# Patient Record
Sex: FEMALE | Race: WHITE | HISPANIC OR LATINO | Employment: OTHER | URBAN - METROPOLITAN AREA
[De-identification: names, ages, dates, MRNs, and addresses within clinical notes are randomized per-mention and may not be internally consistent; named-entity substitution may affect disease eponyms.]

---

## 2017-03-22 ENCOUNTER — APPOINTMENT (OUTPATIENT)
Dept: LAB | Facility: CLINIC | Age: 37
End: 2017-03-22
Attending: OBSTETRICS & GYNECOLOGY
Payer: COMMERCIAL

## 2017-03-22 ENCOUNTER — OFFICE VISIT (OUTPATIENT)
Dept: OBGYN | Facility: CLINIC | Age: 37
End: 2017-03-22
Attending: OBSTETRICS & GYNECOLOGY
Payer: COMMERCIAL

## 2017-03-22 VITALS
HEART RATE: 79 BPM | DIASTOLIC BLOOD PRESSURE: 83 MMHG | SYSTOLIC BLOOD PRESSURE: 129 MMHG | HEIGHT: 70 IN | BODY MASS INDEX: 33.64 KG/M2 | WEIGHT: 235 LBS

## 2017-03-22 DIAGNOSIS — N92.0 EXCESSIVE OR FREQUENT MENSTRUATION: ICD-10-CM

## 2017-03-22 DIAGNOSIS — Z20.2 EXPOSURE TO STD: ICD-10-CM

## 2017-03-22 DIAGNOSIS — R10.2 PELVIC PAIN IN FEMALE: Primary | ICD-10-CM

## 2017-03-22 LAB
ERYTHROCYTE [DISTWIDTH] IN BLOOD BY AUTOMATED COUNT: 13.1 % (ref 10–15)
HCT VFR BLD AUTO: 41.1 % (ref 35–47)
HGB BLD-MCNC: 13.7 G/DL (ref 11.7–15.7)
MCH RBC QN AUTO: 29.8 PG (ref 26.5–33)
MCHC RBC AUTO-ENTMCNC: 33.3 G/DL (ref 31.5–36.5)
MCV RBC AUTO: 89 FL (ref 78–100)
PLATELET # BLD AUTO: 301 10E9/L (ref 150–450)
RBC # BLD AUTO: 4.6 10E12/L (ref 3.8–5.2)
TSH SERPL DL<=0.005 MIU/L-ACNC: 0.98 MU/L (ref 0.4–4)
WBC # BLD AUTO: 10.3 10E9/L (ref 4–11)

## 2017-03-22 PROCEDURE — 85027 COMPLETE CBC AUTOMATED: CPT | Performed by: OBSTETRICS & GYNECOLOGY

## 2017-03-22 PROCEDURE — 87591 N.GONORRHOEAE DNA AMP PROB: CPT | Performed by: OBSTETRICS & GYNECOLOGY

## 2017-03-22 PROCEDURE — 87491 CHLMYD TRACH DNA AMP PROBE: CPT | Performed by: OBSTETRICS & GYNECOLOGY

## 2017-03-22 PROCEDURE — 36415 COLL VENOUS BLD VENIPUNCTURE: CPT | Performed by: OBSTETRICS & GYNECOLOGY

## 2017-03-22 PROCEDURE — 84443 ASSAY THYROID STIM HORMONE: CPT | Performed by: OBSTETRICS & GYNECOLOGY

## 2017-03-22 PROCEDURE — 99212 OFFICE O/P EST SF 10 MIN: CPT | Mod: ZF

## 2017-03-22 NOTE — MR AVS SNAPSHOT
After Visit Summary   3/22/2017    Maryanne Crockett    MRN: [id number]           Patient Information     Date Of Birth          1980        Visit Information        Provider Department      3/22/2017 2:00 PM Adrianne Padilla MD [facility]        Today's Diagnoses     Pelvic pain in female    -  1    Excessive or frequent menstruation        Exposure to STD           Follow-ups after your visit        Future tests that were ordered for you today     Open Future Orders        Priority Expected Expires Ordered    US GYN Complete Transvaginal - 59544 (In Clinic) Routine  7/20/2017 3/22/2017            Who to contact     Please call your clinic at [phone] to:    Ask questions about your health    Make or cancel appointments    Discuss your medicines    Learn about your test results    Speak to your doctor   If you have compliments or concerns about an experience at your clinic, or if you wish to file a complaint, please contact [facility] Patient Relations at [phone] or email us at [email or web address]         Additional Information About Your Visit        MyChart Information     [employer] gives you secure access to your electronic health record. If you see a primary care provider, you can also send messages to your care team and make appointments. If you have questions, please call your primary care clinic.  If you do not have a primary care provider, please call [phone] and they will assist you.      L[employer]iy is an electronic gateway that provides easy, online access to your medical records. With [employer], you can request a clinic appointment, read your test results, renew a prescription or communicate with your care team.     To access your existing account, please contact your [facility] or call [phone] for assistance.        Care EveryWhere ID     This is your Care EveryWhere ID. This  "could be used by other organizations to access your [facility] medical records  [id number]        Your Vitals Were     Pulse Height Last Period Breastfeeding? BMI (Body Mass Index)       79 1.778 m (5' 10\") 03/17/2017 (Exact Date) No 33.72 kg/m2        Blood Pressure from Last 3 Encounters:   03/22/17 129/83   08/11/16 161/90   08/05/16 108/73    Weight from Last 3 Encounters:   03/22/17 106.6 kg (235 lb)   08/11/16 106.7 kg (235 lb 4.8 oz)   08/05/16 108.5 kg (239 lb 3.2 oz)              We Performed the Following     CBC with Platelets     Chlamydia trachomatis PCR (Clinic Collect)     Gonorrhorea by PCR     TSH with free T4 reflex        Primary Care Provider Office Phone # Fax #    Shoshana Hyde [phone]       [place] [address]        Thank you!     Thank you for choosing [facility]  for your care. Our goal is always to provide you with excellent care. Hearing back from our patients is one way we can continue to improve our services. Please take a few minutes to complete the written survey that you may receive in the mail after your visit with us. Thank you!             Your Updated Medication List - Protect others around you: Learn how to safely use, store and throw away your medicines at www.disposemymeds.org.          This list is accurate as of: 3/22/17  5:12 PM.  Always use your most recent med list.                   Brand Name Dispense Instructions for use    acetaminophen 160 MG/5ML solution    TYLENOL     Take 325 mg by mouth every 4 hours as needed for fever or mild pain       ibuprofen 600 MG tablet    ADVIL/MOTRIN    40 tablet    Take 1 tablet (600 mg) by mouth every 6 hours as needed for pain (mild)       ondansetron 4 MG ODT tab    ZOFRAN-ODT    20 tablet    Take 1-2 tablets (4-8 mg) by mouth every 8 hours as needed for nausea Dissolve ON the tongue.       SAPHRIS SL          VENTOLIN  (90 BASE) " MCG/ACT Inhaler   Generic drug:  albuterol      as needed       ZOFRAN PO      Take 4 mg by mouth 2 times daily as needed for nausea or vomiting Reported on 3/22/2017

## 2017-03-22 NOTE — PROGRESS NOTES
"[facility]    S: Ms. Maryanne Crockett is a 36 year old  with history of chronic pain, ovarian cysts, bilateral salpingostomy and right oophorectomy who comes in today for increasing abdominal pain. She has a long history of narcotic use for chronic pain after surgery in her hand. In 2016 she had a right oophorectomy for ovarian torsion which resulted in escalating pain medication needs. At one point she was taking 8-10 5-325 Percocet daily for pain. She signed a pain contract and was able to wean off all pain medications in December.     Recently she has been having increased bilateral lower abdominal pain that she describes as burning in quality. The pain is improved with lying still in the fetal position and heat packs. She also notes that the pain was better when she was on Percocet. She has been experiencing the pain 15-20 days a month and it is negatively affecting her life as she rarely leaves the house do to the high level of discomfort. She would like to figure out her source of pain and come up with a solution to it.     In addition she reports very heavy periods that date back to this past August when she had the oophorectomy. She formerly had periods of 4-5 days with mild amount of bleeding. Since that time she has been having periods of 6-7 days that have required changing her tampon as often as every hour. On multiple occasions she has bleed though her clothes. She also notes that she has been having bloody noses at increased frequency. She had not previous history of bleeding or clotting disorders. She has been taking Ibuprofen Q6hrs for her abdominal pain. She also smokes 4-5 cigarettes daily     O:  /83 (BP Location: Left arm, Patient Position: Chair)  Pulse 79  Ht 1.778 m (5' 10\")  Wt 106.6 kg (235 lb)  LMP 2017 (Exact Date)  Breastfeeding? No  BMI 33.72 kg/m2  Gen: Well-appearing, NAD  HEENT: Normocephalic, atraumatic  CV:  RRR, no m/r/g auscultated  Pulm: CTAB, " no w/r/r auscultated  Abd: Soft, obese, low transverse abdominal scar, minimally -tender, non-distended    Pelvic:  Normal appearing external female genitalia. Normal hair distribution. Vagina is without lesions. No discharge or blood, Cervix multiparous, no lesions, no cervical motion tenderness. Uterus is small, mobile, non-tender. No adnexal tenderness or masses    A/P:  Ms. Maryanne Crockett is a 36 year old  with history of chronic pain, ovarian cysts, bilateral salpingostomy and right oophorectomy who comes in today for increasing abdominal pain. Given her previous history of heavy narcotic usage resulting in confinement to single provide, single pharmacy pain contract, there suspicion for drug seeking behavior. Additionally she is having heavy periods since this past August     #Increased menstrual bleeding: Pt reports heavy bleeding during period since August. She additionally reports increased frequency of bloody noses and increased bruising. Differential diagnosis includes anatomic causes like uterine fibroids, endometriosis, adenomyosis, or endometrial hyperplasia vs coagulation issues.   - Hemoglobin, platelets  - TSH  - Ultrasound     #Abdominal pain: History of chronic pain with narcotic abuse. Concern for drug seeking behavior. Other diagnosis include endometriosis, pelvic adhesions, GI issues.  -  Ultrasound   -  Follow up after US to discuss further treatment options, will avoid usage of narcotics      ISRRAEL SCHULTZ, scribed this note in the presence of Dr. Adrianne Padilla MD based on history, physical exam and discussion with the patient.     Adrianne SCHULTZ, personally performed the services described in this documentation, as scribed by Dominguez Barclay in my presence, and it is both accurate and complete.   Adrianne Padilla MD

## 2017-03-23 LAB
C TRACH DNA SPEC QL NAA+PROBE: NORMAL
N GONORRHOEA DNA SPEC QL NAA+PROBE: NORMAL
SPECIMEN SOURCE: NORMAL
SPECIMEN SOURCE: NORMAL

## 2017-06-24 ENCOUNTER — HEALTH MAINTENANCE LETTER (OUTPATIENT)
Age: 37
End: 2017-06-24

## 2018-01-30 ENCOUNTER — HOSPITAL ENCOUNTER (EMERGENCY)
Facility: CLINIC | Age: 38
Discharge: HOME | End: 2018-01-31
Attending: EMERGENCY MEDICINE | Admitting: EMERGENCY MEDICINE
Payer: COMMERCIAL

## 2018-01-30 DIAGNOSIS — B96.89 BACTERIAL VAGINOSIS: ICD-10-CM

## 2018-01-30 DIAGNOSIS — N76.0 BACTERIAL VAGINOSIS: ICD-10-CM

## 2018-01-30 DIAGNOSIS — R10.2 PELVIC PAIN IN FEMALE: ICD-10-CM

## 2018-01-30 PROCEDURE — 99285 EMERGENCY DEPT VISIT HI MDM: CPT | Mod: 25 | Performed by: EMERGENCY MEDICINE

## 2018-01-30 PROCEDURE — 81001 URINALYSIS AUTO W/SCOPE: CPT | Performed by: FAMILY MEDICINE

## 2018-01-30 PROCEDURE — 99284 EMERGENCY DEPT VISIT MOD MDM: CPT | Mod: GC | Performed by: EMERGENCY MEDICINE

## 2018-01-30 PROCEDURE — 81025 URINE PREGNANCY TEST: CPT | Performed by: FAMILY MEDICINE

## 2018-01-30 NOTE — ED AVS SNAPSHOT
[facility], Emergency Department    [address]    [facility] [address]    Phone:  [phone]    Fax:  [phone]                                       Maryanne Crockett   MRN: [id number]    Department:  [facility], Emergency Department   Date of Visit:  1/30/2018           After Visit Summary Signature Page     I have received my discharge instructions, and my questions have been answered. I have discussed any challenges I see with this plan with the nurse or doctor.    ..........................................................................................................................................  Patient/Patient Representative Signature      ..........................................................................................................................................  Patient Representative Print Name and Relationship to Patient    ..................................................               ................................................  Date                                            Time    ..........................................................................................................................................  Reviewed by Signature/Title    ...................................................              ..............................................  Date                                                            Time

## 2018-01-30 NOTE — ED AVS SNAPSHOT
[facility], Emergency Department    [address]    [place] [address]    Phone:  [phone]    Fax:  [phone]                                       Maryanne Crockett   MRN: [id number]    Department:  [facility], Emergency Department   Date of Visit:  1/30/2018           Patient Information     Date Of Birth          1980        Your diagnoses for this visit were:     Pelvic pain in female     Bacterial vaginosis        You were seen by Sanam Soria MD.        Discharge Instructions         Abdominal Pain    Abdominal pain is pain in the stomach or belly area. Everyone has this pain from time to time. In many cases it goes away on its own. But abdominal pain can sometimes be due to a serious problem, such as appendicitis. So it s important to know when to seek help.  Causes of abdominal pain  There are many possible causes of abdominal pain. Common causes in adults include:    Constipation, diarrhea, or gas    Stomach acid flowing back up into the esophagus (acid reflux or heartburn)    Severe acid reflux, called GERD (gastroesophageal reflux disease)    A sore in the lining of the stomach or small intestine (peptic ulcer)    Inflammation of the gallbladder, liver, or pancreas    Gallstones or kidney stones    Appendicitis     Intestinal blockage     An internal organ pushing through a muscle or other tissue (hernia)    Urinary tract infections    In women, menstrual cramps, fibroids, or endometriosis    Inflammation or infection of the intestines  Diagnosing the cause of abdominal pain  Your healthcare provider will do a physical exam help find the cause of your pain. If needed, tests will be ordered. Belly pain has many possible causes. So it can be hard to find the reason for your pain. Giving details about your pain can help. Tell your provider where and when you feel the pain, and what makes it better or worse. Also let your provider know if you have other symptoms such  as:    Fever    Tiredness    Upset stomach (nausea)    Vomiting    Changes in bathroom habits  Treating abdominal pain  Some causes of pain need emergency medical treatment right away. These include appendicitis or a bowel blockage. Other problems can be treated with rest, fluids, or medicines. Your healthcare provider can give you specific instructions for treatment or self-care based on what is causing your pain.  If you have vomiting or diarrhea, sip water or other clear fluids. When you are ready to eat solid foods again, start with small amounts of easy-to-digest, low-fat foods. These include apple sauce, toast, or crackers.   When to seek medical care  Call 911 or go to the hospital right away if you:    Can t pass stool and are vomiting    Are vomiting blood or have bloody diarrhea or black, tarry diarrhea    Have chest, neck, or shoulder pain    Feel like you might pass out    Have pain in your shoulder blades with nausea    Have sudden, severe belly pain    Have new, severe pain unlike any you have felt before    Have a belly that is rigid, hard, and tender to touch  Call your healthcare provider if you have:    Pain for more than 5 days    Bloating for more than 2 days    Diarrhea for more than 5 days    A fever of 100.4 F (38.0 C) or higher, or as directed by your provider    Pain that gets worse    Weight loss for no reason    Continued lack of appetite    Blood in your stool  How to prevent abdominal pain  Here are some tips to help prevent abdominal pain:    Eat smaller amounts of food at one time.    Avoid greasy, fried, or other high-fat foods.    Avoid foods that give you gas.    Exercise regularly.    Drink plenty of fluids.  To help prevent GERD symptoms:    Quit smoking.    Reduce alcohol and certain foods that increase stomach acid.    Avoid aspirin and over-the-counter pain and fever medicines (NSAIDS or nonsteroidal anti-inflammatory drugs), if possible    Lose extra weight.    Finish eating  at least 2 hours before you go to bed or lie down.    Raise the head of your bed.  Date Last Reviewed: 7/1/2016 2000-2017 [employer]. [address] [place] [address], [place] [address]. All rights reserved. This information is not intended as a substitute for professional medical care. Always follow your healthcare professional's instructions.      Please make an appointment to follow up with [facility] (phone: [phone]) within a week. Return to the ER with new or worsening symptoms.     Follow up with Psychiatry - call [phone] ([facility]).     24 Hour Appointment Hotline       To make an appointment at any [facility], call [phone] ([phone]). If you don't have a family doctor or clinic, we will help you find one. [facility] clinics are conveniently located to serve the needs of you and your family.             Review of your medicines      START taking        Dose / Directions Last dose taken    metroNIDAZOLE 500 MG tablet   Commonly known as:  FLAGYL   Dose:  500 mg   Quantity:  14 tablet        Take 1 tablet (500 mg) by mouth 2 times daily for 7 days   Refills:  0          CONTINUE these medicines which may have CHANGED, or have new prescriptions. If we are uncertain of the size of tablets/capsules you have at home, strength may be listed as something that might have changed.        Dose / Directions Last dose taken    ibuprofen 600 MG tablet   Commonly known as:  ADVIL/MOTRIN   Dose:  600 mg   What changed:  reasons to take this   Quantity:  20 tablet        Take 1 tablet (600 mg) by mouth every 6 hours as needed for moderate pain   Refills:  0          Our records show that you are taking the medicines listed below. If these are incorrect, please call your family doctor or clinic.        Dose / Directions Last dose taken    acetaminophen 32 mg/mL solution   Commonly known as:  TYLENOL   Dose:  325 mg        Take 325 mg by mouth  every 4 hours as needed for fever or mild pain   Refills:  0        ondansetron 4 MG ODT tab   Commonly known as:  ZOFRAN-ODT   Dose:  4-8 mg   Quantity:  20 tablet        Take 1-2 tablets (4-8 mg) by mouth every 8 hours as needed for nausea Dissolve ON the tongue.   Refills:  0        SAPHRIS SL        Refills:  0        VENTOLIN  (90 BASE) MCG/ACT Inhaler   Generic drug:  albuterol        as needed   Refills:  5        ZOFRAN PO   Dose:  4 mg   Indication:  Bulimia        Take 4 mg by mouth 2 times daily as needed for nausea or vomiting Reported on 3/22/2017   Refills:  0                Prescriptions were sent or printed at these locations (2 Prescriptions)                   Other Prescriptions                Printed at Department/Unit printer (2 of 2)         metroNIDAZOLE (FLAGYL) 500 MG tablet               ibuprofen (ADVIL/MOTRIN) 600 MG tablet                Procedures and tests performed during your visit     Basic metabolic panel    CBC with platelets differential    CT Abdomen Pelvis w Contrast    Chlamydia trachomatis PCR    HCG qualitative urine    Neisseria gonorrhoea PCR    Peripheral IV catheter    Prep for procedure    UA with Microscopic reflex to Culture    US Pelvis Cmplt w Transvag & Doppler LmtPel Duplex Limited    Wet prep      Orders Needing Specimen Collection     None      Pending Results     Date and Time Order Name Status Description    1/31/2018 0241 CT Abdomen Pelvis w Contrast Preliminary     1/31/2018 0118 US Pelvis Cmplt w Transvag & Doppler LmtPel Duplex Limited Preliminary     1/31/2018 0032 Chlamydia trachomatis PCR In process     1/31/2018 0032 Neisseria gonorrhoea PCR In process             Pending Culture Results     Date and Time Order Name Status Description    1/31/2018 0032 Chlamydia trachomatis PCR In process     1/31/2018 0032 Neisseria gonorrhoea PCR In process             Pending Results Instructions     If you had any lab results that were not finalized at the  time of your Discharge, you can call the ED Lab Result RN at [phone]. You will be contacted by this team for any positive Lab results or changes in treatment. The nurses are available 7 days a week from 10A to 6:30P.  You can leave a message 24 hours per day and they will return your call.        Thank you for choosing [facility]       Thank you for choosing [facility] for your care. Our goal is always to provide you with excellent care. Hearing back from our patients is one way we can continue to improve our services. Please take a few minutes to complete the written survey that you may receive in the mail after you visit with us. Thank you!        "[employer] Information     [email or web address] gives you secure access to your electronic health record. If you see a primary care provider, you can also send messages to your care team and make appointments. If you have questions, please call your primary care clinic.  If you do not have a primary care provider, please call [phone] and they will assist you.        Care EveryWhere ID     This is your Care EveryWhere ID. This could be used by other organizations to access your [facility] medical records  [id number]        Equal Access to Services     LARRY CARRILLO : Hadtom Kennedy, ede bruce, yakov peoples. So [facility] [phone].    ATENCIÓN: Si habla español, tiene a lilly disposición servicios gratuitos de asistencia lingüística. Tank al [phone].    We comply with applicable federal civil rights laws and [place] laws. We do not discriminate on the basis of race, color, national origin, age, disability, sex, sexual orientation, or gender identity.            After Visit Summary       This is your record. Keep this with you and show to your community pharmacist(s) and doctor(s) at your next visit.

## 2018-01-31 ENCOUNTER — APPOINTMENT (OUTPATIENT)
Dept: ULTRASOUND IMAGING | Facility: CLINIC | Age: 38
End: 2018-01-31
Attending: EMERGENCY MEDICINE
Payer: COMMERCIAL

## 2018-01-31 ENCOUNTER — APPOINTMENT (OUTPATIENT)
Dept: CT IMAGING | Facility: CLINIC | Age: 38
End: 2018-01-31
Attending: EMERGENCY MEDICINE
Payer: COMMERCIAL

## 2018-01-31 VITALS
RESPIRATION RATE: 16 BRPM | OXYGEN SATURATION: 97 % | DIASTOLIC BLOOD PRESSURE: 62 MMHG | HEART RATE: 72 BPM | BODY MASS INDEX: 33.58 KG/M2 | WEIGHT: 234 LBS | SYSTOLIC BLOOD PRESSURE: 98 MMHG | TEMPERATURE: 97.5 F

## 2018-01-31 LAB
ALBUMIN UR-MCNC: NEGATIVE MG/DL
ANION GAP SERPL CALCULATED.3IONS-SCNC: 5 MMOL/L (ref 3–14)
APPEARANCE UR: CLEAR
BASOPHILS # BLD AUTO: 0.1 10E9/L (ref 0–0.2)
BASOPHILS NFR BLD AUTO: 0.8 %
BILIRUB UR QL STRIP: NEGATIVE
BUN SERPL-MCNC: 9 MG/DL (ref 7–30)
C TRACH DNA SPEC QL NAA+PROBE: NEGATIVE
CALCIUM SERPL-MCNC: 8.7 MG/DL (ref 8.5–10.1)
CHLORIDE SERPL-SCNC: 104 MMOL/L (ref 94–109)
CO2 SERPL-SCNC: 31 MMOL/L (ref 20–32)
COLOR UR AUTO: ABNORMAL
CREAT SERPL-MCNC: 0.68 MG/DL (ref 0.52–1.04)
DIFFERENTIAL METHOD BLD: NORMAL
EOSINOPHIL # BLD AUTO: 0.6 10E9/L (ref 0–0.7)
EOSINOPHIL NFR BLD AUTO: 6.9 %
ERYTHROCYTE [DISTWIDTH] IN BLOOD BY AUTOMATED COUNT: 13.4 % (ref 10–15)
GFR SERPL CREATININE-BSD FRML MDRD: >90 ML/MIN/1.7M2
GLUCOSE SERPL-MCNC: 98 MG/DL (ref 70–99)
GLUCOSE UR STRIP-MCNC: NEGATIVE MG/DL
HCG UR QL: NEGATIVE
HCT VFR BLD AUTO: 40.9 % (ref 35–47)
HGB BLD-MCNC: 13.4 G/DL (ref 11.7–15.7)
HGB UR QL STRIP: NEGATIVE
IMM GRANULOCYTES # BLD: 0 10E9/L (ref 0–0.4)
IMM GRANULOCYTES NFR BLD: 0.1 %
KETONES UR STRIP-MCNC: NEGATIVE MG/DL
LEUKOCYTE ESTERASE UR QL STRIP: NEGATIVE
LYMPHOCYTES # BLD AUTO: 3 10E9/L (ref 0.8–5.3)
LYMPHOCYTES NFR BLD AUTO: 35.1 %
MCH RBC QN AUTO: 28.3 PG (ref 26.5–33)
MCHC RBC AUTO-ENTMCNC: 32.8 G/DL (ref 31.5–36.5)
MCV RBC AUTO: 86 FL (ref 78–100)
MONOCYTES # BLD AUTO: 0.5 10E9/L (ref 0–1.3)
MONOCYTES NFR BLD AUTO: 6.3 %
MUCOUS THREADS #/AREA URNS LPF: PRESENT /LPF
N GONORRHOEA DNA SPEC QL NAA+PROBE: NEGATIVE
NEUTROPHILS # BLD AUTO: 4.3 10E9/L (ref 1.6–8.3)
NEUTROPHILS NFR BLD AUTO: 50.8 %
NITRATE UR QL: NEGATIVE
NRBC # BLD AUTO: 0 10*3/UL
NRBC BLD AUTO-RTO: 0 /100
PH UR STRIP: 6 PH (ref 5–7)
PLATELET # BLD AUTO: 305 10E9/L (ref 150–450)
POTASSIUM SERPL-SCNC: 3.9 MMOL/L (ref 3.4–5.3)
RBC # BLD AUTO: 4.74 10E12/L (ref 3.8–5.2)
RBC #/AREA URNS AUTO: <1 /HPF (ref 0–2)
SODIUM SERPL-SCNC: 140 MMOL/L (ref 133–144)
SOURCE: ABNORMAL
SP GR UR STRIP: 1 (ref 1–1.03)
SPECIMEN SOURCE: ABNORMAL
SPECIMEN SOURCE: NORMAL
SPECIMEN SOURCE: NORMAL
SQUAMOUS #/AREA URNS AUTO: 2 /HPF (ref 0–1)
UROBILINOGEN UR STRIP-MCNC: NORMAL MG/DL (ref 0–2)
WBC # BLD AUTO: 8.4 10E9/L (ref 4–11)
WBC #/AREA URNS AUTO: 0 /HPF (ref 0–2)
WET PREP SPEC: ABNORMAL

## 2018-01-31 PROCEDURE — 74177 CT ABD & PELVIS W/CONTRAST: CPT

## 2018-01-31 PROCEDURE — 25000132 ZZH RX MED GY IP 250 OP 250 PS 637: Performed by: EMERGENCY MEDICINE

## 2018-01-31 PROCEDURE — 96375 TX/PRO/DX INJ NEW DRUG ADDON: CPT | Performed by: EMERGENCY MEDICINE

## 2018-01-31 PROCEDURE — 25000128 H RX IP 250 OP 636: Performed by: EMERGENCY MEDICINE

## 2018-01-31 PROCEDURE — 87491 CHLMYD TRACH DNA AMP PROBE: CPT | Performed by: STUDENT IN AN ORGANIZED HEALTH CARE EDUCATION/TRAINING PROGRAM

## 2018-01-31 PROCEDURE — 93976 VASCULAR STUDY: CPT | Mod: XS

## 2018-01-31 PROCEDURE — 25000125 ZZHC RX 250: Performed by: EMERGENCY MEDICINE

## 2018-01-31 PROCEDURE — 96374 THER/PROPH/DIAG INJ IV PUSH: CPT | Mod: 59 | Performed by: EMERGENCY MEDICINE

## 2018-01-31 PROCEDURE — 87210 SMEAR WET MOUNT SALINE/INK: CPT | Performed by: STUDENT IN AN ORGANIZED HEALTH CARE EDUCATION/TRAINING PROGRAM

## 2018-01-31 PROCEDURE — 25000128 H RX IP 250 OP 636: Performed by: STUDENT IN AN ORGANIZED HEALTH CARE EDUCATION/TRAINING PROGRAM

## 2018-01-31 PROCEDURE — 87591 N.GONORRHOEAE DNA AMP PROB: CPT | Performed by: STUDENT IN AN ORGANIZED HEALTH CARE EDUCATION/TRAINING PROGRAM

## 2018-01-31 PROCEDURE — 85025 COMPLETE CBC W/AUTO DIFF WBC: CPT | Performed by: STUDENT IN AN ORGANIZED HEALTH CARE EDUCATION/TRAINING PROGRAM

## 2018-01-31 PROCEDURE — 80048 BASIC METABOLIC PNL TOTAL CA: CPT | Performed by: EMERGENCY MEDICINE

## 2018-01-31 RX ORDER — IOPAMIDOL 755 MG/ML
100 INJECTION, SOLUTION INTRAVASCULAR ONCE
Status: COMPLETED | OUTPATIENT
Start: 2018-01-31 | End: 2018-01-31

## 2018-01-31 RX ORDER — IBUPROFEN 600 MG/1
600 TABLET, FILM COATED ORAL EVERY 6 HOURS PRN
Qty: 20 TABLET | Refills: 0 | Status: SHIPPED | OUTPATIENT
Start: 2018-01-31 | End: 2018-03-21

## 2018-01-31 RX ORDER — ONDANSETRON 2 MG/ML
4 INJECTION INTRAMUSCULAR; INTRAVENOUS EVERY 30 MIN PRN
Status: DISCONTINUED | OUTPATIENT
Start: 2018-01-31 | End: 2018-01-31 | Stop reason: HOSPADM

## 2018-01-31 RX ORDER — METRONIDAZOLE 500 MG/1
500 TABLET ORAL 2 TIMES DAILY
Qty: 14 TABLET | Refills: 0 | Status: SHIPPED | OUTPATIENT
Start: 2018-01-31 | End: 2018-02-07

## 2018-01-31 RX ORDER — OXYCODONE HYDROCHLORIDE 5 MG/1
5 TABLET ORAL ONCE
Status: COMPLETED | OUTPATIENT
Start: 2018-01-31 | End: 2018-01-31

## 2018-01-31 RX ORDER — KETOROLAC TROMETHAMINE 30 MG/ML
30 INJECTION, SOLUTION INTRAMUSCULAR; INTRAVENOUS ONCE
Status: COMPLETED | OUTPATIENT
Start: 2018-01-31 | End: 2018-01-31

## 2018-01-31 RX ADMIN — OXYCODONE HYDROCHLORIDE 5 MG: 5 TABLET ORAL at 01:33

## 2018-01-31 RX ADMIN — ONDANSETRON 4 MG: 2 INJECTION INTRAMUSCULAR; INTRAVENOUS at 00:51

## 2018-01-31 RX ADMIN — IOPAMIDOL 100 ML: 755 INJECTION, SOLUTION INTRAVENOUS at 04:07

## 2018-01-31 RX ADMIN — SODIUM CHLORIDE 50 ML: 9 INJECTION, SOLUTION INTRAVENOUS at 04:08

## 2018-01-31 RX ADMIN — KETOROLAC TROMETHAMINE 30 MG: 30 INJECTION, SOLUTION INTRAMUSCULAR at 00:51

## 2018-01-31 ASSESSMENT — ENCOUNTER SYMPTOMS
ABDOMINAL PAIN: 1
CONSTIPATION: 0
DIFFICULTY URINATING: 0
VOMITING: 0
CHEST TIGHTNESS: 0
NAUSEA: 1
FEVER: 0
SHORTNESS OF BREATH: 0
DIARRHEA: 0
CONFUSION: 0
DYSURIA: 0
CHILLS: 0
NECK STIFFNESS: 0
HEADACHES: 0

## 2018-01-31 NOTE — ED PROVIDER NOTES
"  History     Chief Complaint   Patient presents with     Abdominal Pain     low abd p[ain last 24 hours, some more on left. pt denies diareha vomiting, fevers.     HPI  Maryanne Crockett is a 37 year old female who presents with chief complaint of lower abdominal pain.  Patient reports pain started yesterday, denies any injury or trauma.  Patient reports pain is worse on the left side but does have some pain on the right side as well.  Pain is cramping in nature, but she reports this is not her.  As she had her period 2 weeks ago.  Patient reports that pain is aggravated by any movement especially walking.  Pain is present all the time but gets significantly exacerbated with movement.  Pain is relieved somewhat by laying down.  Patient reports feeling nauseous but has not vomited.  She denies constipation or diarrhea.  Of note patient has a tubal ligation, and her last menstrual period was approximately 2 weeks ago.  Patient does have a history of STD.  She denies dysuria or vaginal discharge.  Patient also has a history of a right nephrectomy for a \"infected cyst\".  Patient also has a history of cholecystectomy.    I have reviewed the Medications, Allergies, Past Medical and Surgical History, and Social History in the Epic system.    Review of Systems   Constitutional: Negative for chills and fever.   HENT: Negative for congestion.    Respiratory: Negative for chest tightness and shortness of breath.    Cardiovascular: Negative for chest pain.   Gastrointestinal: Positive for abdominal pain and nausea. Negative for constipation, diarrhea and vomiting.   Genitourinary: Negative for decreased urine volume, difficulty urinating, dysuria, menstrual problem, urgency, vaginal bleeding and vaginal discharge.   Musculoskeletal: Negative for neck stiffness.   Neurological: Negative for headaches.   Psychiatric/Behavioral: Negative for confusion.       Physical Exam   BP: 160/81  Pulse: 89  Temp: 98.5  F (36.9  C)  Resp: " 14  Weight: 106.1 kg (234 lb)  SpO2: 99 %      Physical Exam   Constitutional: She is oriented to person, place, and time. She appears well-developed and well-nourished. No distress.   HENT:   Head: Atraumatic.   Mouth/Throat: Oropharynx is clear and moist. No oropharyngeal exudate.   Eyes: Pupils are equal, round, and reactive to light. No scleral icterus.   Cardiovascular: Normal heart sounds and intact distal pulses.    Pulmonary/Chest: Breath sounds normal. No respiratory distress.   Abdominal: Soft. Bowel sounds are normal. She exhibits no distension and no mass. There is tenderness (left lower quadrant). There is no rebound and no guarding.   Genitourinary: Vaginal discharge found.   Genitourinary Comments: Cervix with moderate amount of whitish discharge, left sided adnexal tenderness with bimanual exam, no palpable masses on bimanual exam   Musculoskeletal: She exhibits no edema or tenderness.   Neurological: She is alert and oriented to person, place, and time.   Skin: Skin is warm. No rash noted. She is not diaphoretic.   Psychiatric: She has a normal mood and affect. Her behavior is normal.       ED Course     ED Course     Procedures            Critical Care time:             Labs Ordered and Resulted from Time of ED Arrival Up to the Time of Departure from the ED - No data to display  Results for orders placed or performed during the hospital encounter of 01/30/18   UA with Microscopic reflex to Culture   Result Value Ref Range    Color Urine Light Yellow     Appearance Urine Clear     Glucose Urine Negative NEG^Negative mg/dL    Bilirubin Urine Negative NEG^Negative    Ketones Urine Negative NEG^Negative mg/dL    Specific Gravity Urine 1.003 1.003 - 1.035    Blood Urine Negative NEG^Negative    pH Urine 6.0 5.0 - 7.0 pH    Protein Albumin Urine Negative NEG^Negative mg/dL    Urobilinogen mg/dL Normal 0.0 - 2.0 mg/dL    Nitrite Urine Negative NEG^Negative    Leukocyte Esterase Urine Negative  NEG^Negative    Source Unspecified Urine     WBC Urine 0 0 - 2 /HPF    RBC Urine <1 0 - 2 /HPF    Squamous Epithelial /HPF Urine 2 (H) 0 - 1 /HPF    Mucous Urine Present (A) NEG^Negative /LPF   HCG qualitative urine   Result Value Ref Range    HCG Qual Urine Negative NEG^Negative   CBC with platelets differential   Result Value Ref Range    WBC 8.4 4.0 - 11.0 10e9/L    RBC Count 4.74 3.8 - 5.2 10e12/L    Hemoglobin 13.4 11.7 - 15.7 g/dL    Hematocrit 40.9 35.0 - 47.0 %    MCV 86 78 - 100 fl    MCH 28.3 26.5 - 33.0 pg    MCHC 32.8 31.5 - 36.5 g/dL    RDW 13.4 10.0 - 15.0 %    Platelet Count 305 150 - 450 10e9/L    Diff Method Automated Method     % Neutrophils 50.8 %    % Lymphocytes 35.1 %    % Monocytes 6.3 %    % Eosinophils 6.9 %    % Basophils 0.8 %    % Immature Granulocytes 0.1 %    Nucleated RBCs 0 0 /100    Absolute Neutrophil 4.3 1.6 - 8.3 10e9/L    Absolute Lymphocytes 3.0 0.8 - 5.3 10e9/L    Absolute Monocytes 0.5 0.0 - 1.3 10e9/L    Absolute Eosinophils 0.6 0.0 - 0.7 10e9/L    Absolute Basophils 0.1 0.0 - 0.2 10e9/L    Abs Immature Granulocytes 0.0 0 - 0.4 10e9/L    Absolute Nucleated RBC 0.0          Assessments & Plan (with Medical Decision Making)   Patient is a 37-year-old female who presents with chief complaint of bilateral lower abdominal pain, left greater than right.  Patient reports pain for approximately 24 hours is aggravated by movement.  Physical exam is significant for tenderness in the left lower quadrant.  Bimanual exam was significant for tenderness in the left adnexa no masses palpated.  Differential diagnosis includes diverticulitis, ovarian cyst, ovarian torsion versus musculoskeletal injury.  At the end of my shift patient was pending pelvic ultrasound to evaluate for ovarian torsion or cyst.  Laboratory evaluation is pending for STDs, and wet prep.  Urinalysis and CBC are unremarkable.    I have reviewed the nursing notes.    I have reviewed the findings, diagnosis, plan and need  for follow up with the patient.    New Prescriptions    No medications on file       Final diagnoses:   None     Remy Schuster D.O.  Family Medicine G1  [phone]  1/30/2018   [facility], [place], EMERGENCY DEPARTMENT    This data collected with the Resident working in the Emergency Department.  Patient was seen and evaluated by myself and I repeated the history and physical exam with the patient.  The plan of care was discussed with them.  The key portions of the note including the entire assessment and plan reflect my documentation.      37-year-old female presents to the emergency room today with a complaint of left lower quadrant/left pelvic pain for one days time, gradually worsening.  It is made worse with movement.  It is constant.  No nausea, vomiting, diarrhea, dysuria, hematuria, vaginal discharge, or fever.  She reports that she did have an ovarian torsion on the right several years ago, status post right oophorectomy.  She states this does not feel similar to that.    Exam:  General: awake, alert, NAD  HEENT: NC/AT, oropharynx moist and clear  Neck: supple  Lungs: CTA-B  Heart: RRR, no M/R/G  Abd: soft, ND, moderate left lower quadrant tenderness with some voluntary guarding  Pelvic: Small amount of whitish discharge, + left adnexal tenderness with palpation, no cervical motion tenderness  Ext: non-tender, no edema    Patient did seem fairly tender in the left lower quadrant.  Pelvic ultrasound was done, which showed dominant follicle on the left, normal blood flow, no other abnormalities.  Urinalysis was negative for sign of infection or blood to suggest kidney stone.  CBC and BMP were not remarkable.  Wet prep did come back positive for clue cells, I did discuss treating her with Flagyl, and discussed the strong recommendation that she avoid alcohol during her treatment, and for 2 days after.  UPT was negative.  I did recommend CT, primarily to evaluate for diverticulitis.  CT was unremarkable.  I  did go back and look further into her history.  I do see multiple visits last summer at outside facilities with similar symptoms.  She did have a panniculectomy, at one point they thought she had a seroma.  However, even after this is resolved, she continued to have similar symptoms.  She did have a follow-up with 1 of our gynecology doctors, who suggested the possibility of endometriosis.  Patient also had a history of significant opiate use.  She is currently restricted to 1 prescriber, per the chart.  She was given a single oxycodone while here for pain.  Given the lack of findings on evaluation today, she will be discharged home with ibuprofen.  Although intermittent torsion cannot be completely excluded, the patient states this is not similar to episode of torsion, and, per chart review, it does appear similar to multiple previous visits, without definitive diagnosis.  I do strongly recommend she follow-up with gynecology, as she never followed up after her consultation.  She is instructed to return to the ER with new or worsening symptoms.  She verbalizes understanding, is agreeable to the plan.  Also, she incidentally asked if she could have the phone number to a psychiatrist.  She states she is not suicidal, was just desired to see a psychiatrist.  She was given the phone numbers to [facility], which was provided by the [facility] ([place]) .     Dictation Disclaimer: Some of this Note has been completed with voice-recognition dictation software. Although errors are generally corrected real-time, there is the potential for a rare error to be present in the completed chart.       Sanam Soria MD  01/31/18 0511

## 2018-01-31 NOTE — DISCHARGE INSTRUCTIONS

## 2018-03-21 ENCOUNTER — APPOINTMENT (OUTPATIENT)
Dept: CT IMAGING | Facility: CLINIC | Age: 38
End: 2018-03-21
Attending: EMERGENCY MEDICINE
Payer: COMMERCIAL

## 2018-03-21 ENCOUNTER — HOSPITAL ENCOUNTER (OUTPATIENT)
Facility: CLINIC | Age: 38
Setting detail: OBSERVATION
Discharge: HOME | End: 2018-03-22
Attending: EMERGENCY MEDICINE | Admitting: EMERGENCY MEDICINE
Payer: COMMERCIAL

## 2018-03-21 DIAGNOSIS — R10.9 ABDOMINAL PAIN, UNSPECIFIED ABDOMINAL LOCATION: ICD-10-CM

## 2018-03-21 DIAGNOSIS — R11.2 INTRACTABLE VOMITING WITH NAUSEA, UNSPECIFIED VOMITING TYPE: ICD-10-CM

## 2018-03-21 LAB
ALBUMIN SERPL-MCNC: 3.8 G/DL (ref 3.4–5)
ALBUMIN UR-MCNC: 30 MG/DL
ALP SERPL-CCNC: 81 U/L (ref 40–150)
ALT SERPL W P-5'-P-CCNC: 14 U/L (ref 0–50)
AMPHETAMINES UR QL SCN: NEGATIVE
ANION GAP SERPL CALCULATED.3IONS-SCNC: 13 MMOL/L (ref 3–14)
APPEARANCE UR: ABNORMAL
AST SERPL W P-5'-P-CCNC: 11 U/L (ref 0–45)
BACTERIA #/AREA URNS HPF: ABNORMAL /HPF
BARBITURATES UR QL: NEGATIVE
BASOPHILS # BLD AUTO: 0 10E9/L (ref 0–0.2)
BASOPHILS NFR BLD AUTO: 0.3 %
BENZODIAZ UR QL: NEGATIVE
BILIRUB SERPL-MCNC: 0.4 MG/DL (ref 0.2–1.3)
BILIRUB UR QL STRIP: NEGATIVE
BUN SERPL-MCNC: 10 MG/DL (ref 7–30)
CALCIUM SERPL-MCNC: 8.9 MG/DL (ref 8.5–10.1)
CANNABINOIDS UR QL SCN: NEGATIVE
CHLORIDE SERPL-SCNC: 106 MMOL/L (ref 94–109)
CO2 SERPL-SCNC: 21 MMOL/L (ref 20–32)
COCAINE UR QL: NEGATIVE
COLOR UR AUTO: YELLOW
CREAT SERPL-MCNC: 0.62 MG/DL (ref 0.52–1.04)
DIFFERENTIAL METHOD BLD: ABNORMAL
EOSINOPHIL # BLD AUTO: 0 10E9/L (ref 0–0.7)
EOSINOPHIL NFR BLD AUTO: 0 %
ERYTHROCYTE [DISTWIDTH] IN BLOOD BY AUTOMATED COUNT: 12.9 % (ref 10–15)
ETHANOL UR QL SCN: NEGATIVE
GFR SERPL CREATININE-BSD FRML MDRD: >90 ML/MIN/1.7M2
GLUCOSE SERPL-MCNC: 161 MG/DL (ref 70–99)
GLUCOSE UR STRIP-MCNC: NEGATIVE MG/DL
HCG SERPL QL: NEGATIVE
HCT VFR BLD AUTO: 41.6 % (ref 35–47)
HGB BLD-MCNC: 14.5 G/DL (ref 11.7–15.7)
HGB UR QL STRIP: ABNORMAL
IMM GRANULOCYTES # BLD: 0 10E9/L (ref 0–0.4)
IMM GRANULOCYTES NFR BLD: 0.2 %
KETONES UR STRIP-MCNC: 40 MG/DL
LACTATE BLD-SCNC: 1.7 MMOL/L (ref 0.7–2)
LEUKOCYTE ESTERASE UR QL STRIP: NEGATIVE
LIPASE SERPL-CCNC: 149 U/L (ref 73–393)
LYMPHOCYTES # BLD AUTO: 1.8 10E9/L (ref 0.8–5.3)
LYMPHOCYTES NFR BLD AUTO: 14.7 %
MCH RBC QN AUTO: 29.4 PG (ref 26.5–33)
MCHC RBC AUTO-ENTMCNC: 34.9 G/DL (ref 31.5–36.5)
MCV RBC AUTO: 84 FL (ref 78–100)
MONOCYTES # BLD AUTO: 0.3 10E9/L (ref 0–1.3)
MONOCYTES NFR BLD AUTO: 2.8 %
MUCOUS THREADS #/AREA URNS LPF: PRESENT /LPF
NEUTROPHILS # BLD AUTO: 9.8 10E9/L (ref 1.6–8.3)
NEUTROPHILS NFR BLD AUTO: 82 %
NITRATE UR QL: NEGATIVE
NRBC # BLD AUTO: 0 10*3/UL
NRBC BLD AUTO-RTO: 0 /100
OPIATES UR QL SCN: NEGATIVE
PH UR STRIP: 6 PH (ref 5–7)
PLATELET # BLD AUTO: 298 10E9/L (ref 150–450)
POTASSIUM SERPL-SCNC: 3.9 MMOL/L (ref 3.4–5.3)
PROT SERPL-MCNC: 8.1 G/DL (ref 6.8–8.8)
RBC # BLD AUTO: 4.94 10E12/L (ref 3.8–5.2)
RBC #/AREA URNS AUTO: 20 /HPF (ref 0–2)
SODIUM SERPL-SCNC: 140 MMOL/L (ref 133–144)
SOURCE: ABNORMAL
SP GR UR STRIP: 1.02 (ref 1–1.03)
SQUAMOUS #/AREA URNS AUTO: 8 /HPF (ref 0–1)
UROBILINOGEN UR STRIP-MCNC: 2 MG/DL (ref 0–2)
WBC # BLD AUTO: 12 10E9/L (ref 4–11)
WBC #/AREA URNS AUTO: 2 /HPF (ref 0–5)

## 2018-03-21 PROCEDURE — G0378 HOSPITAL OBSERVATION PER HR: HCPCS

## 2018-03-21 PROCEDURE — 83690 ASSAY OF LIPASE: CPT | Performed by: EMERGENCY MEDICINE

## 2018-03-21 PROCEDURE — 96375 TX/PRO/DX INJ NEW DRUG ADDON: CPT | Mod: 59

## 2018-03-21 PROCEDURE — 96376 TX/PRO/DX INJ SAME DRUG ADON: CPT

## 2018-03-21 PROCEDURE — 96374 THER/PROPH/DIAG INJ IV PUSH: CPT | Mod: 59

## 2018-03-21 PROCEDURE — 81001 URINALYSIS AUTO W/SCOPE: CPT | Performed by: EMERGENCY MEDICINE

## 2018-03-21 PROCEDURE — 80053 COMPREHEN METABOLIC PANEL: CPT | Performed by: EMERGENCY MEDICINE

## 2018-03-21 PROCEDURE — 96372 THER/PROPH/DIAG INJ SC/IM: CPT

## 2018-03-21 PROCEDURE — 84703 CHORIONIC GONADOTROPIN ASSAY: CPT | Performed by: EMERGENCY MEDICINE

## 2018-03-21 PROCEDURE — 99219 ZZC INITIAL OBSERVATION CARE,LEVL II: CPT | Mod: Z6 | Performed by: PHYSICIAN ASSISTANT

## 2018-03-21 PROCEDURE — 80320 DRUG SCREEN QUANTALCOHOLS: CPT | Performed by: EMERGENCY MEDICINE

## 2018-03-21 PROCEDURE — 85025 COMPLETE CBC W/AUTO DIFF WBC: CPT | Performed by: EMERGENCY MEDICINE

## 2018-03-21 PROCEDURE — 25000128 H RX IP 250 OP 636: Performed by: EMERGENCY MEDICINE

## 2018-03-21 PROCEDURE — 83605 ASSAY OF LACTIC ACID: CPT | Performed by: EMERGENCY MEDICINE

## 2018-03-21 PROCEDURE — 25000128 H RX IP 250 OP 636: Performed by: NURSE PRACTITIONER

## 2018-03-21 PROCEDURE — 25000132 ZZH RX MED GY IP 250 OP 250 PS 637: Performed by: FAMILY MEDICINE

## 2018-03-21 PROCEDURE — 99285 EMERGENCY DEPT VISIT HI MDM: CPT | Mod: 25

## 2018-03-21 PROCEDURE — 96361 HYDRATE IV INFUSION ADD-ON: CPT

## 2018-03-21 PROCEDURE — 25000132 ZZH RX MED GY IP 250 OP 250 PS 637: Performed by: PHYSICIAN ASSISTANT

## 2018-03-21 PROCEDURE — 25000125 ZZHC RX 250: Performed by: PHYSICIAN ASSISTANT

## 2018-03-21 PROCEDURE — 87086 URINE CULTURE/COLONY COUNT: CPT | Performed by: NURSE PRACTITIONER

## 2018-03-21 PROCEDURE — 25000125 ZZHC RX 250: Performed by: EMERGENCY MEDICINE

## 2018-03-21 PROCEDURE — 25000128 H RX IP 250 OP 636: Performed by: FAMILY MEDICINE

## 2018-03-21 PROCEDURE — 74177 CT ABD & PELVIS W/CONTRAST: CPT

## 2018-03-21 PROCEDURE — 96375 TX/PRO/DX INJ NEW DRUG ADDON: CPT

## 2018-03-21 PROCEDURE — 80307 DRUG TEST PRSMV CHEM ANLYZR: CPT | Performed by: EMERGENCY MEDICINE

## 2018-03-21 PROCEDURE — 25000128 H RX IP 250 OP 636: Performed by: PHYSICIAN ASSISTANT

## 2018-03-21 RX ORDER — OLANZAPINE 10 MG/2ML
5 INJECTION, POWDER, FOR SOLUTION INTRAMUSCULAR ONCE
Status: COMPLETED | OUTPATIENT
Start: 2018-03-21 | End: 2018-03-21

## 2018-03-21 RX ORDER — SODIUM CHLORIDE 9 MG/ML
INJECTION, SOLUTION INTRAVENOUS CONTINUOUS
Status: DISCONTINUED | OUTPATIENT
Start: 2018-03-21 | End: 2018-03-22 | Stop reason: HOSPADM

## 2018-03-21 RX ORDER — HYDROMORPHONE HCL/0.9% NACL/PF 0.2MG/0.2
0.2 SYRINGE (ML) INTRAVENOUS ONCE
Status: COMPLETED | OUTPATIENT
Start: 2018-03-21 | End: 2018-03-21

## 2018-03-21 RX ORDER — ONDANSETRON 2 MG/ML
4 INJECTION INTRAMUSCULAR; INTRAVENOUS ONCE
Status: COMPLETED | OUTPATIENT
Start: 2018-03-21 | End: 2018-03-21

## 2018-03-21 RX ORDER — ALBUTEROL SULFATE 0.83 MG/ML
2.5 SOLUTION RESPIRATORY (INHALATION) EVERY 6 HOURS PRN
Status: DISCONTINUED | OUTPATIENT
Start: 2018-03-21 | End: 2018-03-22 | Stop reason: HOSPADM

## 2018-03-21 RX ORDER — NALOXONE HYDROCHLORIDE 0.4 MG/ML
.1-.4 INJECTION, SOLUTION INTRAMUSCULAR; INTRAVENOUS; SUBCUTANEOUS
Status: DISCONTINUED | OUTPATIENT
Start: 2018-03-21 | End: 2018-03-22 | Stop reason: HOSPADM

## 2018-03-21 RX ORDER — OLANZAPINE 10 MG/2ML
2.5 INJECTION, POWDER, FOR SOLUTION INTRAMUSCULAR ONCE
Status: DISCONTINUED | OUTPATIENT
Start: 2018-03-21 | End: 2018-03-21

## 2018-03-21 RX ORDER — PROCHLORPERAZINE MALEATE 5 MG
5-10 TABLET ORAL EVERY 6 HOURS PRN
Status: DISCONTINUED | OUTPATIENT
Start: 2018-03-21 | End: 2018-03-22 | Stop reason: HOSPADM

## 2018-03-21 RX ORDER — ONDANSETRON 2 MG/ML
4 INJECTION INTRAMUSCULAR; INTRAVENOUS EVERY 6 HOURS PRN
Status: DISCONTINUED | OUTPATIENT
Start: 2018-03-21 | End: 2018-03-21

## 2018-03-21 RX ORDER — ONDANSETRON 4 MG/1
8 TABLET, ORALLY DISINTEGRATING ORAL EVERY 8 HOURS PRN
Status: DISCONTINUED | OUTPATIENT
Start: 2018-03-21 | End: 2018-03-22 | Stop reason: HOSPADM

## 2018-03-21 RX ORDER — ACETAMINOPHEN 325 MG/1
650 TABLET ORAL EVERY 4 HOURS PRN
Status: DISCONTINUED | OUTPATIENT
Start: 2018-03-21 | End: 2018-03-22 | Stop reason: HOSPADM

## 2018-03-21 RX ORDER — ONDANSETRON 2 MG/ML
8 INJECTION INTRAMUSCULAR; INTRAVENOUS EVERY 6 HOURS PRN
Status: DISCONTINUED | OUTPATIENT
Start: 2018-03-21 | End: 2018-03-22 | Stop reason: HOSPADM

## 2018-03-21 RX ORDER — OMEGA-3 FATTY ACIDS/FISH OIL 300-1000MG
800 CAPSULE ORAL EVERY 8 HOURS PRN
Status: ON HOLD | COMMUNITY
End: 2018-03-22

## 2018-03-21 RX ORDER — ONDANSETRON 4 MG/1
4 TABLET, ORALLY DISINTEGRATING ORAL EVERY 6 HOURS PRN
Status: DISCONTINUED | OUTPATIENT
Start: 2018-03-21 | End: 2018-03-21

## 2018-03-21 RX ORDER — IOPAMIDOL 755 MG/ML
100 INJECTION, SOLUTION INTRAVASCULAR ONCE
Status: COMPLETED | OUTPATIENT
Start: 2018-03-21 | End: 2018-03-21

## 2018-03-21 RX ORDER — LIDOCAINE 40 MG/G
CREAM TOPICAL
Status: DISCONTINUED | OUTPATIENT
Start: 2018-03-21 | End: 2018-03-21

## 2018-03-21 RX ADMIN — ACETAMINOPHEN 650 MG: 325 TABLET, FILM COATED ORAL at 23:04

## 2018-03-21 RX ADMIN — ONDANSETRON 4 MG: 4 TABLET, ORALLY DISINTEGRATING ORAL at 17:08

## 2018-03-21 RX ADMIN — PANTOPRAZOLE SODIUM 40 MG: 40 INJECTION, POWDER, FOR SOLUTION INTRAVENOUS at 19:35

## 2018-03-21 RX ADMIN — OLANZAPINE 5 MG: 10 INJECTION, POWDER, LYOPHILIZED, FOR SOLUTION INTRAMUSCULAR at 07:04

## 2018-03-21 RX ADMIN — ONDANSETRON 4 MG: 2 INJECTION INTRAMUSCULAR; INTRAVENOUS at 09:31

## 2018-03-21 RX ADMIN — ACETAMINOPHEN 650 MG: 325 TABLET, FILM COATED ORAL at 17:08

## 2018-03-21 RX ADMIN — Medication 0.2 MG: at 09:37

## 2018-03-21 RX ADMIN — ONDANSETRON 8 MG: 2 INJECTION INTRAMUSCULAR; INTRAVENOUS at 22:52

## 2018-03-21 RX ADMIN — SODIUM CHLORIDE 1000 ML: 9 INJECTION, SOLUTION INTRAVENOUS at 05:23

## 2018-03-21 RX ADMIN — PROCHLORPERAZINE EDISYLATE 10 MG: 5 INJECTION INTRAMUSCULAR; INTRAVENOUS at 05:54

## 2018-03-21 RX ADMIN — ONDANSETRON 4 MG: 2 INJECTION INTRAMUSCULAR; INTRAVENOUS at 05:24

## 2018-03-21 RX ADMIN — SODIUM CHLORIDE 50 ML: 9 INJECTION, SOLUTION INTRAVENOUS at 06:45

## 2018-03-21 RX ADMIN — PROCHLORPERAZINE EDISYLATE 5 MG: 5 INJECTION INTRAMUSCULAR; INTRAVENOUS at 15:09

## 2018-03-21 RX ADMIN — Medication 0.2 MG: at 12:33

## 2018-03-21 RX ADMIN — SODIUM PHOSPHATE 1 ENEMA: 7; 19 ENEMA RECTAL at 10:28

## 2018-03-21 RX ADMIN — PROCHLORPERAZINE EDISYLATE 5 MG: 5 INJECTION INTRAMUSCULAR; INTRAVENOUS at 12:33

## 2018-03-21 RX ADMIN — IOPAMIDOL 100 ML: 755 INJECTION, SOLUTION INTRAVENOUS at 06:45

## 2018-03-21 RX ADMIN — SODIUM CHLORIDE: 9 INJECTION, SOLUTION INTRAVENOUS at 19:43

## 2018-03-21 NOTE — H&P
[facility] ED Observation Admission Note    Chief Complaint   Patient presents with     Nausea & Vomiting     c/o nausea and vomiting which started yesterday around 1100       Assessment/Plan:  Maryanne Crockett is a 37 y.o. female with a history significant for depression and ADD, who presents to the ED for evaluation of abdominal pain.    1. Nausea/Vomiting/Abdominal pain: 37-year-old female who comes in with nausea, vomiting, and abdominal pain that started around 11:30 a.m yesterday. She says she has had 10+ episodes of both vomiting. She did have 1 episode of loose stool yesterday that resolved since.  She has not had any fevers. She gets abdominal pain only with the dry heaving. Abdominal pain is very diffuse throughout the abdominal wall. Patient denies bloody emesis. She has been a little bit lightheaded. She has not had any recent travel or antibiotic treatment. She has not had any known ill contacts.  Hx of uncomplicated lab band surgery for weight management approximately 10 years and panniculectomy on 04/24/2017. Denies chest pain, shortness of breath, hematochezia, or urinary symptoms.  In the ED, stable VS, , K+3.9, BUN/Cr 10/0.62, LFTs normal, Lactate 1.7, HCG negative, wbc 12.0, hgb 14.5, UA seems dirty, however no clear UTI present, and Urine Tox is negative. CT abdomen/pelvis was obtained and reveals very distended abdomen with gas. There is no evidence of outlet obstruction. No bowel obstruction or inflammation.  Lap band is in place over the gastric cardia. In the ED, patient received Dilaudid 0.2 mg x 2 doses, 1 L of NS bolus, IV Zyprexa, IV Zofran, and IV Compazine with minimal improvement of her nausea/vomiting and was subsequently admitted to the ED obs unit for continued management of abdominal pain, IVF treatment, and antiemetics.   -VS per routine  - ml/hour  -Antiemetics  -Acetaminophen for pain control, avoid narcotics  -Protonix   -Advance diet as tolerated  -Low threshold for GI  "consult at this point  -CBC/CMP in am    2. Asthma: Stable. On Albuterol prn  -Continue albuterol q 2 hours prn         HPI:    Per ED note  \"Maryanne Crockett is a 37 year old female with a PMHx that includes previous lap band surgery who presents for evaluation of nausea and vomiting. Patient states that she developed an acute onset of nausea and vomiting around 11:00 AM yesterday morning. It has been persistent since its initial onset. She describes the emesis as non-bloody and non-bilious. She states that she has never had symptoms like this before. She endorses mild generalized abdominal pain, primarily associated with the vomiting. She has not taken any medications to attempt to relieve her symptoms. No recent changes to the lap band. She denies fever, chills, chest pain, shortness of breath, hematemesis, melena, hematochezia, diarrhea, constipation, dysuria, or hematuria\".    In the ED, stable VS, , K+3.9, BUN/Cr 10/0.62, LFTs normal, Lactate 1.7, serum HCG negative, cbc with slight leukocytosis at 12.0, hgb 14.5, UA with moderate bacteria, most likely dirty sepciemen, however no clear UTI present, and Urine Tox is negative. CT abdomen/pelvis was obtained and reveals very distended abdomen with gas. There is no evidence of outlet obstruction. No bowel obstruction or inflammation.  Lap band is in place over the gastric cardia. In the ED, patient received Dilaudid 0.2 mg x 2 doses, 1 L of NS bolus, IV Zyprexa, IV Zofran, and IV Compazine. Patient's nausea did improve with this, and was subsequently admitted to the ED obs unit for continued management of abdominal pain, IVF, and antiemetics.     On admission to the observation unit the patient was found to be stable.  History:    Past Medical History:   Diagnosis Date     Depressive disorder      Family history of glioblastoma     screening MRIs every 2 years     Ovarian cyst        Past Surgical History:   Procedure Laterality Date     CHOLECYSTECTOMY       " DILATION AND CURETTAGE SUCTION  4/30/15    retained POC     GASTRIC RESTRICTIVE PROCEDURE, OPEN, REMOVE/REPLACE SUBCUTANEOUS PORT       LAPAROSCOPIC OOPHORECTOMY Right 8/5/2016    Procedure: LAPAROSCOPIC OOPHORECTOMY;  Surgeon: Adrianne Vergara MD;  Location: [facility] OR     LAPAROSCOPIC SALPINGECTOMY Bilateral 8/5/2016    Procedure: LAPAROSCOPIC SALPINGECTOMY;  Surgeon: Adrianne Vergara MD;  Location: [facility] OR     LAPAROSCOPIC TUBAL LIGATION Bilateral 5/22/2015    Procedure: LAPAROSCOPIC TUBAL LIGATION;  Surgeon: Hayley Zayas MD;  Location: [facility] OR     LAPAROSCOPY OPERATIVE ADULT N/A 8/5/2016    Procedure: LAPAROSCOPY OPERATIVE ADULT;  Surgeon: Adrianne Vergara MD;  Location: [facility] OR     SOFT TISSUE SURGERY Right     cyst in hand       Family History   Problem Relation Age of Onset     Breast Cancer No family hx of      no ovarian cancer     Other Cancer Brother 36     brain ca     Other Cancer Father      leukemia     Other Cancer Maternal Aunt      2 aunts with glioblastomas       Social History     Social History     Marital status: Single     Spouse name: N/A     Number of children: N/A     Years of education: N/A     Occupational History     Not on file.     Social History Main Topics     Smoking status: Current Some Day Smoker     Packs/day: 0.25     Smokeless tobacco: Never Used     Alcohol use 0.0 oz/week     0 Standard drinks or equivalent per week      Comment: rare     Drug use: No     Sexual activity: Yes     Partners: Male     Other Topics Concern     Not on file     Social History Narrative         No current facility-administered medications on file prior to encounter.   Current Outpatient Prescriptions on File Prior to Encounter:  VENTOLIN  (90 BASE) MCG/ACT inhaler Inhale 1-2 puffs into the lungs every 6 hours as needed for shortness of breath / dyspnea or wheezing        Data:    Results for orders placed or performed during the hospital encounter of 03/21/18   CT Abdomen Pelvis w Contrast     Narrative    CT ABDOMEN PELVIS W CONTRAST  3/21/2018 6:51 AM     HISTORY: Intractable nausea/vomiting; history of previous shellie and  lap band surgery.    TECHNIQUE: CT abdomen and pelvis with 100 mL Isovue-370 IV. Radiation  dose for this scan was reduced using automated exposure control,  adjustment of the mA and/or kV according to patient size, or iterative  reconstruction technique.    COMPARISON: 1/31/2018.    FINDINGS:  Abdomen: The lung bases are unremarkable. The heart size is normal.  The gallbladder is absent. The liver, spleen, pancreas, adrenal glands  and kidneys are normal in appearance. There is a lap band over the  gastric cardia. The stomach is very distended with gas. No outlet  obstruction is evident. There is no abdominal or pelvic lymph node  enlargement.    Pelvis: There is no bowel obstruction or inflammation. The appendix is  normal. The uterus and adnexa appear normal. Trace free fluid in the  pelvis is within physiologic limits. No free intraperitoneal gas.  Postoperative changes in the anterior pelvic wall. Degenerative  disease in the lumbar spine.      Impression    IMPRESSION:  1. The stomach is very distended with gas. There is no evidence of  outlet obstruction. No bowel obstruction or inflammation.  2. Lap band is in place over the gastric cardia.   CBC with platelets differential   Result Value Ref Range    WBC 12.0 (H) 4.0 - 11.0 10e9/L    RBC Count 4.94 3.8 - 5.2 10e12/L    Hemoglobin 14.5 11.7 - 15.7 g/dL    Hematocrit 41.6 35.0 - 47.0 %    MCV 84 78 - 100 fl    MCH 29.4 26.5 - 33.0 pg    MCHC 34.9 31.5 - 36.5 g/dL    RDW 12.9 10.0 - 15.0 %    Platelet Count 298 150 - 450 10e9/L    Diff Method Automated Method     % Neutrophils 82.0 %    % Lymphocytes 14.7 %    % Monocytes 2.8 %    % Eosinophils 0.0 %    % Basophils 0.3 %    % Immature Granulocytes 0.2 %    Nucleated RBCs 0 0 /100    Absolute Neutrophil 9.8 (H) 1.6 - 8.3 10e9/L    Absolute Lymphocytes 1.8 0.8 - 5.3 10e9/L     Absolute Monocytes 0.3 0.0 - 1.3 10e9/L    Absolute Eosinophils 0.0 0.0 - 0.7 10e9/L    Absolute Basophils 0.0 0.0 - 0.2 10e9/L    Abs Immature Granulocytes 0.0 0 - 0.4 10e9/L    Absolute Nucleated RBC 0.0    Comprehensive metabolic panel   Result Value Ref Range    Sodium 140 133 - 144 mmol/L    Potassium 3.9 3.4 - 5.3 mmol/L    Chloride 106 94 - 109 mmol/L    Carbon Dioxide 21 20 - 32 mmol/L    Anion Gap 13 3 - 14 mmol/L    Glucose 161 (H) 70 - 99 mg/dL    Urea Nitrogen 10 7 - 30 mg/dL    Creatinine 0.62 0.52 - 1.04 mg/dL    GFR Estimate >90 >60 mL/min/1.7m2    GFR Estimate If Black >90 >60 mL/min/1.7m2    Calcium 8.9 8.5 - 10.1 mg/dL    Bilirubin Total 0.4 0.2 - 1.3 mg/dL    Albumin 3.8 3.4 - 5.0 g/dL    Protein Total 8.1 6.8 - 8.8 g/dL    Alkaline Phosphatase 81 40 - 150 U/L    ALT 14 0 - 50 U/L    AST 11 0 - 45 U/L   Lipase   Result Value Ref Range    Lipase 149 73 - 393 U/L   Lactic acid whole blood   Result Value Ref Range    Lactic Acid 1.7 0.7 - 2.0 mmol/L   UA reflex to Microscopic and Culture   Result Value Ref Range    Color Urine Yellow     Appearance Urine Slightly Cloudy     Glucose Urine Negative NEG^Negative mg/dL    Bilirubin Urine Negative NEG^Negative    Ketones Urine 40 (A) NEG^Negative mg/dL    Specific Gravity Urine 1.025 1.003 - 1.035    Blood Urine Small (A) NEG^Negative    pH Urine 6.0 5.0 - 7.0 pH    Protein Albumin Urine 30 (A) NEG^Negative mg/dL    Urobilinogen mg/dL 2.0 0.0 - 2.0 mg/dL    Nitrite Urine Negative NEG^Negative    Leukocyte Esterase Urine Negative NEG^Negative    Source Midstream Urine     RBC Urine 20 (H) 0 - 2 /HPF    WBC Urine 2 0 - 5 /HPF    Bacteria Urine Moderate (A) NEG^Negative /HPF    Squamous Epithelial /HPF Urine 8 (H) 0 - 1 /HPF    Mucous Urine Present (A) NEG^Negative /LPF   HCG qualitative Blood   Result Value Ref Range    HCG Qualitative Serum Negative NEG^Negative   Drug abuse screen 6 urine (chem dep)   Result Value Ref Range    Amphetamine Qual Urine  Negative NEG^Negative    Barbiturates Qual Urine Negative NEG^Negative    Benzodiazepine Qual Urine Negative NEG^Negative    Cannabinoids Qual Urine Negative NEG^Negative    Cocaine Qual Urine Negative NEG^Negative    Ethanol Qual Urine Negative NEG^Negative    Opiates Qualitative Urine Negative NEG^Negative             EKG Interpretation:      ROS:  REVIEW OF SYSTEMS:   General: Fatigue   EYES: Negative for vision changes or eye problems   ENT: No problems with ears, nose or throat. No difficulty swallowing.   RESP: No coughing, wheezing or shortness of breath   CV: No chest pains or palpitations   GI: Positive for nausea, vomiting, abdominal pain. No diarrhea, constipation or change in bowel habits   : No urinary frequency or dysuria, bladder or kidney problems   MUSCULOSKELETAL: No significant muscle or joint pains   NEUROLOGIC: No headaches, numbness, tingling, weakness, problems with balance or coordination   PSYCHIATRIC: No problems with anxiety, depression or mental health   HEME/IMMUNE/ALLERGY: No history of bleeding or clotting problems or anemia. No allergies or immune system problems   ENDOCRINE: No history of thyroid disease, diabetes or other endocrine disorders   SKIN: No rashes,worrisome lesions or skin problems    PCP: HUMAIRA GAMBLE  CARDIAC RISK:     10 point ROS negative other than the symptoms noted above.      Exam:    Vitals:  B/P: 127/76, T: 98.6, P: 80, R: 16    Physical Exam   Constitutional: Pt is oriented to person, place, and time.Pt appears well-developed and well-nourished.   HENT:   Head: Normocephalic and atraumatic.   Eyes: Conjunctivae are normal. Pupils are equal, round, and reactive to light.   Neck: Normal range of motion. Neck supple.   Cardiovascular: Normal rate, regular rhythm, normal heart sounds and intact distal pulses.    Pulmonary/Chest: Effort normal and breath sounds normal. No respiratory distress. Pt has no wheezes. Pt has no rales  Abdominal: Diffuse abdominal  tenderness. Soft. Bowel sounds are normal. Pt exhibits distension and no mass. Pt has no rebound and no guarding.   Musculoskeletal: Normal range of motion. Pt exhibits no edema.   Neurological: Pt is alert and oriented to person, place, and time. Normal reflexes.   Skin: Skin is warm and dry. No rash noted.   Psychiatric: Pt has a normal mood and affect. Behavior is normal. Judgment and thought content normal.     Consults: Low threshol  FEN: Advance as tolerated  DVT prophylaxis: Early ambulation  Code Status: Full  Disposition: Stable vital signs. Patient return to baseline.  All labs/images reviewed    Signed:  Rox Wilburn PA-C  March 21, 2018 at 5:06 PM

## 2018-03-21 NOTE — IP AVS SNAPSHOT
MRN:[id number]                      After Visit Summary   3/21/2018    Maryanne Crockett    MRN: [id number]           Thank you!     Thank you for choosing [facility] for your care. Our goal is always to provide you with excellent care. Hearing back from our patients is one way we can continue to improve our services. Please take a few minutes to complete the written survey that you may receive in the mail after you visit with us. Thank you!        Patient Information     Date Of Birth          1980        Designated Caregiver       Most Recent Value    Caregiver    Will someone help with your care after discharge? yes    Name of designated caregiver Theresa    Phone number of caregiver [phone]    Caregiver address home      About your hospital stay     You were admitted on:  March 21, 2018 You last received care in the:  Unit 6D Observation [facility]    You were discharged on:  March 22, 2018        Reason for your hospital stay       Nausea/vomiting/abdominal pain                  Who to Call     For medical emergencies, please call 911.  For non-urgent questions about your medical care, please call your primary care provider or clinic, [phone]          Attending Provider     Provider Specialty    Darrian Green MD Emergency Medicine    [facility], Deshaun Garcia MD Emergency Medicine       Primary Care Provider Office Phone # Fax #    Rahshana Barrett [phone]       When to contact your care team       Return to the ED with fever, uncontrolled nausea, vomiting, unrelieved pain, dizziness, chest pain, shortness of breath, loss of consciousness, and any new or concerning symptoms.                  After Care Instructions     Activity       Your activity upon discharge: activity as tolerated            Diet       Follow this diet upon discharge: Advance to a regular diet as tolerated            Discharge Instructions       You were admitted for nausea,  "vomiting and abdominal pain. You blood work up was unremarkable. CT scan of the abdomen showed no acute pathology. You were seen by surgery regarding your lab band who recommended no further intervention at this point. Your symptoms are improving after you were treated with intervenous fluids and anti nausea medications. Drink liquids as directed.  Eat bland foods. When you feel hungry, begin eating soft, bland foods. Examples are bananas, clear soup, potatoes, and applesauce.  Do not have dairy products, alcohol, sugary drinks, or drinks with caffeine until you feel better.  Rest as much as possible. Slowly start to do more each day when you begin to feel better.                  Follow-up Appointments     Follow Up and recommended labs and tests       Follow up with [facility] bariatric surgeon in 1-2 weeks.                  Pending Results     Date and Time Order Name Status Description    3/21/2018 2302 Urine Culture Aerobic Bacterial In process             Statement of Approval     Ordered          03/22/18 1044  I have reviewed and agree with all the recommendations and orders detailed in this document.  EFFECTIVE NOW     Approved and electronically signed by:  Rox Wilburn PA-C             Admission Information     Date & Time Provider Department Dept. Phone    3/21/2018 Deshaun Schmidt MD Unit 6D Observation [facility] [phone]      Your Vitals Were     Blood Pressure Pulse Temperature Respirations Height Weight    123/73 61 97.7  F (36.5  C) (Oral) 16 1.753 m (5' 9\") 102.1 kg (225 lb)    Last Period Pulse Oximetry BMI (Body Mass Index)             03/14/2018 (Approximate) 99% 33.23 kg/m2         MyChart Information     [employer] gives you secure access to your electronic health record. If you see a primary care provider, you can also send messages to your care team and make appointments. If you have questions, please call your primary care clinic.  If you do not have a primary care " provider, please call [phone] and they will assist you.        Care EveryWhere ID     This is your Care EveryWhere ID. This could be used by other organizations to access your [facility] medical records  [id number]        Equal Access to Services     LARRY JOHNSON: Hadii aad ku hadwilmertiff Marcia, watawanada luqadaha, qaybta kaalmada gabriela, yakov caseyaleksandra bradyalejo geiger moody johnson. So [facility] [phone].    ATENCIÓN: Si habla español, tiene a lilly disposición servicios gratuitos de asistencia lingüística. Llame al [phone].    We comply with applicable federal civil rights laws and [place] laws. We do not discriminate on the basis of race, color, national origin, age, disability, sex, sexual orientation, or gender identity.               Review of your medicines      CONTINUE these medicines which have NOT CHANGED        Dose / Directions    VENTOLIN  (90 BASE) MCG/ACT Inhaler   Generic drug:  albuterol        Dose:  1-2 puff   Inhale 1-2 puffs into the lungs every 6 hours as needed for shortness of breath / dyspnea or wheezing   Refills:  5         STOP taking     ibuprofen 200 MG capsule                    Protect others around you: Learn how to safely use, store and throw away your medicines at www.disposemymeds.org.             Medication List: This is a list of all your medications and when to take them. Check marks below indicate your daily home schedule. Keep this list as a reference.      Medications           Morning Afternoon Evening Bedtime As Needed    VENTOLIN  (90 BASE) MCG/ACT Inhaler   Inhale 1-2 puffs into the lungs every 6 hours as needed for shortness of breath / dyspnea or wheezing   Generic drug:  albuterol

## 2018-03-21 NOTE — ED PROVIDER NOTES
[facility] EMERGENCY DEPARTMENT ([facility])    History     Chief Complaint   Patient presents with     Nausea & Vomiting     c/o nausea and vomiting which started yesterday around 1100     HPI  Maryanne Crockett is a 37 year old female with a PMHx that includes previous lap band surgery who presents for evaluation of nausea and vomiting. Patient states that she developed an acute onset of nausea and vomiting around 11:00 AM yesterday morning. It has been persistent since its initial onset. She describes the emesis as non-bloody and non-bilious. She states that she has never had symptoms like this before. She endorses mild generalized abdominal pain, primarily associated with the vomiting. She has not taken any medications to attempt to relieve her symptoms. No recent changes to the lap band. She denies fever, chills, chest pain, shortness of breath, hematemesis, melena, hematochezia, diarrhea, constipation, dysuria, or hematuria.    I have reviewed the Medications, Allergies, Past Medical and Surgical History, and Social History in the [employer] system.    Past Medical History:   Diagnosis Date     Depressive disorder      Family history of glioblastoma     screening MRIs every 2 years     Ovarian cyst        Past Surgical History:   Procedure Laterality Date     CHOLECYSTECTOMY       DILATION AND CURETTAGE SUCTION  4/30/15    retained POC     GASTRIC RESTRICTIVE PROCEDURE, OPEN, REMOVE/REPLACE SUBCUTANEOUS PORT       LAPAROSCOPIC OOPHORECTOMY Right 8/5/2016    Procedure: LAPAROSCOPIC OOPHORECTOMY;  Surgeon: Adrianne Vergara MD;  Location: [facility] OR     LAPAROSCOPIC SALPINGECTOMY Bilateral 8/5/2016    Procedure: LAPAROSCOPIC SALPINGECTOMY;  Surgeon: Adrianne Vergara MD;  [facility] OR     LAPAROSCOPIC TUBAL LIGATION Bilateral 5/22/2015    Procedure: LAPAROSCOPIC TUBAL LIGATION;  Surgeon: Hayley Zayas MD;  Location: [facility] OR     LAPAROSCOPY OPERATIVE ADULT N/A 8/5/2016    Procedure: LAPAROSCOPY OPERATIVE  "ADULT;  Surgeon: Adrianne Vergara MD;  Location: [facility] OR     SOFT TISSUE SURGERY Right     cyst in hand       Family History   Problem Relation Age of Onset     Breast Cancer No family hx of      no ovarian cancer     Other Cancer Brother 36     brain ca     Other Cancer Father      leukemia     Other Cancer Maternal Aunt      2 aunts with glioblastomas       Social History   Substance Use Topics     Smoking status: Current Some Day Smoker     Packs/day: 0.25     Smokeless tobacco: Never Used     Alcohol use 0.0 oz/week     0 Standard drinks or equivalent per week      Comment: rare     Review of Systems  A 14-point review of systems was completed and was otherwise negative unless stated above in the HPI.    Physical Exam   BP: (!) 128/93  Heart Rate: 115  Temp: 97.9  F (36.6  C)  Resp: 19  Height: 175.3 cm (5' 9\")  Weight: 102.1 kg (225 lb)  SpO2: 100 %    Physical Exam  GENERAL: Well-appearing, no acute distress.  HENT:    Head: Normocephalic. Atraumatic.   Ears: External ears normal, hearing grossly intact.   Nose: No external deformities.   Throat/Mouth: Moist oral mucosa. Posterior oropharynx is clear.  EYES: Pupils equal, round, reactive. No conjunctival pallor, sclerae clear. EOMI.  CV: Tachycardic rate, regular rhythm. Warm and well perfused.  PULMONARY: Effort normal. No accessory muscle use. Good air movement. No stridor.  GI: Soft, non-distended, non-tender to palpation.  NEURO: Alert, awake. Oriented x3. No focal sensory loss or muscular weakness. No facial drooping, no slurring of speech.  MSK:    Neck: Supple.   Extremities: No gross deformity. Coordinated movement of all extremities.  INTEGUMENTARY: Warm. No diaphoresis. No rashes, jaundice, or ecchymoses.  PSYCH: Appropriate affect. Behavior is normal. Linear thought process. Normal insight.    ED Course     Procedures    Critical Care time:  none  Labs Ordered and Resulted from Time of ED Arrival Up to the Time of Departure from the ED   CBC WITH " PLATELETS DIFFERENTIAL - Abnormal; Notable for the following:        Result Value    WBC 12.0 (*)     Absolute Neutrophil 9.8 (*)     All other components within normal limits   COMPREHENSIVE METABOLIC PANEL - Abnormal; Notable for the following:     Glucose 161 (*)     All other components within normal limits   UA MACROSCOPIC WITH REFLEX TO MICRO AND CULTURE - Abnormal; Notable for the following:     Ketones Urine 40 (*)     Blood Urine Small (*)     Protein Albumin Urine 30 (*)     RBC Urine 20 (*)     Bacteria Urine Moderate (*)     Squamous Epithelial /HPF Urine 8 (*)     Mucous Urine Present (*)     All other components within normal limits   LIPASE   LACTIC ACID WHOLE BLOOD   HCG QUALITATIVE   DRUG ABUSE SCREEN 6 CHEM DEP URINE ([facility])     CT Abdomen Pelvis w Contrast   Preliminary Result   IMPRESSION:   1. The stomach is very distended with gas. There is no evidence of   outlet obstruction. No bowel obstruction or inflammation.   2. Lap band is in place over the gastric cardia.        Assessments & Plan (with Medical Decision Making)   Primary Evaluation:  Patient was seen and examined in the Emergency Department and was noted to be in no acute distress. Initial vital signs demonstrated tachycardia. Airway was patent and protected. Breathing was intact. Hemodynamics were stable.    Patient's history was reviewed in the chart and with the patient.    MDM and Disposition:  Patient presented for evaluation of intractable nausea/vomiting associated with mild diffuse abdominal pain. Non-surgical abdomen on exam, patient afebrile. Slightly tachycardic on arrival.    Patient given 1L NS bolus and ondansetron with minimal relief of symptoms. UA with moderate bacteria, but likely contaminated specimen. Serum HCG negative. CBC with slight leukocytosis (12k). Otherwise unrevealing. CMP with mild hyperglycemia without acidemia. LFTs unrevealing. Normal lipase and lactate. Patient re-dosed with IV compazine with slight  improvement of symptoms.    Given history of multiple previous surgeries and intractable N/V, decision made to obtain CT abd/pelvis to rule out intraabdominal pathology. Formal radiology impression as above, but demonstrated a gas-distended stomach without clear evidence of outlet obstruction or SBO. Unclear etiology of the patient's symptoms. Re-evaluated patient who continued to endorse persistent nausea. Given IM olanzapine. Discussed ED Obs unit admission with the patient who agreed with the plan. Can rehydrate with IVF, continue anti-emetic administration, and perform serial abdominal exams.    Given the patient's clinical history, physical exam, and results of our diagnostic work-up, the decision was made to admit the patient to the hospital in stable condition for further evaluation and treatment of intractable nausea and vomiting. This was discussed with the patient and all who were present were in agreement with the plan. Report was called to the admitting team who accepted the patient. The patient will be admitted to the ED Observation unit under the emergency medicine service.    Patient remained hemodynamically stable throughout her stay in the Emergency Department.    Final Clinical Impression:  Intractable nausea/vomiting, unclear etiology  History of previous lap band placement    I have reviewed the nursing notes.  I have reviewed the findings, diagnosis, plan and need for follow up with the patient.  Darrian Green DO  Emergency Medicine  Pager: [phone]    Final diagnoses:   Intractable vomiting with nausea, unspecified vomiting type       3/21/2018   [facility], [place], EMERGENCY DEPARTMENT     Darrian Green MD  03/21/18 0804

## 2018-03-21 NOTE — IP AVS SNAPSHOT
Unit 6D Observation [address] [address]    Phone:  [phone]    Fax:  [phone]                                       After Visit Summary   3/21/2018    Maryanne Crockett    MRN: [id number]           After Visit Summary Signature Page     I have received my discharge instructions, and my questions have been answered. I have discussed any challenges I see with this plan with the nurse or doctor.    ..........................................................................................................................................  Patient/Patient Representative Signature      ..........................................................................................................................................  Patient Representative Print Name and Relationship to Patient    ..................................................               ................................................  Date                                            Time    ..........................................................................................................................................  Reviewed by Signature/Title    ...................................................              ..............................................  Date                                                            Time

## 2018-03-21 NOTE — ED NOTES
[facility], [place]   ED Nurse to Floor Handoff     Maryanne Crockett is a 37 year old female who speaks English and lives with family members,  in a home  They arrived in the ED by car from home    ED Chief Complaint: Nausea & Vomiting (c/o nausea and vomiting which started yesterday around 1100)    ED Dx;   Final diagnoses:   Intractable vomiting with nausea, unspecified vomiting type         Needed?: No    Allergies: No Known Allergies.  Past Medical Hx:   Past Medical History:   Diagnosis Date     Depressive disorder      Family history of glioblastoma     screening MRIs every 2 years     Ovarian cyst       Baseline Mental status: WDL  Current Mental Status changes: {Current Mental Status Changes:792809    Infection present or suspected this encounter: no  Sepsis suspected: No  Isolation type: No active isolations     Activity level - Baseline/Home:  Independent  Activity Level - Current:   Independent    Bariatric equipment needed?: No    In the ED these meds were given:   Medications   ondansetron (ZOFRAN) injection 4 mg (4 mg Intravenous Given 3/21/18 0524)   0.9% sodium chloride BOLUS (0 mLs Intravenous Stopped 3/21/18 0653)   prochlorperazine (COMPAZINE) injection 10 mg (10 mg Intravenous Given 3/21/18 0554)   sodium chloride 0.9 % bag 500mL for CT scan flush use (50 mLs As instructed Given 3/21/18 0645)   iopamidol (ISOVUE-370) solution 100 mL (100 mLs Intravenous Given 3/21/18 0645)   OLANZapine (zyPREXA) injection 5 mg (5 mg Intramuscular Given 3/21/18 0704)       Drips running?  No    Home pump  No    Current LDAs  Peripheral IV 03/21/18 Left Upper arm (Active)   Number of days:0       Labs results:   Labs Ordered and Resulted from Time of ED Arrival Up to the Time of Departure from the ED   CBC WITH PLATELETS DIFFERENTIAL - Abnormal; Notable for the following:        Result Value    WBC 12.0 (*)     Absolute Neutrophil 9.8 (*)     All other components within normal  limits   COMPREHENSIVE METABOLIC PANEL - Abnormal; Notable for the following:     Glucose 161 (*)     All other components within normal limits   UA MACROSCOPIC WITH REFLEX TO MICRO AND CULTURE - Abnormal; Notable for the following:     Ketones Urine 40 (*)     Blood Urine Small (*)     Protein Albumin Urine 30 (*)     RBC Urine 20 (*)     Bacteria Urine Moderate (*)     Squamous Epithelial /HPF Urine 8 (*)     Mucous Urine Present (*)     All other components within normal limits   LIPASE   LACTIC ACID WHOLE BLOOD   HCG QUALITATIVE   DRUG ABUSE SCREEN 6 CHEM DEP URINE ([facility])       Imaging Studies:   Recent Results (from the past 24 hour(s))   CT Abdomen Pelvis w Contrast    Narrative    CT ABDOMEN PELVIS W CONTRAST  3/21/2018 6:51 AM     HISTORY: Intractable nausea/vomiting; history of previous shellie and  lap band surgery.    TECHNIQUE: CT abdomen and pelvis with 100 mL Isovue-370 IV. Radiation  dose for this scan was reduced using automated exposure control,  adjustment of the mA and/or kV according to patient size, or iterative  reconstruction technique.    COMPARISON: 1/31/2018.    FINDINGS:  Abdomen: The lung bases are unremarkable. The heart size is normal.  The gallbladder is absent. The liver, spleen, pancreas, adrenal glands  and kidneys are normal in appearance. There is a lap band over the  gastric cardia. The stomach is very distended with gas. No outlet  obstruction is evident. There is no abdominal or pelvic lymph node  enlargement.    Pelvis: There is no bowel obstruction or inflammation. The appendix is  normal. The uterus and adnexa appear normal. Trace free fluid in the  pelvis is within physiologic limits. No free intraperitoneal gas.  Postoperative changes in the anterior pelvic wall. Degenerative  disease in the lumbar spine.      Impression    IMPRESSION:  1. The stomach is very distended with gas. There is no evidence of  outlet obstruction. No bowel obstruction or inflammation.  2. Lap band  "is in place over the gastric cardia.       Recent vital signs:   BP (!) 128/93  Temp 97.9  F (36.6  C) (Oral)  Resp 19  Ht 1.753 m (5' 9\")  Wt 102.1 kg (225 lb)  LMP 03/14/2018 (Approximate)  SpO2 100%  Breastfeeding? No  BMI 33.23 kg/m2    Cardiac Rhythm: Tachycardia  Pt needs tele? Yes  Skin/wound Issues: None    Code Status: Full Code    Pain control: good    Nausea control: fair    Abnormal labs/tests/findings requiring intervention: CT    Family present during ED course? No   Family Comments/Social Situation comments: previous lap band surgery    Tasks needing completion: None    Coleen Lake, RN  [facility]-- [phone] ED  [phone] ED      "

## 2018-03-21 NOTE — ED NOTES
Sign out Provider: Sabra      Sign out Plan: Patient with a history of lap band surgery presented on the night shift with nausea vomiting and abdominal pain.  Workup including CT scan shows no definite etiology of symptoms.  The patient was requesting medication for pain.  CT scan reviewed, shows marked dilation of the stomach.  Spoke with the staff physician for bariatric surgery who reviewed the CT scan.  Bariatric surgery felt it was unlikely her symptoms are result of the LAP-BAND.  There is also no evidence of any bowel obstruction or other life-threatening etiology of her symptoms.  Recommendation was made to start a bowel regimen including enemas.      Reassessment: She remains with a nonsurgical abdomen has some distention.  We ordered a fleets enema.      Disposition: Continue with plan to start bowel regimen observation for symptom management       Carlos Groves MD  03/21/18 5144

## 2018-03-21 NOTE — ED NOTES
Called 6D to find out about bed status and spoke to LATOYA Fisher. She stated that they have a probably discharge at 1100 and after that room is cleaned they will be able to take the patient.

## 2018-03-21 NOTE — PHARMACY-ADMISSION MEDICATION HISTORY
Admission medication history interview status for the 3/21/2018 admission is complete. See Epic admission navigator for allergy information, pharmacy, prior to admission medications and immunization status.     Medication history interview sources:  Patient    Changes made to PTA medication list (reason)  Added: none  Deleted: acetaminophen, Saphris, ondansetron  Changed: ibuprofen (directions per patient), albuterol (directions per patient)    Additional medication history information (including reliability of information, actions taken by pharmacist): The patient was a reliable historian and able to discuss her medications without difficulty.      Prior to Admission medications    Medication Sig Last Dose Taking? Auth Provider   ibuprofen 200 MG capsule Take 800 mg by mouth every 8 hours as needed for pain  Yes Unknown, Entered By History   VENTOLIN  (90 BASE) MCG/ACT inhaler Inhale 1-2 puffs into the lungs every 6 hours as needed for shortness of breath / dyspnea or wheezing   Yes Reported, Patient       Medication history completed by:  Dasia De Luna, PharmD, BCPP  Behavioral ER Pharmacist  [phone]

## 2018-03-22 VITALS
HEART RATE: 61 BPM | HEIGHT: 69 IN | OXYGEN SATURATION: 99 % | WEIGHT: 225 LBS | SYSTOLIC BLOOD PRESSURE: 123 MMHG | RESPIRATION RATE: 16 BRPM | TEMPERATURE: 97.7 F | BODY MASS INDEX: 33.33 KG/M2 | DIASTOLIC BLOOD PRESSURE: 73 MMHG

## 2018-03-22 LAB
ALBUMIN SERPL-MCNC: 3 G/DL (ref 3.4–5)
ALP SERPL-CCNC: 56 U/L (ref 40–150)
ALT SERPL W P-5'-P-CCNC: 12 U/L (ref 0–50)
ANION GAP SERPL CALCULATED.3IONS-SCNC: 6 MMOL/L (ref 3–14)
AST SERPL W P-5'-P-CCNC: 12 U/L (ref 0–45)
BACTERIA SPEC CULT: NORMAL
BACTERIA SPEC CULT: NORMAL
BILIRUB SERPL-MCNC: 0.4 MG/DL (ref 0.2–1.3)
BUN SERPL-MCNC: 10 MG/DL (ref 7–30)
CALCIUM SERPL-MCNC: 8.1 MG/DL (ref 8.5–10.1)
CHLORIDE SERPL-SCNC: 111 MMOL/L (ref 94–109)
CO2 SERPL-SCNC: 26 MMOL/L (ref 20–32)
CREAT SERPL-MCNC: 0.68 MG/DL (ref 0.52–1.04)
ERYTHROCYTE [DISTWIDTH] IN BLOOD BY AUTOMATED COUNT: 13.7 % (ref 10–15)
GFR SERPL CREATININE-BSD FRML MDRD: >90 ML/MIN/1.7M2
GLUCOSE SERPL-MCNC: 88 MG/DL (ref 70–99)
HCT VFR BLD AUTO: 38.3 % (ref 35–47)
HGB BLD-MCNC: 12.3 G/DL (ref 11.7–15.7)
MCH RBC QN AUTO: 28.1 PG (ref 26.5–33)
MCHC RBC AUTO-ENTMCNC: 32.1 G/DL (ref 31.5–36.5)
MCV RBC AUTO: 88 FL (ref 78–100)
PLATELET # BLD AUTO: 210 10E9/L (ref 150–450)
POTASSIUM SERPL-SCNC: 3.4 MMOL/L (ref 3.4–5.3)
PROT SERPL-MCNC: 6 G/DL (ref 6.8–8.8)
RBC # BLD AUTO: 4.37 10E12/L (ref 3.8–5.2)
SODIUM SERPL-SCNC: 143 MMOL/L (ref 133–144)
SPECIMEN SOURCE: NORMAL
WBC # BLD AUTO: 8.2 10E9/L (ref 4–11)

## 2018-03-22 PROCEDURE — 25000132 ZZH RX MED GY IP 250 OP 250 PS 637: Performed by: PHYSICIAN ASSISTANT

## 2018-03-22 PROCEDURE — 25000125 ZZHC RX 250: Performed by: PHYSICIAN ASSISTANT

## 2018-03-22 PROCEDURE — 25000128 H RX IP 250 OP 636: Performed by: NURSE PRACTITIONER

## 2018-03-22 PROCEDURE — 96376 TX/PRO/DX INJ SAME DRUG ADON: CPT

## 2018-03-22 PROCEDURE — 80053 COMPREHEN METABOLIC PANEL: CPT | Performed by: PHYSICIAN ASSISTANT

## 2018-03-22 PROCEDURE — 36415 COLL VENOUS BLD VENIPUNCTURE: CPT | Performed by: PHYSICIAN ASSISTANT

## 2018-03-22 PROCEDURE — 99217 ZZC OBSERVATION CARE DISCHARGE: CPT | Mod: Z6 | Performed by: EMERGENCY MEDICINE

## 2018-03-22 PROCEDURE — 85027 COMPLETE CBC AUTOMATED: CPT | Performed by: PHYSICIAN ASSISTANT

## 2018-03-22 PROCEDURE — 25000128 H RX IP 250 OP 636: Performed by: PHYSICIAN ASSISTANT

## 2018-03-22 PROCEDURE — G0378 HOSPITAL OBSERVATION PER HR: HCPCS

## 2018-03-22 RX ORDER — ONDANSETRON 4 MG/1
4 TABLET, ORALLY DISINTEGRATING ORAL EVERY 6 HOURS PRN
Qty: 20 TABLET | Refills: 1 | Status: ON HOLD | OUTPATIENT
Start: 2018-03-22 | End: 2019-06-26

## 2018-03-22 RX ADMIN — ONDANSETRON 8 MG: 2 INJECTION INTRAMUSCULAR; INTRAVENOUS at 08:20

## 2018-03-22 RX ADMIN — SODIUM CHLORIDE: 9 INJECTION, SOLUTION INTRAVENOUS at 08:31

## 2018-03-22 RX ADMIN — PANTOPRAZOLE SODIUM 40 MG: 40 INJECTION, POWDER, FOR SOLUTION INTRAVENOUS at 08:20

## 2018-03-22 RX ADMIN — ACETAMINOPHEN 650 MG: 325 TABLET, FILM COATED ORAL at 08:20

## 2018-03-22 NOTE — PLAN OF CARE
Problem: Patient Care Overview  Goal: Discharge Needs Assessment  - Nausea/vomiting (diarrhea if present) improved. : Pt reports nausea, administered medications  - Tolerating oral intake to maintain hydration : Yes  - Vital signs normal or at patient baseline and orthostatic vitals are normal and patient not lightheaded with standing : Yes  - Diagnostic tests and consults completed and resulted if ordered : No  - Adequate pain control on oral analgesia : No  - Tolerating oral antibiotics if ordered : Not ordered  - Safe disposition plan has been identified: No    Pt a/o x 4; VSS, pt denies headache, dizziness, & SOB. Pt reports nausea and abd pain. Administered medications and hot packs. Pt ambulates to bathroom, voided, and ate clear liquid foods without problems. Pt resting comfortably in bed. Will continue to monitor pt.

## 2018-03-22 NOTE — PROGRESS NOTES
10:45 AM The patient was seen and examined by me and the case was discussed with the DANGELO. We are in agreement with the assessment and plan.    Past Medical History:   Diagnosis Date     Depressive disorder      Family history of glioblastoma     screening MRIs every 2 years     Ovarian cyst      Social History     Social History     Marital status: Single     Spouse name: N/A     Number of children: N/A     Years of education: N/A     Occupational History     Not on file.     Social History Main Topics     Smoking status: Current Some Day Smoker     Packs/day: 0.25     Smokeless tobacco: Never Used     Alcohol use 0.0 oz/week     0 Standard drinks or equivalent per week      Comment: rare     Drug use: No     Sexual activity: Yes     Partners: Male     Other Topics Concern     Not on file     Social History Narrative     History of present illness:  This is a 37-year-old female status post lap band placed 10 years ago at [facility] presented to the emergency department with about 12 hours of nausea and retching with epigastric discomfort.  No diarrhea no hematemesis no melena.  In the ED she had labs fluids pain management and a CT scan that was negative.  She was admitted to observation for symptomatic management.  She is feeling much better this morning and was able to tolerate a light breakfast.  She feels ready to be discharged.      Physical exam:  General: Awake alert no distress  Cardiac: Regular rate and rhythm no murmurs rubs gallops  Respiratory: Lungs are clear few scant crackles  Abdomen: Soft flat nontender    Assessment and plan: Patient with lap band admitted for nausea and vomiting without diarrhea.  No recent antibiotic use.  Symptoms have resolved this morning she is a benign examination of her abdomen she is ready to be discharged home.  She continues to have epigastric discomfort would recommend GI follow-up.

## 2018-03-22 NOTE — DISCHARGE SUMMARY
Discharge Summary    Maryanne Crockett MRN# [id number]   YOB: 1980 Age: 37 year old     Date of Admission:  3/21/2018  Date of Discharge:  3/22/2018  Admitting Physician:  Deshaun Schmidt MD  Discharge Physician:  PRINCE GUAN  Discharging Service:  Emergency Department Observation Unit     Primary Provider: Shoshana Hyde          Discharge Diagnosis:   Nausea, vomiting, and abdominal pain             Discharge Disposition:   Discharged to home           Condition on Discharge:   Discharge condition: Stable   Code status on discharge: Full Code           Procedures:   Imaging performed:   Abdomen CT             Discharge Medications:     Current Discharge Medication List      CONTINUE these medications which have NOT CHANGED    Details   ibuprofen 200 MG capsule Take 800 mg by mouth every 8 hours as needed for pain      VENTOLIN  (90 BASE) MCG/ACT inhaler Inhale 1-2 puffs into the lungs every 6 hours as needed for shortness of breath / dyspnea or wheezing   Refills: 5                   Consultations:   Consultation during this admission received from surgery             Brief History of Illness:   Maryanne Crockett is a 37 y.o. female with a history significant for depression and ADD, who presents to the ED for evaluation of abdominal pain.          Hospital Course:   1. Nausea/Vomiting/Abdominal pain: 37-year-old female who comes in with nausea, vomiting, and abdominal pain that started around 11:30 a.m yesterday. She says she has had 10+ episodes of both vomiting. She did have 1 episode of loose stool yesterday that resolved since.  She has not had any fevers. She gets abdominal pain only with the dry heaving. Abdominal pain was very diffuse throughout the abdominal wall. Patient denied bloody emesis. She has been a little bit lightheaded. She has not had any recent travel or antibiotic treatment. She has not had any known ill contacts.  Hx of uncomplicated lab band surgery for  "weight management approximately 10 years and panniculectomy on 04/24/2017. Denies chest pain, shortness of breath, hematochezia, or urinary symptoms.  In the ED, stable VS, , K+3.9, BUN/Cr 10/0.62, LFTs normal, Lactate 1.7, HCG negative, wbc 12.0, hgb 14.5, UA seems dirty, however no clear UTI present, and Urine Tox is negative. CT abdomen/pelvis was obtained and reveals very distended abdomen with gas. There is no evidence of outlet obstruction. No bowel obstruction or inflammation.  Lap band is in place over the gastric cardia. In the ED, patient received Dilaudid 0.2 mg x 2 doses, 1 L of NS bolus, IV Zyprexa, IV Zofran, and IV Compazine with minimal improvement of her nausea/vomiting and was subsequently admitted to the ED obs unit for continued management of abdominal pain, IVF treatment, and antiemetics. Patient was seen by surgery while inpatient. They recommended no intervention on lab band at this time and [place] [facility] [place] 1-2 weeks. Patient did well overnight. No interval events Nausea, vomiting, and abdominal pain completely resolved. Patient will be discharged to home with instruction to return if her symptoms return or new symptoms appear.             Final Day of Progress before Discharge:       Physical Exam:  Blood pressure 123/73, pulse 61, temperature 97.7  F (36.5  C), temperature source Oral, resp. rate 16, height 1.753 m (5' 9\"), weight 102.1 kg (225 lb), last menstrual period 03/14/2018, SpO2 99 %, not currently breastfeeding.    EXAM:  Physical Exam   Constitutional: Pt is oriented to person, place, and time.Pt appears well-developed and well-nourished.   HENT: Unremarkable  Head: Normocephalic and atraumatic.   Eyes: Conjunctivae are normal. Pupils are equal, round, and reactive to light.   Neck: Normal range of motion. Neck supple.   Cardiovascular: Normal rate, regular rhythm, normal heart sounds and intact distal pulses.    Pulmonary/Chest: Effort normal and " breath sounds normal. No respiratory distress. Pt has no wheezes. Pt has no rales  Abdominal: Soft. Bowel sounds are normal. Pt exhibits no distension and no mass. No tenderness. Pt has no rebound and no guarding.   Musculoskeletal: Normal range of motion. Pt exhibits no edema.   Neurological: Pt is alert and oriented to person, place, and time. Normal reflexes.   Skin: Skin is warm and dry. No rash noted.   Psychiatric: Pt has a normal mood and affect. Behavior is normal. Judgment and thought content normal.             Data:  All laboratory data reviewed             Significant Results:   None  Results for orders placed or performed during the hospital encounter of 03/21/18   CT Abdomen Pelvis w Contrast    Narrative    CT ABDOMEN PELVIS W CONTRAST  3/21/2018 6:51 AM     HISTORY: Intractable nausea/vomiting; history of previous shellie and  lap band surgery.    TECHNIQUE: CT abdomen and pelvis with 100 mL Isovue-370 IV. Radiation  dose for this scan was reduced using automated exposure control,  adjustment of the mA and/or kV according to patient size, or iterative  reconstruction technique.    COMPARISON: 1/31/2018.    FINDINGS:  Abdomen: The lung bases are unremarkable. The heart size is normal.  The gallbladder is absent. The liver, spleen, pancreas, adrenal glands  and kidneys are normal in appearance. There is a lap band over the  gastric cardia. The stomach is very distended with gas. No outlet  obstruction is evident. There is no abdominal or pelvic lymph node  enlargement.    Pelvis: There is no bowel obstruction or inflammation. The appendix is  normal. The uterus and adnexa appear normal. Trace free fluid in the  pelvis is within physiologic limits. No free intraperitoneal gas.  Postoperative changes in the anterior pelvic wall. Degenerative  disease in the lumbar spine.      Impression    IMPRESSION:  1. The stomach is very distended with gas. There is no evidence of  outlet obstruction. No bowel  obstruction or inflammation.  2. Lap band is in place over the gastric cardia.    JOSE ANTONIO LIMA MD   CBC with platelets differential   Result Value Ref Range    WBC 12.0 (H) 4.0 - 11.0 10e9/L    RBC Count 4.94 3.8 - 5.2 10e12/L    Hemoglobin 14.5 11.7 - 15.7 g/dL    Hematocrit 41.6 35.0 - 47.0 %    MCV 84 78 - 100 fl    MCH 29.4 26.5 - 33.0 pg    MCHC 34.9 31.5 - 36.5 g/dL    RDW 12.9 10.0 - 15.0 %    Platelet Count 298 150 - 450 10e9/L    Diff Method Automated Method     % Neutrophils 82.0 %    % Lymphocytes 14.7 %    % Monocytes 2.8 %    % Eosinophils 0.0 %    % Basophils 0.3 %    % Immature Granulocytes 0.2 %    Nucleated RBCs 0 0 /100    Absolute Neutrophil 9.8 (H) 1.6 - 8.3 10e9/L    Absolute Lymphocytes 1.8 0.8 - 5.3 10e9/L    Absolute Monocytes 0.3 0.0 - 1.3 10e9/L    Absolute Eosinophils 0.0 0.0 - 0.7 10e9/L    Absolute Basophils 0.0 0.0 - 0.2 10e9/L    Abs Immature Granulocytes 0.0 0 - 0.4 10e9/L    Absolute Nucleated RBC 0.0    Comprehensive metabolic panel   Result Value Ref Range    Sodium 140 133 - 144 mmol/L    Potassium 3.9 3.4 - 5.3 mmol/L    Chloride 106 94 - 109 mmol/L    Carbon Dioxide 21 20 - 32 mmol/L    Anion Gap 13 3 - 14 mmol/L    Glucose 161 (H) 70 - 99 mg/dL    Urea Nitrogen 10 7 - 30 mg/dL    Creatinine 0.62 0.52 - 1.04 mg/dL    GFR Estimate >90 >60 mL/min/1.7m2    GFR Estimate If Black >90 >60 mL/min/1.7m2    Calcium 8.9 8.5 - 10.1 mg/dL    Bilirubin Total 0.4 0.2 - 1.3 mg/dL    Albumin 3.8 3.4 - 5.0 g/dL    Protein Total 8.1 6.8 - 8.8 g/dL    Alkaline Phosphatase 81 40 - 150 U/L    ALT 14 0 - 50 U/L    AST 11 0 - 45 U/L   Lipase   Result Value Ref Range    Lipase 149 73 - 393 U/L   Lactic acid whole blood   Result Value Ref Range    Lactic Acid 1.7 0.7 - 2.0 mmol/L   UA reflex to Microscopic and Culture   Result Value Ref Range    Color Urine Yellow     Appearance Urine Slightly Cloudy     Glucose Urine Negative NEG^Negative mg/dL    Bilirubin Urine Negative NEG^Negative    Ketones  Urine 40 (A) NEG^Negative mg/dL    Specific Gravity Urine 1.025 1.003 - 1.035    Blood Urine Small (A) NEG^Negative    pH Urine 6.0 5.0 - 7.0 pH    Protein Albumin Urine 30 (A) NEG^Negative mg/dL    Urobilinogen mg/dL 2.0 0.0 - 2.0 mg/dL    Nitrite Urine Negative NEG^Negative    Leukocyte Esterase Urine Negative NEG^Negative    Source Midstream Urine     RBC Urine 20 (H) 0 - 2 /HPF    WBC Urine 2 0 - 5 /HPF    Bacteria Urine Moderate (A) NEG^Negative /HPF    Squamous Epithelial /HPF Urine 8 (H) 0 - 1 /HPF    Mucous Urine Present (A) NEG^Negative /LPF   HCG qualitative Blood   Result Value Ref Range    HCG Qualitative Serum Negative NEG^Negative   Drug abuse screen 6 urine (chem dep)   Result Value Ref Range    Amphetamine Qual Urine Negative NEG^Negative    Barbiturates Qual Urine Negative NEG^Negative    Benzodiazepine Qual Urine Negative NEG^Negative    Cannabinoids Qual Urine Negative NEG^Negative    Cocaine Qual Urine Negative NEG^Negative    Ethanol Qual Urine Negative NEG^Negative    Opiates Qualitative Urine Negative NEG^Negative   CBC with platelets   Result Value Ref Range    WBC 8.2 4.0 - 11.0 10e9/L    RBC Count 4.37 3.8 - 5.2 10e12/L    Hemoglobin 12.3 11.7 - 15.7 g/dL    Hematocrit 38.3 35.0 - 47.0 %    MCV 88 78 - 100 fl    MCH 28.1 26.5 - 33.0 pg    MCHC 32.1 31.5 - 36.5 g/dL    RDW 13.7 10.0 - 15.0 %    Platelet Count 210 150 - 450 10e9/L   Comprehensive metabolic panel   Result Value Ref Range    Sodium 143 133 - 144 mmol/L    Potassium 3.4 3.4 - 5.3 mmol/L    Chloride 111 (H) 94 - 109 mmol/L    Carbon Dioxide 26 20 - 32 mmol/L    Anion Gap 6 3 - 14 mmol/L    Glucose 88 70 - 99 mg/dL    Urea Nitrogen 10 7 - 30 mg/dL    Creatinine 0.68 0.52 - 1.04 mg/dL    GFR Estimate >90 >60 mL/min/1.7m2    GFR Estimate If Black >90 >60 mL/min/1.7m2    Calcium 8.1 (L) 8.5 - 10.1 mg/dL    Bilirubin Total 0.4 0.2 - 1.3 mg/dL    Albumin 3.0 (L) 3.4 - 5.0 g/dL    Protein Total 6.0 (L) 6.8 - 8.8 g/dL    Alkaline  Phosphatase 56 40 - 150 U/L    ALT 12 0 - 50 U/L    AST 12 0 - 45 U/L      Recent Results (from the past 48 hour(s))   CT Abdomen Pelvis w Contrast    Narrative    CT ABDOMEN PELVIS W CONTRAST  3/21/2018 6:51 AM     HISTORY: Intractable nausea/vomiting; history of previous shellie and  lap band surgery.    TECHNIQUE: CT abdomen and pelvis with 100 mL Isovue-370 IV. Radiation  dose for this scan was reduced using automated exposure control,  adjustment of the mA and/or kV according to patient size, or iterative  reconstruction technique.    COMPARISON: 1/31/2018.    FINDINGS:  Abdomen: The lung bases are unremarkable. The heart size is normal.  The gallbladder is absent. The liver, spleen, pancreas, adrenal glands  and kidneys are normal in appearance. There is a lap band over the  gastric cardia. The stomach is very distended with gas. No outlet  obstruction is evident. There is no abdominal or pelvic lymph node  enlargement.    Pelvis: There is no bowel obstruction or inflammation. The appendix is  normal. The uterus and adnexa appear normal. Trace free fluid in the  pelvis is within physiologic limits. No free intraperitoneal gas.  Postoperative changes in the anterior pelvic wall. Degenerative  disease in the lumbar spine.      Impression    IMPRESSION:  1. The stomach is very distended with gas. There is no evidence of  outlet obstruction. No bowel obstruction or inflammation.  2. Lap band is in place over the gastric cardia.    JOSE ANTONIO LIMA MD                Pending Results:   Unresulted Labs Ordered in the Past 30 Days of this Admission     Date and Time Order Name Status Description    3/21/2018 2302 Urine Culture Aerobic Bacterial In process                   Discharge Instructions and Follow-Up:   No discharge procedures on file.       Attestation:  Rox Wilburn PA-C

## 2018-03-22 NOTE — PLAN OF CARE
Problem: Patient Care Overview  Goal: Discharge Needs Assessment  Outpatient/Observation goals to be met before discharge home:      Nausea/vomiting (diarrhea if present) improved. -Yes, Pt denies any nausea  - Tolerating oral intake to maintain hydration -Yes  - Vital signs normal or at patient baseline and orthostatic vitals are normal and patient not lightheaded with standing -Yes  - Diagnostic tests and consults completed and resulted if ordered -No  - Adequate pain control on oral analgesia -Yes, Pt denies any pain  - Tolerating oral antibiotics if ordered -N/A  - Safe disposition plan has been identified -No  - Nurse to notify provider when observation goals have been met and patient is ready for discharge.

## 2018-03-22 NOTE — H&P
BARIATRIC SURGERY HISTORY AND PHYSICAL    ASSESSMENT/PLAN: Maryanne Crockett is a 37-year-old female with a history of a lap band in 2009 now admitted with nausea, dry heaves, and abdominal pain. She has not had recent changes to the lap band and it appears in good position without any abnormality on CT scan. She reports that her symptoms are improving.    -- No indication for intervention on lap band, no abnormality to correct  -- [place] [facility]  -- Bariatric surgery will sign off, please call with questions    Discussed with Dr. Magdaleno.    Richard Araiza MD  PGY-3, General Surgery    - - - - - - - - - -    CHIEF COMPLAINT: dry heaves    HISTORY OF PRESENT ILLNESS: Maryanne Crockett is a 37-year-old female who presents with nausea, dry heaves, and abdominal pain. She has a history of a [place] [facility] [place]. Per outside record review, there is 2 cc of fluid in the band and this was last adjusted in 2015. She reports dry heaves for the last day. She also reports abdominal pain secondary to the dry heaves. Since being admitted to the hospital, she has had improvement in her symptoms. She is no longer dry heaving but is mildly nauseated. She has been tolerating clears. She denies diarrhea, fevers, chills but has had sweating.     REVIEW OF SYSTEMS: A 10 point review of systems is asked and negative except as above.     PAST MEDICAL HISTORY:   Past Medical History:   Diagnosis Date     Depressive disorder      Family history of glioblastoma     screening MRIs every 2 years     Ovarian cyst        SURGICAL HISTORY:   Past Surgical History:   Procedure Laterality Date     CHOLECYSTECTOMY       DILATION AND CURETTAGE SUCTION  4/30/15    retained POC     GASTRIC RESTRICTIVE PROCEDURE, OPEN, REMOVE/REPLACE SUBCUTANEOUS PORT       LAPAROSCOPIC OOPHORECTOMY Right 8/5/2016    Procedure: LAPAROSCOPIC OOPHORECTOMY;  Surgeon: Adrianne Vergara MD;  Location: [facility] OR     LAPAROSCOPIC SALPINGECTOMY  "Bilateral 8/5/2016    Procedure: LAPAROSCOPIC SALPINGECTOMY;  Surgeon: Adrianne Vergara MD;  Location: [facility] OR     LAPAROSCOPIC TUBAL LIGATION Bilateral 5/22/2015    Procedure: LAPAROSCOPIC TUBAL LIGATION;  Surgeon: Hayley Zayas MD;  Location: [facility] OR     LAPAROSCOPY OPERATIVE ADULT N/A 8/5/2016    Procedure: LAPAROSCOPY OPERATIVE ADULT;  Surgeon: Adrianne Vergara MD;  Location: [facility] OR     SOFT TISSUE SURGERY Right     cyst in hand       SOCIAL HISTORY:   Social History     Social History     Marital status: Single     Spouse name: N/A     Number of children: N/A     Years of education: N/A     Social History Main Topics     Smoking status: Current Some Day Smoker     Packs/day: 0.25     Smokeless tobacco: Never Used     Alcohol use 0.0 oz/week     0 Standard drinks or equivalent per week      Comment: rare     Drug use: No     Sexual activity: Yes     Partners: Male     Other Topics Concern     None     Social History Narrative       FAMILY HISTORY: No bleeding/clotting disorders nor problems with anesthesia.     ALLERGIES:    No Known Allergies    MEDICATIONS:    No current facility-administered medications on file prior to encounter.   Current Outpatient Prescriptions on File Prior to Encounter:  VENTOLIN  (90 BASE) MCG/ACT inhaler Inhale 1-2 puffs into the lungs every 6 hours as needed for shortness of breath / dyspnea or wheezing        PHYSICAL EXAM:   /76  Pulse 80  Temp 98.6  F (37  C) (Oral)  Resp 16  Ht 1.753 m (5' 9\")  Wt 102.1 kg (225 lb)  LMP 03/14/2018 (Approximate)  SpO2 98%  Breastfeeding? No  BMI 33.23 kg/m2  General: Alert female in no acute distress.  HEENT: Normocephalic, atraumatic. Patent nares. EOMI. PERRLA.  Chest wall: Symmetric thorax.   Respiratory: Non-labored breathing. Lung sounds clear to auscultation bilaterally.   Cardiovascular: Regular rate and rhythm.   Gastrointestinal: Abdomen soft, obese, non-distended, mildly tender to palpation in bilateral lower " quadrants. Lap band port palpable in right upper quadrant.    Extremities: Moving all four extremities. No limb deformities. No pedal edema. Peripheral pulses palpable in all distal extremities.   Skin: No rashes or lesions appreciated.    LAB DATA: Reviewed.   Na 140  K 3.9  Cl 106  CO2 21  BUN 10  Cr 0.62  Albumin 3.8  Alk phos 81  ALT 14  AST 11  Lactate 1.7  Lipase 149  Glucose 161  WBC 12.0  Plt 298  Hgb 14.5    IMAGING:   FINDINGS:  Abdomen: The lung bases are unremarkable. The heart size is normal.  The gallbladder is absent. The liver, spleen, pancreas, adrenal glands  and kidneys are normal in appearance. There is a lap band over the  gastric cardia. The stomach is very distended with gas. No outlet  obstruction is evident. There is no abdominal or pelvic lymph node  enlargement.     Pelvis: There is no bowel obstruction or inflammation. The appendix is  normal. The uterus and adnexa appear normal. Trace free fluid in the  pelvis is within physiologic limits. No free intraperitoneal gas.  Postoperative changes in the anterior pelvic wall. Degenerative  disease in the lumbar spine.     IMPRESSION:  1. The stomach is very distended with gas. There is no evidence of  outlet obstruction. No bowel obstruction or inflammation.  2. Lap band is in place over the gastric cardia.

## 2018-03-22 NOTE — PLAN OF CARE
Problem: Patient Care Overview  Goal: Plan of Care/Patient Progress Review  Outcome: No Change     Nausea/vomiting (diarrhea if present) improved. -Yes, Pt denies any nausea  - Tolerating oral intake to maintain hydration -Yes  - Vital signs normal or at patient baseline and orthostatic vitals are normal and patient not lightheaded with standing -Yes  - Diagnostic tests and consults completed and resulted if ordered -No  - Adequate pain control on oral analgesia -Yes, Pt denies any pain  - Tolerating oral antibiotics if ordered -N/A  - Safe disposition plan has been identified -No  - Nurse to notify provider when observation goals have been met and patient is ready for discharge.

## 2019-02-11 ENCOUNTER — APPOINTMENT (OUTPATIENT)
Dept: CT IMAGING | Facility: CLINIC | Age: 39
End: 2019-02-11
Attending: EMERGENCY MEDICINE
Payer: COMMERCIAL

## 2019-02-11 ENCOUNTER — APPOINTMENT (OUTPATIENT)
Dept: GENERAL RADIOLOGY | Facility: CLINIC | Age: 39
End: 2019-02-11
Attending: EMERGENCY MEDICINE
Payer: COMMERCIAL

## 2019-02-11 ENCOUNTER — HOSPITAL ENCOUNTER (INPATIENT)
Facility: CLINIC | Age: 39
LOS: 3 days | Discharge: HOME | End: 2019-02-15
Attending: EMERGENCY MEDICINE | Admitting: PSYCHIATRY & NEUROLOGY
Payer: COMMERCIAL

## 2019-02-11 DIAGNOSIS — M62.838 MUSCLE SPASM: ICD-10-CM

## 2019-02-11 DIAGNOSIS — F31.12 BIPOLAR AFFECTIVE DISORDER, CURRENTLY MANIC, MODERATE (H): ICD-10-CM

## 2019-02-11 DIAGNOSIS — G40.909 RECURRENT SEIZURES (H): ICD-10-CM

## 2019-02-11 DIAGNOSIS — F41.9 ANXIETY DISORDER, UNSPECIFIED TYPE: Primary | ICD-10-CM

## 2019-02-11 DIAGNOSIS — R45.851 SUICIDAL IDEATION: ICD-10-CM

## 2019-02-11 DIAGNOSIS — F32.A DEPRESSION, UNSPECIFIED DEPRESSION TYPE: ICD-10-CM

## 2019-02-11 LAB
ALBUMIN SERPL-MCNC: 3.2 G/DL (ref 3.4–5)
ALP SERPL-CCNC: 72 U/L (ref 40–150)
ALT SERPL W P-5'-P-CCNC: 23 U/L (ref 0–50)
ANION GAP SERPL CALCULATED.3IONS-SCNC: 6 MMOL/L (ref 3–14)
APAP SERPL-MCNC: <2 MG/L (ref 10–20)
AST SERPL W P-5'-P-CCNC: 16 U/L (ref 0–45)
BASOPHILS # BLD AUTO: 0.1 10E9/L (ref 0–0.2)
BASOPHILS NFR BLD AUTO: 0.6 %
BILIRUB SERPL-MCNC: 0.2 MG/DL (ref 0.2–1.3)
BUN SERPL-MCNC: 8 MG/DL (ref 7–30)
CALCIUM SERPL-MCNC: 8 MG/DL (ref 8.5–10.1)
CHLORIDE SERPL-SCNC: 108 MMOL/L (ref 94–109)
CO2 SERPL-SCNC: 27 MMOL/L (ref 20–32)
CREAT SERPL-MCNC: 0.97 MG/DL (ref 0.52–1.04)
DIFFERENTIAL METHOD BLD: NORMAL
EOSINOPHIL # BLD AUTO: 0.6 10E9/L (ref 0–0.7)
EOSINOPHIL NFR BLD AUTO: 6.2 %
ERYTHROCYTE [DISTWIDTH] IN BLOOD BY AUTOMATED COUNT: 14.6 % (ref 10–15)
ETHANOL SERPL-MCNC: <0.01 G/DL
GFR SERPL CREATININE-BSD FRML MDRD: 74 ML/MIN/{1.73_M2}
GLUCOSE SERPL-MCNC: 92 MG/DL (ref 70–99)
HCG SERPL QL: NEGATIVE
HCT VFR BLD AUTO: 39.3 % (ref 35–47)
HGB BLD-MCNC: 13.1 G/DL (ref 11.7–15.7)
IMM GRANULOCYTES # BLD: 0 10E9/L (ref 0–0.4)
IMM GRANULOCYTES NFR BLD: 0.2 %
LYMPHOCYTES # BLD AUTO: 3.9 10E9/L (ref 0.8–5.3)
LYMPHOCYTES NFR BLD AUTO: 42.5 %
MCH RBC QN AUTO: 29.2 PG (ref 26.5–33)
MCHC RBC AUTO-ENTMCNC: 33.3 G/DL (ref 31.5–36.5)
MCV RBC AUTO: 88 FL (ref 78–100)
MONOCYTES # BLD AUTO: 0.6 10E9/L (ref 0–1.3)
MONOCYTES NFR BLD AUTO: 6.5 %
NEUTROPHILS # BLD AUTO: 4 10E9/L (ref 1.6–8.3)
NEUTROPHILS NFR BLD AUTO: 44 %
NRBC # BLD AUTO: 0 10*3/UL
NRBC BLD AUTO-RTO: 0 /100
OSMOLALITY SERPL: 293 MMOL/KG (ref 275–295)
PLATELET # BLD AUTO: 240 10E9/L (ref 150–450)
POTASSIUM SERPL-SCNC: 3.4 MMOL/L (ref 3.4–5.3)
PROT SERPL-MCNC: 6.4 G/DL (ref 6.8–8.8)
RBC # BLD AUTO: 4.48 10E12/L (ref 3.8–5.2)
SALICYLATES SERPL-MCNC: 4 MG/DL
SODIUM SERPL-SCNC: 141 MMOL/L (ref 133–144)
WBC # BLD AUTO: 9.1 10E9/L (ref 4–11)

## 2019-02-11 PROCEDURE — 83930 ASSAY OF BLOOD OSMOLALITY: CPT | Performed by: EMERGENCY MEDICINE

## 2019-02-11 PROCEDURE — 96360 HYDRATION IV INFUSION INIT: CPT

## 2019-02-11 PROCEDURE — 80053 COMPREHEN METABOLIC PANEL: CPT | Performed by: EMERGENCY MEDICINE

## 2019-02-11 PROCEDURE — 96361 HYDRATE IV INFUSION ADD-ON: CPT

## 2019-02-11 PROCEDURE — 80329 ANALGESICS NON-OPIOID 1 OR 2: CPT | Performed by: EMERGENCY MEDICINE

## 2019-02-11 PROCEDURE — 80171 DRUG SCREEN QUANT GABAPENTIN: CPT | Performed by: EMERGENCY MEDICINE

## 2019-02-11 PROCEDURE — 72072 X-RAY EXAM THORAC SPINE 3VWS: CPT

## 2019-02-11 PROCEDURE — 80175 DRUG SCREEN QUAN LAMOTRIGINE: CPT | Performed by: EMERGENCY MEDICINE

## 2019-02-11 PROCEDURE — 80320 DRUG SCREEN QUANTALCOHOLS: CPT | Performed by: EMERGENCY MEDICINE

## 2019-02-11 PROCEDURE — 85025 COMPLETE CBC W/AUTO DIFF WBC: CPT | Performed by: EMERGENCY MEDICINE

## 2019-02-11 PROCEDURE — 72125 CT NECK SPINE W/O DYE: CPT

## 2019-02-11 PROCEDURE — 84703 CHORIONIC GONADOTROPIN ASSAY: CPT | Performed by: EMERGENCY MEDICINE

## 2019-02-11 PROCEDURE — 93005 ELECTROCARDIOGRAM TRACING: CPT

## 2019-02-11 PROCEDURE — 70450 CT HEAD/BRAIN W/O DYE: CPT

## 2019-02-11 PROCEDURE — 99285 EMERGENCY DEPT VISIT HI MDM: CPT | Mod: 25

## 2019-02-11 PROCEDURE — 25000128 H RX IP 250 OP 636: Performed by: EMERGENCY MEDICINE

## 2019-02-11 RX ORDER — SODIUM CHLORIDE 9 MG/ML
1000 INJECTION, SOLUTION INTRAVENOUS CONTINUOUS
Status: DISCONTINUED | OUTPATIENT
Start: 2019-02-11 | End: 2019-02-12

## 2019-02-11 RX ADMIN — SODIUM CHLORIDE 1000 ML: 9 INJECTION, SOLUTION INTRAVENOUS at 23:10

## 2019-02-12 ENCOUNTER — TELEPHONE (OUTPATIENT)
Dept: BEHAVIORAL HEALTH | Facility: CLINIC | Age: 39
End: 2019-02-12

## 2019-02-12 PROBLEM — F32.A DEPRESSION: Status: ACTIVE | Noted: 2019-02-12

## 2019-02-12 LAB
AMPHETAMINES UR QL SCN: POSITIVE
BARBITURATES UR QL: NEGATIVE
BASE EXCESS BLDV CALC-SCNC: 2.2 MMOL/L
BENZODIAZ UR QL: POSITIVE
CANNABINOIDS UR QL SCN: POSITIVE
COCAINE UR QL: NEGATIVE
GABAPENTIN SERPLBLD-MCNC: 22.6 UG/ML
HCO3 BLDV-SCNC: 28 MMOL/L (ref 21–28)
INTERPRETATION ECG - MUSE: NORMAL
OPIATES UR QL SCN: NEGATIVE
OXYHGB MFR BLDV: 86 %
PCO2 BLDV: 49 MM HG (ref 40–50)
PCP UR QL SCN: NEGATIVE
PH BLDV: 7.37 PH (ref 7.32–7.43)
PO2 BLDV: 53 MM HG (ref 25–47)

## 2019-02-12 PROCEDURE — 25000132 ZZH RX MED GY IP 250 OP 250 PS 637: Performed by: EMERGENCY MEDICINE

## 2019-02-12 PROCEDURE — 25000132 ZZH RX MED GY IP 250 OP 250 PS 637: Performed by: PSYCHIATRY & NEUROLOGY

## 2019-02-12 PROCEDURE — 82805 BLOOD GASES W/O2 SATURATION: CPT | Performed by: EMERGENCY MEDICINE

## 2019-02-12 PROCEDURE — 90791 PSYCH DIAGNOSTIC EVALUATION: CPT

## 2019-02-12 PROCEDURE — 80307 DRUG TEST PRSMV CHEM ANLYZR: CPT | Performed by: EMERGENCY MEDICINE

## 2019-02-12 PROCEDURE — 12400000 ZZH R&B MH

## 2019-02-12 RX ORDER — ESCITALOPRAM OXALATE 20 MG/1
20 TABLET ORAL DAILY
Status: ON HOLD | COMMUNITY
End: 2020-06-27

## 2019-02-12 RX ORDER — IBUPROFEN 600 MG/1
600 TABLET, FILM COATED ORAL ONCE
Status: DISCONTINUED | OUTPATIENT
Start: 2019-02-12 | End: 2019-02-12

## 2019-02-12 RX ORDER — GABAPENTIN 300 MG/1
900 CAPSULE ORAL 3 TIMES DAILY
Status: DISCONTINUED | OUTPATIENT
Start: 2019-02-12 | End: 2019-02-12 | Stop reason: ALTCHOICE

## 2019-02-12 RX ORDER — GABAPENTIN 300 MG/1
900 CAPSULE ORAL 3 TIMES DAILY
COMMUNITY
End: 2019-06-10

## 2019-02-12 RX ORDER — CYCLOBENZAPRINE HCL 10 MG
10 TABLET ORAL 2 TIMES DAILY PRN
Status: DISCONTINUED | OUTPATIENT
Start: 2019-02-12 | End: 2019-02-15 | Stop reason: HOSPADM

## 2019-02-12 RX ORDER — BUPROPION HYDROCHLORIDE 300 MG/1
300 TABLET ORAL EVERY MORNING
Status: ON HOLD | COMMUNITY
End: 2019-02-15

## 2019-02-12 RX ORDER — CYCLOBENZAPRINE HCL 10 MG
10 TABLET ORAL ONCE
Status: COMPLETED | OUTPATIENT
Start: 2019-02-12 | End: 2019-02-12

## 2019-02-12 RX ORDER — ALPRAZOLAM 0.5 MG
0.5 TABLET ORAL 3 TIMES DAILY
Status: DISCONTINUED | OUTPATIENT
Start: 2019-02-12 | End: 2019-02-15 | Stop reason: HOSPADM

## 2019-02-12 RX ORDER — LAMOTRIGINE 200 MG/1
200 TABLET ORAL DAILY
COMMUNITY
End: 2019-03-19

## 2019-02-12 RX ORDER — ACETAMINOPHEN 500 MG
1000 TABLET ORAL ONCE
Status: DISCONTINUED | OUTPATIENT
Start: 2019-02-12 | End: 2019-02-12

## 2019-02-12 RX ORDER — ESCITALOPRAM OXALATE 20 MG/1
20 TABLET ORAL DAILY
Status: DISCONTINUED | OUTPATIENT
Start: 2019-02-12 | End: 2019-02-15 | Stop reason: HOSPADM

## 2019-02-12 RX ORDER — DEXTROAMPHETAMINE SACCHARATE, AMPHETAMINE ASPARTATE MONOHYDRATE, DEXTROAMPHETAMINE SULFATE AND AMPHETAMINE SULFATE 5; 5; 5; 5 MG/1; MG/1; MG/1; MG/1
20 CAPSULE, EXTENDED RELEASE ORAL 2 TIMES DAILY
Status: ON HOLD | COMMUNITY
End: 2019-02-15

## 2019-02-12 RX ORDER — GABAPENTIN 300 MG/1
900 CAPSULE ORAL 3 TIMES DAILY
Status: DISCONTINUED | OUTPATIENT
Start: 2019-02-12 | End: 2019-02-15 | Stop reason: HOSPADM

## 2019-02-12 RX ORDER — ACETAMINOPHEN 500 MG
500-1000 TABLET ORAL EVERY 6 HOURS PRN
Status: DISCONTINUED | OUTPATIENT
Start: 2019-02-12 | End: 2019-02-15 | Stop reason: HOSPADM

## 2019-02-12 RX ORDER — HYDROXYZINE HYDROCHLORIDE 25 MG/1
25 TABLET, FILM COATED ORAL EVERY 4 HOURS PRN
Status: DISCONTINUED | OUTPATIENT
Start: 2019-02-12 | End: 2019-02-15 | Stop reason: HOSPADM

## 2019-02-12 RX ORDER — BUSPIRONE HYDROCHLORIDE 30 MG/1
30 TABLET ORAL 3 TIMES DAILY
Status: ON HOLD | COMMUNITY
End: 2019-02-15

## 2019-02-12 RX ORDER — ALBUTEROL SULFATE 90 UG/1
1-2 AEROSOL, METERED RESPIRATORY (INHALATION) EVERY 6 HOURS PRN
Status: DISCONTINUED | OUTPATIENT
Start: 2019-02-12 | End: 2019-02-15 | Stop reason: HOSPADM

## 2019-02-12 RX ORDER — HYDROCODONE BITARTRATE AND ACETAMINOPHEN 5; 325 MG/1; MG/1
1 TABLET ORAL EVERY 4 HOURS PRN
Status: ON HOLD | COMMUNITY
End: 2019-02-15

## 2019-02-12 RX ORDER — ALPRAZOLAM 0.5 MG
0.5 TABLET ORAL 2 TIMES DAILY PRN
COMMUNITY
End: 2019-10-30

## 2019-02-12 RX ORDER — ACETAMINOPHEN 500 MG
500-1000 TABLET ORAL EVERY 6 HOURS PRN
Status: ON HOLD | COMMUNITY
End: 2020-06-27

## 2019-02-12 RX ORDER — ONDANSETRON 4 MG/1
4 TABLET, ORALLY DISINTEGRATING ORAL EVERY 6 HOURS PRN
Status: DISCONTINUED | OUTPATIENT
Start: 2019-02-12 | End: 2019-02-15 | Stop reason: HOSPADM

## 2019-02-12 RX ORDER — ASENAPINE 5 MG/1
10 TABLET SUBLINGUAL AT BEDTIME
Status: DISCONTINUED | OUTPATIENT
Start: 2019-02-12 | End: 2019-02-15 | Stop reason: HOSPADM

## 2019-02-12 RX ORDER — MULTIVITAMIN,THERAPEUTIC
1 TABLET ORAL DAILY
Status: DISCONTINUED | OUTPATIENT
Start: 2019-02-12 | End: 2019-02-12

## 2019-02-12 RX ORDER — OXYCODONE AND ACETAMINOPHEN 5; 325 MG/1; MG/1
1 TABLET ORAL EVERY 6 HOURS PRN
Status: ON HOLD | COMMUNITY
End: 2019-02-15

## 2019-02-12 RX ORDER — ASENAPINE 10 MG/1
10 TABLET SUBLINGUAL 2 TIMES DAILY
COMMUNITY
End: 2019-12-27

## 2019-02-12 RX ORDER — HYDROXYZINE HYDROCHLORIDE 50 MG/1
50 TABLET, FILM COATED ORAL 3 TIMES DAILY PRN
Status: DISCONTINUED | OUTPATIENT
Start: 2019-02-12 | End: 2019-02-12 | Stop reason: ALTCHOICE

## 2019-02-12 RX ORDER — MULTIVITAMIN,THERAPEUTIC
1 TABLET ORAL DAILY
COMMUNITY
End: 2019-06-10

## 2019-02-12 RX ORDER — PHENOL 1.4 %
10 AEROSOL, SPRAY (ML) MUCOUS MEMBRANE
COMMUNITY
End: 2019-06-10

## 2019-02-12 RX ORDER — BUSPIRONE HYDROCHLORIDE 7.5 MG/1
30 TABLET ORAL 3 TIMES DAILY
Status: DISCONTINUED | OUTPATIENT
Start: 2019-02-12 | End: 2019-02-13

## 2019-02-12 RX ORDER — LAMOTRIGINE 100 MG/1
200 TABLET ORAL DAILY
Status: DISCONTINUED | OUTPATIENT
Start: 2019-02-13 | End: 2019-02-15 | Stop reason: HOSPADM

## 2019-02-12 RX ADMIN — ALPRAZOLAM 0.5 MG: 0.5 TABLET ORAL at 22:14

## 2019-02-12 RX ADMIN — LAMOTRIGINE 225 MG: 100 TABLET ORAL at 09:13

## 2019-02-12 RX ADMIN — GABAPENTIN 900 MG: 300 CAPSULE ORAL at 09:13

## 2019-02-12 RX ADMIN — CYCLOBENZAPRINE HYDROCHLORIDE 10 MG: 10 TABLET, FILM COATED ORAL at 01:22

## 2019-02-12 RX ADMIN — GABAPENTIN 900 MG: 300 CAPSULE ORAL at 16:38

## 2019-02-12 RX ADMIN — GABAPENTIN 900 MG: 300 CAPSULE ORAL at 22:15

## 2019-02-12 RX ADMIN — ALPRAZOLAM 0.5 MG: 0.5 TABLET ORAL at 19:31

## 2019-02-12 RX ADMIN — ESCITALOPRAM OXALATE 20 MG: 20 TABLET, FILM COATED ORAL at 19:33

## 2019-02-12 RX ADMIN — ASENAPINE MALEATE 10 MG: 5 TABLET SUBLINGUAL at 22:14

## 2019-02-12 RX ADMIN — BUSPIRONE HYDROCHLORIDE 30 MG: 7.5 TABLET ORAL at 22:18

## 2019-02-12 ASSESSMENT — MIFFLIN-ST. JEOR: SCORE: 1779.48

## 2019-02-12 ASSESSMENT — ACTIVITIES OF DAILY LIVING (ADL)
COGNITION: 0 - NO COGNITION ISSUES REPORTED
DRESS: 0-->INDEPENDENT
FALL_HISTORY_WITHIN_LAST_SIX_MONTHS: NO
AMBULATION: 0-->INDEPENDENT
SWALLOWING: 0-->SWALLOWS FOODS/LIQUIDS WITHOUT DIFFICULTY
RETIRED_COMMUNICATION: 0-->UNDERSTANDS/COMMUNICATES WITHOUT DIFFICULTY
TOILETING: 0-->INDEPENDENT
TRANSFERRING: 0-->INDEPENDENT
RETIRED_EATING: 0-->INDEPENDENT
BATHING: 0-->INDEPENDENT

## 2019-02-12 ASSESSMENT — ENCOUNTER SYMPTOMS
HEADACHES: 1
FATIGUE: 1
NECK PAIN: 1
COUGH: 0
SEIZURES: 1
CONFUSION: 1
BACK PAIN: 1
FEVER: 0
RHINORRHEA: 0

## 2019-02-12 NOTE — ED NOTES
Called floor regarding pt room status. Told pt will be the next to move up to [address] in around an hour.

## 2019-02-12 NOTE — ED NOTES
Pt walked self to restroom with EDT x1. Pt is very calm, cooperative and pleasant. Pt also had urine sent via ERT. Pt had c-collar removed via RN per ED MD soliz.

## 2019-02-12 NOTE — PROGRESS NOTES
I spoke with LATOYA Puckett at [facility] MH unit.  They are expecting no beds today as they have multiple patients in their ED awaiting MH beds.  Pt will be placed on wait list for a MH bed with [facility].

## 2019-02-12 NOTE — PHARMACY-ADMISSION MEDICATION HISTORY
Admission medication history interview status for the 2/11/2019  admission is complete. See EPIC admission navigator for prior to admission medications     Medication history source reliability:Moderate    Actions taken by pharmacist (provider contacted, etc): Completed chart review and verified medication doses and instructions with pharmacy.     Additional medication history information not noted on PTA med list : None    Medication reconciliation/reorder completed by provider prior to medication history? No    Time spent in this activity: 20 minutes    Prior to Admission medications    Medication Sig Last Dose Taking? Auth Provider   acetaminophen (TYLENOL) 500 MG tablet Take 500-1,000 mg by mouth every 6 hours as needed for mild pain PRN Yes Unknown, Entered By History   ALPRAZolam (XANAX) 0.5 MG tablet Take 0.5 mg by mouth 3 times daily 2/11/2019 at Unknown time Yes Unknown, Entered By History   amphetamine-dextroamphetamine (ADDERALL XR) 20 MG 24 hr capsule Take 20 mg by mouth 2 times daily 2/11/2019 at Unknown time Yes Unknown, Entered By History   asenapine (SAPHRIS) 10 MG SUBL sublingual tablet Place 10 mg under the tongue At Bedtime Past Week at Unknown time Yes Unknown, Entered By History   beclomethasone (QVAR) 80 MCG/ACT AERS IS A DISCONTINUED MEDICATION Inhale 1 puff into the lungs daily as needed Past Week at Unknown time Yes Unknown, Entered By History   buPROPion (WELLBUTRIN XL) 300 MG 24 hr tablet Take 300 mg by mouth every morning 2/11/2019 at Unknown time Yes Unknown, Entered By History   busPIRone HCl (BUSPAR) 30 MG tablet Take 30 mg by mouth 3 times daily 2/11/2019 at Unknown time Yes Unknown, Entered By History   escitalopram (LEXAPRO) 20 MG tablet Take 20 mg by mouth daily 2/11/2019 at Unknown time Yes Unknown, Entered By History   gabapentin (NEURONTIN) 300 MG capsule Take 900 mg by mouth 3 times daily 2/11/2019 at Unknown time Yes Unknown, Entered By History   HYDROcodone-acetaminophen  (NORCO) 5-325 MG tablet Take 1 tablet by mouth every 4 hours as needed for severe pain prn Yes Unknown, Entered By History   lamoTRIgine (LAMICTAL) 200 MG tablet Take 200 mg by mouth daily 2/12/2019 at 0800 Yes Unknown, Entered By History   Melatonin 10 MG TABS tablet Take 10 mg by mouth nightly as needed for sleep prn Yes Unknown, Entered By History   multivitamin, therapeutic (THERA-VIT) TABS tablet Take 1 tablet by mouth daily 2/11/2019 at Unknown time Yes Unknown, Entered By History   nicotine (NICOTROL) 10 MG inhaler Inhale into the lungs daily as needed for smoking cessation  prn Yes Unknown, Entered By History   ondansetron (ZOFRAN ODT) 4 MG ODT tab Take 1 tablet (4 mg) by mouth every 6 hours as needed for nausea prn Yes Rox Wilburn PA-C   oxyCODONE HCl (OXAYDO) 5 MG TABA Take 5 mg by mouth every 4 hours as needed  prn Yes Unknown, Entered By History   oxyCODONE-acetaminophen (PERCOCET) 5-325 MG tablet Take 1 tablet by mouth every 6 hours as needed for severe pain prn Yes Unknown, Entered By History   VENTOLIN  (90 BASE) MCG/ACT inhaler Inhale 1-2 puffs into the lungs every 6 hours as needed for shortness of breath / dyspnea or wheezing  prn Yes Reported, Patient

## 2019-02-12 NOTE — ED PROVIDER NOTES
History     Chief Complaint:  Seizures & Suicidal    HPI   Maryanne Crockett is a 38 year old female with a history of seizures, bipolar disorder, and depression who presents via EMS for evaluation of a seizure, as well as suicidal ideations. Tonight, she reports she was watching television when she began thinking about killing herself by ingesting antifreeze. She states she would do it this way so that her family would not find out what she did. Because of these thoughts, she reports calling a friend for help. When her friend arrived, the patient reportedly started seizing; the friend states the seizure lasted for 45 seconds and the patient hit her head and neck on a door frame. She also became incontinent of urine. EMS was called and placed a c-collar en route to the ED. They also noted her blood glucose to be 113. Here, the patient reports feeling tired and confused and endorses pain to her face, upper back, and neck. She denies any visual changes. She reports only taking her normally prescribed medications tonight and denies ingesting antifreeze. She does report a history of prior seizures as well as suicide attempts. In regards to the seizures, she reports she can usually feel their onset by tingles in her feet; she states it has been awhile since she has had one. She also reports taking gabapentin for the seizures; she denies any missed doses or dosage changes of late. She denies any recent fevers, coughs, or cold symptoms. She denies alcohol or drug use tonight. Additionally, she reports losing her brother and significant other of 10 years recently and that has escalated her depression. She does endorse medical and familial support in these manners.     Allergies:  NKDA    Medications:    Asenapine  Lexapro  Lamotrigine  Gabapentin  Hydroxyzine  Adderall    Past Medical History:    Seizures  Bipolar disorder  Chronic pain  ADD  GERD  Ovarian cyst  Anxiety & Depression    Past Surgical History:     Cholecystectomy  D&C  Gastric restrictive procedure to remove subcutaneous port  Oophorectomy - right  Salpingectomy - bilateral  Tubal ligation  Adult operative laparoscopy  Cyst removal from hand    Family History:    Brain cancer  Leukemia    Social History:  Marital Status:  Single [1]  Smokes 0.25 PPD  Positive for alcohol use. Comment: Rare.   Negative for drug use.      Review of Systems   Constitutional: Positive for fatigue. Negative for fever.   HENT: Negative for congestion and rhinorrhea.    Eyes: Negative for visual disturbance.   Respiratory: Negative for cough.    Musculoskeletal: Positive for back pain and neck pain.   Neurological: Positive for seizures and headaches.   Psychiatric/Behavioral: Positive for confusion and suicidal ideas. Negative for self-injury.   All other systems reviewed and are negative.        Physical Exam     Patient Vitals for the past 24 hrs:   BP Temp Temp src Pulse Heart Rate Resp SpO2   02/12/19 0317 118/60 98  F (36.7  C) Oral 69 69 11 97 %   02/12/19 0145 -- -- -- -- 73 18 97 %   02/12/19 0022 114/50 -- -- 71 69 18 96 %   02/11/19 2152 121/80 -- -- 77 -- 18 96 %     Physical Exam  Constitutional:  Appears well-developed and well-nourished. Cooperative. sleepy but rousable  HENT:   Head:    Atraumatic.   Mouth/Throat:   Oropharynx is without erythema or exudate and mucous     membranes are moist.   Eyes:    Conjunctivae normal and EOM are normal except She has a few beats of horizontal nystagmus, fast component to the right.     Pupils are equal, round, and reactive to light.   Neck:    Normal range of motion. Neck supple.   Cardiovascular:  Normal rate, regular rhythm, normal heart sounds and radial and    dorsalis pedis pulses are 2+ and symmetric.     Pulmonary/Chest:  Effort normal and breath sounds normal.   Abdominal:   Soft. Bowel sounds are normal.      No splenomegaly or hepatomegaly. No tenderness. No rebound.   Musculoskeletal:  Normal range of motion. No  edema. Midline tenderness to the lower c spine and upper thoracic spine, as well as some paraspinous muscle tenderness to the right of this. No step off is noted. Chest wall, pelvis, and extremities are otherwise atraumatic.  Neurological:  Alert. 5/5 strength upper and lower extremities bilaterally. Speech is slow and mildly slurred but word choices are appropriate. Sensation to light touch intact in all 4 extremities.   Skin:    Skin is warm and dry. Superficial abrasion and contusion to her right cheek. No palpable bony defect.  Psychiatric:   Depressed mood and flat affect. Limited eye contact.     Emergency Department Course   ECG:   Indication: Seizure  Time: 2312  Vent. Rate 69 bpm. TX interval 184. QRS duration 108. QT/QTc 436/467. P-R-T axis 48 83 50.  Normal sinus rhythm. Incomplete RBBB. Borderline ECG. Read time: 2335.    Imaging:  Radiographic findings were communicated with the patient who voiced understanding of the findings.  CT Head without contrast:   No evidence of acute intracranial abnormality, as per radiology.    CT Cervical Spine without contrast:   No visualized acute fracture of the cervical spine, as per radiology.    XR Thoracic spine, 3 views:   1. No visualized acute fracture or malalignment of the thoracic spine.  2. Mild degenerative changes in the thoracic spine, as per radiology.     Laboratory:  CBC: WBC: 9.1, HGB: 13.1, PLT: 240  CMP: Ca: 8.0 (L), Albumin: 3.2 (L), Protein: 6.4 (L), o/w WNL (Creatinine: 0.97)    Blood gas venous & oxyhgb: PO2: 53 (H), o/w WNL    Drug abuse screen: Amphetamine: Positive (A), Benzodiazepine: Positive (A), Cannabinoids: Positive (A), o/w Negative    Salicylate: 4  Acetaminophen: <2  Alcohol ethyl: <0.01  Lamotrigine level: Pending  Gabapentin level: Pending  HCG: Negative  Osmolality: 293    Interventions:  2310 NS 1L IV  0122 Flexeril, 10 mg, PO    Please see ED Medications for full list of medications administered in the ED.    Emergency Department  Course:  Patient presents via EMS. Paramedic notes and vitals reviewed.   (2300) I performed an exam of the patient as documented above. The patient was placed on a PATRICIA hold.      IV inserted. Medicine administered as documented above. Blood drawn. This was sent to the lab for further testing, results above.     The patient was sent for a head and c-spine CTs, as well as a thoracic spine x-ray  while in the emergency department, findings above.     The patient provided a urine sample here in the emergency department. This was sent for laboratory testing, findings above.      EKG obtained in the ED, see results above.     (0316) I consulted with DEC, regarding the patient's history and presentation here in the emergency department. After their assessment, they believe the patient would benefit from admission. Her insurance is limited to [facility] and they have no beds available, so she will be kept here pending availability.     (0320) I rechecked the patient and discussed the results of her workup thus far.     I personally reviewed the laboratory results with the Patient and answered all related questions prior to shift change.      Impression & Plan      Medical Decision Making:  Maryanne Crockett is a 38 year old female who presents complaining of depressed mood and suicidal thoughts. She has made a plan to drink some antifreeze. She denies having done this. Her assessment is somewhat complicated by the fact that she had a recurrent seizure. She reports a history of seizure disorder and no changes to medications, no recent illness, and denies ingestions. She has slurred speech and is significantly altered. Given the history of thoughts of antifreeze ingestion, I did laboratory testing and no sign of anion gap or coingestion is found. Laboratory testing returned looking unremarkable, as above. She is also complaining of head, neck, and  Upper back pain after falling into the wall during her seizure. She has a  facial contusion. CT scan of the head, neck, and x-ray of the thoracic spine all returned looking unremarkable, without signs of acute injury. Patient was given some flexeril and Tylenol to help with pain and muscle spasms.     Patient denies having missed any doses of her Lamictal and gabapentin. Levels for these are both sent and are pending. Seizure precautions were done in the ED, but there was no further seizure activity. Her mental status cleared. I suspect the altered mental status was strictly from being post ictal.     The patient was evaluated by the DEC , and because of her depression, suicidal thoughts and persistent plan, she will be admitted to the hospital. Unfortunately, the patient's insurance is restricted to [facility]. When we contacted them, they do not have any beds immediately available, but she is on the waiting list for morning. There are also no beds available here in [place], so she will be held overnight in the ED until an appropriate bed can be found.     I discussed test results and the plan for hospitalization with the patient who voices understanding. She is signed out to the morning physician at 6AM.     Critical Care time:  none    Diagnosis:    ICD-10-CM    1. Depression, unspecified depression type F32.9    2. Suicidal ideation R45.851    3. Recurrent seizures (H) G40.909        Disposition:  Signed out to morning physician pending bed availability     Scribe Disclosure:  Naz SCHULTZ, am serving as a scribe on 2/12/2019 at 11:00  AM to personally document services performed by Dante Palacios MD based on my observations and the provider's statements to me.     Naz Gale  2/11/2019    EMERGENCY DEPARTMENT       Dante Palacios MD  02/12/19 0715
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
## 2019-02-12 NOTE — ED NOTES
"Patient drowsy, unable to finish sentence without falling asleep, when asked why she is so tired patient states \" I don't know I guess just from staying up too long\" patient denied any etoh or drugs/pill use to this writer.  "

## 2019-02-12 NOTE — ED TRIAGE NOTES
"Pt arrived via EMS reporting thoughts of ingesting \"something\", denies ingestion of anything prior to a friend arriving at pt home. Friend witnesses a 45 second reportedly tonic clonic seizure, pt hit head and back on door frame, incontinent of urine. Pt placed in c-collar enroute, 18G RAC. Pt is answering questions appropriately, c/o pain to neck and upper back  "

## 2019-02-12 NOTE — ED NOTES
Bed: ED18  Expected date: 2/11/19  Expected time: 9:46 PM  Means of arrival:   Comments:  HEMS 434  38F  Seizure/Fall/Suicidal

## 2019-02-13 LAB
LAMOTRIGINE SERPL-MCNC: 2.2 UG/ML (ref 2.5–15)
MAGNESIUM SERPL-MCNC: 2.3 MG/DL (ref 1.6–2.3)
POTASSIUM SERPL-SCNC: 4.4 MMOL/L (ref 3.4–5.3)
TSH SERPL DL<=0.005 MIU/L-ACNC: 1 MU/L (ref 0.4–4)

## 2019-02-13 PROCEDURE — 84443 ASSAY THYROID STIM HORMONE: CPT | Performed by: PSYCHIATRY & NEUROLOGY

## 2019-02-13 PROCEDURE — 99222 1ST HOSP IP/OBS MODERATE 55: CPT | Performed by: NURSE PRACTITIONER

## 2019-02-13 PROCEDURE — 84132 ASSAY OF SERUM POTASSIUM: CPT | Performed by: PHYSICIAN ASSISTANT

## 2019-02-13 PROCEDURE — 12400000 ZZH R&B MH

## 2019-02-13 PROCEDURE — 83735 ASSAY OF MAGNESIUM: CPT | Performed by: PSYCHIATRY & NEUROLOGY

## 2019-02-13 PROCEDURE — 99222 1ST HOSP IP/OBS MODERATE 55: CPT | Performed by: PHYSICIAN ASSISTANT

## 2019-02-13 PROCEDURE — 25000132 ZZH RX MED GY IP 250 OP 250 PS 637: Performed by: PSYCHIATRY & NEUROLOGY

## 2019-02-13 PROCEDURE — 90853 GROUP PSYCHOTHERAPY: CPT

## 2019-02-13 PROCEDURE — 36415 COLL VENOUS BLD VENIPUNCTURE: CPT | Performed by: PSYCHIATRY & NEUROLOGY

## 2019-02-13 PROCEDURE — 84132 ASSAY OF SERUM POTASSIUM: CPT | Performed by: PSYCHIATRY & NEUROLOGY

## 2019-02-13 RX ORDER — LITHIUM CARBONATE 300 MG/1
300 CAPSULE ORAL 2 TIMES DAILY WITH MEALS
Status: DISCONTINUED | OUTPATIENT
Start: 2019-02-13 | End: 2019-02-15 | Stop reason: HOSPADM

## 2019-02-13 RX ORDER — BUSPIRONE HYDROCHLORIDE 10 MG/1
20 TABLET ORAL 3 TIMES DAILY
Status: DISCONTINUED | OUTPATIENT
Start: 2019-02-13 | End: 2019-02-15 | Stop reason: HOSPADM

## 2019-02-13 RX ADMIN — GABAPENTIN 900 MG: 300 CAPSULE ORAL at 20:53

## 2019-02-13 RX ADMIN — ALPRAZOLAM 0.5 MG: 0.5 TABLET ORAL at 20:53

## 2019-02-13 RX ADMIN — ALPRAZOLAM 0.5 MG: 0.5 TABLET ORAL at 16:01

## 2019-02-13 RX ADMIN — ASENAPINE MALEATE 10 MG: 5 TABLET SUBLINGUAL at 21:49

## 2019-02-13 RX ADMIN — GABAPENTIN 900 MG: 300 CAPSULE ORAL at 08:57

## 2019-02-13 RX ADMIN — BUSPIRONE HYDROCHLORIDE 20 MG: 10 TABLET ORAL at 20:53

## 2019-02-13 RX ADMIN — BUSPIRONE HYDROCHLORIDE 20 MG: 10 TABLET ORAL at 16:01

## 2019-02-13 RX ADMIN — LAMOTRIGINE 200 MG: 100 TABLET ORAL at 08:57

## 2019-02-13 RX ADMIN — ESCITALOPRAM OXALATE 20 MG: 20 TABLET, FILM COATED ORAL at 08:57

## 2019-02-13 RX ADMIN — BUSPIRONE HYDROCHLORIDE 30 MG: 7.5 TABLET ORAL at 08:56

## 2019-02-13 RX ADMIN — ACETAMINOPHEN 1000 MG: 500 TABLET, FILM COATED ORAL at 16:32

## 2019-02-13 RX ADMIN — ALPRAZOLAM 0.5 MG: 0.5 TABLET ORAL at 08:56

## 2019-02-13 RX ADMIN — GABAPENTIN 900 MG: 300 CAPSULE ORAL at 16:01

## 2019-02-13 RX ADMIN — LITHIUM CARBONATE 300 MG: 300 CAPSULE, GELATIN COATED ORAL at 18:17

## 2019-02-13 ASSESSMENT — ACTIVITIES OF DAILY LIVING (ADL)
HYGIENE/GROOMING: INDEPENDENT
ORAL_HYGIENE: INDEPENDENT
HYGIENE/GROOMING: INDEPENDENT
DRESS: INDEPENDENT
LAUNDRY: WITH SUPERVISION
LAUNDRY: WITH SUPERVISION
DRESS: INDEPENDENT
ORAL_HYGIENE: INDEPENDENT

## 2019-02-13 NOTE — PROGRESS NOTES
"Admitted on 72 hour hold from ED with suicidal ideation. Reports history or bipolar depression, anxiety, PTSD. Has been having seizures since May of 2018, states 8-9 total. Recent stress, brother  2 weeks ago, boyfriend  6 months ago. Has been struggling with worsening depression since that time. She reports \"non-stop\" worry, dependent on Xanax, wakes in the night with panic symptoms. Reports cannabis use multiple times per week for much of her life, denies alcohol or other drug use. Admits to passive thoughts of death. Verbally contracts to safety, identifies barriers to self harm. Nursing assessment complete including patient and medication profiles. Risk assessments completed addressing suicide,fall,skin,nutrition and safety issues. Care plan initiated. Assessments reviewed with physician and admit orders received. Video monitoring in progress, Patient Informed.Welcome packet reviewed with patient. Information reviewed includes getting emergency help, preventing infections, understanding your care, using medication safely, reducing falls, preventing pressure ulcers, smoking cessation, powerful choices and Patients Bill of Rights. Pt. given tour of the unit and instruction on use of facility including emergency call light. Program schedule reviewed with patient. Questions regarding the unit addressed. Pt. Search completed and belongings inventoried.      "

## 2019-02-13 NOTE — PLAN OF CARE
Pt has been calm and sleeping in room. Med compliant and although Gabapentin level were high Dr Sims told writer to go ahead and administer the 900 mg of the medication po. Pt is on Seizure precaution with floor mat.

## 2019-02-13 NOTE — H&P
"[facility]    History and Physical - Hospitalist Service       Date of Admission:  2/11/2019    Assessment & Plan   Maryanne Crockett is a 38 year old female admitted on 2/11/2019. She presented with a seizure and suicidal ideation and was admitted to the mental health unit for further management of suicidal ideation with a neurology consultation due to seizures.    Suicidal ideation with plan but no attempt, Worsening Depression  Bipolar disorder, ADD  Significant emotional trauma recently with death of brother from brain ca and loss of long-time significant other. Felt depressed on day of presentation and felt the urge to kill herself by ingesting antifreeze. She admitted she would use antifreeze to prevent her family from finding out her self infliction.   - Adjust mental health associated meds per psychiatry  - On wait list for [facility] where her insurance is restricted to (they did not have beds on admission)    Recurrent Seizures  Seisures since May 2018 with 8-9 total. Witnessed seizure 2/11/19 by friend after pt called due to depressive symptoms. Supratherapeutic gabapentin level on admission. Hit her head and neck on door frame, imaging neg in the ED.   - Neuro consulted - continue current anti-seizure regimen, no driving x3 mos, and follow up with PCP in ~2 weeks for serum gabapentin and lamotrigine levels  - If further difficulties with controlling seizures or maintaining proper levels, would ask PCP for neuro referral  - have pharmacy dose pills in daily pouches for adequate compliance  - Seizure precautions  - Check Mg++/K+ - WNL     Tobacco use disorder  Current daily smoker however intermittently stops \"cold turkey\". No need for nicotine patch or gum at this time.    BMI 35.2 c/w obese class II  Hx lap band  Diet and lifestyle changes with PCP once stable for discharge.    Hx ovarian cyst removal  Bilateral salpingectomy and Rt ooporectomy with Lt adnexal mass removal 2016. Hx tubal " ligation  No current issues.     Diet: Regular Diet Adult    DVT Prophylaxis: Ambulate every shift  Luu Catheter: not present  Code Status: Full Code      Disposition Plan   Expected discharge: 2 - 3 days, recommended to prior living arrangement once when cleared by psychiatry.  Entered: Jerica Villareal PA-C 02/13/2019, 11:04 AM     The patient's care was discussed with the Attending Physician, Dr. Hassan, Bedside Nurse and Patient.    The patient is presently medically stable therefore the Hospitalist service will sign off at this time. Please reconsult if medical issues arise that need to be addressed.    Jerica Villareal PA-C  [facility]    ______________________________________________________________________    Chief Complaint   Seizure and suicidal ideation    History is obtained from the patient and electronic health record    History of Present Illness   Maryanne Crockett is a 38 year old female with seizure disorder, strong family hx of brain cancers, and depression with prior suicide attempts who presents with witnessed seizure and suicidal ideation after feeling like she was depressed and thought about drinking antifreeze. Pt was reportedly watching TV when she started having suicidal thoughts and called a friend who arrived to the patient's home and witnessed a seizure lasting ~45 seconds. She hit her head and neck on the doorframe during the seizure and became incontinent of urine. EMS was called and brought the patient to the ED where she was evaluated by Dr. Dante Palacios. Basic labs unremarkable including CMP, CBC, Hcg and TSH. APAP/EtOH/salicyulate neg. Osmolality wnl. Gabapentin for seizures was checked in the ED and elevated to 22.6. Lamotrigine for seizures was subtherapeutic. Thoracic spine XR with mild degenerative changes but no acute abnormalities. CT of head/neck/spine unremarkable in ED. Initially c/o pain to face, upper back, and neck. Reported she took her regular meds  and did not ingest antifreeze or other harmful substances. Mental status back to baseline in the ED and pt reportedly denied missing doses of anti-seizure medications, DEC was consulted and recommended admission, therefore she was admitted to the mental health unit on a 72 hour hold.     Pt with increase in depression and suicidal thoughts after emotional trauma recently with the death of her brother from brain cancer along with her longtime significant other. During my visit she feels generally well. She denies     Review of Systems    The 10 point Review of Systems is negative other than noted in the HPI or here.     Past Medical History    I have reviewed this patient's medical history and updated it with pertinent information if needed.   Past Medical History:   Diagnosis Date     Depressive disorder      Family history of glioblastoma     screening MRIs every 2 years     Ovarian cyst        Past Surgical History   I have reviewed this patient's surgical history and updated it with pertinent information if needed.  Past Surgical History:   Procedure Laterality Date     CHOLECYSTECTOMY       DILATION AND CURETTAGE SUCTION  4/30/15    retained POC     GASTRIC RESTRICTIVE PROCEDURE, OPEN, REMOVE/REPLACE SUBCUTANEOUS PORT       LAPAROSCOPIC OOPHORECTOMY Right 8/5/2016    Procedure: LAPAROSCOPIC OOPHORECTOMY;  Surgeon: Adrianne Vergara MD;  Location: [facility]     LAPAROSCOPIC SALPINGECTOMY Bilateral 8/5/2016    Procedure: LAPAROSCOPIC SALPINGECTOMY;  Surgeon: Adrianne Vergara MD;  [facility]     LAPAROSCOPIC TUBAL LIGATION Bilateral 5/22/2015    Procedure: LAPAROSCOPIC TUBAL LIGATION;  Surgeon: Hayley Zayas MD;  [facility] OR     LAPAROSCOPY OPERATIVE ADULT N/A 8/5/2016    Procedure: LAPAROSCOPY OPERATIVE ADULT;  Surgeon: Adrianne Vergara MD;  Location: [facility] OR     SOFT TISSUE SURGERY Right     cyst in hand       Social History   I have reviewed this patient's social history and updated it with  pertinent information if needed.  Social History     Tobacco Use     Smoking status: Current Some Day Smoker     Packs/day: 0.25     Smokeless tobacco: Never Used   Substance Use Topics     Alcohol use: Yes     Alcohol/week: 0.0 oz     Comment: rare     Drug use: No       Family History   I have reviewed this patient's family history and updated it with pertinent information if needed.   Family History   Problem Relation Age of Onset     Other Cancer Brother 36        brain ca     Other Cancer Father         leukemia     Other Cancer Maternal Aunt         2 aunts with glioblastomas     Breast Cancer No family hx of         no ovarian cancer       Prior to Admission Medications   Prior to Admission Medications   Prescriptions Last Dose Informant Patient Reported? Taking?   ALPRAZolam (XANAX) 0.5 MG tablet 2/11/2019 at Unknown time Self Yes Yes   Sig: Take 0.5 mg by mouth 3 times daily   HYDROcodone-acetaminophen (NORCO) 5-325 MG tablet prn Self Yes Yes   Sig: Take 1 tablet by mouth every 4 hours as needed for severe pain   Melatonin 10 MG TABS tablet prn Self Yes Yes   Sig: Take 10 mg by mouth nightly as needed for sleep   VENTOLIN  (90 BASE) MCG/ACT inhaler prn Self Yes Yes   Sig: Inhale 1-2 puffs into the lungs every 6 hours as needed for shortness of breath / dyspnea or wheezing    acetaminophen (TYLENOL) 500 MG tablet PRN Self Yes Yes   Sig: Take 500-1,000 mg by mouth every 6 hours as needed for mild pain   amphetamine-dextroamphetamine (ADDERALL XR) 20 MG 24 hr capsule 2/11/2019 at Unknown time Self Yes Yes   Sig: Take 20 mg by mouth 2 times daily   asenapine (SAPHRIS) 10 MG SUBL sublingual tablet Past Week at Unknown time Self Yes Yes   Sig: Place 10 mg under the tongue At Bedtime   beclomethasone (QVAR) 80 MCG/ACT AERS IS A DISCONTINUED MEDICATION Past Week at Unknown time Self Yes Yes   Sig: Inhale 1 puff into the lungs daily as needed   buPROPion (WELLBUTRIN XL) 300 MG 24 hr tablet 2/11/2019 at  Unknown time Self Yes Yes   Sig: Take 300 mg by mouth every morning   busPIRone HCl (BUSPAR) 30 MG tablet 2/11/2019 at Unknown time Self Yes Yes   Sig: Take 30 mg by mouth 3 times daily   escitalopram (LEXAPRO) 20 MG tablet 2/11/2019 at Unknown time Self Yes Yes   Sig: Take 20 mg by mouth daily   gabapentin (NEURONTIN) 300 MG capsule 2/11/2019 at Unknown time Self Yes Yes   Sig: Take 900 mg by mouth 3 times daily   lamoTRIgine (LAMICTAL) 200 MG tablet 2/12/2019 at 0800 Self Yes Yes   Sig: Take 200 mg by mouth daily   multivitamin, therapeutic (THERA-VIT) TABS tablet 2/11/2019 at Unknown time Self Yes Yes   Sig: Take 1 tablet by mouth daily   nicotine (NICOTROL) 10 MG inhaler prn Self Yes Yes   Sig: Inhale into the lungs daily as needed for smoking cessation    ondansetron (ZOFRAN ODT) 4 MG ODT tab prn Self No Yes   Sig: Take 1 tablet (4 mg) by mouth every 6 hours as needed for nausea   oxyCODONE HCl (OXAYDO) 5 MG TABA prn Self Yes Yes   Sig: Take 5 mg by mouth every 4 hours as needed    oxyCODONE-acetaminophen (PERCOCET) 5-325 MG tablet prn Self Yes Yes   Sig: Take 1 tablet by mouth every 6 hours as needed for severe pain      Facility-Administered Medications: None     Allergies   No Known Allergies    Physical Exam   Vital Signs: Temp: 98  F (36.7  C) Temp src: Oral BP: 136/90 Pulse: 68 Heart Rate: 70 Resp: 16        Weight: 231 lbs 11.2 oz    Constitutional: awake, alert, cooperative, no apparent distress, and appears stated age  Eyes: pupils equal, round and reactive to light, extra ocular muscles intact, sclera clear, conjunctiva normal  ENT: Normocephalic, without obvious abnormality, atraumatic, oral pharynx with moist mucous membranes  Respiratory: No increased work of breathing, good air exchange, clear to auscultation bilaterally, no crackles or wheezing  Cardiovascular: Regular rate and rhythm, normal S1 and S2, no S3 or S4, and no murmur noted  GI: No scars, normal bowel sounds, soft, non-distended,  non-tender, no masses palpated  Skin: no bruising or bleeding, normal skin color, texture, turgor and no redness, warmth, or swelling  Musculoskeletal: There is no redness, warmth, or swelling of the joints.  Full range of motion noted.  Motor strength is 5 out of 5 all extremities bilaterally.  Tone is normal.  Neurologic: Awake, alert, oriented to name, place and time.  Cranial nerves II-XII are grossly intact.  Motor is 5 out of 5 bilaterally.  Cerebellar finger to nose, heel to shin intact.  Sensory is intact.    Neuropsychiatric: General: normal, calm and normal eye contact  Level of consciousness: alert / normal  Affect: normal  Orientation: oriented to self, place, time and situation  Memory and insight: normal, memory for past and recent events intact and thought process normal    Data   Data reviewed today: I reviewed all medications, new labs and imaging results over the last 24 hours. I personally reviewed no images or EKG's today.    Recent Labs   Lab 02/11/19  2311   WBC 9.1   HGB 13.1   MCV 88         POTASSIUM 3.4   CHLORIDE 108   CO2 27   BUN 8   CR 0.97   ANIONGAP 6   SOFI 8.0*   GLC 92   ALBUMIN 3.2*   PROTTOTAL 6.4*   BILITOTAL 0.2   ALKPHOS 72   ALT 23   AST 16     No results found for this or any previous visit (from the past 24 hour(s)).
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
## 2019-02-13 NOTE — PLAN OF CARE
Pt has been up for the majority of the shift, but napping and bed resting as well throughout the day. Presents a flat, anxious and depressed affect. Remains calm and has been respectful to peers and staff. Med compliant. No shower; untidy. Pt needs to attend groups going forward.

## 2019-02-13 NOTE — SIGNIFICANT EVENT
Lab phoned critical high gabapentin lab result 22.6 which was ordered last night in ER. Text sent to on-call psychiatrist Dr Sims to inform him; awaiting response regarding whether to hold bedtime dose. Pt has been receiving high dose of this med as part of seizure prevention regime.

## 2019-02-13 NOTE — PLAN OF CARE
Pt placed in room closest to nursing station in [facility] due to seizure history and pads placed on the floor. Alprazolam resumed this evening. Pt states feels anxious.

## 2019-02-13 NOTE — PROGRESS NOTES
02/12/19 1904   Patient Belongings   Did you bring any home meds/supplements to the hospital?  No   Patient Belongings other (see comments)   Belongings Search Yes   Clothing Search Yes   Second Staff Lalo ROLAND      Shoes with laces   Sweater with strings   Bra   Pair of socks  Sweat pants with strings   Wallet   Social security card  [place] EBT [id number]  $22 cash   Lose change   Necklace   Eyebrows plucker  Phone    [place] 's license   Fifi dreamer lotion   Makeup  [employer] rewards [id number]  Gum   Lip [place]   Key chain with x7 keys (one is broken) and 1 fob     Security Envelope [id number]   1977  VISA 7897  VISA 1109  [employer] Checks [phone]  [employer] gift card $10            Admission:  I am responsible for any personal items that are not sent to the safe or pharmacy.  [facility] is not responsible for loss, theft or damage of any property in my possession.    Signature:  _________________________________ Date: _______  Time: _____                                              Staff Signature:  ____________________________ Date: ________  Time: _____      2nd Staff person, if patient is unable/unwilling to sign:    Signature: ________________________________ Date: ________  Time: _____     Discharge:  [facility] has returned all of my personal belongings:    Signature: _________________________________ Date: ________  Time: _____                                          Staff Signature:  ____________________________ Date: ________  Time: _____

## 2019-02-13 NOTE — PHARMACY-PHARMACOTHERAPY NOTE
"The following home medications were NOT continued on inpatient admission per \"Discontinuation of nonessential home medications during hospitalization\" policy: MVI    If a therapeutic holiday is deemed inappropriate per the prescriber, please notify the pharmacist regarding the medication order.    The pharmacist is available to answer any questions and/or concerns the patient may have regarding discontinuation of non-essential medications.    Please ensure that these medications are restarted as needed upon discharge via the medication reconciliation discharge process and included on the discharge medication reconciliation report.    Thank you,  Isael Balbuena [facility]  "

## 2019-02-13 NOTE — PROGRESS NOTES
Dr Sims ordered to continue gabapentin as ordered due to seizure history; neurologist consult ordered and will make recommendations for seizure management.

## 2019-02-13 NOTE — CONSULTS
[facility]    Neurology Consultation Note     Maryanne Crockett MRN# [id number]   YOB: 1980 Age: 38 year old    Code Status:Full Code   Date of Admission: 2/11/2019  Date of Consult: 02/13/2019    _________________________________   Primary Care Physician   IGOR GAMBLE      ______________________________________________         Assessment & Plan     Reason for consult: I was asked by Dr. Calloway to evaluate this patient for seizures        ______________________________________________  #. (O35.843) Recurrent seizures (H)  --history of seizures  --PTA medications:    Gabapentin 900 mg TID   Lamotrigine 200 mg daily  --gabapentin level high, lamotrigine level low  -----in further discussion with the patient, she has difficulty managing her medications and notes it is possible she wasn't taking her medications correctly  ---------will maintain seizure medications as they currently are - would recommend rechecking levels in a couple weeks with her PCP and having someone help set up her medications at home  --she used to follow with [place] [facility], but for the last several years has had PCP managing her seizure medications  --she had a seizure a couple of months ago, but prior to that is unsure how long it had been since her last seizure  --advised her that she cannot drive for 3 months, per [place] law, after having a seizure, which she acknowledges understanding  #. (F32.9) Depression, unspecified depression type  --management per psych  #. (R44.807) Suicidal ideation  --management per psych  #. DVT Prophylaxis  --able to ambulate  #. Nutrition / GI Prophylaxis  --Per recommendations of speech therapy      #. Code Status: Full Code    No further neurological workup recommended at this time. Please call if further questions/concerns    Chief Complaint   ______________________________________________  Seizures and suicidal ideation  History is obtained from the patient    History of  "Present Illness   ______________________________________________  Maryanne Crockett is a 38 year old female who presented with seizure and suicidal ideation. She was watching TV and starting having suicidal thoughts and called a friend. When the friend arrived, she witnessed the patient having a seizure that lasted 45 seconds, and patient hit her head and neck on a doorframe, and she became incontinent of urine. EMS was called. Patient takes lamictal and gabapentin for the seizures, and it had been \"a while\" since she had a seizure. Denies any missed doses of seizure medications. History of suicide attempts. Recent escalation of depression after losing brother and significant other of 10 years. CT of head, neck, and spine unremarkable in the ED. Mental status cleared in the ED.     On exam, no focal neurological deficits. Patient reports having had a seizure a couple of months ago, but doesn't recall when she had one prior to that. Her significant other passed away from a massive heart attack back in 2018, and her brother  of cancer in 2019. She notes her depression has been very bad and she doesn't know if her medications are still helping her. She has had no idea how to cope with it. She does have family support, but still feels like she hasn't been coping well. She notes she has a hard time managing her medications and has thought about having a nurse help her go through them and set them up because she has so many. Discussed that she may have missed doses and not known it because she has so many pill bottles and not a good way to track them.    Past Medical History    ______________________________________________  Past Medical History:   Diagnosis Date     Depressive disorder      Family history of glioblastoma     screening MRIs every 2 years     Ovarian cyst      Past Surgical History   ______________________________________________  Past Surgical History:   Procedure Laterality Date     " CHOLECYSTECTOMY       DILATION AND CURETTAGE SUCTION  4/30/15    retained POC     GASTRIC RESTRICTIVE PROCEDURE, OPEN, REMOVE/REPLACE SUBCUTANEOUS PORT       LAPAROSCOPIC OOPHORECTOMY Right 8/5/2016    Procedure: LAPAROSCOPIC OOPHORECTOMY;  Surgeon: Adrianne Vergara MD;  [facility]: [facility]     LAPAROSCOPIC SALPINGECTOMY Bilateral 8/5/2016    Procedure: LAPAROSCOPIC SALPINGECTOMY;  Surgeon: Adrianne Vergara MD;  [facility]     LAPAROSCOPIC TUBAL LIGATION Bilateral 5/22/2015    Procedure: LAPAROSCOPIC TUBAL LIGATION;  Surgeon: Hayley Zayas MD;  [facility]     LAPAROSCOPY OPERATIVE ADULT N/A 8/5/2016    Procedure: LAPAROSCOPY OPERATIVE ADULT;  Surgeon: Adrianne Vergara MD;  [facility]     SOFT TISSUE SURGERY Right     cyst in hand     Prior to Admission Medications   ______________________________________________  Prior to Admission Medications   Prescriptions Last Dose Informant Patient Reported? Taking?   ALPRAZolam (XANAX) 0.5 MG tablet 2/11/2019 at Unknown time Self Yes Yes   Sig: Take 0.5 mg by mouth 3 times daily   HYDROcodone-acetaminophen (NORCO) 5-325 MG tablet prn Self Yes Yes   Sig: Take 1 tablet by mouth every 4 hours as needed for severe pain   Melatonin 10 MG TABS tablet prn Self Yes Yes   Sig: Take 10 mg by mouth nightly as needed for sleep   VENTOLIN  (90 BASE) MCG/ACT inhaler prn Self Yes Yes   Sig: Inhale 1-2 puffs into the lungs every 6 hours as needed for shortness of breath / dyspnea or wheezing    acetaminophen (TYLENOL) 500 MG tablet PRN Self Yes Yes   Sig: Take 500-1,000 mg by mouth every 6 hours as needed for mild pain   amphetamine-dextroamphetamine (ADDERALL XR) 20 MG 24 hr capsule 2/11/2019 at Unknown time Self Yes Yes   Sig: Take 20 mg by mouth 2 times daily   asenapine (SAPHRIS) 10 MG SUBL sublingual tablet Past Week at Unknown time Self Yes Yes   Sig: Place 10 mg under the tongue At Bedtime   beclomethasone (QVAR) 80 MCG/ACT AERS IS A DISCONTINUED  MEDICATION Past Week at Unknown time Self Yes Yes   Sig: Inhale 1 puff into the lungs daily as needed   buPROPion (WELLBUTRIN XL) 300 MG 24 hr tablet 2/11/2019 at Unknown time Self Yes Yes   Sig: Take 300 mg by mouth every morning   busPIRone HCl (BUSPAR) 30 MG tablet 2/11/2019 at Unknown time Self Yes Yes   Sig: Take 30 mg by mouth 3 times daily   escitalopram (LEXAPRO) 20 MG tablet 2/11/2019 at Unknown time Self Yes Yes   Sig: Take 20 mg by mouth daily   gabapentin (NEURONTIN) 300 MG capsule 2/11/2019 at Unknown time Self Yes Yes   Sig: Take 900 mg by mouth 3 times daily   lamoTRIgine (LAMICTAL) 200 MG tablet 2/12/2019 at 0800 Self Yes Yes   Sig: Take 200 mg by mouth daily   multivitamin, therapeutic (THERA-VIT) TABS tablet 2/11/2019 at Unknown time Self Yes Yes   Sig: Take 1 tablet by mouth daily   nicotine (NICOTROL) 10 MG inhaler prn Self Yes Yes   Sig: Inhale into the lungs daily as needed for smoking cessation    ondansetron (ZOFRAN ODT) 4 MG ODT tab prn Self No Yes   Sig: Take 1 tablet (4 mg) by mouth every 6 hours as needed for nausea   oxyCODONE HCl (OXAYDO) 5 MG TABA prn Self Yes Yes   Sig: Take 5 mg by mouth every 4 hours as needed    oxyCODONE-acetaminophen (PERCOCET) 5-325 MG tablet prn Self Yes Yes   Sig: Take 1 tablet by mouth every 6 hours as needed for severe pain      Facility-Administered Medications: None     Allergies   No Known Allergies    Social History   ______________________________________________  Social History     Socioeconomic History     Marital status: Single     Spouse name: None     Number of children: None     Years of education: None     Highest education level: None   Social Needs     Financial resource strain: None     Food insecurity - worry: None     Food insecurity - inability: None     Transportation needs - medical: None     Transportation needs - non-medical: None   Occupational History     None   Tobacco Use     Smoking status: Current Some Day Smoker     Packs/day:  "0.25     Smokeless tobacco: Never Used   Substance and Sexual Activity     Alcohol use: Yes     Alcohol/week: 0.0 oz     Comment: rare     Drug use: No     Sexual activity: Yes     Partners: Male   Other Topics Concern     None   Social History Narrative     None       Family History   ______________________________________________  Family History   Problem Relation Age of Onset     Other Cancer Brother 36        brain ca     Other Cancer Father         leukemia     Other Cancer Maternal Aunt         2 aunts with glioblastomas     Breast Cancer No family hx of         no ovarian cancer       Review of Systems   ______________________________________________  {A comprehensive review of  10 systems was performed and found to be negative except as described in this note  CONSTITUTIONAL: negative for fever, chills, change in weight  INTEGUMENTARY/SKIN: no rash or obvious new lesions  ENT/MOUTH: no sore throat, new sinus pain or nasal drainage, no neck mass noted  RESP: No pleuretic pain, No cough, no hemoptysis, No SOB   CV: negative for chest pain, palpitations or peripheral edema  GI: no nausea, vomiting, change in stools  : no dysuria or hematuria  MUSCULOSKELETAL: no myalgias, arthralgias or join efffusion  ENDOCRINE: no history of polyuria, polydyspsia or symptoms of thyroid dysfunction  PSYCHIATRIC: no change in mood stable  LYMPHATIC: no new lymphadenopathy  HEME: no bleeding or easy bruisability  NEURO: see HPI    Physical Exam   ______________________________________________  Weight:231 lbs 11.2 oz; Height:5' 8\"  Temp: 98  F (36.7  C) Temp src: Oral BP: 136/90 Pulse: 68 Heart Rate: 70 Resp: 16        General Appearance:  No acute distress  Neuro:       Mental Status Exam:   Awake, alert, oriented X3. Speech and language are intact. Mental status is normal       Cranial Nerves:  Pupils 3 mm, reactive. EOMI. Face sensation is normal. Face is symmetric. Tongue and uvula are midline. Other CN are normal           " Motor:  5/5 X 4. Tone and bulk are normal           Reflexes:  Normal DTR. Toes downgoing.        Sensory:  Normal to light touch               Coordination:   Intact finger-to-nose        Gait:  No significant difficulties  Neck: no nuchal rigidity, normal thyroid. No carotid bruits.    Cardiovascular: Regular rate and rhythm, no m/r/g  Lungs: Clear to auscultation  Abdomen: Soft, not tender, not distended  Extremities: No clubbing, no cyanosis, no edema    Data   ______________________________________________  All Data personally reviewed:       Labs:   CBC RESULTS:     Recent Labs   Lab 02/11/19  2311   WBC 9.1   RBC 4.48   HGB 13.1   HCT 39.3        Basic Metabolic Panel:   Recent Labs   Lab Test 02/11/19  2311 03/22/18  0657 03/21/18  0515    143 140   POTASSIUM 3.4 3.4 3.9   CHLORIDE 108 111* 106   CO2 27 26 21   BUN 8 10 10   CR 0.97 0.68 0.62   GLC 92 88 161*   SOFI 8.0* 8.1* 8.9     Liver panel:  Recent Labs   Lab Test 02/11/19  2311 03/22/18  0657 03/21/18  0515 01/19/16  1118 01/18/16  1743   PROTTOTAL 6.4* 6.0* 8.1 7.0 7.5   ALBUMIN 3.2* 3.0* 3.8 3.6 3.8   BILITOTAL 0.2 0.4 0.4 0.3 0.2   ALKPHOS 72 56 81 46 52   AST 16 12 11 18 15   ALT 23 12 14 35 28     INR:  Recent Labs   Lab Test 04/29/15  0053   INR 1.18*      Drug Screen:   Recent Labs   Lab Test 02/12/19  0108 03/21/18  0611 11/20/14  1713   UAMP Positive* Negative Positive   Cutoff for a positive amphetamine is greater than 500 ng/mL. This is an   unconfirmed screening result to be used for medical purposes only.  *   UBARB Negative Negative Negative   Cutoff for a negative barbiturate is 200 ng/mL or less.     BENZODIAZEUR Positive* Negative Negative   Cutoff for a negative benzodiazepine is 200 ng/mL or less.     UCANN Positive* Negative Positive   Cutoff for a positive cannabinoid is greater than 50 ng/mL. This is an   unconfirmed screening result to be used for medical purposes only.  *   UCOC Negative Negative Negative   Cutoff  for a negative cocaine is 300 ng/mL or less.     OPIT Negative Negative Positive   Cutoff for a positive opiate is greater than 300 ng/mL. This is an unconfirmed   screening result to be used for medical purposes only.  *   UPCP Negative  --  Negative   Cutoff for a negative PCP is 25 ng/mL or less.       Alcohol level:  Recent Labs   Lab Test 02/11/19  2311   ETOH <0.01     UA Results:  Recent Labs   Lab Test 03/21/18  0611   COLOR Yellow   APPEARANCE Slightly Cloudy   URINEGLC Negative   URINEBILI Negative   URINEKETONE 40*   SG 1.025   UBLD Small*   URINEPH 6.0   PROTEIN 30*   NITRITE Negative   LEUKEST Negative   RBCU 20*   WBCU 2     Most Recent 6 Bacteria Isolates From Any Culture (See EPIC Reports for Culture Details):  Recent Labs   Lab Test 03/21/18  2310 04/29/15  0233 12/08/14  1628   CULT 50,000 to 100,000 colonies/mL  mixed urogenital samina    Susceptibility testing not routinely done Moderate growth Enterococcus species  Normal urogenital samina  * No growth        Cardiac US:   --       Neurophysiology:   --       Imaging:   All imaging studies were reviewed personally  CT head 2/12/19:   No evidence of acute intracranial abnormality.        Text Page    Angela Soto, FABIAN, FNP-BC, RN CNRN SCRN

## 2019-02-14 PROCEDURE — 90853 GROUP PSYCHOTHERAPY: CPT

## 2019-02-14 PROCEDURE — 25000132 ZZH RX MED GY IP 250 OP 250 PS 637: Performed by: PSYCHIATRY & NEUROLOGY

## 2019-02-14 PROCEDURE — 90791 PSYCH DIAGNOSTIC EVALUATION: CPT

## 2019-02-14 PROCEDURE — 12400000 ZZH R&B MH

## 2019-02-14 RX ADMIN — HYDROXYZINE HYDROCHLORIDE 25 MG: 25 TABLET ORAL at 14:24

## 2019-02-14 RX ADMIN — ALPRAZOLAM 0.5 MG: 0.5 TABLET ORAL at 12:12

## 2019-02-14 RX ADMIN — LAMOTRIGINE 200 MG: 100 TABLET ORAL at 08:21

## 2019-02-14 RX ADMIN — BUSPIRONE HYDROCHLORIDE 20 MG: 10 TABLET ORAL at 18:05

## 2019-02-14 RX ADMIN — BUSPIRONE HYDROCHLORIDE 20 MG: 10 TABLET ORAL at 08:22

## 2019-02-14 RX ADMIN — ESCITALOPRAM OXALATE 20 MG: 20 TABLET, FILM COATED ORAL at 08:21

## 2019-02-14 RX ADMIN — ASENAPINE MALEATE 10 MG: 5 TABLET SUBLINGUAL at 20:42

## 2019-02-14 RX ADMIN — ALPRAZOLAM 0.5 MG: 0.5 TABLET ORAL at 08:21

## 2019-02-14 RX ADMIN — GABAPENTIN 900 MG: 300 CAPSULE ORAL at 08:21

## 2019-02-14 RX ADMIN — ACETAMINOPHEN 1000 MG: 500 TABLET, FILM COATED ORAL at 08:21

## 2019-02-14 RX ADMIN — LITHIUM CARBONATE 300 MG: 300 CAPSULE, GELATIN COATED ORAL at 08:22

## 2019-02-14 RX ADMIN — GABAPENTIN 900 MG: 300 CAPSULE ORAL at 20:41

## 2019-02-14 RX ADMIN — LITHIUM CARBONATE 300 MG: 300 CAPSULE, GELATIN COATED ORAL at 18:05

## 2019-02-14 RX ADMIN — ALPRAZOLAM 0.5 MG: 0.5 TABLET ORAL at 18:05

## 2019-02-14 RX ADMIN — BUSPIRONE HYDROCHLORIDE 20 MG: 10 TABLET ORAL at 12:12

## 2019-02-14 RX ADMIN — GABAPENTIN 900 MG: 300 CAPSULE ORAL at 12:12

## 2019-02-14 ASSESSMENT — ACTIVITIES OF DAILY LIVING (ADL)
ORAL_HYGIENE: INDEPENDENT
HYGIENE/GROOMING: INDEPENDENT
DRESS: INDEPENDENT
HYGIENE/GROOMING: INDEPENDENT
LAUNDRY: WITH SUPERVISION

## 2019-02-14 NOTE — PLAN OF CARE
"  Depressive Symptoms  Depressive Symptoms  Description  Signs and symptoms of listed problems will be absent or manageable.  2/13/2019 2237 - Improving by Lalo Tipton  Flowsheets  Taken 2/13/2019 2234   Depressive Symptoms Assessed  all  Depressive Symptoms Present  affect;mood  Note  Pt was active and pleasant within the ITC. Pt presents with a blunt affect and calm mood. Pt spent almost all shift in the lounge watching tv and socializing with peers. Pt attended all unit programming, because she \"wants it to look good so I can get discharged.\" Pt was pleasant with all interactions with peers and staff. Pt ate all of her food and was me compliant.      "

## 2019-02-14 NOTE — PLAN OF CARE
BEHAVIORAL TEAM DISCUSSION    Participants: Dr. Wood, nurses, social workers, occupational therapist, psych assistants.  Progress: No change. Pt reports she has no SI. Pt has been present around the unit. Attended groups and participated appropriately in all of them.   Continued Stay Criteria/Rationale: Until pt becomes adequately stabilized; possible discharge tomorrow.  Medical/Physical: n/a  Precautions:   Behavioral Orders   Procedures    Code 1 - Restrict to Unit    Discontinue 1:1 attendant for suicide risk     Order Specific Question:   I have performed an in person assessment of the patient     Answer:   Based on this assessment the patient no longer requires a one on one attendant at this point in time.    Fall precautions    Routine Programming     As clinically indicated    Status 15     Every 15 minutes.     Plan: Continue with current treatment plan; possible discharge tomorrow.  Rationale for change in precautions or plan: Pt needs further observation here on the unit.
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
## 2019-02-14 NOTE — PLAN OF CARE
Patient attended Process Group today and participated appropriately. Patient demonstrated good insight into the topic of discussion.

## 2019-02-14 NOTE — PROGRESS NOTES
Calls made to set up appointments. Appointment made for medication manager. Waiting return calls from therapist and . Message left for  that patient is interested in med box pending start of her meds arriving in [place] from [place] in next month. Both therapist and  returned calls and appointments are in line. Checked in with patient on safety plan. She had completed it and will put in the rest of the phone numbers when she gets her phone back.

## 2019-02-14 NOTE — PLAN OF CARE
"Flat but reactive affect, mood calm. Visible all shift out in ITC lounge watching TV, social with peers. Attended groups and participated appropriately. Still endorses some passive, fleeting suicidal thoughts\" I have been dealing with these thoughts all my life\" but \"I would never doing anything because I could not leave my daughter like that\". Showered. Med compliant  "

## 2019-02-14 NOTE — PROGRESS NOTES
"[facility] Psychiatric Progress Note      Interval History:   Pt seen, team meeting held with , nursing staff, OT, and PA's to review diagnosis and treatment plan.  Patient reports she is doing much better.  She states she is hoping to go home soon because she feels her friend that called EMS misunderstood her regarding her self-harm thoughts.  She tells me that she has a daughter who just lost her father and as such she feels she could not put her throughout on the loss.  She states she has had her own financial losses but denies any thoughts of harming herself or others.  She states she has a  and she currently resides in supervised living situation.  She tells me that her medications are supplied prepackaged and so she is capable of taking them appropriately.  Of note this is very different from her accounts yesterday where she had she had that she has a difficult time keeping her medications straight and was feeling hopeless.  It is unclear why she has suddenly taken a \"flight into health\" but she insists that she does not feel hopeless or helpless at this time.  Staff report that she has been appropriate during group sessions.     Review of systems:   The Review of Systems is negative other than noted in the HPI     Medications:       ALPRAZolam  0.5 mg Oral TID     asenapine  10 mg Sublingual At Bedtime     busPIRone  20 mg Oral TID     escitalopram  20 mg Oral Daily     gabapentin  900 mg Oral TID     lamoTRIgine  200 mg Oral Daily     lithium  300 mg Oral BID w/meals     acetaminophen, albuterol, cyclobenzaprine, hydrOXYzine, melatonin, nicotine, ondansetron    Mental Status Examination:     Appearance:  awake, alert, adequately groomed and casually dressed  Eye Contact:  better  Speech:  clear, coherent  Psychomotor Behavior:  no evidence of tardive dyskinesia, dystonia, or tics and fidgeting  Mood:  better   Affect:  appropriate and in normal range and intensity is " normal  Thought Process:  logical, linear and goal oriented no loose associations  Thought Content:  no evidence of suicidal ideation or homicidal ideation.  Patient denies the presence of auditory or visual hallucinations.    Oriented to:  time, person, and place  Attention Span and Concentration:  limited  Recent and Remote Memory:  limited  Fund of Knowledge: appropriate  Muscle Strength and Tone: normal  Gait and Station: Normal  Insight:  fair  Judgment:  Improving        Labs/Vitals:   No results found for this or any previous visit (from the past 24 hour(s)).  B/P: 93/52, T: 98.1, P: 61, R: 16    Impression:   Maryanne Crockett is a 38-year-old single mother of two with no current employment who resides in [place].  She has established history of seizure disorder and had presented to the hospital on account of worsening depression and seizure activity.  She is currently on a 72-hour hold, her involvement with [facility] is not quite clear.       DIagnoses:     1.  Bipolar 2 disorder, current episode depressed without psychotic features.   2.  Anxiety disorder, unspecified.   3.  Cannabis use disorder.   4.  Attention deficit hyperactivity disorder, inattentive type by history.   5.  Seizure disorder.   6.  Ovarian cysts.   7.  Status post laparoscopic band surgery.         Plan:   1. Written information given on medications. Side effects, risks, benefits reviewed.  2. Maintain current medications without adjustment.  3. For possible discharge tomorrow.      Attestation:  Patient has been seen and evaluated by Demetris murphy MD    PATIENT ID  Name: Maryanne Crockett  MRN:[id number]  YOB: 1980

## 2019-02-15 VITALS
BODY MASS INDEX: 35.12 KG/M2 | HEIGHT: 68 IN | OXYGEN SATURATION: 97 % | SYSTOLIC BLOOD PRESSURE: 129 MMHG | HEART RATE: 79 BPM | TEMPERATURE: 97.8 F | WEIGHT: 231.7 LBS | DIASTOLIC BLOOD PRESSURE: 79 MMHG | RESPIRATION RATE: 16 BRPM

## 2019-02-15 PROCEDURE — 25000132 ZZH RX MED GY IP 250 OP 250 PS 637: Performed by: PSYCHIATRY & NEUROLOGY

## 2019-02-15 RX ORDER — CYCLOBENZAPRINE HCL 10 MG
10 TABLET ORAL 2 TIMES DAILY PRN
Qty: 20 TABLET | Refills: 0 | Status: SHIPPED | OUTPATIENT
Start: 2019-02-15 | End: 2019-05-21

## 2019-02-15 RX ORDER — LITHIUM CARBONATE 300 MG/1
300 CAPSULE ORAL 2 TIMES DAILY WITH MEALS
Qty: 60 CAPSULE | Refills: 0 | Status: SHIPPED | OUTPATIENT
Start: 2019-02-15 | End: 2019-05-21

## 2019-02-15 RX ORDER — BUSPIRONE HYDROCHLORIDE 10 MG/1
20 TABLET ORAL 3 TIMES DAILY
Qty: 180 TABLET | Refills: 0 | Status: ON HOLD | OUTPATIENT
Start: 2019-02-15 | End: 2019-06-26

## 2019-02-15 RX ADMIN — ALPRAZOLAM 0.5 MG: 0.5 TABLET ORAL at 12:03

## 2019-02-15 RX ADMIN — LITHIUM CARBONATE 300 MG: 300 CAPSULE, GELATIN COATED ORAL at 08:05

## 2019-02-15 RX ADMIN — BUSPIRONE HYDROCHLORIDE 20 MG: 10 TABLET ORAL at 08:05

## 2019-02-15 RX ADMIN — LAMOTRIGINE 200 MG: 100 TABLET ORAL at 08:05

## 2019-02-15 RX ADMIN — GABAPENTIN 900 MG: 300 CAPSULE ORAL at 08:05

## 2019-02-15 RX ADMIN — ALPRAZOLAM 0.5 MG: 0.5 TABLET ORAL at 08:05

## 2019-02-15 RX ADMIN — BUSPIRONE HYDROCHLORIDE 20 MG: 10 TABLET ORAL at 12:03

## 2019-02-15 RX ADMIN — GABAPENTIN 900 MG: 300 CAPSULE ORAL at 12:03

## 2019-02-15 RX ADMIN — ESCITALOPRAM OXALATE 20 MG: 20 TABLET, FILM COATED ORAL at 08:05

## 2019-02-15 ASSESSMENT — ACTIVITIES OF DAILY LIVING (ADL)
ORAL_HYGIENE: INDEPENDENT
DRESS: INDEPENDENT
HYGIENE/GROOMING: INDEPENDENT
LAUNDRY: WITH SUPERVISION

## 2019-02-15 NOTE — DISCHARGE INSTRUCTIONS
Behavioral Discharge Planning and Instructions    Summary: Admitted to hospital with suicidal ideation.    Main Diagnosis: Bipolar 2 Disorder; Anxiety Disorder,unspecified; Cannabis Use Disorder; ADHD by history; Seizure Disorder    Major Treatments, Procedures and Findings: Psychiatric assessment.     Symptoms to Report: Increased confusion, Losing more sleep, Mood getting worse or Thoughts of suicide    Lifestyle Adjustment: Develop and follow safety plan. Follow up with medication management, therapy and case management providers to learn skills to manage anxiety and depression. Keep up with your educational and back to work goals.  Abstain from the use of all mood-altering substances, including alcohol and marijuana, as usage may increase symptoms of depression. Additionally use of illicit chemicals can interfere with your prescribed medications.  Maintain sobriety by attending daily AA or NA meetings and obtaining a sponsor.     Psychiatry Follow-up:     Bethanie Marte- appointment on Friday 3/1/19 @ 1:10 pm.  [facility]  [address] [place] [address]  [phone] / fax: [phone]    Sayra Yu- appointment on Thursday 2/21/19 @ 1 pm.  [facility]  [address] [place]   [phone] / fax: [phone]    [employer]  - Amy  [phone] fax:[phone]  Note: Call Amy to discuss/set up med box and/or blister pack pill set up. She will be glad to meet with you to set this up.    Resources:   [facility] Crisis Service- [phone]  Crisis Intervention: 354.444.3748 or 829-305-0025 (TTY: 527.159.4609).  Call anytime for help.  National [place] on Mental Illness (www.[place].drew.org): 343.838.2338 or 490-235-8108.  National Suicide Prevention Line (www.mentalhealthmn.org): 016-451-DXWR (7369)    General Medication Instructions:   See your medication sheet(s) for instructions.   Take all medicines as directed.  Make  no changes unless your doctor suggests them.   Go to all your doctor visits.  Be sure to have all your required lab tests. This way, your medicines can be refilled on time.  Do not use any drugs not prescribed by your doctor.  Avoid alcohol.

## 2019-02-15 NOTE — PLAN OF CARE
Pt is A&O x 4, mood is calm, and full range affect. Speech is clear. Pt?s thought process is intact. Pt displays appropriate behavior. Pt has good insight into current situation. denies suicidal ideation. Plan is to discharge friday.

## 2019-02-15 NOTE — PLAN OF CARE
Pt is alert and oriented x3. Pat is calm and cooperative and demonstrate full range affect and calm mood. Denies any SI. Pt is med compliant and and been social, pleasant to staff and peers. Patient is being discharge home with hr meds sent to her home pharmacy and patient express understanding of the explanation of the discharge medications. Additional education material given at discharge to patient.

## 2019-02-20 NOTE — DISCHARGE SUMMARY
[facility]    Discharge Summary  Adult Psychiatry    Date of Admission:  2019  Date of Discharge:  2/15/2019 12:20 PM  Discharging Provider: Demetris Wood MD  Date of Service (when I saw the patient): 02/15/19    Discharge Diagnoses   1.  Bipolar 2 disorder, current episode depressed without psychotic features.   2.  Anxiety disorder, unspecified.   3.  Cannabis use disorder.   4.  Attention deficit hyperactivity disorder, inattentive type by history.   5.  Seizure disorder.   6.  Ovarian cysts.   7.  Status post laparoscopic band surgery.     History of Present Illness   Maryanne Crockett is a 38-year-old woman who reports she is single and has 2 children, a 21-year-old son and an 11-year-old daughter.  She reports she is supported on [facility] and sees a psychiatric provider at [facility] in [place].  She presented to [facility] ED on 2019 by ambulance due to increasing suicidal ideations and seizure. She was admitted on a 72-hour hold that  on 02/15/2019 at 6:01 p.m. For more details, I refer the reader to the initial psychiatric assessment documented on admission by Demetris Wood MD     Hospital Course    Maryanne Crockett was admitted on 2019.  She was retained on the intensive treatment center on account of her reported self-harm thoughts and seizure disorder.  Staff provided her with a safe environment and she was encouraged to ventilate her stressors and active listening was provided.  She was introduced to the milieu and encouraged to participate in individual, milieu and group therapy.  She was evaluated by the hospitalist service noted that her gabapentin level was supratherapeutic on admission.  Neurology service was consulted and they recommended maintaining her antiseizure regimen and advised her not to drive until she is seizure free for at least 3 months.  Recommend that she follow-up with her primary  care physician within 2 weeks of discharge to repeat her serial gabapentin and lamotrigine levels, but that if she continued to have breakthrough seizures.  For her primary care physician to refer her to a neurologist.  With respect to her mental health, the patient initially endorsed neurovegetative symptoms of depression and feelings of hopelessness on account of which her medication regimen was reviewed with her.  She admitted to experiencing mood lability, but indicated that she had never been on lithium before, but had tried Depakote in the remote past with unspecified response.  Consequently, she was offered lithium carbonate after a conversation about her potential risks, benefits and alternative medications, was had with her.  She tolerated the medication without any untoward effects.  She participated diligently in individual, milieu and group therapy and was appropriate with staff and peers.  While she was in the hospital.  She did not require prior to admission, norco or oxycodone, and these were not prescribed.  She was maintained on prior to admission dosing of alprazolam while her buspirone was reduced to 20 mg 3 times a day and Wellbutrin XL was discontinued.  Lexapro was maintained at 20 mg daily and lithium carbonate was initiated at 300 mg twice a day.  Saphris was continued at 10 mg sublingual daily at bedtime.  She did complain of experiencing muscle spasms, which was offered.  Flexeril 10 mg twice a day when necessary muscle spasms by the hospitalist team.  Adderall XL was discontinued.  The patient did remarkably well in the milieu and tolerated her medication combinations with no observed seizure activity.  By 2/15/2019, the patient reported that she felt stable enough to discharge from the hospital.  She did not endorse any further self-harm thoughts, plans or intent reported future orientation.  She looked forward to returning home and following up with her outpatient  providers.      Demetris Wood MD    Significant Results and Procedures   Please see below.    Unresulted Labs Ordered in the Past 30 Days of this Admission     No orders found from 12/13/2018 to 2/12/2019.          Code Status   Full Code    Primary Care Physician   IGOR GAMBLE    Physical Exam   Appearance:  awake, alert, adequately groomed and casually dressed  Attitude:  cooperative  Eye Contact:  good  Mood:  good  Affect:  appropriate and in normal range and mood congruent  Speech:  clear, coherent and normal prosody  Psychomotor Behavior:  no evidence of tardive dyskinesia, dystonia, or tics and intact station, gait and muscle tone  Thought Process:  logical, linear and goal oriented  Associations:  no loose associations  Thought Content:  no evidence of suicidal ideation or homicidal ideation and no evidence of psychotic thought  Insight:  good  Judgment:  intact  Oriented to:  time, person, and place  Attention Span and Concentration:  intact  Recent and Remote Memory:  intact  Language: Able to name objects and Able to repeat phrases  Fund of Knowledge: appropriate  Muscle Strength and Tone: normal  Gait and Station: Normal    Time Spent on this Encounter   IDemetris, personally saw the patient today and spent greater than 30 minutes discharging this patient.    Discharge Disposition   Discharged to home  Condition at discharge: Stable    Psychiatry Follow-up:   Bethanie Marte- appointment on Friday 3/1/19 @ 1:10 pm.  [facility]  [address] [place] [address]  [phone] / fax: [phone]     Sayra Yu- appointment on Thursday 2/21/19 @ 1 pm.  [facility]  [address] [place], [place]   [phone] / fax: [phone]     [employer]  - Amy  [phone] fax:[phone]  Note: Call Amy to discuss/set up med box and/or blister pack pill set up. She will be glad to meet with you to set  this up.     Consultations This Hospital Stay   HOSPITALIST IP CONSULT  NEUROLOGY IP CONSULT    Discharge Orders      Follow-up and recommended labs and tests     Follow up with primary care provider, IGOR GAMBLE, within 14 days for hospital follow- up.  The following labs/tests are recommended: repeat serum gabapentin and lamotrigine levels. Refer back to Neurology if abnormalities or additional seizures while taking meds correctly.     Discharge Instructions    - No driving for 3 months due to your seizure.  - Take your meds as directed, look into the pharmacy for proper pouch dosing.     Discharge Medications   Discharge Medication List as of 2/15/2019 11:23 AM      START taking these medications    Details   cyclobenzaprine (FLEXERIL) 10 MG tablet Take 1 tablet (10 mg) by mouth 2 times daily as needed for muscle spasms, Disp-20 tablet, R-0, E-Prescribe      lithium 300 MG capsule Take 1 capsule (300 mg) by mouth 2 times daily (with meals), Disp-60 capsule, R-0, E-Prescribe         CONTINUE these medications which have CHANGED    Details   busPIRone (BUSPAR) 10 MG tablet Take 2 tablets (20 mg) by mouth 3 times daily, Disp-180 tablet, R-0, E-Prescribe         CONTINUE these medications which have NOT CHANGED    Details   acetaminophen (TYLENOL) 500 MG tablet Take 500-1,000 mg by mouth every 6 hours as needed for mild pain, Historical      ALPRAZolam (XANAX) 0.5 MG tablet Take 0.5 mg by mouth 3 times daily, Historical      asenapine (SAPHRIS) 10 MG SUBL sublingual tablet Place 10 mg under the tongue At Bedtime, Historical      escitalopram (LEXAPRO) 20 MG tablet Take 20 mg by mouth daily, Historical      gabapentin (NEURONTIN) 300 MG capsule Take 900 mg by mouth 3 times daily, Historical      lamoTRIgine (LAMICTAL) 200 MG tablet Take 200 mg by mouth daily, Historical      Melatonin 10 MG TABS tablet Take 10 mg by mouth nightly as needed for sleep, Historical      multivitamin, therapeutic (THERA-VIT) TABS  tablet Take 1 tablet by mouth daily, Historical      nicotine (NICOTROL) 10 MG inhaler Inhale into the lungs daily as needed for smoking cessation , Historical      ondansetron (ZOFRAN ODT) 4 MG ODT tab Take 1 tablet (4 mg) by mouth every 6 hours as needed for nausea, Disp-20 tablet, R-1, Local Print      VENTOLIN  (90 BASE) MCG/ACT inhaler Inhale 1-2 puffs into the lungs every 6 hours as needed for shortness of breath / dyspnea or wheezing , R-5, SABINO, Historical         STOP taking these medications       amphetamine-dextroamphetamine (ADDERALL XR) 20 MG 24 hr capsule Comments:   Reason for Stopping:         beclomethasone (QVAR) 80 MCG/ACT AERS IS A DISCONTINUED MEDICATION Comments:   Reason for Stopping:         buPROPion (WELLBUTRIN XL) 300 MG 24 hr tablet Comments:   Reason for Stopping:         HYDROcodone-acetaminophen (NORCO) 5-325 MG tablet Comments:   Reason for Stopping:         oxyCODONE HCl (OXAYDO) 5 MG TABA Comments:   Reason for Stopping:         oxyCODONE-acetaminophen (PERCOCET) 5-325 MG tablet Comments:   Reason for Stopping:             Allergies   No Known Allergies  Data   Most Recent 3 CBC's:  Recent Labs   Lab Test 02/11/19 2311 03/22/18  0657 03/21/18  0515   WBC 9.1 8.2 12.0*   HGB 13.1 12.3 14.5   MCV 88 88 84    210 298      Most Recent 3 BMP's:  Recent Labs   Lab Test 02/13/19  0839 02/11/19 2311 03/22/18  0657 03/21/18  0515   NA  --  141 143 140   POTASSIUM 4.4 3.4 3.4 3.9   CHLORIDE  --  108 111* 106   CO2  --  27 26 21   BUN  --  8 10 10   CR  --  0.97 0.68 0.62   ANIONGAP  --  6 6 13   SOFI  --  8.0* 8.1* 8.9   GLC  --  92 88 161*     Most Recent 2 LFT's:  Recent Labs   Lab Test 02/11/19 2311 03/22/18  0657   AST 16 12   ALT 23 12   ALKPHOS 72 56   BILITOTAL 0.2 0.4      Panel:No lab results found.  Most Recent 6 Bacteria Isolates From Any Culture (See EPIC Reports for Culture Details):  Recent Labs   Lab Test 03/21/18  2310 04/29/15  0233 12/08/14  1628   CULT  50,000 to 100,000 colonies/mL  mixed urogenital samina    Susceptibility testing not routinely done Moderate growth Enterococcus species  Normal urogenital samina  * No growth     Most Recent TSH, T4 and A1c Labs:  Recent Labs   Lab Test 02/13/19  0839   TSH 1.00     Results for orders placed or performed during the hospital encounter of 02/11/19   Head CT w/o contrast    Narrative    CT HEAD WITHOUT CONTRAST  2/12/2019 12:00 AM     HISTORY: Fall. Altered mental status. Seizure.    COMPARISON: None.    TECHNIQUE: Without intravenous contrast, helical sections were  acquired through the brain. Coronal reconstructions were generated.  Radiation dose for this scan was reduced using automated exposure  control, adjustment of the mA and/or kV according to the patient's  size, or iterative reconstruction technique.    FINDINGS: No intra-axial mass, mass effect or midline shift. Normal  gray-white matter differentiation. No visualized acute intra-axial  hemorrhage. The cerebral ventricles are normal in caliber. The basal  cisterns are patent. No extra-axial fluid collection. Mild mucosal  thickening in bilateral ethmoid sinuses.      Impression    IMPRESSION: No evidence of acute intracranial abnormality.    ADINA REICH MD   Cervical spine CT w/o contrast    Narrative    CT CERVICAL SPINE WITHOUT CONTRAST  2/12/2019 12:05 AM     HISTORY: Seizure. Fall. Lower neck and upper back pain.    COMPARISON: None.    TECHNIQUE: Without intravenous contrast, helical sections were  acquired through the cervical spine from the skull base through the  bottom of the T2 vertebral body. Coronal and sagittal reconstructions  were generated. Radiation dose for this scan was reduced using  automated exposure control, adjustment of the mA and/or kV according  to the patient's size, or iterative reconstruction technique.    FINDINGS: No visualized acute fracture of the cervical spine. Slight  gradual kyphosis of the cervical spine, possibly  related to patient  positioning or muscle spasm. Otherwise, normal alignment of the  cervical spine. No prevertebral soft tissue swelling.      Impression    IMPRESSION: No visualized acute fracture of the cervical spine.    ADINA REICH MD   Thoracic spine XR, 3 views    Narrative    THORACIC SPINE 3 VIEWS  2/12/2019 12:03 AM     HISTORY: Fall after seizure. Upper back pain.    COMPARISON: None.      Impression    IMPRESSION:  1. No visualized acute fracture or malalignment of the thoracic spine.  2. Mild degenerative changes in the thoracic spine.    ADINA REICH MD

## 2019-03-19 ENCOUNTER — APPOINTMENT (OUTPATIENT)
Dept: ULTRASOUND IMAGING | Facility: CLINIC | Age: 39
End: 2019-03-19
Attending: EMERGENCY MEDICINE
Payer: COMMERCIAL

## 2019-03-19 ENCOUNTER — HOSPITAL ENCOUNTER (EMERGENCY)
Facility: CLINIC | Age: 39
Discharge: HOME | End: 2019-03-19
Attending: EMERGENCY MEDICINE | Admitting: EMERGENCY MEDICINE
Payer: COMMERCIAL

## 2019-03-19 VITALS
HEART RATE: 106 BPM | OXYGEN SATURATION: 96 % | RESPIRATION RATE: 16 BRPM | DIASTOLIC BLOOD PRESSURE: 87 MMHG | TEMPERATURE: 97.2 F | SYSTOLIC BLOOD PRESSURE: 123 MMHG

## 2019-03-19 DIAGNOSIS — R41.0 CONFUSION: ICD-10-CM

## 2019-03-19 DIAGNOSIS — A59.09 TRICHOMONAL CERVICITIS: ICD-10-CM

## 2019-03-19 DIAGNOSIS — N73.0 PID (ACUTE PELVIC INFLAMMATORY DISEASE): ICD-10-CM

## 2019-03-19 LAB
ALBUMIN SERPL-MCNC: 3.9 G/DL (ref 3.4–5)
ALBUMIN UR-MCNC: NEGATIVE MG/DL
ALP SERPL-CCNC: 77 U/L (ref 40–150)
ALT SERPL W P-5'-P-CCNC: 33 U/L (ref 0–50)
ANION GAP SERPL CALCULATED.3IONS-SCNC: 7 MMOL/L (ref 3–14)
APPEARANCE UR: CLEAR
AST SERPL W P-5'-P-CCNC: 18 U/L (ref 0–45)
BACTERIA #/AREA URNS HPF: ABNORMAL /HPF
BASOPHILS # BLD AUTO: 0.1 10E9/L (ref 0–0.2)
BASOPHILS NFR BLD AUTO: 0.6 %
BILIRUB SERPL-MCNC: 0.4 MG/DL (ref 0.2–1.3)
BILIRUB UR QL STRIP: NEGATIVE
BUN SERPL-MCNC: 5 MG/DL (ref 7–30)
CALCIUM SERPL-MCNC: 8.7 MG/DL (ref 8.5–10.1)
CHLORIDE SERPL-SCNC: 103 MMOL/L (ref 94–109)
CO2 SERPL-SCNC: 28 MMOL/L (ref 20–32)
COLOR UR AUTO: ABNORMAL
CREAT SERPL-MCNC: 0.86 MG/DL (ref 0.52–1.04)
DIFFERENTIAL METHOD BLD: ABNORMAL
EOSINOPHIL # BLD AUTO: 0.7 10E9/L (ref 0–0.7)
EOSINOPHIL NFR BLD AUTO: 5.6 %
ERYTHROCYTE [DISTWIDTH] IN BLOOD BY AUTOMATED COUNT: 13 % (ref 10–15)
GFR SERPL CREATININE-BSD FRML MDRD: 86 ML/MIN/{1.73_M2}
GLUCOSE SERPL-MCNC: 89 MG/DL (ref 70–99)
GLUCOSE UR STRIP-MCNC: NEGATIVE MG/DL
HCG UR QL: NEGATIVE
HCT VFR BLD AUTO: 42.2 % (ref 35–47)
HGB BLD-MCNC: 13.9 G/DL (ref 11.7–15.7)
HGB UR QL STRIP: NEGATIVE
IMM GRANULOCYTES # BLD: 0 10E9/L (ref 0–0.4)
IMM GRANULOCYTES NFR BLD: 0.2 %
INTERNAL QC OK POCT: YES
KETONES UR STRIP-MCNC: NEGATIVE MG/DL
LEUKOCYTE ESTERASE UR QL STRIP: ABNORMAL
LITHIUM SERPL-SCNC: 0.54 MMOL/L (ref 0.6–1.2)
LYMPHOCYTES # BLD AUTO: 3.7 10E9/L (ref 0.8–5.3)
LYMPHOCYTES NFR BLD AUTO: 32.1 %
MCH RBC QN AUTO: 29.2 PG (ref 26.5–33)
MCHC RBC AUTO-ENTMCNC: 32.9 G/DL (ref 31.5–36.5)
MCV RBC AUTO: 89 FL (ref 78–100)
MONOCYTES # BLD AUTO: 0.8 10E9/L (ref 0–1.3)
MONOCYTES NFR BLD AUTO: 7.1 %
NEUTROPHILS # BLD AUTO: 6.3 10E9/L (ref 1.6–8.3)
NEUTROPHILS NFR BLD AUTO: 54.4 %
NITRATE UR QL: NEGATIVE
NRBC # BLD AUTO: 0 10*3/UL
NRBC BLD AUTO-RTO: 0 /100
PH UR STRIP: 6 PH (ref 5–7)
PLATELET # BLD AUTO: 284 10E9/L (ref 150–450)
POTASSIUM SERPL-SCNC: 3.6 MMOL/L (ref 3.4–5.3)
PROT SERPL-MCNC: 7.5 G/DL (ref 6.8–8.8)
RBC # BLD AUTO: 4.76 10E12/L (ref 3.8–5.2)
RBC #/AREA URNS AUTO: 0 /HPF (ref 0–2)
SODIUM SERPL-SCNC: 138 MMOL/L (ref 133–144)
SOURCE: ABNORMAL
SP GR UR STRIP: 1 (ref 1–1.03)
SPECIMEN SOURCE: ABNORMAL
SQUAMOUS #/AREA URNS AUTO: 2 /HPF (ref 0–1)
UROBILINOGEN UR STRIP-MCNC: NORMAL MG/DL (ref 0–2)
WBC # BLD AUTO: 11.5 10E9/L (ref 4–11)
WBC #/AREA URNS AUTO: 7 /HPF (ref 0–5)
WET PREP SPEC: ABNORMAL

## 2019-03-19 PROCEDURE — 85025 COMPLETE CBC W/AUTO DIFF WBC: CPT | Performed by: EMERGENCY MEDICINE

## 2019-03-19 PROCEDURE — 99284 EMERGENCY DEPT VISIT MOD MDM: CPT | Mod: 25 | Performed by: EMERGENCY MEDICINE

## 2019-03-19 PROCEDURE — 80053 COMPREHEN METABOLIC PANEL: CPT | Performed by: EMERGENCY MEDICINE

## 2019-03-19 PROCEDURE — 81001 URINALYSIS AUTO W/SCOPE: CPT | Performed by: EMERGENCY MEDICINE

## 2019-03-19 PROCEDURE — 87389 HIV-1 AG W/HIV-1&-2 AB AG IA: CPT | Performed by: EMERGENCY MEDICINE

## 2019-03-19 PROCEDURE — 93976 VASCULAR STUDY: CPT

## 2019-03-19 PROCEDURE — 99284 EMERGENCY DEPT VISIT MOD MDM: CPT | Mod: Z6 | Performed by: EMERGENCY MEDICINE

## 2019-03-19 PROCEDURE — 80178 ASSAY OF LITHIUM: CPT | Performed by: EMERGENCY MEDICINE

## 2019-03-19 PROCEDURE — 25000128 H RX IP 250 OP 636: Performed by: EMERGENCY MEDICINE

## 2019-03-19 PROCEDURE — 81025 URINE PREGNANCY TEST: CPT | Performed by: EMERGENCY MEDICINE

## 2019-03-19 PROCEDURE — 87491 CHLMYD TRACH DNA AMP PROBE: CPT | Performed by: EMERGENCY MEDICINE

## 2019-03-19 PROCEDURE — 25000132 ZZH RX MED GY IP 250 OP 250 PS 637: Performed by: EMERGENCY MEDICINE

## 2019-03-19 PROCEDURE — 25000125 ZZHC RX 250: Performed by: EMERGENCY MEDICINE

## 2019-03-19 PROCEDURE — 96372 THER/PROPH/DIAG INJ SC/IM: CPT | Performed by: EMERGENCY MEDICINE

## 2019-03-19 PROCEDURE — 87210 SMEAR WET MOUNT SALINE/INK: CPT | Performed by: EMERGENCY MEDICINE

## 2019-03-19 PROCEDURE — 87086 URINE CULTURE/COLONY COUNT: CPT | Performed by: EMERGENCY MEDICINE

## 2019-03-19 PROCEDURE — 87591 N.GONORRHOEAE DNA AMP PROB: CPT | Performed by: EMERGENCY MEDICINE

## 2019-03-19 RX ORDER — METRONIDAZOLE 500 MG/1
500 TABLET ORAL 4 TIMES DAILY
Qty: 40 TABLET | Refills: 0 | Status: SHIPPED | OUTPATIENT
Start: 2019-03-19 | End: 2019-05-21

## 2019-03-19 RX ORDER — METRONIDAZOLE 500 MG/1
1000 TABLET ORAL EVERY 8 HOURS SCHEDULED
Status: DISCONTINUED | OUTPATIENT
Start: 2019-03-19 | End: 2019-03-19

## 2019-03-19 RX ORDER — AZITHROMYCIN 500 MG/1
1000 TABLET, FILM COATED ORAL ONCE
Status: COMPLETED | OUTPATIENT
Start: 2019-03-19 | End: 2019-03-19

## 2019-03-19 RX ORDER — SODIUM CHLORIDE 9 MG/ML
1000 INJECTION, SOLUTION INTRAVENOUS CONTINUOUS
Status: DISCONTINUED | OUTPATIENT
Start: 2019-03-19 | End: 2019-03-19 | Stop reason: HOSPADM

## 2019-03-19 RX ORDER — LAMOTRIGINE 200 MG/1
200 TABLET ORAL DAILY
Qty: 15 TABLET | Refills: 0 | Status: ON HOLD | OUTPATIENT
Start: 2019-03-19 | End: 2019-07-02

## 2019-03-19 RX ORDER — DOXYCYCLINE 100 MG/1
100 CAPSULE ORAL 2 TIMES DAILY
Qty: 28 CAPSULE | Refills: 0 | Status: SHIPPED | OUTPATIENT
Start: 2019-03-19 | End: 2019-05-21

## 2019-03-19 RX ADMIN — AZITHROMYCIN MONOHYDRATE 1000 MG: 500 TABLET ORAL at 17:36

## 2019-03-19 RX ADMIN — LIDOCAINE HYDROCHLORIDE 250 MG: 10 INJECTION, SOLUTION EPIDURAL; INFILTRATION; INTRACAUDAL; PERINEURAL at 17:54

## 2019-03-19 ASSESSMENT — ENCOUNTER SYMPTOMS
HEADACHES: 0
DYSURIA: 1
CONFUSION: 1
NERVOUS/ANXIOUS: 1
FATIGUE: 1
SLEEP DISTURBANCE: 1
DYSPHORIC MOOD: 1

## 2019-03-19 NOTE — ED PROVIDER NOTES
[facility] [place] ([facility])    3/19/19        History     Chief Complaint   Patient presents with     Female Gu Problem     pelvic pain, vaginal discharge, lump at inner thigh     Addiction Problem     referred to ED from court today by  for CD marlon; pt is prescribed ~15 medications and is having gaps in memory     The history is provided by the patient, a friend and medical records.     Maryanne Crockett is a 38 year old female with a past medical history significant for CRPS, PID, ovarian torsion, asthma, ovarian cysts and anxiety who present to the Emergency Department today complaining of a lump on her inner left thigh, vaginal discharge, and loss of medications. Patient reports that she has had unprotected sex recently. She has noted vaginal discharge and dysuria that started recently. She also reports pain while she walks. Patient was seen in court today and her PO told her to come to the ED for evaluation. Patient lost her medication bag which includes 11-12 of her medications. Patient reports being on these medications for year. Since this time she has been confused and fatigued; sleeping all the time. She notes gaps in her memory since she last took her medications. Patient has been experiencing increased anxiety and has had panic attacks. Patient reports a seizure 3 weeks ago but denies any current headaches. Patient denies any drug or alcohol use. Patient reports feeling off and has never felt this way before.    I have reviewed the Medications, Allergies, Past Medical and Surgical History, and Social History in the [employer] system.    Past Medical History:   Diagnosis Date     Depressive disorder      Family history of glioblastoma     screening MRIs every 2 years     Ovarian cyst        Past Surgical History:   Procedure Laterality Date     CHOLECYSTECTOMY       DILATION AND CURETTAGE SUCTION  4/30/15    retained POC     GASTRIC RESTRICTIVE PROCEDURE, OPEN,  REMOVE/REPLACE SUBCUTANEOUS PORT       LAPAROSCOPIC OOPHORECTOMY Right 8/5/2016    Procedure: LAPAROSCOPIC OOPHORECTOMY;  Surgeon: Adrianne Vergara MD;  Location: [facility] OR     LAPAROSCOPIC SALPINGECTOMY Bilateral 8/5/2016    Procedure: LAPAROSCOPIC SALPINGECTOMY;  Surgeon: Adrianne Vergara MD;  [facility]: [facility] OR     LAPAROSCOPIC TUBAL LIGATION Bilateral 5/22/2015    Procedure: LAPAROSCOPIC TUBAL LIGATION;  Surgeon: Hayley Zayas MD;  Location: [facility] OR     LAPAROSCOPY OPERATIVE ADULT N/A 8/5/2016    Procedure: LAPAROSCOPY OPERATIVE ADULT;  Surgeon: Adrianne Vergara MD;  Location: [facility] OR     SOFT TISSUE SURGERY Right     cyst in hand       Family History   Problem Relation Age of Onset     Other Cancer Brother 36        brain ca     Other Cancer Father         leukemia     Other Cancer Maternal Aunt         2 aunts with glioblastomas     Breast Cancer No family hx of         no ovarian cancer       Social History     Tobacco Use     Smoking status: Current Some Day Smoker     Packs/day: 0.25     Smokeless tobacco: Never Used   Substance Use Topics     Alcohol use: Yes     Alcohol/week: 0.0 oz     Comment: rare       Current Facility-Administered Medications   Medication     0.9% sodium chloride BOLUS    Followed by     sodium chloride 0.9% infusion     Current Outpatient Medications   Medication     acetaminophen (TYLENOL) 500 MG tablet     ALPRAZolam (XANAX) 0.5 MG tablet     asenapine (SAPHRIS) 10 MG SUBL sublingual tablet     escitalopram (LEXAPRO) 20 MG tablet     gabapentin (NEURONTIN) 300 MG capsule     lamoTRIgine (LAMICTAL) 200 MG tablet     lithium 300 MG capsule     Melatonin 10 MG TABS tablet     multivitamin, therapeutic (THERA-VIT) TABS tablet     nicotine (NICOTROL) 10 MG inhaler     ondansetron (ZOFRAN ODT) 4 MG ODT tab     VENTOLIN  (90 BASE) MCG/ACT inhaler        Allergies   Allergen Reactions     Ibuprofen GI Disturbance         Review of Systems   Constitutional: Positive for fatigue.    Genitourinary: Positive for dysuria, vaginal discharge and vaginal pain.   Neurological: Negative for headaches.   Psychiatric/Behavioral: Positive for confusion, dysphoric mood and sleep disturbance. The patient is nervous/anxious.    All other systems reviewed and are negative.      Physical Exam   BP: 123/87  Pulse: 106  Temp: 97.2  F (36.2  C)  Resp: 16  SpO2: 96 %      Physical Exam  Physical Exam   Constitutional: oriented to person, place, and time. appears well-developed and well-nourished.   HENT:   Head: Normocephalic and atraumatic.   Neck: Normal range of motion.   Pulmonary/Chest: Effort normal. No respiratory distress.   Cardiac: No murmurs, rubs, gallops. RRR.  Abdominal: Abdomen soft, nontender, nondistended. No rebound tenderness.  MSK: Long bones without deformity or evidence of trauma  Neurological: alert and oriented to person, place, and time. Gait intact.  No tremor.  No nystagmus.  No tongue fasciculations.  Cranial nerves II through XII intact.  Pupils are 3 mm and reactive to light bilaterally.  No clonus.  No rigidity.  Strength 5 out of 5 in upper and lower extremities and symmetric.  Sensation grossly intact.  Skin: Skin is warm and dry.   Psychiatric:  normal mood and affect.  behavior is normal. Thought content normal.   : moderate vaginal discharge. CMT on the L adnexa.    ED Course   3:03 PM  The patient was seen and examined by Reza Escalante MD in Room ED05.        Procedures        Results for orders placed or performed during the hospital encounter of 03/19/19   US Pelvis Cmplt w Transvag & Doppler LmtPel Duplex Limited    Narrative    PELVIC ULTRASOUND WITH ENDOVAGINAL TRANSDUCER    3/19/2019 4:21 PM     HISTORY: Pelvic pain.    TECHNIQUE: Transabdominal and endovaginal sonography was performed.    COMPARISON: None.    FINDINGS: The uterus is normal in size measuring 8.2 x 5.0 x 4.3 cm.  The endometrium is normal in thickness at 0.9 cm. The right ovary is  surgically absent.  There is a small collapsing left ovarian follicle  measuring 1.5 x 1.2 x 1.2 cm. Color Doppler and Doppler waveform  analysis of the left ovary shows arterial and venous blood flow. There  is a small amount of free pelvic fluid, within physiologic limits.      Impression    IMPRESSION: 1.5 cm collapsing left ovarian follicle.      JOSE ANTONIO LIMA MD   CBC with platelets differential   Result Value Ref Range    WBC 11.5 (H) 4.0 - 11.0 10e9/L    RBC Count 4.76 3.8 - 5.2 10e12/L    Hemoglobin 13.9 11.7 - 15.7 g/dL    Hematocrit 42.2 35.0 - 47.0 %    MCV 89 78 - 100 fl    MCH 29.2 26.5 - 33.0 pg    MCHC 32.9 31.5 - 36.5 g/dL    RDW 13.0 10.0 - 15.0 %    Platelet Count 284 150 - 450 10e9/L    Diff Method Automated Method     % Neutrophils 54.4 %    % Lymphocytes 32.1 %    % Monocytes 7.1 %    % Eosinophils 5.6 %    % Basophils 0.6 %    % Immature Granulocytes 0.2 %    Nucleated RBCs 0 0 /100    Absolute Neutrophil 6.3 1.6 - 8.3 10e9/L    Absolute Lymphocytes 3.7 0.8 - 5.3 10e9/L    Absolute Monocytes 0.8 0.0 - 1.3 10e9/L    Absolute Eosinophils 0.7 0.0 - 0.7 10e9/L    Absolute Basophils 0.1 0.0 - 0.2 10e9/L    Abs Immature Granulocytes 0.0 0 - 0.4 10e9/L    Absolute Nucleated RBC 0.0    Comprehensive metabolic panel   Result Value Ref Range    Sodium 138 133 - 144 mmol/L    Potassium 3.6 3.4 - 5.3 mmol/L    Chloride 103 94 - 109 mmol/L    Carbon Dioxide 28 20 - 32 mmol/L    Anion Gap 7 3 - 14 mmol/L    Glucose 89 70 - 99 mg/dL    Urea Nitrogen 5 (L) 7 - 30 mg/dL    Creatinine 0.86 0.52 - 1.04 mg/dL    GFR Estimate 86 >60 mL/min/[1.73_m2]    GFR Estimate If Black >90 >60 mL/min/[1.73_m2]    Calcium 8.7 8.5 - 10.1 mg/dL    Bilirubin Total 0.4 0.2 - 1.3 mg/dL    Albumin 3.9 3.4 - 5.0 g/dL    Protein Total 7.5 6.8 - 8.8 g/dL    Alkaline Phosphatase 77 40 - 150 U/L    ALT 33 0 - 50 U/L    AST 18 0 - 45 U/L   UA reflex to Microscopic and Culture   Result Value Ref Range    Color Urine Light Yellow     Appearance Urine Clear      Glucose Urine Negative NEG^Negative mg/dL    Bilirubin Urine Negative NEG^Negative    Ketones Urine Negative NEG^Negative mg/dL    Specific Gravity Urine 1.002 (L) 1.003 - 1.035    Blood Urine Negative NEG^Negative    pH Urine 6.0 5.0 - 7.0 pH    Protein Albumin Urine Negative NEG^Negative mg/dL    Urobilinogen mg/dL Normal 0.0 - 2.0 mg/dL    Nitrite Urine Negative NEG^Negative    Leukocyte Esterase Urine Large (A) NEG^Negative    Source Midstream Urine     RBC Urine 0 0 - 2 /HPF    WBC Urine 7 (H) 0 - 5 /HPF    Bacteria Urine Few (A) NEG^Negative /HPF    Squamous Epithelial /HPF Urine 2 (H) 0 - 1 /HPF   Lithium level   Result Value Ref Range    Lithium Level 0.54 (L) 0.60 - 1.20 mmol/L   hCG qual urine POCT   Result Value Ref Range    HCG Qual Urine Negative neg    Internal QC OK Yes    Wet prep   Result Value Ref Range    Specimen Description Vagina     Wet Prep Many  Motile Trichomonas seen   (A)     Wet Prep No yeast seen     Wet Prep Moderate  PMNs seen       Wet Prep No clue cells seen        Labs Ordered and Resulted from Time of ED Arrival Up to the Time of Departure from the ED - No data to display         Assessments & Plan (with Medical Decision Making)   MDM  Patient presenting with concern for vaginal discharge in addition to medication reaction.  The patient does not appear to be in withdrawal from benzodiazepines.  She is mildly tachycardic on arrival however she does not have tremors, fasciculations, rigidity, hypertension or fever.  Patient will be observed in the emergency department.  She is no neurologic deficit so will not image head at this point.  Patient is alert and oriented and seems to be appropriately answering questions.    Re eval: Labs show mildly elevated white blood count at 11.4.  Lithium level is low which is to be expected.  Metabolic panel is normal.  Wet prep does show Trichomonas.  Urinalysis has small amount of white blood cells but the patient is not symptomatic so we will  defer as this is likely contaminant from vaginal discharge.  Will treat for gonorrhea and chlamydia here in the emergency department.  Patient will be given her antiepileptics and she has a nurse appointment tomorrow discussing medications.  I strongly encourage the patient to call psychiatry today or tomorrow to discuss other medications.  She has no acute psychiatric needs at this point, has no paranoia, possible self-harm or harming others, denying hallucinations or voices.  Patient agrees to come back if she feels unsafe.  She will be staying with a friend tonight.    I have reviewed the nursing notes.    I have reviewed the findings, diagnosis, plan and need for follow up with the patient.       Medication List      ASK your doctor about these medications    busPIRone 10 MG tablet  Commonly known as:  BUSPAR  20 mg, Oral, 3 TIMES DAILY  Ask about: Should I take this medication?     cyclobenzaprine 10 MG tablet  Commonly known as:  FLEXERIL  10 mg, Oral, 2 TIMES DAILY PRN  Ask about: Should I take this medication?            Final diagnoses:   Trichomonal cervicitis   Confusion   PID (acute pelvic inflammatory disease)     I, Gerry Maguire, am serving as a trained medical scribe to document services personally performed by Reza Escalante MD, based on the provider's statements to me.   I, Reza Escalante MD, was physically present and have reviewed and verified the accuracy of this note documented by Gerry Maguire.    3/19/2019   [facility], [place], EMERGENCY DEPARTMENT     Reza Escalante MD  03/19/19 1703       Reza Escalante MD  03/19/19 9131

## 2019-03-19 NOTE — ED AVS SNAPSHOT
[facility], Emergency Department  [address]  [facility] [address]  Phone:  [phone]  Fax:  [phone]                                    Maryanne Crockett   MRN: [id number]    Department:  [facility], Emergency Department   Date of Visit:  3/19/2019           After Visit Summary Signature Page    I have received my discharge instructions, and my questions have been answered. I have discussed any challenges I see with this plan with the nurse or doctor.    ..........................................................................................................................................  Patient/Patient Representative Signature      ..........................................................................................................................................  Patient Representative Print Name and Relationship to Patient    ..................................................               ................................................  Date                                   Time    ..........................................................................................................................................  Reviewed by Signature/Title    ...................................................              ..............................................  Date                                               Time          22EPIC Rev 08/18

## 2019-03-19 NOTE — DISCHARGE INSTRUCTIONS
Please call your psychiatrist and follow-up with your primary doctor as soon as he can to discuss your medications.  Call them either today or tomorrow tomorrow.

## 2019-03-19 NOTE — ED NOTES
Pt and friend were informed that they should stay for 15-20 min after receiving the Rocephin injection to insure there is no reactions. Pt and friend did not want to stay, informed pt's friend to watch for allergic reactions, friend verbalized understanding.

## 2019-03-19 NOTE — ED NOTES
Pt was sent here by her PO, they feel she is on too many medications and is causing her confusion.

## 2019-03-20 LAB
BACTERIA SPEC CULT: NORMAL
C TRACH DNA SPEC QL NAA+PROBE: NEGATIVE
Lab: NORMAL
N GONORRHOEA DNA SPEC QL NAA+PROBE: NEGATIVE
SPECIMEN SOURCE: NORMAL

## 2019-03-21 LAB — HIV 1+2 AB+HIV1 P24 AG SERPL QL IA: NONREACTIVE

## 2019-03-21 NOTE — RESULT ENCOUNTER NOTE
Final urine culture report is NEGATIVE per [facility] ED Lab Result protocol.    If NEGATIVE result, no change in treatment, per [facility] ED Lab Result protocol.

## 2019-05-16 ENCOUNTER — TELEPHONE (OUTPATIENT)
Dept: SURGERY | Facility: CLINIC | Age: 39
End: 2019-05-16

## 2019-05-16 NOTE — TELEPHONE ENCOUNTER
New Transfer Patient:    Reason for call:  Other   Patient called regarding (reason for call): call back  Additional comments: Patient would like a call back to discuss coming in for a band removal. She said she is very uncomfortable, and would like to do this as soon as possible. She had the procedure over 10 years ago at [facility] [place]. She does not remember the surgeon's name. Please reach out to the patient.      Phone number to reach patient:  Home number on file [phone] (home)    Best Time:  As soon as possible    Can we leave a detailed message on this number?  YES

## 2019-05-21 ENCOUNTER — OFFICE VISIT (OUTPATIENT)
Dept: SURGERY | Facility: CLINIC | Age: 39
End: 2019-05-21
Payer: COMMERCIAL

## 2019-05-21 VITALS
HEART RATE: 67 BPM | DIASTOLIC BLOOD PRESSURE: 90 MMHG | OXYGEN SATURATION: 95 % | WEIGHT: 232 LBS | HEIGHT: 68 IN | BODY MASS INDEX: 35.16 KG/M2 | SYSTOLIC BLOOD PRESSURE: 142 MMHG

## 2019-05-21 DIAGNOSIS — E66.01 MORBID OBESITY DUE TO EXCESS CALORIES (H): Primary | ICD-10-CM

## 2019-05-21 PROCEDURE — 99203 OFFICE O/P NEW LOW 30 MIN: CPT | Performed by: SURGERY

## 2019-05-21 ASSESSMENT — ENCOUNTER SYMPTOMS
DIARRHEA: 0
NAUSEA: 1
ABDOMINAL PAIN: 1
HEARTBURN: 1
WEIGHT LOSS: 0

## 2019-05-21 ASSESSMENT — MIFFLIN-ST. JEOR: SCORE: 1780.85

## 2019-05-21 NOTE — PROGRESS NOTES
HPI  The patient comes to us wishing to have a Lap-Band removed.   She had a band placed roughly 10 years ago she believes at [facility].  She had adjustments done for about 8 years but has had no adjustments done for 2 years.  Peak preop weight was 330 and she is now 230.  Her weight may have been as low as 200.  She does not have vomiting.  She says she has some pain with eating regularly and does not like her band.  She has had a cholecystectomy and ovarian surgery.  Today we discussed how she could have a revision of band to another procedure, band removal, or diagnostic studies to determine the current status of her band.  She has thought about this and clearly just wants the band removed.  We discussed how her weight may come back up.  Review of Systems   Constitutional: Negative for weight loss.   Gastrointestinal: Positive for abdominal pain, heartburn and nausea. Negative for diarrhea.   All other systems reviewed and are negative.        Physical Exam   Constitutional: She is oriented to person, place, and time. She appears well-developed and well-nourished. No distress.   HENT:   Head: Normocephalic and atraumatic.   Nose: Nose normal.   Eyes: Conjunctivae are normal. No scleral icterus.   Neck: No JVD present.   Pulmonary/Chest: Effort normal. No respiratory distress. She exhibits no tenderness.   Abdominal: Soft. She exhibits no distension and no mass. There is no tenderness. There is no guarding. No hernia.   Neurological: She is alert and oriented to person, place, and time.   Skin: She is not diaphoretic.   Nursing note and vitals reviewed.  Imp: LAP-BAND in the past.  Weight has been fairly stable but she does not like the presence of the band.  We went over again how band removal may lead to an increase in weight.  She appears to understand this and wishes to proceed with band removal.  We reviewed how this can usually be done laparoscopically in an outpatient or overnight  setting.  We reviewed the risks of bleeding, anesthesia, weight regain, infection, possible need for an open approach.  She appears to understand her choices and wishes to proceed with band removal.  We discussed how we will not be providing long term pain medications after this limited procedure.     Copy to PCP

## 2019-06-05 ENCOUNTER — OFFICE VISIT (OUTPATIENT)
Dept: NEUROLOGY | Facility: CLINIC | Age: 39
End: 2019-06-05

## 2019-06-05 VITALS
HEART RATE: 83 BPM | WEIGHT: 247.8 LBS | BODY MASS INDEX: 37.68 KG/M2 | DIASTOLIC BLOOD PRESSURE: 94 MMHG | SYSTOLIC BLOOD PRESSURE: 153 MMHG

## 2019-06-05 DIAGNOSIS — R56.9 CONVULSIONS, UNSPECIFIED CONVULSION TYPE (H): Primary | ICD-10-CM

## 2019-06-05 ASSESSMENT — PAIN SCALES - GENERAL: PAINLEVEL: NO PAIN (0)

## 2019-06-05 NOTE — LETTER
RE: Maryanne Crockett  : 1980   MRN: [id number]      Dear Colleague,    Thank you for referring your patient, Maryanne Crockett, to the [facility] EPILEPSY CARE at [facility]. Please see a copy of my visit note below.    [facility]  Epilepsy Clinic:  NEW PATIENT EVALUATION         Service Date: 2019    HISTORY:  Ms. Maryanne Crockett is a 38-year-old, right-handed woman who was referred by Dr. Shoshana Hyde on that for evaluation of recurring convulsive events.  The patient came alone to the visit today and appeared able to provide detailed medical history.  I reviewed medical records on the [facility] chart and through Care Everywhere and also records of brain MRI and inpatient video EEG monitoring performed in the [facility] system in 2018.      Ictal semiology-history:   The patient reported that she has had only one type of recurring seizure in her lifetime, which is a convulsion with loss of consciousness.  She estimates that she has ha about 10 convulsions in her lifetime.  The first of these reportedly occurred in 2018, and the most recent on 2019, reportedly without more than 1 on a single day and with the distribution usually weeks or a month apart.  These all have arisen during waking, usually with no aura or prodrome.  Several times she has had a feeling of diffuse warmth and tingling numbness around the lips and toes for several minutes before she loses consciousness.  She usually finds that she is down on the floor or ground when she regains awareness, and she feels confused with no memory for the event and generally lethargy.  On several occasions, she found that she had bitten the tip of her tongue while unconscious or had been incontinent of urine.  Friends and passers-by have told her that when she is down, her whole body is shaking or trembling.  A friend told her that one of these lasted about 5 minutes in terms of shaking and unresponsiveness.     The  "patient does not recognize exacerbating or remitting factors with regard to seizure frequency.      Epilepsy-seizure predispositions:   The patient noted a family history of grand mal seizures in her brother, who had the seizures as a manifestation of a brain tumor in adulthood.      She has no history of gestational or  injury, febrile convulsions, developmental delay, stroke, meningitis, encephalitis, significant head injury, or other epileptic predispositions.  She denied a history of physical or sexual abuse by an adult during her childhood or adulthood.      Laboratory evaluations:   The [facility] system medical record indicates that she had a brain MRI performed on 2018, which was reported as normal except for small subcortical white matter signal changes.      The record also shows that she had 5 days of video-EEG monitoring at [facility], performed -2018, which was interpreted by Dr. Segun Dominguez as showing normal electroencephalographic activities at all times, including during an event with \"slipping off the couch with associated brief amnesia   not an epileptic seizure.\"  She was not reported to have had a generalized convulsion during the recording.      Epilepsy therapeutics:   It is not clear to the patient whether she has ever been treated with anti-seizure medications for a diagnosis of epilepsy, but it is clear that she has been on medications that may be useful in neuropathic pain or mood stabilization in bipolar disorder.  She does not remember all of the medications she has received in past years, but the medical record shows that she is currently on gabapentin, which possibly was started for neuropathic pain following a right wrist procedure and lamotrigine, possibly started for mood stabilization.  She has been on bupropion, but it seems clear that some of her convulsions occurred after she was taken off bupropion.  In the past, she has also been on " lithium, and various other psychiatric medications for bipolar disorder.      Other history:   The patient noted that she had symptoms of recurring bill and depression dating back to her teens, although she was not diagnosed with bipolar disorder until her early 20s.  She most recently had an exacerbation of depression in 02/2019, when she was hospitalized with medication adjustment.  She denied having suicidal ideation after that time.  Currently, she stated that she feels that her mood is fairly stable and that she is compliant with the prescribed medications.  Her medications are prescribed by her primary care physician, Dr. Hyde and also by her psychiatrist, Dr. Bethanie Marte.      PAST MEDICAL-SURGICAL HISTORY:    1.  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   2.  Bipolar disorder with recurrent bill and depression.   3.  Chronic obesity; status post laparoscopic banding.   4.  Status post resection of ganglion cyst at the right wrist with subsequent chronic pain locally.   5.  Status post cholecystectomy.     FAMILY HISTORY:  Family history of seizures is as noted above.     PERSONAL AND SOCIAL HISTORY:  The patient grew up in [place].  She graduated from high school in regular classes and has attended 2 years of college courses.  She worked for a number of years as a  for a private company.  She is currently unemployed.  She lives in an apartment in [place], [place] with her 12-year-old daughter and has support in  and other activities from her own mother.  Currently, she does not use tobacco (recently having stopped), ethanol or illicit recreational drugs.      REVIEW OF SYSTEMS:  She stated that her right wrist discomfort is stable and less severe than it has been in the past.    She feels that her weight is stable, having lost about 100 pounds after  laparoscopic banding, but she is planning to have the band removed as was recommended to her sometime in the next month.   She denied history of attention and memory disturbance, difficulties with comprehension and expression, difficulty in solving problems, weakness, tremors or other abnormal involuntary movements, numbness or tingling, incoordination, falling or difficulty in walking, urinary or fecal incontinence, headache and other pain, except as described above.  The patient denied any history of severe depression or suicidal ideation, severe anxiety, panic attacks, hallucinations, delusions, psychosis, substance abuse, or psychiatric hospitalization.  She denied rashes and easy bruising.  The remainder of a 14-system symptom review was negative except as noted above.       MEDICATIONS:  Lamotrigine 200 mg daily, g Gabapentin 900 mg t.i.d., asenapine, and other medications as per the electronic medical record.     PHYSICAL EXAMINATION:    On physical examination the patient appeared well nourished and in no acute distress.  Pulse was 83 and regular.  Blood pressure initially was 153/94, but after approximately 20 minutes, was 138/82.  Respirations were 18 per minute.  Weight was 248 pounds.  Skull was normocephalic and atraumatic.  Neck was supple, without signs of meningeal irritation.      On neurological examination the patient appeared alert and was fully oriented to person, place, time, and reason for visit.  Speech showed normal articulation, fluency, repetitions, naming, syntax and comprehension.  She accurately repeated digits sequences, repeating 8 forward and 5 reversed.  At 10 minutes from presentation, 4 of 4 phrases were recalled.  No apraxias or atavistic signs were elicited.  Fundoscopy was normal bilaterally.  Cranial nerves III through XII were normal.  Muscle masses, tones and strengths were normal throughout.  There was no pronator drift.  Sequential fine finger movements were performed  normally with each hand.  No spontaneous tremors, myoclonus, or other abnormal movements were observed.  Sensations of light touch, pin prick, vibration and proprioception were reportedly normal throughout.  The rapid alternating movements, and finger-nose-finger and heel-shin maneuvers were performed normally bilaterally.  Romberg maneuver was negative.  Regular, heel, toe, tandem and reverse tandem walking were normal.  Deep tendon reflexes were normal and symmetric throughout.  Toes were downgoing bilaterally.      IMPRESSION:    The patient has had episodes of falling with convulsive movements and unconsciousness, recurring over approximately 1 year.  She did have a psychogenic non-epileptic event recorded with video-EEG monitoring at [facility] in 06/2018, but this probably was not her habitual convulsive event.  She was reported to have normal interictal electroencephalography, although this in itself does not exclude a diagnosis of intermittent epileptic seizures.  She does appear to have risk factors for psychogenic convulsions and probably a recorded psychogenic nonconvulsive event.  She has no epileptogenic lesions on a brain MRI, reported last year, and I did not identify any specific epilepsy risk factors.      She may have exclusively psychogenic convulsions, although it appears that these were not actually recorded during video-EEG monitoring at [facility].  I told her that we could perform video-EEG monitoring at the [facility], but she would need to stay during the admission until a convulsion is recorded if we are going to further advance the diagnosis.  She might consider this in the future, but she wants first to complete surgical removal of banding for obesity.      The patient indicated that she would continue to take lamotrigine for mood stabilization.  She reported that she is compliant with all of her psychiatric medications.   Today she denied suicidal ideation.  She stated that she will be seeing her psychiatrist back within the week.      The patient stated that she is not driving due to recurrent events with unconsciousness.  Nonetheless, I reviewed [place] regulations on loss of consciousness and driving with the patient.  She appeared to clearly understand that she is prohibited from operating a motor vehicle within 3 months following any episode with sudden unconsciousness or inability to sit up, and that she is required to report any future such seizure to the [facility] within 30 days after the event.  I also recommended that she and her family review all of her other activities, and avoid any activities that might lead to self-injury or injury of others, within 3 months following any seizure with impaired awareness or impaired motor control.  Such activities include but are not limited to holding babies or young children at heights from which they might be injured if dropped, bathing infants or young children in situations in which they might drown without continuous interactive care by an adult who is fully capable at all times during the bath, operating power cutting or other tools, handling firearms, exposure to heights from which she might fall, exposure to vessels with hot cooking oil or water, and tub-bathing or swimming alone.      PLAN:    1.  I did not prescribe any medications in the absence of an epilepsy diagnosis.   2.  The patient will call back if she decides to proceed with inpatient video-EEG monitoring for diagnosis of convulsions.   3.  I did not schedule a return visit to the Epilepsy Clinic.   I spent 65 minutes in this patient care, more than 50% of which consisted of counseling and coordinating care.       Walker Schaefer M.D.   Professor of Neurology     cc:   [place] [address]        D: 06/05/2019   T: 06/05/2019   MT: FREDDY    Name:      CYNTHIA ESPINOZA   MRN:      [id number]        Account:      [id number]   :      1980           Service Date: 2019    Document: [id number]

## 2019-06-05 NOTE — PATIENT INSTRUCTIONS
Please call us at [phone], if you decide to schedule in-patient video-EEG monitoring.  Thank you.

## 2019-06-05 NOTE — PROGRESS NOTES
[facility]  Epilepsy Clinic:  NEW PATIENT EVALUATION         Service Date: 06/05/2019    HISTORY:  Ms. Maryanne Crockett is a 38-year-old, right-handed woman who was referred by Dr. Shoshana Hyde on that for evaluation of recurring convulsive events.  The patient came alone to the visit today and appeared able to provide detailed medical history.  I reviewed medical records on the [facility] chart and through Care Everywhere and also records of brain MRI and inpatient video EEG monitoring performed in the [facility] system in 2018.      Ictal semiology-history:   The patient reported that she has had only one type of recurring seizure in her lifetime, which is a convulsion with loss of consciousness.  She estimates that she has ha about 10 convulsions in her lifetime.  The first of these reportedly occurred in 05/2018, and the most recent on 05/28/2019, reportedly without more than 1 on a single day and with the distribution usually weeks or a month apart.  These all have arisen during waking, usually with no aura or prodrome.  Several times she has had a feeling of diffuse warmth and tingling numbness around the lips and toes for several minutes before she loses consciousness.  She usually finds that she is down on the floor or ground when she regains awareness, and she feels confused with no memory for the event and generally lethargy.  On several occasions, she found that she had bitten the tip of her tongue while unconscious or had been incontinent of urine.  Friends and passers-by have told her that when she is down, her whole body is shaking or trembling.  A friend told her that one of these lasted about 5 minutes in terms of shaking and unresponsiveness.     The patient does not recognize exacerbating or remitting factors with regard to seizure frequency.      Epilepsy-seizure predispositions:   The patient noted a family history of grand mal seizures in her brother, who had the seizures as a manifestation of  "a brain tumor in adulthood.      She has no history of gestational or  injury, febrile convulsions, developmental delay, stroke, meningitis, encephalitis, significant head injury, or other epileptic predispositions.  She denied a history of physical or sexual abuse by an adult during her childhood or adulthood.      Laboratory evaluations:   The [facility] medical record indicates that she had a brain MRI performed on 2018, which was reported as normal except for small subcortical white matter signal changes.      The record also shows that she had 5 days of video-EEG monitoring at [facility], performed -2018, which was interpreted by Dr. Segun Dominguez as showing normal electroencephalographic activities at all times, including during an event with \"slipping off the couch with associated brief amnesia   not an epileptic seizure.\"  She was not reported to have had a generalized convulsion during the recording.      Epilepsy therapeutics:   It is not clear to the patient whether she has ever been treated with anti-seizure medications for a diagnosis of epilepsy, but it is clear that she has been on medications that may be useful in neuropathic pain or mood stabilization in bipolar disorder.  She does not remember all of the medications she has received in past years, but the medical record shows that she is currently on gabapentin, which possibly was started for neuropathic pain following a right wrist procedure and lamotrigine, possibly started for mood stabilization.  She has been on bupropion, but it seems clear that some of her convulsions occurred after she was taken off bupropion.  In the past, she has also been on lithium, and various other psychiatric medications for bipolar disorder.      Other history:   The patient noted that she had symptoms of recurring bill and depression dating back to her teens, although she was not diagnosed with bipolar disorder until her " early 20s.  She most recently had an exacerbation of depression in 02/2019, when she was hospitalized with medication adjustment.  She denied having suicidal ideation after that time.  Currently, she stated that she feels that her mood is fairly stable and that she is compliant with the prescribed medications.  Her medications are prescribed by her primary care physician, Dr. Hyde and also by her psychiatrist, Dr. Bethanie Marte.      PAST MEDICAL-SURGICAL HISTORY:    1.  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   2.  Bipolar disorder with recurrent bill and depression.   3.  Chronic obesity; status post laparoscopic banding.   4.  Status post resection of ganglion cyst at the right wrist with subsequent chronic pain locally.   5.  Status post cholecystectomy.     FAMILY HISTORY:  Family history of seizures is as noted above.     PERSONAL AND SOCIAL HISTORY:  The patient grew up in [place].  She graduated from high school in regular classes and has attended 2 years of college courses.  She worked for a number of years as a  for a private company.  She is currently unemployed.  She lives in an apartment in [place], [place] with her 12-year-old daughter and has support in  and other activities from her own mother.  Currently, she does not use tobacco (recently having stopped), ethanol or illicit recreational drugs.      REVIEW OF SYSTEMS:  She stated that her right wrist discomfort is stable and less severe than it has been in the past.    She feels that her weight is stable, having lost about 100 pounds after laparoscopic banding, but she is planning to have the band removed as was recommended to her sometime in the next month.   She denied history of attention and memory disturbance, difficulties with comprehension and expression, difficulty in solving problems,  weakness, tremors or other abnormal involuntary movements, numbness or tingling, incoordination, falling or difficulty in walking, urinary or fecal incontinence, headache and other pain, except as described above.  The patient denied any history of severe depression or suicidal ideation, severe anxiety, panic attacks, hallucinations, delusions, psychosis, substance abuse, or psychiatric hospitalization.  She denied rashes and easy bruising.  The remainder of a 14-system symptom review was negative except as noted above.       MEDICATIONS:  Lamotrigine 200 mg daily, g Gabapentin 900 mg t.i.d., asenapine, and other medications as per the electronic medical record.     PHYSICAL EXAMINATION:    On physical examination the patient appeared well nourished and in no acute distress.  Pulse was 83 and regular.  Blood pressure initially was 153/94, but after approximately 20 minutes, was 138/82.  Respirations were 18 per minute.  Weight was 248 pounds.  Skull was normocephalic and atraumatic.  Neck was supple, without signs of meningeal irritation.      On neurological examination the patient appeared alert and was fully oriented to person, place, time, and reason for visit.  Speech showed normal articulation, fluency, repetitions, naming, syntax and comprehension.  She accurately repeated digits sequences, repeating 8 forward and 5 reversed.  At 10 minutes from presentation, 4 of 4 phrases were recalled.  No apraxias or atavistic signs were elicited.  Fundoscopy was normal bilaterally.  Cranial nerves III through XII were normal.  Muscle masses, tones and strengths were normal throughout.  There was no pronator drift.  Sequential fine finger movements were performed normally with each hand.  No spontaneous tremors, myoclonus, or other abnormal movements were observed.  Sensations of light touch, pin prick, vibration and proprioception were reportedly normal throughout.  The rapid alternating movements, and finger-nose-finger  and heel-shin maneuvers were performed normally bilaterally.  Romberg maneuver was negative.  Regular, heel, toe, tandem and reverse tandem walking were normal.  Deep tendon reflexes were normal and symmetric throughout.  Toes were downgoing bilaterally.      IMPRESSION:    The patient has had episodes of falling with convulsive movements and unconsciousness, recurring over approximately 1 year.  She did have a psychogenic non-epileptic event recorded with video-EEG monitoring at [facility] in 06/2018, but this probably was not her habitual convulsive event.  She was reported to have normal interictal electroencephalography, although this in itself does not exclude a diagnosis of intermittent epileptic seizures.  She does appear to have risk factors for psychogenic convulsions and probably a recorded psychogenic nonconvulsive event.  She has no epileptogenic lesions on a brain MRI, reported last year, and I did not identify any specific epilepsy risk factors.      She may have exclusively psychogenic convulsions, although it appears that these were not actually recorded during video-EEG monitoring at [facility].  I told her that we could perform video-EEG monitoring at the [facility], but she would need to stay during the admission until a convulsion is recorded if we are going to further advance the diagnosis.  She might consider this in the future, but she wants first to complete surgical removal of banding for obesity.      The patient indicated that she would continue to take lamotrigine for mood stabilization.  She reported that she is compliant with all of her psychiatric medications.  Today she denied suicidal ideation.  She stated that she will be seeing her psychiatrist back within the week.      The patient stated that she is not driving due to recurrent events with unconsciousness.  Nonetheless, I reviewed [place] regulations on loss of  consciousness and driving with the patient.  She appeared to clearly understand that she is prohibited from operating a motor vehicle within 3 months following any episode with sudden unconsciousness or inability to sit up, and that she is required to report any future such seizure to the [facility] within 30 days after the event.  I also recommended that she and her family review all of her other activities, and avoid any activities that might lead to self-injury or injury of others, within 3 months following any seizure with impaired awareness or impaired motor control.  Such activities include but are not limited to holding babies or young children at heights from which they might be injured if dropped, bathing infants or young children in situations in which they might drown without continuous interactive care by an adult who is fully capable at all times during the bath, operating power cutting or other tools, handling firearms, exposure to heights from which she might fall, exposure to vessels with hot cooking oil or water, and tub-bathing or swimming alone.      PLAN:    1.  I did not prescribe any medications in the absence of an epilepsy diagnosis.   2.  The patient will call back if she decides to proceed with inpatient video-EEG monitoring for diagnosis of convulsions.   3.  I did not schedule a return visit to the Epilepsy Clinic.   I spent 65 minutes in this patient care, more than 50% of which consisted of counseling and coordinating care.       Walker Schaefer M.D.   Professor of Neurology     cc:   Shoshana Hyde M.D.   [address] [address]             D: 2019   T: 2019   MT: FREDDY      Name:     CYNTHIA ESPINOZA   MRN:      [id number]        Account:      [id number]   :      1980           Service Date: 2019      Document: [id number]

## 2019-06-10 ENCOUNTER — OFFICE VISIT (OUTPATIENT)
Dept: OBGYN | Facility: CLINIC | Age: 39
End: 2019-06-10
Attending: OBSTETRICS & GYNECOLOGY
Payer: COMMERCIAL

## 2019-06-10 VITALS
SYSTOLIC BLOOD PRESSURE: 121 MMHG | DIASTOLIC BLOOD PRESSURE: 81 MMHG | BODY MASS INDEX: 37.68 KG/M2 | HEART RATE: 83 BPM | HEIGHT: 68 IN

## 2019-06-10 DIAGNOSIS — R10.31 RLQ ABDOMINAL PAIN: Primary | ICD-10-CM

## 2019-06-10 PROCEDURE — G0463 HOSPITAL OUTPT CLINIC VISIT: HCPCS | Mod: ZF

## 2019-06-10 NOTE — NURSING NOTE
Chief Complaint   Patient presents with     Follow Up     Right side pelvic pain       See ABEBE Mathews 6/10/2019

## 2019-06-10 NOTE — LETTER
"6/10/2019       RE: Maryanne Crockett  [address] [facility] [address]     Dear Colleague,    Thank you for referring your patient, Maryanne Crockett, to the [facility] at [facility]. Please see a copy of my visit note below.    Pt comes in today for evaluation of right lower quadrant pain.  She states she  Is not exactly sure it is right sided, but it is fleeting and is not constant. She denies nausea or vomiting. Some occasional HF and Night sweats.  Periods are still regular.      She had h/o right oophorectomy back in 2016 per Dr. Vergara.    /81 (BP Location: Left arm, Patient Position: Chair)   Pulse 83   Ht 1.727 m (5' 8\")   Breastfeeding? No   BMI 37.68 kg/m       uls done 3/19 showed collapsing left ov cyst and absent right ovary.   She is status post tubal ligation.     She denies current pain. She wonders about something that could help with cysts.    We discussed briefly options for ovulation suppression. She is not a candidate for estrogen as she is a smoker over 35. She declines IUD or Nexplanon.     At this point, I recommend watchful waiting.  There does not appear to be any acute process.      Hayley Zayas MD, FACOG  [facility]  OB/GYN    6/12/2019  1:17 PM    TT spent 15 min, >50%^ counseling re: ov cysts, ov suppression and treatment of both  "

## 2019-06-12 NOTE — PROGRESS NOTES
"Pt comes in today for evaluation of right lower quadrant pain.  She states she  Is not exactly sure it is right sided, but it is fleeting and is not constant. She denies nausea or vomiting. Some occasional HF and Night sweats.  Periods are still regular.      She had h/o right oophorectomy back in 2016 per Dr. Vergara.    /81 (BP Location: Left arm, Patient Position: Chair)   Pulse 83   Ht 1.727 m (5' 8\")   Breastfeeding? No   BMI 37.68 kg/m      uls done 3/19 showed collapsing left ov cyst and absent right ovary.   She is status post tubal ligation.     She denies current pain. She wonders about something that could help with cysts.    We discussed briefly options for ovulation suppression. She is not a candidate for estrogen as she is a smoker over 35. She declines IUD or Nexplanon.     At this point, I recommend watchful waiting.  There does not appear to be any acute process.      Hayley Zayas MD, FACOG  [facility]  OB/GYN    6/12/2019  1:17 PM    TT spent 15 min, >50%^ counseling re: ov cysts, ov suppression and treatment of both    "

## 2019-06-19 ENCOUNTER — TELEPHONE (OUTPATIENT)
Dept: NEUROLOGY | Facility: CLINIC | Age: 39
End: 2019-06-19

## 2019-06-19 DIAGNOSIS — R56.9 CONVULSIONS, UNSPECIFIED CONVULSION TYPE (H): Primary | ICD-10-CM

## 2019-06-19 NOTE — TELEPHONE ENCOUNTER
What is the concern that needs to be addressed by a nurse? Pt would like to proceed with inpatient video EEG monitoring. No orders in Epic.     May a detailed message be left on voicemail? Yes    Date of last office visit: 6/5/19    Message routed to: nurse

## 2019-06-19 NOTE — TELEPHONE ENCOUNTER
Orders for inpatient Video EEG have been entered.  This order has been forwarded to Marcus Abel to schedule.

## 2019-06-24 ENCOUNTER — PATIENT OUTREACH (OUTPATIENT)
Dept: NEUROLOGY | Facility: CLINIC | Age: 39
End: 2019-06-24

## 2019-06-24 NOTE — PROGRESS NOTES
Pre Admission contact prior to inpatient continuous video EEG monitoring at the [facility]    MD provided information:  MINADELAIDA provider:Walker Schaefer M.D.  Admission date: 6/26/19 9:15 AM  Reason for admit (pre surgical, characterize, quantify burden, classify diagnosis) Event characterization - Differential Diagnosis  Nurse education regarding admission:Reviewed by telephone     Nurse Admission Checklist:  EEG prior to admission? No   Imaging files have been requested from [facility]  Documentation of any out of the ordinary inpatient staff time - n/a  Does the patient have behavioral needs?: No  If patient has developmental delay is family staying? Not applicable.  What level of assistance required: Gait Belt  Nicotine use; tolerance of nicotine patch. Patient states she won't need one.    needs: No  Is this a local patient? YES, [facility] Consent scanned on file :Yes    For admitting nurse:  Home environment (group home, independent):house  Barriers to discharge: Private Car   Harness room: No    Patient confirms her admission scheduled for Wednesday 9:15 AM.

## 2019-06-26 ENCOUNTER — HOSPITAL ENCOUNTER (INPATIENT)
Facility: CLINIC | Age: 39
LOS: 6 days | Discharge: HOME | End: 2019-07-02
Attending: PSYCHIATRY & NEUROLOGY | Admitting: PSYCHIATRY & NEUROLOGY
Payer: COMMERCIAL

## 2019-06-26 ENCOUNTER — ALLIED HEALTH/NURSE VISIT (OUTPATIENT)
Dept: NEUROLOGY | Facility: CLINIC | Age: 39
End: 2019-06-26
Attending: PSYCHIATRY & NEUROLOGY
Payer: COMMERCIAL

## 2019-06-26 ENCOUNTER — TRANSFERRED RECORDS (OUTPATIENT)
Dept: HEALTH INFORMATION MANAGEMENT | Facility: CLINIC | Age: 39
End: 2019-06-26

## 2019-06-26 DIAGNOSIS — R56.9 CONVULSIONS, UNSPECIFIED CONVULSION TYPE (H): Primary | ICD-10-CM

## 2019-06-26 LAB — B-HCG SERPL-ACNC: <1 IU/L (ref 0–5)

## 2019-06-26 PROCEDURE — 95951 ZZHC EEG VIDEO EACH 24 HR: CPT | Mod: ZF

## 2019-06-26 PROCEDURE — 40000141 ZZH STATISTIC PERIPHERAL IV START W/O US GUIDANCE

## 2019-06-26 PROCEDURE — 12000001 ZZH R&B MED SURG/OB UMMC

## 2019-06-26 PROCEDURE — 40000802 ZZH SITE CHECK

## 2019-06-26 PROCEDURE — 99222 1ST HOSP IP/OBS MODERATE 55: CPT

## 2019-06-26 PROCEDURE — 36415 COLL VENOUS BLD VENIPUNCTURE: CPT | Performed by: NURSE PRACTITIONER

## 2019-06-26 PROCEDURE — 84702 CHORIONIC GONADOTROPIN TEST: CPT | Performed by: NURSE PRACTITIONER

## 2019-06-26 PROCEDURE — 25000132 ZZH RX MED GY IP 250 OP 250 PS 637: Performed by: NURSE PRACTITIONER

## 2019-06-26 RX ORDER — MULTIPLE VITAMINS W/ MINERALS TAB 9MG-400MCG
1 TAB ORAL DAILY
Status: DISCONTINUED | OUTPATIENT
Start: 2019-06-26 | End: 2019-07-02 | Stop reason: HOSPADM

## 2019-06-26 RX ORDER — LORAZEPAM 2 MG/ML
2 INJECTION INTRAMUSCULAR
Status: DISCONTINUED | OUTPATIENT
Start: 2019-06-26 | End: 2019-07-02 | Stop reason: HOSPADM

## 2019-06-26 RX ORDER — DEXTROAMPHETAMINE SACCHARATE, AMPHETAMINE ASPARTATE, DEXTROAMPHETAMINE SULFATE AND AMPHETAMINE SULFATE 5; 5; 5; 5 MG/1; MG/1; MG/1; MG/1
20 TABLET ORAL 2 TIMES DAILY
COMMUNITY
End: 2019-10-30

## 2019-06-26 RX ORDER — IBUPROFEN 200 MG
200 TABLET ORAL EVERY 6 HOURS PRN
Status: DISCONTINUED | OUTPATIENT
Start: 2019-06-26 | End: 2019-07-02 | Stop reason: HOSPADM

## 2019-06-26 RX ORDER — HYDROCODONE BITARTRATE AND ACETAMINOPHEN 5; 325 MG/1; MG/1
1 TABLET ORAL EVERY 4 HOURS PRN
Status: DISCONTINUED | OUTPATIENT
Start: 2019-06-26 | End: 2019-07-02 | Stop reason: HOSPADM

## 2019-06-26 RX ORDER — MULTIPLE VITAMINS W/ MINERALS TAB 9MG-400MCG
1 TAB ORAL DAILY
Status: ON HOLD | COMMUNITY
End: 2020-06-27

## 2019-06-26 RX ORDER — LIDOCAINE 40 MG/G
CREAM TOPICAL
Status: DISCONTINUED | OUTPATIENT
Start: 2019-06-26 | End: 2019-07-02 | Stop reason: HOSPADM

## 2019-06-26 RX ORDER — PLANT STANOL ESTER 450 MG
2.5 TABLET ORAL DAILY
Status: ON HOLD | COMMUNITY
End: 2020-06-24

## 2019-06-26 RX ORDER — DOCUSATE SODIUM 100 MG/1
100 CAPSULE, LIQUID FILLED ORAL 2 TIMES DAILY PRN
Status: DISCONTINUED | OUTPATIENT
Start: 2019-06-26 | End: 2019-07-02 | Stop reason: HOSPADM

## 2019-06-26 RX ORDER — ALPRAZOLAM 0.5 MG
0.5 TABLET ORAL 2 TIMES DAILY PRN
Status: DISCONTINUED | OUTPATIENT
Start: 2019-06-26 | End: 2019-07-02 | Stop reason: HOSPADM

## 2019-06-26 RX ORDER — DEXTROAMPHETAMINE SACCHARATE, AMPHETAMINE ASPARTATE, DEXTROAMPHETAMINE SULFATE AND AMPHETAMINE SULFATE 2.5; 2.5; 2.5; 2.5 MG/1; MG/1; MG/1; MG/1
20 TABLET ORAL DAILY
Status: DISCONTINUED | OUTPATIENT
Start: 2019-06-26 | End: 2019-07-02 | Stop reason: HOSPADM

## 2019-06-26 RX ORDER — DEXTROAMPHETAMINE SACCHARATE, AMPHETAMINE ASPARTATE, DEXTROAMPHETAMINE SULFATE AND AMPHETAMINE SULFATE 2.5; 2.5; 2.5; 2.5 MG/1; MG/1; MG/1; MG/1
20 TABLET ORAL DAILY
Status: DISCONTINUED | OUTPATIENT
Start: 2019-06-27 | End: 2019-07-02 | Stop reason: HOSPADM

## 2019-06-26 RX ORDER — GINSENG 100 MG
CAPSULE ORAL 3 TIMES DAILY PRN
Status: DISCONTINUED | OUTPATIENT
Start: 2019-06-26 | End: 2019-07-02 | Stop reason: HOSPADM

## 2019-06-26 RX ORDER — LIDOCAINE HYDROCHLORIDE 20 MG/ML
5 SOLUTION OROPHARYNGEAL EVERY 6 HOURS PRN
Status: DISCONTINUED | OUTPATIENT
Start: 2019-06-26 | End: 2019-07-02 | Stop reason: HOSPADM

## 2019-06-26 RX ORDER — ONDANSETRON 4 MG/1
4-8 TABLET, FILM COATED ORAL EVERY 8 HOURS PRN
Status: DISCONTINUED | OUTPATIENT
Start: 2019-06-26 | End: 2019-07-02 | Stop reason: HOSPADM

## 2019-06-26 RX ORDER — BUPROPION HYDROCHLORIDE 300 MG/1
300 TABLET ORAL EVERY MORNING
COMMUNITY
End: 2019-10-30

## 2019-06-26 RX ORDER — ACETAMINOPHEN 325 MG/1
650 TABLET ORAL EVERY 4 HOURS PRN
Status: DISCONTINUED | OUTPATIENT
Start: 2019-06-26 | End: 2019-07-02 | Stop reason: HOSPADM

## 2019-06-26 RX ORDER — BUSPIRONE HYDROCHLORIDE 30 MG/1
30 TABLET ORAL 2 TIMES DAILY
COMMUNITY
Start: 2019-05-28 | End: 2019-10-30

## 2019-06-26 RX ORDER — HYDROCODONE BITARTRATE AND ACETAMINOPHEN 5; 325 MG/1; MG/1
1 TABLET ORAL EVERY 4 HOURS PRN
COMMUNITY
End: 2019-10-30

## 2019-06-26 RX ORDER — ESCITALOPRAM OXALATE 10 MG/1
20 TABLET ORAL DAILY
Status: DISCONTINUED | OUTPATIENT
Start: 2019-06-26 | End: 2019-07-02 | Stop reason: HOSPADM

## 2019-06-26 RX ORDER — DEXTROAMPHETAMINE SACCHARATE, AMPHETAMINE ASPARTATE, DEXTROAMPHETAMINE SULFATE AND AMPHETAMINE SULFATE 1.25; 1.25; 1.25; 1.25 MG/1; MG/1; MG/1; MG/1
20 TABLET ORAL DAILY
Status: DISCONTINUED | OUTPATIENT
Start: 2019-06-26 | End: 2019-06-26

## 2019-06-26 RX ORDER — ONDANSETRON 4 MG/1
4-8 TABLET, FILM COATED ORAL EVERY 8 HOURS PRN
COMMUNITY
End: 2019-12-27

## 2019-06-26 RX ORDER — LANOLIN ALCOHOL/MO/W.PET/CERES
1000 CREAM (GRAM) TOPICAL DAILY
Status: DISCONTINUED | OUTPATIENT
Start: 2019-06-26 | End: 2019-07-02 | Stop reason: HOSPADM

## 2019-06-26 RX ORDER — ASENAPINE 5 MG/1
10 TABLET SUBLINGUAL 2 TIMES DAILY
Status: DISCONTINUED | OUTPATIENT
Start: 2019-06-26 | End: 2019-07-02 | Stop reason: HOSPADM

## 2019-06-26 RX ORDER — NALOXONE HYDROCHLORIDE 0.4 MG/ML
.1-.4 INJECTION, SOLUTION INTRAMUSCULAR; INTRAVENOUS; SUBCUTANEOUS
Status: DISCONTINUED | OUTPATIENT
Start: 2019-06-26 | End: 2019-07-02 | Stop reason: HOSPADM

## 2019-06-26 RX ORDER — BUSPIRONE HYDROCHLORIDE 10 MG/1
30 TABLET ORAL 2 TIMES DAILY
Status: DISCONTINUED | OUTPATIENT
Start: 2019-06-26 | End: 2019-07-02 | Stop reason: HOSPADM

## 2019-06-26 RX ORDER — PROPRANOLOL HYDROCHLORIDE 20 MG/1
20 TABLET ORAL 3 TIMES DAILY PRN
Status: ON HOLD | COMMUNITY
Start: 2019-05-06 | End: 2019-06-26

## 2019-06-26 RX ORDER — DEXTROAMPHETAMINE SACCHARATE, AMPHETAMINE ASPARTATE, DEXTROAMPHETAMINE SULFATE AND AMPHETAMINE SULFATE 1.25; 1.25; 1.25; 1.25 MG/1; MG/1; MG/1; MG/1
5 TABLET ORAL DAILY
Status: DISCONTINUED | OUTPATIENT
Start: 2019-06-26 | End: 2019-06-26

## 2019-06-26 RX ORDER — LAMOTRIGINE 200 MG/1
200 TABLET ORAL DAILY
Status: DISCONTINUED | OUTPATIENT
Start: 2019-06-26 | End: 2019-06-28

## 2019-06-26 RX ORDER — PLANT STANOL ESTER 450 MG
2.5 TABLET ORAL DAILY
Status: DISCONTINUED | OUTPATIENT
Start: 2019-06-26 | End: 2019-07-02 | Stop reason: HOSPADM

## 2019-06-26 RX ORDER — BUPROPION HYDROCHLORIDE 300 MG/1
300 TABLET ORAL EVERY MORNING
Status: DISCONTINUED | OUTPATIENT
Start: 2019-06-27 | End: 2019-07-02 | Stop reason: HOSPADM

## 2019-06-26 RX ADMIN — ACETAMINOPHEN 650 MG: 325 TABLET, FILM COATED ORAL at 13:17

## 2019-06-26 RX ADMIN — ASENAPINE MALEATE 10 MG: 5 TABLET SUBLINGUAL at 21:09

## 2019-06-26 RX ADMIN — CYANOCOBALAMIN TAB 1000 MCG 1000 MCG: 1000 TAB at 14:27

## 2019-06-26 RX ADMIN — BUSPIRONE HYDROCHLORIDE 30 MG: 10 TABLET ORAL at 20:11

## 2019-06-26 RX ADMIN — HYDROCODONE BITARTRATE AND ACETAMINOPHEN 1 TABLET: 5; 325 TABLET ORAL at 20:32

## 2019-06-26 RX ADMIN — ALPRAZOLAM 0.5 MG: 0.5 TABLET ORAL at 17:18

## 2019-06-26 RX ADMIN — LAMOTRIGINE 200 MG: 200 TABLET ORAL at 14:27

## 2019-06-26 RX ADMIN — ESCITALOPRAM OXALATE 20 MG: 10 TABLET ORAL at 14:27

## 2019-06-26 RX ADMIN — ALPRAZOLAM 0.5 MG: 0.5 TABLET ORAL at 13:16

## 2019-06-26 RX ADMIN — HYDROCODONE BITARTRATE AND ACETAMINOPHEN 1 TABLET: 5; 325 TABLET ORAL at 16:19

## 2019-06-26 RX ADMIN — MULTIPLE VITAMINS W/ MINERALS TAB 1 TABLET: TAB at 14:27

## 2019-06-26 RX ADMIN — Medication 2.5 MEQ: at 16:15

## 2019-06-26 ASSESSMENT — ACTIVITIES OF DAILY LIVING (ADL)
ADLS_ACUITY_SCORE: 10
TRANSFERRING: 0-->INDEPENDENT
COGNITION: 0 - NO COGNITION ISSUES REPORTED
AMBULATION: 0-->INDEPENDENT
FALL_HISTORY_WITHIN_LAST_SIX_MONTHS: NO
ADLS_ACUITY_SCORE: 11
TOILETING: 0-->INDEPENDENT
RETIRED_COMMUNICATION: 0-->UNDERSTANDS/COMMUNICATES WITHOUT DIFFICULTY
ADLS_ACUITY_SCORE: 10
SWALLOWING: 0-->SWALLOWS FOODS/LIQUIDS WITHOUT DIFFICULTY
BATHING: 0-->INDEPENDENT
DRESS: 0-->INDEPENDENT
RETIRED_EATING: 0-->INDEPENDENT

## 2019-06-26 ASSESSMENT — MIFFLIN-ST. JEOR: SCORE: 1793.55

## 2019-06-26 ASSESSMENT — PAIN DESCRIPTION - DESCRIPTORS: DESCRIPTORS: DULL

## 2019-06-26 ASSESSMENT — VISUAL ACUITY: OU: NORMAL ACUITY

## 2019-06-26 NOTE — PHARMACY-ADMISSION MEDICATION HISTORY
"Admission medication history interview status for the 6/26/2019 admission is complete. See Epic admission navigator for allergy information, pharmacy, prior to admission medications and immunization status.     Medication history interview sources:  patient, fill record from [facility]    Changes made to PTA medication list (reason)  Added: Adderall, buproprion, hydrocodone-APAP, miltivitamin, potassium gluconate, vitamin B-12  Deleted: nicotine inhaler (patient no longer using), propranolol (patient stopped taking a few weeks ago because it was \"not helpful\")  Changed: Saphris (from 10mg once daily to 10mg twice daily at 9pm and 2am per patient and [facility]'s record), ondansetron (update dose to 4-8 mg PO Q8H PRN per [facility]'s record)    Additional medication history information (including reliability of information, actions taken by pharmacist):  - Patient knows her medications and was able to confirm/clarify information from [facility]'s fill record.   - Patient has prescriptions for both lamotrigine 100 mg tablets and lamotrigine 200 mg tablets. She should be taking 200 mg once daily (either 2x 100 mg tablets or 1x 200 mg tablet). She is aware that they are the same and is not taking both at the same time.   - Patient stated that she stopped taking propranolol a few weeks ago as they are not helpful. She stated that her doctor is aware of her stopping this.  - Patient recently had a dental appointment. She was prescribed diazepam 5mg for the appointment - this is not a long-term prescription. She was also prescribed hydrocordone-APAP for pain after the appointment and stated that she is still needing it.  - Patient had a prescription for amoxicillin 500 mg TID on 6/3 - this is completed.       Prior to Admission medications    Medication Sig Last Dose Taking? Auth Provider   acetaminophen (TYLENOL) 500 MG tablet Take 500-1,000 mg by mouth every 6 hours as needed for mild pain Past Week Yes Unknown, Entered By History "   ALPRAZolam (XANAX) 0.5 MG tablet Take 0.5 mg by mouth 2 times daily as needed for anxiety (panic attack)  6/25/2019 Yes Unknown, Entered By History   amphetamine-dextroamphetamine (ADDERALL) 20 MG tablet Take 20 mg by mouth 2 times daily In the morning and at 2pm 6/25/2019 at 2pm Yes Unknown, Entered By History   asenapine (SAPHRIS) 10 MG SUBL sublingual tablet Place 10 mg under the tongue 2 times daily At 9pm and 2am. 6/25/2019 at 9pm Yes Unknown, Entered By History   buPROPion (WELLBUTRIN XL) 300 MG 24 hr tablet Take 300 mg by mouth every morning 6/25/2019 at AM Yes Unknown, Entered By History   busPIRone HCl (BUSPAR) 30 MG tablet Take 30 mg by mouth 2 times daily 6/25/2019 Yes Reported, Patient   escitalopram (LEXAPRO) 20 MG tablet Take 20 mg by mouth daily 6/25/2019 at AM Yes Unknown, Entered By History   HYDROcodone-acetaminophen (NORCO) 5-325 MG tablet Take 1 tablet by mouth every 4 hours as needed for severe pain PRN Yes Unknown, Entered By History   lamoTRIgine (LAMICTAL) 200 MG tablet Take 1 tablet (200 mg) by mouth daily 6/25/2019 at AM Yes Reza Escalante MD   multivitamin w/minerals (THERA-VIT-M) tablet Take 1 tablet by mouth daily 6/25/2019 at AM Yes Unknown, Entered By History   ondansetron (ZOFRAN) 4 MG tablet Take 4-8 mg by mouth every 8 hours as needed for nausea Past Week Yes Unknown, Entered By History   potassium gluconate 2.5 MEQ tablet Take 2.5 mEq by mouth daily 6/25/2019 Yes Unknown, Entered By History   VENTOLIN  (90 BASE) MCG/ACT inhaler Inhale 1-2 puffs into the lungs every 6 hours as needed for shortness of breath / dyspnea or wheezing  PRN Yes Reported, Patient   vitamin B-12 (CYANOCOBALAMIN) 1000 MCG tablet Take 1,000 mcg by mouth daily 6/25/2019 Yes Unknown, Entered By History         Medication history completed by: Delia James PharmD  6/26/2019 12:20 PM

## 2019-06-26 NOTE — PLAN OF CARE
"Status: Pt on 6a for seizure monitoring.   Vitals: /65   Pulse 68   Temp 96  F (35.6  C) (Oral)   Resp 16   Ht 1.753 m (5' 9\")   Wt 104.9 kg (231 lb 4.8 oz)   SpO2 97%   BMI 34.16 kg/m    Neuros: A&Ox4. No events witnessed or reported this shift.   IV: PIV SL.  Resp/trach: On RA.  Diet: Reg diet, minimal intake due to tooth pain.   Bowel status: No BM this shift.   : Voids spontaneously.  Skin: EEG leads intact.   Pain: Headache w/ tylenol..   Activity: Up GB/SBA. Seizure precautions maintained.   Social: No visitors this shift.   Plan: Unable to complete admit questions as pt states she wants to sleep, will answer questions later. Psych consult entered. Continue to monitor and follow POC.    "
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
## 2019-06-27 ENCOUNTER — ALLIED HEALTH/NURSE VISIT (OUTPATIENT)
Dept: NEUROLOGY | Facility: CLINIC | Age: 39
End: 2019-06-27
Attending: PSYCHIATRY & NEUROLOGY
Payer: COMMERCIAL

## 2019-06-27 DIAGNOSIS — R56.9 CONVULSIONS, UNSPECIFIED CONVULSION TYPE (H): Primary | ICD-10-CM

## 2019-06-27 PROCEDURE — 95951 ZZHC EEG VIDEO EACH 24 HR: CPT | Mod: ZF

## 2019-06-27 PROCEDURE — 12000001 ZZH R&B MED SURG/OB UMMC

## 2019-06-27 PROCEDURE — 25000132 ZZH RX MED GY IP 250 OP 250 PS 637: Performed by: NURSE PRACTITIONER

## 2019-06-27 RX ADMIN — BUSPIRONE HYDROCHLORIDE 30 MG: 10 TABLET ORAL at 20:19

## 2019-06-27 RX ADMIN — HYDROCODONE BITARTRATE AND ACETAMINOPHEN 1 TABLET: 5; 325 TABLET ORAL at 16:07

## 2019-06-27 RX ADMIN — ACETAMINOPHEN 650 MG: 325 TABLET, FILM COATED ORAL at 14:49

## 2019-06-27 RX ADMIN — LAMOTRIGINE 200 MG: 200 TABLET ORAL at 08:08

## 2019-06-27 RX ADMIN — CYANOCOBALAMIN TAB 1000 MCG 1000 MCG: 1000 TAB at 08:08

## 2019-06-27 RX ADMIN — Medication 2.5 MEQ: at 11:47

## 2019-06-27 RX ADMIN — BUSPIRONE HYDROCHLORIDE 30 MG: 10 TABLET ORAL at 08:08

## 2019-06-27 RX ADMIN — ALPRAZOLAM 0.5 MG: 0.5 TABLET ORAL at 10:22

## 2019-06-27 RX ADMIN — HYDROCODONE BITARTRATE AND ACETAMINOPHEN 1 TABLET: 5; 325 TABLET ORAL at 07:11

## 2019-06-27 RX ADMIN — ASENAPINE MALEATE 10 MG: 5 TABLET SUBLINGUAL at 21:45

## 2019-06-27 RX ADMIN — HYDROCODONE BITARTRATE AND ACETAMINOPHEN 1 TABLET: 5; 325 TABLET ORAL at 11:47

## 2019-06-27 RX ADMIN — ESCITALOPRAM OXALATE 20 MG: 10 TABLET ORAL at 08:08

## 2019-06-27 RX ADMIN — MULTIPLE VITAMINS W/ MINERALS TAB 1 TABLET: TAB at 08:08

## 2019-06-27 RX ADMIN — ACETAMINOPHEN 650 MG: 325 TABLET, FILM COATED ORAL at 09:36

## 2019-06-27 RX ADMIN — ALPRAZOLAM 0.5 MG: 0.5 TABLET ORAL at 14:48

## 2019-06-27 RX ADMIN — DEXTROAMPHETAMINE SACCHARATE, AMPHETAMINE ASPARTATE, DEXTROAMPHETAMINE SULFATE AND AMPHETAMINE SULFATE 20 MG: 2.5; 2.5; 2.5; 2.5 TABLET ORAL at 13:01

## 2019-06-27 RX ADMIN — DEXTROAMPHETAMINE SACCHARATE, AMPHETAMINE ASPARTATE, DEXTROAMPHETAMINE SULFATE AND AMPHETAMINE SULFATE 20 MG: 2.5; 2.5; 2.5; 2.5 TABLET ORAL at 08:09

## 2019-06-27 RX ADMIN — HYDROCODONE BITARTRATE AND ACETAMINOPHEN 1 TABLET: 5; 325 TABLET ORAL at 20:19

## 2019-06-27 RX ADMIN — BUPROPION HYDROCHLORIDE 300 MG: 300 TABLET, FILM COATED, EXTENDED RELEASE ORAL at 08:08

## 2019-06-27 ASSESSMENT — ACTIVITIES OF DAILY LIVING (ADL)
ADLS_ACUITY_SCORE: 10

## 2019-06-27 ASSESSMENT — VISUAL ACUITY: OU: NORMAL ACUITY

## 2019-06-27 NOTE — PROCEDURES
Procedure Date: 06/26/2019      EEG #-1.      DATE OF RECORDING/SERVICE DATE:  06/26/2019.      SOURCE FILE DURATION:  12:39:19.      CLINICAL HISTORY:  This is day #1 of video EEG monitoring in a 38-year-old right-handed female who is having recurrent convulsive events.  Video EEG was started for evaluation of events and characterization of seizure.      MEDICATIONS:   1.  Saphris 10 mg b.i.d.   2.  Lexapro 10 mg daily.     3.  Lamictal 200 mg daily.   4.  Wellbutrin 300 mg daily.   5.  BuSpar 30 mg b.i.d.     6.  Adderall 20 mg b.i.d.     PRN-  Xanax 0.5 mg given at 13:16 and 17:18.    PRN-  Hydrocodone 1 tab given at 16:19 and 20:32.      TECHNICAL SUMMARY: This continuous video- EEG monitoring procedure was performed with 23 scalp electrodes in 10-20 electrode system placement, and additional scalp, precordial and other surface electrodes used for electrical referencing and artifact detection.  Video monitoring was utilized and periodically reviewed by EEG technologists and the physician for electroclinical correlations.    INTERICTAL EEG ACTIVITY:  During wakefulness, there is a symmetric and well-sustained posterior dominant rhythm that attenuates with eye opening and has frequencies ranging between 9.5 and 10.5 Hz.  During wakefulness, there is myogenic artifact in the bifrontal temporal regions, as well as blink artifact.  During drowsiness, there is increased generalized theta, and dropout of the posterior dominant rhythm occurs intermittently.  During stage II sleep, there are symmetric sleep spindles and vertex transients, as well as K complexes.  No focal slowing is seen on this day.      ACTIVATION PROCEDURES:  Hyperventilation is performed at 15:27 with no major changes on EEG noted.      EPILEPTIFORM ACTIVITIES:  None.      ICTAL ACTIVITIES:  None.      IMPRESSION:  Normal.  No target events were captured on this day.  No electrographic seizures, epileptiform activities or paroxysmal behavioral  events are otherwise noted.  Clinical correlation is recommended.        This report is dictated by Dr. Memo Guevara DO, CNP fellow.   I personally reviewed the video EEG and agree with the reported findings.    Melani Jordan MD                  D: 2019   T: 2019   MT: KAI      Name:     CYNTHIA ESPINOZA   MRN:      [id number]        Account:        [id number]   :      1980           Procedure Date: 2019      Document: [id number]

## 2019-06-27 NOTE — PLAN OF CARE
Status: Pt on 6A for seizure monitoring.   Vitals: VSS.  Neuros: A&O x4. Neuros intact. No events witnessed or reported this shift.  IV: PIV SL.  Resp/trach: LS clear on room air.  Diet: Pt tolerating regular diet.  Bowel status: No BM this shift.  : Voiding spontaneously.  Skin: EEG leads intact and in place.  Pain: Pt reports minimal tooth pain. Norco given at 2032; pt reported improvement in pain.  Activity: SBA w/GB.   Social: No visitors this shift. Family to visit 6/27.  Plan: Continue to monitor and follow POC.

## 2019-06-27 NOTE — PLAN OF CARE
"Status: Pt on 6a for seizure monitoring.   Vitals: /51 (BP Location: Right arm)   Pulse 66   Temp 96.4  F (35.8  C) (Oral)   Resp 16   Ht 1.753 m (5' 9\")   Wt 104.9 kg (231 lb 4.8 oz)   SpO2 97%   BMI 34.16 kg/m    Neuros: A&Ox4. No events witnessed or reported this shift.   IV: PIV SL.  Resp/trach: On RA.  Diet: Reg diet.  Bowel status: No BM this shift.   : Voids spontaneously.  Skin: EEG leads intact.   Pain: Headache w/ tylenol..   Activity: Up GB/SBA. Seizure precautions maintained.   Social: No visitors this shift.   Plan: Continue to monitor and follow POC.  "

## 2019-06-27 NOTE — PROGRESS NOTES
"[facility] [place]   Epilepsy Service Daily Note      Interval History:   Patient had no spells overnight. She reports during HV today her feet started to feel numb and this happens prior to spells. No major complaints. Slept well last night.     Review of System:   No nausea, No vomiting, no headaches, no dizziness, no chest pain.     Medications:   Antiepileptic Medications Home Doses: lamotrigine 200 for mood   Antiepileptic Medications Current Doses: lamotrigine 200    Exam: Blood pressure 108/51, pulse 66, temperature 96.4  F (35.8  C), temperature source Oral, resp. rate 16, height 1.753 m (5' 9\"), weight 104.9 kg (231 lb 4.8 oz), SpO2 97 %, not currently breastfeeding.   General: NAD  Head: NC/AT  Neuro: Alert and oriented. Speech fluent. Pupils equal, round and reactive to light. EOM's intact. Face symmetric, tongue midline. Strength with giveway weakness. No drift or pronation. Intact FNF. Sensation grossly intact to light touch.     EEG: normal     Assessment and Plan:   1. Maryanne Crockett is a 38 year old right handed female with a h/o Bipolar disorder, anxiety disorder, ADHD who is being admitted for characterization of her seizure like spells. No target spells recorded yet.     -continue vEEG monitoring  -encouraged walking with nurses         Columba Salcedo PA-C    Type of target event identified: convulsion   Number of events: more needed, 1  Discharge medication plan: To be decided  Further Imaging studies needed prior to discharge: No imaging required prior to discharge  Discharge transportation: not discussed  Other pertinent issues/goals for discharge: not at this time    Total time: 15 minute was spent in the care of this patient. The patient agrees with the above mentioned plan of care. I answered all the patient's questions and addressed immediate concerns. More than 50% of time spent consisted of counseling and coordinating care, including discussion of the " diagnostic significance of EEG findings, anti-seizure medication management, and planning for discharge home

## 2019-06-27 NOTE — CONSULTS
"Consult Date:  06/26/2019      PSYCHIATRIC CONSULTATION      REASON FOR CONSULTATION:  The Neurology Service requested a consultation to make recommendations of alternative treatments for anxiety for this patient.        The patient is a 38-year-old female with a history of bipolar II disorder, anxiety disorder, ADHD and cannabis use disorder who presents with seizure-like spells since 05/2018.  She has had approximately 10 spells.  The patient reports one type of spell.  She will have numbness, lose awareness, whole body shaking for 3-5 minutes, has had some episodes of urinary incontinence and tongue biting.  She is amnestic.  They tend to cluster.      The patient has been on gabapentin and Lyrica for pain.  She currently is on lamotrigine for mood disorder.      The patient has had a previous workup with video EEG at [facility].  It was normal in 06/2018.  She did not have a generalized convulsion during this recording.  Has had an MRI of the brain 05/2018, which was unremarkable.      The patient has a history of what is described as bipolar 2 disorder.  I did review records from [facility] from 2014 and [facility] from 2019.  The patient was given diagnoses of bipolar type 2, ADD, cannabis use disorder.  She has been on multiple medications and notes describe has been on \"many antidepressants and mood stabilizers.\"  She had been on Geodon and Adderall in 2014 as well as Wellbutrin and gabapentin.  The Wellbutrin and gabapentin were discontinued in 2019, this past February.  She was admitted.  At that time was on Lamictal, Lexapro, Saphris, Neurontin, Adderall, Wellbutrin.  The Adderall and Wellbutrin were discontinued.  Lithium was started.      CURRENT MEDICATIONS:  Include Xanax 0.5 t.i.d., Wellbutrin 300 mg a day, BuSpar 30 b.i.d., Saphris 10 mg, Lexapro, lamotrigine and Adderall.      The patient states she sees Dr. Bethanie Marte, a psychiatrist at [facility], and " "she has been seeing her for a few years.  She also has a psychotherapist at [facility] and a nurse that helps her with her medications at [facility] and she sees both of them on a weekly basis.  She sees Dr. Marte once a month.      Currently, the patient states she has some anxiety about being in the hospital, worried about her daughter and her mother, who are at home, \"there's so much stuff going on.\"  She feels mildly depressed.  We did discuss her complex psychotropic medication regimen.  She has been on this for some time.  She states she uses the Xanax 0.5 t.i.d. p.r.n.  She states she takes it t.i.d. more days than not.  She states she has been on this for a year.  Currently, denies any suicidal ideation.  Denies any severe depression.  Denies any manic symptoms.  She describes her bipolar disorder as \"rapid cycling.\"  She states that she will have what sounds like hypomanic periods, possibly for a few days at a time and depression for a few days to a week.  She has had multiple psychiatric hospitalizations over the years.  States that she has been hospitalized approximately 6 times, first time at age 13 after a suicide attempt and the most recent in 02/2019 under the care of Dr. Wood at [facility].  She denies any subsequent suicide attempts after the first one.  She does frequently have suicidal ideation and depression that leads to hospitalizations.      CHEMICAL DEPENDENCE HISTORY:  She has a history of marijuana use disorder.  She states she is currently taking marijuana for her seizure.  \"I'm on medical marijuana now.\"      FAMILY PSYCHIATRIC HISTORY:  Significant for bipolar disorder in her grandfather, depression in her mother.  No suicides.      SOCIAL HISTORY:  The patient has 2 children, a 21-year-old and a 12-year-old.  The 12-year-old lives with her.  She lives in her own apartment, but the patient is very close to her mother and visits her frequently and can go there if " she is not feeling well.  She states she supports herself on welfare.  She states she is applying for Social Security Disability and has an upcoming court date for that.      FAMILY MEDICAL HISTORY:  The patient states she has had a brother and 2 aunts who have had glioblastomas.  Her brother had seizures.  She gets MRI screening for this per her history.      PAST MEDICAL HISTORY:  Chronic obesity, status post lap banding, cholecystectomy.      REVIEW OF SYSTEMS:  As per HPI, patient currently denies cough, chest pain, shortness of breath, nausea, vomiting, fevers, chills.  Denies other localizing neurological symptoms.  The rest of the 10-point review of systems is negative.      MEDICATIONS ON ADMISSION:  Reviewed per Epic.      VITAL SIGNS:  Blood pressure 115/57, temperature 97.4, pulse 90, respirations 16.      MENTAL STATUS EXAMINATION:  The patient is sitting up in bed.  She is engageable.  She appears in no acute distress.  Mood and affect:  Mood is described as mildly anxious and depressed.  Affect is mobile.  Thought processes and associations are within normal limits.  No loosening of associations.  Thought content:  Denies auditory or visual hallucinations.  Denies suicidal ideation.  Speech and language:  The patient is slightly dysarthric.  Otherwise, speech and language are normal.  Attention and concentration appear generally intact.  Orientation:  Alert and oriented x3.  Recent and remote memory are intact.  She is vague about some details.  Fund of knowledge:  Appears average.  Judgment and insight appear adequate.  Muscle bulk and tone appears normal.  Abnormal movements:  None noted.      IMPRESSION:  The patient is a 38-year-old female with a long psychiatric history of what is described as bipolar type 2, attention deficit hyperactivity disorder, anxiety, and marijuana use disorder.  She currently has been having seizure-like episodes.  She had a previous video EEG which did not capture any  episodes.  She has some baseline anxiety.  I did discuss the issues of using Xanax for her anxiety while they are trying to monitor her first seizures.  Currently, it is unclear if these are in fact seizures or behavioral events.      DSM DIAGNOSES:  Bipolar 2 disorder, anxiety, unspecified; marijuana use disorder, attention deficit hyperactivity disorder, rule out somatoform or conversion disorder.      TREATMENT RECOMMENDATIONS:   1.  The patient has been on Xanax for some time.  She uses it usually up to 3 times a day.  This is a very short half-life medication and difficult to titrate down because of its short half-life.  She has chronic anxiety.  She does not appear to have panic attacks.  She does state that she uses dialectical behavioral therapy strategies to control her anxiety.  We had an extensive discussion about this.  I believe at this time she can use these skills.  If it is necessary to taper the Xanax for possible precipitation of seizures, this may increase her baseline anxiety.  Other options for anxiety that may have less impact on her seizures if she needed some increased medication would be a consideration of using antihistamines, but the patient appears confident she can use her DBT skills.      2.  The patient is on a complex regimen of psychotropics.  Would not recommend changing these at this time, other than the Xanax as discussed above.   3.  If the patient does become anxious and is having difficulties, could consider having [facility] come by for some supportive psychotherapy.   4.  The issue of this being a conversion or somatoform disorder is unclear at this time, pending results of video EEG.   5.  Please call or reconsult with any questions, concerns or change in the patient's status.  My number is [phone].         LAWANDA VILLA MD             D: 06/26/2019   T: 06/26/2019   MT: CHRISTINE      Name:     CYNTHIA ESPINOZA   MRN:      [id number]        Account:       [id number]    :      1980           Consult Date:  2019      Document: [id number]       cc: Shoshana Hyde MD

## 2019-06-27 NOTE — PLAN OF CARE
[id number]- No change in pt status. Norco given for oral pain from tooth removal. Walked halls x 1 with RN.

## 2019-06-28 ENCOUNTER — ALLIED HEALTH/NURSE VISIT (OUTPATIENT)
Dept: NEUROLOGY | Facility: CLINIC | Age: 39
End: 2019-06-28
Attending: PSYCHIATRY & NEUROLOGY
Payer: COMMERCIAL

## 2019-06-28 DIAGNOSIS — R56.9 CONVULSIONS, UNSPECIFIED CONVULSION TYPE (H): Primary | ICD-10-CM

## 2019-06-28 PROCEDURE — 95951 ZZHC EEG VIDEO EACH 24 HR: CPT | Mod: ZF

## 2019-06-28 PROCEDURE — 25000131 ZZH RX MED GY IP 250 OP 636 PS 637: Performed by: NURSE PRACTITIONER

## 2019-06-28 PROCEDURE — 12000001 ZZH R&B MED SURG/OB UMMC

## 2019-06-28 PROCEDURE — 25000132 ZZH RX MED GY IP 250 OP 250 PS 637: Performed by: NURSE PRACTITIONER

## 2019-06-28 RX ORDER — LAMOTRIGINE 100 MG/1
100 TABLET ORAL DAILY
Status: DISCONTINUED | OUTPATIENT
Start: 2019-06-29 | End: 2019-06-29

## 2019-06-28 RX ADMIN — ALPRAZOLAM 0.5 MG: 0.5 TABLET ORAL at 11:56

## 2019-06-28 RX ADMIN — Medication 2.5 MEQ: at 08:58

## 2019-06-28 RX ADMIN — DEXTROAMPHETAMINE SACCHARATE, AMPHETAMINE ASPARTATE, DEXTROAMPHETAMINE SULFATE AND AMPHETAMINE SULFATE 20 MG: 2.5; 2.5; 2.5; 2.5 TABLET ORAL at 08:57

## 2019-06-28 RX ADMIN — ONDANSETRON HYDROCHLORIDE 4 MG: 4 TABLET, FILM COATED ORAL at 21:26

## 2019-06-28 RX ADMIN — LAMOTRIGINE 200 MG: 200 TABLET ORAL at 08:58

## 2019-06-28 RX ADMIN — ESCITALOPRAM OXALATE 20 MG: 10 TABLET ORAL at 08:57

## 2019-06-28 RX ADMIN — CYANOCOBALAMIN TAB 1000 MCG 1000 MCG: 1000 TAB at 08:58

## 2019-06-28 RX ADMIN — ALPRAZOLAM 0.5 MG: 0.5 TABLET ORAL at 16:51

## 2019-06-28 RX ADMIN — MULTIPLE VITAMINS W/ MINERALS TAB 1 TABLET: TAB at 08:57

## 2019-06-28 RX ADMIN — BUPROPION HYDROCHLORIDE 300 MG: 300 TABLET, FILM COATED, EXTENDED RELEASE ORAL at 08:57

## 2019-06-28 RX ADMIN — BUSPIRONE HYDROCHLORIDE 30 MG: 10 TABLET ORAL at 21:28

## 2019-06-28 RX ADMIN — ASENAPINE MALEATE 10 MG: 5 TABLET SUBLINGUAL at 01:08

## 2019-06-28 RX ADMIN — HYDROCODONE BITARTRATE AND ACETAMINOPHEN 1 TABLET: 5; 325 TABLET ORAL at 18:27

## 2019-06-28 RX ADMIN — BUSPIRONE HYDROCHLORIDE 30 MG: 10 TABLET ORAL at 08:57

## 2019-06-28 RX ADMIN — HYDROCODONE BITARTRATE AND ACETAMINOPHEN 1 TABLET: 5; 325 TABLET ORAL at 09:12

## 2019-06-28 RX ADMIN — ASENAPINE MALEATE 10 MG: 5 TABLET SUBLINGUAL at 21:26

## 2019-06-28 RX ADMIN — HYDROCODONE BITARTRATE AND ACETAMINOPHEN 1 TABLET: 5; 325 TABLET ORAL at 22:58

## 2019-06-28 RX ADMIN — HYDROCODONE BITARTRATE AND ACETAMINOPHEN 1 TABLET: 5; 325 TABLET ORAL at 14:22

## 2019-06-28 RX ADMIN — DEXTROAMPHETAMINE SACCHARATE, AMPHETAMINE ASPARTATE, DEXTROAMPHETAMINE SULFATE AND AMPHETAMINE SULFATE 20 MG: 2.5; 2.5; 2.5; 2.5 TABLET ORAL at 14:22

## 2019-06-28 RX ADMIN — HYDROCODONE BITARTRATE AND ACETAMINOPHEN 1 TABLET: 5; 325 TABLET ORAL at 01:09

## 2019-06-28 RX ADMIN — ACETAMINOPHEN 650 MG: 325 TABLET, FILM COATED ORAL at 18:27

## 2019-06-28 ASSESSMENT — ACTIVITIES OF DAILY LIVING (ADL)
ADLS_ACUITY_SCORE: 10

## 2019-06-28 ASSESSMENT — VISUAL ACUITY
OU: NORMAL ACUITY
OU: NORMAL ACUITY

## 2019-06-28 NOTE — PLAN OF CARE
Status: Pt on 6a for seizure monitoring.   Vitals: VSS  Neuros: A&Ox4. No events witnessed or reported this shift.   IV: PIV SL.  Resp/trach: On RA.  Diet: Reg diet, good intake  Bowel status: No BM this shift.   : Voids spontaneously.  Skin: EEG leads intact.   Pain: tooth pain reported, Norco given x2.  Activity: Up GB/SBA. Seizure precautions maintained.   Social: mother and daughter visited.   Plan: Continue to monitor and follow POC.

## 2019-06-28 NOTE — PROGRESS NOTES
"[facility], [place]   Epilepsy Service Daily Note      Interval History:   Patient had no spells overnight. She no longer has numbness in feet. No major concerns at this time.     Review of System:   No nausea, No vomiting, no headaches, no dizziness, no chest pain.     Medications:   Antiepileptic Medications Home Doses: lamotrigine 200 for mood   Antiepileptic Medications Current Doses: lamotrigine 200    Exam: Blood pressure 133/83, pulse 82, temperature 97.7  F (36.5  C), temperature source Oral, resp. rate 16, height 1.753 m (5' 9\"), weight 104.9 kg (231 lb 4.8 oz), SpO2 96 %, not currently breastfeeding.   General: NAD  Head: NC/AT  Neuro: Alert and oriented. Speech fluent. Pupils equal, round and reactive to light. EOM's intact. Face symmetric, tongue midline. Strength with giveway weakness. No drift or pronation. Intact FNF. Sensation grossly intact to light touch.     EEG: normal     Assessment and Plan: Patient seen and discussed with Dr. Juan   1. Maryanne Crockett is a 38 year old right handed female with a h/o Bipolar disorder, anxiety disorder, ADHD who is being admitted for characterization of her seizure like spells. No target spells recorded yet.     -continue vEEG monitoring  -encouraged walking with nurses   -up in harness   -decrease lamotrigine to 100         Columba Salcedo PA-C    Type of target event identified: convulsion   Number of events: more needed, 1  Discharge medication plan: To be decided  Further Imaging studies needed prior to discharge: No imaging required prior to discharge  Discharge transportation: not discussed  Other pertinent issues/goals for discharge: not at this time    Total time: 15 minute was spent in the care of this patient. The patient agrees with the above mentioned plan of care. I answered all the patient's questions and addressed immediate concerns. More than 50% of time spent consisted of counseling and coordinating care, including " discussion of the diagnostic significance of EEG findings, anti-seizure medication management, and planning for discharge home

## 2019-06-28 NOTE — PLAN OF CARE
Status: Pt on 6A for seizure monitoring. VEEG in place. No events witnessed this shift  Vitals: VSS on RA w/continous pulse ox for overnights  Neuros: A/Ox4. Intact. Numbness to bottom of both feet. Intermittently numbness/tingling to lips.   IV: PIV SL  Resp/trach: LS clear, RA  Diet: Regular diet  Bowel status: BS+, No BM this shift  : Voids spontaneously without difficulty  Skin: EEG leads intact  Pain: Right sided tooth pain controlled with Norco x1  Activity: Up with GB,SBA. Seizure precautions maintained.   Social: No visitors   Plan: Continue to monitor and follow POC

## 2019-06-28 NOTE — PROCEDURES
Procedure Date: 06/27/2019      EEG #-2       DATE OF RECORDING/SERVICE DATE:  06/27/2019       SOURCE FILE DURATION:  23:47:42      CLINICAL HISTORY:  This is day #2 of video EEG monitoring on a 38-year-old right-handed female who has had recurring convulsive events and was admitted for further seizure characterization.  Video EEG was started for evaluation of seizure.      MEDICATIONS:   1.  Saphris 10 mg b.i.d.   2.  Lexapro 20 mg daily.     3.  Lamictal 200 mg daily.   4.  Wellbutrin 300 mg daily.     5.  Buspirone 30 mg b.i.d.     6.  Adderall 20 mg b.i.d.   7.  Xanax 0.5 mg b.i.d., doses given at 1022 and 1448, respectively.     8.  Hydrocodone one tab given at 0711, 1147, 1607, and 2019.      TECHNICAL SUMMARY: This continuous video- EEG monitoring procedure was performed with 23 scalp electrodes in 10-20 electrode system placement, and additional scalp, precordial and other surface electrodes used for electrical referencing and artifact detection.  Video monitoring was utilized and periodically reviewed by EEG technologists and the physician for electroclinical correlations.    INTERICTAL EEG ACTIVITY:  During wakefulness, there is a symmetric and reactive posterior dominant rhythm that is well sustained with frequencies ranging between 8.5 and 9.5 Hz, of which attenuate with eye opening.  There is frequent myogenic artifact in the bifrontal regions as well as blink artifact during wakefulness.  During drowsiness, there is increased generalized theta, as well as intermittent dropout of the posterior dominant rhythm.  During stage II sleep, there are symmetric sleep spindles, vertex transients, and K complexes noted.      ACTIVATION PROCEDURES:  Hyperventilation is performed on this day and following 4 minutes and 40 seconds of hyperventilation, the patient reports extreme dizziness.  During hyperventilation, there are increased amplitudes of posterior dominant rhythm.  However, during dizziness there  is no EEG changes from previously mentioned background.      EPILEPTIFORM ACTIVITIES:  None.      ICTAL ACTIVITIES:  None.      IMPRESSION:  Normal.  No target of events are captured on this day.  There were no epileptiform activities, electrographic seizures or paroxysmal behavioral events otherwise reported on this day.  Clinical correlation is recommended.      This report is dictated by Dr. Memo Guevara DO, CNP fellow.   I personally reviewed the video EEG and agree with the reported findings.    Melani Jordan MD                  D: 2019   T: 2019   MT: WT      Name:     CYNTHIA ESPINOZA   MRN:      [id number]        Account:        [id number]   :      1980           Procedure Date: 2019      Document: [id number]

## 2019-06-28 NOTE — PLAN OF CARE
Status:  Pt admitted for seizure monitoring, no events witnessed or reported   Vitals: VSS on RA  Neuros: AO4, strength 5/5 throughout. Intermittent numbness/tingling to bilateral toes, tingling to lips as aura   IV: PIV SL  Resp/trach: LS clear throughout   Diet: Regular diet, good PO intake   Bowel status: BM x 2 today   : Voiding spontaneously   Skin: EEG leads intact   Pain: Norco given for tooth pain x 2, Xanax given for anxiety x 2  Activity: Up SBA. Ambulated x 2 today   Social: Family at bedside, supportive   Plan: Continue to monitor and follow POC

## 2019-06-29 ENCOUNTER — ALLIED HEALTH/NURSE VISIT (OUTPATIENT)
Dept: NEUROLOGY | Facility: CLINIC | Age: 39
End: 2019-06-29
Attending: PSYCHIATRY & NEUROLOGY
Payer: COMMERCIAL

## 2019-06-29 DIAGNOSIS — R56.9 CONVULSIONS, UNSPECIFIED CONVULSION TYPE (H): Primary | ICD-10-CM

## 2019-06-29 PROCEDURE — 95951 ZZHC EEG VIDEO EACH 24 HR: CPT | Mod: ZF

## 2019-06-29 PROCEDURE — 25000132 ZZH RX MED GY IP 250 OP 250 PS 637: Performed by: NURSE PRACTITIONER

## 2019-06-29 PROCEDURE — 12000001 ZZH R&B MED SURG/OB UMMC

## 2019-06-29 PROCEDURE — 25000132 ZZH RX MED GY IP 250 OP 250 PS 637: Performed by: PHYSICIAN ASSISTANT

## 2019-06-29 PROCEDURE — 40000141 ZZH STATISTIC PERIPHERAL IV START W/O US GUIDANCE

## 2019-06-29 RX ADMIN — ACETAMINOPHEN 650 MG: 325 TABLET, FILM COATED ORAL at 11:21

## 2019-06-29 RX ADMIN — BUSPIRONE HYDROCHLORIDE 30 MG: 10 TABLET ORAL at 07:34

## 2019-06-29 RX ADMIN — ESCITALOPRAM OXALATE 20 MG: 10 TABLET ORAL at 07:35

## 2019-06-29 RX ADMIN — BUPROPION HYDROCHLORIDE 300 MG: 300 TABLET, FILM COATED, EXTENDED RELEASE ORAL at 07:35

## 2019-06-29 RX ADMIN — DEXTROAMPHETAMINE SACCHARATE, AMPHETAMINE ASPARTATE, DEXTROAMPHETAMINE SULFATE AND AMPHETAMINE SULFATE 20 MG: 2.5; 2.5; 2.5; 2.5 TABLET ORAL at 07:35

## 2019-06-29 RX ADMIN — Medication 2.5 MEQ: at 11:07

## 2019-06-29 RX ADMIN — DEXTROAMPHETAMINE SACCHARATE, AMPHETAMINE ASPARTATE, DEXTROAMPHETAMINE SULFATE AND AMPHETAMINE SULFATE 20 MG: 2.5; 2.5; 2.5; 2.5 TABLET ORAL at 13:44

## 2019-06-29 RX ADMIN — HYDROCODONE BITARTRATE AND ACETAMINOPHEN 1 TABLET: 5; 325 TABLET ORAL at 11:21

## 2019-06-29 RX ADMIN — HYDROCODONE BITARTRATE AND ACETAMINOPHEN 1 TABLET: 5; 325 TABLET ORAL at 20:02

## 2019-06-29 RX ADMIN — HYDROCODONE BITARTRATE AND ACETAMINOPHEN 1 TABLET: 5; 325 TABLET ORAL at 02:58

## 2019-06-29 RX ADMIN — ALPRAZOLAM 0.5 MG: 0.5 TABLET ORAL at 14:38

## 2019-06-29 RX ADMIN — HYDROCODONE BITARTRATE AND ACETAMINOPHEN 1 TABLET: 5; 325 TABLET ORAL at 07:35

## 2019-06-29 RX ADMIN — MULTIPLE VITAMINS W/ MINERALS TAB 1 TABLET: TAB at 07:35

## 2019-06-29 RX ADMIN — HYDROCODONE BITARTRATE AND ACETAMINOPHEN 1 TABLET: 5; 325 TABLET ORAL at 15:29

## 2019-06-29 RX ADMIN — LAMOTRIGINE 100 MG: 100 TABLET ORAL at 07:35

## 2019-06-29 RX ADMIN — CYANOCOBALAMIN TAB 1000 MCG 1000 MCG: 1000 TAB at 07:35

## 2019-06-29 RX ADMIN — ASENAPINE MALEATE 10 MG: 5 TABLET SUBLINGUAL at 03:04

## 2019-06-29 RX ADMIN — BUSPIRONE HYDROCHLORIDE 30 MG: 10 TABLET ORAL at 20:02

## 2019-06-29 RX ADMIN — ALPRAZOLAM 0.5 MG: 0.5 TABLET ORAL at 01:33

## 2019-06-29 RX ADMIN — ACETAMINOPHEN 650 MG: 325 TABLET, FILM COATED ORAL at 18:08

## 2019-06-29 ASSESSMENT — ACTIVITIES OF DAILY LIVING (ADL)
ADLS_ACUITY_SCORE: 10

## 2019-06-29 ASSESSMENT — VISUAL ACUITY
OU: NORMAL ACUITY
OU: NORMAL ACUITY

## 2019-06-29 NOTE — PLAN OF CARE
Cared for pt [id number]: No changes. PRN norco and tylenol given for dental pain. VEEG leads in place. No events reported or witnessed. Sleep deprivation tonight. Up in harness today x2. Continue with POC.

## 2019-06-29 NOTE — PROGRESS NOTES
"Epilepsy Service Progress Note   Interval History:   The patient had an event last night with weird feeling and intermittent numbness and tingling in her feet. She did not sleep well, slept between 1-3 and 3-6. She has slight headaches, dizziness, intermittent blurry vision. She just had HV and felt tingling of her legs and fingertips. She also felt somewhat confused and shaky. These are similar to her habitual spells before she loses consciousness.     Physical Exam:   /55   Pulse 69   Temp 96.8  F (36  C) (Oral)   Resp 16   Ht 1.753 m (5' 9\")   Wt 104.9 kg (231 lb 4.8 oz)   SpO2 99%   BMI 34.16 kg/m    Alert and wake, she is oriented to place, doesn't know the day of the week and the date, said it's Friday. Also she said it's July and then corrected it. Follows commands, no aphasia, slight dysarthria, EOMI, facial sensation intact, face symmetric, slight tremors in left FNF, normal on right, normal tone, bulk and strength 5/5.     Current Facility-Administered Medications   Medication     acetaminophen (TYLENOL) tablet 650 mg     ALPRAZolam (XANAX) tablet 0.5 mg     amphetamine-dextroamphetamine (ADDERALL) per tablet 20 mg     amphetamine-dextroamphetamine (ADDERALL) per tablet 20 mg     asenapine (SAPHRIS) sublingual tablet 10 mg     bacitracin ointment     buPROPion (WELLBUTRIN XL) 24 hr tablet 300 mg     busPIRone (BUSPAR) tablet 30 mg     cyanocobalamin (VITAMIN B-12) tablet 1,000 mcg     docusate sodium (COLACE) capsule 100 mg     escitalopram (LEXAPRO) tablet 20 mg     HYDROcodone-acetaminophen (NORCO) 5-325 MG per tablet 1 tablet     ibuprofen (ADVIL/MOTRIN) tablet 200 mg     lamoTRIgine (LaMICtal) tablet 100 mg     lidocaine (LMX4) cream     lidocaine 1 % 0.1-1 mL     lidocaine HCl (XYLOCAINE) 2 % solution 5 mL     LORazepam (ATIVAN) injection 2 mg     magnesium hydroxide (MILK OF MAGNESIA) suspension 30 mL     multivitamin w/minerals (THERA-VIT-M) tablet 1 tablet     naloxone (NARCAN) " injection 0.1-0.4 mg     ondansetron (ZOFRAN) tablet 4-8 mg     potassium gluconate tablet 2.5 mEq     sodium chloride (PF) 0.9% PF flush 3 mL     sodium chloride (PF) 0.9% PF flush 3 mL     EEG findings: Normal. Event of dizziness and numbness/tingling did not have an abnormal EEG correlate.     Assessment:      Maryanne Crockett is a 38 years old right handed female with a h/o Bipolar disorder, anxiety disorder, ADHD who was admitted for characterization of her seizure like spells. She had an episodes of lightheadedness and tingling/numbness of legs last night and an episode of blurred vision and intermittent tingling in fingertips and confusion during HV today. These feelings are similar to her habitual events before she loses consciousness. No EEG changes were seen during these events. Need to capture an episode with LOC.     Plan:   - Continue vEEG monitoring to capture target event with LOC.  - discontinue lamotrigine (for mood)  - Sleep-deprive tonight (sleep 4-6)      I spent 15 minutes in the care of this patient. More than 50% of time spent on counseling and coordinating care.  Melani Jordan MD

## 2019-06-29 NOTE — PLAN OF CARE
Status: Pt admitted for seizure monitoring, no events witnessed or reported   Vitals: VSS on RA  Neuros: AO4, strength 5/5 throughout. Denied numbness/tingling to bilateral toes, tingling to lips as aura   IV: PIV SL  Resp/trach: LS clear throughout   Diet: Regular diet, good PO intake   Bowel status: BM x 1 today   : Voiding spontaneously   Skin: EEG leads intact   Pain: Norco given for tooth pain x 2 along with PRN Tylenol  Activity: Up SBA. Ambulated x 2 today   Social: Family at bedside, supportive   Plan: Pt to be sleep deprived until 0400 on 6/30, sleep for until 0600 then up until 2200. Lamictal discontinued. Continue to monitor and follow POC

## 2019-06-29 NOTE — PLAN OF CARE
"Admitted for seizure monitoring. VEEG jyotsna intact. Seizure precautions maintained. Neuros intact ex. intermittent tingling to feet, intermittently feeling \"off, strange\" No lip numbness reported overnight. VSS on RA. Up 1/gb. Voids without difficulty. PIV SL. Norco for teeth pain. Xanax given x1 overnight for anxiety. Continue to monitor.   "

## 2019-06-30 ENCOUNTER — ALLIED HEALTH/NURSE VISIT (OUTPATIENT)
Dept: NEUROLOGY | Facility: CLINIC | Age: 39
End: 2019-06-30
Attending: PSYCHIATRY & NEUROLOGY
Payer: COMMERCIAL

## 2019-06-30 DIAGNOSIS — R56.9 CONVULSIONS, UNSPECIFIED CONVULSION TYPE (H): Primary | ICD-10-CM

## 2019-06-30 PROCEDURE — 25000131 ZZH RX MED GY IP 250 OP 636 PS 637: Performed by: NURSE PRACTITIONER

## 2019-06-30 PROCEDURE — 25000132 ZZH RX MED GY IP 250 OP 250 PS 637: Performed by: NURSE PRACTITIONER

## 2019-06-30 PROCEDURE — 95951 ZZHC EEG VIDEO EACH 24 HR: CPT | Mod: ZF

## 2019-06-30 PROCEDURE — 12000001 ZZH R&B MED SURG/OB UMMC

## 2019-06-30 RX ADMIN — DEXTROAMPHETAMINE SACCHARATE, AMPHETAMINE ASPARTATE, DEXTROAMPHETAMINE SULFATE AND AMPHETAMINE SULFATE 10 MG: 2.5; 2.5; 2.5; 2.5 TABLET ORAL at 13:18

## 2019-06-30 RX ADMIN — ESCITALOPRAM OXALATE 20 MG: 10 TABLET ORAL at 08:27

## 2019-06-30 RX ADMIN — ONDANSETRON HYDROCHLORIDE 4 MG: 4 TABLET, FILM COATED ORAL at 09:54

## 2019-06-30 RX ADMIN — DEXTROAMPHETAMINE SACCHARATE, AMPHETAMINE ASPARTATE, DEXTROAMPHETAMINE SULFATE AND AMPHETAMINE SULFATE 20 MG: 2.5; 2.5; 2.5; 2.5 TABLET ORAL at 08:31

## 2019-06-30 RX ADMIN — HYDROCODONE BITARTRATE AND ACETAMINOPHEN 1 TABLET: 5; 325 TABLET ORAL at 11:17

## 2019-06-30 RX ADMIN — BUPROPION HYDROCHLORIDE 300 MG: 300 TABLET, FILM COATED, EXTENDED RELEASE ORAL at 08:27

## 2019-06-30 RX ADMIN — BUSPIRONE HYDROCHLORIDE 30 MG: 10 TABLET ORAL at 08:27

## 2019-06-30 RX ADMIN — DEXTROAMPHETAMINE SACCHARATE, AMPHETAMINE ASPARTATE, DEXTROAMPHETAMINE SULFATE AND AMPHETAMINE SULFATE 10 MG: 2.5; 2.5; 2.5; 2.5 TABLET ORAL at 13:14

## 2019-06-30 RX ADMIN — HYDROCODONE BITARTRATE AND ACETAMINOPHEN 1 TABLET: 5; 325 TABLET ORAL at 15:05

## 2019-06-30 RX ADMIN — Medication 2.5 MEQ: at 08:31

## 2019-06-30 RX ADMIN — HYDROCODONE BITARTRATE AND ACETAMINOPHEN 1 TABLET: 5; 325 TABLET ORAL at 07:04

## 2019-06-30 RX ADMIN — HYDROCODONE BITARTRATE AND ACETAMINOPHEN 1 TABLET: 5; 325 TABLET ORAL at 23:06

## 2019-06-30 RX ADMIN — HYDROCODONE BITARTRATE AND ACETAMINOPHEN 1 TABLET: 5; 325 TABLET ORAL at 00:06

## 2019-06-30 RX ADMIN — CYANOCOBALAMIN TAB 1000 MCG 1000 MCG: 1000 TAB at 08:27

## 2019-06-30 RX ADMIN — MULTIPLE VITAMINS W/ MINERALS TAB 1 TABLET: TAB at 08:27

## 2019-06-30 RX ADMIN — ALPRAZOLAM 0.5 MG: 0.5 TABLET ORAL at 00:06

## 2019-06-30 RX ADMIN — HYDROCODONE BITARTRATE AND ACETAMINOPHEN 1 TABLET: 5; 325 TABLET ORAL at 19:20

## 2019-06-30 RX ADMIN — ACETAMINOPHEN 650 MG: 325 TABLET, FILM COATED ORAL at 11:17

## 2019-06-30 RX ADMIN — ALPRAZOLAM 0.5 MG: 0.5 TABLET ORAL at 23:06

## 2019-06-30 RX ADMIN — ACETAMINOPHEN 650 MG: 325 TABLET, FILM COATED ORAL at 00:06

## 2019-06-30 ASSESSMENT — ACTIVITIES OF DAILY LIVING (ADL)
ADLS_ACUITY_SCORE: 10

## 2019-06-30 ASSESSMENT — VISUAL ACUITY: OU: NORMAL ACUITY

## 2019-06-30 NOTE — PLAN OF CARE
Status: Pt admitted for seizure monitoring, no events witnessed or reported   Vitals: VSS on RA  Neuros: AO4, strength 5/5 throughout. Felt numbness/tingling to lips, bilateral hands and feet around 1600, pressed button for aura   IV: PIV SL  Resp/trach: LS clear throughout   Diet: Regular diet, good PO intake. Nausea this AM, relieved with oral Zofran  Bowel status: BM x 1 this AM  : Voiding spontaneously   Skin: EEG leads intact   Pain: Norco given for tooth pain with PRN Tylenol  Activity: Up SBA. Ambulated x 2 today   Social: Family at bedside, supportive   Plan: Pt to be sleep deprived until 2200 this evening. Lamictal discontinued. Continue to monitor and follow POC

## 2019-06-30 NOTE — PROGRESS NOTES
"Epilepsy Service Progress Note   Interval History:   The patient did not have any seizures or events overnight. She had a weird feeling and scared when the nurse checked her eyes with a flash light today, which she never experienced before. She feels shaky and tingly in hands and legs. She feels she is going to have a seizure today. Before she would report lip numbness and tingly before her seizures. She was sleep deprived last night.      Physical Exam:   /70   Pulse 71   Temp 97  F (36.1  C) (Oral)   Resp 16   Ht 1.753 m (5' 9\")   Wt 104.9 kg (231 lb 4.8 oz)   SpO2 100%   BMI 34.16 kg/m    Alert and oriented X4, no aphasia or dysarthria, EOMI, facial sensation intact, face symmetric, bilateral tremors in FNF, normal on right, normal tone, bulk and strength 5/5.     Current Facility-Administered Medications   Medication     acetaminophen (TYLENOL) tablet 650 mg     ALPRAZolam (XANAX) tablet 0.5 mg     amphetamine-dextroamphetamine (ADDERALL) per tablet 20 mg     amphetamine-dextroamphetamine (ADDERALL) per tablet 20 mg     asenapine (SAPHRIS) sublingual tablet 10 mg     bacitracin ointment     buPROPion (WELLBUTRIN XL) 24 hr tablet 300 mg     busPIRone (BUSPAR) tablet 30 mg     cyanocobalamin (VITAMIN B-12) tablet 1,000 mcg     docusate sodium (COLACE) capsule 100 mg     escitalopram (LEXAPRO) tablet 20 mg     HYDROcodone-acetaminophen (NORCO) 5-325 MG per tablet 1 tablet     ibuprofen (ADVIL/MOTRIN) tablet 200 mg     lidocaine (LMX4) cream     lidocaine 1 % 0.1-1 mL     lidocaine HCl (XYLOCAINE) 2 % solution 5 mL     LORazepam (ATIVAN) injection 2 mg     magnesium hydroxide (MILK OF MAGNESIA) suspension 30 mL     multivitamin w/minerals (THERA-VIT-M) tablet 1 tablet     naloxone (NARCAN) injection 0.1-0.4 mg     ondansetron (ZOFRAN) tablet 4-8 mg     potassium gluconate tablet 2.5 mEq     sodium chloride (PF) 0.9% PF flush 3 mL     sodium chloride (PF) 0.9% PF flush 3 mL     Assessment:      Maryanne" Self is a 38 years old right handed female with a h/o Bipolar disorder, anxiety disorder, ADHD who was admitted for characterization of her seizure like spells. She has tingling in hands and legs and feels shaky, she feels she is gonna have a seizure today. Will do photic today to trigger one. So far EEG has been normal.        Plan:   - Continue vEEG monitoring to capture target event with LOC.  - continue off lamotrigine  - seizure/fall precautions      I spent 15 minutes in the care of this patient. More than 50% of time spent on  counseling and coordinating care, including discussion of the EEG findings, and answering the patient's questions.   Melani Jordan MD

## 2019-06-30 NOTE — PLAN OF CARE
Pt stable overnight. No events noted. VEEG continues. Sleep deprived until 0400, slept [id number] and will now be awake until 2200. Family at bedside. Up to BR and walked halls with assist frequently. Will continue to monitor pt.

## 2019-06-30 NOTE — PROCEDURES
Procedure Date: 2019      EEG #-4 (Day 4 of 24-hour video EEG)       DATE OF RECORDING/SERVICE DATE:  2019       SOURCE FILE DURATION:  23 hours 43 minutes 14 seconds.      CLINICAL HISTORY:  This is day #4 of video EEG monitoring on a 38-year-old right-handed female who has had recurring convulsive events and was admitted for further seizure characterization.  Video EEG was started for evaluation of seizure.     MEDICATIONS:  Lamotrigine 100.      TECHNICAL SUMMARY: This continuous video- EEG monitoring procedure was performed with 23 scalp electrodes in 10-20 electrode system placement, and additional scalp, precordial and other surface electrodes used for electrical referencing and artifact detection.  Video monitoring was utilized and periodically reviewed by EEG technologists and the physician for electroclinical correlations.    INTERICTAL ACTIVITY:  During quiet waking there was 9 Hz alpha activity over the posterior head regions which was symmetric and attenuated by eye opening.  Drowsiness was manifested as dropout of the posterior dominant rhythm and diffuse theta activity and horizontal eye movements.  Stage II sleep was manifested as vertex waves, symmetric sleep spindles and K complexes.  No epileptiform discharges were present.      ACTIVATION MANEUVERS:  Hyperventilation induced diffuse delta slowing.      CLINICAL/ICTAL EVENTS:  No electrographic or clinical seizures were recorded.      IMPRESSION:  Normal video EEG.  No epileptiform discharges were present.  No electrographic or clinical seizures were recorded.  Clinical correlation advised.         ALEJANDRA BOSCH MD             D: 2019   T: 2019   MT: WT      Name:     CYNTHIA ESPINOZA   MRN:      [id number]        Account:        [id number]   :      1980           Procedure Date: 2019      Document: [id number]

## 2019-06-30 NOTE — PROCEDURES
Procedure Date: 06/28/2019      EEG #-3 (Day 3 of 24-hour video EEG)        DATE OF RECORDING/SERVICE DATE:  06/28/2019       SOURCE FILE DURATION:  23 hours 35 minutes 57 seconds.      CLINICAL HISTORY:  This is day #3 of video EEG monitoring on a 38-year-old right-handed female who has had recurring convulsive events and was admitted for further seizure characterization.  Video EEG was performed to evaluate for seizures.      MEDICATIONS:   1.  Lamotrigine 200 mg daily.   2.  Wellbutrin 300 mg daily.   3.  BuSpar 30 mg b.i.d.   4.  Adderall 20 mg b.i.d.   5.  Lexapro 20 mg daily.     TECHNICAL SUMMARY: This continuous video- EEG monitoring procedure was performed with 23 scalp electrodes in 10-20 electrode system placement, and additional scalp, precordial and other surface electrodes used for electrical referencing and artifact detection.  Video monitoring was utilized and periodically reviewed by EEG technologists and the physician for electroclinical correlations.     INTERICTAL ACTIVITY:  During quiet waking there was 9 Hz alpha activity over the posterior head regions which was symmetric and attenuated by eye opening.  Drowsiness was manifested as dropout of the posterior dominant rhythm and diffuse theta activity and horizontal eye movements.  Stage II sleep was manifested as vertex waves, symmetric sleep spindles and K complexes.  No epileptiform discharges were present.      Activation maneuvers were not performed.      CLINICAL/ICTAL EVENTS:  The patient had an event at approximately 21:23.  The patient was sitting on the bed and reported weird feeling.  During this episode, the patient was conversing appropriately and was oriented.  No EEG changes from baseline were seen during this event, no epileptiform discharges or ictal activity seen.      IMPRESSION:  Normal video EEG.  No epileptiform discharges or pathological slowing were present.  No electrographic seizures were recorded.  The patient  had an event of feeling weird with no abnormal EEG correlate.  Clinical correlation is advised.         ALEJANDRA BOSCH MD             D: 2019   T: 2019   MT: TL      Name:     CYNTHIA ESPINOZA   MRN:      [id number]        Account:        [id number]   :      1980           Procedure Date: 2019      Document: [id number]

## 2019-07-01 ENCOUNTER — ALLIED HEALTH/NURSE VISIT (OUTPATIENT)
Dept: NEUROLOGY | Facility: CLINIC | Age: 39
End: 2019-07-01
Attending: PSYCHIATRY & NEUROLOGY
Payer: COMMERCIAL

## 2019-07-01 DIAGNOSIS — R56.9 CONVULSIONS, UNSPECIFIED CONVULSION TYPE (H): Primary | ICD-10-CM

## 2019-07-01 PROCEDURE — 25000132 ZZH RX MED GY IP 250 OP 250 PS 637: Performed by: NURSE PRACTITIONER

## 2019-07-01 PROCEDURE — 12000001 ZZH R&B MED SURG/OB UMMC

## 2019-07-01 PROCEDURE — 25000132 ZZH RX MED GY IP 250 OP 250 PS 637: Performed by: PSYCHIATRY & NEUROLOGY

## 2019-07-01 PROCEDURE — 95951 ZZHC EEG VIDEO EACH 24 HR: CPT | Mod: ZF

## 2019-07-01 RX ORDER — LAMOTRIGINE 150 MG/1
150 TABLET ORAL 2 TIMES DAILY
Status: DISCONTINUED | OUTPATIENT
Start: 2019-07-02 | End: 2019-07-02 | Stop reason: HOSPADM

## 2019-07-01 RX ORDER — LAMOTRIGINE 100 MG/1
100 TABLET ORAL ONCE
Status: COMPLETED | OUTPATIENT
Start: 2019-07-01 | End: 2019-07-01

## 2019-07-01 RX ADMIN — DEXTROAMPHETAMINE SACCHARATE, AMPHETAMINE ASPARTATE, DEXTROAMPHETAMINE SULFATE AND AMPHETAMINE SULFATE 20 MG: 2.5; 2.5; 2.5; 2.5 TABLET ORAL at 07:59

## 2019-07-01 RX ADMIN — ACETAMINOPHEN 650 MG: 325 TABLET, FILM COATED ORAL at 11:30

## 2019-07-01 RX ADMIN — ALPRAZOLAM 0.5 MG: 0.5 TABLET ORAL at 13:02

## 2019-07-01 RX ADMIN — LAMOTRIGINE 100 MG: 100 TABLET ORAL at 14:38

## 2019-07-01 RX ADMIN — LAMOTRIGINE 100 MG: 100 TABLET ORAL at 19:50

## 2019-07-01 RX ADMIN — ASENAPINE MALEATE 10 MG: 5 TABLET SUBLINGUAL at 20:44

## 2019-07-01 RX ADMIN — Medication 2.5 MEQ: at 07:59

## 2019-07-01 RX ADMIN — DEXTROAMPHETAMINE SACCHARATE, AMPHETAMINE ASPARTATE, DEXTROAMPHETAMINE SULFATE AND AMPHETAMINE SULFATE 20 MG: 2.5; 2.5; 2.5; 2.5 TABLET ORAL at 13:02

## 2019-07-01 RX ADMIN — ESCITALOPRAM OXALATE 20 MG: 10 TABLET ORAL at 07:59

## 2019-07-01 RX ADMIN — HYDROCODONE BITARTRATE AND ACETAMINOPHEN 1 TABLET: 5; 325 TABLET ORAL at 19:50

## 2019-07-01 RX ADMIN — BUPROPION HYDROCHLORIDE 300 MG: 300 TABLET, FILM COATED, EXTENDED RELEASE ORAL at 08:00

## 2019-07-01 RX ADMIN — CYANOCOBALAMIN TAB 1000 MCG 1000 MCG: 1000 TAB at 08:00

## 2019-07-01 RX ADMIN — BUSPIRONE HYDROCHLORIDE 30 MG: 10 TABLET ORAL at 19:49

## 2019-07-01 RX ADMIN — HYDROCODONE BITARTRATE AND ACETAMINOPHEN 1 TABLET: 5; 325 TABLET ORAL at 07:09

## 2019-07-01 RX ADMIN — HYDROCODONE BITARTRATE AND ACETAMINOPHEN 1 TABLET: 5; 325 TABLET ORAL at 11:30

## 2019-07-01 RX ADMIN — HYDROCODONE BITARTRATE AND ACETAMINOPHEN 1 TABLET: 5; 325 TABLET ORAL at 15:46

## 2019-07-01 RX ADMIN — BUSPIRONE HYDROCHLORIDE 30 MG: 10 TABLET ORAL at 07:59

## 2019-07-01 RX ADMIN — ALPRAZOLAM 0.5 MG: 0.5 TABLET ORAL at 17:08

## 2019-07-01 ASSESSMENT — ACTIVITIES OF DAILY LIVING (ADL)
ADLS_ACUITY_SCORE: 10

## 2019-07-01 NOTE — PROGRESS NOTES
"NEUROLOGY/EPILEPSY ATTENDING NOTE    No attacks overnight; none since admission.    Describes attacks as follows. Usually no warning. Amnestic. When comes to confused, has spurt of energy and then signficant sedation and needs to sleep. Reports observers describe stiffening, collapse, jerking. Reports tongue bite (tip) and urinary incontinence occur regularly. Spells occur in clusters. Reports strong family hisotry of brain tumors; MRI May 2018, after onset of spells was normal. She has been treated with GP, PGB and VPA for bipolar disease. Has been treated with lamotrigine since spells started but never more than 200 mg per day with maximum level available in chart being 2.4. No side effects with lamotrigine.    AEDs: lamotrigine 0 (200 mg/d on admission).    EXAM: /68 (BP Location: Right arm)   Pulse 74   Temp 96.7  F (35.9  C) (Oral)   Resp 16   Ht 1.753 m (5' 9\")   Wt 104.9 kg (231 lb 4.8 oz)   SpO2 97%   BMI 34.16 kg/m    Alert, oriented. Language fluent. Not obviously depressed or anxious. VFF. EOMI without nystagmus. Smile symmetrical. Tongue midline and strong. No drift, pronation, or tremor. FFN is done well. Strength appears full. Minor postural tremor, disappears with intention. Coherent, goal directed speech without pressure or inappropriate content.    EEG: Normal. No epileptiform discharges.    IMPRESSION  1) Hypopostural spells. Description consistent with tonic clonic seizures but there is no clear etiology. She has not had an adequate AED trial.  2) Bipolar disease, appears stable. Appreciate psychiatry input.    DISCUSSION  She would like to go home tomorrow. We discussed that we do not have clear diagnosis but story supports epilepsy and I would not feel comfortable discharging her without AEDs. Discussed maximizing lamotrigine and she was fine with this.    PLAN:  1) Continue video-EEG monitoring.  2) Resume lamotrigine with 200 mg today and start 150 mg twice a day tomorrow. Would " push gradually to 500 mg per day as outpatinet.  3) Suggested she present to [facility] immeidately after seizure for 3 hour EEG and hopefully amublatory EEG if available. Since she clusters, this may result in diagnosis. She will also ask family and friends to record seizures with personal electronic devices to facilitate diagnosis.    Total time today 23 minutes, more than half counselling and coordinating cares.    Heraclio Brantley MD  Pager [phone]

## 2019-07-01 NOTE — PLAN OF CARE
"Status: Pt here for seizure monitoring. EEG leads in place.  Vitals: VSS on RA.  Neuros: A&Ox4. 5/5 all extremitites. Denies N/T. Pt states earlier to first shift nurse of numbness in lips, hands and feet which can be a potential \"aura\". No events this shift.  IV: PIV SL.  Resp/trach: Stable on RA. LS clear all fields.  Diet: Regular.  Bowel status: No BM reported this shift. BS active all quads.  : Voids spontaneously.   Skin: EEG leads in place. Skin intact.  Pain: Denies. Gets Snyder Q 4 hr for dental pain.  Activity: Up w/ SBA and GB.  Social: Family here earlier this evening. Involved and supportive with cares.  Plan: Continue to monitor and follow POC.  "

## 2019-07-01 NOTE — PLAN OF CARE
Status: Pt admitted for seizure monitoring, no events witnessed or reported over shift.  Vitals: VSS on RA  Neuros: Intact. Denies N/T.  IV: PIV SL  Resp/trach: LS clear throughout   Diet: Regular diet  Bowel status: No BM over shift. BS+.  : Voiding w/o difficulty   Skin: EEG leads intact   Pain: Pt has pain from recent dental work. Tylenol & norco available - none given over shift.   Activity: Up w/ SBA & GB  Social: No visitors overnight.    Plan: Continue to monitor for seizure activity & follow POC.

## 2019-07-02 ENCOUNTER — ALLIED HEALTH/NURSE VISIT (OUTPATIENT)
Dept: NEUROLOGY | Facility: CLINIC | Age: 39
End: 2019-07-02
Attending: PSYCHIATRY & NEUROLOGY
Payer: COMMERCIAL

## 2019-07-02 VITALS
HEART RATE: 86 BPM | SYSTOLIC BLOOD PRESSURE: 123 MMHG | BODY MASS INDEX: 34.26 KG/M2 | OXYGEN SATURATION: 92 % | TEMPERATURE: 97.6 F | WEIGHT: 231.3 LBS | RESPIRATION RATE: 14 BRPM | DIASTOLIC BLOOD PRESSURE: 72 MMHG | HEIGHT: 69 IN

## 2019-07-02 DIAGNOSIS — R56.9 CONVULSIONS, UNSPECIFIED CONVULSION TYPE (H): Primary | ICD-10-CM

## 2019-07-02 PROCEDURE — 25000132 ZZH RX MED GY IP 250 OP 250 PS 637: Performed by: PSYCHIATRY & NEUROLOGY

## 2019-07-02 PROCEDURE — 95951 ZZHC EEG VIDEO < 12 HR: CPT | Mod: 52,ZF

## 2019-07-02 PROCEDURE — 25000132 ZZH RX MED GY IP 250 OP 250 PS 637: Performed by: NURSE PRACTITIONER

## 2019-07-02 RX ORDER — LAMOTRIGINE 100 MG/1
TABLET ORAL
Qty: 150 TABLET | Refills: 2 | Status: SHIPPED | OUTPATIENT
Start: 2019-07-02 | End: 2019-08-12

## 2019-07-02 RX ADMIN — BUPROPION HYDROCHLORIDE 300 MG: 300 TABLET, FILM COATED, EXTENDED RELEASE ORAL at 07:46

## 2019-07-02 RX ADMIN — ASENAPINE MALEATE 10 MG: 5 TABLET SUBLINGUAL at 03:16

## 2019-07-02 RX ADMIN — BUSPIRONE HYDROCHLORIDE 30 MG: 10 TABLET ORAL at 07:46

## 2019-07-02 RX ADMIN — HYDROCODONE BITARTRATE AND ACETAMINOPHEN 1 TABLET: 5; 325 TABLET ORAL at 11:38

## 2019-07-02 RX ADMIN — DEXTROAMPHETAMINE SACCHARATE, AMPHETAMINE ASPARTATE, DEXTROAMPHETAMINE SULFATE AND AMPHETAMINE SULFATE 20 MG: 2.5; 2.5; 2.5; 2.5 TABLET ORAL at 13:03

## 2019-07-02 RX ADMIN — LAMOTRIGINE 150 MG: 150 TABLET ORAL at 07:46

## 2019-07-02 RX ADMIN — Medication 2.5 MEQ: at 07:45

## 2019-07-02 RX ADMIN — HYDROCODONE BITARTRATE AND ACETAMINOPHEN 1 TABLET: 5; 325 TABLET ORAL at 03:00

## 2019-07-02 RX ADMIN — HYDROCODONE BITARTRATE AND ACETAMINOPHEN 1 TABLET: 5; 325 TABLET ORAL at 07:45

## 2019-07-02 RX ADMIN — ALPRAZOLAM 0.5 MG: 0.5 TABLET ORAL at 08:41

## 2019-07-02 RX ADMIN — ACETAMINOPHEN 650 MG: 325 TABLET, FILM COATED ORAL at 11:38

## 2019-07-02 RX ADMIN — DEXTROAMPHETAMINE SACCHARATE, AMPHETAMINE ASPARTATE, DEXTROAMPHETAMINE SULFATE AND AMPHETAMINE SULFATE 20 MG: 2.5; 2.5; 2.5; 2.5 TABLET ORAL at 07:46

## 2019-07-02 RX ADMIN — CYANOCOBALAMIN TAB 1000 MCG 1000 MCG: 1000 TAB at 07:46

## 2019-07-02 RX ADMIN — ESCITALOPRAM OXALATE 20 MG: 10 TABLET ORAL at 07:46

## 2019-07-02 ASSESSMENT — ACTIVITIES OF DAILY LIVING (ADL)
ADLS_ACUITY_SCORE: 10

## 2019-07-02 NOTE — PLAN OF CARE
Status: Pt admitted for seizure monitoring, no events witnessed or reported over shift.  Vitals: VSS on RA  Neuros: Intact. Denies N/T.  IV: PIV S- removed prior to discharge.  Resp/trach: LS clear throughout   Diet: Regular diet  Bowel status: No BM over shift. BS+.  : Voiding w/o difficulty   Skin: EEG leads removed by tech.  Pain: Pt has pain from recent dental work. Norco given x2.  Activity: Up w/ SBA & GB  Social: No visitors- mother picking pt up.    Plan: discharged to home and walked from 6A to meet ride in [place] at 1310. Pt had belongings and discharge instructions with her at time of discharge.

## 2019-07-02 NOTE — PROGRESS NOTES
CPT: 35271-58  [facility]/JACQUE-d/c'ed before Naa-mariely Brantley MD: Fercho

## 2019-07-02 NOTE — PROCEDURES
EEG #-5       DATE OF RECORDING/SERVICE DATE:  06/30/2019       SOURCE FILE DURATION:  21 hours, 10 minutes, 52 seconds.      TYPE OF STUDY:  Inpatient video-EEG monitoring.      HISTORY:  Day #5 of video-EEG monitoring in Maryanne Crockett, a 38-year-old being evaluated for spells of collapse, altered mental status, jerking and tongue bite.  These are thought to probably represent generalized tonic-clonic seizures.  She presented on lamotrigine 200 mg per day and this medication has been discontinued to facilitate recording of seizures.      TECHNICAL SUMMARY:  EEG is recorded from scalp electrodes placed according to the 10-20 international system.  Additional electrodes were utilized for referencing, artifact detection, and recording from other cerebral regions.  Video was continuously recorded.  Video was reviewed for clinical correlation and to assist with EEG interpretation.      FINDINGS:  During quiet wakefulness, a 9 Hz posterior dominant rhythm, symmetrical, reactive.  During sleep, symmetrical vertex waves and sleep spindles.  HV x 5 minutes is performed and results in irregular diffuse slowing.  Photic stimulation is not performed.      OTHER INTERICTAL ABNORMALITIES:  No epileptiform discharges.      ICTAL ABNORMALITIES:  The patient reports a period of lip tingling, and tingling of fingers and feet.  She reports that these symptoms sometimes precede her overt seizures.  Staff is present and interacted appropriately with patient.  Standardized examination, however, is not performed.  EEG and EKG during this period of time did not show any changes suggesting seizure.  There is no change in patient's usual background activity.      IMPRESSION:  Normal.  No epileptiform activity.  No overt seizures.  The patient reports a spell of tingling of her lips, hands and feet.  She reports that these symptoms sometimes precede her seizures.  EEG during this period of time did not show seizure activity.  Simple  partial seizure cannot be entirely excluded because simple partial seizures sometimes lack an EEG ictal discharge.  Clinical correlation is needed.         THERON CANALES MD             D: 2019   T: 2019   MT: EVANGELISTA      Name:     CYNTHIA ESPINOZA   MRN:      [id number]        Account:        [id number]   :      1980           Procedure Date: 2019      Document: [id number]

## 2019-07-02 NOTE — PROCEDURES
EEG #-6       DATE OF RECORDING/SERVICE DATE:  07/01/2019       SOURCE FILE DURATION:  23 hours, 34 minutes, 59 seconds      TYPE OF STUDY:  Inpatient video EEG monitoring.       HISTORY:  Day #6 of video-EEG monitoring for Maryanne Crockett, a 38-year-old female being evaluated for spells consisting of collapse, stiffening, jerking and alteration of mental status.  She reports tongue bite and urinary incontinence occur consistently.  She therefore carries a working diagnosis of generalized tonic-clonic seizures and has been treated with lamotrigine 200 mg per day.  Lamotrigine was discontinued during this study to facilitate recording of seizures.      TECHNICAL SUMMARY:  EEG is recorded from scalp electrodes placed according to the 10-20 International system.  Additional electrodes were utilized for referencing, artifact detection, and recording from other cerebral regions.  Video was continuously recorded.  Video was reviewed for clinical correlation and to assist with EEG interpretation.      FINDINGS:  A 9-10 Hz posterior dominant rhythm while awake, symmetrical, reactive.  During sleep, symmetrical vertex waves and sleep spindles.  Hyperventilation and photic stimulation were performed and did not induce any abnormalities.  Hyperventilation did induce a spell (see below).      OTHER INTERICTAL ABNORMALITIES:  No epileptiform discharges.      ICTAL ABNORMALITIES:  During hyperventilation around 11:04 a.m., patient reported that her right hand felt numb from the wrist down and that she felt warm and shaky all over.  She then reported blurry vision.  This, however, clearer as time passed.  Review of the spell on rounds the following morning indicated that it was not a target spell.  EEG and EKG during this period of time did not show any abnormalities.  The patient's usual background rhythm was seen.      IMPRESSION:  Normal.  No epileptiform activity or seizures.  The patient did report nonspecific visual  blurring and numbness during hyperventilation.  This was thought to represent a response to hyperventilation.  It was not considered a target spell by the patient.  EEG during the spell was normal.         THERON CANALES MD             D: 2019   T: 2019   ALEISHA ELISE      Name:     CYNTHIA ESPINOZA   MRN:      [id number]        Account:        [id number]   :      1980           Procedure Date: 2019      Document: [id number]

## 2019-07-02 NOTE — PLAN OF CARE
Status: Pt admitted for seizure monitoring, no events witnessed or reported over shift.  Vitals: VSS on RA  Neuros: Intact. Denies N/T.  IV: PIV SL  Resp/trach: LS clear throughout   Diet: Regular diet  Bowel status: No BM over shift. BS+.  : Voiding w/o difficulty   Skin: EEG leads intact   Pain: Pt has pain from recent dental work. Norco given x1.  Activity: Up w/ SBA & GB  Social: No visitors overnight.    Plan: Plan to discharge today. Continue to monitor for seizure activity & follow POC.

## 2019-07-02 NOTE — PROCEDURES
INPATIENT VIDEO EEG MONITORING      EEG #-7       DATE OF RECORDING/SERVICE DATE:  07/02/2019       SOURCE FILE DURATION:  11 hours 32 minutes 30 seconds.      HISTORY:  Day #7 of video-EEG monitoring in Maryanne Crockett, a 38-year-old being evaluated to characterize spells.  The spells consist of collapse, altered mental status, jerking, tongue bite and urinary incontinence.  Working diagnosis is that the patient suffers from generalized tonic-clonic seizures.  She is being treated with lamotrigine, which was discontinued to facilitate recording of seizures.  This particular study, however, lamotrigine was reinitiated in preparation for discharge; the patient took 150 mg of lamotrigine during the period of this recording.      TECHNICAL SUMMARY:  EEG was recorded from scalp electrodes placed according to the 10-20 international system.  Additional electrodes were utilized for referencing, artifact detection, and recording from other cerebral regions.  Video was continuously recorded.  Video was reviewed for clinical correlation and to assist with EEG interpretation.      FINDINGS:  During sleep, symmetrical vertex waves and sleep spindles.  During wakefulness, 9-10 Hz posterior dominant rhythm, symmetrical and reactive.  No focal slowing.      Hyperventilation and photic stimulation were not performed      OTHER INTERICTAL ABNORMALITIES:  No epileptiform discharges.      ICTAL ABNORMALITIES:  No electrographic seizures.      IMPRESSION:  Normal.  No epileptiform activity or seizures.      SUMMARY OF 7 DAYS OF VIDEO EEG MONITORING:  A 9-10 Hz posterior dominant rhythm.  No focal slowing.  No epileptiform activity.  The patient experienced several minor spells consisting of lightheadedness, numbness of her legs, her hands and some visual changes.  She felt that some of these sensations occasionally preceded her target spells.  Many of these occurred during hyperventilation and may have been due to hyperventilation.   EEG during these spells was consistently normal with no seizure discharge.  Major spells of collapse, amnesia, impairment, and jerking were not recorded.  Thus, the identity of these major spells is unclear.  The interictal record was entirely normal without epileptiform activity.         THERON CANALES MD             D: 2019   T: 2019   MT: WT      Name:     CYNTHIA ESPINOZA   MRN:      [id number]        Account:        [id number]   :      1980           Procedure Date: 2019      Document: [id number]

## 2019-07-02 NOTE — DISCHARGE SUMMARY
"                                                 [facility]/ [facility]: Discharge Summary    Maryanne Crockett MRN# [id number]   YOB: 1980 Age: 38 year old     Date of Admission:  6/26/2019  Date of Discharge:  7/2/2019  Admitting Physician:  Walker Schaefer MD  Discharge Physician:  Heraclio Brantley*  Discharging Service:  Epilepsy Service   Primary Provider: Shoshana Hyde          Admission Diagnoses:   seizure  Convulsions, unspecified convulsion type (H)          Discharge Diagnosis:   Convulsions, unspecified convulsion type              Discharge Disposition:   Discharged to home           Condition on Discharge:   Discharge condition: Stable   Discharge vitals Blood pressure 123/72, pulse 86, temperature 97.6  F (36.4  C), temperature source Oral, resp. rate 14, height 1.753 m (5' 9\"), weight 104.9 kg (231 lb 4.8 oz), SpO2 92 %, not currently breastfeeding.     Code status on discharge: Full Code        Attending Physician (Heraclio Garcia) Summary and Plan:   38 year old with significant bipolar disease. Describes attacks as follows. Usually no warning. Amnestic. When comes to confused, has spurt of energy and then signficant sedation and needs to sleep. Reports observers describe stiffening, collapse, jerking. Reports tongue bite (tip) and urinary incontinence occur regularly. Spells occur in clusters. Reports strong family hisotoy of brain tumors; MRI May 2018, after onset of spells was normal. She has been treated with GP, PGB and VPA for bipolar disease. Has been treated with lamotrigine since spells started but never more than 200 mg per day with maximum level available in chart being 2.4 and most being lower. No side effects with lamotrigine.     Lamotrigine was discontinued and video-EEG monitoring performed for 7 days. Nine Hz posterior dominant rhythm. No epileptiform activity. She experienced several minor spells consisting of lightheadedness, numbness and tingling and other " nonspecific sensations. She flet that some of these sensations occasionally preceded target spells. EEG during these periods was normal. Major spells of collapse, amneisa, impairment and jerking were not recorded. She tolerated period without lamotrigine in hospital; no worsening of bipolar disease.     Identity of her spells unclear because no spells recorded. History as provided by patient consistent with tonic clonic seizures so lamotrigine was restarted. She was provided with regimen to gradually increase dose to 200 mg twice a day. She was encouraged to have observers record her spells. Because spells occur in clusters, she was encouraged to call clinic immediately after spell; hopefully EEG and perhaps ambulatoary EEG can be quickly arranged so that EEG during spell can be recorded. She was encouraged to continue with psychiatric treatments. Laws regarding driving, safety items, teratogenicity discussed (she reports she has had post partum tubal ligation). Provided with seizure diary. [facility] follow-up arranged.         Labs and Procedures (Video EEG, MRI of brain, Tilt Table Test, etc):   Summary of 7 days of VEEG Results:   A 9-10 Hz posterior dominant rhythm.  No focal slowing.  No epileptiform activity.  The patient experienced several minor spells consisting of lightheadedness, numbness of her legs, her hands and some visual changes.  She felt that some of these sensations occasionally preceded her target spells.  Many of these occurred during hyperventilation and may have been due to hyperventilation.  EEG during these spells was consistently normal with no seizure discharge.  Major spells of collapse, amnesia, impairment, and jerking were not recorded.  Thus, the identity of these spells is unclear.  The interictal record was entirely normal without epileptiform activity and thus did not provide support for the diagnosis of epilepsy either.           Medications Prior to Admission:     Medications Prior  to Admission   Medication Sig Dispense Refill Last Dose     acetaminophen (TYLENOL) 500 MG tablet Take 500-1,000 mg by mouth every 6 hours as needed for mild pain   Past Week     ALPRAZolam (XANAX) 0.5 MG tablet Take 0.5 mg by mouth 2 times daily as needed for anxiety (panic attack)    6/25/2019     amphetamine-dextroamphetamine (ADDERALL) 20 MG tablet Take 20 mg by mouth 2 times daily In the morning and at 2pm   6/25/2019 at 2pm     asenapine (SAPHRIS) 10 MG SUBL sublingual tablet Place 10 mg under the tongue 2 times daily At 9pm and 2am.   6/25/2019 at 9pm     buPROPion (WELLBUTRIN XL) 300 MG 24 hr tablet Take 300 mg by mouth every morning   6/25/2019 at AM     busPIRone HCl (BUSPAR) 30 MG tablet Take 30 mg by mouth 2 times daily   6/25/2019     escitalopram (LEXAPRO) 20 MG tablet Take 20 mg by mouth daily   6/25/2019 at AM     HYDROcodone-acetaminophen (NORCO) 5-325 MG tablet Take 1 tablet by mouth every 4 hours as needed for severe pain   PRN     multivitamin w/minerals (THERA-VIT-M) tablet Take 1 tablet by mouth daily   6/25/2019 at AM     ondansetron (ZOFRAN) 4 MG tablet Take 4-8 mg by mouth every 8 hours as needed for nausea   Past Week     potassium gluconate 2.5 MEQ tablet Take 2.5 mEq by mouth daily   6/25/2019     VENTOLIN  (90 BASE) MCG/ACT inhaler Inhale 1-2 puffs into the lungs every 6 hours as needed for shortness of breath / dyspnea or wheezing   5 PRN     vitamin B-12 (CYANOCOBALAMIN) 1000 MCG tablet Take 1,000 mcg by mouth daily   6/25/2019     [DISCONTINUED] lamoTRIgine (LAMICTAL) 200 MG tablet Take 1 tablet (200 mg) by mouth daily 15 tablet 0 6/25/2019 at AM             Discharge Medications:     Current Discharge Medication List      CONTINUE these medications which have CHANGED    Details   lamoTRIgine (LAMICTAL) 100 MG tablet Take 1 1/2 tabs twice a day X1 week, then increase to 2 tabs twice a day X1 week and then increase to 2 1/2 tabs twice a day.  Qty: 150 tablet, Refills: 2     Associated Diagnoses: Convulsions, unspecified convulsion type (H)         CONTINUE these medications which have NOT CHANGED    Details   acetaminophen (TYLENOL) 500 MG tablet Take 500-1,000 mg by mouth every 6 hours as needed for mild pain      ALPRAZolam (XANAX) 0.5 MG tablet Take 0.5 mg by mouth 2 times daily as needed for anxiety (panic attack)       amphetamine-dextroamphetamine (ADDERALL) 20 MG tablet Take 20 mg by mouth 2 times daily In the morning and at 2pm      asenapine (SAPHRIS) 10 MG SUBL sublingual tablet Place 10 mg under the tongue 2 times daily At 9pm and 2am.      buPROPion (WELLBUTRIN XL) 300 MG 24 hr tablet Take 300 mg by mouth every morning      busPIRone HCl (BUSPAR) 30 MG tablet Take 30 mg by mouth 2 times daily      escitalopram (LEXAPRO) 20 MG tablet Take 20 mg by mouth daily      HYDROcodone-acetaminophen (NORCO) 5-325 MG tablet Take 1 tablet by mouth every 4 hours as needed for severe pain      multivitamin w/minerals (THERA-VIT-M) tablet Take 1 tablet by mouth daily      ondansetron (ZOFRAN) 4 MG tablet Take 4-8 mg by mouth every 8 hours as needed for nausea      potassium gluconate 2.5 MEQ tablet Take 2.5 mEq by mouth daily      VENTOLIN  (90 BASE) MCG/ACT inhaler Inhale 1-2 puffs into the lungs every 6 hours as needed for shortness of breath / dyspnea or wheezing   Refills: 5      vitamin B-12 (CYANOCOBALAMIN) 1000 MCG tablet Take 1,000 mcg by mouth daily                   Consultations:   Consultation during this admission received from Psychiatry. Please see their recommendations below.   IMPRESSION:  The patient is a 38-year-old female with a long psychiatric history of what is described as bipolar type 2, attention deficit hyperactivity disorder, anxiety, and marijuana use disorder.  She currently has been having seizure-like episodes.  She had a previous video EEG which did not capture any episodes.  She has some baseline anxiety.  I did discuss the issues of using Xanax for  her anxiety while they are trying to monitor her first seizures.  Currently, it is unclear if these are in fact seizures or behavioral events.      DSM DIAGNOSES:  Bipolar 2 disorder, anxiety, unspecified; marijuana use disorder, attention deficit hyperactivity disorder, rule out somatoform or conversion disorder.      TREATMENT RECOMMENDATIONS:   1.  The patient has been on Xanax for some time.  She uses it usually up to 3 times a day.  This is a very short half-life medication and difficult to titrate down because of its short half-life.  She has chronic anxiety.  She does not appear to have panic attacks.  She does state that she uses dialectical behavioral therapy strategies to control her anxiety.  We had an extensive discussion about this.  I believe at this time she can use these skills.  If it is necessary to taper the Xanax for possible precipitation of seizures, this may increase her baseline anxiety.  Other options for anxiety that may have less impact on her seizures if she needed some increased medication would be a consideration of using antihistamines, but the patient appears confident she can use her DBT skills.      2.  The patient is on a complex regimen of psychotropics.  Would not recommend changing these at this time, other than the Xanax as discussed above.   3.  If the patient does become anxious and is having difficulties, could consider having Health Psychology come by for some supportive psychotherapy.   4.  The issue of this being a conversion or somatoform disorder is unclear at this time, pending results of video EEG.   5.  Please call or reconsult with any questions, concerns or change in the patient's status.                  Brief History of Illness:   Per admission H&P, this patient is a 38 year old female who presents with seizure like spells since 5/2018. These started at the age of 37. Since then she has had about 10 or so similar spells. Patient reports only one type of spells.  "Type I: In the past she did not have any warning. But recently she started to notice a warning with lip numbness and feet numbness. Then she will lose awareness. Witnesses have told her that she has whole body shaking which last for 3-5 minutes. She has had urinary incontinence and tongue biting with these spells in the past. She is amnestic about these spells. Most recent event was a week ago. Frequency varies and sometimes she has clusters of 2-3 spells the same day.             Hospital Course:   Patient was in the hospital from 6/26/2019- 7/2/2019. PTA, she was on Lamotrigine 200 mg a day for mood stabilization. This was titrated and she was sleep deprived to induce her target spells. Patient had a spell of \"feeling weird\" and a spell of tingling of her lips, hands and feet without any EEG changes. Patient's clinical description consistent with grand mal seizures. Hence Lamotrigine dose was increased to 150 mg twice daily and patient was discharged home in stable condition. Plan is to gradually increase LTG dose to 500 mg a day. A ADRIANA RN will call her in a few days for a follow up and she will follow up with Dr. Schaefer on August 28th.                   Pending Results:   None      Discharge physical examination:   /72   Pulse 86   Temp 97.6  F (36.4  C) (Oral)   Resp 14   Ht 1.753 m (5' 9\")   Wt 104.9 kg (231 lb 4.8 oz)   SpO2 92%   BMI 34.16 kg/m    General: Awake, alert and oriented x 4  HEENT: NC/AT  Cardiac: RRR, no m/r/g  Chest: CTAB  Abdomen: S/NT/ND  Extremities: No swelling.   Skin: No rash or lesion.   Psych: Mood pleasant, affect congruent  Neuro: Awake, alert, attentive, oriented. Speech fluent. Hearing intact to normal conversation. Eyes conjugate, PERRL, EOMI, face symmetric, shoulder shrug strong, tongue/uvula midline. 5/5 strength in all 4 extremities. Sensation grossly intact to light touch. No drift or pronation. Intact FNF. Normal finger tapping and sequential fine finger " movements bilaterally. Gait normal, stable. Able to stand on one foot for greater than 3 seconds bilaterally.            Discharge Instructions and Follow-Up:   Discharge diet: Regular   Discharge activity: Activity as tolerated   Discharge follow-up: - ADRIANA RN phone call follow up [place] few days    - F/u visit with Dr. Schaefer on August 28th   Other instructions: [place] regulations on seizures and driving were reviewed with the patient prior to discharge. The patient clearly understands that she/he is prohibited from operating a motor vehicle within 3 months following any seizure or other episode with sudden unconsciousness or inability to sit up, and that he is required to report this most recent seizure to the DMV within 30 days after the event. Avoid any activities that might lead to self-injury or injury of others, within 3 months following any seizure with impaired awareness or impaired motor control such activities include but are not limited to operating power tools, operating firearms, climbing ladders/trees/exposure to heights from which he might fall, exposure to vessels with hot cooking oil or water, and swimming alone.           Total time: 30 minutes was spent in the care of this patient. The patient agrees with the above mentioned plan of care. I answered all the patient's questions and addressed immediate concerns. More than 50% of time spent consisted of counseling and coordinating care, including discussion of the diagnostic significance of EEG findings, anti-seizure medication management, and planning for discharge home.        WHITNEY Gamez/ ADRIANA

## 2019-07-02 NOTE — PLAN OF CARE
Status: Pt admitted for seizure monitoring, no events witnessed or reported over shift. Pt feeling anxious and overwhelmed at beginning of shift due to her new epilepsy diagnosis, requested her Xanax early. Much calmer after administration.   Vitals: VSS on RA.   Neuros: Intact. Denies N/T.  IV: PIV SL.   Resp/trach: LS clear throughout.   Diet: Regular diet, good appetite, tolerating it well.   Bowel status: No BM over shift. BS+.  : Voiding w/o difficulty.   Skin: EEG leads intact   Pain: Pt with pain from recent dental work. Controlled with Tylenol & norco PRN.   Activity: Up w/ SBA & GB  Social: Family at bedside for most of shift, supportive.   Plan: Continue to monitor for seizure activity & follow POC.  Plan to discharge tomorrow.

## 2019-07-03 ENCOUNTER — PATIENT OUTREACH (OUTPATIENT)
Dept: CARE COORDINATION | Facility: CLINIC | Age: 39
End: 2019-07-03

## 2019-07-03 NOTE — PROGRESS NOTES
Patient meet with her home health nurse this morning who set her medications up and was able to answer her questions    She will call us if she has any more questions or concerns

## 2019-07-05 NOTE — PROGRESS NOTES
EPILEPSY PHYSICIAN SUMMARY NOTE    38 year old with significant bipolar disease. Describes attacks as follows. Usually no warning. Amnestic. When comes to confused, has spurt of energy and then signficant sedation and needs to sleep. Reports observers describe stiffening, collapse, jerking. Reports tongue bite (tip) and urinary incontinence occur regularly. Spells occur in clusters. Reports strong family hisotoy of brain tumors; MRI May 2018, after onset of spells was normal. She has been treated with GP, PGB and VPA for bipolar disease. Has been treated with lamotrigine since spells started but never more than 200 mg per day with maximum level available in chart being 2.4 and most being lower. No side effects with lamotrigine.    Lamotrigine was discontinued and video-EEG monitoring performed for 7 days. Nine Hz posterior dominant rhythm. No epileptiform activity. She experienced several minor spells consisting of lightheadedness, numbness and tingling and other nonspecific sensations. She flet that some of these sensations occasionally preceded target spells. EEG during these periods was normal. Major spells of collapse, amneisa, impairment and jerking were not recorded. She tolerated period without lamotrigine in hospital; no worsening of bipolar disease.    Identity of her spells unclear because no spells recorded. History as provided by patient consistent with tonic clonic seizures so lamotrigine was restarted. She was provided with regimen to gradually increase dose to 200 mg twice a day. She was encouraged to have observers record her spells. Because spells occur in clusters, she was encouraged to call clinic immediately after spell; hopefully EEG and perhaps ambulatoary EEG can be quickly arranged so that EEG during spell can be recorded. She was encouraged to continue with psychiatric treatments. Laws regarding driving, safety items, teratogenicity discussed (she reports she has had post partum tubal  ligation). Provided with seizure diary. [facility] follow-up arranged.    Heraclio Brantley MD

## 2019-07-08 ENCOUNTER — PATIENT OUTREACH (OUTPATIENT)
Dept: NEUROLOGY | Facility: CLINIC | Age: 39
End: 2019-07-08

## 2019-07-08 NOTE — PROGRESS NOTES
"Discharge phone call:    Patient was admitted to [facility] from 6/26/19 to 7/2/19 for characterization of seizures.    Admission diagnosis:  seizure    Discharge diagnosis: convulsions, unspecified convulsion type    Patient's understanding of the discharge diagnosis: A lifestyle change, I have to take a lot of precautions and let others do things for me like driving... I could have a seizure at any time. If I have a seizure, I should call clinic or go to the hospital for EEG monitoring.     Hospital course:  From the note on 7/2/19 by Priyanka Banks CNP:   Patient was in the hospital from 6/26/2019- 7/2/2019. PTA, she was on Lamotrigine 200 mg a day for mood stabilization. This was titrated and she was sleep deprived to induce her target spells. Patient had a spell of \"feeling weird\" and a spell of tingling of her lips, hands and feet without any EEG changes. Patient's clinical description consistent with grand mal seizures. Hence Lamotrigine dose was increased to 150 mg twice daily and patient was discharged home in stable condition. Plan is to gradually increase LTG dose to 500 mg a day. A ADRIANA RN will call her in a few days for a follow up and she will follow up with Dr. Schaefer on August 28th.           Medications confirmed: Lamotrigine  Take 1 1/2 tabs twice a day X1  week, then increase to 2 tabs twice a day X1  week and then increase to 2 1/2 tabs twice a day.   Patient has a nurse setting up her medication box.     Questions regarding admission or discharge instructions: None.     Confirmed follow up scheduled:  August 28th with Dr. Schaefer at 2:30 PM.    Patient is doing good at home, no seizures since returning home. Feels like her mood is flat on the lamotrigine increase, but volunteers that she may need a bit of time to adjust to it.       Christopher Hernandez RN              "

## 2019-07-30 NOTE — PROGRESS NOTES
Care Coordinator Progress Note    Admission Date/Time:  6/26/2019  Attending MD:  Dr Melani Jordan    Data  Chart reviewed, discussed with interdisciplinary team. In 6A Discharge Rounds it was reported pt is on continuous video EEG monitoring. Psych consulted.     Patient was admitted for:  Characterization of seizure-like spells    Past history includes:  Depression; anxiety, ADHD, bipolar disorder type 2 with recurrent bill & depression; chronic obesity, s/p lap banding. Hx of multiple psychiatric hospitalizations; suicide attempt as a teenager. In Feb, 2019 admitted to [facility] with suicidal ideations.       Concerns with insurance coverage for discharge needs: None. Pt's insurance is [employer] [place].   Current Living Situation: Patient lives with family. Per H&P pt lives with her 12 year old daughter. Has a 21 year old son.      Coordination of Care and Referrals  Chart reviewed. Noted in Epic Header pt is restricted, but there was no specific information on the restriction.  Called Naz Josue, Discharge Pharmacy Liaison and inquired if she had any info regarding restricted status. Naz [place] and was able to find the following info on pt's restriction.     Maryanne Self  [facility] Restricted Dept: [phone]   Restricted Recipient Program   Please call the [facility] unit for additional information. The telephone number is [phone] or [phone].   Provider number [id number], IGOR GAMBLE MD is a provider for a Restricted Recipient for: Physician Services , Nurse Practitioner Services.   Provider number [id number], [facility] is a provider for a Restricted Recipient for: Physician Services , Diagnostic Lab , Nurse Practitioner Services.   Provider number [id number], [facility] is a provider for a Restricted Recipient for: Inpatient Hospital , Outpatient Hospital , Physician Services , Diagnostic Lab.   Provider number [id number] [place]  [facility] is a provider for a Restricted Recipient for: Pharmacy.   If you are providing services other than the restricted services listed above, you may bill DHS. If you have questions, please call [phone] or [phone].   _______________________________________________________________________________    --Left a message with the Restricted Recipient Program at [phone] to verify pt's primary would need to write any new scripts.   --1pm:  Received voice message from Amy at the [place] Restricted Recipient Program. She said pt is managed by the [facility] Restricted Recipient Program, phone: [phone].   --Called the [facility] and left a message requesting verification of pt's restricted status and if pt's primary needs to write scripts.   --2:40pm:  Received a voice message at 2:05pm from Dunia at the [facility] Restricted Recipient Program. Dunia said pt is restricted to Dr Shoshana Hyde at the [facility] on [address], phone: [phone]. Dr Hyde or any of her primary care partners are authorized to give referrals. Pt is restricted to [facility]. Restricted to [facility] on [address] in [place], phone: [phone]. Dunia's phone: [phone]. She requested a call to coordinate discharge and with updated plan.   --2:45pm:  Called Dunia at [facility] and asked about prescriptions. She said they received a referral from the primary clinic for both this hospital stay and for Dr Jordan. Dr Jordan is authorized to write scripts at this time, however the scripts will need to be filled at [facility], pt's restricted pharmacy. Dunia requested a call when pt discharges. Dunia said she didn't need anymore information at this time.     --Called [employer] at [phone] and left 2 messages informing her [facility] Informed Brianna the [facility] did receive referral  from pt's primary clinic for this hospital stay. Gave her the phone number for Dunia.      Assessment  Pt admitted for continuous video EEG monitoring to characterize spells. Psych history, including suicidal ideations. Has outpt psych. providers.      Plan  Anticipated Discharge Date:  When EEG monitoring complete and sufficient number of spells obtained.     Anticipated Discharge Plan:   Discharge home  --Dr Jordan is authorized by [facility] to write scripts. Scripts still need to be filled at the restricted pharmacy, [facility] on [address] in [place], phone: [phone].    --Notify Dunia at the [facility] Restricted Recipient Line of discharge.         Doreen Monae, RN Care Coordinator  Unit [address], [facility] Restricted Recipient Program  Main phone: [phone]  Dunia, phone:  [phone]    [facility]  Phone:  [phone]  [facility]  Dr Domenico Hyde  Phone:  [phone]       Finasteride Pregnancy And Lactation Text: This medication is absolutely contraindicated during pregnancy. It is unknown if it is excreted in breast milk.

## 2019-08-08 ENCOUNTER — TELEPHONE (OUTPATIENT)
Dept: NEUROLOGY | Facility: CLINIC | Age: 39
End: 2019-08-08

## 2019-08-12 ENCOUNTER — TELEPHONE (OUTPATIENT)
Dept: NEUROLOGY | Facility: CLINIC | Age: 39
End: 2019-08-12

## 2019-08-12 DIAGNOSIS — R56.9 CONVULSIONS, UNSPECIFIED CONVULSION TYPE (H): ICD-10-CM

## 2019-08-12 RX ORDER — LAMOTRIGINE 100 MG/1
250 TABLET ORAL 2 TIMES DAILY
Qty: 155 TABLET | Refills: 2 | Status: SHIPPED | OUTPATIENT
Start: 2019-08-12 | End: 2019-10-30

## 2019-08-12 NOTE — TELEPHONE ENCOUNTER
Adrianne is a [facility] nurse that manages Maryanne's medications.    Patient has an order for GONZALEZ titration up to 2.5 tabs bid 100mg size through [facility], but the pharmacy does not have an up to day record of this.  Current pharmacy is:-  [facility]      Will send current prescription as requested

## 2019-08-27 NOTE — TELEPHONE ENCOUNTER
Completed form faxed to Amy Mercado at [phone] as indicated on the cover sheet. Form was also mailed to pt's home address. Copy sent to scanning.

## 2019-10-03 ENCOUNTER — HEALTH MAINTENANCE LETTER (OUTPATIENT)
Age: 39
End: 2019-10-03

## 2019-10-30 ENCOUNTER — OFFICE VISIT (OUTPATIENT)
Dept: NEUROLOGY | Facility: CLINIC | Age: 39
End: 2019-10-30
Payer: COMMERCIAL

## 2019-10-30 VITALS
SYSTOLIC BLOOD PRESSURE: 135 MMHG | BODY MASS INDEX: 33.99 KG/M2 | DIASTOLIC BLOOD PRESSURE: 87 MMHG | HEART RATE: 80 BPM | WEIGHT: 230.2 LBS

## 2019-10-30 DIAGNOSIS — R56.9 CONVULSIONS, UNSPECIFIED CONVULSION TYPE (H): ICD-10-CM

## 2019-10-30 RX ORDER — LAMOTRIGINE 100 MG/1
200 TABLET ORAL 2 TIMES DAILY
Qty: 120 TABLET | Refills: 11 | Status: ON HOLD | OUTPATIENT
Start: 2019-10-30 | End: 2020-06-24

## 2019-10-30 ASSESSMENT — ANXIETY QUESTIONNAIRES
3. WORRYING TOO MUCH ABOUT DIFFERENT THINGS: SEVERAL DAYS
1. FEELING NERVOUS, ANXIOUS, OR ON EDGE: SEVERAL DAYS
GAD7 TOTAL SCORE: 9
4. TROUBLE RELAXING: SEVERAL DAYS
5. BEING SO RESTLESS THAT IT IS HARD TO SIT STILL: SEVERAL DAYS
2. NOT BEING ABLE TO STOP OR CONTROL WORRYING: SEVERAL DAYS
6. BECOMING EASILY ANNOYED OR IRRITABLE: SEVERAL DAYS
7. FEELING AFRAID AS IF SOMETHING AWFUL MIGHT HAPPEN: NEARLY EVERY DAY

## 2019-10-30 ASSESSMENT — PATIENT HEALTH QUESTIONNAIRE - PHQ9: SUM OF ALL RESPONSES TO PHQ QUESTIONS 1-9: 18

## 2019-10-30 ASSESSMENT — PAIN SCALES - GENERAL: PAINLEVEL: NO PAIN (0)
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
## 2019-10-31 ASSESSMENT — ANXIETY QUESTIONNAIRES: GAD7 TOTAL SCORE: 9

## 2019-12-27 ENCOUNTER — HOSPITAL ENCOUNTER (EMERGENCY)
Facility: CLINIC | Age: 39
Discharge: HOME | End: 2019-12-27
Attending: EMERGENCY MEDICINE | Admitting: EMERGENCY MEDICINE
Payer: COMMERCIAL

## 2019-12-27 VITALS
BODY MASS INDEX: 35 KG/M2 | HEART RATE: 81 BPM | RESPIRATION RATE: 16 BRPM | TEMPERATURE: 97.1 F | WEIGHT: 237 LBS | OXYGEN SATURATION: 97 % | SYSTOLIC BLOOD PRESSURE: 126 MMHG

## 2019-12-27 DIAGNOSIS — K08.89 PAIN, DENTAL: ICD-10-CM

## 2019-12-27 DIAGNOSIS — K12.2 CELLULITIS OF GINGIVA: ICD-10-CM

## 2019-12-27 PROCEDURE — 99283 EMERGENCY DEPT VISIT LOW MDM: CPT | Mod: Z6 | Performed by: EMERGENCY MEDICINE

## 2019-12-27 PROCEDURE — 99282 EMERGENCY DEPT VISIT SF MDM: CPT | Performed by: EMERGENCY MEDICINE

## 2019-12-27 RX ORDER — DEXTROAMPHETAMINE SACCHARATE, AMPHETAMINE ASPARTATE, DEXTROAMPHETAMINE SULFATE AND AMPHETAMINE SULFATE 2.5; 2.5; 2.5; 2.5 MG/1; MG/1; MG/1; MG/1
10 TABLET ORAL DAILY
Status: ON HOLD | COMMUNITY
End: 2020-06-24

## 2019-12-27 RX ORDER — QUETIAPINE FUMARATE 50 MG/1
50 TABLET, FILM COATED ORAL DAILY
Status: ON HOLD | COMMUNITY
End: 2020-06-24

## 2019-12-27 ASSESSMENT — ENCOUNTER SYMPTOMS
FACIAL SWELLING: 1
NAUSEA: 0
EYE PAIN: 0
FEVER: 0
VOMITING: 0

## 2019-12-27 NOTE — ED PROVIDER NOTES
History     Chief Complaint   Patient presents with     Dental Pain     left lower tooth pain with swelling.      HPI  Maryanne Crockett is a 39 year old female who resents to the emergency department with pain and swelling of her left anterior mandibular region.  She states that she is also has an associated dental pain.  She states her symptoms began yesterday.  Patient states that she is undergoing extractions to be fitted for dentures.  She states that she was referred to dental dentistry.  Patient denies any fever.  She denies intraoral drainage.  She denies any recent injury or illness.    I have reviewed the Medications, Allergies, Past Medical and Surgical History, and Social History in the Epic system.    Review of Systems   Constitutional: Negative for fever.   HENT: Positive for dental problem and facial swelling.    Eyes: Negative for pain.   Gastrointestinal: Negative for nausea and vomiting.   All other systems reviewed and are negative.      Physical Exam   BP: 115/75  Pulse: 81  Temp: 97.1  F (36.2  C)  Resp: 16  Weight: 107.5 kg (237 lb)  SpO2: 97 %      Physical Exam  Vitals signs and nursing note reviewed.   Constitutional:       Appearance: Normal appearance.   HENT:      Head: Normocephalic and atraumatic.        Right Ear: External ear normal.      Left Ear: External ear normal.      Nose: Nose normal.      Mouth/Throat:      Mouth: Mucous membranes are moist.        Comments: Multiple extractions  Neurological:      Mental Status: She is alert.         ED Course        Procedures            Critical Care time:    Patient offered Dental Clinic appointment today at 1400 in the [facility].  Patient declines appointment and is requesting only antibiotics.           Assessments & Plan (with Medical Decision Making)   39 year old female here with dental pain and swelling.  Her gingiva appears cellulitic.  There is no focal, defined area of abscess that would be amenable to  drainage.  Patient was offered appointment in dental clinic today which she declined.  She will be discharged home with a 7-day course of Augmentin.  Primary dental clinic follow-up recommended.    I have reviewed the nursing notes.    I have reviewed the findings, diagnosis, plan and need for follow up with the patient.    New Prescriptions    AMOXICILLIN-CLAVULANATE (AUGMENTIN) 875-125 MG TABLET    Take 1 tablet by mouth 2 times daily for 7 days       Final diagnoses:   Pain, dental   Cellulitis of gingiva       12/27/2019   [facility], [place], EMERGENCY DEPARTMENT     Angus Hernandez MD  12/27/19 2389

## 2019-12-27 NOTE — DISCHARGE INSTRUCTIONS
Take complete course of Augmentin.    Follow-up with your dentist next week.    Return to the emergency department if fever, worsening symptoms, or other concerns.

## 2019-12-27 NOTE — ED AVS SNAPSHOT
[facility], Emergency Department  [address]  [facility] [address]  Phone:  [phone]  Fax:  [phone]                                    Maryanne Crockett   MRN: [id number]    Department:  [facility], Emergency Department   Date of Visit:  12/27/2019           After Visit Summary Signature Page    I have received my discharge instructions, and my questions have been answered. I have discussed any challenges I see with this plan with the nurse or doctor.    ..........................................................................................................................................  Patient/Patient Representative Signature      ..........................................................................................................................................  Patient Representative Print Name and Relationship to Patient    ..................................................               ................................................  Date                                   Time    ..........................................................................................................................................  Reviewed by Signature/Title    ...................................................              ..............................................  Date                                               Time          22EPIC Rev 08/18

## 2020-03-09 ENCOUNTER — TELEPHONE (OUTPATIENT)
Dept: SURGERY | Facility: CLINIC | Age: 40
End: 2020-03-09

## 2020-03-09 NOTE — TELEPHONE ENCOUNTER
I called patient back and we dicussed moving forward with LapBand removal, no revision. I let the patient know I will need to check with Dr Hendricks if she needs another appointment with him first. I will call patient back once I hear back from Dr. Hendricks. Patient understands and is fine with plan.

## 2020-03-09 NOTE — TELEPHONE ENCOUNTER
Mirlande   Pt had lapband with us around 2008, and wants to be seen for lapband removal (no revisions)    [phone]  **OK to leave detailed VM**

## 2020-04-27 ENCOUNTER — TELEPHONE (OUTPATIENT)
Dept: OBGYN | Facility: CLINIC | Age: 40
End: 2020-04-27

## 2020-04-27 DIAGNOSIS — R10.32 LLQ ABDOMINAL PAIN: Primary | ICD-10-CM

## 2020-04-27 NOTE — TELEPHONE ENCOUNTER
Maryanne called to report onset of left lower quadrant pain. She has history of ovarian cysts and is concerned for this again. Patient is hoping to be evaluated to avoid need to visit ED. Onset of pain was nearly a week ago. Patient states it is slightly better today.     Advised ultrasound and clinic visit. Advised ED if pain becomes severe before this appointment. Patient agreeable.

## 2020-04-28 ENCOUNTER — APPOINTMENT (OUTPATIENT)
Dept: ULTRASOUND IMAGING | Facility: CLINIC | Age: 40
End: 2020-04-28
Attending: EMERGENCY MEDICINE
Payer: COMMERCIAL

## 2020-04-28 ENCOUNTER — HOSPITAL ENCOUNTER (EMERGENCY)
Facility: CLINIC | Age: 40
Discharge: HOME | End: 2020-04-28
Attending: EMERGENCY MEDICINE | Admitting: EMERGENCY MEDICINE
Payer: COMMERCIAL

## 2020-04-28 ENCOUNTER — APPOINTMENT (OUTPATIENT)
Dept: CT IMAGING | Facility: CLINIC | Age: 40
End: 2020-04-28
Attending: EMERGENCY MEDICINE
Payer: COMMERCIAL

## 2020-04-28 ENCOUNTER — TELEPHONE (OUTPATIENT)
Dept: OBGYN | Facility: CLINIC | Age: 40
End: 2020-04-28

## 2020-04-28 VITALS
SYSTOLIC BLOOD PRESSURE: 143 MMHG | TEMPERATURE: 99.2 F | BODY MASS INDEX: 32.19 KG/M2 | WEIGHT: 218 LBS | OXYGEN SATURATION: 100 % | RESPIRATION RATE: 22 BRPM | HEART RATE: 99 BPM | DIASTOLIC BLOOD PRESSURE: 72 MMHG

## 2020-04-28 DIAGNOSIS — N76.0 BV (BACTERIAL VAGINOSIS): ICD-10-CM

## 2020-04-28 DIAGNOSIS — R10.2 PELVIC PAIN IN FEMALE: ICD-10-CM

## 2020-04-28 DIAGNOSIS — B96.89 BV (BACTERIAL VAGINOSIS): ICD-10-CM

## 2020-04-28 LAB
ALBUMIN UR-MCNC: NEGATIVE MG/DL
ANION GAP SERPL CALCULATED.3IONS-SCNC: 6 MMOL/L (ref 3–14)
APPEARANCE UR: CLEAR
BACTERIA #/AREA URNS HPF: ABNORMAL /HPF
BASOPHILS # BLD AUTO: 0.1 10E9/L (ref 0–0.2)
BASOPHILS NFR BLD AUTO: 0.8 %
BILIRUB UR QL STRIP: NEGATIVE
BUN SERPL-MCNC: 10 MG/DL (ref 7–30)
CALCIUM SERPL-MCNC: 9.1 MG/DL (ref 8.5–10.1)
CHLORIDE SERPL-SCNC: 104 MMOL/L (ref 94–109)
CO2 SERPL-SCNC: 26 MMOL/L (ref 20–32)
COLOR UR AUTO: YELLOW
CREAT SERPL-MCNC: 0.72 MG/DL (ref 0.52–1.04)
DIFFERENTIAL METHOD BLD: ABNORMAL
EOSINOPHIL # BLD AUTO: 0.9 10E9/L (ref 0–0.7)
EOSINOPHIL NFR BLD AUTO: 7.8 %
ERYTHROCYTE [DISTWIDTH] IN BLOOD BY AUTOMATED COUNT: 12.5 % (ref 10–15)
GFR SERPL CREATININE-BSD FRML MDRD: >90 ML/MIN/{1.73_M2}
GLUCOSE SERPL-MCNC: 90 MG/DL (ref 70–99)
GLUCOSE UR STRIP-MCNC: NEGATIVE MG/DL
HCG UR QL: NEGATIVE
HCT VFR BLD AUTO: 41.4 % (ref 35–47)
HGB BLD-MCNC: 14.3 G/DL (ref 11.7–15.7)
HGB UR QL STRIP: NEGATIVE
IMM GRANULOCYTES # BLD: 0 10E9/L (ref 0–0.4)
IMM GRANULOCYTES NFR BLD: 0.2 %
KETONES UR STRIP-MCNC: NEGATIVE MG/DL
LEUKOCYTE ESTERASE UR QL STRIP: NEGATIVE
LYMPHOCYTES # BLD AUTO: 4.5 10E9/L (ref 0.8–5.3)
LYMPHOCYTES NFR BLD AUTO: 41 %
MCH RBC QN AUTO: 29.3 PG (ref 26.5–33)
MCHC RBC AUTO-ENTMCNC: 34.5 G/DL (ref 31.5–36.5)
MCV RBC AUTO: 85 FL (ref 78–100)
MONOCYTES # BLD AUTO: 0.8 10E9/L (ref 0–1.3)
MONOCYTES NFR BLD AUTO: 6.8 %
NEUTROPHILS # BLD AUTO: 4.8 10E9/L (ref 1.6–8.3)
NEUTROPHILS NFR BLD AUTO: 43.4 %
NITRATE UR QL: NEGATIVE
NRBC # BLD AUTO: 0 10*3/UL
NRBC BLD AUTO-RTO: 0 /100
PH UR STRIP: 5.5 PH (ref 5–7)
PLATELET # BLD AUTO: 320 10E9/L (ref 150–450)
POTASSIUM SERPL-SCNC: 3.6 MMOL/L (ref 3.4–5.3)
RBC # BLD AUTO: 4.88 10E12/L (ref 3.8–5.2)
RBC #/AREA URNS AUTO: <1 /HPF (ref 0–2)
SODIUM SERPL-SCNC: 136 MMOL/L (ref 133–144)
SOURCE: ABNORMAL
SP GR UR STRIP: 1.01 (ref 1–1.03)
SPECIMEN SOURCE: ABNORMAL
SQUAMOUS #/AREA URNS AUTO: <1 /HPF (ref 0–1)
UROBILINOGEN UR STRIP-MCNC: NORMAL MG/DL (ref 0–2)
WBC # BLD AUTO: 11 10E9/L (ref 4–11)
WBC #/AREA URNS AUTO: 1 /HPF (ref 0–5)
WET PREP SPEC: ABNORMAL

## 2020-04-28 PROCEDURE — 87210 SMEAR WET MOUNT SALINE/INK: CPT | Performed by: EMERGENCY MEDICINE

## 2020-04-28 PROCEDURE — 96361 HYDRATE IV INFUSION ADD-ON: CPT | Performed by: EMERGENCY MEDICINE

## 2020-04-28 PROCEDURE — 25000128 H RX IP 250 OP 636: Performed by: EMERGENCY MEDICINE

## 2020-04-28 PROCEDURE — 96375 TX/PRO/DX INJ NEW DRUG ADDON: CPT | Performed by: EMERGENCY MEDICINE

## 2020-04-28 PROCEDURE — 76856 US EXAM PELVIC COMPLETE: CPT

## 2020-04-28 PROCEDURE — 81001 URINALYSIS AUTO W/SCOPE: CPT | Performed by: EMERGENCY MEDICINE

## 2020-04-28 PROCEDURE — 74177 CT ABD & PELVIS W/CONTRAST: CPT

## 2020-04-28 PROCEDURE — 80048 BASIC METABOLIC PNL TOTAL CA: CPT | Performed by: EMERGENCY MEDICINE

## 2020-04-28 PROCEDURE — 25000125 ZZHC RX 250: Performed by: EMERGENCY MEDICINE

## 2020-04-28 PROCEDURE — 87591 N.GONORRHOEAE DNA AMP PROB: CPT | Performed by: EMERGENCY MEDICINE

## 2020-04-28 PROCEDURE — 96374 THER/PROPH/DIAG INJ IV PUSH: CPT | Mod: 59 | Performed by: EMERGENCY MEDICINE

## 2020-04-28 PROCEDURE — 87491 CHLMYD TRACH DNA AMP PROBE: CPT | Performed by: EMERGENCY MEDICINE

## 2020-04-28 PROCEDURE — 25800030 ZZH RX IP 258 OP 636: Performed by: EMERGENCY MEDICINE

## 2020-04-28 PROCEDURE — 81025 URINE PREGNANCY TEST: CPT | Performed by: EMERGENCY MEDICINE

## 2020-04-28 PROCEDURE — 99285 EMERGENCY DEPT VISIT HI MDM: CPT | Mod: 25 | Performed by: EMERGENCY MEDICINE

## 2020-04-28 PROCEDURE — 85025 COMPLETE CBC W/AUTO DIFF WBC: CPT | Performed by: EMERGENCY MEDICINE

## 2020-04-28 PROCEDURE — 96376 TX/PRO/DX INJ SAME DRUG ADON: CPT | Performed by: EMERGENCY MEDICINE

## 2020-04-28 PROCEDURE — 99285 EMERGENCY DEPT VISIT HI MDM: CPT | Mod: Z6 | Performed by: EMERGENCY MEDICINE

## 2020-04-28 RX ORDER — IOPAMIDOL 755 MG/ML
100 INJECTION, SOLUTION INTRAVASCULAR ONCE
Status: COMPLETED | OUTPATIENT
Start: 2020-04-28 | End: 2020-04-28

## 2020-04-28 RX ORDER — KETOROLAC TROMETHAMINE 30 MG/ML
30 INJECTION, SOLUTION INTRAMUSCULAR; INTRAVENOUS ONCE
Status: COMPLETED | OUTPATIENT
Start: 2020-04-28 | End: 2020-04-28

## 2020-04-28 RX ORDER — METRONIDAZOLE 500 MG/1
500 TABLET ORAL 2 TIMES DAILY
Qty: 28 TABLET | Refills: 0 | Status: ON HOLD | OUTPATIENT
Start: 2020-04-28 | End: 2020-06-24

## 2020-04-28 RX ORDER — ALPRAZOLAM 2 MG
2 TABLET ORAL DAILY
Status: ON HOLD | COMMUNITY
Start: 2020-04-17 | End: 2020-06-27

## 2020-04-28 RX ORDER — SODIUM CHLORIDE 9 MG/ML
INJECTION, SOLUTION INTRAVENOUS CONTINUOUS
Status: DISCONTINUED | OUTPATIENT
Start: 2020-04-28 | End: 2020-04-28 | Stop reason: HOSPADM

## 2020-04-28 RX ORDER — LORAZEPAM 2 MG/ML
1 INJECTION INTRAMUSCULAR ONCE
Status: COMPLETED | OUTPATIENT
Start: 2020-04-28 | End: 2020-04-28

## 2020-04-28 RX ORDER — MORPHINE SULFATE 4 MG/ML
4 INJECTION, SOLUTION INTRAMUSCULAR; INTRAVENOUS
Status: COMPLETED | OUTPATIENT
Start: 2020-04-28 | End: 2020-04-28

## 2020-04-28 RX ORDER — MORPHINE SULFATE 4 MG/ML
4 INJECTION, SOLUTION INTRAMUSCULAR; INTRAVENOUS
Status: DISCONTINUED | OUTPATIENT
Start: 2020-04-28 | End: 2020-04-28 | Stop reason: HOSPADM

## 2020-04-28 RX ORDER — ONDANSETRON 2 MG/ML
4 INJECTION INTRAMUSCULAR; INTRAVENOUS EVERY 30 MIN PRN
Status: DISCONTINUED | OUTPATIENT
Start: 2020-04-28 | End: 2020-04-28 | Stop reason: HOSPADM

## 2020-04-28 RX ORDER — IBUPROFEN 600 MG/1
600 TABLET, FILM COATED ORAL EVERY 8 HOURS PRN
Qty: 20 TABLET | Refills: 0 | Status: ON HOLD | OUTPATIENT
Start: 2020-04-28 | End: 2020-06-25

## 2020-04-28 RX ADMIN — MORPHINE SULFATE 4 MG: 4 INJECTION INTRAVENOUS at 11:44

## 2020-04-28 RX ADMIN — MORPHINE SULFATE 4 MG: 4 INJECTION INTRAVENOUS at 15:46

## 2020-04-28 RX ADMIN — MORPHINE SULFATE 4 MG: 4 INJECTION INTRAVENOUS at 12:55

## 2020-04-28 RX ADMIN — ONDANSETRON 4 MG: 2 INJECTION INTRAMUSCULAR; INTRAVENOUS at 11:39

## 2020-04-28 RX ADMIN — KETOROLAC TROMETHAMINE 30 MG: 30 INJECTION, SOLUTION INTRAMUSCULAR at 15:12

## 2020-04-28 RX ADMIN — IOPAMIDOL 97 ML: 755 INJECTION, SOLUTION INTRAVENOUS at 13:48

## 2020-04-28 RX ADMIN — LORAZEPAM 1 MG: 2 INJECTION INTRAMUSCULAR; INTRAVENOUS at 13:25

## 2020-04-28 RX ADMIN — SODIUM CHLORIDE 67 ML: 9 INJECTION, SOLUTION INTRAVENOUS at 13:48

## 2020-04-28 RX ADMIN — SODIUM CHLORIDE: 9 INJECTION, SOLUTION INTRAVENOUS at 11:39

## 2020-04-28 ASSESSMENT — ENCOUNTER SYMPTOMS
CONSTIPATION: 0
ABDOMINAL DISTENTION: 0
FEVER: 0
MUSCULOSKELETAL NEGATIVE: 1
ABDOMINAL PAIN: 1
CHILLS: 1
DIARRHEA: 0
BLOOD IN STOOL: 0
RESPIRATORY NEGATIVE: 1
APPETITE CHANGE: 1
FATIGUE: 1
NAUSEA: 1

## 2020-04-28 NOTE — ED PROVIDER NOTES
[facility] [place] EMERGENCY DEPARTMENT ([facility])    4/28/20     ED 8  History     Chief Complaint   Patient presents with     Abdominal Pain     abd pain for a few days.  Has trouble walking.  hx of mulbtiple surgeries in abd     HPI  Maryanne Crockett is a 39 year old female with prior history of ovarian cysts who presents with abdominal pain.  She reports she has been plagued by chronic abdominal pain however this is different is worse over the last 3 days primarily in the left lower quadrant and suprapubic area.  She remote reports multiple previous abdominal surgeries including cholecystectomy tubal ligation and unilateral oophorectomy.  She does not think her symptoms are related to a bowel obstruction she is not sexually active no urinary tract symptoms.  She has a lap band which is causing her problems but this pain is clearly remote from that.  She has nausea and at times sweats she is able to keep yourself hydrated and obtain adequate nutrition but nothing clearly improves the pain she feels that is worse with movement, attempted to urinate or have a bowel movement makes it worse as well.    I have reviewed the Medications, Allergies, Past Medical and Surgical History, and Social History in the [employer] system.  PAST MEDICAL HISTORY:   Past Medical History:   Diagnosis Date     Depressive disorder      Family history of glioblastoma     screening MRIs every 2 years     Ovarian cyst      Seizures (H)        PAST SURGICAL HISTORY:   Past Surgical History:   Procedure Laterality Date     CHOLECYSTECTOMY       DILATION AND CURETTAGE SUCTION  4/30/15    retained POC     GASTRIC RESTRICTIVE PROCEDURE, OPEN, REMOVE/REPLACE SUBCUTANEOUS PORT       LAPAROSCOPIC OOPHORECTOMY Right 8/5/2016    Procedure: LAPAROSCOPIC OOPHORECTOMY;  Surgeon: Adrianne Vergara MD;  Location: [facility] OR     LAPAROSCOPIC SALPINGECTOMY Bilateral 8/5/2016    Procedure: LAPAROSCOPIC SALPINGECTOMY;  Surgeon: Adrianne Vergara MD;  [facility]: [facility] OR      LAPAROSCOPIC TUBAL LIGATION Bilateral 5/22/2015    Procedure: LAPAROSCOPIC TUBAL LIGATION;  Surgeon: Hayley Zayas MD;  Location: [facility] OR     LAPAROSCOPY OPERATIVE ADULT N/A 8/5/2016    Procedure: LAPAROSCOPY OPERATIVE ADULT;  Surgeon: Adrianne Vergara MD;  Location: [facility] OR     SOFT TISSUE SURGERY Right     cyst in hand       Past medical history, past surgical history, medications, and allergies were reviewed with the patient. Additional pertinent items: None    FAMILY HISTORY:   Family History   Problem Relation Age of Onset     Other Cancer Brother 36        brain ca     Other Cancer Father         leukemia     Other Cancer Maternal Aunt         2 aunts with glioblastomas     Breast Cancer No family hx of         no ovarian cancer       SOCIAL HISTORY:   Social History     Tobacco Use     Smoking status: Current Some Day Smoker     Packs/day: 0.25     Smokeless tobacco: Never Used     Tobacco comment: Trying   Substance Use Topics     Alcohol use: Yes     Alcohol/week: 0.0 standard drinks     Comment: rare     Social history was reviewed with the patient. Additional pertinent items: None      Discharge Medication List as of 4/28/2020  4:22 PM      START taking these medications    Details   ibuprofen (ADVIL/MOTRIN) 600 MG tablet Take 1 tablet (600 mg) by mouth every 8 hours as needed for moderate pain, Disp-20 tablet,R-0, Local Print      metroNIDAZOLE (FLAGYL) 500 MG tablet Take 1 tablet (500 mg) by mouth 2 times daily for 14 days, Disp-28 tablet,R-0, Local PrintEat yogurt or cottage cheese daily to prevent diarrhea that can be caused by taking this medication.         CONTINUE these medications which have NOT CHANGED    Details   ALPRAZolam (XANAX) 2 MG tablet Take 2 mg by mouth 2 times daily as needed, Historical      amphetamine-dextroamphetamine (ADDERALL) 10 MG tablet Take 10 mg by mouth daily, Historical      escitalopram (LEXAPRO) 20 MG tablet Take 20 mg by mouth daily, Historical       multivitamin w/minerals (THERA-VIT-M) tablet Take 1 tablet by mouth daily, Historical      potassium gluconate 2.5 MEQ tablet Take 2.5 mEq by mouth daily, Historical      QUEtiapine (SEROQUEL) 50 MG tablet Take 50 mg by mouth daily, Historical      vitamin B-12 (CYANOCOBALAMIN) 1000 MCG tablet Take 1,000 mcg by mouth daily, Historical      acetaminophen (TYLENOL) 500 MG tablet Take 500-1,000 mg by mouth every 6 hours as needed for mild pain, Historical      lamoTRIgine (LAMICTAL) 100 MG tablet Take 2 tablets (200 mg) by mouth 2 times daily, Disp-120 tablet, R-11, E-Prescribe      VENTOLIN  (90 BASE) MCG/ACT inhaler Inhale 1-2 puffs into the lungs every 6 hours as needed for shortness of breath / dyspnea or wheezing , R-5, SABINO, Historical         STOP taking these medications       amoxicillin-clavulanate (AUGMENTIN) 875-125 MG tablet Comments:   Reason for Stopping:                  Allergies   Allergen Reactions     Ibuprofen GI Disturbance        Review of Systems   Constitutional: Positive for appetite change, chills and fatigue. Negative for fever.   Respiratory: Negative.    Gastrointestinal: Positive for abdominal pain and nausea. Negative for abdominal distention, blood in stool, constipation and diarrhea.   Genitourinary: Negative.    Musculoskeletal: Negative.    All other systems reviewed and are negative.    A complete review of systems was performed with pertinent positives and negatives noted in the HPI, and all other systems negative.    Physical Exam   BP: (!) 143/72  Pulse: 99  Temp: 99.2  F (37.3  C)  Resp: 22  Weight: 98.9 kg (218 lb)  SpO2: 100 %      Physical Exam  Vitals signs and nursing note reviewed. Exam conducted with a chaperone present.   Constitutional:       General: She is in acute distress.   HENT:      Mouth/Throat:      Mouth: Mucous membranes are moist.   Cardiovascular:      Rate and Rhythm: Normal rate and regular rhythm.   Pulmonary:      Effort: Pulmonary effort is  normal.      Breath sounds: Normal breath sounds.   Abdominal:      General: Abdomen is protuberant.      Tenderness: There is abdominal tenderness in the suprapubic area and left lower quadrant.   Genitourinary:     General: Normal vulva.      Exam position: Knee-chest position.      Vagina: Normal.      Cervix: Normal.      Adnexa: Right adnexa normal and left adnexa normal.   Skin:     General: Skin is warm.   Neurological:      General: No focal deficit present.      Mental Status: She is oriented to person, place, and time.   Psychiatric:         Mood and Affect: Mood is anxious.         ED Course        Procedures               No results found for this or any previous visit (from the past 24 hour(s)).  Medications   morphine (PF) injection 4 mg (4 mg Intravenous Given 4/28/20 1546)   LORazepam (ATIVAN) injection 1 mg (1 mg Intravenous Given 4/28/20 1325)   iopamidol (ISOVUE-370) solution 100 mL (97 mLs Intravenous Given 4/28/20 1348)   sodium chloride 0.9 % bag 500mL for CT scan flush use (67 mLs As instructed Given 4/28/20 1348)   ketorolac (TORADOL) injection 30 mg (30 mg Intravenous Given 4/28/20 1512)             Assessments & Plan (with Medical Decision Making)   Patient presents with what sounds like acute exacerbation of chronic abdominal pain.  She has had multiple previous extensive evaluations without obvious abnormality.  She says the pain is in the left lower quadrant and sounds like previous ovarian cyst pain that she has had.  She has had a previous right oophorectomy.  No urinary symptoms no fevers vomiting diarrhea or GI bleeding symptoms.  Proceeded to ultrasound which showed no left adnexal pathology no free fluid then went to CT scan that likewise showed no acute abnormality to explain her symptoms.  Pelvic exam was performed culture showed clue cells uncomfortable I am not missing any significant intra-abdominal pathology as a cause of her symptoms and will treat with metronidazole.  She  has been seen gynecology here for this pain and I recommend she continue to do so.  She had a clinic appointment today in fact.    I have reviewed the nursing notes.    I have reviewed the findings, diagnosis, plan and need for follow up with the patient.    Discharge Medication List as of 4/28/2020  4:22 PM      START taking these medications    Details   ibuprofen (ADVIL/MOTRIN) 600 MG tablet Take 1 tablet (600 mg) by mouth every 8 hours as needed for moderate pain, Disp-20 tablet,R-0, Local Print      metroNIDAZOLE (FLAGYL) 500 MG tablet Take 1 tablet (500 mg) by mouth 2 times daily for 14 days, Disp-28 tablet,R-0, Local PrintEat yogurt or cottage cheese daily to prevent diarrhea that can be caused by taking this medication.             Final diagnoses:   Pelvic pain in female   BV (bacterial vaginosis)       4/28/2020   [facility], [place], EMERGENCY DEPARTMENT     Star Wynn MD  04/29/20 1926     appropriate

## 2020-04-28 NOTE — TELEPHONE ENCOUNTER
"Telephone call from Maryanne pain started last Thursday 04/23/2020, severe cramps pain on left side, nausea and vomiting.  Menses 04/22/2020 started started 3-4 on pain scale.  Taking zofran for nausea.      Pain increased over the weekend.  Pain yesterday increasing. Rates as 6 and able to move.    Yesterday pain \"7\" and then had breaks with discomfort.  She has not slept due to pain.       She has had pain similar to this and feels like after she had surgery. \"9\".  Waves of nausea.  Lab band has not removed and has digestive issue.  Afebrile currently.  Thursday had a fever however did not check.      Advised ER for evaluation now, not to drive herself and should have family member drive or if pain is so severe that intolerable would need to go by ambulance.  She states she will go now to the ER for evaluation, and will have either her sister or mother drive her to the ER.      Left message after instructed to go to ER would like to cancel appt however would like confirmation of this.  Ask that she please call back and speak with a nurse at [phone].     "

## 2020-04-28 NOTE — ED AVS SNAPSHOT
[facility], [place], Emergency Department  [address]  [facility] [address]  Phone:  [phone]  Fax:  [phone]                                    Maryanne Crockett   MRN: [id number]    Department:  [facility], Emergency Department   Date of Visit:  4/28/2020           After Visit Summary Signature Page    I have received my discharge instructions, and my questions have been answered. I have discussed any challenges I see with this plan with the nurse or doctor.    ..........................................................................................................................................  Patient/Patient Representative Signature      ..........................................................................................................................................  Patient Representative Print Name and Relationship to Patient    ..................................................               ................................................  Date                                   Time    ..........................................................................................................................................  Reviewed by Signature/Title    ...................................................              ..............................................  Date                                               Time          22EPIC Rev 08/18

## 2020-04-28 NOTE — DISCHARGE INSTRUCTIONS
The CT scan and ultrasound did not show a cause for your symptoms  The pelvic exam showed evidence for bacterial vaginosis,  take antibiotics as directed  Follow-up with Dr. Zayas in gynecology  Use Tylenol and ibuprofen for pain

## 2020-04-29 LAB
C TRACH DNA SPEC QL NAA+PROBE: NEGATIVE
N GONORRHOEA DNA SPEC QL NAA+PROBE: NEGATIVE
SPECIMEN SOURCE: NORMAL
SPECIMEN SOURCE: NORMAL

## 2020-06-17 ENCOUNTER — NURSE TRIAGE (OUTPATIENT)
Dept: NURSING | Facility: CLINIC | Age: 40
End: 2020-06-17

## 2020-06-18 NOTE — TELEPHONE ENCOUNTER
Maryanne calls and says that she has vaginal pain and is taking an antibiotic for a bacterial, vaginal infection. Pt. Says that yesterday she passed a blob of tissue and thinks she has a cut in her vagina. Pt. Says that she is going to call her clinic tomorrow. COVID 19 Nurse Triage Plan/Patient Instructions    Please be aware that novel coronavirus (COVID-19) may be circulating in the community. If you develop symptoms such as fever, cough, or SOB or if you have concerns about the presence of another infection including coronavirus (COVID-19), please contact your health care provider or visit [email or web address].     Disposition/Instructions    Patient to schedule an In Person Visit with provider. Reference Visit Selection Guide.    Thank you for taking steps to prevent the spread of this virus.  o Limit your contact with others.  o Wear a simple mask to cover your cough.  o Wash your hands well and often.    Resources    [facility]: About COVID-19: [email or web address]    CDC: What to Do If You're Sick: www.cdc.gov/coronavirus/2019-ncov/about/steps-when-sick.html    CDC: Ending Home Isolation: www.cdc.gov/coronavirus/2019-ncov/hcp/disposition-in-home-patients.html     CDC: Caring for Someone: www.cdc.gov/coronavirus/2019-ncov/if-you-are-sick/care-for-someone.html     [facility]: Interim Guidance for Hospital Discharge to Home: www.health.[facility].mn.us/diseases/coronavirus/hcp/hospdischarge.pdf    [facility] clinical trials (COVID-19 research studies): [email or web address]     Below are the COVID-19 hotlines at the [place] Department of [facility] ([facility]). Interpreters are available.   o For health questions: Call 054-342-6730 or 1-578.557.1453 (7 a.m. to 7 p.m.)  o For questions about schools and childcare: Call 815-040-6821 or 1-856.308.6256 (7 a.m. to 7 p.m.)                     Additional Information    Negative: Sounds like a life-threatening emergency to the triager     Negative: Followed a genital area injury    Negative: Foreign body in vagina (e.g., tampon)    Negative: Vaginal bleeding is main symptom    Negative: Vaginal discharge is main symptom    Pain or burning with passing urine (urination) is main symptom    Negative: Shock suspected (e.g., cold/pale/clammy skin, too weak to stand, low BP, rapid pulse)    Negative: Sounds like a life-threatening emergency to the triager    Negative: Followed a genital area injury    Taking antibiotic for urinary tract infection (UTI)    Negative: Shock suspected (e.g., cold/pale/clammy skin, too weak to stand, low BP, rapid pulse)    Negative: Sounds like a life-threatening emergency to the triager    Negative: Urinary tract infection suspected, but not taking antibiotics    Negative: [1] Unable to urinate (or only a few drops) > 4 hours AND     [2] bladder feels very full (e.g., palpable bladder or strong urge to urinate)    Negative: Passing pure blood or large blood clots (i.e., size > a dime)  (Exceptions: flecks, small strands, or pinkish-red color)    Negative: Patient sounds very sick or weak to the triager    Negative: [1] SEVERE pain (e.g., excruciating) AND [2] no improvement 2 hours after pain medications    Negative: [1] Fever > 100.0 F (37.8 C) AND [2] new onset since starting antibiotics    Negative: [1] Side (flank) or lower back pain AND [2] new onset since starting antibiotics    Negative: [1] Taking antibiotic > 24 hours for UTI AND [2] flank or lower back pain worsening    Negative: [1] Vomiting 2 or more times AND [2] interferes with taking oral antibiotic    Negative: [1] Taking antibiotic > 24 hours for UTI (urinary tract or bladder infection) AND [2] fever persists    Negative: [1] Taking antibiotic > 72 hours (3 days) for UTI AND [2] painful urination or frequency not improved    Negative: [1] Taking antibiotic > 72 hours (3 days) for UTI AND [2] flank or lower back pain not improved    Diabetes mellitus or weak  immune system (e.g., HIV positive, cancer chemo, splenectomy, organ transplant, chronic steroids)    Protocols used: VAGINAL SYMPTOMS-A-AH, URINATION PAIN - FEMALE-A-AH, URINARY TRACT INFECTION ON ANTIBIOTIC FOLLOW-UP CALL - FEMALE-A-AH

## 2020-06-23 ENCOUNTER — HOSPITAL ENCOUNTER (INPATIENT)
Facility: CLINIC | Age: 40
LOS: 3 days | Discharge: HOME | End: 2020-06-27
Attending: EMERGENCY MEDICINE | Admitting: PSYCHIATRY & NEUROLOGY
Payer: COMMERCIAL

## 2020-06-23 ENCOUNTER — APPOINTMENT (OUTPATIENT)
Dept: ULTRASOUND IMAGING | Facility: CLINIC | Age: 40
End: 2020-06-23
Attending: EMERGENCY MEDICINE
Payer: COMMERCIAL

## 2020-06-23 DIAGNOSIS — F30.9 MANIC EPISODE (H): ICD-10-CM

## 2020-06-23 DIAGNOSIS — F31.0 BIPOLAR I DISORDER, MOST RECENT EPISODE HYPOMANIC (H): ICD-10-CM

## 2020-06-23 DIAGNOSIS — E55.9 VITAMIN D DEFICIENCY: Primary | ICD-10-CM

## 2020-06-23 DIAGNOSIS — Z20.828 EXPOSURE TO SARS-ASSOCIATED CORONAVIRUS: ICD-10-CM

## 2020-06-23 DIAGNOSIS — R45.851 SUICIDAL IDEATION: ICD-10-CM

## 2020-06-23 DIAGNOSIS — F33.1 MODERATE EPISODE OF RECURRENT MAJOR DEPRESSIVE DISORDER (H): ICD-10-CM

## 2020-06-23 DIAGNOSIS — R10.2 PELVIC PAIN IN FEMALE: ICD-10-CM

## 2020-06-23 LAB
ALBUMIN SERPL-MCNC: 4.2 G/DL (ref 3.4–5)
ALBUMIN UR-MCNC: 30 MG/DL
ALP SERPL-CCNC: 65 U/L (ref 40–150)
ALT SERPL W P-5'-P-CCNC: 33 U/L (ref 0–50)
ANION GAP SERPL CALCULATED.3IONS-SCNC: 5 MMOL/L (ref 3–14)
APPEARANCE UR: CLEAR
AST SERPL W P-5'-P-CCNC: 24 U/L (ref 0–45)
BASOPHILS # BLD AUTO: 0.1 10E9/L (ref 0–0.2)
BASOPHILS NFR BLD AUTO: 1.1 %
BILIRUB SERPL-MCNC: 0.4 MG/DL (ref 0.2–1.3)
BILIRUB UR QL STRIP: ABNORMAL
BUN SERPL-MCNC: 8 MG/DL (ref 7–30)
CALCIUM SERPL-MCNC: 8.9 MG/DL (ref 8.5–10.1)
CHLORIDE SERPL-SCNC: 106 MMOL/L (ref 94–109)
CO2 SERPL-SCNC: 26 MMOL/L (ref 20–32)
COLOR UR AUTO: YELLOW
CREAT SERPL-MCNC: 0.8 MG/DL (ref 0.52–1.04)
DIFFERENTIAL METHOD BLD: NORMAL
EOSINOPHIL # BLD AUTO: 0.2 10E9/L (ref 0–0.7)
EOSINOPHIL NFR BLD AUTO: 2.8 %
ERYTHROCYTE [DISTWIDTH] IN BLOOD BY AUTOMATED COUNT: 13.5 % (ref 10–15)
GFR SERPL CREATININE-BSD FRML MDRD: >90 ML/MIN/{1.73_M2}
GLUCOSE SERPL-MCNC: 88 MG/DL (ref 70–99)
GLUCOSE UR STRIP-MCNC: NEGATIVE MG/DL
HCG SERPL QL: NEGATIVE
HCT VFR BLD AUTO: 42.9 % (ref 35–47)
HGB BLD-MCNC: 13.8 G/DL (ref 11.7–15.7)
HGB UR QL STRIP: NEGATIVE
IMM GRANULOCYTES # BLD: 0 10E9/L (ref 0–0.4)
IMM GRANULOCYTES NFR BLD: 0.1 %
KETONES UR STRIP-MCNC: 5 MG/DL
LEUKOCYTE ESTERASE UR QL STRIP: ABNORMAL
LIPASE SERPL-CCNC: 83 U/L (ref 73–393)
LYMPHOCYTES # BLD AUTO: 3.5 10E9/L (ref 0.8–5.3)
LYMPHOCYTES NFR BLD AUTO: 42.8 %
MCH RBC QN AUTO: 28.9 PG (ref 26.5–33)
MCHC RBC AUTO-ENTMCNC: 32.2 G/DL (ref 31.5–36.5)
MCV RBC AUTO: 90 FL (ref 78–100)
MONOCYTES # BLD AUTO: 0.6 10E9/L (ref 0–1.3)
MONOCYTES NFR BLD AUTO: 6.7 %
MUCOUS THREADS #/AREA URNS LPF: PRESENT /LPF
NEUTROPHILS # BLD AUTO: 3.9 10E9/L (ref 1.6–8.3)
NEUTROPHILS NFR BLD AUTO: 46.5 %
NITRATE UR QL: NEGATIVE
NRBC # BLD AUTO: 0 10*3/UL
NRBC BLD AUTO-RTO: 0 /100
PH UR STRIP: 5.5 PH (ref 5–7)
PLATELET # BLD AUTO: 306 10E9/L (ref 150–450)
POTASSIUM SERPL-SCNC: 3.7 MMOL/L (ref 3.4–5.3)
PROT SERPL-MCNC: 7.8 G/DL (ref 6.8–8.8)
RBC # BLD AUTO: 4.78 10E12/L (ref 3.8–5.2)
RBC #/AREA URNS AUTO: 2 /HPF (ref 0–2)
SODIUM SERPL-SCNC: 137 MMOL/L (ref 133–144)
SOURCE: ABNORMAL
SP GR UR STRIP: 1.02 (ref 1–1.03)
SPECIMEN SOURCE: NORMAL
SQUAMOUS #/AREA URNS AUTO: 4 /HPF (ref 0–1)
UROBILINOGEN UR STRIP-MCNC: 2 MG/DL (ref 0–2)
WBC # BLD AUTO: 8.3 10E9/L (ref 4–11)
WBC #/AREA URNS AUTO: 1 /HPF (ref 0–5)
WET PREP SPEC: NORMAL

## 2020-06-23 PROCEDURE — 99285 EMERGENCY DEPT VISIT HI MDM: CPT | Mod: Z6 | Performed by: EMERGENCY MEDICINE

## 2020-06-23 PROCEDURE — 76830 TRANSVAGINAL US NON-OB: CPT

## 2020-06-23 PROCEDURE — 80320 DRUG SCREEN QUANTALCOHOLS: CPT | Performed by: EMERGENCY MEDICINE

## 2020-06-23 PROCEDURE — 85025 COMPLETE CBC W/AUTO DIFF WBC: CPT | Performed by: EMERGENCY MEDICINE

## 2020-06-23 PROCEDURE — 84703 CHORIONIC GONADOTROPIN ASSAY: CPT | Performed by: EMERGENCY MEDICINE

## 2020-06-23 PROCEDURE — C9803 HOPD COVID-19 SPEC COLLECT: HCPCS | Performed by: EMERGENCY MEDICINE

## 2020-06-23 PROCEDURE — 83690 ASSAY OF LIPASE: CPT | Performed by: EMERGENCY MEDICINE

## 2020-06-23 PROCEDURE — 80307 DRUG TEST PRSMV CHEM ANLYZR: CPT | Performed by: EMERGENCY MEDICINE

## 2020-06-23 PROCEDURE — 80053 COMPREHEN METABOLIC PANEL: CPT | Performed by: EMERGENCY MEDICINE

## 2020-06-23 PROCEDURE — 90791 PSYCH DIAGNOSTIC EVALUATION: CPT

## 2020-06-23 PROCEDURE — 81001 URINALYSIS AUTO W/SCOPE: CPT | Performed by: EMERGENCY MEDICINE

## 2020-06-23 PROCEDURE — 87210 SMEAR WET MOUNT SALINE/INK: CPT | Performed by: EMERGENCY MEDICINE

## 2020-06-23 PROCEDURE — 25000132 ZZH RX MED GY IP 250 OP 250 PS 637: Performed by: EMERGENCY MEDICINE

## 2020-06-23 PROCEDURE — 87591 N.GONORRHOEAE DNA AMP PROB: CPT | Performed by: EMERGENCY MEDICINE

## 2020-06-23 PROCEDURE — 99285 EMERGENCY DEPT VISIT HI MDM: CPT | Mod: 25 | Performed by: EMERGENCY MEDICINE

## 2020-06-23 RX ORDER — KETOROLAC TROMETHAMINE 15 MG/ML
15 INJECTION, SOLUTION INTRAMUSCULAR; INTRAVENOUS ONCE
Status: COMPLETED | OUTPATIENT
Start: 2020-06-23 | End: 2020-06-24

## 2020-06-23 RX ORDER — HYDROCODONE BITARTRATE AND ACETAMINOPHEN 5; 325 MG/1; MG/1
1 TABLET ORAL ONCE
Status: COMPLETED | OUTPATIENT
Start: 2020-06-23 | End: 2020-06-23

## 2020-06-23 RX ORDER — CYCLOBENZAPRINE HCL 10 MG
10 TABLET ORAL 3 TIMES DAILY PRN
Qty: 20 TABLET | Refills: 0 | Status: SHIPPED | OUTPATIENT
Start: 2020-06-23 | End: 2020-06-27

## 2020-06-23 RX ADMIN — HYDROCODONE BITARTRATE AND ACETAMINOPHEN 1 TABLET: 5; 325 TABLET ORAL at 20:11

## 2020-06-23 RX ADMIN — HYDROCODONE BITARTRATE AND ACETAMINOPHEN 1 TABLET: 5; 325 TABLET ORAL at 17:51

## 2020-06-23 ASSESSMENT — MIFFLIN-ST. JEOR: SCORE: 1678.33

## 2020-06-23 NOTE — ED PROVIDER NOTES
"    [facility] EMERGENCY DEPARTMENT ([facility])  6/23/20    History     Chief Complaint   Patient presents with     Pelvic Pain     The history is provided by the patient and medical records.     Maryanne Crockett is a 39 year old female with a past medical history pertinent for cholecystectomy, right oophorectomy, tubal ligation, and ovarian cysts who presents to the Emergency Department for evaluation of pelvic pain for the past few months.  Patient states she was evaluated here, chart review shows she was evaluated here on 4/28/2020, ultrasound in this visit showed no left adnexal pathology no free fluid as well.  CT scan likewise showed no acute abnormality to explain her symptoms.  She was treated with metronidazole for her symptoms.  She states she visited her PCP after this and continued her metronidazole prescription.  She states she was also recently treated with antibiotics for a UTI.    Patient is tangential and difficult to obtain history.    She states her pelvic pain got worse a couple of days ago.  She states her pain is worse on the left side.  She also reports that 2 weeks ago she noticed a large cotton ball-sized \"chunk\" of vaginal discharge in her underwear.  She describes this as green/white/yellow/brown/bloody in color.  She states she had vaginal bleeding after this as well.  She states she has seen her PCP for this, she received a urine test and was swabbed.  She states her PCP sent her here for evaluation and further testing.  She states she has not been sexually active for more than 1 year.  She states she has done nothing in the past few days to exacerbate her pelvic pain.  No other symptoms noted.    Patient also notes suicidal ideation.  She states that she will kill herself if she has to continue to live like this.  She also states she does feel like harming others but does not have any specific plans to do so.  She states she does have a history of suicide attempts in the past.  " "Patient states that \"I have been without my psych meds for a long time and just restarted them.  They do not make me feel the same.\"        I have reviewed the Medications, Allergies, Past Medical and Surgical History, and Social History in the [employer] system.  PAST MEDICAL HISTORY:   Past Medical History:   Diagnosis Date     Depressive disorder      Family history of glioblastoma     screening MRIs every 2 years     Ovarian cyst      Seizures (H)        PAST SURGICAL HISTORY:   Past Surgical History:   Procedure Laterality Date     CHOLECYSTECTOMY       DILATION AND CURETTAGE SUCTION  4/30/15    retained POC     GASTRIC RESTRICTIVE PROCEDURE, OPEN, REMOVE/REPLACE SUBCUTANEOUS PORT       LAPAROSCOPIC OOPHORECTOMY Right 8/5/2016    Procedure: LAPAROSCOPIC OOPHORECTOMY;  Surgeon: Adrianne Vergara MD;  Location: [facility] OR     LAPAROSCOPIC SALPINGECTOMY Bilateral 8/5/2016    Procedure: LAPAROSCOPIC SALPINGECTOMY;  Surgeon: Adrianne Vergara MD;  Location: [facility] OR     LAPAROSCOPIC TUBAL LIGATION Bilateral 5/22/2015    Procedure: LAPAROSCOPIC TUBAL LIGATION;  Surgeon: Hayley Zayas MD;  Location: [facility] OR     LAPAROSCOPY OPERATIVE ADULT N/A 8/5/2016    Procedure: LAPAROSCOPY OPERATIVE ADULT;  Surgeon: Adrianne Vergara MD;  Location: [facility] OR     SOFT TISSUE SURGERY Right     cyst in hand       Past medical history, past surgical history, medications, and allergies were reviewed with the patient. Additional pertinent items: None    FAMILY HISTORY:   Family History   Problem Relation Age of Onset     Other Cancer Brother 36        brain ca     Other Cancer Father         leukemia     Other Cancer Maternal Aunt         2 aunts with glioblastomas     Breast Cancer No family hx of         no ovarian cancer       SOCIAL HISTORY:   Social History     Tobacco Use     Smoking status: Current Some Day Smoker     Packs/day: 0.25     Smokeless tobacco: Never Used     Tobacco comment: Trying   Substance Use Topics     Alcohol use: Yes " "    Alcohol/week: 0.0 standard drinks     Comment: rare     Social history was reviewed with the patient. Additional pertinent items: None      Patient's Medications   New Prescriptions    No medications on file   Previous Medications    ACETAMINOPHEN (TYLENOL) 500 MG TABLET    Take 500-1,000 mg by mouth every 6 hours as needed for mild pain    ALPRAZOLAM (XANAX) 2 MG TABLET    Take 2 mg by mouth 2 times daily as needed    AMPHETAMINE-DEXTROAMPHETAMINE (ADDERALL) 10 MG TABLET    Take 10 mg by mouth daily    ESCITALOPRAM (LEXAPRO) 20 MG TABLET    Take 20 mg by mouth daily    IBUPROFEN (ADVIL/MOTRIN) 600 MG TABLET    Take 1 tablet (600 mg) by mouth every 8 hours as needed for moderate pain    LAMOTRIGINE (LAMICTAL) 100 MG TABLET    Take 2 tablets (200 mg) by mouth 2 times daily    MULTIVITAMIN W/MINERALS (THERA-VIT-M) TABLET    Take 1 tablet by mouth daily    POTASSIUM GLUCONATE 2.5 MEQ TABLET    Take 2.5 mEq by mouth daily    QUETIAPINE (SEROQUEL) 50 MG TABLET    Take 50 mg by mouth daily    VENTOLIN  (90 BASE) MCG/ACT INHALER    Inhale 1-2 puffs into the lungs every 6 hours as needed for shortness of breath / dyspnea or wheezing     VITAMIN B-12 (CYANOCOBALAMIN) 1000 MCG TABLET    Take 1,000 mcg by mouth daily   Modified Medications    No medications on file   Discontinued Medications    No medications on file          Allergies   Allergen Reactions     Ibuprofen GI Disturbance        Review of Systems  ROS: 14 point ROS neg other than the symptoms noted above in the HPI.      Physical Exam   BP: (!) 149/98  Pulse: 122  Temp: 98.1  F (36.7  C)  Resp: 16  Height: 175.3 cm (5' 9\")  Weight: 93.9 kg (207 lb)  SpO2: 100 %      Physical Exam  Exam conducted with a chaperone present.   Constitutional:       General: She is not in acute distress.     Appearance: She is well-developed. She is not diaphoretic.   HENT:      Head: Normocephalic and atraumatic.      Mouth/Throat:      Pharynx: No oropharyngeal exudate. "   Eyes:      General: No scleral icterus.        Right eye: No discharge.         Left eye: No discharge.      Pupils: Pupils are equal, round, and reactive to light.   Neck:      Musculoskeletal: Normal range of motion and neck supple.   Cardiovascular:      Rate and Rhythm: Normal rate and regular rhythm.      Heart sounds: Normal heart sounds. No murmur. No friction rub. No gallop.    Pulmonary:      Effort: Pulmonary effort is normal. No respiratory distress.      Breath sounds: Normal breath sounds. No wheezing.   Chest:      Chest wall: No tenderness.   Abdominal:      General: Bowel sounds are normal. There is no distension.      Palpations: Abdomen is soft.      Tenderness: There is no abdominal tenderness.   Genitourinary:     General: Normal vulva.      Pubic Area: No rash or pubic lice.       Labia:         Right: No rash, tenderness, lesion or injury.         Left: No rash, tenderness, lesion or injury.       Vagina: No signs of injury and foreign body. No vaginal discharge, erythema or bleeding.      Cervix: No cervical motion tenderness or discharge.      Comments: Minor L sided tenderness on exam. No CMT  Musculoskeletal: Normal range of motion.         General: No tenderness or deformity.   Skin:     General: Skin is warm and dry.      Coloration: Skin is not pale.      Findings: No erythema or rash.   Neurological:      Mental Status: She is alert and oriented to person, place, and time.      Cranial Nerves: No cranial nerve deficit.   Psychiatric:         Speech: Speech is rapid and pressured and tangential.         Thought Content: Thought content includes suicidal ideation. Thought content does not include homicidal ideation.         Judgment: Judgment is impulsive.         ED Course   5:28 PM  The patient was seen and examined by Galdino Park DO in Room ED17.        Procedures                           Results for orders placed or performed during the hospital encounter of 06/23/20 (from the  past 24 hour(s))   CBC with platelets differential   Result Value Ref Range    WBC 8.3 4.0 - 11.0 10e9/L    RBC Count 4.78 3.8 - 5.2 10e12/L    Hemoglobin 13.8 11.7 - 15.7 g/dL    Hematocrit 42.9 35.0 - 47.0 %    MCV 90 78 - 100 fl    MCH 28.9 26.5 - 33.0 pg    MCHC 32.2 31.5 - 36.5 g/dL    RDW 13.5 10.0 - 15.0 %    Platelet Count 306 150 - 450 10e9/L    Diff Method Automated Method     % Neutrophils 46.5 %    % Lymphocytes 42.8 %    % Monocytes 6.7 %    % Eosinophils 2.8 %    % Basophils 1.1 %    % Immature Granulocytes 0.1 %    Nucleated RBCs 0 0 /100    Absolute Neutrophil 3.9 1.6 - 8.3 10e9/L    Absolute Lymphocytes 3.5 0.8 - 5.3 10e9/L    Absolute Monocytes 0.6 0.0 - 1.3 10e9/L    Absolute Eosinophils 0.2 0.0 - 0.7 10e9/L    Absolute Basophils 0.1 0.0 - 0.2 10e9/L    Abs Immature Granulocytes 0.0 0 - 0.4 10e9/L    Absolute Nucleated RBC 0.0    Comprehensive metabolic panel   Result Value Ref Range    Sodium 137 133 - 144 mmol/L    Potassium 3.7 3.4 - 5.3 mmol/L    Chloride 106 94 - 109 mmol/L    Carbon Dioxide 26 20 - 32 mmol/L    Anion Gap 5 3 - 14 mmol/L    Glucose 88 70 - 99 mg/dL    Urea Nitrogen 8 7 - 30 mg/dL    Creatinine 0.80 0.52 - 1.04 mg/dL    GFR Estimate >90 >60 mL/min/[1.73_m2]    GFR Estimate If Black >90 >60 mL/min/[1.73_m2]    Calcium 8.9 8.5 - 10.1 mg/dL    Bilirubin Total 0.4 0.2 - 1.3 mg/dL    Albumin 4.2 3.4 - 5.0 g/dL    Protein Total 7.8 6.8 - 8.8 g/dL    Alkaline Phosphatase 65 40 - 150 U/L    ALT 33 0 - 50 U/L    AST 24 0 - 45 U/L   Lipase   Result Value Ref Range    Lipase 83 73 - 393 U/L   HCG qualitative Blood   Result Value Ref Range    HCG Qualitative Serum Negative NEG^Negative     Medications - No data to display          Assessments & Plan (with Medical Decision Making)   This is a 39-year-old female who presents with pelvic pain.  This appears to be acute on chronic.  She notes left-sided pelvic pain and had recent vaginal discharge.  No known precipitating factors.  Patient  also has suicidal ideation but no specific plan.  She states that she is worried that she will kill herself if she leaves the hospital.  She is tangential and demonstrating pressured speech.  Exam demonstrates minor left-sided tenderness on pelvic exam.  No other acute abnormalities.  Work shows no acute abnormalities.  Wet prep shows no acute abnormalities.  Ultrasound shows no acute abnormalities.  Discussed all results with patient.  DEC assessment was obtained.  After assessment it was determined that patient would benefit from inpatient mental health care.  She is scheduled to see OB/Gyn for her pelvic pain and I discussed this with the service in order to facilitate this during patient's admission.  Patient was given hydrocodone acetaminophen, ketorolac, ondansetron and olanzapine in the emergency department.  Patient contracts for safety while in the emergency department and one-to-one was discontinued.  We will admit for further monitoring work-up and treatment.    I have reviewed the nursing notes.    I have reviewed the findings, diagnosis, plan and need for follow up with the patient.    New Prescriptions    No medications on file       Final diagnoses:   None       6/23/2020   [facility], [place], EMERGENCY DEPARTMENT    Lora SCHULTZ, am serving as a trained medical scribe to document services personally performed by Galdino Park DO, based on the provider's statements to me.     Galdino SCHULTZ DO, was physically present and have reviewed and verified the accuracy of this note documented by Lora Villeda.       Galdino Park DO  06/24/20 0216     Substance intoxication or withdrawal

## 2020-06-23 NOTE — ED TRIAGE NOTES
Pt arrives to triage with complaints of pelvic pain. Pt reports she has been having these symptoms for the past few months and has been seen multiple times for the same complaints. Reports some vaginal discharge, dark brown in color 2 weeks ago. Pt was treated for a UTI and bacterial infection with abx. A&O, VSS  Pt reports she has been feeling depressed and hopeless the past few weeks. Fleeing suicidal thoughts, no suicidal attempts.

## 2020-06-24 ENCOUNTER — TELEPHONE (OUTPATIENT)
Dept: OBGYN | Facility: CLINIC | Age: 40
End: 2020-06-24

## 2020-06-24 PROBLEM — R45.851 SUICIDAL IDEATION: Status: ACTIVE | Noted: 2020-06-24

## 2020-06-24 LAB
ALBUMIN SERPL-MCNC: 3.2 G/DL (ref 3.4–5)
ALP SERPL-CCNC: 56 U/L (ref 40–150)
ALT SERPL W P-5'-P-CCNC: 27 U/L (ref 0–50)
AMPHETAMINES UR QL SCN: POSITIVE
AST SERPL W P-5'-P-CCNC: 21 U/L (ref 0–45)
BARBITURATES UR QL: NEGATIVE
BENZODIAZ UR QL: NEGATIVE
BILIRUB DIRECT SERPL-MCNC: <0.1 MG/DL (ref 0–0.2)
BILIRUB SERPL-MCNC: 0.4 MG/DL (ref 0.2–1.3)
CANNABINOIDS UR QL SCN: POSITIVE
CHOLEST SERPL-MCNC: 168 MG/DL
COCAINE UR QL: NEGATIVE
DEPRECATED CALCIDIOL+CALCIFEROL SERPL-MC: 31 UG/L (ref 20–75)
ETHANOL UR QL SCN: NEGATIVE
HDLC SERPL-MCNC: 48 MG/DL
LDLC SERPL CALC-MCNC: 91 MG/DL
N GONORRHOEA DNA SPEC QL NAA+PROBE: NEGATIVE
NONHDLC SERPL-MCNC: 120 MG/DL
OPIATES UR QL SCN: NEGATIVE
PROT SERPL-MCNC: 6.7 G/DL (ref 6.8–8.8)
SARS-COV-2 PCR COMMENT: NORMAL
SARS-COV-2 RNA SPEC QL NAA+PROBE: NEGATIVE
SARS-COV-2 RNA SPEC QL NAA+PROBE: NORMAL
SPECIMEN SOURCE: NORMAL
TRIGL SERPL-MCNC: 143 MG/DL
TSH SERPL DL<=0.005 MIU/L-ACNC: 1.71 MU/L (ref 0.4–4)

## 2020-06-24 PROCEDURE — 25000132 ZZH RX MED GY IP 250 OP 250 PS 637: Performed by: EMERGENCY MEDICINE

## 2020-06-24 PROCEDURE — 96374 THER/PROPH/DIAG INJ IV PUSH: CPT | Performed by: EMERGENCY MEDICINE

## 2020-06-24 PROCEDURE — 25000128 H RX IP 250 OP 636: Performed by: STUDENT IN AN ORGANIZED HEALTH CARE EDUCATION/TRAINING PROGRAM

## 2020-06-24 PROCEDURE — 80076 HEPATIC FUNCTION PANEL: CPT | Performed by: STUDENT IN AN ORGANIZED HEALTH CARE EDUCATION/TRAINING PROGRAM

## 2020-06-24 PROCEDURE — 82306 VITAMIN D 25 HYDROXY: CPT | Performed by: STUDENT IN AN ORGANIZED HEALTH CARE EDUCATION/TRAINING PROGRAM

## 2020-06-24 PROCEDURE — 25000132 ZZH RX MED GY IP 250 OP 250 PS 637: Performed by: STUDENT IN AN ORGANIZED HEALTH CARE EDUCATION/TRAINING PROGRAM

## 2020-06-24 PROCEDURE — 96375 TX/PRO/DX INJ NEW DRUG ADDON: CPT | Performed by: EMERGENCY MEDICINE

## 2020-06-24 PROCEDURE — 84443 ASSAY THYROID STIM HORMONE: CPT | Performed by: STUDENT IN AN ORGANIZED HEALTH CARE EDUCATION/TRAINING PROGRAM

## 2020-06-24 PROCEDURE — 12400001 ZZH R&B MH UMMC

## 2020-06-24 PROCEDURE — 80061 LIPID PANEL: CPT | Performed by: STUDENT IN AN ORGANIZED HEALTH CARE EDUCATION/TRAINING PROGRAM

## 2020-06-24 PROCEDURE — 36415 COLL VENOUS BLD VENIPUNCTURE: CPT | Performed by: STUDENT IN AN ORGANIZED HEALTH CARE EDUCATION/TRAINING PROGRAM

## 2020-06-24 PROCEDURE — 25000128 H RX IP 250 OP 636: Performed by: EMERGENCY MEDICINE

## 2020-06-24 PROCEDURE — 99223 1ST HOSP IP/OBS HIGH 75: CPT | Mod: AI | Performed by: PSYCHIATRY & NEUROLOGY

## 2020-06-24 RX ORDER — IBUPROFEN 400 MG/1
400 TABLET, FILM COATED ORAL EVERY 6 HOURS PRN
Status: DISCONTINUED | OUTPATIENT
Start: 2020-06-24 | End: 2020-06-24

## 2020-06-24 RX ORDER — POLYETHYLENE GLYCOL 3350 17 G/17G
1 POWDER, FOR SOLUTION ORAL DAILY PRN
Status: ON HOLD | COMMUNITY
End: 2020-06-27

## 2020-06-24 RX ORDER — ASENAPINE 5 MG/1
5 TABLET SUBLINGUAL AT BEDTIME
Status: DISCONTINUED | OUTPATIENT
Start: 2020-06-24 | End: 2020-06-26

## 2020-06-24 RX ORDER — TRAZODONE HYDROCHLORIDE 50 MG/1
50 TABLET, FILM COATED ORAL
Status: DISCONTINUED | OUTPATIENT
Start: 2020-06-24 | End: 2020-06-26

## 2020-06-24 RX ORDER — QUETIAPINE FUMARATE 100 MG/1
100 TABLET, FILM COATED ORAL AT BEDTIME
Status: ON HOLD | COMMUNITY
End: 2020-06-27

## 2020-06-24 RX ORDER — DEXTROAMPHETAMINE SACCHARATE, AMPHETAMINE ASPARTATE, DEXTROAMPHETAMINE SULFATE AND AMPHETAMINE SULFATE 5; 5; 5; 5 MG/1; MG/1; MG/1; MG/1
20 TABLET ORAL 2 TIMES DAILY
Status: ON HOLD | COMMUNITY
End: 2020-06-27

## 2020-06-24 RX ORDER — CLONAZEPAM 0.5 MG/1
0.5 TABLET ORAL 2 TIMES DAILY PRN
Status: DISCONTINUED | OUTPATIENT
Start: 2020-06-24 | End: 2020-06-27 | Stop reason: HOSPADM

## 2020-06-24 RX ORDER — OLANZAPINE 10 MG/1
10 TABLET ORAL
Status: DISCONTINUED | OUTPATIENT
Start: 2020-06-24 | End: 2020-06-27 | Stop reason: HOSPADM

## 2020-06-24 RX ORDER — ONDANSETRON 2 MG/ML
4 INJECTION INTRAMUSCULAR; INTRAVENOUS ONCE
Status: COMPLETED | OUTPATIENT
Start: 2020-06-24 | End: 2020-06-24

## 2020-06-24 RX ORDER — POLYETHYLENE GLYCOL 3350 17 G
2-4 POWDER IN PACKET (EA) ORAL
Status: DISCONTINUED | OUTPATIENT
Start: 2020-06-24 | End: 2020-06-27 | Stop reason: HOSPADM

## 2020-06-24 RX ORDER — OLANZAPINE 5 MG/1
5 TABLET, ORALLY DISINTEGRATING ORAL ONCE
Status: COMPLETED | OUTPATIENT
Start: 2020-06-24 | End: 2020-06-24

## 2020-06-24 RX ORDER — DOXEPIN HYDROCHLORIDE 10 MG/1
10 CAPSULE ORAL AT BEDTIME
Status: ON HOLD | COMMUNITY
End: 2020-06-27

## 2020-06-24 RX ORDER — OLANZAPINE 10 MG/2ML
10 INJECTION, POWDER, FOR SOLUTION INTRAMUSCULAR
Status: DISCONTINUED | OUTPATIENT
Start: 2020-06-24 | End: 2020-06-27 | Stop reason: HOSPADM

## 2020-06-24 RX ORDER — ALUMINA, MAGNESIA, AND SIMETHICONE 2400; 2400; 240 MG/30ML; MG/30ML; MG/30ML
30 SUSPENSION ORAL EVERY 4 HOURS PRN
Status: DISCONTINUED | OUTPATIENT
Start: 2020-06-24 | End: 2020-06-27 | Stop reason: HOSPADM

## 2020-06-24 RX ORDER — ACETAMINOPHEN 325 MG/1
650 TABLET ORAL EVERY 4 HOURS PRN
Status: DISCONTINUED | OUTPATIENT
Start: 2020-06-24 | End: 2020-06-27 | Stop reason: HOSPADM

## 2020-06-24 RX ORDER — HYDROXYZINE HYDROCHLORIDE 25 MG/1
25 TABLET, FILM COATED ORAL EVERY 4 HOURS PRN
Status: DISCONTINUED | OUTPATIENT
Start: 2020-06-24 | End: 2020-06-27 | Stop reason: HOSPADM

## 2020-06-24 RX ADMIN — OLANZAPINE 5 MG: 5 TABLET, ORALLY DISINTEGRATING ORAL at 00:22

## 2020-06-24 RX ADMIN — OLANZAPINE 10 MG: 10 INJECTION, POWDER, LYOPHILIZED, FOR SOLUTION INTRAMUSCULAR at 13:43

## 2020-06-24 RX ADMIN — ASENAPINE MALEATE 5 MG: 5 TABLET SUBLINGUAL at 21:39

## 2020-06-24 RX ADMIN — ONDANSETRON 4 MG: 2 INJECTION INTRAMUSCULAR; INTRAVENOUS at 01:22

## 2020-06-24 RX ADMIN — KETOROLAC TROMETHAMINE 15 MG: 15 INJECTION, SOLUTION INTRAMUSCULAR; INTRAVENOUS at 01:22

## 2020-06-24 ASSESSMENT — ACTIVITIES OF DAILY LIVING (ADL)
SWALLOWING: 0-->SWALLOWS FOODS/LIQUIDS WITHOUT DIFFICULTY
TOILETING: 0-->INDEPENDENT
DRESS: INDEPENDENT
DRESS: SCRUBS (BEHAVIORAL HEALTH)
LAUNDRY: WITH SUPERVISION
DRESS: 0-->INDEPENDENT
RETIRED_COMMUNICATION: 0-->UNDERSTANDS/COMMUNICATES WITHOUT DIFFICULTY
HYGIENE/GROOMING: INDEPENDENT
ORAL_HYGIENE: INDEPENDENT
HYGIENE/GROOMING: INDEPENDENT
TRANSFERRING: 0-->INDEPENDENT
FALL_HISTORY_WITHIN_LAST_SIX_MONTHS: NO
COGNITION: 0 - NO COGNITION ISSUES REPORTED
ORAL_HYGIENE: INDEPENDENT
AMBULATION: 0-->INDEPENDENT
RETIRED_EATING: 0-->INDEPENDENT
LAUNDRY: WITH SUPERVISION
BATHING: 0-->INDEPENDENT

## 2020-06-24 NOTE — ED NOTES
Pt verbally contracted to not harm herself. MD verbally stated there is no longer a need for 1:1 staff supervision  Maryanne Pastor RN on 6/24/2020 at 1:17 AM

## 2020-06-24 NOTE — H&P
"Psychiatry History and Physical    Maryanne Crockett MRN# [id number]   Age: 39 year old YOB: 1980     Date of Admission:  6/23/2020  Admitting Physician:  Dr. Funes          Contacts:     Primary Outpatient Psychiatrist: Dr. Humberto RICHTER @ [facility]  Primary Physician: Shoshana Hyde  Therapist: None currently         Chief Complaint:     \"abdominal pain\"         History of Present Illness:     History obtained from patient and EMR. Maryanne Crockett is a 39 year old female with history of bipolar 2 disorder, unspecified anxiety, and ADHD, as well as multiple medical co-morbidities including chronic abdominal and pelvic pain, who presented to ED for abdominal pain then endorsed SI with plan when told of plan to discharge from ED.     Per ED Note:   \"This is a 39-year-old female who presents with pelvic pain.  This appears to be acute on chronic.  She notes left-sided pelvic pain and had recent vaginal discharge.  No known precipitating factors.  Patient also has suicidal ideation but no specific plan.  She states that she is worried that she will kill herself if she leaves the hospital.  She is tangential and demonstrating pressured speech.  Exam demonstrates minor left-sided tenderness on pelvic exam.  No other acute abnormalities.  Work shows no acute abnormalities.  Wet prep shows no acute abnormalities.  Ultrasound shows no acute abnormalities.  Discussed all results with patient.  DEC assessment was obtained.  After assessment it was determined that patient would benefit from inpatient mental health care.  She is scheduled to see OB/Gyn for her pelvic pain and I discussed this with the service in order to facilitate this during patient's admission.  Patient was given hydrocodone acetaminophen, ketorolac, ondansetron and olanzapine in the emergency department.  Patient contracts for safety while in the emergency department and one-to-one was discontinued.  We will admit for further monitoring " "work-up and treatment.\"    In the ED, when attempting to discharge patient, she was reported as saying \"I need to see psych because I just can't do this.\" She did verbally contract to not harm self in ED. Medical work-up was negative except left-sided pelvic pain. In ED given 2 tablet NORCO, 15mg ketroloac, 5 mg olanzapine, and 4 mg ondanestron. Medically cleared for admission to mental health unit with plan to see OB/GYN.    She was medically cleared for admission to inpatient psychiatric unit.    Per Dec Report:  Reported acute abdominal pain and said that she had intentions of suicide if she had to leave hospital without having issue resolved. She was previously taking Seroquel, Lamictal, and Xanax but recently stopped. Noted as having pressured speech, perseveration about abdominal pain, disorganization, and impulsivity. Per  \"Appears to be experiencing manic episode\".  recommended inpatient treatment.    Per patient report:    Maryanne RAYO Self reports that for a couple months she has been experiencing symptoms of chronic SI, depression, and manic episodes. She reports last being well for about a week at some point after her ED visit on 4/28. She attributes her symptoms & decompensation to pelvic/abdominal pain and being unable to find medical treatment for it. She reports recently taking Lexapro and Seroquel daily with Xanax and Adderall she takes \"as needed\". She reports stopping daily medications a few weeks ago due to pharmacy closures in the context of civil unrest in [place]. After being off of medications for a few weeks re-started at prior doses and \"felt funny, head was splitting\" so she stopped all medications. Has been off for around a week now. She reports  recent substance use of daily medical marijuana and cigarettes. Denies alcohol or other drugs.     She reports other current stressors including her family not understanding her illness. In particular, she notes that her son was " "angry with her after she was unable to visit him on Father's Day.  She regularly sees a psychiatrist Dr. Paul but reports wanting a new psychiatrist, referring to the clinic as \"shadey\" and disagreeing with medication choices. She also reports stress with COVID-19 situation and challenges to accessing therapy.    In the past, she has felt like she has been on too many medications saying she was taking \"28 pills\" per day. She does report having a nurse to help her with medications. Her primary goal for this hospitalization is to find new psychiatrist, therapist (PASQUALE), and find medication regimen to stabilize her mood. She also wants her pelvic pain addressed.    We discussed medications that may be of interest to her for mood stabilization. She acknowledged \"I need a mood stabilizer\" and reports cycling in and out of what she perceives to be manic episodes in the recent past. She stated that Seroquel has paradoxically made her sleep worse. She reports previously trying lithium and depakote and felt \"lethargic.\" Also reports that Gabapentin made her feel \"groggy\" and her family doesn't like it. She has \"no negative feelings\" about Abilify but believes Saphris has been most helpful to her in the past and would like to try it again now. She did report dental problems resulting from use, but believed it helped a lot with her sleep.     Regarding her hip/pelvic pain, she reports feeling \"like it is swelling/infected.\" States that she has difficulty walking currently. She has concern for PID or other pathology. She was informed that case will be reviewed by medicine team. She has undergone multiple abdominal/pelvic procedures in the past and views her medical treatment and pain as traumatic.    Discussed past history of possible seizures. Since stopping lamotrigine several months ago, she reports one possible spell \"several weeks ago\" where her mother found her \"unconscious\" but was not sure if it was a seizure.    The " risks, benefits, alternatives and side effects have been discussed and are understood by the patient and other caregivers.         Psychiatric Review of Systems:     Depressive:   Reports suicidal ideation, depressed mood, low energy, insomnia, poor concentration /memory and feeling worthless   Dysregulation:    Reports mood dysregulation and aggressive   Psychosis:    Reports none  Suha:    Reports increasd talkativeness, impulsivity, distractibility  and inter-episode mood instability  Anxiety:    Reports worries  PTSD:    Reports trauma  ADHD:    Reports impulsive  Disordered Eating:   Reports none  Cluster B:   Reports difficulty with stable relationships, affect dysregulation and difficulty regulating mood          Medical Review of Systems:     The Review of Systems is negative other than what is noted in the HPI         Psychiatric History:     Prior diagnoses: previous psychiatric diagnoses include MDD, ADHD, anxiety disorder, unspecified, PTSD, bipolar affective disorder.     Hospitalizations:  Most recent hospitalization was at [facility] in 02/2019 for suicidal ideation/ possible seizure.  Also hospitalized in 11/2014 for depressed mood/SI.    Court Committments: None per chart review.    Suicide attempts:  Per chart review, attempt as a teenager.    Self-injurious behavior: None per patient report     Violence: None per patient report.     ECT: None per chart review.    TMS: None per chart review.    Past medications:   per patient report:  Most recently: Lexapro, Xanax, Seroquel, Adderall. Also reports trying Doxepin, Abilify, Gabapentin, Lamictal, Lithium, and Depakote.           Substance Use History:     Alcohol: reports no alcohol use    Nicotine: Endorses cigarette use    Illicit Substances: Reports medical marijuana use    Chemical Dependency Treatment: None         Social History:     Family/Relationships: Does maintain contact with Her family. Has two living children. Grandchildren from  adult son who is 22. Reports conflict with children surrounding them not understanding her mental illness.     Living Situation: currently lives in [facility] with her mother.         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     Past Medical History:   Diagnosis Date     Depressive disorder      Family history of glioblastoma     screening MRIs every 2 years     Ovarian cyst      Seizures (H)      Past Surgical History:   Procedure Laterality Date     CHOLECYSTECTOMY       DILATION AND CURETTAGE SUCTION  4/30/15    retained POC     GASTRIC RESTRICTIVE PROCEDURE, OPEN, REMOVE/REPLACE SUBCUTANEOUS PORT       LAPAROSCOPIC OOPHORECTOMY Right 8/5/2016    Procedure: LAPAROSCOPIC OOPHORECTOMY;  Surgeon: Adrianne Vergara MD;  Location: [facility] OR     LAPAROSCOPIC SALPINGECTOMY Bilateral 8/5/2016    Procedure: LAPAROSCOPIC SALPINGECTOMY;  Surgeon: Adrianne Vergara MD;  Location: [facility] OR     LAPAROSCOPIC TUBAL LIGATION Bilateral 5/22/2015    Procedure: LAPAROSCOPIC TUBAL LIGATION;  Surgeon: Hayley Zayas MD;  Location: [facility] OR     LAPAROSCOPY OPERATIVE ADULT N/A 8/5/2016    Procedure: LAPAROSCOPY OPERATIVE ADULT;  Surgeon: Adrianne Vergara MD;  Location: [facility] OR     SOFT TISSUE SURGERY Right     cyst in hand          Allergies:      Allergies   Allergen Reactions     Ibuprofen GI Disturbance          Medications:     Current Outpatient Medications   Medication Sig Dispense Refill     cyclobenzaprine (FLEXERIL) 10 MG tablet Take 1 tablet (10 mg) by mouth 3 times daily as needed for muscle spasms 20 tablet 0             Family History:   Reports daughter as having MDD. States that most of her family members have a mental health history.    Family History   Problem Relation Age of Onset     Other Cancer Brother 36        brain ca     Other Cancer Father         leukemia     Other Cancer Maternal Aunt         2 aunts with glioblastomas     Breast Cancer No family hx of          no ovarian cancer            Labs:     Recent Results (from the past 24 hour(s))   UA reflex to Microscopic and Culture    Collection Time: 06/23/20  4:10 PM    Specimen: Urine clean catch   Result Value Ref Range    Color Urine Yellow     Appearance Urine Clear     Glucose Urine Negative NEG^Negative mg/dL    Bilirubin Urine Small (A) NEG^Negative    Ketones Urine 5 (A) NEG^Negative mg/dL    Specific Gravity Urine 1.024 1.003 - 1.035    Blood Urine Negative NEG^Negative    pH Urine 5.5 5.0 - 7.0 pH    Protein Albumin Urine 30 (A) NEG^Negative mg/dL    Urobilinogen mg/dL 2.0 0.0 - 2.0 mg/dL    Nitrite Urine Negative NEG^Negative    Leukocyte Esterase Urine Small (A) NEG^Negative    Source Urine     RBC Urine 2 0 - 2 /HPF    WBC Urine 1 0 - 5 /HPF    Squamous Epithelial /HPF Urine 4 (H) 0 - 1 /HPF    Mucous Urine Present (A) NEG^Negative /LPF   Drug abuse screen 6 urine (chem dep)    Collection Time: 06/23/20  4:10 PM   Result Value Ref Range    Amphetamine Qual Urine Positive (A) NEG^Negative    Barbiturates Qual Urine Negative NEG^Negative    Benzodiazepine Qual Urine Negative NEG^Negative    Cannabinoids Qual Urine Positive (A) NEG^Negative    Cocaine Qual Urine Negative NEG^Negative    Ethanol Qual Urine Negative NEG^Negative    Opiates Qualitative Urine Negative NEG^Negative   CBC with platelets differential    Collection Time: 06/23/20  4:24 PM   Result Value Ref Range    WBC 8.3 4.0 - 11.0 10e9/L    RBC Count 4.78 3.8 - 5.2 10e12/L    Hemoglobin 13.8 11.7 - 15.7 g/dL    Hematocrit 42.9 35.0 - 47.0 %    MCV 90 78 - 100 fl    MCH 28.9 26.5 - 33.0 pg    MCHC 32.2 31.5 - 36.5 g/dL    RDW 13.5 10.0 - 15.0 %    Platelet Count 306 150 - 450 10e9/L    Diff Method Automated Method     % Neutrophils 46.5 %    % Lymphocytes 42.8 %    % Monocytes 6.7 %    % Eosinophils 2.8 %    % Basophils 1.1 %    % Immature Granulocytes 0.1 %    Nucleated RBCs 0 0 /100    Absolute Neutrophil 3.9 1.6 - 8.3 10e9/L    Absolute Lymphocytes  3.5 0.8 - 5.3 10e9/L    Absolute Monocytes 0.6 0.0 - 1.3 10e9/L    Absolute Eosinophils 0.2 0.0 - 0.7 10e9/L    Absolute Basophils 0.1 0.0 - 0.2 10e9/L    Abs Immature Granulocytes 0.0 0 - 0.4 10e9/L    Absolute Nucleated RBC 0.0    Comprehensive metabolic panel    Collection Time: 06/23/20  4:24 PM   Result Value Ref Range    Sodium 137 133 - 144 mmol/L    Potassium 3.7 3.4 - 5.3 mmol/L    Chloride 106 94 - 109 mmol/L    Carbon Dioxide 26 20 - 32 mmol/L    Anion Gap 5 3 - 14 mmol/L    Glucose 88 70 - 99 mg/dL    Urea Nitrogen 8 7 - 30 mg/dL    Creatinine 0.80 0.52 - 1.04 mg/dL    GFR Estimate >90 >60 mL/min/[1.73_m2]    GFR Estimate If Black >90 >60 mL/min/[1.73_m2]    Calcium 8.9 8.5 - 10.1 mg/dL    Bilirubin Total 0.4 0.2 - 1.3 mg/dL    Albumin 4.2 3.4 - 5.0 g/dL    Protein Total 7.8 6.8 - 8.8 g/dL    Alkaline Phosphatase 65 40 - 150 U/L    ALT 33 0 - 50 U/L    AST 24 0 - 45 U/L   Lipase    Collection Time: 06/23/20  4:24 PM   Result Value Ref Range    Lipase 83 73 - 393 U/L   HCG qualitative Blood    Collection Time: 06/23/20  4:24 PM   Result Value Ref Range    HCG Qualitative Serum Negative NEG^Negative   Wet prep    Collection Time: 06/23/20  7:00 PM    Specimen: Genital    VAGINAL   Result Value Ref Range    Specimen Description Genital VAGINAL     Wet Prep No Trichomonas seen     Wet Prep No clue cells seen     Wet Prep No yeast seen     Wet Prep No PMNs seen    Neisseria gonorrhoeae PCR    Collection Time: 06/23/20  7:00 PM    Specimen: Cervix   Result Value Ref Range    Specimen Descrip Cervix     N Gonorrhea PCR Negative NEG^Negative   Asymptomatic COVID-19 Virus (Coronavirus) by PCR    Collection Time: 06/24/20 12:29 AM    Specimen: Nasopharyngeal   Result Value Ref Range    COVID-19 Virus PCR to [facility] - Source Nasopharyngeal     COVID-19 Virus PCR to [facility] - Result       Test received-See reflex to IDDL test SARS CoV2 (COVID-19) Virus RT-PCR   SARS-CoV-2 COVID-19 Virus (Coronavirus) RT-PCR  "Nasopharyngeal    Collection Time: 06/24/20 12:29 AM    Specimen: Nasopharyngeal   Result Value Ref Range    SARS-CoV-2 Virus Specimen Source Nasopharyngeal     SARS-CoV-2 PCR Result NEGATIVE     SARS-CoV-2 PCR Comment       Testing was performed using the [employer] COVID-19 Direct Assay on the [employer] Liaison MDX   instrument. Additional information about this Emergency Use Authorization (EUA) assay can   be found via the Lab Guide.     Hepatic panel    Collection Time: 06/24/20  7:34 AM   Result Value Ref Range    Bilirubin Direct <0.1 0.0 - 0.2 mg/dL    Bilirubin Total 0.4 0.2 - 1.3 mg/dL    Albumin 3.2 (L) 3.4 - 5.0 g/dL    Protein Total 6.7 (L) 6.8 - 8.8 g/dL    Alkaline Phosphatase 56 40 - 150 U/L    ALT 27 0 - 50 U/L    AST 21 0 - 45 U/L   Lipid panel    Collection Time: 06/24/20  7:34 AM   Result Value Ref Range    Cholesterol 168 <200 mg/dL    Triglycerides 143 <150 mg/dL    HDL Cholesterol 48 (L) >49 mg/dL    LDL Cholesterol Calculated 91 <100 mg/dL    Non HDL Cholesterol 120 <130 mg/dL   TSH with free T4 reflex and/or T3 as indicated    Collection Time: 06/24/20  7:34 AM   Result Value Ref Range    TSH 1.71 0.40 - 4.00 mU/L   Vitamin D    Collection Time: 06/24/20  7:34 AM   Result Value Ref Range    Vitamin D Deficiency screening 31 20 - 75 ug/L          Psychiatric Examination:   BP 99/51   Pulse 71   Temp 97.5  F (36.4  C) (Oral)   Resp 18   Ht 1.753 m (5' 9\")   Wt 93.9 kg (207 lb)   SpO2 100%   BMI 30.57 kg/m      Appearance:  awake, alert, lying in hospital bed and untidy  Attitude:  cooperative  Eye Contact:  good  Mood:  \"depressed, in pain\"  Affect:  mood congruent, intensity is normal and labile at times when discussing medical issues  Speech:  clear, coherent and rambling  Psychomotor Behavior:  no evidence of tardive dyskinesia, dystonia, or tics  Thought Process:  logical, goal oriented and circumstantial  Associations:  no loose associations  Thought Content:  passive suicidal ideation " present, no HI, SIB urges, AH, VH  Insight:  fair  Judgment:  poor  Oriented to:  time, person, and place  Attention Span and Concentration:  intact  Recent and Remote Memory:  intact  Language:  english with appropriate syntax and vocabulary  Fund of Knowledge: appropriate  Muscle Strength and Tone: normal  Gait and Station: patient lying in bed         Physical Exam:     See ED assessment note by ED physician on 6/23.         Assessment   Maryanne Crockett is a 39 year old  female with a past psychiatric history of bipolar affective disorder, PTSD, unspecified anxiety, ADHD, and cannabis use disorder who presented to the ER with SI in the context of 2 months of pelvic pain for which she has been unable to find definitive diagnosis or treatment. She has significant worry and anxiety around her pain. She stated that if she were currently discharged she has suicidal intent, but she has no plans in the hospital. Other significant mental health symptoms include depressive and manic symptoms. Her last psychiatric hospitalization was in 02/2019 for SI.  She is currently followed by Dr. Paul at [facility].     Current psychosocial stressors include chronic mental health issues, family dynamics and pelvic pain which she has been coping with by using substances and seeking medical care.  Patient's support system includes family.  Substance use (daily marijuana) does appear to be playing a contributing role in the patient's presentation.  There is genetic loading for mood. Medical history does appear to be significant for previous pelvic/abdominal surgeries as well as ovarian cysts though work-up has not yet revealed medical cause for her current pelvic pain. 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      The initial MSE is notable for depressed mood, passive SI, and  labile affect. She reports no self injurious behaviors. Her reported symptoms of depressed mood, SI, and manic episodes are consistent with her historic diagnosis of bipolar affective disorder, however it is not clear she meets criteria for a full mood episode at this time. Cluster B personality traits possibly contributing to presentation (she seems to have difficulty with interpersonal relationships and maladaptive coping, as well as labile affect and chronic suicidal ideations driven by psychosocial stress).     Given that she currently has SI, patient warrants inpatient psychiatric hospitalization to maintain her safety. Disposition pending clinical stabilization, medication optimization and development of an appropriate discharge plan.    Risk for harm is moderate-high.  Risk factors: SI, maladaptive coping, substance use, trauma, family history, impulsive and past behaviors  Protective factors: family and engaged in treatment     Principal psychiatric diagnosis:   - Unspecified mood or emotional dysregulation disorder  - Bipolar 2 Disorder (historic) vs Cluster B personality traits +/- substance induced component    Secondary psychiatric diagnoses:   - PTSD, per history   - r/o Cannabis abuse disorder            Plan     Admit to [facility] [address] with Attending Physician Dr. Shantel M.D.    Medications:   Outpatient medications held:     -Lexapro 20 mg daily  -Seroquel 100 mg daily  -Adderall 20 mg BID  -Doxepin 10 mg daily  -Xanax 2 mg daily    Outpatient medications continued:   -None    New medications initiated:   -Asenapine 5 mg at bedtime  -Klonopin 0.5mg BID prn for anxiety, sleep  -Olanzapine 10 mg PO/IM prn Q2H severe agitation/psychosis  -Hydroxyzine 25 mg Q4H PRN for anxiety  -Trazodone 50 mg PRN bedtime for sleep  -Tylenol 650 mg Q6H PRN for pain and fever  -Mylanta 30 ml Q4H PRN for indigestion  -Nicotine lozenge 2-4 mg Q1H PRN for nicotine withdrawal  -Milk of Magnesia 30 mL at bedtime Prn for  constipation  -MAALOX 30 mL Q4H PRN for indigestion    Medications: risks/benefits discussed with patient    Patient will be treated in therapeutic milieu with appropriate individual and group therapies.    Laboratory/Imaging:  - Labs: CMP, CBC, TSH, Vit D, Hepatic panel WNL  - COVID-19 negative  - UA with small leukocyte esterase, normal WBC  - Wet Prep negative, Neisseria Gonnorhea negative    Legal Status:   Orders Placed This Encounter      Voluntary      Safety Assessment:    Behavioral Orders   Procedures     Code 1 - Restrict to Unit     Routine Programming     As clinically indicated     Self Injury Precaution     Single Room     Status 15     Every 15 minutes.     Suicide precautions     Patients on Suicide Precautions should have a Combination Diet ordered that includes a Diet selection(s) AND a Behavioral Tray selection for Safe Tray - with utensils, or Safe Tray - NO utensils       Withdrawal precautions      Pt has not required locked seclusion or restraints in the past 24 hours to maintain safety, please refer to RN documentation for further details.    Consults:  - OB/GYN for abdominal pain/vaginal discharge    Medical diagnoses to be addressed this admission:     #. Abdominal/Pelvic Pain  No cause found on admission per ED work-up. Wet mount, N. gonnorhea negative. Ultrasound was non-revealing.  - Consulting OB/GYN, appreciate recs    #. 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 I have verified the history and personally performed the  exam and medical decision making. I agree with the assessment and plan of  care as documented in the note.    Song Vernon  PGY-2 Psychiatry      Attestation:  I, Jennifer Funes MD, have personally  performed an examination of this patient and I have reviewed the resident's documentation.  I have edited the note to reflect all relevant changes.  I have discussed this patient with the house staff on 6/24/2020.  I agree with resident findings and plan in today's resident H&P.  I have reviewed all vitals and laboratory findings.      I certifiy that the inpatient services were ordered in accordance with the Medicare regulations governing the order. This includes certification that hospital inpatient services are reasonable and necessary and in the case of services not specified as inpatient-only under 42 .22(n), that they are appropriately provided as inpatient services in accordance with the 2-midnight benchmark under 42 .3(e).     The reason for inpatient status is Suicidal Ideation and/or Behavior.    Jennifer Funes M.D.,Ph.D.        -------------------------------------------------------

## 2020-06-24 NOTE — TELEPHONE ENCOUNTER
Received message on triage voicemail from Maryanne who was tearful as she explained that she is having significant LEFT side low abdominal pain.  She was in ED yesterday and received ultrasound, wet prep, and labs that did not identify a cause of her pain.  She believes she has pelvic inflammatory disease and would like to be evaluated/treated for that.    Of note, she is inpatient psych, unit 20.    Discussed with Dr. Padilla who advised calling unit 20 and have them request a GYN consult to see Maryanne.    Tried to reach unit 20 but nurses were in report/shift change. Left message to call nurse at [phone].
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
## 2020-06-24 NOTE — PROVIDER NOTIFICATION
06/24/20 0524   Valuables   Patient Belongings locker;sent to security per site process;remains with patient   Patient Belongings Remaining with Patient ring   Patient Belongings Put in Hospital Secure Location (Security or Locker, etc.) bracelet;cell phone/electronics;clothing;keys;medication(s);shoes;watch   Did you bring any home meds/supplements to the hospital?  Yes   Disposition of meds  Sent to security/pharmacy per site process          Remain with patient   2 rings          Sent to security   Medication (metronidazole in a tube)         Kept in locker   cell phone, 2 power santillan (1 green, 1 black), 2  cords (1 black, 1 orange), white wall .  1 pair of flat maroon shoes, 1 pair gold colored sandals, purple underwear, white bra, 2 black tops, black sweatshirt, grey pants, multicolored striped shorts, blue top, blue pants, black hand brace, 3 notebooks, blue small container, 4 cigarette lighters, open pack of marlboro cigarettes, black toilet bag with makeup & toiletries, pink bag with 1 watch, 1 bracelet, 1 set of keys, hairbrush, body spray, chewing gum, & 3 pens.     ..A               Admission:  I am responsible for any personal items that are not sent to the safe or pharmacy.  [facility] is not responsible for loss, theft or damage of any property in my possession.    Signature:  _________________________________ Date: _______  Time: _____                                              Staff Signature:  ____________________________ Date: ________  Time: _____      2nd Staff person, if patient is unable/unwilling to sign:    Signature: ________________________________ Date: ________  Time: _____     Discharge:  [facility] has returned all of my personal belongings:    Signature: _________________________________ Date: ________  Time: _____                                          Staff Signature:  ____________________________ Date: ________  Time: _____

## 2020-06-24 NOTE — PLAN OF CARE
BEHAVIORAL TEAM DISCUSSION    Participants: Jennifer Funes MD, Ivanna PAREDES, Star Alegria RN  Progress: New admission  Anticipated length of stay: 3-5 days  Continued Stay Criteria/Rationale: symptom stabilization  Medical/Physical: Will evaluate and assess  Precautions:   Behavioral Orders   Procedures    Code 1 - Restrict to Unit    Routine Programming     As clinically indicated    Self Injury Precaution    Single Room    Status 15     Every 15 minutes.    Suicide precautions     Patients on Suicide Precautions should have a Combination Diet ordered that includes a Diet selection(s) AND a Behavioral Tray selection for Safe Tray - with utensils, or Safe Tray - NO utensils      Withdrawal precautions     Plan: Evaluate assess, stabilize   Rationale for change in precautions or plan: N/A new admission

## 2020-06-24 NOTE — PHARMACY-ADMISSION MEDICATION HISTORY
Admission medication history interview status for the 6/23/2020 admission is complete. See Epic admission navigator for allergy information, pharmacy, prior to admission medications and immunization status.     Medication history interview sources:  [facility] prescription fill history, [facility] #[id number] ([phone])    Changes made to PTA medication list (reason)  Added: Doxepin 10mg HS   Polyethylene glycol 17g prn  Deleted: Augmentin (therapy complete)   Lamotrigine (not filled since 2/2020)   Metronidazole (therapy complete)  Changed: Alprazolam 2mg BID -> 2mg daily   Adderall 10mg daily -> 20mg BID   Quetiapine 50mg daily -> 100mg daily    [place]  fill history in last month (consistently receives Adderall and Alprazolam monthly):  Adderall 20mg tab last filled 6/12/20 for #60 (30 DS)  Alprazolam 2mg tab last filled 6/12/20 for #14 (14 DS)  Alprazolam 2mg tab filled 6/2/20 for #14 (14 DS)    Additional medication history information (including reliability of information, actions taken by pharmacist):  -Patient unable to be interviewed so OTC medication use unable to be confirmed; current prescription medications confirmed by fill history with [facility] pharmacy and [place]     Prior to Admission medications    Medication Sig Last Dose Taking? Auth Provider   ALPRAZolam (XANAX) 2 MG tablet Take 2 mg by mouth daily   Yes Reported, Patient   amphetamine-dextroamphetamine (ADDERALL) 20 MG tablet Take 20 mg by mouth 2 times daily  Yes Unknown, Entered By History   cyclobenzaprine (FLEXERIL) 10 MG tablet Take 1 tablet (10 mg) by mouth 3 times daily as needed for muscle spasms  Yes Galdino Park DO   doxepin (SINEQUAN) 10 MG capsule Take 10 mg by mouth At Bedtime  Yes Unknown, Entered By History   escitalopram (LEXAPRO) 20 MG tablet Take 20 mg by mouth daily  Yes Unknown, Entered By History   polyethylene glycol (MIRALAX) 17 g packet Take 1 packet by mouth daily as needed for constipation  Yes Unknown, Entered  By History   QUEtiapine (SEROQUEL) 100 MG tablet Take 100 mg by mouth At Bedtime  Yes Unknown, Entered By History   acetaminophen (TYLENOL) 500 MG tablet Take 500-1,000 mg by mouth every 6 hours as needed for mild pain   Unknown, Entered By History   ibuprofen (ADVIL/MOTRIN) 600 MG tablet Take 1 tablet (600 mg) by mouth every 8 hours as needed for moderate pain   Star Wynn MD   multivitamin w/minerals (THERA-VIT-M) tablet Take 1 tablet by mouth daily   Unknown, Entered By History   VENTOLIN  (90 BASE) MCG/ACT inhaler Inhale 1-2 puffs into the lungs every 6 hours as needed for shortness of breath / dyspnea or wheezing    Reported, Patient   vitamin B-12 (CYANOCOBALAMIN) 1000 MCG tablet Take 1,000 mcg by mouth daily   Unknown, Entered By History     Medication history completed by:   Dang Haney, Pharm D  June 24, 2020

## 2020-06-24 NOTE — PROGRESS NOTES
Brief OB/GYN Note    OB/GYN team aware of consult. Emergency Department work-up reassurance for absence of infectious etiology given negative Gonorrhea, Wet prep, WBC WNL, and normal pelvic ultrasound. May consider adding on Chlamydia testing. ED work-up also reassuring that there is no acute process such as ovarian torsion and no structural abnormalities noted. Chart review reveals pathology report from 2016 laparoscopy, bilateral salpingectomy, right oophorectomy, removal of left ovarian mass significant for Endometriosis. Endometriosis could be the source of patient's chronic pelvic pain and ovarian suppression should be offered. Patient's RN contacted and patient is sleeping s/p Zyprexa. Will plan for in-patient consult on 6/25/20.     Discussed with Dr. Anderson.    Thank you for allowing us to be involved in the care of your patient. Please do not hesitate to give us a call with further questions. Gynecology Team consult pager [phone] from 6:30AM to 6:00PM Monday-Friday or [phone] from 6PM to 6:30AM and on weekends.     Alicia Myhre, DO  Obstetrics and Gynecology, PGY-4  June 24, 2020, 4:20 PM

## 2020-06-24 NOTE — TELEPHONE ENCOUNTER
Received callback from Unit [address] staff, Manolo. Made request for gyn consult order to be placed for pt to be seen by S provider per Dr. Adrianne Padilla. He agreed with plan.

## 2020-06-24 NOTE — ED NOTES
"Writer attempted to administer tordol and discharge pt. Pt began crying and saying \"I need to see psych because I just can't do this.\" Writer explained that all labs and test were WNL and that MD would like her to follow up with OBGYN. Pt became increasingly more upset and was talking very quickly and irrationally, and at times did not make sense. Pt insisted that she is not able to leave the hospital and that she needs to see psych. MD updated. DEC assessment initiated.   Maryanne Pastor RN on 6/23/2020 at 10:14 PM    "

## 2020-06-24 NOTE — PROGRESS NOTES
Pt woke up late but the moment she woke up she was out in the milieu yelling and screaming in pain. Pt  Was immediately approached by staff and nurses for intervention. Pt refused all help provided to her. The nurse offered her prn but because they were not pain medication pt declined them. Pt later sat on the  counter demanding medication and a slew of other things. Pt threw the journal at a nurse and began yelling that she is in so much pain that she can't walk. Pt was redirected to move away from the counter and was given the options to either sit in the lounge or go to her room to avoid disrupting the milieu. Pt started getting confrontational with staff and she was sternly redirected to her room by staff. As soon as pt got into her room she started shouting and slamming the door extremely loud to point that the other pts started getting scared. A code was called and pt got quiet the moment she was offered a shot. Pt claims she doesn't have any mental health problems. Pt claim to have an infection that requires pain meds and antibiotics.        06/24/20 1340   Behavioral Health   Hallucinations denies / not responding to hallucinations   Thinking poor concentration   Orientation person: oriented;place: oriented;date: oriented;time: oriented   Memory baseline memory   Insight denial of illness   Judgement impaired   Eye Contact at examiner   Affect angry;tense;irritable   Mood irritable   Physical Appearance/Attire untidy;disheveled   Hygiene neglected grooming - unclean body, hair, teeth   Suicidality   (denies)   1. Wish to be Dead (Recent) No   2. Non-Specific Active Suicidal Thoughts (Recent) No   Self Injury   (denies)   Elopement Statements about wanting to leave   Activity isolative;hyperactive (agitated, impulsive);restless   Speech rambling   Psychomotor / Gait agitated;hyperactive   Activities of Daily Living   Hygiene/Grooming independent   Oral Hygiene independent   Dress scrubs (behavioral  health)   Laundrosanna with supervision   Room Organization independent

## 2020-06-24 NOTE — ED NOTES
[facility], [place]   ED Nurse to Floor Handoff     Maryanne Crockett is a 39 year old female who speaks English and lives with family members,  in a home  They arrived in the ED by car from home    ED Chief Complaint: Pelvic Pain    ED Dx;   Final diagnoses:   Pelvic pain in female         Needed?: No    Allergies:   Allergies   Allergen Reactions     Ibuprofen GI Disturbance   .  Past Medical Hx:   Past Medical History:   Diagnosis Date     Depressive disorder      Family history of glioblastoma     screening MRIs every 2 years     Ovarian cyst      Seizures (H)       Baseline Mental status: WDL  Current Mental Status changes: needs sittter and other suicidal ideation, manic    Infection present or suspected this encounter: no  Sepsis suspected: No  Isolation type: No active isolations     Activity level - Baseline/Home:  Independent  Activity Level - Current:   Independent    Bariatric equipment needed?: No    In the ED these meds were given:   Medications   ketorolac (TORADOL) injection 15 mg (15 mg Intravenous Not Given 6/23/20 2209)   HYDROcodone-acetaminophen (NORCO) 5-325 MG per tablet 1 tablet (1 tablet Oral Given 6/23/20 1751)   HYDROcodone-acetaminophen (NORCO) 5-325 MG per tablet 1 tablet (1 tablet Oral Given 6/23/20 2011)       Drips running?  No    Home pump  No    Current LDAs  Peripheral IV 06/23/20 Left (Active)   Site Assessment WDL 06/23/20 1625   Line Status Saline locked 06/23/20 1625   Number of days: 1       Labs results:   Labs Ordered and Resulted from Time of ED Arrival Up to the Time of Departure from the ED   UA MACROSCOPIC WITH REFLEX TO MICRO AND CULTURE - Abnormal; Notable for the following components:       Result Value    Bilirubin Urine Small (*)     Ketones Urine 5 (*)     Protein Albumin Urine 30 (*)     Leukocyte Esterase Urine Small (*)     Squamous Epithelial /HPF Urine 4 (*)     Mucous Urine Present (*)     All other components within normal  "limits   CBC WITH PLATELETS DIFFERENTIAL   COMPREHENSIVE METABOLIC PANEL   LIPASE   HCG QUALITATIVE   COVID-19 VIRUS (CORONAVIRUS) BY PCR   WET PREP   NEISSERIA GONORRHOEAE PCR       Imaging Studies:   Recent Results (from the past 24 hour(s))   US Pelvic Complete with Transvaginal    Narrative    EXAMINATION: US PELVIC COMPLETE WITH TRANSVAGINAL, 6/23/2020 6:37 PM     COMPARISON: 4/28/2020    HISTORY: L pelvic pain, hx of ovarian cyst. R/o torsion.    TECHNIQUE: The pelvis was scanned in standard fashion with  transabdominal and transvaginal transducer(s) using both grey scale  and spectral flow and color Doppler techniques.    FINDINGS:  The uterus measures 7.7 x 3.8 x 5.1 cm, and there is no evidence of a  focal fibroid.  The endometrium is within normal limits and measures 6  mm. There is no free fluid in the pelvis.    The left ovary measures 3.7 x 1.7 x 2.2 cm. Multiple follicles with a  dominant follicle measuring 1.7 cm. There is no adnexal mass. There is  normal blood flow.    The right ovary is surgically removed.        Impression    IMPRESSION:   Right oophorectomy, otherwise normal pelvic ultrasound. No sonographic  evidence of ovarian torsion.      I have personally reviewed the examination and initial interpretation  and I agree with the findings.    KIERSTEN WOODARD MD       Recent vital signs:   BP (!) 150/89   Pulse 72   Temp 98.1  F (36.7  C) (Oral)   Resp 16   Ht 1.753 m (5' 9\")   Wt 93.9 kg (207 lb)   SpO2 100%   BMI 30.57 kg/m      Archer Coma Scale Score: 15 (06/23/20 1357)       Cardiac Rhythm: Other  Pt needs tele? No  Skin/wound Issues: None    Code Status: Full Code    Pain control: fair    Nausea control: pt had none    Abnormal labs/tests/findings requiring intervention: see labs    Family present during ED course? No   Family Comments/Social Situation comments:     Tasks needing completion: None    Maryanne Pastor, RN    [phone] [facility]      "

## 2020-06-24 NOTE — PLAN OF CARE
"Pt is a 38 y/o female presenting with SI and abdominal pain at our [facility] ED.      Per report :  Hx depression, ADD, PTSD, bipolar d/o, ovarian cyst, seizures.  Pt presented with complaints of ongoing abdominal pain.  Pt stated her sx have been over the past few months and she has been evaluated several times.  Pt is also endorsing depression and hopelessness.  Per , ED is unable to find anything abnormal and attempted to discharge her with recommendation to follow up with OBGYN.   reported pt appeared manic and stated she would kill herself if she leaves.  Hx of SI and impulsive behavior.  Admitted to [facility] in 2019 for SI.  Denied hx of aggression or substance abuse.    Upon admission to the unit, pt presented with a depressed and a flat affect. Denies AH. Denies substance abuse Was pleasant and cooperative but was feeling very tired. At times tangiental.  When asked if she has SI, she states yes, if she continuous to live like this everyday ...\" - referring to her medical condition. Stressors are her medical condition (she thinks she has pelvic infection) and death of her fiancee 2 years back.      UTOX positive for amphetamine and cannabinoids.COVID has been ordered and results pending per ED report.     Voluntary. On SI,SIB , withdrawal precautions. Single room.   "

## 2020-06-25 PROCEDURE — 99232 SBSQ HOSP IP/OBS MODERATE 35: CPT | Mod: GC | Performed by: PSYCHIATRY & NEUROLOGY

## 2020-06-25 PROCEDURE — 12400001 ZZH R&B MH UMMC

## 2020-06-25 PROCEDURE — 25000132 ZZH RX MED GY IP 250 OP 250 PS 637: Performed by: STUDENT IN AN ORGANIZED HEALTH CARE EDUCATION/TRAINING PROGRAM

## 2020-06-25 RX ORDER — NAPROXEN 250 MG/1
250 TABLET ORAL 3 TIMES DAILY PRN
Status: DISCONTINUED | OUTPATIENT
Start: 2020-06-25 | End: 2020-06-27 | Stop reason: HOSPADM

## 2020-06-25 RX ADMIN — ASENAPINE MALEATE 5 MG: 5 TABLET SUBLINGUAL at 21:51

## 2020-06-25 RX ADMIN — TRAZODONE HYDROCHLORIDE 50 MG: 50 TABLET ORAL at 22:17

## 2020-06-25 RX ADMIN — OLANZAPINE 10 MG: 10 TABLET, FILM COATED ORAL at 18:38

## 2020-06-25 RX ADMIN — TRAZODONE HYDROCHLORIDE 50 MG: 50 TABLET ORAL at 21:51

## 2020-06-25 RX ADMIN — CLONAZEPAM 0.5 MG: 0.5 TABLET ORAL at 21:11

## 2020-06-25 RX ADMIN — ACETAMINOPHEN 650 MG: 325 TABLET, FILM COATED ORAL at 21:13

## 2020-06-25 RX ADMIN — NAPROXEN 250 MG: 250 TABLET ORAL at 18:13

## 2020-06-25 RX ADMIN — OLANZAPINE 10 MG: 10 TABLET, FILM COATED ORAL at 11:53

## 2020-06-25 RX ADMIN — OLANZAPINE 10 MG: 10 TABLET, FILM COATED ORAL at 15:36

## 2020-06-25 RX ADMIN — CLONAZEPAM 0.5 MG: 0.5 TABLET ORAL at 18:01

## 2020-06-25 ASSESSMENT — ACTIVITIES OF DAILY LIVING (ADL)
ORAL_HYGIENE: INDEPENDENT
ORAL_HYGIENE: INDEPENDENT
DRESS: INDEPENDENT
DRESS: INDEPENDENT
LAUNDRY: WITH SUPERVISION
HYGIENE/GROOMING: INDEPENDENT
LAUNDRY: WITH SUPERVISION
HYGIENE/GROOMING: INDEPENDENT

## 2020-06-25 NOTE — PROGRESS NOTES
06/24/20 9384   Significant Event   Significant Event Other (see comments)  (Shift Summary)   Patient had a quiet shift.    Patient did not require seclusion/restraints to manage behavior.    Maryanne RAYO Self did not participate in groups and was not visible in the milieu.    Visitors during this shift included none.      Other information about this shift: Pt. was sleeping off and on in her room. She did not come out of her room during this shift. She was quiet and calm throughout the evening.

## 2020-06-25 NOTE — PROGRESS NOTES
Patient was isolated to room for much of the shift. Patient complained of excruciating pain in abdomen. Requesting to be seen by an OBGYN. Patient was seen by the OBGYN. Patient began upset and screaming in room. Denied SI/SIB.        06/25/20 1510   Behavioral Health   Hallucinations denies / not responding to hallucinations   Thinking poor concentration   Orientation person: oriented;place: oriented   Memory baseline memory   Insight poor   Judgement impaired   Eye Contact at examiner   Affect blunted, flat   Mood anxious;irritable   Physical Appearance/Attire appears stated age   Hygiene well groomed   Suicidality other (see comments)  (denies )   1. Wish to be Dead (Recent) No   2. Non-Specific Active Suicidal Thoughts (Recent) No   Self Injury   (denies )   Elopement   (none observed)   Activity isolative;withdrawn   Speech clear;coherent   Medication Sensitivity no stated side effects   Psychomotor / Gait tremors   Psycho Education   Type of Intervention 1:1 intervention   Response participates, initiates socially appropriate   Hours 0.5   Treatment Detail   (check-in)   Safety   Suicidality Status 15   Activities of Daily Living   Hygiene/Grooming independent   Oral Hygiene independent   Dress independent   Laundry with supervision   Room Organization independent   Groups   Details   (isolate)

## 2020-06-25 NOTE — PROGRESS NOTES
06/25/20 1308   General Information   Has Not Attended OT as of: 06/25/20     Sharonda Guajardo, OT on 6/25/2020 at 1:08 PM

## 2020-06-25 NOTE — CONSULTS
"Gynecology Consult Note    Referring Physician: TAMERA Rosario MD  Reason for Consult: Pelvic Pain    HPI: Maryanne Crockett is a 39 year old  HD#2 on Psychiatry Service for suicidal ideation. Patient is a poor historian, which limits HPI. Maryanne reports pelvic pain worsening over the last year. She describes constant left lower pelvic pain that acutely worsened approximately 5 days ago. She describes the pain as sharp. She states that she is unable to walk due to the pain. She gives conflicting reports of taking no medications for pain and taking high dose tylenol. She states that she has monthly periods but also states that she has them every two weeks. She is unsure when her last menses occurred. She repeatedly states that she is convinced that she has \"pelvic infections\" despite negative testing. She also repeatedly states that she feels that has PID. She denies vaginal discharge and states that something is wrong because she does not have vaginal discharge. She also reports swollen painful vulva. Patient is not sexually active. She denies fever/chills, vaginal bleeding, or changes in discharge.        OBHx: ,   NSVDx2    GynHx:  Last pap- NILM PAP 2008  STIs- Trich 3/19/2019  Contraception Hx:  5/22/15: Laparoscopic bilateral tubal ligation, right hemorrhagic ovarian cyst drainage  16: Laparoscopic bilateral salpingectomy, right oophorectomy, removal of left ovarian mass. Pathology with endometriosis on right ovary    PMH:   Past Medical History:   Diagnosis Date     Depressive disorder      Family history of glioblastoma     screening MRIs every 2 years     Ovarian cyst      Seizures (H)        PSH:   Past Surgical History:   Procedure Laterality Date     CHOLECYSTECTOMY       DILATION AND CURETTAGE SUCTION  4/30/15    retained POC     GASTRIC RESTRICTIVE PROCEDURE, OPEN, REMOVE/REPLACE SUBCUTANEOUS PORT       LAPAROSCOPIC OOPHORECTOMY Right 2016    Procedure: LAPAROSCOPIC OOPHORECTOMY;  " "Surgeon: Adrianne Vergara MD;  Location: [facility]     LAPAROSCOPIC SALPINGECTOMY Bilateral 8/5/2016    Procedure: LAPAROSCOPIC SALPINGECTOMY;  Surgeon: Adrianne Vergara MD;  Location: [facility]     LAPAROSCOPIC TUBAL LIGATION Bilateral 5/22/2015    Procedure: LAPAROSCOPIC TUBAL LIGATION;  Surgeon: Hayley Zayas MD;  Location: [facility]     LAPAROSCOPY OPERATIVE ADULT N/A 8/5/2016    Procedure: LAPAROSCOPY OPERATIVE ADULT;  Surgeon: Adrianne Vergara MD;  Location: [facility]     SOFT TISSUE SURGERY Right     cyst in hand       Social Hx:   Social History     Tobacco Use     Smoking status: Current Some Day Smoker     Packs/day: 0.25     Smokeless tobacco: Never Used     Tobacco comment: Trying   Substance Use Topics     Alcohol use: Yes     Alcohol/week: 0.0 standard drinks     Comment: rare     Drug use: Yes     Types: Marijuana     Comment: pt says she is prescribed medical marijuana        Family History: family history includes Other Cancer in her father and maternal aunt; Other Cancer (age of onset: 36) in her brother.    ROS:   Negative except per HPI    Objective:   /79   Pulse 85   Temp 98.5  F (36.9  C) (Oral)   Resp 16   Ht 1.753 m (5' 9\")   Wt 93.9 kg (207 lb)   SpO2 100%   BMI 30.57 kg/m    Constitutional: disheveled appearing female  Cardiovascular: Regular rate  Respiratory: no increased work of breathing  Gastrointestinal: Abdomen soft, no guarding or rebound tenderness, mildly tender to palpation at left lower quadrant. No palpable masses.  Vertical and transverse scars on lower abdomen   :  no pelvic exam performed. Vulva grossly normal   Psychiatric: circumstantial speech, lacking insight    Labs/Imaging:  Results for orders placed or performed during the hospital encounter of 06/23/20   US Pelvic Complete with Transvaginal     Status: None    Narrative    EXAMINATION: US PELVIC COMPLETE WITH TRANSVAGINAL, 6/23/2020 6:37 PM     COMPARISON: 4/28/2020    HISTORY: L pelvic pain, hx of " ovarian cyst. R/o torsion.    TECHNIQUE: The pelvis was scanned in standard fashion with  transabdominal and transvaginal transducer(s) using both grey scale  and spectral flow and color Doppler techniques.    FINDINGS:  The uterus measures 7.7 x 3.8 x 5.1 cm, and there is no evidence of a  focal fibroid.  The endometrium is within normal limits and measures 6  mm. There is no free fluid in the pelvis.    The left ovary measures 3.7 x 1.7 x 2.2 cm. Multiple follicles with a  dominant follicle measuring 1.7 cm. There is no adnexal mass. There is  normal blood flow.    The right ovary is surgically removed.        Impression    IMPRESSION:   Right oophorectomy, otherwise normal pelvic ultrasound. No sonographic  evidence of ovarian torsion.      I have personally reviewed the examination and initial interpretation  and I agree with the findings.    KIERSTEN WOODARD MD   CBC with platelets differential     Status: None   Result Value Ref Range    WBC 8.3 4.0 - 11.0 10e9/L    RBC Count 4.78 3.8 - 5.2 10e12/L    Hemoglobin 13.8 11.7 - 15.7 g/dL    Hematocrit 42.9 35.0 - 47.0 %    MCV 90 78 - 100 fl    MCH 28.9 26.5 - 33.0 pg    MCHC 32.2 31.5 - 36.5 g/dL    RDW 13.5 10.0 - 15.0 %    Platelet Count 306 150 - 450 10e9/L    Diff Method Automated Method     % Neutrophils 46.5 %    % Lymphocytes 42.8 %    % Monocytes 6.7 %    % Eosinophils 2.8 %    % Basophils 1.1 %    % Immature Granulocytes 0.1 %    Nucleated RBCs 0 0 /100    Absolute Neutrophil 3.9 1.6 - 8.3 10e9/L    Absolute Lymphocytes 3.5 0.8 - 5.3 10e9/L    Absolute Monocytes 0.6 0.0 - 1.3 10e9/L    Absolute Eosinophils 0.2 0.0 - 0.7 10e9/L    Absolute Basophils 0.1 0.0 - 0.2 10e9/L    Abs Immature Granulocytes 0.0 0 - 0.4 10e9/L    Absolute Nucleated RBC 0.0    Comprehensive metabolic panel     Status: None   Result Value Ref Range    Sodium 137 133 - 144 mmol/L    Potassium 3.7 3.4 - 5.3 mmol/L    Chloride 106 94 - 109 mmol/L    Carbon Dioxide 26 20 - 32 mmol/L     Anion Gap 5 3 - 14 mmol/L    Glucose 88 70 - 99 mg/dL    Urea Nitrogen 8 7 - 30 mg/dL    Creatinine 0.80 0.52 - 1.04 mg/dL    GFR Estimate >90 >60 mL/min/[1.73_m2]    GFR Estimate If Black >90 >60 mL/min/[1.73_m2]    Calcium 8.9 8.5 - 10.1 mg/dL    Bilirubin Total 0.4 0.2 - 1.3 mg/dL    Albumin 4.2 3.4 - 5.0 g/dL    Protein Total 7.8 6.8 - 8.8 g/dL    Alkaline Phosphatase 65 40 - 150 U/L    ALT 33 0 - 50 U/L    AST 24 0 - 45 U/L   Lipase     Status: None   Result Value Ref Range    Lipase 83 73 - 393 U/L   HCG qualitative Blood     Status: None   Result Value Ref Range    HCG Qualitative Serum Negative NEG^Negative   UA reflex to Microscopic and Culture     Status: Abnormal    Specimen: Urine clean catch   Result Value Ref Range    Color Urine Yellow     Appearance Urine Clear     Glucose Urine Negative NEG^Negative mg/dL    Bilirubin Urine Small (A) NEG^Negative    Ketones Urine 5 (A) NEG^Negative mg/dL    Specific Gravity Urine 1.024 1.003 - 1.035    Blood Urine Negative NEG^Negative    pH Urine 5.5 5.0 - 7.0 pH    Protein Albumin Urine 30 (A) NEG^Negative mg/dL    Urobilinogen mg/dL 2.0 0.0 - 2.0 mg/dL    Nitrite Urine Negative NEG^Negative    Leukocyte Esterase Urine Small (A) NEG^Negative    Source Urine     RBC Urine 2 0 - 2 /HPF    WBC Urine 1 0 - 5 /HPF    Squamous Epithelial /HPF Urine 4 (H) 0 - 1 /HPF    Mucous Urine Present (A) NEG^Negative /LPF   Asymptomatic COVID-19 Virus (Coronavirus) by PCR     Status: None    Specimen: Nasopharyngeal   Result Value Ref Range    COVID-19 Virus PCR to [facility] - Source Nasopharyngeal     COVID-19 Virus PCR to [facility] - Result       Test received-See reflex to IDDL test SARS CoV2 (COVID-19) Virus RT-PCR   Drug abuse screen 6 urine (chem dep)     Status: Abnormal   Result Value Ref Range    Amphetamine Qual Urine Positive (A) NEG^Negative    Barbiturates Qual Urine Negative NEG^Negative    Benzodiazepine Qual Urine Negative NEG^Negative    Cannabinoids Qual Urine  Positive (A) NEG^Negative    Cocaine Qual Urine Negative NEG^Negative    Ethanol Qual Urine Negative NEG^Negative    Opiates Qualitative Urine Negative NEG^Negative   SARS-CoV-2 COVID-19 Virus (Coronavirus) RT-PCR Nasopharyngeal     Status: None    Specimen: Nasopharyngeal   Result Value Ref Range    SARS-CoV-2 Virus Specimen Source Nasopharyngeal     SARS-CoV-2 PCR Result NEGATIVE     SARS-CoV-2 PCR Comment       Testing was performed using the [employer]plex[employer] COVID-19 Direct Assay on the [employer] MDX   instrument. Additional information about this Emergency Use Authorization (EUA) assay can   be found via the Lab Guide.     Hepatic panel     Status: Abnormal   Result Value Ref Range    Bilirubin Direct <0.1 0.0 - 0.2 mg/dL    Bilirubin Total 0.4 0.2 - 1.3 mg/dL    Albumin 3.2 (L) 3.4 - 5.0 g/dL    Protein Total 6.7 (L) 6.8 - 8.8 g/dL    Alkaline Phosphatase 56 40 - 150 U/L    ALT 27 0 - 50 U/L    AST 21 0 - 45 U/L   Lipid panel     Status: Abnormal   Result Value Ref Range    Cholesterol 168 <200 mg/dL    Triglycerides 143 <150 mg/dL    HDL Cholesterol 48 (L) >49 mg/dL    LDL Cholesterol Calculated 91 <100 mg/dL    Non HDL Cholesterol 120 <130 mg/dL   TSH with free T4 reflex and/or T3 as indicated     Status: None   Result Value Ref Range    TSH 1.71 0.40 - 4.00 mU/L   Vitamin D     Status: None   Result Value Ref Range    Vitamin D Deficiency screening 31 20 - 75 ug/L   Wet prep     Status: None    Specimen: Genital    VAGINAL   Result Value Ref Range    Specimen Description Genital VAGINAL     Wet Prep No Trichomonas seen     Wet Prep No clue cells seen     Wet Prep No yeast seen     Wet Prep No PMNs seen    Neisseria gonorrhoeae PCR     Status: None    Specimen: Cervix   Result Value Ref Range    Specimen Descrip Cervix     N Gonorrhea PCR Negative NEG^Negative        Assessment/Plan:    Maryanne Crockett is a 39 year old  HD#2 on Psychiatry Service for suicidal ideation. OB/GYN consulted for acute on  chronic pelvic pain.     Endometriosis  -Patient with diagnosis of endometriosis based on pathology report from 2016 laparoscopic bilateral salpingectomy and right oophorectomy. Reviewed with patient that endometriosis can be a significant source of pelvic pain. Discussed ovarian suppression as first line treatement for endometriosis.  -Patient is not a candidate for combined OCP due to age greater than 35 and a smoker. Additional options discussed included progesterone only pills (POPs), Depo Lupron, Orilissa, and hysterectomy. Reviewed side effects for Depro Lupron include menopause like symptoms (including hot flashes, vaginal dryness, sleep and mood disruption) due to effects as an estrogen antagonist via inhibition of gonadotropin release. Reviewed milder side effects associated with Orilissa due to partial estrogen antagonism. Patient reluctant to continue to discuss options and side effects and instead perseverating on pelvic infection.  -Patient ultimately decided that she would like to try Orilissa for management of endometriosis. OB/GYN team will work on getting prior authorization completed for Orilissa, but consideration should be given to potential effects on mood given disruption of hormonal axis.   -Pain: encouraged scheduled tylenol. Defer to psychiatry for pain management compatible with patient's current medications.   -Patient thought pattern circumstantial during interview and there are concerns regarding patient ability to consent to new treatment.   -Appreciate Psychiatry's input regarding patient's ability to consent and potential mood disruption with estrogen antagonists.     Pelvic infection  -Offered reassurance that screening for infection BV, trich, and Yeast were negative.   -Chlamydia urine screen ordered.     Discussed with Dr. Terrell Alicia Myhre, DO  Obstetrics and Gynecology, PGY-4  June 25, 2020, 3:26 PM     Thank you for allowing us to be involved in the care of your patient.  Please do not hesitate to give us a call with further questions. GYN team consult pager [phone] from 6:30AM to 6:00PM Monday - Friday or [phone] from 6PM to 6:30AM and on weekends.       I have reviewed the case in detail with the resident. I have reviewed the patient's course and findings to date. I agree with the assessment and plan.   Patient will likely require ongoing care and management of endometriosis and we have concerns about her being able to fully understand and decide about a treatment plan at this time.     Elodia Garcia MD

## 2020-06-25 NOTE — PROGRESS NOTES
Psychiatry Cross Cover Note    S: Notified by staff that patient was offered Ibuprofen, which appears to be a new med ordered today, for pain.  Patient is allergic to Ibuprofen, she became upset that this was offered to her because of her allergy.  She did not take any Ibuprofen.  I discontinued the Ibuprofen PRN.  She has Tylenol available for pain. I encourage patient to discuss further pain management options with primary team tomorrow.      Jerica Clay DO, VIJAY, MS  On-call PGY-2 Psychiatry Resident

## 2020-06-25 NOTE — PROGRESS NOTES
"  ----------------------------------------------------------------------------------------------------------  [facility], [place]   Psychiatric Progress Note  Hospital Day #1     Interim History:   The patient's care was discussed with the treatment team and chart notes were reviewed.    Sleep 6.75 hours (06/25/20 0600)  Scheduled Medications: took Asenapine as scheduled  PRN medications: 6/24: olanzapine injection @ 6390    Staff Report:   Per chart review: Upon waking late in the day shift, she was \"yelling and screaming in pain\", requested pain medication, threw journal at RN. Code was called and she went back to her room. Did take IM olanzapine. She claimed to have no mental health symptoms and that she has an infection that needs antibiotics.    Reported as being quiet and calm in evening, did not leave her room. Was upset about being offered ibuprofen, reports allergy.    OB-GYN aware of patient and will consult in-patient today.    Patient Interview:   Maryanne Crockett was interviewed in the conference room. She was tearful and crying. Reported feeling \"miserable\" and said \"no one believes me\" regarding her pain. Reported feeling depressed thoughts of SI, both of which she attributes to her pain. She was assured that OB/GYN would see her to further evaluate for causes of her pain and that team will defer medical evaluation to them. Said that right now sleeping is \"all that I can do\" and \"I can't even make it to my own bathroom.\"    The risks, benefits, alternatives and side effects of any medication changes have been discussed and are understood by the patient and other caregivers.    Review of systems:     ROS was negative unless noted above.          Allergies:     Allergies   Allergen Reactions     Ibuprofen GI Disturbance            Psychiatric Examination:   BP 99/51   Pulse 71   Temp 97.5  F (36.4  C) (Oral)   Resp 18   Ht 1.753 m (5' 9\")   Wt 93.9 kg (207 lb)   SpO2 100%  " " BMI 30.57 kg/m    Weight is 207 lbs 0 oz  Body mass index is 30.57 kg/m .    MENTAL STATUS EXAM    Appearance:  appears older than than stated age and unkempt  Attitude:  cooperative  Psychomotor:  no evidence of tics, dystonia, or tardive dyskinesia and agitated  Eye Contact: downcast  Speech:  Speaking while crying, normal rate  Mood: \"miserable\"  Affect: Mood  congruent, tearful/crying  Thought Content: passive suicidal ideation  Thought Process: rambling and circumstantial  Sensorium: awake and alert  Cognition: memory grossly intact  Impulse control: fair  Insight: questionable  Judgment: questionable         Labs:   No results found for this or any previous visit (from the past 24 hour(s)).     Assessment    Principal psychiatric diagnosis:   - Unspecified mood or emotional dysregulation disorder  - Bipolar 2 Disorder (historic) vs Cluster B personality traits +/- substance induced component     Secondary psychiatric diagnoses:   - PTSD, per history   - r/o Cannabis abuse disorder     Diagnostic Impression:   Maryanne Crockett is a 39 year old  female with a past psychiatric history of bipolar affective disorder, PTSD, unspecified anxiety, ADHD, and cannabis use disorder who presented to the ER with SI in the context of 2 months of pelvic pain for which she has been unable to find definitive diagnosis or treatment. She presented with significant worry and anxiety around her pain. In ED, she stated that if she were discharged she would kill herself.      Current psychosocial stressors include chronic mental health issues, family dynamics and pelvic pain which she has been coping with by using substances and seeking medical care.  Patient's support system includes family.  Substance use (daily marijuana) appeared to be playing a contributing role in the patient's presentation.  There is genetic loading for mood. Medical history does appear to be significant for previous pelvic/abdominal surgeries as well " as ovarian cysts though work-up has not yet revealed medical cause for her current pelvic pain. 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       The initial MSE was notable for depressed mood, passive SI, and labile affect. She reports no self injurious behaviors. Her reported symptoms of depressed mood, SI, and manic episodes are consistent with her historic diagnosis of bipolar affective disorder, however it is not clear she meets criteria for a full mood episode at this time. Cluster B personality traits possibly contributing to presentation (she seems to have difficulty with interpersonal relationships and maladaptive coping, as well as labile affect and chronic suicidal ideations driven by psychosocial stress).     Psychiatric Hospital course:   Maryanne Crockett was admitted to station 20 as a voluntary patient with attending Dr Funes. PTA Lexapro, Seroquel, Doxepin, Adderall and Xanax were held due to inconsistent use/lack of efficacy. Maryanne requested to restart Safris due to past benefit with goal to target impulsivity, sleep, and mood instability. Started this medication at bedtime. PRN Klonopin also started for anxiety.     Discontinued Medications (& Rationale):  Lexapro, Seroquel, Doxepin, Adderall, Xanax- lack of benefit, diagnostic uncertainty    Medical course   Presented with 2 month history of left-sided abdominal/pelvic pain. Evaluated for acute processes in ED, no medical cause for pain found at that time (work-up included pelvic ultrasound/microbiology). 6/25: Consult ordered with OB/GYN.    Data:   Labs on Admission:  - Labs: CMP, CBC, TSH, Vit D, Hepatic panel WNL  - COVID-19 negative  - UA with small leukocyte esterase, normal WBC  - Wet Prep negative, Neisseria Gonnorhea negative    Consults:   OB/GYN (placed 6/25): Acute-on chronic pelvic  pain.    Plan     Today's Changes:  - Consulted OB/GYN for acute-on chronic pelvic pain    Psychotropic Medications:  Scheduled Psych Medications:  -Asenapine 5 mg at bedtime    PRN Psych Medications  -Klonopin 0.5mg BID prn for anxiety, sleep  -Olanzapine 10 mg PO/IM prn Q2H severe agitation/psychosis  -Hydroxyzine 25 mg Q4H PRN for anxiety  -Trazodone 50 mg PRN bedtime for sleep  -Tylenol 650 mg Q6H PRN for pain and fever  -Mylanta 30 ml Q4H PRN for indigestion  -Nicotine lozenge 2-4 mg Q1H PRN for nicotine withdrawal  -Milk of Magnesia 30 mL at bedtime Prn for constipation  -MAALOX 30 mL Q4H PRN for indigestion    Patient will be treated in therapeutic milieu with appropriate individual and group therapies as described.    Medical diagnoses to be addressed this admission:    #. Abdominal/Pelvic Pain  No cause found on admission per ED work-up. Wet mount, N. gonnorhea negative. Ultrasound was non-revealing.  - Consulting OB/GYN, appreciate recs     #. 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    Legal Status:   Orders Placed This Encounter      Voluntary      Safety Assessment:   Behavioral Orders   Procedures     Code 1 - Restrict to Unit     Routine Programming     As clinically indicated     Self Injury Precaution     Single Room     Status 15     Every 15 minutes.     Suicide precautions     Patients on Suicide Precautions should have a Combination Diet ordered that includes a Diet selection(s) AND a Behavioral Tray selection for Safe Tray - with utensils, or Safe Tray - NO utensils       Withdrawal precautions       Disposition: Pending stabilization & development of a safe discharge plan.     The patient was seen and the plan was discussed with the attending physician.     Juanjo Allen, MS4    I was present with the medical student who participated in the service and in the  documentation of the note. I have verified the history and personally  performed the  exam and medical decision making. I agree with the assessment and plan of  care as documented in the note.    Song Vernon  PGY-2 Psychiatry    Attestation:  This patient has been seen and evaluated by me, Jennifer Funes MD.  I have discussed this patient with the house staff team including the resident and medical student and I agree with the findings and plan in this note.    I have reviewed today's vital signs, medications, labs and imaging. Jennifer Funes MD , PhD.

## 2020-06-26 PROCEDURE — 99232 SBSQ HOSP IP/OBS MODERATE 35: CPT | Mod: GC | Performed by: PSYCHIATRY & NEUROLOGY

## 2020-06-26 PROCEDURE — 25000132 ZZH RX MED GY IP 250 OP 250 PS 637: Performed by: STUDENT IN AN ORGANIZED HEALTH CARE EDUCATION/TRAINING PROGRAM

## 2020-06-26 PROCEDURE — 87491 CHLMYD TRACH DNA AMP PROBE: CPT | Performed by: STUDENT IN AN ORGANIZED HEALTH CARE EDUCATION/TRAINING PROGRAM

## 2020-06-26 PROCEDURE — 12400001 ZZH R&B MH UMMC

## 2020-06-26 PROCEDURE — H2032 ACTIVITY THERAPY, PER 15 MIN: HCPCS

## 2020-06-26 RX ORDER — MULTIVITAMIN,THERAPEUTIC
1 TABLET ORAL DAILY
Status: DISCONTINUED | OUTPATIENT
Start: 2020-06-26 | End: 2020-06-27 | Stop reason: HOSPADM

## 2020-06-26 RX ORDER — POTASSIUM CHLORIDE 750 MG/1
10 TABLET, EXTENDED RELEASE ORAL DAILY
Status: DISCONTINUED | OUTPATIENT
Start: 2020-06-26 | End: 2020-06-27 | Stop reason: HOSPADM

## 2020-06-26 RX ORDER — UBIDECARENONE 75 MG
100 CAPSULE ORAL DAILY
Status: DISCONTINUED | OUTPATIENT
Start: 2020-06-26 | End: 2020-06-27 | Stop reason: HOSPADM

## 2020-06-26 RX ORDER — CHOLECALCIFEROL (VITAMIN D3) 1250 MCG
1250 CAPSULE ORAL
Status: DISCONTINUED | OUTPATIENT
Start: 2020-06-26 | End: 2020-06-27 | Stop reason: HOSPADM

## 2020-06-26 RX ORDER — POTASSIUM CHLORIDE 1500 MG/1
20 TABLET, EXTENDED RELEASE ORAL DAILY
Status: DISCONTINUED | OUTPATIENT
Start: 2020-06-26 | End: 2020-06-26

## 2020-06-26 RX ORDER — ASENAPINE 5 MG/1
10 TABLET SUBLINGUAL AT BEDTIME
Status: DISCONTINUED | OUTPATIENT
Start: 2020-06-26 | End: 2020-06-27 | Stop reason: HOSPADM

## 2020-06-26 RX ADMIN — ACETAMINOPHEN 650 MG: 325 TABLET, FILM COATED ORAL at 14:24

## 2020-06-26 RX ADMIN — NAPROXEN 250 MG: 250 TABLET ORAL at 20:50

## 2020-06-26 RX ADMIN — FLUOXETINE 20 MG: 20 CAPSULE ORAL at 16:55

## 2020-06-26 RX ADMIN — NAPROXEN 250 MG: 250 TABLET ORAL at 16:56

## 2020-06-26 RX ADMIN — OLANZAPINE 10 MG: 10 TABLET, FILM COATED ORAL at 18:03

## 2020-06-26 RX ADMIN — VITAM B12 100 MCG: 100 TAB at 16:56

## 2020-06-26 RX ADMIN — CLONAZEPAM 0.5 MG: 0.5 TABLET ORAL at 11:21

## 2020-06-26 RX ADMIN — OLANZAPINE 10 MG: 10 TABLET, FILM COATED ORAL at 12:54

## 2020-06-26 RX ADMIN — THERA TABS 1 TABLET: TAB at 16:56

## 2020-06-26 RX ADMIN — OLANZAPINE 10 MG: 10 TABLET, FILM COATED ORAL at 20:50

## 2020-06-26 RX ADMIN — HYDROXYZINE HYDROCHLORIDE 25 MG: 25 TABLET, FILM COATED ORAL at 14:24

## 2020-06-26 RX ADMIN — CHOLECALCIFEROL CAP 1.25 MG (50000 UNIT) 1250 MCG: 1.25 CAP at 18:04

## 2020-06-26 RX ADMIN — NAPROXEN 250 MG: 250 TABLET ORAL at 11:21

## 2020-06-26 RX ADMIN — CLONAZEPAM 0.5 MG: 0.5 TABLET ORAL at 16:55

## 2020-06-26 RX ADMIN — POTASSIUM CHLORIDE 10 MEQ: 750 TABLET, EXTENDED RELEASE ORAL at 18:04

## 2020-06-26 RX ADMIN — ASENAPINE MALEATE 10 MG: 5 TABLET SUBLINGUAL at 21:55

## 2020-06-26 ASSESSMENT — ACTIVITIES OF DAILY LIVING (ADL)
ORAL_HYGIENE: INDEPENDENT
DRESS: INDEPENDENT
HYGIENE/GROOMING: INDEPENDENT;SHOWER

## 2020-06-26 NOTE — PROGRESS NOTES
Work Completed: Met with team. Discussed patient's progress with medical providers. Patient continues report pelvic pain. Consult for OB was ordered. Patient declined to discussed aftercare plan with writer as she was upset about not getting stronger medication for her pain.     Discharge plan or goal: TBD                Barriers to discharge: Ongoing pelvic pain, aggressive behavior, non-adherent to treatment recommendations.

## 2020-06-26 NOTE — DISCHARGE INSTRUCTIONS
Behavioral Discharge Planning and Instructions      Summary:  You were admitted on 6/23/2020  due to Suicidal Ideations.  You were treated by Dr. Jennifer Funes MD and discharged on ***/***/*** from [facility] to {[facility] D/C Locations:387019}      Principal Diagnosis:       Health Care Follow-up Appointments:   Medication management/Psychiatry  Date/Time: ***      Provider: ***  Address: ***  Phone:***  Fax: ***    DBT Therapist  Date/Time: ***      Provider: ***  Address: ***  Phone:***  Fax: ***    Please make an appointment to follow up with [facility] (phone: [phone]) as soon as possible. Return to the emergency department if symptoms get worse, there are any new symptoms or any cause for concern.     Attend all scheduled appointments with your outpatient providers. Call at least 24 hours in advance if you need to reschedule an appointment to ensure continued access to your outpatient providers.   Major Treatments, Procedures and Findings:  You were provided with: {Major Treatments:429354}    Symptoms to Report: feeling more aggressive, increased confusion, losing more sleep, mood getting worse or thoughts of suicide    Early warning signs can include: increased depression or anxiety sleep disturbances increased thoughts or behaviors of suicide or self-harm  increased unusual thinking, such as paranoia or hearing voices    Safety and Wellness:  Take all medicines as directed.  Make no changes unless your doctor suggests them.      Follow treatment recommendations.  Refrain from alcohol and non-prescribed drugs.  Ask your support system to help you reduce your access to items that could harm yourself or others. If there is a concern for safety, call 911.    Resources:   [place] on Mental Illness (www.[place].drew.org): 308.874.5332 or 312-314-6627.  Alcoholics Anonymous (www.alcoholics-anonymous.org): Check your phone book for your local chapter.  National Suicide Prevention Line  (www.mentalhealthmn.org): 163-758-JUOQ (6655)  Mental Health Consumer/Survivor Network of [place] (www.mhcsn.net): 859.203.2649 or 317-577-4396  Mental Health Association of [place] (www.mentalhealth.org): 634.698.3662 or 732-774-4943  Self- Management and Recovery Training., [employer]-- Toll free: 927.905.3486  www.[employer]  [facility] Crisis (COPE) Response - Adult [phone]      The treatment team has appreciated the opportunity to work with you.  Maryanne.  please take care and make your recovery a daily recovery. If you would like to obtain any specific documentation regarding your hospitalization after your discharge, contact [facility] Release of Information/Medical Records:  [phone]

## 2020-06-26 NOTE — PROGRESS NOTES
"Patient is requesting IM Zyprexa in order to treat pelvic pain and states feeling like she is \"going to freak out.\" She has already been given PRN Zyprexa 10mg PO 3 times today, which puts her at the daily limit. Writer consulted on-call resident who advised against giving any additional Zyprexa unless in an emergency (Code 21) situation.     Writer explained to patient that Zyprexa is an anti-psychotic medication that is not intended to treat pain, but instead is meant to treat the positive symptoms of psychosis. Also explained administering doses beyond 30mg/day could result in adverse side effects such as hypotension.     Patient was accepting of this but tearfully explained that she was in so much pain that she felt suicidal and felt like no one was listening to her. Writer attempted to offer alternative methods for pain management, none of which patient accepted.    Patient eventually requested Tylenol and Trazodone in order to try to sleep.   "

## 2020-06-26 NOTE — PLAN OF CARE
"48 hour assessment:  slept in until 11:30 this morning.  Endorses continued depression and anxiety, which she states is all related to her medical issues. States \"I would be all better if I could just have a hysterectomy\".  Requesting PRN medication every 2 hours for c/o pain and anxiety.  PRN's that have been given since 11:30 include Klonopin, naproxen, Zyprexa, hydroxyzine and tylenol. Declined offer of cold pack for anxiety. Did shower and apply make up this afternoon.   "

## 2020-06-26 NOTE — PROGRESS NOTES
"   06/25/20 2039   Behavioral Health   Hallucinations denies / not responding to hallucinations   Thinking poor concentration   Orientation time: oriented;date: oriented;place: oriented;person: oriented   Memory baseline memory   Insight poor   Judgement impaired   Eye Contact at examiner   Affect blunted, flat   Mood depressed;anxious   Physical Appearance/Attire disheveled   Hygiene other (see comment)  (adequate)   Suicidality other (see comments)  (Pt denies)   1. Wish to be Dead (Recent) No   2. Non-Specific Active Suicidal Thoughts (Recent) No   Self Injury other (see comment)  (Pt denies)   Elopement Statements about wanting to leave   Activity isolative;withdrawn   Speech clear;coherent   Medication Sensitivity no observed side effects;other (see comment)  (States anxiety meds not working)   Psychomotor / Gait balanced   Psycho Education   Type of Intervention 1:1 intervention   Response participates, initiates socially appropriate   Hours 0.5   Treatment Detail Check-In   Activities of Daily Living   Hygiene/Grooming independent   Oral Hygiene independent   Dress independent   Laundry with supervision   Room Organization independent     Pt rated her depression at a 5/10 and her anxiety at a 10/10, both of these are higher than normal due to her physical pain. She explained she is in a lot of pain due to endometriosis, and it is also making her feel irritable. She stated she wants to schedule a \"full hysterectomy\" to get rid of the pain. Pt denies SI/SIB/HI and hallucinations. When asked about side effects to medications she states that her anxiety medications are not working. To cope she plans to just lay in bed and isolate until she can leave and schedule her surgery. She did come out for dinner, but has been asking for extra anxiety medications this evening to help her relax. Pt had a good shift, though she was isolative and withdrawn.   "

## 2020-06-26 NOTE — PROGRESS NOTES
"  ----------------------------------------------------------------------------------------------------------  [facility], [place]   Psychiatric Progress Note  Hospital Day #2     Interim History:   The patient's care was discussed with the treatment team and chart notes were reviewed.    Sleep 6.25 hours (06/26/20 0657)  Scheduled Medications: took Asenapine as scheduled  PRN medications: 6/25: acetaminophen at 2113, klonopin at 1601 and 2111, naproxen at 1813, olanzapine at 1153, 1536, and 1838, trazodone at 2151 and 2217    Staff Report:   Per chart review: She spent most of day in bed in an attempt to deal with pain. Reported as wanting \"full hysterectomy\" to deal with pain from endometriosis. Requested and took 30 mg PRN olanzapine to deal with pain, was requesting more.    Was seen by OB/GYN, and discussed treatment options for endometriosis. Reported that patient decided that she would like to try Orilissa.    Patient Interview:   Maryanne Crockett was interviewed in the conference room. Discussed her pain and that she states it persists but she reported feeling somewhat better due to \"accepting it.\" She was provided verbal education about the nature of pain and how thoughts and mood can contribute to pain perception. She stated that although she can handle her level of chronic pain, she feels she can not manage the level of acute pain she has had the past couple of months.    Discussed that she saw OB/GYN yesterday. While she reported skepticism that endometriosis could account for her current pelvic pain stating \"I don't think endometriosis lasts weeks on weeks\", she also reported at this time preferring to pursue hysterectomy for definitive treatment of endometriosis-related pain. She was encouraged to discuss options with OB/GYN and still explore Orilissa for current symptoms.    Regarding medications, she is amenable to increasing Asenapine at this time. She also believed she could " "benefit from resuming an antidepressant, stating that she has found Prozac most helpful in the past. While we discussed that an SNRI like Cymbalta would also be an option due providing some analgesic effect, she prefers to start Prozac due to her past experience. She also requested increase in Klonopin, although she was informed that team was not comfortable making this change. While discussing alternatives for anxiety/sleep, she reported previous negative effects of trazodone and stated that hydroxyzine has \"no effect\" for her. She also requested to resume vitamins that she takes at home including and Vitamin D, B12, potassium. She at one point stated that she would be interested in ECT in the future, because she believes that she is \"medication resistant.\"    The risks, benefits, alternatives and side effects of any medication changes have been discussed and are understood by the patient and other caregivers.    Review of systems:     ROS was negative unless noted above.          Allergies:     Allergies   Allergen Reactions     Ibuprofen GI Disturbance            Psychiatric Examination:   /79   Pulse 85   Temp 97.9  F (36.6  C) (Oral)   Resp 16   Ht 1.753 m (5' 9\")   Wt 93.9 kg (207 lb)   SpO2 97%   BMI 30.57 kg/m    Weight is 207 lbs 0 oz  Body mass index is 30.57 kg/m .    MENTAL STATUS EXAM    Appearance:  appears older than than stated age and unkempt  Attitude:  cooperative  Psychomotor:  no evidence of tics, dystonia, or tardive dyskinesia and agitated  Eye Contact: appropriate  Speech:  Normal rate  Mood: \"accepting\"  Affect: Mood  congruent  Thought Content: passive suicidal ideation  Thought Process: rambling, coherent, goal-directed  Sensorium: awake and alert  Cognition: memory grossly intact  Impulse control: fair  Insight: questionable  Judgment: questionable         Labs:   No results found for this or any previous visit (from the past 24 hour(s)).     Assessment    Principal " psychiatric diagnosis:   - Unspecified mood or emotional dysregulation disorder  - Bipolar 2 Disorder (historic) vs Cluster B personality traits +/- substance induced component     Secondary psychiatric diagnoses:   - PTSD, per history   - r/o Cannabis abuse disorder     Diagnostic Impression:   Maryanne Crockett is a 39 year old  female with a past psychiatric history of bipolar affective disorder, PTSD, unspecified anxiety, ADHD, and cannabis use disorder who presented to the ER with SI in the context of 2 months of pelvic pain for which she has been unable to find definitive diagnosis or treatment. She presented with significant worry and anxiety around her pain. In ED, she stated that if she were discharged she would kill herself.      Current psychosocial stressors include chronic mental health issues, family dynamics and pelvic pain which she has been coping with by using substances and seeking medical care.  Patient's support system includes family.  Substance use (daily marijuana) appeared to be playing a contributing role in the patient's presentation.  There is genetic loading for mood. Medical history does appear to be significant for previous pelvic/abdominal surgeries as well as ovarian cysts though work-up has not yet revealed medical cause for her current pelvic pain. 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       The initial MSE was notable for depressed mood, passive SI, and labile affect. She reports no self injurious behaviors. Her reported symptoms of depressed mood, SI, and manic episodes are consistent with her historic diagnosis of bipolar affective disorder, however it is not clear she meets criteria for a full mood episode at this time. Cluster B personality traits possibly contributing to presentation (she seems to have difficulty with  interpersonal relationships and maladaptive coping, as well as labile affect and chronic suicidal ideations driven by psychosocial stress).     Psychiatric Hospital course:   Maryanne Crockett was admitted to station 20 as a voluntary patient with attending Dr Funes. PTA Lexapro, Seroquel, Doxepin, Adderall and Xanax were held due to inconsistent use/lack of efficacy. Maryanne requested to restart Safris due to past benefit with goal to target impulsivity, sleep, and mood instability. Started this medication at bedtime, currently titrating. PRN Klonopin also started for anxiety. Also started Prozac per her request as she believes she needs an antidepressant.    Discontinued Medications (& Rationale):  Lexapro, Seroquel, Doxepin, Adderall, Xanax- lack of benefit, diagnostic uncertainty    Medical course   Presented with 2 month history of left-sided abdominal/pelvic pain. Evaluated for acute processes in ED, no medical cause for pain found at that time (work-up included pelvic ultrasound/microbiology). 6/25: Consult ordered with OB/GYN. They saw patient and discussed starting Orilissa.    Data:   Labs on Admission:  - Labs: CMP, CBC, TSH, Vit D, Hepatic panel WNL  - COVID-19 negative  - UA with small leukocyte esterase, normal WBC  - Wet Prep negative, Neisseria Gonnorhea negative  6/26:  -Chlamydia PCR in progress    Consults:   OB/GYN (placed 6/25): Acute-on chronic pelvic pain.    Plan     Today's Changes:  -Increased Asenapine to 10 mg at bedtime  -Start Prozac 20 mg daily    Psychotropic Medications:  Scheduled Psych Medications:  -Asenapine 10 mg at bedtime  -Prozac 20 mg daily    PRN Psych Medications  -Klonopin 0.5mg BID prn for anxiety, sleep  -Olanzapine 10 mg PO/IM prn Q2H severe agitation/psychosis  -Hydroxyzine 25 mg Q4H PRN for anxiety  -Tylenol 650 mg Q6H PRN for pain and fever  -Mylanta 30 ml Q4H PRN for indigestion  -Nicotine lozenge 2-4 mg Q1H PRN for nicotine withdrawal  -Milk of Magnesia 30 mL at bedtime  Prn for constipation  -MAALOX 30 mL Q4H PRN for indigestion    Patient will be treated in therapeutic milieu with appropriate individual and group therapies as described.    Medical diagnoses to be addressed this admission:    #. Abdominal/Pelvic Pain suspected 2/2 Endometriosis  No cause found for pain on admission per ED work-up. Wet mount, N. gonnorhea negative. Pelvic ultrasound was non-revealing. Diagnosis of endometriosis based on previous pathology report from 2016.  - Consulting OB/GYN, appreciate recs  - Plan to treat endometriosis with Orilissa per OB/GYN  - From psychiatry team standpoint, benefit on mood from pain reduction likely outweighs risk of negative effect on mood from hormonal treatment and we do not have concerns about her capacity to consent for this treatment     #. 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    Legal Status:   Orders Placed This Encounter      Voluntary      Safety Assessment:   Behavioral Orders   Procedures     Code 1 - Restrict to Unit     Routine Programming     As clinically indicated     Self Injury Precaution     Single Room     Status 15     Every 15 minutes.     Suicide precautions     Patients on Suicide Precautions should have a Combination Diet ordered that includes a Diet selection(s) AND a Behavioral Tray selection for Safe Tray - with utensils, or Safe Tray - NO utensils       Withdrawal precautions       Disposition: Pending stabilization & development of a safe discharge plan.     The patient was seen and the plan was discussed with the attending physician.     Juanjo Allen, MS4    I was present with the medical student who participated in the service and in the documentation of the note. I have verified the history and personally performed the exam and medical decision making in consultation with the attending physician. I agree with the assessment and plan of care as documented in the note. Jennifer  Shantel RICHTER, Ph.D.

## 2020-06-26 NOTE — PROGRESS NOTES
Work Completed: Met with team. Discussed patient's progress with medical providers. Patient continues report pelvic pain. Consult had consult with OB but continues reporting pain. Writer approached client in the milieu and asked her to sign ROIs for outpatient psychiatry services. Patient indicated that she has an establish psychiatrist but would like a different one. However, she indicated that she has been banned from different facilities such as [facility]. Patient indicated that if no other psychitric facility take her in then she will keep her current psychiatrist. Patient is a restricted recipient and placement may be challenging.     Discharge plan or goal: TBD                Barriers to discharge: Ongoing pelvic pain, aggressive behavior, non-adherent to treatment recommendations.

## 2020-06-27 VITALS
RESPIRATION RATE: 12 BRPM | DIASTOLIC BLOOD PRESSURE: 56 MMHG | BODY MASS INDEX: 30.66 KG/M2 | HEART RATE: 75 BPM | HEIGHT: 69 IN | TEMPERATURE: 98.1 F | OXYGEN SATURATION: 97 % | SYSTOLIC BLOOD PRESSURE: 106 MMHG | WEIGHT: 207 LBS

## 2020-06-27 PROCEDURE — 25000132 ZZH RX MED GY IP 250 OP 250 PS 637: Performed by: STUDENT IN AN ORGANIZED HEALTH CARE EDUCATION/TRAINING PROGRAM

## 2020-06-27 PROCEDURE — 99238 HOSP IP/OBS DSCHRG MGMT 30/<: CPT | Mod: GC | Performed by: PSYCHIATRY & NEUROLOGY

## 2020-06-27 RX ORDER — ASENAPINE 10 MG/1
10 TABLET SUBLINGUAL AT BEDTIME
Qty: 15 TABLET | Refills: 0 | Status: SHIPPED | OUTPATIENT
Start: 2020-06-27 | End: 2020-07-20

## 2020-06-27 RX ORDER — ASENAPINE 10 MG/1
10 TABLET SUBLINGUAL AT BEDTIME
Qty: 15 TABLET | Refills: 0 | Status: SHIPPED | OUTPATIENT
Start: 2020-06-27 | End: 2020-06-27

## 2020-06-27 RX ORDER — CHOLECALCIFEROL (VITAMIN D3) 1250 MCG
1250 CAPSULE ORAL
Qty: 2 CAPSULE | Refills: 0 | Status: SHIPPED | OUTPATIENT
Start: 2020-07-03 | End: 2021-01-29

## 2020-06-27 RX ORDER — CHOLECALCIFEROL (VITAMIN D3) 1250 MCG
1250 CAPSULE ORAL
Qty: 2 CAPSULE | Refills: 0 | Status: SHIPPED | OUTPATIENT
Start: 2020-07-03 | End: 2020-06-27

## 2020-06-27 RX ADMIN — CLONAZEPAM 0.5 MG: 0.5 TABLET ORAL at 00:23

## 2020-06-27 RX ADMIN — CLONAZEPAM 0.5 MG: 0.5 TABLET ORAL at 12:27

## 2020-06-27 RX ADMIN — VITAM B12 100 MCG: 100 TAB at 11:37

## 2020-06-27 RX ADMIN — NAPROXEN 250 MG: 250 TABLET ORAL at 16:07

## 2020-06-27 RX ADMIN — OLANZAPINE 10 MG: 10 TABLET, FILM COATED ORAL at 11:38

## 2020-06-27 RX ADMIN — THERA TABS 1 TABLET: TAB at 11:37

## 2020-06-27 RX ADMIN — OLANZAPINE 10 MG: 10 TABLET, FILM COATED ORAL at 16:07

## 2020-06-27 RX ADMIN — FLUOXETINE 20 MG: 20 CAPSULE ORAL at 11:38

## 2020-06-27 RX ADMIN — POTASSIUM CHLORIDE 10 MEQ: 750 TABLET, EXTENDED RELEASE ORAL at 11:37

## 2020-06-27 RX ADMIN — OLANZAPINE 10 MG: 10 TABLET, FILM COATED ORAL at 18:37

## 2020-06-27 RX ADMIN — NAPROXEN 250 MG: 250 TABLET ORAL at 11:38

## 2020-06-27 ASSESSMENT — ACTIVITIES OF DAILY LIVING (ADL)
ORAL_HYGIENE: INDEPENDENT
LAUNDRY: WITH SUPERVISION
HYGIENE/GROOMING: INDEPENDENT
DRESS: INDEPENDENT

## 2020-06-27 NOTE — PROGRESS NOTES
Pt slept through most of the morning. Pt denied any thoughts of self harm. Pt denied any SI/SIB or hallucinations. Pt appeared to be emotional when she woke up for her morning medications but was able to cope and be in a better mood afterwards. Pt was pleasant and approachable. Pt didn't shower or take care of any ADL'S.       06/27/20 1507   Behavioral Health   Hallucinations denies / not responding to hallucinations   Thinking intact   Orientation person: oriented;place: oriented;date: oriented;time: oriented   Memory baseline memory   Insight poor   Judgement impaired   Eye Contact at examiner   Affect blunted, flat   Mood anxious   Physical Appearance/Attire appears stated age   Hygiene well groomed   Suicidality other (see comments)  (denies)   1. Wish to be Dead (Recent) No   2. Non-Specific Active Suicidal Thoughts (Recent) No   Self Injury other (see comment)  (denies)   Speech clear;coherent   Psychomotor / Gait balanced;steady   Psycho Education   Type of Intervention 1:1 intervention   Response participates, initiates socially appropriate   Hours 0.5   Treatment Detail Check in   Activities of Daily Living   Hygiene/Grooming independent   Oral Hygiene independent   Dress independent   Laundry with supervision   Room Organization independent

## 2020-06-27 NOTE — PROVIDER NOTIFICATION
Patient was presenting with flat affect appeared depressed, endorsed high anxiety that is not being managed with medications, complained of left lower abdominal pain that is excruciating, stated the milieu was overstimulating and anxiety invoking but feels safe overall, denies active suicidal and self harm thinking, main stressors included being off her medications, not working, not being with her family,  Mother and her daughter she stated are her main support and keeps contact with them daily, currently in her room sleeping no other concerns reported.      06/26/20 2100   Behavioral Health   Hallucinations denies / not responding to hallucinations   Thinking intact   Orientation person: oriented;place: oriented;date: oriented;time: oriented   Memory baseline memory   Insight poor   Judgement impaired   Eye Contact at examiner   Affect blunted, flat   Mood depressed;anxious   Physical Appearance/Attire appears stated age;attire appropriate to age and situation;neat   Hygiene well groomed   Suicidality other (see comments)  (denies)   1. Wish to be Dead (Recent) No

## 2020-06-27 NOTE — PROGRESS NOTES
Signed at 12 hour intent to leave at 13:20.  States main reason for wanting to leave is that her mother is not longer able to care for her daughter.  Tearful,  Reports ongoing anxiety. Requested and recieved PRN Zyrexa x1 and Klonopin x 1 today.   Reports that medications do little to relieve her anxiety.  [facility] resident paged regarding patient signing a 12 hour intent to leave.

## 2020-06-27 NOTE — PROGRESS NOTES
06/26/20 2202   General Information   Art Directive other (see comments)   AT directive is to create a watercolor painting using mindfulness techniques as well as expressing current mood as encouraged by author. Goals of directive: mindfulness, emotional expression. Pt was an active participant, focused on task for the full duration of group. Pt presented with a depressed mood, somewhat irritable at the start of group. Pt requested to paint three small boxes rather then suggested directive by author. Pt plans to give the boxes to her two children and grandchild. Pt was in a calmer mood at the end of group and thanked author for group.

## 2020-06-28 LAB
C TRACH DNA SPEC QL NAA+PROBE: NEGATIVE
SPECIMEN SOURCE: NORMAL

## 2020-06-28 NOTE — PLAN OF CARE
Patient appeared anxious and restless, reported the anxiety to be invoked by her daughter being home alone without a supervision, patient denied active suicidal and self harm thinking, denied hallucinations endorsed high anxiety, mental status was intact no alterations noted, vitals were within defined limit, discharge teaching was adequate patient is aware of what to do in the event of crisis and stated she will seek help if and when her thoughts of self harm come back, no other concerns reported.

## 2020-06-28 NOTE — DISCHARGE SUMMARY
"    Psychiatric Discharge Summary    Hospital Day #3    Maryanne Crockett MRN# [id number]   Age: 39 year old YOB: 1980     Date of Admission:  6/23/2020  Date of Discharge:  6/27/2020  7:10 PM  Admitting Physician:  Jennifer Funes MD  Discharge Physician:  Seymour Chicas MD         Event Leading to Hospitalization:     From admission H&P by Dr. Abraham Schuster on 6/24/2020:  \"History obtained from patient and EMR. Maryanne Crockett is a 39 year old female with history of bipolar 2 disorder, unspecified anxiety, and ADHD, as well as multiple medical co-morbidities including chronic abdominal and pelvic pain, who presented to ED for abdominal pain then endorsed SI with plan when told of plan to discharge from ED.      Per ED Note:   \"This is a 39-year-old female who presents with pelvic pain.  This appears to be acute on chronic.  She notes left-sided pelvic pain and had recent vaginal discharge.  No known precipitating factors.  Patient also has suicidal ideation but no specific plan.  She states that she is worried that she will kill herself if she leaves the hospital.  She is tangential and demonstrating pressured speech.  Exam demonstrates minor left-sided tenderness on pelvic exam.  No other acute abnormalities.  Work shows no acute abnormalities.  Wet prep shows no acute abnormalities.  Ultrasound shows no acute abnormalities.  Discussed all results with patient.  DEC assessment was obtained.  After assessment it was determined that patient would benefit from inpatient mental health care.  She is scheduled to see OB/Gyn for her pelvic pain and I discussed this with the service in order to facilitate this during patient's admission.  Patient was given hydrocodone acetaminophen, ketorolac, ondansetron and olanzapine in the emergency department.  Patient contracts for safety while in the emergency department and one-to-one was discontinued.  We will admit for further monitoring work-up and treatment.\"     In the ED, " "when attempting to discharge patient, she was reported as saying \"I need to see psych because I just can't do this.\" She did verbally contract to not harm self in ED. Medical work-up was negative except left-sided pelvic pain. In ED given 2 tablet NORCO, 15mg ketroloac, 5 mg olanzapine, and 4 mg ondanestron. Medically cleared for admission to mental health unit with plan to see OB/GYN.     She was medically cleared for admission to inpatient psychiatric unit.    Per Dec Report:  Reported acute abdominal pain and said that she had intentions of suicide if she had to leave hospital without having issue resolved. She was previously taking Seroquel, Lamictal, and Xanax but recently stopped. Noted as having pressured speech, perseveration about abdominal pain, disorganization, and impulsivity. Per  \"Appears to be experiencing manic episode\".  recommended inpatient treatment.     Per patient report:    Maryanne RAYO Self reports that for a couple months she has been experiencing symptoms of chronic SI, depression, and manic episodes. She reports last being well for about a week at some point after her ED visit on 4/28. She attributes her symptoms & decompensation to pelvic/abdominal pain and being unable to find medical treatment for it. She reports recently taking Lexapro and Seroquel daily with Xanax and Adderall she takes \"as needed\". She reports stopping daily medications a few weeks ago due to pharmacy closures in the context of civil unrest in [place]. After being off of medications for a few weeks re-started at prior doses and \"felt funny, head was splitting\" so she stopped all medications. Has been off for around a week now. She reports  recent substance use of daily medical marijuana and cigarettes. Denies alcohol or other drugs.      She reports other current stressors including her family not understanding her illness. In particular, she notes that her son was angry with her after she was unable " "to visit him on Father's Day.  She regularly sees a psychiatrist Dr. Paul but reports wanting a new psychiatrist, referring to the clinic as \"shadey\" and disagreeing with medication choices. She also reports stress with COVID-19 situation and challenges to accessing therapy.    In the past, she has felt like she has been on too many medications saying she was taking \"28 pills\" per day. She does report having a nurse to help her with medications. Her primary goal for this hospitalization is to find new psychiatrist, therapist ([facility]), and find medication regimen to stabilize her mood. She also wants her pelvic pain addressed.    We discussed medications that may be of interest to her for mood stabilization. She acknowledged \"I need a mood stabilizer\" and reports cycling in and out of what she perceives to be manic episodes in the recent past. She stated that Seroquel has paradoxically made her sleep worse. She reports previously trying lithium and depakote and felt \"lethargic.\" Also reports that Gabapentin made her feel \"groggy\" and her family doesn't like it. She has \"no negative feelings\" about Abilify but believes Saphris has been most helpful to her in the past and would like to try it again now. She did report dental problems resulting from use, but believed it helped a lot with her sleep.     Regarding her hip/pelvic pain, she reports feeling \"like it is swelling/infected.\" States that she has difficulty walking currently. She has concern for PID or other pathology. She was informed that case will be reviewed by medicine team. She has undergone multiple abdominal/pelvic procedures in the past and views her medical treatment and pain as traumatic.    Discussed past history of possible seizures. Since stopping lamotrigine several months ago, she reports one possible spell \"several weeks ago\" where her mother found her \"unconscious\" but was not sure if it was a seizure.\"       See Admission note for additional " details.          Diagnoses:     Principal psychiatric diagnosis:   - Unspecified mood or emotional dysregulation disorder  - Bipolar 2 Disorder (historic) vs Cluster B personality traits +/- substance induced component     Secondary psychiatric diagnoses:   - PTSD, per history    - r/o Cannabis abuse disorder          Consults:     OB/GYN (placed 6/25): Acute-on chronic pelvic pain.  See consult note for additional details.         Hospital Course:   Psychiatric Course:  Maryanne Crockett was admitted to station 20 as a voluntary patient with attending Dr Funes. PTA Lexapro, Seroquel, Doxepin, Adderall and Xanax were held due to inconsistent use/lack of efficacy. Maryanne requested to restart Safris due to past benefit with goal to target impulsivity, sleep, and mood instability. Started this medication at bedtime, currently titrating. PRN Klonopin also started for anxiety. Also started Prozac per her request as she believes she needs an antidepressant.    On day of discharge, pt noted that her 14 y/o daughter had been staying at pt's mother's house, but that pt's mother had left to attend a family graduation with the intent of being gone for several days, leaving no one to care for her daughter. She filed a 12-hour intent to discharge with a plan to stay with her daughter at her mother's house. Pt was calm, coherent and able to provide an appropriate safety plan after discharge. Medications were sent to pt's outside preferred pharmacy and reported having no SI/HI or AVH. She did not appear to be holdable for any imminent danger.    Maryanne Crockett was discharged to home at her mother's house. At the time of discharge Maryanne Crockett was determined to not be a danger to herself or others.    Medical Course:  Presented with 2 month history of left-sided abdominal/pelvic pain. Evaluated for acute processes in ED, no medical cause for pain found at that time (work-up included pelvic ultrasound/microbiology). 6/25: Consult ordered  with OB/GYN. They saw patient and discussed starting Orilissa.    Risk Assessment:  Maryanne Crockett has notable risk factors for self-harm, including single status, anxiety, comorbid medical condition of recently diagnosed endometriosis and a remote history of one suicide attempt. However, risk is mitigated by commitment to family, ability to volunteer a safety plan and history of seeking help when needed.Additional steps taken to minimize risk include: verifying safe place to discharge. Therefore, based on all available evidence including the factors cited above, Maryanne Crockett does not appear to be at imminent risk for self-harm, and is appropriate for outpatient level of care.     This document serves as a transfer of care to Maryanne Crockett's outpatient providers.         Discharge Medications:     Discharge Medication List as of 6/27/2020  6:05 PM      CONTINUE these medications which have CHANGED    Details   asenapine (SAPHRIS) 10 MG SUBL sublingual tablet Place 1 tablet (10 mg) under the tongue At Bedtime, Disp-15 tablet,R-0, E-Prescribe      cholecalciferol (VITAMIN D3) 1250 mcg (61994 units) capsule Take 1 capsule (1,250 mcg) by mouth every 7 days, Disp-2 capsule,R-0, E-Prescribe      FLUoxetine (PROZAC) 20 MG capsule Take 1 capsule (20 mg) by mouth daily, Disp-15 capsule,R-0, E-Prescribe         CONTINUE these medications which have NOT CHANGED    Details   VENTOLIN  (90 BASE) MCG/ACT inhaler Inhale 1-2 puffs into the lungs every 6 hours as needed for shortness of breath / dyspnea or wheezing , R-5, SABINO, Historical      vitamin B-12 (CYANOCOBALAMIN) 1000 MCG tablet Take 1,000 mcg by mouth daily, Historical         STOP taking these medications       acetaminophen (TYLENOL) 500 MG tablet Comments:   Reason for Stopping:         ALPRAZolam (XANAX) 2 MG tablet Comments:   Reason for Stopping:         amphetamine-dextroamphetamine (ADDERALL) 20 MG tablet Comments:   Reason for Stopping:         doxepin  (SINEQUAN) 10 MG capsule Comments:   Reason for Stopping:         escitalopram (LEXAPRO) 20 MG tablet Comments:   Reason for Stopping:         multivitamin w/minerals (THERA-VIT-M) tablet Comments:   Reason for Stopping:         polyethylene glycol (MIRALAX) 17 g packet Comments:   Reason for Stopping:         QUEtiapine (SEROQUEL) 100 MG tablet Comments:   Reason for Stopping:                      Psychiatric Examination:   Appearance:  awake, alert, adequately groomed and appeared older than stated age  Attitude:  cooperative  Eye Contact:  good  Mood:  better and good  Affect:  appropriate and in normal range and mood congruent  Speech:  clear, coherent and normal prosody  Psychomotor Behavior:  no evidence of tardive dyskinesia, dystonia, or tics  Thought Process:  logical, linear and goal oriented  Associations:  no loose associations  Thought Content:  no evidence of suicidal ideation or homicidal ideation and no evidence of psychotic thought  Insight:  good  Judgment:  intact  Oriented to:  time, person, and place  Attention Span and Concentration:  intact  Recent and Remote Memory:  intact  Language: no notable deficits, fluent in English  Fund of Knowledge: appropriate  Muscle Strength and Tone: normal  Gait and Station: Normal         Discharge Plan:   Pt reported she would attempt to set up follow-up appointment with her primary psychiatrist in [facility] the following Monday, as discharge occurred over weekend.    Psychiatric Appointments: TBD      Other Referrals:   Please make an appointment to follow up with [facility] (phone: [phone]) as soon as possible. Return to the emergency department if symptoms get worse, there are any new symptoms or any cause for concern.      Attend all scheduled appointments with your outpatient providers. Call at least 24 hours in advance if you need to reschedule an appointment to ensure continued access to your outpatient providers.       Pt seen and  discussed with my attending physician.   Lev Houser MD  PGY-2 Resident         Attestation:  Physician Attestation   I, Seymour Chicas, saw and evaluated this patient prior to discharge.  I discussed the patient with the resident/fellow and agree with plan of care as documented in the note.      I personally reviewed vital signs, medications and recent notes including plan of care.  As documented above, she articulated a clear safety plan and demonstrated an ability to plan for ongoing care of her mental health needs. She did not display behavior influenced by hallucinations or delusions; she may have been a bit perseverative on her anger at her mother but this seems within normal bounds. No evidence for acute safety risks or justification for a hold, although I did note that we would have been happier to have her stay.    I personally spent 25 minutes on discharge activities.    Seymour Chicas MD  Date of Service (when I saw the patient): 6/27/20              Appendix A: All Labs This Admission:     Results for orders placed or performed during the hospital encounter of 06/23/20   US Pelvic Complete with Transvaginal     Status: None    Narrative    EXAMINATION: US PELVIC COMPLETE WITH TRANSVAGINAL, 6/23/2020 6:37 PM     COMPARISON: 4/28/2020    HISTORY: L pelvic pain, hx of ovarian cyst. R/o torsion.    TECHNIQUE: The pelvis was scanned in standard fashion with  transabdominal and transvaginal transducer(s) using both grey scale  and spectral flow and color Doppler techniques.    FINDINGS:  The uterus measures 7.7 x 3.8 x 5.1 cm, and there is no evidence of a  focal fibroid.  The endometrium is within normal limits and measures 6  mm. There is no free fluid in the pelvis.    The left ovary measures 3.7 x 1.7 x 2.2 cm. Multiple follicles with a  dominant follicle measuring 1.7 cm. There is no adnexal mass. There is  normal blood flow.    The right ovary is surgically removed.        Impression    IMPRESSION:    Right oophorectomy, otherwise normal pelvic ultrasound. No sonographic  evidence of ovarian torsion.      I have personally reviewed the examination and initial interpretation  and I agree with the findings.    KIERSTEN WOODARD MD   CBC with platelets differential     Status: None   Result Value Ref Range    WBC 8.3 4.0 - 11.0 10e9/L    RBC Count 4.78 3.8 - 5.2 10e12/L    Hemoglobin 13.8 11.7 - 15.7 g/dL    Hematocrit 42.9 35.0 - 47.0 %    MCV 90 78 - 100 fl    MCH 28.9 26.5 - 33.0 pg    MCHC 32.2 31.5 - 36.5 g/dL    RDW 13.5 10.0 - 15.0 %    Platelet Count 306 150 - 450 10e9/L    Diff Method Automated Method     % Neutrophils 46.5 %    % Lymphocytes 42.8 %    % Monocytes 6.7 %    % Eosinophils 2.8 %    % Basophils 1.1 %    % Immature Granulocytes 0.1 %    Nucleated RBCs 0 0 /100    Absolute Neutrophil 3.9 1.6 - 8.3 10e9/L    Absolute Lymphocytes 3.5 0.8 - 5.3 10e9/L    Absolute Monocytes 0.6 0.0 - 1.3 10e9/L    Absolute Eosinophils 0.2 0.0 - 0.7 10e9/L    Absolute Basophils 0.1 0.0 - 0.2 10e9/L    Abs Immature Granulocytes 0.0 0 - 0.4 10e9/L    Absolute Nucleated RBC 0.0    Comprehensive metabolic panel     Status: None   Result Value Ref Range    Sodium 137 133 - 144 mmol/L    Potassium 3.7 3.4 - 5.3 mmol/L    Chloride 106 94 - 109 mmol/L    Carbon Dioxide 26 20 - 32 mmol/L    Anion Gap 5 3 - 14 mmol/L    Glucose 88 70 - 99 mg/dL    Urea Nitrogen 8 7 - 30 mg/dL    Creatinine 0.80 0.52 - 1.04 mg/dL    GFR Estimate >90 >60 mL/min/[1.73_m2]    GFR Estimate If Black >90 >60 mL/min/[1.73_m2]    Calcium 8.9 8.5 - 10.1 mg/dL    Bilirubin Total 0.4 0.2 - 1.3 mg/dL    Albumin 4.2 3.4 - 5.0 g/dL    Protein Total 7.8 6.8 - 8.8 g/dL    Alkaline Phosphatase 65 40 - 150 U/L    ALT 33 0 - 50 U/L    AST 24 0 - 45 U/L   Lipase     Status: None   Result Value Ref Range    Lipase 83 73 - 393 U/L   HCG qualitative Blood     Status: None   Result Value Ref Range    HCG Qualitative Serum Negative NEG^Negative   UA reflex to  Microscopic and Culture     Status: Abnormal    Specimen: Urine clean catch   Result Value Ref Range    Color Urine Yellow     Appearance Urine Clear     Glucose Urine Negative NEG^Negative mg/dL    Bilirubin Urine Small (A) NEG^Negative    Ketones Urine 5 (A) NEG^Negative mg/dL    Specific Gravity Urine 1.024 1.003 - 1.035    Blood Urine Negative NEG^Negative    pH Urine 5.5 5.0 - 7.0 pH    Protein Albumin Urine 30 (A) NEG^Negative mg/dL    Urobilinogen mg/dL 2.0 0.0 - 2.0 mg/dL    Nitrite Urine Negative NEG^Negative    Leukocyte Esterase Urine Small (A) NEG^Negative    Source Urine     RBC Urine 2 0 - 2 /HPF    WBC Urine 1 0 - 5 /HPF    Squamous Epithelial /HPF Urine 4 (H) 0 - 1 /HPF    Mucous Urine Present (A) NEG^Negative /LPF   Asymptomatic COVID-19 Virus (Coronavirus) by PCR     Status: None    Specimen: Nasopharyngeal   Result Value Ref Range    COVID-19 Virus PCR to [facility] - Source Nasopharyngeal     COVID-19 Virus PCR to [facility] - Result       Test received-See reflex to IDDL test SARS CoV2 (COVID-19) Virus RT-PCR   Drug abuse screen 6 urine (chem dep)     Status: Abnormal   Result Value Ref Range    Amphetamine Qual Urine Positive (A) NEG^Negative    Barbiturates Qual Urine Negative NEG^Negative    Benzodiazepine Qual Urine Negative NEG^Negative    Cannabinoids Qual Urine Positive (A) NEG^Negative    Cocaine Qual Urine Negative NEG^Negative    Ethanol Qual Urine Negative NEG^Negative    Opiates Qualitative Urine Negative NEG^Negative   SARS-CoV-2 COVID-19 Virus (Coronavirus) RT-PCR Nasopharyngeal     Status: None    Specimen: Nasopharyngeal   Result Value Ref Range    SARS-CoV-2 Virus Specimen Source Nasopharyngeal     SARS-CoV-2 PCR Result NEGATIVE     SARS-CoV-2 PCR Comment       Testing was performed using the [employer] COVID-19 Direct Assay on the [employer] MDX   instrument. Additional information about this Emergency Use Authorization (EUA) assay can   be found via the Lab Guide.     Hepatic  "panel     Status: Abnormal   Result Value Ref Range    Bilirubin Direct <0.1 0.0 - 0.2 mg/dL    Bilirubin Total 0.4 0.2 - 1.3 mg/dL    Albumin 3.2 (L) 3.4 - 5.0 g/dL    Protein Total 6.7 (L) 6.8 - 8.8 g/dL    Alkaline Phosphatase 56 40 - 150 U/L    ALT 27 0 - 50 U/L    AST 21 0 - 45 U/L   Lipid panel     Status: Abnormal   Result Value Ref Range    Cholesterol 168 <200 mg/dL    Triglycerides 143 <150 mg/dL    HDL Cholesterol 48 (L) >49 mg/dL    LDL Cholesterol Calculated 91 <100 mg/dL    Non HDL Cholesterol 120 <130 mg/dL   TSH with free T4 reflex and/or T3 as indicated     Status: None   Result Value Ref Range    TSH 1.71 0.40 - 4.00 mU/L   Vitamin D     Status: None   Result Value Ref Range    Vitamin D Deficiency screening 31 20 - 75 ug/L   Obstetrics / Gynecology IP Consult: Acute-on-chronic pelvic pain; Consultant may enter orders: Yes; Patient to be seen: Routine - within 24 hours; Call back #: [phone]; Requesting provider? Attending physician     Status: None ()    Narrative    Elodia Garcia MD     2020  7:26 PM  Gynecology Consult Note    Referring Physician: TAMERA Rosario MD  Reason for Consult: Pelvic Pain    HPI: Maryanne Crockett is a 39 year old  HD#2 on Psychiatry   Service for suicidal ideation. Patient is a poor historian, which   limits HPI. Maryanne reports pelvic pain worsening over the last   year. She describes constant left lower pelvic pain that acutely   worsened approximately 5 days ago. She describes the pain as   sharp. She states that she is unable to walk due to the pain. She   gives conflicting reports of taking no medications for pain and   taking high dose tylenol. She states that she has monthly periods   but also states that she has them every two weeks. She is unsure   when her last menses occurred. She repeatedly states that she is   convinced that she has \"pelvic infections\" despite negative   testing. She also repeatedly states that she feels that has PID.   She " denies vaginal discharge and states that something is wrong   because she does not have vaginal discharge. She also reports   swollen painful vulva. Patient is not sexually active. She denies   fever/chills, vaginal bleeding, or changes in discharge.        OBHx: ,   NSVDx2    GynHx:  Last pap- NILM PAP 2008  STIs- Trich 3/19/2019  Contraception Hx:  5/22/15: Laparoscopic bilateral tubal ligation, right hemorrhagic   ovarian cyst drainage  16: Laparoscopic bilateral salpingectomy, right   oophorectomy, removal of left ovarian mass. Pathology with   endometriosis on right ovary    PMH:   Past Medical History:   Diagnosis Date     Depressive disorder      Family history of glioblastoma     screening MRIs every 2 years     Ovarian cyst      Seizures (H)        PSH:   Past Surgical History:   Procedure Laterality Date     CHOLECYSTECTOMY       DILATION AND CURETTAGE SUCTION  4/30/15    retained POC     GASTRIC RESTRICTIVE PROCEDURE, OPEN, REMOVE/REPLACE   SUBCUTANEOUS PORT       LAPAROSCOPIC OOPHORECTOMY Right 2016    Procedure: LAPAROSCOPIC OOPHORECTOMY;  Surgeon: Adrianne Vergara MD;  Location: [facility]     LAPAROSCOPIC SALPINGECTOMY Bilateral 2016    Procedure: LAPAROSCOPIC SALPINGECTOMY;  Surgeon: Adrianne Vergara MD;  Location: [facility]     LAPAROSCOPIC TUBAL LIGATION Bilateral 2015    Procedure: LAPAROSCOPIC TUBAL LIGATION;  Surgeon: Hayley Zayas MD;  Location: [facility]     LAPAROSCOPY OPERATIVE ADULT N/A 2016    Procedure: LAPAROSCOPY OPERATIVE ADULT;  Surgeon: Adrianne Vergara MD;  Location: [facility]     SOFT TISSUE SURGERY Right     cyst in hand       Social Hx:   Social History     Tobacco Use     Smoking status: Current Some Day Smoker     Packs/day: 0.25     Smokeless tobacco: Never Used     Tobacco comment: Trying   Substance Use Topics     Alcohol use: Yes     Alcohol/week: 0.0 standard drinks     Comment: rare     Drug use: Yes     Types: Marijuana     Comment: pt  "says she is prescribed medical marijuana        Family History: family history includes Other Cancer in her   father and maternal aunt; Other Cancer (age of onset: 36) in her   brother.    ROS:   Negative except per HPI    Objective:   /79   Pulse 85   Temp 98.5  F (36.9  C) (Oral)   Resp 16     Ht 1.753 m (5' 9\")   Wt 93.9 kg (207 lb)   SpO2 100%   BMI   30.57 kg/m    Constitutional: disheveled appearing female  Cardiovascular: Regular rate  Respiratory: no increased work of breathing  Gastrointestinal: Abdomen soft, no guarding or rebound   tenderness, mildly tender to palpation at left lower quadrant. No   palpable masses.  Vertical and transverse scars on lower abdomen   :  no pelvic exam performed. Vulva grossly normal   Psychiatric: circumstantial speech, lacking insight    Labs/Imaging:  Results for orders placed or performed during the hospital   encounter of 06/23/20   US Pelvic Complete with Transvaginal     Status: None    Narrative    EXAMINATION: US PELVIC COMPLETE WITH TRANSVAGINAL, 6/23/2020   6:37 PM     COMPARISON: 4/28/2020    HISTORY: L pelvic pain, hx of ovarian cyst. R/o torsion.    TECHNIQUE: The pelvis was scanned in standard fashion with  transabdominal and transvaginal transducer(s) using both grey   scale  and spectral flow and color Doppler techniques.    FINDINGS:  The uterus measures 7.7 x 3.8 x 5.1 cm, and there is no evidence   of a  focal fibroid.  The endometrium is within normal limits and   measures 6  mm. There is no free fluid in the pelvis.    The left ovary measures 3.7 x 1.7 x 2.2 cm. Multiple follicles   with a  dominant follicle measuring 1.7 cm. There is no adnexal mass.   There is  normal blood flow.    The right ovary is surgically removed.        Impression    IMPRESSION:   Right oophorectomy, otherwise normal pelvic ultrasound. No   sonographic  evidence of ovarian torsion.      I have personally reviewed the examination and initial   interpretation  and " I agree with the findings.    KIERSTEN WOODARD MD   CBC with platelets differential     Status: None   Result Value Ref Range    WBC 8.3 4.0 - 11.0 10e9/L    RBC Count 4.78 3.8 - 5.2 10e12/L    Hemoglobin 13.8 11.7 - 15.7 g/dL    Hematocrit 42.9 35.0 - 47.0 %    MCV 90 78 - 100 fl    MCH 28.9 26.5 - 33.0 pg    MCHC 32.2 31.5 - 36.5 g/dL    RDW 13.5 10.0 - 15.0 %    Platelet Count 306 150 - 450 10e9/L    Diff Method Automated Method     % Neutrophils 46.5 %    % Lymphocytes 42.8 %    % Monocytes 6.7 %    % Eosinophils 2.8 %    % Basophils 1.1 %    % Immature Granulocytes 0.1 %    Nucleated RBCs 0 0 /100    Absolute Neutrophil 3.9 1.6 - 8.3 10e9/L    Absolute Lymphocytes 3.5 0.8 - 5.3 10e9/L    Absolute Monocytes 0.6 0.0 - 1.3 10e9/L    Absolute Eosinophils 0.2 0.0 - 0.7 10e9/L    Absolute Basophils 0.1 0.0 - 0.2 10e9/L    Abs Immature Granulocytes 0.0 0 - 0.4 10e9/L    Absolute Nucleated RBC 0.0    Comprehensive metabolic panel     Status: None   Result Value Ref Range    Sodium 137 133 - 144 mmol/L    Potassium 3.7 3.4 - 5.3 mmol/L    Chloride 106 94 - 109 mmol/L    Carbon Dioxide 26 20 - 32 mmol/L    Anion Gap 5 3 - 14 mmol/L    Glucose 88 70 - 99 mg/dL    Urea Nitrogen 8 7 - 30 mg/dL    Creatinine 0.80 0.52 - 1.04 mg/dL    GFR Estimate >90 >60 mL/min/[1.73_m2]    GFR Estimate If Black >90 >60 mL/min/[1.73_m2]    Calcium 8.9 8.5 - 10.1 mg/dL    Bilirubin Total 0.4 0.2 - 1.3 mg/dL    Albumin 4.2 3.4 - 5.0 g/dL    Protein Total 7.8 6.8 - 8.8 g/dL    Alkaline Phosphatase 65 40 - 150 U/L    ALT 33 0 - 50 U/L    AST 24 0 - 45 U/L   Lipase     Status: None   Result Value Ref Range    Lipase 83 73 - 393 U/L   HCG qualitative Blood     Status: None   Result Value Ref Range    HCG Qualitative Serum Negative NEG^Negative   UA reflex to Microscopic and Culture     Status: Abnormal    Specimen: Urine clean catch   Result Value Ref Range    Color Urine Yellow     Appearance Urine Clear     Glucose Urine Negative NEG^Negative  mg/dL    Bilirubin Urine Small (A) NEG^Negative    Ketones Urine 5 (A) NEG^Negative mg/dL    Specific Gravity Urine 1.024 1.003 - 1.035    Blood Urine Negative NEG^Negative    pH Urine 5.5 5.0 - 7.0 pH    Protein Albumin Urine 30 (A) NEG^Negative mg/dL    Urobilinogen mg/dL 2.0 0.0 - 2.0 mg/dL    Nitrite Urine Negative NEG^Negative    Leukocyte Esterase Urine Small (A) NEG^Negative    Source Urine     RBC Urine 2 0 - 2 /HPF    WBC Urine 1 0 - 5 /HPF    Squamous Epithelial /HPF Urine 4 (H) 0 - 1 /HPF    Mucous Urine Present (A) NEG^Negative /LPF   Asymptomatic COVID-19 Virus (Coronavirus) by PCR     Status: None      Specimen: Nasopharyngeal   Result Value Ref Range    COVID-19 Virus PCR to [facility] - Source Nasopharyngeal     COVID-19 Virus PCR to [facility] - Result       Test received-See reflex to IDDL test SARS CoV2 (COVID-19)   Virus RT-PCR   Drug abuse screen 6 urine (chem dep)     Status: Abnormal   Result Value Ref Range    Amphetamine Qual Urine Positive (A) NEG^Negative    Barbiturates Qual Urine Negative NEG^Negative    Benzodiazepine Qual Urine Negative NEG^Negative    Cannabinoids Qual Urine Positive (A) NEG^Negative    Cocaine Qual Urine Negative NEG^Negative    Ethanol Qual Urine Negative NEG^Negative    Opiates Qualitative Urine Negative NEG^Negative   SARS-CoV-2 COVID-19 Virus (Coronavirus) RT-PCR Nasopharyngeal       Status: None    Specimen: Nasopharyngeal   Result Value Ref Range    SARS-CoV-2 Virus Specimen Source Nasopharyngeal     SARS-CoV-2 PCR Result NEGATIVE     SARS-CoV-2 PCR Comment       Testing was performed using the Simplexa COVID-19 Direct Assay   on the [employer] Liaison MDX   instrument. Additional information about this Emergency Use   Authorization (EUA) assay can   be found via the Lab Guide.     Hepatic panel     Status: Abnormal   Result Value Ref Range    Bilirubin Direct <0.1 0.0 - 0.2 mg/dL    Bilirubin Total 0.4 0.2 - 1.3 mg/dL    Albumin 3.2 (L) 3.4 - 5.0 g/dL    Protein Total  6.7 (L) 6.8 - 8.8 g/dL    Alkaline Phosphatase 56 40 - 150 U/L    ALT 27 0 - 50 U/L    AST 21 0 - 45 U/L   Lipid panel     Status: Abnormal   Result Value Ref Range    Cholesterol 168 <200 mg/dL    Triglycerides 143 <150 mg/dL    HDL Cholesterol 48 (L) >49 mg/dL    LDL Cholesterol Calculated 91 <100 mg/dL    Non HDL Cholesterol 120 <130 mg/dL   TSH with free T4 reflex and/or T3 as indicated     Status: None   Result Value Ref Range    TSH 1.71 0.40 - 4.00 mU/L   Vitamin D     Status: None   Result Value Ref Range    Vitamin D Deficiency screening 31 20 - 75 ug/L   Wet prep     Status: None    Specimen: Genital    VAGINAL   Result Value Ref Range    Specimen Description Genital VAGINAL     Wet Prep No Trichomonas seen     Wet Prep No clue cells seen     Wet Prep No yeast seen     Wet Prep No PMNs seen    Neisseria gonorrhoeae PCR     Status: None    Specimen: Cervix   Result Value Ref Range    Specimen Descrip Cervix     N Gonorrhea PCR Negative NEG^Negative        Assessment/Plan:    Maryanne Crockett is a 39 year old  HD#2 on Psychiatry   Service for suicidal ideation. OB/GYN consulted for acute on   chronic pelvic pain.     Endometriosis  -Patient with diagnosis of endometriosis based on pathology   report from 2016 laparoscopic bilateral salpingectomy and right   oophorectomy. Reviewed with patient that endometriosis can be a   significant source of pelvic pain. Discussed ovarian suppression   as first line treatement for endometriosis.  -Patient is not a candidate for combined OCP due to age greater   than 35 and a smoker. Additional options discussed included   progesterone only pills (POPs), Depo Lupron, Orilissa, and   hysterectomy. Reviewed side effects for Depro Lupron include   menopause like symptoms (including hot flashes, vaginal dryness,   sleep and mood disruption) due to effects as an estrogen   antagonist via inhibition of gonadotropin release. Reviewed   milder side effects associated with  Orilissa due to partial   estrogen antagonism. Patient reluctant to continue to discuss   options and side effects and instead perseverating on pelvic   infection.  -Patient ultimately decided that she would like to try Orilissa   for management of endometriosis. OB/GYN team will work on getting   prior authorization completed for Orilissa, but consideration   should be given to potential effects on mood given disruption of   hormonal axis.   -Pain: encouraged scheduled tylenol. Defer to psychiatry for pain   management compatible with patient's current medications.   -Patient thought pattern circumstantial during interview and   there are concerns regarding patient ability to consent to new   treatment.   -Appreciate Psychiatry's input regarding patient's ability to   consent and potential mood disruption with estrogen antagonists.     Pelvic infection  -Offered reassurance that screening for infection BV, trich, and   Yeast were negative.   -Chlamydia urine screen ordered.     Discussed with Dr. Terrell Alicia Myhre,   Obstetrics and Gynecology, PGY-4  June 25, 2020, 3:26 PM     Thank you for allowing us to be involved in the care of your   patient. Please do not hesitate to give us a call with further   questions. GYN team consult pager [phone] from 6:30AM to   6:00PM Monday - Friday or [phone] from 6PM to 6:30AM and on   weekends.       I have reviewed the case in detail with the resident. I have   reviewed the patient's course and findings to date. I agree with   the assessment and plan.   Patient will likely require ongoing care and management of   endometriosis and we have concerns about her being able to fully   understand and decide about a treatment plan at this time.     Elodia Garcia MD     Wet prep     Status: None    Specimen: Genital    VAGINAL   Result Value Ref Range    Specimen Description Genital VAGINAL     Wet Prep No Trichomonas seen     Wet Prep No clue cells seen     Wet  Prep No yeast seen     Wet Prep No PMNs seen    Neisseria gonorrhoeae PCR     Status: None    Specimen: Cervix   Result Value Ref Range    Specimen Descrip Cervix     N Gonorrhea PCR Negative NEG^Negative

## 2020-06-30 ENCOUNTER — TELEPHONE (OUTPATIENT)
Dept: OBGYN | Facility: CLINIC | Age: 40
End: 2020-06-30

## 2020-06-30 NOTE — TELEPHONE ENCOUNTER
Spoke with Maryanne who stated she is still in pain and wants office appointment.  Offered appointment with Dr. Shaw on 7/2 or to move forward with new medication discuss with Dr. Garcia when she was in the hospital.    She stated she does not want to start a new medication and does not want to start with a new doctor so would like to see Dr. Zayas only. She has decided she wants a hysterectomy as discussed with her previously.    Offered first available appointment with Dr. Zayas on 7/14. She agreed with plan.

## 2020-07-09 NOTE — PROGRESS NOTES
"  ----------------------------------------------------------------------------------------------------------  [facility], [place]   Psychiatric Progress Note  Hospital Day #2      Interim History:   The patient's care was discussed with the treatment team and chart notes were reviewed.     Sleep 6.25 hours (06/26/20 0657)  Scheduled Medications: took Asenapine as scheduled  PRN medications: 6/25: acetaminophen at 2113, klonopin at 1601 and 2111, naproxen at 1813, olanzapine at 1153, 1536, and 1838, trazodone at 2151 and 2217     Staff Report:   Per chart review: She spent most of day in bed in an attempt to deal with pain. Reported as wanting \"full hysterectomy\" to deal with pain from endometriosis. Requested and took 30 mg PRN olanzapine to deal with pain, was requesting more.    Was seen by OB/GYN, and discussed treatment options for endometriosis. Reported that patient decided that she would like to try Orilissa.     Patient Interview:   Maryanne Crockett was interviewed in the conference room. Discussed her pain and that she states it persists but she reported feeling somewhat better due to \"accepting it.\" She was provided verbal education about the nature of pain and how thoughts and mood can contribute to pain perception. She stated that although she can handle her level of chronic pain, she feels she can not manage the level of acute pain she has had the past couple of months.    Discussed that she saw OB/GYN yesterday. While she reported skepticism that endometriosis could account for her current pelvic pain stating \"I don't think endometriosis lasts weeks on weeks\", she also reported at this time preferring to pursue hysterectomy for definitive treatment of endometriosis-related pain. She was encouraged to discuss options with OB/GYN and still explore Orilissa for current symptoms.    Regarding medications, she is amenable to increasing Asenapine at this time. She also believed she " "could benefit from resuming an antidepressant, stating that she has found Prozac most helpful in the past. While we discussed that an SNRI like Cymbalta would also be an option due providing some analgesic effect, she prefers to start Prozac due to her past experience. She also requested increase in Klonopin, although she was informed that team was not comfortable making this change. While discussing alternatives for anxiety/sleep, she reported previous negative effects of trazodone and stated that hydroxyzine has \"no effect\" for her. She also requested to resume vitamins that she takes at home including and Vitamin D, B12, potassium. She at one point stated that she would be interested in ECT in the future, because she believes that she is \"medication resistant.\"     The risks, benefits, alternatives and side effects of any medication changes have been discussed and are understood by the patient and other caregivers.     Review of systems:      ROS was negative unless noted above.          Allergies:           Allergies   Allergen Reactions     Ibuprofen GI Disturbance             Psychiatric Examination:   /79   Pulse 85   Temp 97.9  F (36.6  C) (Oral)   Resp 16   Ht 1.753 m (5' 9\")   Wt 93.9 kg (207 lb)   SpO2 97%   BMI 30.57 kg/m    Weight is 207 lbs 0 oz  Body mass index is 30.57 kg/m .     MENTAL STATUS EXAM     Appearance:  appears older than than stated age and unkempt  Attitude:  cooperative  Psychomotor:  no evidence of tics, dystonia, or tardive dyskinesia and agitated  Eye Contact: appropriate  Speech:  Normal rate  Mood: \"accepting\"  Affect: Mood  congruent  Thought Content: passive suicidal ideation  Thought Process: rambling, coherent, goal-directed  Sensorium: awake and alert  Cognition: memory grossly intact  Impulse control: fair  Insight: questionable  Judgment: questionable          Labs:   No results found for this or any previous visit (from the past 24 hour(s)).      Assessment  "   Principal psychiatric diagnosis:   - Unspecified mood or emotional dysregulation disorder  - Bipolar 2 Disorder (historic) vs Cluster B personality traits +/- substance induced component     Secondary psychiatric diagnoses:   - PTSD, per history   - r/o Cannabis abuse disorder      Diagnostic Impression:   Maryanne Crockett is a 39 year old  female with a past psychiatric history of bipolar affective disorder, PTSD, unspecified anxiety, ADHD, and cannabis use disorder who presented to the ER with SI in the context of 2 months of pelvic pain for which she has been unable to find definitive diagnosis or treatment. She presented with significant worry and anxiety around her pain. In ED, she stated that if she were discharged she would kill herself.      Current psychosocial stressors include chronic mental health issues, family dynamics and pelvic pain which she has been coping with by using substances and seeking medical care.  Patient's support system includes family.  Substance use (daily marijuana) appeared to be playing a contributing role in the patient's presentation.  There is genetic loading for mood. Medical history does appear to be significant for previous pelvic/abdominal surgeries as well as ovarian cysts though work-up has not yet revealed medical cause for her current pelvic pain. 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       The initial MSE was notable for depressed mood, passive SI, and labile affect. She reports no self injurious behaviors. Her reported symptoms of depressed mood, SI, and manic episodes are consistent with her historic diagnosis of bipolar affective disorder, however it is not clear she meets criteria for a full mood episode at this time. Cluster B personality traits possibly contributing to presentation (she seems to have  difficulty with interpersonal relationships and maladaptive coping, as well as labile affect and chronic suicidal ideations driven by psychosocial stress).      Psychiatric Hospital course:   Maryanne Crockett was admitted to station 20 as a voluntary patient with attending Dr Funes. PTA Lexapro, Seroquel, Doxepin, Adderall and Xanax were held due to inconsistent use/lack of efficacy. Maryanne requested to restart Safris due to past benefit with goal to target impulsivity, sleep, and mood instability. Started this medication at bedtime, currently titrating. PRN Klonopin also started for anxiety. Also started Prozac per her request as she believes she needs an antidepressant.     Discontinued Medications (& Rationale):  Lexapro, Seroquel, Doxepin, Adderall, Xanax- lack of benefit, diagnostic uncertainty     Medical course   Presented with 2 month history of left-sided abdominal/pelvic pain. Evaluated for acute processes in ED, no medical cause for pain found at that time (work-up included pelvic ultrasound/microbiology). 6/25: Consult ordered with OB/GYN. They saw patient and discussed starting Orilissa.     Data:   Labs on Admission:  - Labs: CMP, CBC, TSH, Vit D, Hepatic panel WNL  - COVID-19 negative  - UA with small leukocyte esterase, normal WBC  - Wet Prep negative, Neisseria Gonnorhea negative  6/26:  -Chlamydia PCR in progress     Consults:   OB/GYN (placed 6/25): Acute-on chronic pelvic pain.     Plan      Today's Changes:  -Increased Asenapine to 10 mg at bedtime  -Start Prozac 20 mg daily     Psychotropic Medications:  Scheduled Psych Medications:  -Asenapine 10 mg at bedtime  -Prozac 20 mg daily     PRN Psych Medications  -Klonopin 0.5mg BID prn for anxiety, sleep  -Olanzapine 10 mg PO/IM prn Q2H severe agitation/psychosis  -Hydroxyzine 25 mg Q4H PRN for anxiety  -Tylenol 650 mg Q6H PRN for pain and fever  -Mylanta 30 ml Q4H PRN for indigestion  -Nicotine lozenge 2-4 mg Q1H PRN for nicotine withdrawal  -Milk of  Magnesia 30 mL at bedtime Prn for constipation  -MAALOX 30 mL Q4H PRN for indigestion     Patient will be treated in therapeutic milieu with appropriate individual and group therapies as described.     Medical diagnoses to be addressed this admission:    #. Abdominal/Pelvic Pain suspected 2/2 Endometriosis  No cause found for pain on admission per ED work-up. Wet mount, N. gonnorhea negative. Pelvic ultrasound was non-revealing. Diagnosis of endometriosis based on previous pathology report from 2016.  - Consulting OB/GYN, appreciate recs  - Plan to treat endometriosis with Orilissa per OB/GYN  - From psychiatry team standpoint, benefit on mood from pain reduction likely outweighs risk of negative effect on mood from hormonal treatment and we do not have concerns about her capacity to consent for this treatment     #. 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     Legal Status:   Orders Placed This Encounter      Voluntary       Safety Assessment:        Behavioral Orders   Procedures     Code 1 - Restrict to Unit     Routine Programming       As clinically indicated     Self Injury Precaution     Single Room     Status 15       Every 15 minutes.     Suicide precautions       Patients on Suicide Precautions should have a Combination Diet ordered that includes a Diet selection(s) AND a Behavioral Tray selection for Safe Tray - with utensils, or Safe Tray - NO utensils        Withdrawal precautions        Disposition: Pending stabilization & development of a safe discharge plan.      The patient was seen and the plan was discussed with the attending physician.     Juanjo Allen, MS4  This patient was seen and discussed with my attending physician.  Iglesia Sheldon MD  PGY-1 Psychiatry Resident Physician      Attestation:  This patient has been seen and evaluated by me, Jennifer Funes MD.  I have discussed this patient with the house staff team including the  resident and medical student and I agree with the findings and plan in this note.    I have reviewed today's vital signs, medications, labs and imaging. Jennifer Funes MD , PhD.

## 2020-07-15 ENCOUNTER — TELEPHONE (OUTPATIENT)
Dept: OBGYN | Facility: CLINIC | Age: 40
End: 2020-07-15

## 2020-07-15 NOTE — TELEPHONE ENCOUNTER
Told patient Dr. castro's recommendations nd appointment was made for Friday 7-17-20        ----- Message from Elodia Garcia MD sent at 7/15/2020 11:49 AM CDT -----  Regarding: RE: Decided wants hysterectomy - Ok just to schedule?  The last time we saw her she was really not able to make decisions due to psychiatric illness. Recommend office consult with any ob MD or resident.     Thank you.   ----- Message -----  From: Raysa Mcfadden LPN  Sent: 7/15/2020  11:47 AM CDT  To: Elodia Garcia MD  Subject: FW: Decided wants hysterectomy - Ok just to #    I don't see orders for hyst.  ----- Message -----  From: Mary Strickland RN  Sent: 7/14/2020   4:33 PM CDT  To: Naomy Rn-[facility]  Subject: Decided wants hysterectomy - Ok just to sche#

## 2020-07-20 ENCOUNTER — PREP FOR PROCEDURE (OUTPATIENT)
Dept: OBGYN | Facility: CLINIC | Age: 40
End: 2020-07-20

## 2020-07-20 ENCOUNTER — OFFICE VISIT (OUTPATIENT)
Dept: OBGYN | Facility: CLINIC | Age: 40
End: 2020-07-20
Attending: OBSTETRICS & GYNECOLOGY
Payer: COMMERCIAL

## 2020-07-20 VITALS
HEART RATE: 92 BPM | SYSTOLIC BLOOD PRESSURE: 125 MMHG | HEIGHT: 69 IN | DIASTOLIC BLOOD PRESSURE: 87 MMHG | BODY MASS INDEX: 30.82 KG/M2 | WEIGHT: 208.1 LBS

## 2020-07-20 DIAGNOSIS — Z12.4 SCREENING FOR MALIGNANT NEOPLASM OF CERVIX: Primary | ICD-10-CM

## 2020-07-20 DIAGNOSIS — N80.9 ENDOMETRIOSIS: Primary | ICD-10-CM

## 2020-07-20 PROCEDURE — G0463 HOSPITAL OUTPT CLINIC VISIT: HCPCS | Mod: ZF

## 2020-07-20 PROCEDURE — G0476 HPV COMBO ASSAY CA SCREEN: HCPCS | Performed by: OBSTETRICS & GYNECOLOGY

## 2020-07-20 PROCEDURE — G0145 SCR C/V CYTO,THINLAYER,RESCR: HCPCS | Performed by: OBSTETRICS & GYNECOLOGY

## 2020-07-20 PROCEDURE — 87624 HPV HI-RISK TYP POOLED RSLT: CPT | Performed by: OBSTETRICS & GYNECOLOGY

## 2020-07-20 RX ORDER — CEFAZOLIN SODIUM 1 G/3ML
1 INJECTION, POWDER, FOR SOLUTION INTRAMUSCULAR; INTRAVENOUS SEE ADMIN INSTRUCTIONS
Status: CANCELLED | OUTPATIENT
Start: 2020-07-20

## 2020-07-20 RX ORDER — PHENAZOPYRIDINE HYDROCHLORIDE 100 MG/1
200 TABLET, FILM COATED ORAL ONCE
Status: CANCELLED | OUTPATIENT
Start: 2020-07-20 | End: 2020-07-20

## 2020-07-20 RX ORDER — CEFAZOLIN SODIUM 2 G/100ML
2 INJECTION, SOLUTION INTRAVENOUS
Status: CANCELLED | OUTPATIENT
Start: 2020-07-20

## 2020-07-20 RX ORDER — DOXEPIN HYDROCHLORIDE 10 MG/1
CAPSULE ORAL AT BEDTIME
COMMUNITY
Start: 2020-07-10 | End: 2020-10-12

## 2020-07-20 RX ORDER — DEXTROAMPHETAMINE SACCHARATE, AMPHETAMINE ASPARTATE, DEXTROAMPHETAMINE SULFATE AND AMPHETAMINE SULFATE 5; 5; 5; 5 MG/1; MG/1; MG/1; MG/1
30 TABLET ORAL DAILY
COMMUNITY

## 2020-07-20 RX ORDER — ESCITALOPRAM OXALATE 20 MG/1
20 TABLET ORAL EVERY MORNING
COMMUNITY
Start: 2020-07-05 | End: 2022-01-06

## 2020-07-20 ASSESSMENT — MIFFLIN-ST. JEOR: SCORE: 1683.32

## 2020-07-20 NOTE — LETTER
7/20/2020       RE: Maryanne Crockett  [address] [facility] [address]     Dear Colleague,    Thank you for referring your patient, Maryanne Crockett, to the [facility] at [facility]. Please see a copy of my visit note below.    HPI:  38 yo P2-0-12-2 with ongoing issues with pelvic pain. Has known endometriosis. Struggling with heavy bleeding and passing clots and tissue. Menses also very irregular and frequent.     Has unclear STI history. Per pt, many episodes of infection, but many negative tests.     Past GYN history:    Last PAP smear: uncertain  TSH normal in June 2020.     Past Medical History:   Diagnosis Date     Depressive disorder      Family history of glioblastoma     screening MRIs every 2 years     Ovarian cyst      Seizures (H)        Past Surgical History:   Procedure Laterality Date     CHOLECYSTECTOMY       DILATION AND CURETTAGE SUCTION  4/30/15    retained POC     GASTRIC RESTRICTIVE PROCEDURE, OPEN, REMOVE/REPLACE SUBCUTANEOUS PORT       LAPAROSCOPIC OOPHORECTOMY Right 8/5/2016    Procedure: LAPAROSCOPIC OOPHORECTOMY;  Surgeon: Adrianne Vergara MD;  Location: [facility] OR     LAPAROSCOPIC SALPINGECTOMY Bilateral 8/5/2016    Procedure: LAPAROSCOPIC SALPINGECTOMY;  Surgeon: Adrianne Vergara MD;  Location: [facility] OR     LAPAROSCOPIC TUBAL LIGATION Bilateral 5/22/2015    Procedure: LAPAROSCOPIC TUBAL LIGATION;  Surgeon: Hayley Zayas MD;  Location: [facility] OR     LAPAROSCOPY OPERATIVE ADULT N/A 8/5/2016    Procedure: LAPAROSCOPY OPERATIVE ADULT;  Surgeon: Adrianne Vergara MD;  Location: [facility] OR     SOFT TISSUE SURGERY Right     cyst in hand       Family History   Problem Relation Age of Onset     Other Cancer Brother 36        brain ca     Other Cancer Father         leukemia     Other Cancer Maternal Aunt         2 aunts with glioblastomas     Breast Cancer No family hx of         no ovarian cancer     Allergies: Ibuprofen and Seroquel  "[quetiapine]    Current Outpatient Medications   Medication Sig Dispense Refill     cholecalciferol (VITAMIN D3) 1250 mcg (29848 units) capsule Take 1 capsule (1,250 mcg) by mouth every 7 days 2 capsule 0     VENTOLIN  (90 BASE) MCG/ACT inhaler Inhale 1-2 puffs into the lungs every 6 hours as needed for shortness of breath / dyspnea or wheezing   5     vitamin B-12 (CYANOCOBALAMIN) 1000 MCG tablet Take 1,000 mcg by mouth daily       amphetamine-dextroamphetamine (ADDERALL) 20 MG tablet Take 20 mg by mouth       doxepin (SINEQUAN) 10 MG capsule        escitalopram (LEXAPRO) 20 MG tablet        ROS:  C: NEGATIVE for fever, chills, change in weight  I: NEGATIVE for worrisome rashes, moles or lesions  E: NEGATIVE for vision changes or irritation  E/M: NEGATIVE for ear, mouth and throat problems  R: NEGATIVE for significant cough or SOB  CV: NEGATIVE for chest pain, palpitations or peripheral edema  GI: NEGATIVE for nausea, abdominal pain, heartburn, or change in bowel habits  : NEGATIVE for frequency, dysuria, hematuria, vaginal discharge and irregular bleeding  M: NEGATIVE for significant arthralgias or myalgia  N: NEGATIVE for weakness, dizziness or paresthesias  E: NEGATIVE for temperature intolerance, skin/hair changes  P: NEGATIVE for changes in mood or affect    EXAM:  Blood pressure 125/87, pulse 92, height 1.753 m (5' 9\"), weight 94.4 kg (208 lb 1.6 oz), not currently breastfeeding.   BMI= Body mass index is 30.73 kg/m .  General - pleasant female in no acute distress.  Lungs - non labored  Abdomen - soft, nontender, nondistended, no hepatosplenomegaly. Scars c/w prior abdominal surgery.   Pelvic - EG: normal adult female, BUS: within normal limits, Vagina: well rugated, no discharge, Cervix: no lesions or CMT, Uterus: firm, normal sized and nontender, Adnexae: no masses or tenderness.  Rectovaginal - deferred.  Musculoskeletal - no gross deformities.  Neurological - normal strength, sensation, and " mental status.    40 yo P 2-0-12-2 female with surgically proven endometriosis. Ongoing pelvic pain and irregular bleeding.      We discussed the RaTLH with Left oophorectomy and cystoscopy.       We discussed post op restrictions and expectations. We discussed that though I can assure her of no further vaginal bleeding, she may not be cured of all of her abdominal/pelvic pain.       She is ready for definitive mgmt.     Hayley Zayas MD, [facility]  OB/GYN    7/20/2020  9:24 PM

## 2020-07-20 NOTE — PROGRESS NOTES
Orders placed.     Hayley Zayas MD, [facility]  OB/GYN    7/20/2020  3:59 PM

## 2020-07-20 NOTE — NURSING NOTE
Chief Complaint   Patient presents with     Consult     Hysterectromy consult       See ABEBE Mathews 7/20/2020

## 2020-07-20 NOTE — PROGRESS NOTES
HPI:  40 yo P2-0-12-2 with ongoing issues with pelvic pain. Has known endometriosis. Struggling with heavy bleeding and passing clots and tissue. Menses also very irregular and frequent.     Has unclear STI history. Per pt, many episodes of infection, but many negative tests.     Past GYN history:    Last PAP smear: uncertain  TSH normal in June 2020.     Past Medical History:   Diagnosis Date     Depressive disorder      Family history of glioblastoma     screening MRIs every 2 years     Ovarian cyst      Seizures (H)        Past Surgical History:   Procedure Laterality Date     CHOLECYSTECTOMY       DILATION AND CURETTAGE SUCTION  4/30/15    retained POC     GASTRIC RESTRICTIVE PROCEDURE, OPEN, REMOVE/REPLACE SUBCUTANEOUS PORT       LAPAROSCOPIC OOPHORECTOMY Right 8/5/2016    Procedure: LAPAROSCOPIC OOPHORECTOMY;  Surgeon: Adrianne Vergara MD;  Location: [facility]     LAPAROSCOPIC SALPINGECTOMY Bilateral 8/5/2016    Procedure: LAPAROSCOPIC SALPINGECTOMY;  Surgeon: Adrianne Vergara MD;  Location: [facility]     LAPAROSCOPIC TUBAL LIGATION Bilateral 5/22/2015    Procedure: LAPAROSCOPIC TUBAL LIGATION;  Surgeon: Hayley Zayas MD;  Location: [facility]     LAPAROSCOPY OPERATIVE ADULT N/A 8/5/2016    Procedure: LAPAROSCOPY OPERATIVE ADULT;  Surgeon: Adrianne Vergara MD;  Location: [facility]     SOFT TISSUE SURGERY Right     cyst in hand       Family History   Problem Relation Age of Onset     Other Cancer Brother 36        brain ca     Other Cancer Father         leukemia     Other Cancer Maternal Aunt         2 aunts with glioblastomas     Breast Cancer No family hx of         no ovarian cancer     Allergies: Ibuprofen and Seroquel [quetiapine]    Current Outpatient Medications   Medication Sig Dispense Refill     cholecalciferol (VITAMIN D3) 1250 mcg (51132 units) capsule Take 1 capsule (1,250 mcg) by mouth every 7 days 2 capsule 0     VENTOLIN  (90 BASE) MCG/ACT inhaler Inhale 1-2 puffs into the lungs every 6  "hours as needed for shortness of breath / dyspnea or wheezing   5     vitamin B-12 (CYANOCOBALAMIN) 1000 MCG tablet Take 1,000 mcg by mouth daily       amphetamine-dextroamphetamine (ADDERALL) 20 MG tablet Take 20 mg by mouth       doxepin (SINEQUAN) 10 MG capsule        escitalopram (LEXAPRO) 20 MG tablet        ROS:  C: NEGATIVE for fever, chills, change in weight  I: NEGATIVE for worrisome rashes, moles or lesions  E: NEGATIVE for vision changes or irritation  E/M: NEGATIVE for ear, mouth and throat problems  R: NEGATIVE for significant cough or SOB  CV: NEGATIVE for chest pain, palpitations or peripheral edema  GI: NEGATIVE for nausea, abdominal pain, heartburn, or change in bowel habits  : NEGATIVE for frequency, dysuria, hematuria, vaginal discharge and irregular bleeding  M: NEGATIVE for significant arthralgias or myalgia  N: NEGATIVE for weakness, dizziness or paresthesias  E: NEGATIVE for temperature intolerance, skin/hair changes  P: NEGATIVE for changes in mood or affect    EXAM:  Blood pressure 125/87, pulse 92, height 1.753 m (5' 9\"), weight 94.4 kg (208 lb 1.6 oz), not currently breastfeeding.   BMI= Body mass index is 30.73 kg/m .  General - pleasant female in no acute distress.  Lungs - non labored  Abdomen - soft, nontender, nondistended, no hepatosplenomegaly. Scars c/w prior abdominal surgery.   Pelvic - EG: normal adult female, BUS: within normal limits, Vagina: well rugated, no discharge, Cervix: no lesions or CMT, Uterus: firm, normal sized and nontender, Adnexae: no masses or tenderness.  Rectovaginal - deferred.  Musculoskeletal - no gross deformities.  Neurological - normal strength, sensation, and mental status.    38 yo P 2-0-12-2 female with surgically proven endometriosis. Ongoing pelvic pain and irregular bleeding.      We discussed the RaTLH with Left oophorectomy and cystoscopy.       We discussed post op restrictions and expectations. We discussed that though I can assure her of no " further vaginal bleeding, she may not be cured of all of her abdominal/pelvic pain.       She is ready for definitive mgmt.     Hayley Zayas MD, [facility] Staff  OB/GYN    7/20/2020  9:24 PM

## 2020-07-21 ENCOUNTER — TELEPHONE (OUTPATIENT)
Dept: OBGYN | Facility: CLINIC | Age: 40
End: 2020-07-21

## 2020-07-21 DIAGNOSIS — Z11.59 ENCOUNTER FOR SCREENING FOR OTHER VIRAL DISEASES: Primary | ICD-10-CM

## 2020-07-21 PROBLEM — N80.9 ENDOMETRIOSIS: Status: ACTIVE | Noted: 2020-07-21

## 2020-07-21 NOTE — TELEPHONE ENCOUNTER
Confirmed surgery date, time and location, 9/1/20 with arrival time at 6:30a.m with nothing to eat eight hours before scheduled surgery time and clear liquids up to two hours before scheduled surgery time, informed patient to have a pre op physical within thirty days of surgery and that a map and letter will be mailed out.     to complete the following fields:            CHECKLIST     Google Calendar : Yes     Resident notified: Not Applicable     Clinic schedule blocked:  Not Applicable    Patient notified:Yes      Pre op information sent: Yes     Given to patient over the phone.Yes    Comments:

## 2020-07-22 LAB
COPATH REPORT: NORMAL
PAP: NORMAL

## 2020-07-27 ENCOUNTER — TELEPHONE (OUTPATIENT)
Dept: OBGYN | Facility: CLINIC | Age: 40
End: 2020-07-27

## 2020-07-27 LAB
FINAL DIAGNOSIS: NORMAL
HPV HR 12 DNA CVX QL NAA+PROBE: NEGATIVE
HPV16 DNA SPEC QL NAA+PROBE: NEGATIVE
HPV18 DNA SPEC QL NAA+PROBE: NEGATIVE
SPECIMEN DESCRIPTION: NORMAL
SPECIMEN SOURCE CVX/VAG CYTO: NORMAL

## 2020-07-27 NOTE — TELEPHONE ENCOUNTER
Spoke with Maryanne regarding new vaginal lump. States this developed on left side of vulva below where she has noted persistent pain recently. States lump is very painful to touch. She has been using warm packs and sitting in shallow bath tub for relief.    Advised recommend clinic visit to evaluate new problem. Patient agrees and was scheduled.

## 2020-07-27 NOTE — TELEPHONE ENCOUNTER
Patient left message on triage voicemail. Returned call and voicemail left.    Indicates on message that she has continued to have debilitating left-sided vaginal burning, for which she was seen by Dr. Zayas on 7/20 and subsequently scheduled for surgery for definitive management.    Patient states on message that she has developed a lump/cyst on the outer left side of labia. Does not indicate if this is painful. She also notes new vaginal odor that is different to her.    Called to Maryanne to assess symptoms further. Based on information left in message, RN feels clinic evaluation is needed for new symptoms, but availability of appointments is a barrier at this time.     Await return call from patient to discuss further.

## 2020-07-29 ENCOUNTER — OFFICE VISIT (OUTPATIENT)
Dept: OBGYN | Facility: CLINIC | Age: 40
End: 2020-07-29
Attending: OBSTETRICS & GYNECOLOGY
Payer: COMMERCIAL

## 2020-07-29 VITALS
DIASTOLIC BLOOD PRESSURE: 93 MMHG | HEIGHT: 69 IN | WEIGHT: 203.3 LBS | BODY MASS INDEX: 30.11 KG/M2 | SYSTOLIC BLOOD PRESSURE: 126 MMHG | HEART RATE: 108 BPM

## 2020-07-29 DIAGNOSIS — N76.2 VULVAR CELLULITIS: Primary | ICD-10-CM

## 2020-07-29 DIAGNOSIS — R10.2 PELVIC PAIN IN FEMALE: ICD-10-CM

## 2020-07-29 DIAGNOSIS — N80.9 ENDOMETRIOSIS: ICD-10-CM

## 2020-07-29 PROCEDURE — G0463 HOSPITAL OUTPT CLINIC VISIT: HCPCS | Mod: ZF

## 2020-07-29 RX ORDER — MINERAL OIL/HYDROPHIL PETROLAT
OINTMENT (GRAM) TOPICAL PRN
Qty: 50 G | Refills: 0 | Status: SHIPPED | OUTPATIENT
Start: 2020-07-29 | End: 2020-10-12

## 2020-07-29 RX ORDER — ALPRAZOLAM 1 MG
2 TABLET ORAL 2 TIMES DAILY
COMMUNITY
Start: 2020-07-02 | End: 2021-01-29

## 2020-07-29 RX ORDER — SULFAMETHOXAZOLE/TRIMETHOPRIM 800-160 MG
1 TABLET ORAL 2 TIMES DAILY
Qty: 14 TABLET | Refills: 0 | Status: SHIPPED | OUTPATIENT
Start: 2020-07-29 | End: 2020-08-05

## 2020-07-29 ASSESSMENT — MIFFLIN-ST. JEOR: SCORE: 1661.54

## 2020-07-29 NOTE — PATIENT INSTRUCTIONS
Some Suggested Vulvar Pain & Itching Measures    The vulva is the external genitalia in the female.  The skin of the vulva can be quite sensitive.  Because it is moist and frequently subjected to friction while sitting and moving, this area can be easily injured.  There are various strategies that can be used to prevent irritation and allow the vulva to heal.  Keeping this area dry can accelerate healing.  Chemicals found in toilet tissues, laundry soaps and detergents that come in contact with the vulva can cause irritation.  Avoiding contact with potential irritants that contain chemicals is important.  Fabric softeners in undergarments, chemicals in deodorant soaps, bubble baths, feminine hygiene spray and panty liners, etc., can all cause irritation to the vulva.  The following recommendations are specific measures that can help minimize vulvar irritation.    Wear white 100% cotton underwear and do not wear underwear at night.  Do not wear pantyhose, tights, or other close-fitting clothes.  Enclosing this area with synthetic fibers holds both heat and moisture in the skin, conditions which potentiate the development of infections.  Tight-fitting clothes may also increase your symptoms of discomfort.    After washing underwear, put it through at least one whole cycle with water only.  Some women have suffered needlessly from irritants in detergents whose residue was left in clothes by incomplete rinsing.  Rinsing clothes thoroughly is more important than which detergent is used although to be on the safe side, the milder the soap, the better.  Wash new underwear before wearing.  Fabric softeners and dryer sheets should not be used.    Rinse skin off with plain water frequently.  Use tap water, distilled water, sitz baths, squirt bottles, or bidets.  Additionally, hand held showers can be helpful for rinsing the vulva.  After rinsing, pat the skin gently dry.    If the anus is irritated, consider cutting white  "flannel squares and placing the square in warm water.  Wipe the anus with the wet flannel and discard or wash the flannel.    Use very mild soap for bathing.  It is best not to use any soaps on the vulva.  The vulva should be rinsed with warm water.  Bars of soap such as Neutrogena unscented face soap, Basis, Pears (made in [place]), and castile soap with olive oil (Raine) are gentle to the other skin areas.  They are found at pharmacies or health food stores.  Remember that frequent baths with soaps may increase the irritation.  You cannot wash away your symptoms.    A compress of oiled Aveeno (a powdered oatmeal bath treatment) has been recommended by some.  It is placed over the vulva three to four times a day.  Put two tablespoons of Aveeno in one quart of water.  Mix in a jar and refrigerate.  This is often helpful after intercourse or when symptoms of burning and itching are present.    Some patients find the use of cool gel packs on the vulva to be helpful.    Do not use products with benzocaine.    Lubricants    Use lubricants suggested by your health care provider to make intercourse more comfortable.    This does not attempt to be a complete list, but rather describes commonly used lubricants.  We do not officially recommend use of any one of these products, nor do we recommend any one product over any other products. A list of lubricants follows:    Slippery Stuff - a silken gel that does not leave a sticky residue  It is hygenic, water-based and water-soluble, odorless, long lasting and latex compatible.    Astroglide - A long lasting, light lubrication that is odorless and flavorless.  It is water soluble.  Many like it because it is a long lasting lubricant that does not become \"stringy.\"    Femigel Natural product from tea trees -  For vaginal dryness.    K-Y Jelly - Generally considered an all-purpose lubricant that many people have found helpful with a \"medium\" degree of thickness.  Some report it " "comes out too fast and gets \"gummy.\"    Lubrin - A suppository.  Many post-menopausal women find this a helpful lubricant because, since it is inserted into the vagina, it lasts longer.  They indicate that it needs some time to melt inside the vagina because it is a suppository.  For some women, they indicate that it is almost \"too much\" lubrication.    Moist Again Natural    Replens - A lubricant that is inserted by applicator into the vagina.  It comes in a package of 12 single-use applications.  This vaginal gel is considered to have medium thickness and properties similar to Ortho Personal Lubricant.  Women note that, like Lubrin, it does not dissolve too quickly.  Must be used several times weekly.    Sylk - Made of Kiwi fruit vine and purified water.  From [place].  Marketed through [employer].  Mimics natural secretions.    Surgilube - Many consider this to be thicker than K-Y jelly.    Vitamin E oil - Available in health food stores, preferred by some women for natural, non-irritating qualities.    Vegetable oil (like olive oil) can also be used.    Egg whites have been used for lubrication.    Saliva has been used for lubrication.    Consider using 100% cotton menstrual pads and tampons.  Many women with vulvar pain experience a significant increase in irritation and pain every month when they use commercial paper pads or tampons.  This monthly increase in pain can often be reduced by using 100% washable and reusable cotton menstrual pads.  Pure cotton tampons are available.    Don't sit or remain in a wet bathing suit for prolonged periods.    Avoid condom and spermicidal creams or gels if they cause increased irritation of sensitive tissues.    Many of the disease processes will require a biopsy to diagnose your condition.  If a biopsy is performed during your visit, after care is important.  Keep the area clean and dry.  Avoid application of creams or ointments to the biopsy site.  Sitz baths twice " "a day for three to four days following the biopsy will aid in healing.  If increased redness, severe pain, heavy discharge, or heavy bleeding occurs at the biopsy site, call for further instructions.  Avoid intercourse until the biopsy site is healed.    Adapted From:  The Vulvar Pain Foundation, \"Natural and Prophylactic Measures Suggested\", Vulvar Pain   Newsletter 1993: Sprin-6  The Interstitial Cystitis Association Vulvar Pain handout        Important Things to Remember About Vulvar Pain      Vulvar pain is not generally associated with malignancy.      Despite the fact that the cause of vulvar pain cannot be established in many cases, careful investigation has established that it is not a sexually transmitted disease and is not contagious to your partner.      Vulvar pain is not due to poor hygiene, and the use of strong soaps and detergents can worsen the condition.  Use gentle soaps to the skin and no soap on the vulva, allowing water alone to cleanse the perineum.      Improvement often takes weeks to months.      Although the cause of vulvar pain cannot always be determined, it has been characterized well enough to allow treatment of the pain with a reasonable expectation of significant improvement, if not complete alleviation of pain.      Treatment setbacks may occur; they are not necessarily the fault of your health care provider or you.      We understand that chronic pain is exhausting and can be demoralizing.      There is nothing wrong with you as a person, the problem is your pain.      Don't feel that because this is genital pain that you can't talk to other people.  People with chronic problems need others for support.  Family and friends can help.      If you are in a relationship, both of you are affected by this problem.  Appropriate couple counseling may be needed.      It is OK to seek information on your own.  The more you know about this disease, the more control you have over your " situation.      Sometimes patients become depressed if new treatments fail.  Remember this as we work through this problem.

## 2020-07-29 NOTE — LETTER
"2020       RE: Maryanne Crockett  [address] [facility] [address]     Dear Colleague,    Thank you for referring your patient, Maryanne Crockett, to the [facility] at [facility]. Please see a copy of my visit note below.    [facility]  Gynecology Visit    HPI:    Maryanne Crockett is a 39 year old  with a history of pathology confirmed endometriosis status post bilateral salpingectomy and right oophorectomy here for new \"vaginal lump\".  The patient was seen in the emergency department on  for 3 months of right sided pelvic pain.  The emergency department collected wet prep and chlamydia that were both negative.  She was admitted during this time for psychiatric evaluation when expressing suicidal ideation related to her pain.  She was evaluated by inpatient gynecology team, followed up as outpatient and was schedule for TL, left oophorectomy on  for suspected endometriosis as the etiology of her pain.    The patient states that she noticed 4 days ago a new lump on her left vulva that was painless and did not have any overlying skin changes or associated itching.  The following day she states she noticed a clear drainage from the area and a foul-smelling odor.  She has been wearing pads for her irregular menses and stated that the friction from her pad eventually caused irritation on the lump and that it now appears open.  It has no longer continued to drain any fluid but the foul odor has reportedly persisted.  She denies any dysuria, hematuria, constipation, diarrhea, fevers or chills, or vomiting.  The patient does report nausea associated with bouts of her pelvic pain and hot flashes over the last 3 months.  She has taken her temperature during hot flashes never recorded a fever.  She is not sexually active and has had no new partners within the last year.      She repeatedly expresses concern that this lump is related to her pelvic " "pain and is worried that her pain is related to a pelvic infection.  She has been sporadically taking amoxicillin, prescribed for dental procedures, and states that helps with her pelvic pain.  The patient also previously received a prescription for metronidazole gel that she refilled and has been occasionally using, reporting that that also is associated with improvement in her pelvic pain.  She is allergic to ibuprofen and has previously tried taking Tylenol for her 10 out of 10 pelvic pain but states that it has no effect.  She stopped taking it completely out of fear that she will build a tolerance to Tylenol and it will not be useful when she will be needing it after her surgery.  At the end of the evaluation she continues to express frustration that her surgery is scheduled over a month out and requests that her surgery be moved up.  She is very worried that this will become an emergency situation due to the pain and concern of ongoing infection. \"I know something is wrong, my body is telling me I need this surgery.\"       GYN History  - LMP: No LMP recorded. (Menstrual status: Irregular Periods).  - Menses: Irregular   - Pap Smears:Last PAP , NIL, HPV neg  - Contraception: Salpingectomy   - Hx STIs/UTIs: remote history of chlamydia, trichomonas     OBHx  OB History    Para Term  AB Living   14 2 2 0 12 2   SAB TAB Ectopic Multiple Live Births   7 0 1 0 0      # Outcome Date GA Lbr Efrain/2nd Weight Sex Delivery Anes PTL Lv   14 AB            13 AB            12 AB            11 AB            10 Term            9 Term            8 SAB            7 SAB            6 SAB            5 SAB            4 SAB            3 SAB            2 SAB            1 Ectopic                PMHx: Endometriosis, depressive disorder, chronic pelvic pain   PSHx: Bilateral salpingectomy, RSO, D&C, cholecystecomy  Meds: Adderall, Vitamin D, Doxepin, escitalopram, ventolin inhaler, xanax   Allergies: Ibuprofen, seroquel " "    SocHx: Not sexually active, current occasional tobacco use, Marijuana use, occasional alcohol use     FamHx:  No family history of breast cancer, family history of glioblastoma     ROS: 10-Point ROS negative except as noted in HPI    Physical Exam  BP (!) 126/93 (BP Location: Left arm, Patient Position: Chair)   Pulse 108   Ht 1.753 m (5' 9\")   Wt 92.2 kg (203 lb 4.8 oz)   Breastfeeding No   BMI 30.02 kg/m    Gen: Well-appearing, NAD  HEENT: Normocephalic, atraumatic  CV:  Mildly tachycardic   Pulm: No increased work of breathing on room air   Abd: Soft, non-distended, scars consistent with prior abdominal surgery   Ext: No LE edema, extremities warm and well perfused  Pelvic:  Normal appearing external female genitalia. 1 cm x 1 cm superficial erosion present at 4 o'clock over external labia majora. No drainage expressed. Minimal surrounding erythema with palpable surrounding induration. Normal hair distribution. Vagina is without lesions. No abnormal discharge. Cervix multiparous, no lesions. Bimanual exam deferred due to patient discomfort.   Psych:  Tearful throughout exam and evaluation. Bouts of pressured, tangential speech. Flight of ideas. Mood labile. Denies suicidal ideation.     Assessment/Plan:  Maryanne Crockett is a 39 year old  female with a past medical history notable for pathology confirmed endometriosis after laparoscopic bilateral salpingectomy, right oophorectomy and removal of left ovarian mass and mood disorder here with vulvar cellulitis and ongoing pelvic pain.     Vulvar cellulitis:   -Wet prep was negative, no indication to repeat chlamydia testing  -Advised patient to discontinue self treatment with sporadic use of metronidazole and amoxicillin  -Prescribed 7-day course of Bactrim  -Reviewed warning signs/symptoms to return to be evaluated (fevers/chills, worsening erosion or vulvar pain)    Endometriosis  AUB:    The patient has definitive surgical management scheduled with " Dr. Zayas on 9/1. Reviewed with the patient that at this point in time there were no clinical indications for emergent surgical treatment but will contact Dr. Zayas to see if her surgery can be rescheduled at an earlier date.  -Encouraged scheduled Tylenol for pain management    Staffed with Dr. Mackenzie.     Flori Constantino MD  OBGYN PGY-2  7/29/2020, 2:35 PM    OBGYN Attending Addendum    Maryanne Crockett was seen by the resident, Dr. Constantino. I, Viviane Mackenzie, reviewed the history & exam. The assessment and plan were made jointly between myself and Dr. Constantino.    Viviane Mackenzie MD, MSCI    [facility]/OBGYN  Date of Service: 7/29/20

## 2020-07-29 NOTE — NURSING NOTE
Chief Complaint   Patient presents with     Gyn Exam     Lump on vagina and pain.        See ABEBE Mathews 7/29/2020

## 2020-07-29 NOTE — PROGRESS NOTES
"[facility]  Gynecology Visit    HPI:    Maryanne Crockett is a 39 year old  with a history of pathology confirmed endometriosis status post bilateral salpingectomy and right oophorectomy here for new \"vaginal lump\".  The patient was seen in the emergency department on  for 3 months of right sided pelvic pain.  The emergency department collected wet prep and chlamydia that were both negative.  She was admitted during this time for psychiatric evaluation when expressing suicidal ideation related to her pain.  She was evaluated by inpatient gynecology team, followed up as outpatient and was schedule for [facility], left oophorectomy on  for suspected endometriosis as the etiology of her pain.    The patient states that she noticed 4 days ago a new lump on her left vulva that was painless and did not have any overlying skin changes or associated itching.  The following day she states she noticed a clear drainage from the area and a foul-smelling odor.  She has been wearing pads for her irregular menses and stated that the friction from her pad eventually caused irritation on the lump and that it now appears open.  It has no longer continued to drain any fluid but the foul odor has reportedly persisted.  She denies any dysuria, hematuria, constipation, diarrhea, fevers or chills, or vomiting.  The patient does report nausea associated with bouts of her pelvic pain and hot flashes over the last 3 months.  She has taken her temperature during hot flashes never recorded a fever.  She is not sexually active and has had no new partners within the last year.      She repeatedly expresses concern that this lump is related to her pelvic pain and is worried that her pain is related to a pelvic infection.  She has been sporadically taking amoxicillin, prescribed for dental procedures, and states that helps with her pelvic pain.  The patient also previously received a prescription for metronidazole gel that she refilled and " "has been occasionally using, reporting that that also is associated with improvement in her pelvic pain.  She is allergic to ibuprofen and has previously tried taking Tylenol for her 10 out of 10 pelvic pain but states that it has no effect.  She stopped taking it completely out of fear that she will build a tolerance to Tylenol and it will not be useful when she will be needing it after her surgery.  At the end of the evaluation she continues to express frustration that her surgery is scheduled over a month out and requests that her surgery be moved up.  She is very worried that this will become an emergency situation due to the pain and concern of ongoing infection. \"I know something is wrong, my body is telling me I need this surgery.\"       GYN History  - LMP: No LMP recorded. (Menstrual status: Irregular Periods).  - Menses: Irregular   - Pap Smears:Last PAP , NIL, HPV neg  - Contraception: Salpingectomy   - Hx STIs/UTIs: remote history of chlamydia, trichomonas     OBHx  OB History    Para Term  AB Living   14 2 2 0 12 2   SAB TAB Ectopic Multiple Live Births   7 0 1 0 0      # Outcome Date GA Lbr Efrain/2nd Weight Sex Delivery Anes PTL Lv   14 AB            13 AB            12 AB            11 AB            10 Term            9 Term            8 SAB            7 SAB            6 SAB            5 SAB            4 SAB            3 SAB            2 SAB            1 Ectopic                PMHx: Endometriosis, depressive disorder, chronic pelvic pain   PSHx: Bilateral salpingectomy, RSO, D&C, cholecystecomy  Meds: Adderall, Vitamin D, Doxepin, escitalopram, ventolin inhaler, xanax   Allergies: Ibuprofen, seroquel     SocHx: Not sexually active, current occasional tobacco use, Marijuana use, occasional alcohol use     FamHx:  No family history of breast cancer, family history of glioblastoma     ROS: 10-Point ROS negative except as noted in HPI    Physical Exam  BP (!) 126/93 (BP Location: Left arm, " "Patient Position: Chair)   Pulse 108   Ht 1.753 m (5' 9\")   Wt 92.2 kg (203 lb 4.8 oz)   Breastfeeding No   BMI 30.02 kg/m    Gen: Well-appearing, NAD  HEENT: Normocephalic, atraumatic  CV:  Mildly tachycardic   Pulm: No increased work of breathing on room air   Abd: Soft, non-distended, scars consistent with prior abdominal surgery   Ext: No LE edema, extremities warm and well perfused  Pelvic:  Normal appearing external female genitalia. 1 cm x 1 cm superficial erosion present at 4 o'clock over external labia majora. No drainage expressed. Minimal surrounding erythema with palpable surrounding induration. Normal hair distribution. Vagina is without lesions. No abnormal discharge. Cervix multiparous, no lesions. Bimanual exam deferred due to patient discomfort.   Psych:  Tearful throughout exam and evaluation. Bouts of pressured, tangential speech. Flight of ideas. Mood labile. Denies suicidal ideation.     Assessment/Plan:  Maryanne Crockett is a 39 year old  female with a past medical history notable for pathology confirmed endometriosis after laparoscopic bilateral salpingectomy, right oophorectomy and removal of left ovarian mass and mood disorder here with vulvar cellulitis and ongoing pelvic pain.     Vulvar cellulitis:   -Wet prep was negative, no indication to repeat chlamydia testing  -Advised patient to discontinue self treatment with sporadic use of metronidazole and amoxicillin  -Prescribed 7-day course of Bactrim  -Reviewed warning signs/symptoms to return to be evaluated (fevers/chills, worsening erosion or vulvar pain)    Endometriosis  AUB:    The patient has definitive surgical management scheduled with Dr. Zayas on . Reviewed with the patient that at this point in time there were no clinical indications for emergent surgical treatment but will contact Dr. Zayas to see if her surgery can be rescheduled at an earlier date.  -Encouraged scheduled Tylenol for pain management    Staffed " with Dr. Mackenzie.     Flori Constantino MD  OBGYN PGY-2  7/29/2020, 2:35 PM    OBGYN Attending Addendum    Maryanne Crockett was seen by the resident, Dr. Constantino. I, Viviane Mackenzie, reviewed the history & exam. The assessment and plan were made jointly between myself and Dr. Constantino.    Viviane Mackenzie MD, MSCI    [facility]/OBGYN  Date of Service: 7/29/20

## 2020-08-28 DIAGNOSIS — Z11.59 ENCOUNTER FOR SCREENING FOR OTHER VIRAL DISEASES: ICD-10-CM

## 2020-08-28 PROCEDURE — U0003 INFECTIOUS AGENT DETECTION BY NUCLEIC ACID (DNA OR RNA); SEVERE ACUTE RESPIRATORY SYNDROME CORONAVIRUS 2 (SARS-COV-2) (CORONAVIRUS DISEASE [COVID-19]), AMPLIFIED PROBE TECHNIQUE, MAKING USE OF HIGH THROUGHPUT TECHNOLOGIES AS DESCRIBED BY CMS-2020-01-R: HCPCS | Performed by: OBSTETRICS & GYNECOLOGY

## 2020-08-29 LAB
SARS-COV-2 RNA SPEC QL NAA+PROBE: NOT DETECTED
SPECIMEN SOURCE: NORMAL

## 2020-08-31 ENCOUNTER — ANESTHESIA EVENT (OUTPATIENT)
Dept: SURGERY | Facility: CLINIC | Age: 40
End: 2020-08-31
Payer: COMMERCIAL

## 2020-09-01 ENCOUNTER — HOSPITAL ENCOUNTER (OUTPATIENT)
Facility: CLINIC | Age: 40
Setting detail: OBSERVATION
Discharge: HOME | End: 2020-09-02
Attending: OBSTETRICS & GYNECOLOGY | Admitting: OBSTETRICS & GYNECOLOGY
Payer: COMMERCIAL

## 2020-09-01 ENCOUNTER — ANESTHESIA (OUTPATIENT)
Dept: SURGERY | Facility: CLINIC | Age: 40
End: 2020-09-01
Payer: COMMERCIAL

## 2020-09-01 ENCOUNTER — ANCILLARY PROCEDURE (OUTPATIENT)
Dept: ULTRASOUND IMAGING | Facility: CLINIC | Age: 40
End: 2020-09-01
Attending: ANESTHESIOLOGY
Payer: COMMERCIAL

## 2020-09-01 DIAGNOSIS — N80.9 ENDOMETRIOSIS: ICD-10-CM

## 2020-09-01 DIAGNOSIS — Z90.710 S/P HYSTERECTOMY: Primary | ICD-10-CM

## 2020-09-01 LAB
ABO + RH BLD: NORMAL
ABO + RH BLD: NORMAL
B-HCG SERPL-ACNC: <1 IU/L (ref 0–5)
BLD GP AB SCN SERPL QL: NORMAL
BLOOD BANK CMNT PATIENT-IMP: NORMAL
ERYTHROCYTE [DISTWIDTH] IN BLOOD BY AUTOMATED COUNT: 13.2 % (ref 10–15)
GLUCOSE SERPL-MCNC: 91 MG/DL (ref 70–99)
HCT VFR BLD AUTO: 40.9 % (ref 35–47)
HGB BLD-MCNC: 13.2 G/DL (ref 11.7–15.7)
MCH RBC QN AUTO: 28.8 PG (ref 26.5–33)
MCHC RBC AUTO-ENTMCNC: 32.3 G/DL (ref 31.5–36.5)
MCV RBC AUTO: 89 FL (ref 78–100)
PLATELET # BLD AUTO: 270 10E9/L (ref 150–450)
RBC # BLD AUTO: 4.58 10E12/L (ref 3.8–5.2)
SPECIMEN EXP DATE BLD: NORMAL
WBC # BLD AUTO: 8.7 10E9/L (ref 4–11)

## 2020-09-01 PROCEDURE — 25000128 H RX IP 250 OP 636: Performed by: OBSTETRICS & GYNECOLOGY

## 2020-09-01 PROCEDURE — 25000132 ZZH RX MED GY IP 250 OP 250 PS 637

## 2020-09-01 PROCEDURE — 86900 BLOOD TYPING SEROLOGIC ABO: CPT | Performed by: OBSTETRICS & GYNECOLOGY

## 2020-09-01 PROCEDURE — 37000008 ZZH ANESTHESIA TECHNICAL FEE, 1ST 30 MIN: Performed by: OBSTETRICS & GYNECOLOGY

## 2020-09-01 PROCEDURE — 85027 COMPLETE CBC AUTOMATED: CPT | Performed by: OBSTETRICS & GYNECOLOGY

## 2020-09-01 PROCEDURE — C1765 ADHESION BARRIER: HCPCS | Performed by: OBSTETRICS & GYNECOLOGY

## 2020-09-01 PROCEDURE — 25800030 ZZH RX IP 258 OP 636: Performed by: STUDENT IN AN ORGANIZED HEALTH CARE EDUCATION/TRAINING PROGRAM

## 2020-09-01 PROCEDURE — 25000132 ZZH RX MED GY IP 250 OP 250 PS 637: Performed by: OBSTETRICS & GYNECOLOGY

## 2020-09-01 PROCEDURE — 25000566 ZZH SEVOFLURANE, EA 15 MIN: Performed by: OBSTETRICS & GYNECOLOGY

## 2020-09-01 PROCEDURE — 37000009 ZZH ANESTHESIA TECHNICAL FEE, EACH ADDTL 15 MIN: Performed by: OBSTETRICS & GYNECOLOGY

## 2020-09-01 PROCEDURE — 25000125 ZZHC RX 250: Performed by: NURSE ANESTHETIST, CERTIFIED REGISTERED

## 2020-09-01 PROCEDURE — 71000015 ZZH RECOVERY PHASE 1 LEVEL 2 EA ADDTL HR: Performed by: OBSTETRICS & GYNECOLOGY

## 2020-09-01 PROCEDURE — 25000128 H RX IP 250 OP 636: Performed by: ANESTHESIOLOGY

## 2020-09-01 PROCEDURE — 36000088 ZZH SURGERY LEVEL 8 EA 15 ADDTL MIN - UMMC: Performed by: OBSTETRICS & GYNECOLOGY

## 2020-09-01 PROCEDURE — G0378 HOSPITAL OBSERVATION PER HR: HCPCS

## 2020-09-01 PROCEDURE — 86850 RBC ANTIBODY SCREEN: CPT | Performed by: OBSTETRICS & GYNECOLOGY

## 2020-09-01 PROCEDURE — 84702 CHORIONIC GONADOTROPIN TEST: CPT | Performed by: OBSTETRICS & GYNECOLOGY

## 2020-09-01 PROCEDURE — 25000128 H RX IP 250 OP 636: Performed by: NURSE ANESTHETIST, CERTIFIED REGISTERED

## 2020-09-01 PROCEDURE — 82947 ASSAY GLUCOSE BLOOD QUANT: CPT | Performed by: OBSTETRICS & GYNECOLOGY

## 2020-09-01 PROCEDURE — 25000132 ZZH RX MED GY IP 250 OP 250 PS 637: Performed by: STUDENT IN AN ORGANIZED HEALTH CARE EDUCATION/TRAINING PROGRAM

## 2020-09-01 PROCEDURE — 88307 TISSUE EXAM BY PATHOLOGIST: CPT | Performed by: OBSTETRICS & GYNECOLOGY

## 2020-09-01 PROCEDURE — 36000086 ZZH SURGERY LEVEL 8 1ST 30 MIN UMMC: Performed by: OBSTETRICS & GYNECOLOGY

## 2020-09-01 PROCEDURE — 71000014 ZZH RECOVERY PHASE 1 LEVEL 2 FIRST HR: Performed by: OBSTETRICS & GYNECOLOGY

## 2020-09-01 PROCEDURE — 25000125 ZZHC RX 250: Performed by: ANESTHESIOLOGY

## 2020-09-01 PROCEDURE — 40000170 ZZH STATISTIC PRE-PROCEDURE ASSESSMENT II: Performed by: OBSTETRICS & GYNECOLOGY

## 2020-09-01 PROCEDURE — 86901 BLOOD TYPING SEROLOGIC RH(D): CPT | Performed by: OBSTETRICS & GYNECOLOGY

## 2020-09-01 PROCEDURE — 25800030 ZZH RX IP 258 OP 636: Performed by: NURSE ANESTHETIST, CERTIFIED REGISTERED

## 2020-09-01 PROCEDURE — 27210794 ZZH OR GENERAL SUPPLY STERILE: Performed by: OBSTETRICS & GYNECOLOGY

## 2020-09-01 PROCEDURE — 25000128 H RX IP 250 OP 636: Performed by: STUDENT IN AN ORGANIZED HEALTH CARE EDUCATION/TRAINING PROGRAM

## 2020-09-01 RX ORDER — ALPRAZOLAM 1 MG
2 TABLET ORAL 2 TIMES DAILY
Status: DISCONTINUED | OUTPATIENT
Start: 2020-09-01 | End: 2020-09-02 | Stop reason: HOSPADM

## 2020-09-01 RX ORDER — SODIUM CHLORIDE, SODIUM LACTATE, POTASSIUM CHLORIDE, CALCIUM CHLORIDE 600; 310; 30; 20 MG/100ML; MG/100ML; MG/100ML; MG/100ML
INJECTION, SOLUTION INTRAVENOUS CONTINUOUS PRN
Status: DISCONTINUED | OUTPATIENT
Start: 2020-09-01 | End: 2020-09-01

## 2020-09-01 RX ORDER — ONDANSETRON 2 MG/ML
4 INJECTION INTRAMUSCULAR; INTRAVENOUS EVERY 6 HOURS PRN
Status: DISCONTINUED | OUTPATIENT
Start: 2020-09-01 | End: 2020-09-01

## 2020-09-01 RX ORDER — ONDANSETRON 2 MG/ML
INJECTION INTRAMUSCULAR; INTRAVENOUS PRN
Status: DISCONTINUED | OUTPATIENT
Start: 2020-09-01 | End: 2020-09-01

## 2020-09-01 RX ORDER — HYDROMORPHONE HYDROCHLORIDE 1 MG/ML
.3-.5 INJECTION, SOLUTION INTRAMUSCULAR; INTRAVENOUS; SUBCUTANEOUS EVERY 5 MIN PRN
Status: DISCONTINUED | OUTPATIENT
Start: 2020-09-01 | End: 2020-09-01 | Stop reason: HOSPADM

## 2020-09-01 RX ORDER — LIDOCAINE HYDROCHLORIDE 20 MG/ML
INJECTION, SOLUTION INFILTRATION; PERINEURAL PRN
Status: DISCONTINUED | OUTPATIENT
Start: 2020-09-01 | End: 2020-09-01

## 2020-09-01 RX ORDER — SODIUM CHLORIDE, SODIUM LACTATE, POTASSIUM CHLORIDE, CALCIUM CHLORIDE 600; 310; 30; 20 MG/100ML; MG/100ML; MG/100ML; MG/100ML
INJECTION, SOLUTION INTRAVENOUS CONTINUOUS
Status: DISCONTINUED | OUTPATIENT
Start: 2020-09-01 | End: 2020-09-02 | Stop reason: HOSPADM

## 2020-09-01 RX ORDER — DIPHENHYDRAMINE HYDROCHLORIDE 50 MG/ML
25 INJECTION INTRAMUSCULAR; INTRAVENOUS EVERY 6 HOURS PRN
Status: DISCONTINUED | OUTPATIENT
Start: 2020-09-01 | End: 2020-09-02 | Stop reason: HOSPADM

## 2020-09-01 RX ORDER — DIPHENHYDRAMINE HCL 25 MG
25 CAPSULE ORAL EVERY 6 HOURS PRN
Status: DISCONTINUED | OUTPATIENT
Start: 2020-09-01 | End: 2020-09-02 | Stop reason: HOSPADM

## 2020-09-01 RX ORDER — BUPIVACAINE HYDROCHLORIDE 2.5 MG/ML
INJECTION, SOLUTION EPIDURAL; INFILTRATION; INTRACAUDAL PRN
Status: DISCONTINUED | OUTPATIENT
Start: 2020-09-01 | End: 2020-09-01

## 2020-09-01 RX ORDER — DEXAMETHASONE SODIUM PHOSPHATE 4 MG/ML
INJECTION, SOLUTION INTRA-ARTICULAR; INTRALESIONAL; INTRAMUSCULAR; INTRAVENOUS; SOFT TISSUE PRN
Status: DISCONTINUED | OUTPATIENT
Start: 2020-09-01 | End: 2020-09-01

## 2020-09-01 RX ORDER — PROPOFOL 10 MG/ML
INJECTION, EMULSION INTRAVENOUS PRN
Status: DISCONTINUED | OUTPATIENT
Start: 2020-09-01 | End: 2020-09-01

## 2020-09-01 RX ORDER — LIDOCAINE 40 MG/G
CREAM TOPICAL
Status: DISCONTINUED | OUTPATIENT
Start: 2020-09-01 | End: 2020-09-02 | Stop reason: HOSPADM

## 2020-09-01 RX ORDER — AMOXICILLIN 250 MG
1 CAPSULE ORAL 2 TIMES DAILY
Status: DISCONTINUED | OUTPATIENT
Start: 2020-09-01 | End: 2020-09-02 | Stop reason: HOSPADM

## 2020-09-01 RX ORDER — ONDANSETRON 2 MG/ML
4 INJECTION INTRAMUSCULAR; INTRAVENOUS EVERY 30 MIN PRN
Status: DISCONTINUED | OUTPATIENT
Start: 2020-09-01 | End: 2020-09-01 | Stop reason: HOSPADM

## 2020-09-01 RX ORDER — NALOXONE HYDROCHLORIDE 0.4 MG/ML
.1-.4 INJECTION, SOLUTION INTRAMUSCULAR; INTRAVENOUS; SUBCUTANEOUS
Status: DISCONTINUED | OUTPATIENT
Start: 2020-09-01 | End: 2020-09-01

## 2020-09-01 RX ORDER — DEXAMETHASONE SODIUM PHOSPHATE 10 MG/ML
INJECTION, SOLUTION INTRAMUSCULAR; INTRAVENOUS PRN
Status: DISCONTINUED | OUTPATIENT
Start: 2020-09-01 | End: 2020-09-01

## 2020-09-01 RX ORDER — ONDANSETRON 4 MG/1
4 TABLET, ORALLY DISINTEGRATING ORAL EVERY 6 HOURS PRN
Status: DISCONTINUED | OUTPATIENT
Start: 2020-09-01 | End: 2020-09-01

## 2020-09-01 RX ORDER — SODIUM CHLORIDE, SODIUM LACTATE, POTASSIUM CHLORIDE, CALCIUM CHLORIDE 600; 310; 30; 20 MG/100ML; MG/100ML; MG/100ML; MG/100ML
INJECTION, SOLUTION INTRAVENOUS CONTINUOUS
Status: DISCONTINUED | OUTPATIENT
Start: 2020-09-01 | End: 2020-09-01 | Stop reason: HOSPADM

## 2020-09-01 RX ORDER — HYDROMORPHONE HYDROCHLORIDE 1 MG/ML
.3-.5 INJECTION, SOLUTION INTRAMUSCULAR; INTRAVENOUS; SUBCUTANEOUS EVERY 5 MIN PRN
Status: DISCONTINUED | OUTPATIENT
Start: 2020-09-01 | End: 2020-09-01

## 2020-09-01 RX ORDER — CEFAZOLIN SODIUM 2 G/100ML
2 INJECTION, SOLUTION INTRAVENOUS
Status: COMPLETED | OUTPATIENT
Start: 2020-09-01 | End: 2020-09-01

## 2020-09-01 RX ORDER — KETOROLAC TROMETHAMINE 30 MG/ML
INJECTION, SOLUTION INTRAMUSCULAR; INTRAVENOUS PRN
Status: DISCONTINUED | OUTPATIENT
Start: 2020-09-01 | End: 2020-09-01

## 2020-09-01 RX ORDER — OXYCODONE HYDROCHLORIDE 5 MG/1
5-10 TABLET ORAL EVERY 4 HOURS PRN
Status: DISCONTINUED | OUTPATIENT
Start: 2020-09-01 | End: 2020-09-02 | Stop reason: HOSPADM

## 2020-09-01 RX ORDER — CEFAZOLIN SODIUM 1 G/3ML
1 INJECTION, POWDER, FOR SOLUTION INTRAMUSCULAR; INTRAVENOUS SEE ADMIN INSTRUCTIONS
Status: DISCONTINUED | OUTPATIENT
Start: 2020-09-01 | End: 2020-09-01 | Stop reason: HOSPADM

## 2020-09-01 RX ORDER — NALOXONE HYDROCHLORIDE 0.4 MG/ML
.1-.4 INJECTION, SOLUTION INTRAMUSCULAR; INTRAVENOUS; SUBCUTANEOUS
Status: DISCONTINUED | OUTPATIENT
Start: 2020-09-01 | End: 2020-09-02 | Stop reason: HOSPADM

## 2020-09-01 RX ORDER — ONDANSETRON 4 MG/1
4 TABLET, ORALLY DISINTEGRATING ORAL EVERY 6 HOURS PRN
Status: DISCONTINUED | OUTPATIENT
Start: 2020-09-01 | End: 2020-09-02 | Stop reason: HOSPADM

## 2020-09-01 RX ORDER — ONDANSETRON 2 MG/ML
4 INJECTION INTRAMUSCULAR; INTRAVENOUS EVERY 30 MIN PRN
Status: DISCONTINUED | OUTPATIENT
Start: 2020-09-01 | End: 2020-09-01

## 2020-09-01 RX ORDER — FENTANYL CITRATE 50 UG/ML
25-50 INJECTION, SOLUTION INTRAMUSCULAR; INTRAVENOUS
Status: DISCONTINUED | OUTPATIENT
Start: 2020-09-01 | End: 2020-09-01

## 2020-09-01 RX ORDER — AMOXICILLIN 250 MG
2 CAPSULE ORAL 2 TIMES DAILY
Status: DISCONTINUED | OUTPATIENT
Start: 2020-09-01 | End: 2020-09-02 | Stop reason: HOSPADM

## 2020-09-01 RX ORDER — ACETAMINOPHEN 325 MG/1
650 TABLET ORAL EVERY 4 HOURS PRN
Status: DISCONTINUED | OUTPATIENT
Start: 2020-09-04 | End: 2020-09-02 | Stop reason: HOSPADM

## 2020-09-01 RX ORDER — FENTANYL CITRATE 50 UG/ML
INJECTION, SOLUTION INTRAMUSCULAR; INTRAVENOUS PRN
Status: DISCONTINUED | OUTPATIENT
Start: 2020-09-01 | End: 2020-09-01

## 2020-09-01 RX ORDER — ESCITALOPRAM OXALATE 10 MG/1
20 TABLET ORAL DAILY
Status: DISCONTINUED | OUTPATIENT
Start: 2020-09-01 | End: 2020-09-02 | Stop reason: HOSPADM

## 2020-09-01 RX ORDER — ONDANSETRON 4 MG/1
4 TABLET, ORALLY DISINTEGRATING ORAL EVERY 30 MIN PRN
Status: DISCONTINUED | OUTPATIENT
Start: 2020-09-01 | End: 2020-09-01

## 2020-09-01 RX ORDER — DOXEPIN HYDROCHLORIDE 10 MG/1
10 CAPSULE ORAL AT BEDTIME
Status: DISCONTINUED | OUTPATIENT
Start: 2020-09-01 | End: 2020-09-02 | Stop reason: HOSPADM

## 2020-09-01 RX ORDER — LABETALOL 20 MG/4 ML (5 MG/ML) INTRAVENOUS SYRINGE
5 ONCE
Status: DISCONTINUED | OUTPATIENT
Start: 2020-09-01 | End: 2020-09-01

## 2020-09-01 RX ORDER — ONDANSETRON 4 MG/1
4 TABLET, ORALLY DISINTEGRATING ORAL EVERY 30 MIN PRN
Status: DISCONTINUED | OUTPATIENT
Start: 2020-09-01 | End: 2020-09-01 | Stop reason: HOSPADM

## 2020-09-01 RX ORDER — CYCLOBENZAPRINE HCL 5 MG
10 TABLET ORAL 3 TIMES DAILY PRN
Status: DISCONTINUED | OUTPATIENT
Start: 2020-09-01 | End: 2020-09-02 | Stop reason: HOSPADM

## 2020-09-01 RX ORDER — ONDANSETRON 4 MG/1
TABLET, FILM COATED ORAL
COMMUNITY
Start: 2020-07-31 | End: 2021-03-09

## 2020-09-01 RX ORDER — ONDANSETRON 2 MG/ML
4 INJECTION INTRAMUSCULAR; INTRAVENOUS EVERY 6 HOURS PRN
Status: DISCONTINUED | OUTPATIENT
Start: 2020-09-01 | End: 2020-09-02 | Stop reason: HOSPADM

## 2020-09-01 RX ORDER — PHENAZOPYRIDINE HYDROCHLORIDE 200 MG/1
200 TABLET, FILM COATED ORAL ONCE
Status: COMPLETED | OUTPATIENT
Start: 2020-09-01 | End: 2020-09-01

## 2020-09-01 RX ORDER — ACETAMINOPHEN 325 MG/1
975 TABLET ORAL EVERY 8 HOURS
Status: DISCONTINUED | OUTPATIENT
Start: 2020-09-01 | End: 2020-09-02 | Stop reason: HOSPADM

## 2020-09-01 RX ADMIN — HYDROMORPHONE HYDROCHLORIDE 0.3 MG: 1 INJECTION, SOLUTION INTRAMUSCULAR; INTRAVENOUS; SUBCUTANEOUS at 15:19

## 2020-09-01 RX ADMIN — LABETALOL 20 MG/4 ML (5 MG/ML) INTRAVENOUS SYRINGE 5 MG: at 16:12

## 2020-09-01 RX ADMIN — PROCHLORPERAZINE EDISYLATE 10 MG: 5 INJECTION INTRAMUSCULAR; INTRAVENOUS at 13:35

## 2020-09-01 RX ADMIN — SODIUM CHLORIDE, POTASSIUM CHLORIDE, SODIUM LACTATE AND CALCIUM CHLORIDE: 600; 310; 30; 20 INJECTION, SOLUTION INTRAVENOUS at 08:25

## 2020-09-01 RX ADMIN — PHENAZOPYRIDINE HYDROCHLORIDE 200 MG: 200 TABLET, FILM COATED ORAL at 07:05

## 2020-09-01 RX ADMIN — CEFAZOLIN 1 G: 1 INJECTION, POWDER, FOR SOLUTION INTRAMUSCULAR; INTRAVENOUS at 10:45

## 2020-09-01 RX ADMIN — ROCURONIUM BROMIDE 20 MG: 10 INJECTION INTRAVENOUS at 10:46

## 2020-09-01 RX ADMIN — OXYCODONE HYDROCHLORIDE 5 MG: 5 TABLET ORAL at 21:53

## 2020-09-01 RX ADMIN — SODIUM CHLORIDE, POTASSIUM CHLORIDE, SODIUM LACTATE AND CALCIUM CHLORIDE: 600; 310; 30; 20 INJECTION, SOLUTION INTRAVENOUS at 19:12

## 2020-09-01 RX ADMIN — ROCURONIUM BROMIDE 20 MG: 10 INJECTION INTRAVENOUS at 10:15

## 2020-09-01 RX ADMIN — MIDAZOLAM 2 MG: 1 INJECTION INTRAMUSCULAR; INTRAVENOUS at 08:25

## 2020-09-01 RX ADMIN — HYDROMORPHONE HYDROCHLORIDE 0.5 MG: 1 INJECTION, SOLUTION INTRAMUSCULAR; INTRAVENOUS; SUBCUTANEOUS at 09:12

## 2020-09-01 RX ADMIN — Medication 40 MCG: at 11:54

## 2020-09-01 RX ADMIN — ONDANSETRON 4 MG: 2 INJECTION INTRAMUSCULAR; INTRAVENOUS at 10:55

## 2020-09-01 RX ADMIN — FENTANYL CITRATE 25 MCG: 50 INJECTION INTRAMUSCULAR; INTRAVENOUS at 13:22

## 2020-09-01 RX ADMIN — DEXAMETHASONE SODIUM PHOSPHATE 2 MG: 10 INJECTION, SOLUTION INTRAMUSCULAR; INTRAVENOUS at 11:54

## 2020-09-01 RX ADMIN — SODIUM CHLORIDE, SODIUM LACTATE, POTASSIUM CHLORIDE, CALCIUM CHLORIDE: 600; 310; 30; 20 INJECTION, SOLUTION INTRAVENOUS at 08:40

## 2020-09-01 RX ADMIN — FENTANYL CITRATE 50 MCG: 50 INJECTION INTRAMUSCULAR; INTRAVENOUS at 14:10

## 2020-09-01 RX ADMIN — ROCURONIUM BROMIDE 10 MG: 10 INJECTION INTRAVENOUS at 09:49

## 2020-09-01 RX ADMIN — FENTANYL CITRATE 50 MCG: 50 INJECTION, SOLUTION INTRAMUSCULAR; INTRAVENOUS at 08:32

## 2020-09-01 RX ADMIN — ROCURONIUM BROMIDE 20 MG: 10 INJECTION INTRAVENOUS at 08:57

## 2020-09-01 RX ADMIN — ONDANSETRON 4 MG: 2 INJECTION INTRAMUSCULAR; INTRAVENOUS at 18:52

## 2020-09-01 RX ADMIN — Medication 2 G: at 08:45

## 2020-09-01 RX ADMIN — ACETAMINOPHEN 975 MG: 325 TABLET, FILM COATED ORAL at 18:59

## 2020-09-01 RX ADMIN — KETOROLAC TROMETHAMINE 30 MG: 30 INJECTION, SOLUTION INTRAMUSCULAR at 11:21

## 2020-09-01 RX ADMIN — PROPOFOL 150 MG: 10 INJECTION, EMULSION INTRAVENOUS at 08:32

## 2020-09-01 RX ADMIN — ROCURONIUM BROMIDE 50 MG: 10 INJECTION INTRAVENOUS at 08:33

## 2020-09-01 RX ADMIN — ONDANSETRON 4 MG: 2 INJECTION INTRAMUSCULAR; INTRAVENOUS at 12:39

## 2020-09-01 RX ADMIN — BUPIVACAINE HYDROCHLORIDE 50 ML: 2.5 INJECTION, SOLUTION EPIDURAL; INFILTRATION; INTRACAUDAL at 11:54

## 2020-09-01 RX ADMIN — OXYCODONE HYDROCHLORIDE 5 MG: 5 TABLET ORAL at 19:00

## 2020-09-01 RX ADMIN — DOCUSATE SODIUM AND SENNOSIDES 1 TABLET: 8.6; 5 TABLET ORAL at 21:53

## 2020-09-01 RX ADMIN — DEXAMETHASONE SODIUM PHOSPHATE 4 MG: 4 INJECTION, SOLUTION INTRAMUSCULAR; INTRAVENOUS at 08:53

## 2020-09-01 RX ADMIN — ALPRAZOLAM 2 MG: 1 TABLET ORAL at 22:54

## 2020-09-01 RX ADMIN — LIDOCAINE HYDROCHLORIDE 80 MG: 20 INJECTION, SOLUTION INFILTRATION; PERINEURAL at 08:32

## 2020-09-01 RX ADMIN — HYDROMORPHONE HYDROCHLORIDE 0.5 MG: 1 INJECTION, SOLUTION INTRAMUSCULAR; INTRAVENOUS; SUBCUTANEOUS at 09:42

## 2020-09-01 RX ADMIN — SUGAMMADEX 200 MG: 100 INJECTION, SOLUTION INTRAVENOUS at 11:52

## 2020-09-01 RX ADMIN — FENTANYL CITRATE 25 MCG: 50 INJECTION INTRAMUSCULAR; INTRAVENOUS at 12:33

## 2020-09-01 RX ADMIN — FENTANYL CITRATE 25 MCG: 50 INJECTION INTRAMUSCULAR; INTRAVENOUS at 15:03

## 2020-09-01 RX ADMIN — FENTANYL CITRATE 50 MCG: 50 INJECTION, SOLUTION INTRAMUSCULAR; INTRAVENOUS at 08:57

## 2020-09-01 ASSESSMENT — MIFFLIN-ST. JEOR
SCORE: 1694.89
SCORE: 1688.63

## 2020-09-01 ASSESSMENT — PAIN DESCRIPTION - DESCRIPTORS
DESCRIPTORS: BURNING
DESCRIPTORS: STABBING

## 2020-09-01 NOTE — DISCHARGE SUMMARY
Gynecology Discharge Summary    Maryanne Crockett    YOB: 1980  MRN# [id number]   Age: 40 year old         Date of Admission:  9/1/2020  Date of Discharge:  9/2/2020   Admitting Physician:  Hayley Zayas MD  Discharge Physician:  Naz Anderson MD  Discharging Service:  Gynecology     Primary Provider: Shoshana Hyde          Admission Diagnoses:   Endometriosis [N80.9]  Left ovarian cystectomy         Discharge Diagnosis:   Same as above             Discharge Disposition:   Discharged to home           Procedures:   Da fatuma assisted total laparoscopic hysterectomy, left oophorectomy, cystoscopy  General endotracheal anesthesia  TAP block          Medications Prior to Admission:     Medications Prior to Admission   Medication Sig Dispense Refill Last Dose     acetaminophen 500 MG CAPS Take 1,000 mg by mouth   8/31/2020 at Unknown time     ALPRAZolam (XANAX) 1 MG tablet 2 mg 2 times daily    9/1/2020 at 0100     amphetamine-dextroamphetamine (ADDERALL) 20 MG tablet Take 20 mg by mouth   8/31/2020 at 1000     doxepin (SINEQUAN) 10 MG capsule At Bedtime    Past Week at Unknown time     escitalopram (LEXAPRO) 20 MG tablet 20 mg daily    Past Week at Unknown time     mineral oil-hydrophilic petrolatum (AQUAPHOR) external ointment Apply topically as needed 50 g 0 Past Month at Unknown time     Multiple Vitamins-Minerals (MULTIVITAMIN ADULT PO) Take 1 tablet by mouth daily   Past Week at Unknown time     ondansetron (ZOFRAN) 4 MG tablet    8/31/2020 at Unknown time     vitamin B-12 (CYANOCOBALAMIN) 1000 MCG tablet Take 1,000 mcg by mouth daily   8/31/2020 at 1500     cholecalciferol (VITAMIN D3) 1250 mcg (89003 units) capsule Take 1 capsule (1,250 mcg) by mouth every 7 days (Patient not taking: Reported on 8/28/2020) 2 capsule 0 More than a month at Unknown time     VENTOLIN  (90 BASE) MCG/ACT inhaler Inhale 1-2 puffs into the lungs every 6 hours as needed for shortness of breath / dyspnea or  wheezing   5 More than a month at Unknown time             Discharge Medications:        Review of your medicines      START taking      Dose / Directions   SENNA-docusate sodium 8.6-50 MG tablet  Commonly known as:  SENNA S      Dose:  1 tablet  Take 1 tablet by mouth At Bedtime  Quantity:  30 tablet  Refills:  0        CONTINUE these medicines which have NOT CHANGED      Dose / Directions   acetaminophen 500 MG Caps      Dose:  1,000 mg  Take 1,000 mg by mouth  Refills:  0     ALPRAZolam 1 MG tablet  Commonly known as:  XANAX      Dose:  2 mg  2 mg 2 times daily  Refills:  0     amphetamine-dextroamphetamine 20 MG tablet  Commonly known as:  ADDERALL      Dose:  20 mg  Take 20 mg by mouth  Refills:  0     cholecalciferol 1250 mcg (15653 units) capsule  Commonly known as:  VITAMIN D3  Used for:  Vitamin D deficiency      Dose:  1,250 mcg  Take 1 capsule (1,250 mcg) by mouth every 7 days  Quantity:  2 capsule  Refills:  0     doxepin 10 MG capsule  Commonly known as:  SINEquan      At Bedtime  Refills:  0     escitalopram 20 MG tablet  Commonly known as:  LEXAPRO      Dose:  20 mg  20 mg daily  Refills:  0     mineral oil-hydrophilic petrolatum external ointment  Used for:  Vulvar cellulitis      Apply topically as needed  Quantity:  50 g  Refills:  0     MULTIVITAMIN ADULT PO      Dose:  1 tablet  Take 1 tablet by mouth daily  Refills:  0     ondansetron 4 MG tablet  Commonly known as:  ZOFRAN      Refills:  0     Ventolin  (90 Base) MCG/ACT inhaler  Generic drug:  albuterol      Dose:  1-2 puff  Inhale 1-2 puffs into the lungs every 6 hours as needed for shortness of breath / dyspnea or wheezing  Refills:  5     vitamin B-12 1000 MCG tablet  Commonly known as:  CYANOCOBALAMIN      Dose:  1,000 mcg  Take 1,000 mcg by mouth daily  Refills:  0           Where to get your medicines      These medications were sent to [facility] [place], [place] - [address]  [address],  [facility] [address]    Phone:  [phone]     SENNA-docusate sodium 8.6-50 MG tablet                 Consultations:   Anesthesia             Brief History of Illness:   Maryanne Crockett is 40 year old female has known endometriosis and chronic pelvic pain. She desired definitive managment. Given this, it was recommended she undergo Davinci assisted total laparoscopy hysterectomy, left oophorectomy. Risks, benefits and alternatives to above stated procedure were discussed. Patient desired to proceed and written informed consent was obtained prior to proceeding.           Hospital Course:   The above procedure was uncomplicated.   Findings:  Exam under anesthesia reveals small, mobilt anteverted uterus. Fullness in left adnexa on bimanual exam. No trauma on laparoscopic entry. Smooth liver edges and diaphragms, gastric band visualized. Appendix not visualized. Small, healthy appearing uterus, surgically absent fallopian tubes bilaterally, right ovary surgically absent, left ovarian cyst approximately 5x4cm. Hemostatic surgical beds after hysterectomy. Bilateral ureteral vermiculation before and after procedure. Cystoscopy revealed no bladder trauma, bilateral ureteral efflux visualized        Due to patient not having a safe ride home on POD#0 the patient was admitted for observation overnight. She did well overnight, pain was controlled with PO pain medication. She is voiding spontaneously and ambulating. She was tolerating PO and deemed stable for discharge.              Discharge Instructions and Follow-Up:   Discharge diet: Regular   Discharge activity: Lifting restricted to 10 pounds  No sex for 6 week(s)   Discharge follow-up: Follow up as scheduled with Dr Zayas 10/12/20   Other instructions: None        Siomara Galicia MD  Obstetrics and Gynecology, PGY-4  September 2, 2020 , 7:44 AM      Staff MD Note    I evaluated the patient on the day of discharge.  We reviewed discharge instructions.  Patient stable  for discharge.    Naz Anderson MD

## 2020-09-01 NOTE — ANESTHESIA POSTPROCEDURE EVALUATION
Anesthesia POST Procedure Evaluation    Patient: Maryanne Crockett   MRN:     [id number] Gender:   female   Age:    40 year old :      1980        Preoperative Diagnosis: Endometriosis [N80.9]   Procedure(s):  HYSTERECTOMY, TOTAL, ROBOT-ASSISTED, LAPAROSCOPIC,  left oophorectomy  CYSTOSCOPY   Postop Comments: No value filed.     Anesthesia Type: General          Postop Pain Control: Uneventful            Sign Out: Well controlled pain   PONV: No   Neuro/Psych: Uneventful            Sign Out: Acceptable/Baseline neuro status   Airway/Respiratory: Uneventful            Sign Out: Acceptable/Baseline resp. status   CV/Hemodynamics: Uneventful            Sign Out: Acceptable CV status   Other NRE: NONE   DID A NON-ROUTINE EVENT OCCUR? No         Last Anesthesia Record Vitals:  CRNA VITALS  2020 1133 - 2020 1233      2020             Resp Rate (observed):  (!) 1          Last PACU Vitals:  Vitals Value Taken Time   /113 2020  4:00 PM   Temp 36.9  C (98.4  F) 2020  3:45 PM   Pulse 84 2020  4:00 PM   Resp 18 2020  3:45 PM   SpO2 95 % 2020  4:10 PM   Temp src     NIBP     Pulse     SpO2     Resp     Temp     Ht Rate     Temp 2     Vitals shown include unvalidated device data.      Electronically Signed By: Karli Alvarez MD, 2020, 4:11 PM

## 2020-09-01 NOTE — BRIEF OP NOTE
[facility], [place]    Brief Operative Note    Pre-operative diagnosis: Endometriosis [N80.9]  Post-operative diagnosis Same as pre-operative diagnosis, left ovarian cyst    Procedure: Procedure(s):  HYSTERECTOMY, TOTAL, ROBOT-ASSISTED, LAPAROSCOPIC,  left oophorectomy  CYSTOSCOPY  Surgeon: Surgeon(s) and Role:  Panel 1:     * Hayley Zayas MD - Primary     * Steve Brasher MD - Resident - Assisting     * Siomara Galicia MD - Resident - Assisting  Panel 2:     * Hayley Zayas MD - Primary     * Steve Brasher MD - Resident - Assisting     * Siomara Galicia MD - Resident - Assisting  Anesthesia: General   Estimated blood loss: 20mL  Drains:  None  Specimens: Uterus, cervix, left ovary  Findings:   Exam under anesthesia reveals small, mobilt anteverted uterus. Fullness in left adnexa on bimanual exam. No trauma on laparoscopic entry. Smooth liver edges and diaphragms, gastric band visualized. Appendix not visualized. Small, healthy appearing uterus, surgically absent fallopian tubes bilaterally, right ovary surgically absent, left ovarian cyst approximately 5x4cm. Hemostatic surgical beds after hysterectomy. Bilateral ureteral vermiculation before and after procedure. Cystoscopy revealed no bladder trauma, bilateral ureteral efflux visualized  .  Complications: None.  Implants: * No implants in log *      Siomara Galicia MD  Obstetrics and Gynecology, PGY-4  September 1, 2020 , 11:21 AM

## 2020-09-01 NOTE — ANESTHESIA PREPROCEDURE EVALUATION
"Anesthesia Pre-Procedure Evaluation    Patient: Maryanne Crockett   MRN:     [id number] Gender:   female   Age:    40 year old :      1980        Preoperative Diagnosis: Endometriosis [N80.9]   Procedure(s):  HYSTERECTOMY, TOTAL, ROBOT-ASSISTED, LAPAROSCOPIC,  left oophorectomy  CYSTOSCOPY  LAPAROTOMY POSSIBLE     LABS:  CBC:   Lab Results   Component Value Date    WBC 8.3 2020    WBC 11.0 2020    HGB 13.8 2020    HGB 14.3 2020    HCT 42.9 2020    HCT 41.4 2020     2020     2020     BMP:   Lab Results   Component Value Date     2020     2020    POTASSIUM 3.7 2020    POTASSIUM 3.6 2020    CHLORIDE 106 2020    CHLORIDE 104 2020    CO2 26 2020    CO2 26 2020    BUN 8 2020    BUN 10 2020    CR 0.80 2020    CR 0.72 2020    GLC 88 2020    GLC 90 2020     COAGS:   Lab Results   Component Value Date    INR 1.18 (H) 2015     POC:   Lab Results   Component Value Date    HCG Negative 2020    HCGS Negative 2020     OTHER:   Lab Results   Component Value Date    LACT 1.7 2018    SOFI 8.9 2020    MAG 2.3 2019    ALBUMIN 3.2 (L) 2020    PROTTOTAL 6.7 (L) 2020    ALT 27 2020    AST 21 2020    ALKPHOS 56 2020    BILITOTAL 0.4 2020    LIPASE 83 2020    TSH 1.71 2020        Preop Vitals    BP Readings from Last 3 Encounters:   20 120/78   20 (!) 126/93   20 125/87    Pulse Readings from Last 3 Encounters:   20 82   20 108   20 92      Resp Readings from Last 3 Encounters:   20 16   20 12   20 22    SpO2 Readings from Last 3 Encounters:   20 97%   20 97%   20 100%      Temp Readings from Last 1 Encounters:   20 36.9  C (98.4  F) (Oral)    Ht Readings from Last 1 Encounters:   20 1.753 m (5' 9.02\")      Wt Readings " "from Last 1 Encounters:   09/01/20 95.4 kg (210 lb 5.1 oz)    Estimated body mass index is 31.04 kg/m  as calculated from the following:    Height as of this encounter: 1.753 m (5' 9.02\").    Weight as of this encounter: 95.4 kg (210 lb 5.1 oz).     LDA:  Peripheral IV 06/23/20 Left (Active)   Number of days: 70        Past Medical History:   Diagnosis Date     Depressive disorder      Family history of glioblastoma     screening MRIs every 2 years     Ovarian cyst      Seizures (H)       Past Surgical History:   Procedure Laterality Date     CHOLECYSTECTOMY       DILATION AND CURETTAGE SUCTION  4/30/15    retained POC     GASTRIC RESTRICTIVE PROCEDURE, OPEN, REMOVE/REPLACE SUBCUTANEOUS PORT       LAPAROSCOPIC OOPHORECTOMY Right 8/5/2016    Procedure: LAPAROSCOPIC OOPHORECTOMY;  Surgeon: Adrianne Vergara MD;  Location: [facility] OR     LAPAROSCOPIC SALPINGECTOMY Bilateral 8/5/2016    Procedure: LAPAROSCOPIC SALPINGECTOMY;  Surgeon: Adrianne Vergara MD;  Location: [facility] OR     LAPAROSCOPIC TUBAL LIGATION Bilateral 5/22/2015    Procedure: LAPAROSCOPIC TUBAL LIGATION;  Surgeon: Hayley Zayas MD;  Location: [facility] OR     LAPAROSCOPY OPERATIVE ADULT N/A 8/5/2016    Procedure: LAPAROSCOPY OPERATIVE ADULT;  Surgeon: Adrianne Vergara MD;  Location: [facility] OR     SOFT TISSUE SURGERY Right     cyst in hand      Allergies   Allergen Reactions     Ibuprofen GI Disturbance     Seroquel [Quetiapine] Other (See Comments) and Rash     Insomnia        Anesthesia Evaluation     . Pt has had prior anesthetic. Type: General           ROS/MED HX    ENT/Pulmonary:  - neg pulmonary ROS     Neurologic:  - neg neurologic ROS     Cardiovascular:  - neg cardiovascular ROS       METS/Exercise Tolerance:     Hematologic:  - neg hematologic  ROS       Musculoskeletal:  - neg musculoskeletal ROS       GI/Hepatic:  - neg GI/hepatic ROS       Renal/Genitourinary:  - ROS Renal section negative       Endo:  - neg endo ROS       Psychiatric:  - neg " psychiatric ROS       Infectious Disease:  - neg infectious disease ROS       Malignancy:      - no malignancy   Other:                         PHYSICAL EXAM:   Mental Status/Neuro: A/A/O   Airway: Facies: Feasible  Mallampati: I  Mouth/Opening: Full  TM distance: > 6 cm  Neck ROM: Full   Respiratory: Auscultation: CTAB     Resp. Rate: Normal     Resp. Effort: Normal      CV: Rhythm: Regular  Rate: Age appropriate  Heart: Normal Sounds  Edema: None   Comments:      Dental: Normal Dentition                Assessment:   ASA SCORE: 2      Smoking Status:  Non-Smoker/Unknown   NPO Status: NPO Appropriate     Plan:   Anes. Type:  General   Pre-Medication: None   Induction:  IV (Standard)   Airway: ETT; Oral   Access/Monitoring: PIV   Maintenance: Balanced     Postop Plan:   Postop Pain: Opioids  Postop Sedation/Airway: Not planned     PONV Management:   Adult Risk Factors: Female, Non-Smoker, Postop Opioids   Prevention: Ondansetron, Dexamethasone                   Karli Alvarez MD

## 2020-09-01 NOTE — ANESTHESIA PROCEDURE NOTES
Peripheral Nerve Block Procedure Note  Staff -   Anesthesiologist:  Cat Askew MD      Performed By: anesthesiologist        Location: Pre-op and OR AFTER induction  Procedure Start/Stop TImes:      9/1/2020 11:46 AM     9/1/2020 11:52 AM    patient identified, IV checked, site marked, risks and benefits discussed, informed consent, monitors and equipment checked, pre-op evaluation, at physician/surgeon's request and post-op pain management      Correct Patient: Yes      Correct Position: Yes      Correct Site: Yes      Correct Procedure: Yes      Correct Laterality:  Yes    Site Marked:  Yes  Procedure details:     Procedure:  TAP    Diagnosis:  Postoperative pain relief    Laterality:  Bilateral    Position:  Supine    Sterile Prep: chloraprep, mask and sterile gloves      Local skin infiltration:  None    Needle:  Insulated    Needle gauge:  21    Needle length (mm):  110    Ultrasound: Yes      Ultrasound used to identify targeted nerve, plexus, or vascular structure and placed a needle adjacent to it      Permanent Image entered into patiient's record      Abnormal pain on injection: No      Blood Aspirated: No      Paresthesias:  No    Bleeding at site: No      Bolus via:  Needle    Infusion Method:  Single Shot    Complications:  None  Assessment/Narrative:     Injection made incrementally with aspirations every (mL):  5     Discussed risks of nerve block, including nerve injury, bleeding, infection, incomplete analgesia. Discussed pre-emergence TAP block, in case procedure converted to open.  Patient under general anesthesia during block. Incremental aspiration every 5 mL. No heme. Needle tip visualized throughout with appropriate spread of local anesthetic in fascial plane.

## 2020-09-01 NOTE — PROVIDER NOTIFICATION
Dr Karli Rojas at the bedside, ordered Labetalol 5 mg IV. SBP decreased to 155/102 HR 82. MD comfortable with pt transferring to the floor.

## 2020-09-01 NOTE — OP NOTE
Operative Note  9/1/2020    [facility]  Full Operative Note    Date of Service:  9/1/2020    Surgeon: Hayley Zayas MD  Assistants:   Siomara Galicia MD PGY4  Steve Brasher MD PGY2    Preop Dx:   Endometriosis  Chronic pelvic pain    Postop Dx:   Endometriosis  Left ovarian cyst    Procedure: Da fatuma assisted total laparoscopic hysterectomy, left oophorectomy, cystoscopy    Anesthesia: General with TAP block  IVF: 1300 mL crystalloid  EBL: 20 mL  UOP: 700 mL pyridium-stained urine at the end of the case  Drains: none  Specimens: Uterus, cervix, left ovary    Complications: None apparent    Indications: Maryanne Crockett is 40 year old female has known endometriosis and chronic pelvic pain. She desired definitive managment. Given this, it was recommended she undergo Davinci assisted total laparoscopy hysterectomy, left oophorectomy. Risks, benefits and alternatives to above stated procedure were discussed. Patient desired to proceed and written informed consent was obtained prior to proceeding.   Findings:  Exam under anesthesia reveals small, mobilt anteverted uterus. Fullness in left adnexa on bimanual exam. No trauma on laparoscopic entry. Smooth liver edges and diaphragms, gastric band visualized. Appendix not visualized. Small, healthy appearing uterus, surgically absent fallopian tubes bilaterally, right ovary surgically absent, left ovarian cyst approximately 5x4cm. Hemostatic surgical beds after hysterectomy. Bilateral ureteral vermiculation after procedure. Cystoscopy revealed no bladder trauma, bilateral ureteral efflux visualized    PROCEDURE Details:   Consent was reviewed with the patient in the preoperative setting and confirmed. She received prophylactic antibiotics - Ancef. The patient was transferred to the operating room and placed in dorsal supine position. General endotracheal intubation was obtained in the usual manner without noted difficulties. The patient was  then positioned onto Elmo stirrups and an exam under anesthesia was performed with findings as described above.  The patient was then prepped and draped in the normal fashion. Timeout was called at which point the patient's name, procedure and operative site was confirmed by the operative team.  Speculum was then placed in the vagina and the cervix visualized. The anterior lip of the cervix was grasped, the uterus sounded and the cervix was serially dilated.  The Advincula Arch uterine manipulator was then inserted and secured in the standard fashion. Speculum was then removed and a ventura catheter was placed.     Attention was then turned to the upper abdomen. Due to the prior abdominal surgery, decision was made to proceed with a vertical incision 4cm superior to the umbilicus.  The abdomen was elevated with towel clamps and a  step Veress needle was then introduced through the incision into the abdomen. The abdomen was insufflated with an opening pressure of 3 mmHg. The gas was then turned to high flow and the abdomen insufflated to 15 mmHg. The Veress needle was then removed an a 12 mm trocar was introduced. The laparoscope was then inserted and survey of the abdomen was performed and there was no evidence of injury on entry. At this point, the patient was placed into steep Trendelenburg. Sites for the remaining Da Levy laparoscopic ports were demarcated, total of 3 additional ports, initiating with this midline incision above the umbilicus and positioned in a semicircular fashion around the upper abdomen. One additional 8 mm port was placed in the right lower quadrant under direct visualization. Next, attention was turned to the left side, where one additional 8 mm port was placed under direct visualization without any injury noted to underlying structures. One additional step 12 mm assistant port was placed at a distance half way between the supraumbilical and left lower quadrant ports. Bowels were then swept  into the upper abdomen with gentle traction. At this point, the Da Levy was docked onto the ports, all appropriate instruments were inserted including the monopolar scissors on the right, and the fenestrated bipolar grasper on the left.     Attention was then turned to performing the hysterectomy. The right ureter was identified transperitoneally well below the field of interest.  The right round ligament was identified, grasped and cauterized until the round ligament was transected to identify the anterior and posterior leaf of the broad ligaments. The monopolar cautery was then used to carry the anterior peritoneal incision inferiorly in order to create a bladder flap. Attention was then turned to the left side of the uterus, where the left round ligament was incised. The left ureter was identified in the pelvis and the left infundibulopelvic ligament was coagulated and transected with the ureter in view. The vesicouterine anterior peritoneum on the left side was then incised and the bladder was mobilized off the lower uterine segment, cervix and upper vagina. The left uterine vessels were then skeletonized, coagulated and incised using a combination of bipolar and monopolar cautery. The right uterine vessels were then skeletonized, coagulated and transected. Bilateral uterosacral ligaments were then also transected to clear the entire path along the uterine manipulator cup. The uterus was then placed on traction and the monopolar scissors was used to created the colpotomy. The uterus, cervix, and left ovary were removed through the vagina and sent to pathology. The pelvis was irrigated and hemostasis was observed. The vaginal cuff was closed with a 2.0-V-loc suture.    Attention was turned to the perineum. The ventura catheter was removed and the cystoscope was placed in the urethra. Full inspection of the bladder was performed, which revealed no trauma. Brisk bilateral ureteral efflux was noted. The cystoscope  was then removed.     The Davinci was undocked and the abdomen. The assistant port was closed with 0-vicryl with the Elmer Kary device and the abdomen was desufflated. The remaining ports removed under direct visualization. The additional 12mm port was closed primarily with 0-vicryl with a UR6. The skin was then closed with 4-0 Monocryl. Steri strips and sterile bandages were placed.      Dr. Hayley Zayas was present for the entirety of the case.    Siomara Galicia MD  Obstetrics and Gynecology, PGY-4  September 1, 2020 , 11:31 AM     Staff:  I was scrubbed and present for entire case and agree w/ above note.    Hayley Zayas MD

## 2020-09-01 NOTE — OR NURSING
"Complains of generalized weakness. Asked about physical therapy to help increase strength. States she had an episode in July where she \"blanked out\" and doesn't remember anything. Last grand mal seizure was a year ago.   "

## 2020-09-01 NOTE — ANESTHESIA CARE TRANSFER NOTE
Patient: Maryanne Crockett    Procedure(s):  HYSTERECTOMY, TOTAL, ROBOT-ASSISTED, LAPAROSCOPIC,  left oophorectomy  CYSTOSCOPY    Diagnosis: Endometriosis [N80.9]  Diagnosis Additional Information: No value filed.    Anesthesia Type:   General     Note:  Airway :Face Mask  Patient transferred to:PACU  Comments: 154/95, sat 99%, HR 77, T 36.4, RR 16Handoff Report: Identifed the Patient, Identified the Reponsible Provider, Reviewed the pertinent medical history, Discussed the surgical course, Reviewed Intra-OP anesthesia mangement and issues during anesthesia, Set expectations for post-procedure period and Allowed opportunity for questions and acknowledgement of understanding      Vitals: (Last set prior to Anesthesia Care Transfer)    CRNA VITALS  9/1/2020 1133 - 9/1/2020 1209      9/1/2020             Resp Rate (observed):  (!) 1                Electronically Signed By: SANTIAGO Faustin CRNA  September 1, 2020  12:09 PM

## 2020-09-01 NOTE — OR NURSING
PACU to Inpatient Nursing Handoff    Patient Maryanne Crockett is a 40 year old female who speaks English.   Procedure Procedure(s):  HYSTERECTOMY, TOTAL, ROBOT-ASSISTED, LAPAROSCOPIC,  left oophorectomy  CYSTOSCOPY   Surgeon(s) Primary: Hayley Zayas MD  Resident - Assisting: Steve Brasher MD; Siomara Galicia MD     Allergies   Allergen Reactions     Ibuprofen GI Disturbance     Seroquel [Quetiapine] Other (See Comments) and Rash     Insomnia       Isolation  [unfilled]     Past Medical History   has a past medical history of Depressive disorder, Family history of glioblastoma, Ovarian cyst, and Seizures (H).    Anesthesia General   Dermatome Level     Preop Meds phenazopyridine (Pyridium) - time given: 0705   Nerve block Transversus abdominus plane (TAP).  Location:bilateral. Med:bupivacaine and dexmethasone and precedex. Time given: 1154   Intraop Meds dexamethasone (Decadron)  fentanyl (Sublimaze): 100 mcg total  hydromorphone (Dilaudid): 1 mg total  ketorolac (Toradol): last given at 1121  ondansetron (Zofran): last given at 1055  versed 2 mg    Local Meds No   Antibiotics cefazolin (Ancef) - last given at 1045     Pain Patient Currently in Pain: sleeping: patient not able to self report  Comfort: intolerable  Pain Control: inadequate pain control   PACU meds  fentanyl (Sublimaze): 125 mcg (total dose) last given at 1500   ondansetron (Zofran): 4 mg (total dose) last given at 1239   prochlorperazine (Compazine): 10 mg (total dose) last given at 1335    PCA / epidural No   Capnography Respiratory Monitoring (EtCO2): 41 mmHg  Integrated Pulmonary Index (IPI): 8-9   Telemetry ECG Rhythm: Normal sinus rhythm   Inpatient Telemetry Monitor Ordered? No        Labs Glucose Lab Results   Component Value Date    GLC 91 09/01/2020       Hgb Lab Results   Component Value Date    HGB 13.2 09/01/2020       INR Lab Results   Component Value Date    INR 1.18 04/29/2015      PACU Imaging Not applicable      Wound/Incision Incision/Surgical Site 09/01/20 Left;Lateral Abdomen (Active)   Incision Assessment [facility] 09/01/20 1203   Closure Sutures;Adhesive strip(s) 09/01/20 1203   Dressing Intervention Clean, dry, intact 09/01/20 1203   Number of days: 0       Incision/Surgical Site 09/01/20 Left Abdomen (Active)   Incision Assessment [facility] 09/01/20 1203   Closure Sutures;Adhesive strip(s) 09/01/20 1203   Dressing Intervention Clean, dry, intact 09/01/20 1203   Number of days: 0       Incision/Surgical Site 09/01/20 Right;Lateral Abdomen (Active)   Incision Assessment [facility] 09/01/20 1203   Closure Sutures;Adhesive strip(s) 09/01/20 1203   Dressing Intervention Clean, dry, intact 09/01/20 1203   Number of days: 0       Incision/Surgical Site 09/01/20 Right Abdomen (Active)   Incision Assessment [facility] 09/01/20 1203   Closure Sutures;Adhesive strip(s) 09/01/20 1203   Dressing Intervention Clean, dry, intact 09/01/20 1203   Number of days: 0       Incision/Surgical Site 09/01/20 Bilateral Abdomen (Active)   Incision Assessment [facility] 09/01/20 1203   Closure Sutures;Adhesive strip(s) 09/01/20 1203   Dressing Intervention Clean, dry, intact 09/01/20 1203   Number of days: 0      CMS        Equipment ice pack and abdominal binder   Other LDA       IV Access Peripheral IV 06/23/20 Left (Active)   Number of days: 70       Peripheral IV 09/01/20 Left Hand (Active)   Site Assessment [facility] 09/01/20 1203   Line Status Saline locked 09/01/20 1203   Phlebitis Scale 0-->no symptoms 09/01/20 1203   Infiltration Scale 0 09/01/20 1203   Infiltration Site Treatment Method  None 09/01/20 0717   Extravasation? No 09/01/20 0717   Dressing Intervention New dressing  09/01/20 0717   Number of days: 0       Peripheral IV 09/01/20 Right Lower forearm (Active)   Site Assessment [facility] 09/01/20 1203   Line Status Infusing 09/01/20 1203   Phlebitis Scale 0-->no symptoms 09/01/20 1203   Number of days: 0      Blood Products Not applicable EBL 20 mL   Intake/Output  Date 09/01/20 0700 - 09/02/20 0659   Shift 2464-6155 6418-2996 7404-4982 24 Hour Total   INTAKE   I.V. 1300   1300   Shift Total(mL/kg) 1300(13.63)   1300(13.63)   OUTPUT   Urine 700   700   Blood 20   20   Shift Total(mL/kg) 720(7.55)   720(7.55)   Weight (kg) 95.4 95.4 95.4 95.4      Drains / Luu     Time of void PreOp Void Prior to Procedure: 0727 (09/01/20 0726)    PostOp      Diapered? No   Bladder Scan     PO    ice chips     Vitals    B/P: (!) 140/92  T: 97.8  F (36.6  C)    Temp src: Oral  P:  Pulse: 72 (09/01/20 1245)          R: 12  O2:  SpO2: 94 %    O2 Device: None (Room air) (09/01/20 1245)    Oxygen Delivery: 6 LPM (09/01/20 1203)         Family/support present mom by phone   Patient belongings     Patient transported on cart   DC meds/scripts (obs/outpt) Not applicable   Inpatient Pain Meds Released? Yes       Special needs/considerations chronic pain, CRPS, mental health history   Tasks needing completion None       Hoda Zapata, LATOYA Martins RN  [facility] [id number]

## 2020-09-02 VITALS
OXYGEN SATURATION: 95 % | SYSTOLIC BLOOD PRESSURE: 158 MMHG | HEART RATE: 85 BPM | TEMPERATURE: 99.3 F | DIASTOLIC BLOOD PRESSURE: 76 MMHG | HEIGHT: 69 IN | BODY MASS INDEX: 31.36 KG/M2 | WEIGHT: 211.7 LBS | RESPIRATION RATE: 20 BRPM

## 2020-09-02 LAB — GLUCOSE BLDC GLUCOMTR-MCNC: 116 MG/DL (ref 70–99)

## 2020-09-02 PROCEDURE — 25800030 ZZH RX IP 258 OP 636: Performed by: STUDENT IN AN ORGANIZED HEALTH CARE EDUCATION/TRAINING PROGRAM

## 2020-09-02 PROCEDURE — 25000132 ZZH RX MED GY IP 250 OP 250 PS 637: Performed by: STUDENT IN AN ORGANIZED HEALTH CARE EDUCATION/TRAINING PROGRAM

## 2020-09-02 PROCEDURE — 00000146 ZZHCL STATISTIC GLUCOSE BY METER IP

## 2020-09-02 PROCEDURE — 25000132 ZZH RX MED GY IP 250 OP 250 PS 637

## 2020-09-02 PROCEDURE — G0378 HOSPITAL OBSERVATION PER HR: HCPCS

## 2020-09-02 RX ORDER — SENNA AND DOCUSATE SODIUM 50; 8.6 MG/1; MG/1
1 TABLET, FILM COATED ORAL AT BEDTIME
Qty: 30 TABLET | Refills: 0 | Status: SHIPPED | OUTPATIENT
Start: 2020-09-02 | End: 2020-10-12

## 2020-09-02 RX ORDER — OXYCODONE HYDROCHLORIDE 5 MG/1
5 TABLET ORAL EVERY 6 HOURS PRN
Qty: 15 TABLET | Refills: 0 | Status: SHIPPED | OUTPATIENT
Start: 2020-09-02 | End: 2020-09-04

## 2020-09-02 RX ADMIN — ACETAMINOPHEN 975 MG: 325 TABLET, FILM COATED ORAL at 01:37

## 2020-09-02 RX ADMIN — DOCUSATE SODIUM AND SENNOSIDES 2 TABLET: 8.6; 5 TABLET ORAL at 09:33

## 2020-09-02 RX ADMIN — CYCLOBENZAPRINE HYDROCHLORIDE 10 MG: 5 TABLET, FILM COATED ORAL at 18:09

## 2020-09-02 RX ADMIN — OXYCODONE HYDROCHLORIDE 10 MG: 5 TABLET ORAL at 09:48

## 2020-09-02 RX ADMIN — SODIUM CHLORIDE, POTASSIUM CHLORIDE, SODIUM LACTATE AND CALCIUM CHLORIDE: 600; 310; 30; 20 INJECTION, SOLUTION INTRAVENOUS at 05:24

## 2020-09-02 RX ADMIN — ALPRAZOLAM 2 MG: 1 TABLET ORAL at 09:38

## 2020-09-02 RX ADMIN — OXYCODONE HYDROCHLORIDE 10 MG: 5 TABLET ORAL at 06:48

## 2020-09-02 RX ADMIN — OXYCODONE HYDROCHLORIDE 10 MG: 5 TABLET ORAL at 14:26

## 2020-09-02 RX ADMIN — ACETAMINOPHEN 975 MG: 325 TABLET, FILM COATED ORAL at 09:33

## 2020-09-02 RX ADMIN — ESCITALOPRAM OXALATE 20 MG: 10 TABLET, FILM COATED ORAL at 09:33

## 2020-09-02 RX ADMIN — OXYCODONE HYDROCHLORIDE 10 MG: 5 TABLET ORAL at 18:38

## 2020-09-02 RX ADMIN — ACETAMINOPHEN 975 MG: 325 TABLET, FILM COATED ORAL at 16:37

## 2020-09-02 RX ADMIN — OXYCODONE HYDROCHLORIDE 5 MG: 5 TABLET ORAL at 01:37

## 2020-09-02 NOTE — PROGRESS NOTES
Gynecologic Progress Note  2020    S:Pt is doing well this morning, slept well overnight. Still not really eating anything but tolerating liquids without issue. Plans to order breakfast this morning. She is ambulating a small amount around her room and voiding spontaneously. Pain is tolerable if she doesn't move to much. Ready to go home today she thinks  O:  Vitals:    20 0128 20 0346 20 0648 20 0722   BP: (!) 162/80 (!) 148/68  (!) 158/76   BP Location: Right arm Left arm  Right arm   Cuff Size:       Pulse: 80 84 88 85   Resp:    Temp: 98.8  F (37.1  C) 99.3  F (37.4  C)  99.3  F (37.4  C)   TempSrc:  Oral  Oral   SpO2: 93% 95% 95% 95%   Weight:       Height:           Gen: In no distress, very sleepy but responds to questioning  Cardio: regular rate and rhythm  Resp: nonlabored breathing  Abdomen: soft, appropriately tender, nondistended, 4 incisions covered with steristrips and bandaids with minimal tried blood  Extremities: Non-tender, trace edema bilaterally, SCDs in place though not turned on     A: 40 year old POD#1 s/p da fatuma assisted total laparoscopic hysterectomy, left oophorectomy, cystoscopy. Uncomplicated procedure with EBL 20mL. Doing well, likely home midday if tolerating PO     Dz: Endometriosis, left ovarian cyst  FEN: Regular diet  CV: s/p labetalol in PACU due to hypertension. Patient does not take antihypertensives, will continue to monitor. Recommend primary care follow up   Pain: s/p TAP and toradol; PRN oxycodone,Tylenol, flexeril  - s/p Gastric band procedure, avoiding longterm NSAIDs per pt  GI: PRN antiemetics and stool softeners  : f/p ventura, voiding spontaneously    Dispo: To home today, has follow up scheduled    Siomara Galicia MD  Obstetrics and Gyncology, PGY-4  2020 , 7:33 AM   [phone] 6pm-6:30AM, ragini  [phone] 6:30AM-6PM    Staff MD Note    I appreciate the note by Dr. Galicia.  Any necessary  changes have been made by me.  I saw and evaluated the patient and agree with the findings and plan of care as documented in the note.  Patient continues to be in bed when I see her this morning.  She is having appropriate pain control with medications.  Minimal spotting.  Has not eaten breakfast yet, encouraged her to order something to eat now.  Emptying bladder without issues.  No flatus or BM yet.  Has only been out of bed to go to bathroom.  Discussed importance of her getting up and starting to move and eat.  Awaiting bowel function return.  Should be ready to go home later today once accomplishing these goals.    Naz Anderson MD

## 2020-09-02 NOTE — PLAN OF CARE
Problem: Adult Inpatient Plan of Care  Goal: Plan of Care Review  9/2/2020 0400 by Janet Jasso, RN  Outcome: No Change   Note for the night shift.  D: Pt sleeping well tonight between checks. Capno good. Lungs clear. 4 abdominal wound drsgs are all CDI. Denies passing gas yet. Told to call for help to commode, but pt up during the night with out help to the commode. Medicated for pain again this AM.   Call light with in reach. Able to make needs known.  A: Doing OK. P: Monitor closely.  Supportive cares. Medicate for pain as needed. Encourage activity today. Up walking. Home today.     Observation goals  PER UNIT ROUTINE     Comments: In order to discharge patient must:   - Tolerate pain with PO medication   - Ambulating   - Voiding spontaneously   - Tolerating PO without nausea/vomiting  2400: sleeping soundly  0130:  PO pain meds given and pt back to sleep. Not out of bed yet tonight to walk or void. Tolerated water with pain pills and no nausea.   0400: Comfortable.  Got up without calling for help to the commode. Voiding in good amts.  Only taking sips of water, but denies nausea.   0600.  Requested and received pain pills. Up to commode. Told , next time call for help and she needs to walk to the bathroom. Taking sips of water, no nausea.

## 2020-09-02 NOTE — PLAN OF CARE
0800  - Tolerate pain with PO medication - Met  - Ambulating - not met  -  Voiding spontaneously - Met  - Tolerating PO without nausea/vomiting - tolerating po, no nausea  1000  - Tolerate pain with PO medication - Met  - Ambulating - not met  -  Voiding spontaneously - Met  - Tolerating PO without nausea/vomiting - tolerating po, no nausea  1200  - Tolerate pain with PO medication - Met  - Ambulating - Met  -  Voiding spontaneously - Met  - Tolerating PO without nausea/vomiting - tolerating po, no nausea  1400  - Tolerate pain with PO medication - Met  - Ambulating - Met  -  Voiding spontaneously - Met  - Tolerating PO without nausea/vomiting - tolerating po, no nausea    Sleeping most of shift, awakens easily. Tolerating po, mostly liquids, strongly encouraged to eat, pt states after she sleeps a little longer she will order something. Pt up indep to BR, voiding spontaneously without difficulty. Pt ambulated in rocha x 1, pt states she is passing flatus. Lap sites CDI. Pt states she feels much better since surgery and that her abd pain is so much better and the incisional pain is much less than what she had before surgery. Pain managed with oxycodone, scheduled tylenol. Gyn resident updated via text page. Plan for discharge later this pm.

## 2020-09-02 NOTE — PLAN OF CARE
Pt discharged to home via car with her mother at 1910.  Pt left in a pleasant mood with all medications and discharge education complete. All observation goals met.     1600  - Tolerate pain with PO medication - Met  - Ambulating - not met  -  Voiding spontaneously - Met  - Tolerating PO without nausea/vomiting - tolerating po, no nausea    1800  - Tolerate pain with PO medication - Met  - Ambulating - not met  -  Voiding spontaneously - Met  - Tolerating PO without nausea/vomiting - tolerating po, no nausea

## 2020-09-02 NOTE — PLAN OF CARE
Observation goals  PER UNIT ROUTINE      Comments: In order to discharge patient must:   - Tolerate pain with PO medication   - Ambulating   - Voiding spontaneously   - Tolerating PO without nausea/vomiting       Nursing:  post op  1630  Arrived to 828 per cart from PACU.  Very Sleepy but arousable.  Dozed right back to sleep.             Abd 3 stab wounds intact,  scant vag drainage.  BP elevated- labetolol given in PACU             Occas nausea briefly  1830: up to commode to void when requested pt to try.  Voided 150cc orange.  Pt requested binder off             BP remains 160's/85-90    MD aware. Low grade temp  using IBD to 1500 w encouragment  1900- zofran for nausea   pt declines po x few sips water  states abd discomfort valerie L side. Oxy 5mg  2000- sleeping  Off and on    2145- up to commode to void  PVR 0,  pt agreed to taking only few steips in room .  Oxy 5mg for abd                Pain.  BP same.  States not on BP meds but it does elevate w pain. Taking sips only, no appetite  Pt requests her usual xanax dose,  Declined sinequan and lexapro this byron    A- nausea better, mod pain control.  Voiding. Poor po so far  R- enc IBD, enc po fluids as tyrel   enc up in chair and walk in am.  Abd binder prn

## 2020-09-02 NOTE — PROGRESS NOTES
Brief Note    Called RN to discuss void. Patient voided 150 ml, requested RN to bladder scan patient now. If PVR is > 150 ml should straight cath. Patient's pain is well controlled. Requesting PTA Xanax. Tolerating small amount of liquid.     Patient Vitals for the past 2 hrs:   BP Pulse Resp SpO2   09/01/20 2009 (!) 156/82 80 16 --   09/01/20 2000 (!) 160/80 80 -- 95 %     Joana Bocanegra MD  OB/GYN PGY-3  09/01/20 9:58 PM

## 2020-09-04 DIAGNOSIS — Z90.710 S/P HYSTERECTOMY: ICD-10-CM

## 2020-09-04 LAB — COPATH REPORT: NORMAL

## 2020-09-04 RX ORDER — OXYCODONE HYDROCHLORIDE 5 MG/1
5 TABLET ORAL EVERY 6 HOURS PRN
Qty: 15 TABLET | Refills: 0 | Status: SHIPPED | OUTPATIENT
Start: 2020-09-04 | End: 2020-10-12

## 2020-09-04 NOTE — TELEPHONE ENCOUNTER
Pt called to report she is almost out of oxycodone  (received 15 tabs on 9/2 after hysterectomy) and is requesting a refill to get through the weekend. States she is taking 1-2 tabs Q 4-6 hours and does have significant pain when medication wears off. Left side worse than right. Having difficulty sleeping due to pain. Unable to take ibuprofen due to allergy/adverse reaction. Denies fever/chills/body aches. Denies drainage at incision site.     Routing request to Dr. Zayas

## 2020-09-22 ENCOUNTER — HOSPITAL ENCOUNTER (EMERGENCY)
Facility: CLINIC | Age: 40
Discharge: LEFT BEFORE DISCHARGE | End: 2020-09-22
Payer: COMMERCIAL

## 2020-09-22 VITALS
HEART RATE: 113 BPM | DIASTOLIC BLOOD PRESSURE: 80 MMHG | TEMPERATURE: 100.8 F | SYSTOLIC BLOOD PRESSURE: 127 MMHG | OXYGEN SATURATION: 97 % | RESPIRATION RATE: 16 BRPM

## 2020-09-23 NOTE — ED NOTES
"Patient called writer into room.  Patient reported to writer, \"I've had these issues for a while.  I have very limited time, and I need to go.  I missed my appointment.  I will just follow up with my doctor.\"  Patient is PUI status and did not sign declination of medical examination form.  Patient escorted out of ED by writer and was told to come back if she had any emergent concerns.  Patient acknowledged.  "

## 2020-10-12 ENCOUNTER — OFFICE VISIT (OUTPATIENT)
Dept: OBGYN | Facility: CLINIC | Age: 40
End: 2020-10-12
Attending: OBSTETRICS & GYNECOLOGY
Payer: COMMERCIAL

## 2020-10-12 VITALS
WEIGHT: 208.5 LBS | BODY MASS INDEX: 30.78 KG/M2 | HEART RATE: 81 BPM | TEMPERATURE: 98.1 F | SYSTOLIC BLOOD PRESSURE: 124 MMHG | DIASTOLIC BLOOD PRESSURE: 84 MMHG

## 2020-10-12 DIAGNOSIS — N95.1 MENOPAUSAL SYNDROME (HOT FLASHES): Primary | ICD-10-CM

## 2020-10-12 PROCEDURE — 99024 POSTOP FOLLOW-UP VISIT: CPT | Performed by: OBSTETRICS & GYNECOLOGY

## 2020-10-12 PROCEDURE — G0463 HOSPITAL OUTPT CLINIC VISIT: HCPCS

## 2020-10-12 RX ORDER — ESTRADIOL 0.03 MG/D
1 FILM, EXTENDED RELEASE TRANSDERMAL
Qty: 8 PATCH | Refills: 3 | Status: SHIPPED | OUTPATIENT
Start: 2020-10-12 | End: 2021-01-29

## 2020-10-12 NOTE — LETTER
[facility] [employer]ESSION[employer] [facility] [address]  3RD FLR,[address]  [address]  [facility] [address]  [phone]          October 12, 2020    RE:  Maryanne Crockett                                                                                                                                                       [address] [facility] [address]            To whom it may concern:    Maryanne Crockett is under my professional care for Menopausal syndrome (hot flashes) She  may return to work with the following: The employee is UNABLE to return to work until 10/26/2020.  She should not be lifting anything more than 10 lbs until then.         Sincerely,        Hayley Zayas MD

## 2020-10-12 NOTE — PROGRESS NOTES
"Gyn Clinic Postoperative Visit Note  10/12/2020    S: Cynthia Crockett is a 40 year old  here for post-operative visit following DaTLH, LSO on 20. She reports feeling well aside from significant HF and night sweats.  She desires treatment if possible.  Also notes left vulvar bump and wants evaluation. She reports, \"doing surgery on it\" at home.     O:   /84 (BP Location: Right arm, Patient Position: Chair)   Pulse 81   Temp 98.1  F (36.7  C) (Oral)   Wt 94.6 kg (208 lb 8 oz)   LMP 2018 (Approximate)   Breastfeeding No   BMI 30.78 kg/m    Exam: incisions cdi  Vaginal exam shows well healing cuff.  Small follicle on left vulva. Appears to have been drained. No redness or erythema.     Labs:   Hemoglobin   Date Value Ref Range Status   2020 13.2 11.7 - 15.7 g/dL Final   ]    Pathology:   Copath Report   Date Value Ref Range Status   2020   Final    Patient Name: CYNTHIA CROCKETT  MR#: [id number]  Specimen #: [id number]  Collected: 2020  Received: 2020  Reported: 2020 15:44  Ordering Phy(s): RAKEL DECKER    For improved result formatting, select 'View Enhanced Report Format' under   Linked Documents section.    SPECIMEN(S):  Uterus, Cervix, left ovary    FINAL DIAGNOSIS:  UTERUS, CERVIX, LEFT OVARY, TOTAL HYSTERECTOMY AND LEFT OOPHORECTOMY:  - Proliferative-type endometrium  - Anterior and posterior cervix with squamous metaplasia and chronic   cervicitis  - Adenomyosis  - Uterine serosal adhesions  - Left ovary with hemorrhagic follicular cyst(s)  - Negative for hyperplasia or malignancy    I have personally reviewed all specimens and/or slides, including the   listed special stains, and used them  with my medical judgement to determine or confirm the final diagnosis.    Electronically signed out by:    Ar Langford M.D., PhD, Physicians    CLINICAL HISTORY:  40 year old with endometriosis and chronic pelvic pain. Previous right   ovary and bilateral " "fallopian tubes  excision with endometriosis. Most recent ultrasound showed no   abnormalities. NILM on previous PAP.    GROSS:  The specimen is received fresh with proper patient identification, labeled   \"uterus, cervix, left ovary\".  The  specimen consists of a 154.7 g, 8.7 cm (fundus to ectocervix) by 5.1 cm   (cornu to cornu) by 3.8 cm (anterior  to posterior) hysterectomy with left sided ovary (5.8 x 4.4 x 4.2 cm).  No   fallopian tubes are present with  the specimen.  The serosal surface is tan, smooth and glistening.  The   paracervical margins are inked black.  The ectocervix is tan and smooth without lesions.  The os is a 1.3 cm   slit.  The endocervical canal is grossly  unremarkable.  The endometrial cavity is 4.9 cm in length and 2.8 cm in   greatest width.  The endometrium is  0.1 cm in thickness and is tan and grossly unremarkable.  No polyps or   masses are present.  The myometrium  ranges from 1.3-2.1 cm in thickness and is unremarkable without bulging   nodule.  The external surface of the  ovary is purple- gray and smooth.  No papillary excrescences are present.    It is sectioned to reveal a  unilocular cyst with brown thin fluid.  No papillary excrescences or firm   areas are present.  A thin rim of  normal ovarian parenchyma is grossly identified.  Representative sections   are submitted.    Summary of Sections:  A1-A2 - anterior cervix and lower uterine segment, bisected with blue ink  A3-A4 - posterior cervix and lower uterine segment, bisected with blue ink  A5-A6 - anterior uterine wall, full-thickness sections  A7-A8 - posterior uterine wall, full-thickness sections  A9-A11 - ovarian cyst wall (Dictated by: Kayli JONES [facility] 9/2/2020   01:26 PM)    MICROSCOPIC:  Microscopic examination was performed.    The technical component of this testing was completed at the [facility], with the professional component performed   at the " General acute hospital, [address],   [place] [address] ([phone])    CPT Codes:  A: 03932-RL3    COLLECTION SITE:  Client: [facility]  Location: [facility] (B)    Resident  TORY     ]    A: 40 year old female PO x 6 wks s/p DaTLH BSO.  Doing well aside from significant HF. Desires tx.     P:    Vivelle Dot rx sent. Discussed lowest dose is plan.  Discussed risk of DVT/HTN.  Pt aware and desires to proceed.    Hayley Zayas MD, FACOG  (she/her/hers)    Department of Ob/Gyn/Women's Health  [facility]  [facility]  [address] [place] [address]  [email or web address]  p. [phone]  f. [phone]

## 2020-10-26 ENCOUNTER — TELEPHONE (OUTPATIENT)
Dept: OBGYN | Facility: CLINIC | Age: 40
End: 2020-10-26

## 2020-10-26 NOTE — TELEPHONE ENCOUNTER
Pt called to report continued left sided pelvic pain and menopausal symptoms despite being 8 weeks post op and started on HRT. Pt desires in clinic visit.    Pain described as constant tenderness with sharp pain with some movements. Tylenol improves but does not resolve pain completely. Low grade fever (per patient, does not have working thermometer at home) 4 days ago, now resolved. Pt desires CT scan for further assessment. Advised pt she will need clinic appointment. Nothing in clinic with any MD for 2+ weeks. Added to DOD schedule on 10/27.

## 2020-10-27 ENCOUNTER — ALLIED HEALTH/NURSE VISIT (OUTPATIENT)
Dept: OBGYN | Facility: CLINIC | Age: 40
End: 2020-10-27
Attending: OBSTETRICS & GYNECOLOGY
Payer: COMMERCIAL

## 2020-10-27 VITALS
BODY MASS INDEX: 30.96 KG/M2 | HEART RATE: 97 BPM | HEIGHT: 69 IN | DIASTOLIC BLOOD PRESSURE: 85 MMHG | WEIGHT: 209 LBS | SYSTOLIC BLOOD PRESSURE: 122 MMHG

## 2020-10-27 DIAGNOSIS — N95.1 MENOPAUSAL SYNDROME (HOT FLASHES): ICD-10-CM

## 2020-10-27 DIAGNOSIS — G89.18 POSTOPERATIVE PAIN: Primary | ICD-10-CM

## 2020-10-27 PROCEDURE — 99024 POSTOP FOLLOW-UP VISIT: CPT | Mod: GC | Performed by: OBSTETRICS & GYNECOLOGY

## 2020-10-27 PROCEDURE — G0463 HOSPITAL OUTPT CLINIC VISIT: HCPCS

## 2020-10-27 RX ORDER — ESTRADIOL 0.05 MG/D
1 PATCH, EXTENDED RELEASE TRANSDERMAL
Qty: 8 PATCH | Refills: 3 | Status: SHIPPED | OUTPATIENT
Start: 2020-10-29 | End: 2021-03-23

## 2020-10-27 ASSESSMENT — PAIN SCALES - GENERAL: PAINLEVEL: NO PAIN (0)

## 2020-10-27 ASSESSMENT — PATIENT HEALTH QUESTIONNAIRE - PHQ9: SUM OF ALL RESPONSES TO PHQ QUESTIONS 1-9: 0

## 2020-10-27 ASSESSMENT — MIFFLIN-ST. JEOR: SCORE: 1682.4

## 2020-10-27 NOTE — LETTER
[facility] [address]  3RD FLR,[address]  [address] [facility] [address]  [phone]          October 27, 2020    RE:  Maryanne Crockett                                                                                                                                                       [address] [facility] [address]            To whom it may concern:    Maryanne Crockett is under my professional care for post op hysterectomy on 9/11/2020.  She has needed to be on lifting/working/bending restrictions since surgery due to post op restrictions. These restrictions should continue for 2 more weeks.     Sincerely,        Hayley Zayas MD, FACOG  (she/her/hers)    Department of Ob/Gyn/Women's Health  [employer] [facility] [employer]  [facility]  [address] [place], [place] [address] [address]  [email or web address]  p. [phone]  f. [phone]

## 2020-10-27 NOTE — NURSING NOTE
Chief Complaint   Patient presents with     Abdominal Pain     Pt c/o left ovarian pain. Pt requests physical therapy.

## 2020-10-27 NOTE — PROGRESS NOTES
"[facility]  10/27/2020  Reason For Visit: pain after hysterectomy    S: Maryanne Crockett is a 40 year old  here for pain 8 weeks after her hysterectomy. She is 8 weeks post op from a DA-TLH and L oophorectomy. She was seen for her postoperative visit on 10/12/2020 and was started on HRT for menopausal symptoms.     She now reports left sided abdominal pain. She reports that it is worse with bending or moving. She was unable to lay on her L side for a day, but now is able to. She is unsure if she has had any fevers, as she also has hot flashes. She thinks her hot flashes are slightly improved after starting HRT. She doesn't have a working thermometer at home. She did have one episode of vaginal spotting 1 week ago. She thinks it was from her vagina and not vulva or urine. It happened one time, then has not happened again. Denies any abnormal or foul smelling discharge. She is overall feeling a bit fatigued. She recently moved and has some stress with her apartment/landlord.     Denies headaches, chest pain, SOB, n/v, constipation, diarrhea, difficulties with urination.     O:   /85   Pulse 97   Ht 1.753 m (5' 9\")   Wt 94.8 kg (209 lb)   LMP 2018 (Approximate)   BMI 30.86 kg/m     Temp: 98.5F  Exam:  Gen: alert, in no acute distress  CV: Well perfused, regular rate   Resp: On room air, no increased work of breathing  Abd: soft, tenderness to palpation of the LLQ, otherwise nontender  : normal vulva, healing lesion on the left vulva. Normal vaginal tissue. Vaginal cuff appears intact, suture material is visualized. No open areas of the cuff, no active bleeding or blood in the vagina, no purulent discharge. Bimanual exam reveals tenderness on the left side of the vaginal cuff and with palpation of the L adnexa. No right adnexal tenderness.       A/P: 40 year old year old  here for pain 8 weeks after her hysterectomy. It is on her left side, worse with movement. Exam significant for " some pain on the left adnexa with bimanual exam. Cuff is intact with no bleeding or purulent discharge.    # post-operative pain  Differential includes abscess, musculoskeletal pain, adhesive disease. The patient hasn't had a fever, but is more tender than expected in her left adnexa with bimanual exam. Plan for imaging to rule out abscess. Will also refer to PT for improving movement post-op. Discussed symptomatic treatment with tylenol, heat, ice. Discussed return precautions if she has a fever/chills, purulent discharge, or severe pain.   - PT referral  - CT abdomen and pelvis    # hot flashes  Slightly improved since starting HRT, she thinks she may need a slightly higher dose. Discussed risks, and the patient would like to try the next dose.   - Vivelle Dot 0.05mg twice weekly    Patient seen with Dr. Zayas.    Pascual Sher MD  OB/GYN PGY-1  10/27/20 2:51 PM    The Patient was seen in [facility] by PASCUAL SHER.  I reviewed the history & exam. Assessment and plan were jointly made.  I was present for exam as above.     Hayley Zayas MD

## 2020-10-29 ENCOUNTER — HOSPITAL ENCOUNTER (OUTPATIENT)
Dept: CT IMAGING | Facility: CLINIC | Age: 40
Discharge: HOME | End: 2020-10-29
Attending: OBSTETRICS & GYNECOLOGY | Admitting: OBSTETRICS & GYNECOLOGY
Payer: COMMERCIAL

## 2020-10-29 DIAGNOSIS — G89.18 POSTOPERATIVE PAIN: ICD-10-CM

## 2020-10-29 PROCEDURE — 74176 CT ABD & PELVIS W/O CONTRAST: CPT | Mod: 26 | Performed by: RADIOLOGY

## 2020-10-29 PROCEDURE — 74176 CT ABD & PELVIS W/O CONTRAST: CPT

## 2020-10-30 ENCOUNTER — TELEPHONE (OUTPATIENT)
Dept: ENDOCRINOLOGY | Facility: CLINIC | Age: 40
End: 2020-10-30

## 2020-10-30 NOTE — TELEPHONE ENCOUNTER
Patient had lap band weight loss surgery 2007.      Scheduled to see CNP or CYNTHIA at 0730 .    Spoke to patient: regarding upcoming appt    Instructed to complete pre-visit questionnaires on [employer], or arrive 15 minutes early to complete.    [phone] contact center phone number.    Miley Enamorado, EMT

## 2020-11-04 DIAGNOSIS — G89.18 POSTOPERATIVE PAIN: Primary | ICD-10-CM

## 2020-11-12 ENCOUNTER — TELEPHONE (OUTPATIENT)
Dept: PHYSICAL MEDICINE AND REHAB | Facility: CLINIC | Age: 40
End: 2020-11-12

## 2020-11-12 NOTE — TELEPHONE ENCOUNTER
[facility] Call Center    Phone Message    May a detailed message be left on voicemail: yes     Reason for Call: Other: Maryanne has appt for RSD with Dr. Simmons on 12/07. Pt saw him at his previous clinic. Please review to see if she can be seen for RSD.      Action Taken: Message routed to:  [facility] ([facility]): dominic pmr     Travel Screening: Not Applicable

## 2021-01-15 ENCOUNTER — HEALTH MAINTENANCE LETTER (OUTPATIENT)
Age: 41
End: 2021-01-15

## 2021-01-23 NOTE — TELEPHONE ENCOUNTER
REFERRAL INFORMATION:    Referring Provider:  N/A    Referring Clinic:  N/A    Reason for Visit/Diagnosis: New Re-establish Care, Lap band 2007 [facility], discuss removal        FUTURE VISIT INFORMATION:    Appointment Date: 1/29/2021    Appointment Time: 9:30 AM      NOTES RECORD STATUS  DETAILS   OFFICE NOTE from Referring Provider N/A    OFFICE NOTE from Other Specialists Internal/ Care Everywhere/ In process 5/21/19 Office visit with Dr. Angus Hendricks ( [facility])     3/8/16, 9/30/15 Office visit with Dr. John Earl ([facility])    10/21/15 Office visit with Dr. Remy Turner ([facility])    HOSPITAL DISCHARGE SUMMARY/ ED VISITS  N/A    OPERATIVE REPORT In process    ENDOSCOPY (EGD)  N/A    PERTINENT LABS Care Everywhere    PATHOLOGY REPORTS (RELATED) N/A    IMAGING (CT, MRI, US, XR)  N/A      1/25/2021 5:02pm Called and LVM for Pt. -Bhao     1/28/2021 2:27pm Called and LVM for Pt. CSS will notify provider to see if 2007 lap band op report is needed. -Bhao

## 2021-01-28 ENCOUNTER — TELEPHONE (OUTPATIENT)
Dept: ENDOCRINOLOGY | Facility: CLINIC | Age: 41
End: 2021-01-28

## 2021-01-28 NOTE — TELEPHONE ENCOUNTER
Patient had 2007 weight loss surgery lap band.      Scheduled to see CNP or CYNTHIA at 0930    Spoke to patient: yes    Left message:     Instructed to complete pre-visit questionnaires on [employer], or arrive 15 minutes early to complete.    [phone] contact center phone number.    Miley Enamorado, EMT

## 2021-01-29 ENCOUNTER — VIRTUAL VISIT (OUTPATIENT)
Dept: ENDOCRINOLOGY | Facility: CLINIC | Age: 41
End: 2021-01-29
Payer: COMMERCIAL

## 2021-01-29 ENCOUNTER — PRE VISIT (OUTPATIENT)
Dept: ENDOCRINOLOGY | Facility: CLINIC | Age: 41
End: 2021-01-29

## 2021-01-29 VITALS — BODY MASS INDEX: 28.14 KG/M2 | HEIGHT: 69 IN | WEIGHT: 190 LBS

## 2021-01-29 DIAGNOSIS — Z98.84 GASTRIC BANDING STATUS: Primary | ICD-10-CM

## 2021-01-29 DIAGNOSIS — Z98.84 BARIATRIC SURGERY STATUS: ICD-10-CM

## 2021-01-29 PROBLEM — F33.2 SEVERE EPISODE OF RECURRENT MAJOR DEPRESSIVE DISORDER, WITHOUT PSYCHOTIC FEATURES (H): Status: ACTIVE | Noted: 2021-01-29

## 2021-01-29 PROCEDURE — 99203 OFFICE O/P NEW LOW 30 MIN: CPT | Mod: 95 | Performed by: NURSE PRACTITIONER

## 2021-01-29 RX ORDER — BUPRENORPHINE HYDROCHLORIDE, NALOXONE HYDROCHLORIDE 8; 2 MG/1; MG/1
FILM, SOLUBLE BUCCAL; SUBLINGUAL
Status: ON HOLD | COMMUNITY
Start: 2021-01-22 | End: 2021-03-19

## 2021-01-29 RX ORDER — ALPRAZOLAM 1 MG
1 TABLET ORAL 3 TIMES DAILY PRN
COMMUNITY
Start: 2020-12-29

## 2021-01-29 RX ORDER — ASENAPINE MALEATE 10 MG/1
10 TABLET SUBLINGUAL 2 TIMES DAILY
COMMUNITY
Start: 2021-01-13

## 2021-01-29 ASSESSMENT — PAIN SCALES - GENERAL: PAINLEVEL: NO PAIN (0)

## 2021-01-29 ASSESSMENT — MIFFLIN-ST. JEOR: SCORE: 1596.21

## 2021-01-29 NOTE — LETTER
"2021       RE: Maryanne Crockett  [address] [facility] [address]     Dear Colleague,    Thank you for referring your patient, Maryanne Crockett, to the [facility] [place] [facility]. Please see a copy of my visit note below.    Maryanne is a 40 year old who is being evaluated via a billable video visit.      How would you like to obtain your AVS? MyChart  If the video visit is dropped, the invitation should be resent by: Text to cell phone: [phone]  Will anyone else be joining your video visit? No      Video Start Time: 941  Video-Visit Details    Type of service:  Video Visit    Video End Time:100    Originating Location (pt. Location): Home    Distant Location (provider location):  [facility]     Platform used for Video Visit: CoinJar     Rony Re-establish Bariatric Surgery Consultation Note    2021    RE: Maryanne Crockett  MR#: [id number]  : 1980      Referring provider: No flowsheet data found.    Chief Complaint/Reason for visit: re-establish bariatric care    Dear Shoshana Hyde (General),    I had the pleasure of seeing your patient, Maryanne Crockett, to re-establish bariatric care. As you know, Maryanne Crockett is 40 year old.  She has a height of 5' 9\", a weight of 190 lbs 0 oz, and calculated Body mass index is 28.06 kg/m .. [place] [facility] [place] has been attempting to have the band removed due to dysphasia and excessive weight loss for over a year now. weight loss and dysphasia is compounded by decreased ability to chew over the years due to removal of teeth.     She continues to run into barriers to removal including facilities not working with the bands any more, covid 19 restrictions and most recently needing a hysterectomy more urgently.  Hysterectomy was in 2020 and she feels she has fully recovered from this procedure however she " feels her weight loss has only become more problematic since then. She estimates 50lb weight loss since September 2020.  Of note, weight in encounter from 7/2020 was 203lbs. October 2019, weight was 248. No recent documentation of malnutrition.  She does not feel comfortable at current BMI of 28.     Assessment & Plan   Problem List Items Addressed This Visit        Other    Gastric banding status - Primary     Experiencing perceived malnutrition, dysphasia and decreased quality of life due to band being in place. Would like removal. Recommend labs, UGI, and dietitian assessment prior to follow up with Dr. Gold to discuss.            Other Visit Diagnoses     Bariatric surgery status        Relevant Orders    XR Upper GI without KUB    CBC with platelets    Comprehensive metabolic panel    Ferritin    Hemoglobin A1c    Parathyroid Hormone Intact    Vitamin A    Vitamin D Deficiency    Vitamin B12           Review of external notes as documented above       41 minutes spent on the date of the encounter doing chart review, history and exam, documentation and further activities as noted above    Maryanne Crockett is a 40 year old female who presents to clinic today for the following health issues         HISTORY OF PRESENT ILLNESS:  Weight Loss History Reviewed with Patient 1/29/2021   How long have you been overweight? Since early childhood   What is the most that you have ever weighed? 350   What is the most weight you have lost? 150   I have tried the following methods to lose weight Exercise, Weight Loss Surgery   Have you ever had weight loss surgery? Yes   Please select the type of weight loss surgery you had (select all that apply): adjustable gastric band / LapBand or Realize Band       Missing several teeth which causes difficulty chewing, believes she lost her teeth secondary to a psych medication     Food often getting stuck, causing her to get sick  Tolerates mashed potatoes, toast, oatmeal, very limited meat  (mostly fish, some chicken). No issues with water intake.     Pain only when food gets stuck. Sometimes takes up to a day to get the food unstuck. Does not have any fluid in band.     Weight: 50lb weight loss since September. 350lbs pre-lap band got down to 180 shortly after surgery.     Used to love going out to eat but isn't able to do that any more.      Alcohol- none  Tobacco- yes  Drug use- none  Suboxone- yes        CO-MORBIDITIES OF OBESITY INCLUDE:     1/29/2021   I have the following health issues associated with obesity: None of the above       PAST MEDICAL HISTORY:  Past Medical History:   Diagnosis Date     Depressive disorder      Family history of glioblastoma     screening MRIs every 2 years     Ovarian cyst      Seizures (H)        PAST SURGICAL HISTORY:  Past Surgical History:   Procedure Laterality Date     CHOLECYSTECTOMY       CYSTOSCOPY N/A 9/1/2020    Procedure: CYSTOSCOPY;  Surgeon: Hayley Zayas MD;  Location: [facility]     DAVINCI HYSTERECTOMY TOTAL, BILATERAL SALPINGO-OOPHORECTOMY, COMBINED Left 9/1/2020    Procedure: HYSTERECTOMY, TOTAL, ROBOT-ASSISTED, LAPAROSCOPIC,  left oophorectomy;  Surgeon: Hayley Zayas MD;  Location: [facility] OR     DILATION AND CURETTAGE SUCTION  4/30/15    retained POC     GASTRIC RESTRICTIVE PROCEDURE, OPEN, REMOVE/REPLACE SUBCUTANEOUS PORT       LAPAROSCOPIC OOPHORECTOMY Right 8/5/2016    Procedure: LAPAROSCOPIC OOPHORECTOMY;  Surgeon: Adrianne Vergara MD;  [facility]     LAPAROSCOPIC SALPINGECTOMY Bilateral 8/5/2016    Procedure: LAPAROSCOPIC SALPINGECTOMY;  Surgeon: Adrianne Vergara MD;  [facility]     LAPAROSCOPIC TUBAL LIGATION Bilateral 5/22/2015    Procedure: LAPAROSCOPIC TUBAL LIGATION;  Surgeon: Hayley Zayas MD;  [facility]     LAPAROSCOPY OPERATIVE ADULT N/A 8/5/2016    Procedure: LAPAROSCOPY OPERATIVE ADULT;  Surgeon: Adrianne Vergara MD;  [facility]     SOFT TISSUE SURGERY Right     cyst in hand        FAMILY HISTORY:   Family History   Problem Relation Age of Onset     Other Cancer Brother 36        brain ca     Other Cancer Father         leukemia     Other Cancer Maternal Aunt         2 aunts with glioblastomas     Breast Cancer No family hx of         no ovarian cancer       SOCIAL HISTORY:   Social History Questions Reviewed With Patient 1/29/2021   Which best describes your employment status (select all that apply) I am disabled   Which best describes your marital status: single   Do you have children? Yes   Who do you have in your support network that can be available to help you for the first 2 weeks after surgery? Yes   Who can you count on for support throughout your weight loss surgery journey? Family   Can you afford 3 meals a day?  Yes   Can you afford 50-60 dollars a month for vitamins? No       HABITS:     1/29/2021   How often do you drink alcohol? Never   Do you currently use any of the following Nicotine products? cigarettes   Have you ever used any of the following nicotine products? Cigarettes   Have you or are you currently using street drugs or prescription strength medication for which you do not have a prescription for? No   Do you have a history of chemical dependency (alcohol or drug abuse)? No       PSYCHOLOGICAL HISTORY:   Psychological History Reviewed With Patient 1/29/2021   Have you ever attempted suicide? In the last 1 to 5 years.   Have you had thoughts of suicide in the past year? Yes   Have you ever been hospitalized for mental illness or a suicide attempt? In the last year.   Do you have a history of chronic pain? Yes   Have you ever been diagnosed with fibromyalgia? Yes   Are you currently being treated for any of the following? (select all that apply) Depression, Anxiety, Bipolar, Post traumatic stress disorder   Are you currently seeing a therapist or counselor?  Yes   Are you currently seeing a psychiatrist? Yes       ROS:     1/29/2021   Skin:  None of the above    HEENT: Missing teeth   Musculoskeletal: Arthritis   Cardiovascular: None of the above   Pulmonary: None of the above   Gastrointestinal: None of the above   Genitourinary: None of the above   Hematological: None of the above   Neurological: Seizures   Female only: Loss of menstrual cycles       EATING BEHAVIORS:     1/29/2021   Have you or anyone else thought that you had an eating disorder? Yes   If you answered yes to the previous eating disorder question, select the types that apply from this list: Binge Eating   Do you currently binge eat (eat a large amount of food in a short time)? No   Are you an emotional eater? No   Do you get up to eat after falling asleep? Yes       EXERCISE:     1/29/2021   How often do you exercise? 3 to 4 times per week   What is the duration of your exercise (in minutes)? 45 Minutes   What types of exercise do you do? gym membership   What keeps you from being more active?  Pain, I have recently been sick       MEDICATIONS:  Current Outpatient Medications   Medication Sig Dispense Refill     acetaminophen 500 MG CAPS Take 1,000 mg by mouth       ALPRAZolam (XANAX) 2 MG tablet        amphetamine-dextroamphetamine (ADDERALL) 20 MG tablet Take 20 mg by mouth       B Complex Vitamins (VITAMIN B COMPLEX PO)        escitalopram (LEXAPRO) 20 MG tablet 20 mg daily        estradiol (VIVELLE-DOT) 0.05 MG/24HR bi-weekly patch Place 1 patch onto the skin twice a week 8 patch 3     Multiple Vitamins-Minerals (MULTIVITAMIN ADULT PO) Take 1 tablet by mouth daily       ondansetron (ZOFRAN) 4 MG tablet        SAPHRIS 5 MG SUBL sublingual tablet        SUBOXONE 8-2 MG per film        VENTOLIN  (90 BASE) MCG/ACT inhaler Inhale 1-2 puffs into the lungs every 6 hours as needed for shortness of breath / dyspnea or wheezing   5     vitamin B-12 (CYANOCOBALAMIN) 1000 MCG tablet Take 1,000 mcg by mouth daily         ALLERGIES:  Allergies   Allergen Reactions     Ibuprofen GI Disturbance     Seroquel  [Quetiapine] Other (See Comments) and Rash     Insomnia     Exam: Limited upper GI    Comparison: T of the abdomen/pelvis dated 5/11/2015 from outside facility.    History: Check lap band position and please check lap band tubing.    Universal protocol was followed.    Technique: Fluoroscopy scouts were obtained prior to starting the exam. The patient drank approximately 4 ounces of thin barium in upright position. Images were obtained in AP, lateral and oblique views.    Findings: The band is in good position. Emptying of contrast through the gastric band is concentric.  There is no evidence of gastric band slip or obstruction. The port tubing is smooth incongruent without evidence for kinks or fracturing.    Complications: None    Fluoroscopy time: 54 seconds.     Dose: 35 mGy  Admission on 09/01/2020, Discharged on 09/02/2020   Component Date Value Ref Range Status     ABO 09/01/2020 O   Final     RH(D) 09/01/2020 Pos   Final     Antibody Screen 09/01/2020 Neg   Final     Test Valid Only At 09/01/2020 [facility]      Final     Specimen Expires 09/01/2020 09/04/2020   Final     WBC 09/01/2020 8.7  4.0 - 11.0 10e9/L Final     RBC Count 09/01/2020 4.58  3.8 - 5.2 10e12/L Final     Hemoglobin 09/01/2020 13.2  11.7 - 15.7 g/dL Final     Hematocrit 09/01/2020 40.9  35.0 - 47.0 % Final     MCV 09/01/2020 89  78 - 100 fl Final     MCH 09/01/2020 28.8  26.5 - 33.0 pg Final     MCHC 09/01/2020 32.3  31.5 - 36.5 g/dL Final     RDW 09/01/2020 13.2  10.0 - 15.0 % Final     Platelet Count 09/01/2020 270  150 - 450 10e9/L Final     HCG Quantitative Serum 09/01/2020 <1  0 - 5 IU/L Final     Glucose 09/01/2020 91  70 - 99 mg/dL Final     Copath Report 09/01/2020    Final                    Value:Patient Name: CYNTHIA ESPINOZA  MR#: [id number]  Specimen #: [id number]  Collected: 9/1/2020  Received: 9/2/2020  Reported: 9/4/2020 15:44  Ordering Phy(s): RAKEL Gonzalez improved  "result formatting, select 'View Enhanced Report Format' under   Linked Documents section.    SPECIMEN(S):  Uterus, Cervix, left ovary    FINAL DIAGNOSIS:  UTERUS, CERVIX, LEFT OVARY, TOTAL HYSTERECTOMY AND LEFT OOPHORECTOMY:  - Proliferative-type endometrium  - Anterior and posterior cervix with squamous metaplasia and chronic   cervicitis  - Adenomyosis  - Uterine serosal adhesions  - Left ovary with hemorrhagic follicular cyst(s)  - Negative for hyperplasia or malignancy    I have personally reviewed all specimens and/or slides, including the   listed special stains, and used them  with my medical judgement to determine or confirm the final diagnosis.    Electronically signed out by:    Ar Langford M.D., PhD, [facility]    CLINICAL HISTORY:  40 year old with endometriosis and chronic pelvic                           pain. Previous right   ovary and bilateral fallopian tubes  excision with endometriosis. Most recent ultrasound showed no   abnormalities. NILM on previous PAP.    GROSS:  The specimen is received fresh with proper patient identification, labeled   \"uterus, cervix, left ovary\".  The  specimen consists of a 154.7 g, 8.7 cm (fundus to ectocervix) by 5.1 cm   (cornu to cornu) by 3.8 cm (anterior  to posterior) hysterectomy with left sided ovary (5.8 x 4.4 x 4.2 cm).  No   fallopian tubes are present with  the specimen.  The serosal surface is tan, smooth and glistening.  The   paracervical margins are inked black.  The ectocervix is tan and smooth without lesions.  The os is a 1.3 cm   slit.  The endocervical canal is grossly  unremarkable.  The endometrial cavity is 4.9 cm in length and 2.8 cm in   greatest width.  The endometrium is  0.1 cm in thickness and is tan and grossly unremarkable.  No polyps or   masses are present.  The myometrium  ranges from 1.3-2.1 cm in thickness and is u                          nremarkable without bulging   nodule.  The external surface of the  ovary is purple- " "gray and smooth.  No papillary excrescences are present.    It is sectioned to reveal a  unilocular cyst with brown thin fluid.  No papillary excrescences or firm   areas are present.  A thin rim of  normal ovarian parenchyma is grossly identified.  Representative sections   are submitted.    Summary of Sections:  A1-A2 - anterior cervix and lower uterine segment, bisected with blue ink  A3-A4 - posterior cervix and lower uterine segment, bisected with blue ink  A5-A6 - anterior uterine wall, full-thickness sections  A7-A8 - posterior uterine wall, full-thickness sections  A9-A11 - ovarian cyst wall (Dictated by: Kayli JONES ASCP 9/2/2020   01:26 PM)    MICROSCOPIC:  Microscopic examination was performed.    The technical component of this testing was completed at the [facility], with the professional component performed   at the [facility], [address] [place] [phone] ([phone])    CPT Codes:  A: 96223-YM2    COLLECTION SITE:  Client: [facility]  Location: [facility] (B)    Resident  TORY       Glucose 09/02/2020 116* 70 - 99 mg/dL Final       PHYSICAL EXAM:  Objective    Ht 1.753 m (5' 9\")   Wt 86.2 kg (190 lb)   LMP 03/14/2018 (Approximate)   BMI 28.06 kg/m    Vitals - Patient Reported  Pain Score: No Pain (0)        Physical Exam   GENERAL: Healthy, alert and no distress  EYES: Eyes grossly normal to inspection.  No discharge or erythema, or obvious scleral/conjunctival abnormalities.  RESP: No audible wheeze, cough, or visible cyanosis.  No visible retractions or increased work of breathing.    SKIN: Visible skin clear. No significant rash, abnormal pigmentation or lesions.  NEURO: Cranial nerves grossly intact.  Mentation and speech appropriate for age.  PSYCH: Mentation appears normal, affect normal/bright, " judgement and insight intact, normal speech and appearance well-groomed.      In summary, Maryanne Crockett has history of Class III obesity with a body mass index of Body mass index is 28.06 kg/m . kg/m2 and the comorbidities stated above. She has a history of lap band in 2007 and is here to re-establish bariatric care and discuss band removal due to dysphasia, inability to chew food adequately and weight loss. No recent documentation of malnutrition but will obtain these labs.  No recent EGD or UGI. Will obtain UGI at the same time as EGD.   Encouraged smoking cessation   Encouraged follow up with dietitian to improve nutrition prior to surgery       MEDICATIONS:  Continue current medications without change  FURTHER TESTING:       - UGI prior to seeing Dr. Gold  CONSULTATION/REFERRAL to dietitian   FUTURE LABS:       - Schedule a non-fasting blood draw - prior to seeing Dr. Gold   FUTURE APPOINTMENTS:       - Follow-up visit in 1 month with Dr. Gold       Sincerely,     Carolina Sahni NP

## 2021-01-29 NOTE — PROGRESS NOTES
"Maryanne is a 40 year old who is being evaluated via a billable video visit.      How would you like to obtain your AVS? MyChart  If the video visit is dropped, the invitation should be resent by: Text to cell phone: [phone]  Will anyone else be joining your video visit? No      Video Start Time: 941  Video-Visit Details    Type of service:  Video Visit    Video End Time:100    Originating Location (pt. Location): Home    Distant Location (provider location):  [facility] [place]     Platform used for Video Visit: AmWell     New Re-establish Bariatric Surgery Consultation Note    2021    RE: Maryanne Crockett  MR#: [id number]  : 1980      Referring provider: No flowsheet data found.    Chief Complaint/Reason for visit: re-establish bariatric care    Dear Shoshana Hyde (General),    I had the pleasure of seeing your patient, Maryanne Crockett, to re-establish bariatric care. As you know, Maryanne Crockett is 40 year old.  She has a height of 5' 9\", a weight of 190 lbs 0 oz, and calculated Body mass index is 28.06 kg/m .. She has a history of [facility]. She has been attempting to have the band removed due to dysphasia and excessive weight loss for over a year now. weight loss and dysphasia is compounded by decreased ability to chew over the years due to removal of teeth.     She continues to run into barriers to removal including facilities not working with the bands any more, covid 19 restrictions and most recently needing a hysterectomy more urgently.  Hysterectomy was in 2020 and she feels she has fully recovered from this procedure however she feels her weight loss has only become more problematic since then. She estimates 50lb weight loss since 2020.  Of note, weight in encounter from 2020 was 203lbs. 2019, weight was 248. No recent documentation of malnutrition.  She does not feel comfortable at current BMI of 28. "     Assessment & Plan   Problem List Items Addressed This Visit        Other    Gastric banding status - Primary     Experiencing perceived malnutrition, dysphasia and decreased quality of life due to band being in place. Would like removal. Recommend labs, UGI, and dietitian assessment prior to follow up with Dr. Gold to discuss.            Other Visit Diagnoses     Bariatric surgery status        Relevant Orders    XR Upper GI without KUB    CBC with platelets    Comprehensive metabolic panel    Ferritin    Hemoglobin A1c    Parathyroid Hormone Intact    Vitamin A    Vitamin D Deficiency    Vitamin B12           Review of external notes as documented above       41 minutes spent on the date of the encounter doing chart review, history and exam, documentation and further activities as noted above    Maryanne Crockett is a 40 year old female who presents to clinic today for the following health issues         HISTORY OF PRESENT ILLNESS:  Weight Loss History Reviewed with Patient 1/29/2021   How long have you been overweight? Since early childhood   What is the most that you have ever weighed? 350   What is the most weight you have lost? 150   I have tried the following methods to lose weight Exercise, Weight Loss Surgery   Have you ever had weight loss surgery? Yes   Please select the type of weight loss surgery you had (select all that apply): adjustable gastric band / LapBand or Realize Band       Missing several teeth which causes difficulty chewing, believes she lost her teeth secondary to a psych medication     Food often getting stuck, causing her to get sick  Tolerates mashed potatoes, toast, oatmeal, very limited meat (mostly fish, some chicken). No issues with water intake.     Pain only when food gets stuck. Sometimes takes up to a day to get the food unstuck. Does not have any fluid in band.     Weight: 50lb weight loss since September. 350lbs pre-lap band got down to 180 shortly after surgery.     Used to  love going out to eat but isn't able to do that any more.      Alcohol- none  Tobacco- yes  Drug use- none  Suboxone- yes        CO-MORBIDITIES OF OBESITY INCLUDE:     1/29/2021   I have the following health issues associated with obesity: None of the above       PAST MEDICAL HISTORY:  Past Medical History:   Diagnosis Date     Depressive disorder      Family history of glioblastoma     screening MRIs every 2 years     Ovarian cyst      Seizures (H)        PAST SURGICAL HISTORY:  Past Surgical History:   Procedure Laterality Date     CHOLECYSTECTOMY       CYSTOSCOPY N/A 9/1/2020    Procedure: CYSTOSCOPY;  Surgeon: Hayley Zayas MD;  [facility]: [facility]     DAVINCI HYSTERECTOMY TOTAL, BILATERAL SALPINGO-OOPHORECTOMY, COMBINED Left 9/1/2020    Procedure: HYSTERECTOMY, TOTAL, ROBOT-ASSISTED, LAPAROSCOPIC,  left oophorectomy;  Surgeon: Hayley Zayas MD;  Location: [facility]     DILATION AND CURETTAGE SUCTION  4/30/15    retained POC     GASTRIC RESTRICTIVE PROCEDURE, OPEN, REMOVE/REPLACE SUBCUTANEOUS PORT       LAPAROSCOPIC OOPHORECTOMY Right 8/5/2016    Procedure: LAPAROSCOPIC OOPHORECTOMY;  Surgeon: Adrianne Vergara MD;  [facility]     LAPAROSCOPIC SALPINGECTOMY Bilateral 8/5/2016    Procedure: LAPAROSCOPIC SALPINGECTOMY;  Surgeon: Adrianne Vergara MD;  [facility]     LAPAROSCOPIC TUBAL LIGATION Bilateral 5/22/2015    Procedure: LAPAROSCOPIC TUBAL LIGATION;  Surgeon: Hayley Zayas MD;  [facility]     LAPAROSCOPY OPERATIVE ADULT N/A 8/5/2016    Procedure: LAPAROSCOPY OPERATIVE ADULT;  Surgeon: Adrianne Vergara MD;  [facility]     SOFT TISSUE SURGERY Right     cyst in hand       FAMILY HISTORY:   Family History   Problem Relation Age of Onset     Other Cancer Brother 36        brain ca     Other Cancer Father         leukemia     Other Cancer Maternal Aunt         2 aunts with glioblastomas     Breast Cancer No family hx of         no ovarian cancer       SOCIAL HISTORY:    Social History Questions Reviewed With Patient 1/29/2021   Which best describes your employment status (select all that apply) I am disabled   Which best describes your marital status: single   Do you have children? Yes   Who do you have in your support network that can be available to help you for the first 2 weeks after surgery? Yes   Who can you count on for support throughout your weight loss surgery journey? Family   Can you afford 3 meals a day?  Yes   Can you afford 50-60 dollars a month for vitamins? No       HABITS:     1/29/2021   How often do you drink alcohol? Never   Do you currently use any of the following Nicotine products? cigarettes   Have you ever used any of the following nicotine products? Cigarettes   Have you or are you currently using street drugs or prescription strength medication for which you do not have a prescription for? No   Do you have a history of chemical dependency (alcohol or drug abuse)? No       PSYCHOLOGICAL HISTORY:   Psychological History Reviewed With Patient 1/29/2021   Have you ever attempted suicide? In the last 1 to 5 years.   Have you had thoughts of suicide in the past year? Yes   Have you ever been hospitalized for mental illness or a suicide attempt? In the last year.   Do you have a history of chronic pain? Yes   Have you ever been diagnosed with fibromyalgia? Yes   Are you currently being treated for any of the following? (select all that apply) Depression, Anxiety, Bipolar, Post traumatic stress disorder   Are you currently seeing a therapist or counselor?  Yes   Are you currently seeing a psychiatrist? Yes       ROS:     1/29/2021   Skin:  None of the above   HEENT: Missing teeth   Musculoskeletal: Arthritis   Cardiovascular: None of the above   Pulmonary: None of the above   Gastrointestinal: None of the above   Genitourinary: None of the above   Hematological: None of the above   Neurological: Seizures   Female only: Loss of menstrual cycles       EATING  BEHAVIORS:     1/29/2021   Have you or anyone else thought that you had an eating disorder? Yes   If you answered yes to the previous eating disorder question, select the types that apply from this list: Binge Eating   Do you currently binge eat (eat a large amount of food in a short time)? No   Are you an emotional eater? No   Do you get up to eat after falling asleep? Yes       EXERCISE:     1/29/2021   How often do you exercise? 3 to 4 times per week   What is the duration of your exercise (in minutes)? 45 Minutes   What types of exercise do you do? gym membership   What keeps you from being more active?  Pain, I have recently been sick       MEDICATIONS:  Current Outpatient Medications   Medication Sig Dispense Refill     acetaminophen 500 MG CAPS Take 1,000 mg by mouth       ALPRAZolam (XANAX) 2 MG tablet        amphetamine-dextroamphetamine (ADDERALL) 20 MG tablet Take 20 mg by mouth       B Complex Vitamins (VITAMIN B COMPLEX PO)        escitalopram (LEXAPRO) 20 MG tablet 20 mg daily        estradiol (VIVELLE-DOT) 0.05 MG/24HR bi-weekly patch Place 1 patch onto the skin twice a week 8 patch 3     Multiple Vitamins-Minerals (MULTIVITAMIN ADULT PO) Take 1 tablet by mouth daily       ondansetron (ZOFRAN) 4 MG tablet        SAPHRIS 5 MG SUBL sublingual tablet        SUBOXONE 8-2 MG per film        VENTOLIN  (90 BASE) MCG/ACT inhaler Inhale 1-2 puffs into the lungs every 6 hours as needed for shortness of breath / dyspnea or wheezing   5     vitamin B-12 (CYANOCOBALAMIN) 1000 MCG tablet Take 1,000 mcg by mouth daily         ALLERGIES:  Allergies   Allergen Reactions     Ibuprofen GI Disturbance     Seroquel [Quetiapine] Other (See Comments) and Rash     Insomnia     Exam: Limited upper GI    Comparison: T of the abdomen/pelvis dated 5/11/2015 from outside facility.    History: Check lap band position and please check lap band tubing.    Universal protocol was followed.    Technique: Fluoroscopy scouts were  obtained prior to starting the exam. The patient drank approximately 4 ounces of thin barium in upright position. Images were obtained in AP, lateral and oblique views.    Findings: The band is in good position. Emptying of contrast through the gastric band is concentric.  There is no evidence of gastric band slip or obstruction. The port tubing is smooth incongruent without evidence for kinks or fracturing.    Complications: None    Fluoroscopy time: 54 seconds.     Dose: 35 mGy  Admission on 09/01/2020, Discharged on 09/02/2020   Component Date Value Ref Range Status     ABO 09/01/2020 O   Final     RH(D) 09/01/2020 Pos   Final     Antibody Screen 09/01/2020 Neg   Final     Test Valid Only At 09/01/2020 [facility]      Final     Specimen Expires 09/01/2020 09/04/2020   Final     WBC 09/01/2020 8.7  4.0 - 11.0 10e9/L Final     RBC Count 09/01/2020 4.58  3.8 - 5.2 10e12/L Final     Hemoglobin 09/01/2020 13.2  11.7 - 15.7 g/dL Final     Hematocrit 09/01/2020 40.9  35.0 - 47.0 % Final     MCV 09/01/2020 89  78 - 100 fl Final     MCH 09/01/2020 28.8  26.5 - 33.0 pg Final     MCHC 09/01/2020 32.3  31.5 - 36.5 g/dL Final     RDW 09/01/2020 13.2  10.0 - 15.0 % Final     Platelet Count 09/01/2020 270  150 - 450 10e9/L Final     HCG Quantitative Serum 09/01/2020 <1  0 - 5 IU/L Final     Glucose 09/01/2020 91  70 - 99 mg/dL Final     Copath Report 09/01/2020    Final                    Value:Patient Name: CYNTHIA ESPINOZA  MR#: [id number]  Specimen #: [id number]  Collected: 9/1/2020  Received: 9/2/2020  Reported: 9/4/2020 15:44  Ordering Phy(s): RAKEL DECKER    For improved result formatting, select 'View Enhanced Report Format' under   Linked Documents section.    SPECIMEN(S):  Uterus, Cervix, left ovary    FINAL DIAGNOSIS:  UTERUS, CERVIX, LEFT OVARY, TOTAL HYSTERECTOMY AND LEFT OOPHORECTOMY:  - Proliferative-type endometrium  - Anterior and posterior cervix with squamous  "metaplasia and chronic   cervicitis  - Adenomyosis  - Uterine serosal adhesions  - Left ovary with hemorrhagic follicular cyst(s)  - Negative for hyperplasia or malignancy    I have personally reviewed all specimens and/or slides, including the   listed special stains, and used them  with my medical judgement to determine or confirm the final diagnosis.    Electronically signed out by:    Ar Langford M.D., PhD, [facility]    CLINICAL HISTORY:  40 year old with endometriosis and chronic pelvic                           pain. Previous right   ovary and bilateral fallopian tubes  excision with endometriosis. Most recent ultrasound showed no   abnormalities. NILM on previous PAP.    GROSS:  The specimen is received fresh with proper patient identification, labeled   \"uterus, cervix, left ovary\".  The  specimen consists of a 154.7 g, 8.7 cm (fundus to ectocervix) by 5.1 cm   (cornu to cornu) by 3.8 cm (anterior  to posterior) hysterectomy with left sided ovary (5.8 x 4.4 x 4.2 cm).  No   fallopian tubes are present with  the specimen.  The serosal surface is tan, smooth and glistening.  The   paracervical margins are inked black.  The ectocervix is tan and smooth without lesions.  The os is a 1.3 cm   slit.  The endocervical canal is grossly  unremarkable.  The endometrial cavity is 4.9 cm in length and 2.8 cm in   greatest width.  The endometrium is  0.1 cm in thickness and is tan and grossly unremarkable.  No polyps or   masses are present.  The myometrium  ranges from 1.3-2.1 cm in thickness and is u                          nremarkable without bulging   nodule.  The external surface of the  ovary is purple- gray and smooth.  No papillary excrescences are present.    It is sectioned to reveal a  unilocular cyst with brown thin fluid.  No papillary excrescences or firm   areas are present.  A thin rim of  normal ovarian parenchyma is grossly identified.  Representative sections   are submitted.    Summary of " "Sections:  A1-A2 - anterior cervix and lower uterine segment, bisected with blue ink  A3-A4 - posterior cervix and lower uterine segment, bisected with blue ink  A5-A6 - anterior uterine wall, full-thickness sections  A7-A8 - posterior uterine wall, full-thickness sections  A9-A11 - ovarian cyst wall (Dictated by: Kayli JONES ASCP 9/2/2020   01:26 PM)    MICROSCOPIC:  Microscopic examination was performed.    The technical component of this testing was completed at the [facility], with the professional component performed   at the [facility], [address] [place] [phone] ([phone])    CPT Codes:  A: 71427-GM0    COLLECTION SITE:  Client[facility]  Location: [facility] ([facility]    Resident  TORY       Glucose 09/02/2020 116* 70 - 99 mg/dL Final       PHYSICAL EXAM:  Objective    Ht 1.753 m (5' 9\")   Wt 86.2 kg (190 lb)   LMP 03/14/2018 (Approximate)   BMI 28.06 kg/m    Vitals - Patient Reported  Pain Score: No Pain (0)        Physical Exam   GENERAL: Healthy, alert and no distress  EYES: Eyes grossly normal to inspection.  No discharge or erythema, or obvious scleral/conjunctival abnormalities.  RESP: No audible wheeze, cough, or visible cyanosis.  No visible retractions or increased work of breathing.    SKIN: Visible skin clear. No significant rash, abnormal pigmentation or lesions.  NEURO: Cranial nerves grossly intact.  Mentation and speech appropriate for age.  PSYCH: Mentation appears normal, affect normal/bright, judgement and insight intact, normal speech and appearance well-groomed.      In summary, Maryanne RAYO Self has history of Class III obesity with a body mass index of Body mass index is 28.06 kg/m . kg/m2 and the comorbidities stated above. She has a history of lap band in 2007 and is here to re-establish " bariatric care and discuss band removal due to dysphasia, inability to chew food adequately and weight loss. No recent documentation of malnutrition but will obtain these labs.  No recent EGD or UGI. Will obtain UGI at the same time as EGD.   Encouraged smoking cessation   Encouraged follow up with dietitian to improve nutrition prior to surgery       MEDICATIONS:  Continue current medications without change  FURTHER TESTING:       - UGI prior to seeing Dr. Gold  CONSULTATION/REFERRAL to dietitian   FUTURE LABS:       - Schedule a non-fasting blood draw - prior to seeing Dr. Gold   FUTURE APPOINTMENTS:       - Follow-up visit in 1 month with Dr. Gold       Sincerely,     Carolina Sahni NP

## 2021-01-29 NOTE — ASSESSMENT & PLAN NOTE
Experiencing perceived malnutrition, dysphasia and decreased quality of life due to band being in place. Would like removal. Recommend labs, UGI, and dietitian assessment prior to follow up with Dr. Gold to discuss.

## 2021-01-29 NOTE — NURSING NOTE
"Chief Complaint   Patient presents with     Consult     [facility].       Vitals:    01/29/21 0901   Weight: 86.2 kg (190 lb)   Height: 1.753 m (5' 9\")       Body mass index is 28.06 kg/m .                            JOSE BARNHART, EMT    "

## 2021-01-29 NOTE — PATIENT INSTRUCTIONS
"It was a pleasure meeting with you today.    Thank you for allowing us the privilege of caring for you. We hope we provided you with the excellent service you deserve.   Please let us know if there is anything else we can do for you so that we can be sure you are leaving completely satisfied with your care experience.    To ensure the quality of our services you may be receiving a patient satisfaction survey from an independent patient satisfaction monitoring company.    The greatest compliment you can give is a \"Likely to Recommend\"      Your visit was with Carolina Sahni NP today.     Instructions per today's visit:   -get labs done - any [facility] location- recommend clinic and surgery center   -Schedule upper GI   -schedule with Dr. Gold to discuss removal   -recommend dietitian       To schedule appointments with our team, please call [phone] option #1    Please call during clinic hours Monday through Friday 8:00a - 4:00p if you have questions or you can contact us via [facility] at anytime.      Nurses: [phone]  Fax: [phone]  Surgery Scheduler: [phone]    Please call the hospital at [phone] to speak with our on call MDs if you have urgent needs after hours, during weekends, or holidays.    **Please note if you need to go to the Emergency Room for any reason in the first 90 days after surgery it is important to go to the [facility] ER on the [place] on [address] off of [address] off [address]    *For patients who are currently enrolled in our program and have not yet had weight loss surgery please note*:    You must be at your required goal weight at the time of your Anesthesia clinic visit as well as the day of surgery or your surgery will be canceled. You will not be able to obtain a new surgery date until you have met your goal weight.    Lab results will be communicated through My Chart or letter (if My Chart not used). Please call the clinic if you have not " received communication after 1 week or if you have any questions.?    Main follow-up parameters for all bariatric patients     Patients who have undergone Bariatric surgery:    1. Follow-up interval during the first year: ~1 week, 1 month, 3 month, 6 month,12 months.  Every 6 months until 5 years; and then annually thereafter.  The goal of follow-up sessions is to ensure that food intake behavior (choice of food and eating speed) and exercise/activity level are set appropriately.    2. Yearly lab tests ordered by our clinic.      3. The bariatric team should be aware and potentially evaluate all adverse gastrointestinal symptoms (dysphagia, abdominal pain, nausea, vomiting, diarrhea, heartburn, reflux, etc), which can be a sign of complication.  The bariatric surgeons perform general surgery procedures in addition to bariatric surgery (laparoscopic cholecystectomy, etc.)    4. Inability to tolerate textured food (chicken, steak, fish, eggs, beans) is NOT normal and may need to be evaluated by endoscopy performed by the bariatric surgeon.    _____________________________________________________________________________________________________________________________    Meal Replacement Products:    Here is the link to our new e-store where you can purchase our meal replacement products    [facility] E-Store  Just Sing [email or web address]    The one week starter kit is a great way to sample a variety of products and see what works for you.    If you want more information about the product go to: Cloudtop    Free Shipping for orders over $75     Benefits of meal replacements products:    Portion and calorie control  Improved nutrition  Structured eating  Simplified food choices  Avoid contact with trigger foods  ______________________________________________________________________________________________________________________________    Healthy Lifestyle Support Group    [facility]  Weight Management Program  Virtual Support Group Now Available    60 minutes of small group guided discussion, support and resources    Led by Health , Maryanne Aponte    For questions, and to receive the invite information, contact Maryanne at [email or web address]    All our welcome!    One Friday per month, 12:30pm to 1:30pm  SELF COMPASSION: July 31st  CREATING MY WELLNESS VISION: August 28th  MINDFULNESS PRACTICES FOR A CALM BODY & MIND: September 25th  MINDFUL EATING TOOLS: October 30th  HEALTHY& HAPPY HOLIDAYS: November 20th  OPEN FORUM: December 18th  _______________________________________________________________________________________________________________________________    Connections: Bariatric Care support group  Due to the Covid-19 restrictions, we will be doing our support group virtually using Microsoft Teams. You will need to get an invitation to the group from Angus Tillman, Ph.D., LP at [email or web address] and to learn about using Microsoft Teams. The next meeting is on Tuesday, July 14 between 6:30 and 8 PM and there will be no formal speaker.    This group meets the second Tuesday of each month from 6:30 to 8 PM and will be held again at [facility] in the [address] [place] [address] (A-B room) when the Covid-19 restrictions are lifted. The group is led by Angus Tillman, Ph.D., Licensed Psychologist, [facility] Comprehensive Weight Management Program. There is no cost for group participation and is open to all [facility] (and those external to this program) pre- and post-operative bariatric surgery patients as well as their support system.   _________________________________________________________________________________________________________________________________      Interested in working with a health ?  Health coaches work with you to improve your overall health and wellbeing.  They look at the whole person, and may involve discussion of  different areas of life, including, but not limited to the four pillars of health (sleep, exercise, nutrition, and stress management). Discuss with your care team if you would like to start working a health .     Health Coaching-3 Pack:      $99 for three health coaching visits    Visits may be done in person or via phone    Coaching is a partnership between the  and the client; Coaches do not prescribe or diagnose    Coaching helps inspire the client to reach his/her personal goals   __________________________________________________________________________________________________________________________________    [facility] Get Moving Program    Our team of physical therapists is trained to help you understand and take control of your condition. They will perform a thorough evaluation to determine your ability for activity and develop a customized plan to fit your goals and physical ability.  Scheduling: Unsure if the Get Moving program is right for you? Discuss the program with your medical provider or diabetes educator. You can also call us at [phone] to ask questions or schedule an appointment.   [facility] Get Moving Program  ______________________________________________________________________________________________________________________  Bluetooth Scale:    We hope to provide you with high quality telephone and virtual healthcare visits while social distancing for COVID-19 is necessary, as well as in the future when virtual visits may be more convenient for you.     Our technology team made it possible for Bluetooth scales to send weight measurements to our electronic medical record. This allows weights from you weighing at home to securely flow into the medical record, which will improve telephone and virtual visits.   Additionally, studies have shown that adults actually lose more weight when their weights are automatically sent to someone else, and also that this process  is not stressful for those adults.    Below is a link for purchasing the scale, with a discount code for our patients. You may call your insurance company to see if they will reimburse you for the cost of the scale, as a piece of durable medical equipment. The scales only go up to a weight of 400 pounds. This is an issue and we are working with the developer on increasing this. We found no scales that go over 400lb that have blue-tooth for connecting to [employer].    Scale to purchase: the ETARGET  Body  Scale: https://[email or web address]/en/body/[email or web address]=aw.ds    Discount Code: We have a discount code for our patients to bring the cost down to $50, the code is: [place]_Scale_20%off    Steps to link the scale to [employer] via an Android Phone (you can always disconnect at any point in the future):  1. The order must be placed first before the patient can access Track My Health within [employer].  2. Download Google Fit carlos from the Google Play Store   a. Log in or register using your Google account   3. Download the [employer] carlos from Google Play Store  a. Select add organization   b. Search for [email or web address] and select it   c. Log into [employer]  d. Select Track My Health   e. Select the green connect my account button   f. When prompted log into your Google account   g. Select okay to confirm the account   4. Download the [facility] Health Mate carlos from Google Play Store  a. [place] for ETARGET   b. Go to profile   c. Tap google fit under the Apps section  d. Select the option to activate Google Fit integration   e. Select the same Google account   f. Select okay to confirm the account  g.   Steps to link the scale to [employer] via an iPhone (you can always disconnect at any point in the future):  **Note EventSneaker is not available for download on an iPad**  1. The order must be placed first before the patient can access Track My Health within  [employer].  2. Locate the Health [facility]o on your iPhone.  a. Set up your Apple Health account as prompted  b. The Sources page will show Apps that communicate with your Health jarocho. Once all steps are completed, you should see [employer] and [employer]t listed under the Apps section and your iPhone under the devices section.  i. Select Health Mate  1. Under 'ALLOW  ShapeUp  TO WRITE DATA ensure the toggle is on for Weight.  2. This will allow the scale to add your weight to the [employer] [facility]  ii. Select MyChart  1. Under 'ALLOW  Twilio  TO READ DATA ensure the toggle is on for Weight.  2. This allows [employer] to grab the weight from [employer] so your provider can see your weights.  3. Download the [employer] jarocho from the [employer] Store   a. Select add organization                                                  b. Search for [employer]ger and select it  c. Log into [employer]  d. Select Track My Health   e. Select the green connect my account button   f. Follow prompts to link your device to [employer].  4. Download the [facility] health mate jarocho in the [employer] Store   a. [place] for [employer]   b. Go to profile   c. Tap Health under the Apps section  d. If prompted to allow access with the Health [facility], toggle weight on for read and write access.

## 2021-02-24 ENCOUNTER — APPOINTMENT (OUTPATIENT)
Dept: MRI IMAGING | Facility: CLINIC | Age: 41
End: 2021-02-24
Attending: EMERGENCY MEDICINE
Payer: COMMERCIAL

## 2021-02-24 ENCOUNTER — APPOINTMENT (OUTPATIENT)
Dept: CT IMAGING | Facility: CLINIC | Age: 41
End: 2021-02-24
Attending: EMERGENCY MEDICINE
Payer: COMMERCIAL

## 2021-02-24 ENCOUNTER — HOSPITAL ENCOUNTER (EMERGENCY)
Facility: CLINIC | Age: 41
Discharge: HOME | End: 2021-02-24
Attending: EMERGENCY MEDICINE | Admitting: EMERGENCY MEDICINE
Payer: COMMERCIAL

## 2021-02-24 VITALS
DIASTOLIC BLOOD PRESSURE: 95 MMHG | BODY MASS INDEX: 27.19 KG/M2 | TEMPERATURE: 98.5 F | OXYGEN SATURATION: 99 % | WEIGHT: 184.1 LBS | HEART RATE: 103 BPM | RESPIRATION RATE: 20 BRPM | SYSTOLIC BLOOD PRESSURE: 184 MMHG

## 2021-02-24 DIAGNOSIS — I67.1 NONRUPTURED CEREBRAL ANEURYSM: Primary | ICD-10-CM

## 2021-02-24 DIAGNOSIS — G44.89 OTHER HEADACHE SYNDROME: ICD-10-CM

## 2021-02-24 DIAGNOSIS — R20.0 LEFT FACIAL NUMBNESS: ICD-10-CM

## 2021-02-24 DIAGNOSIS — R11.0 NAUSEA: ICD-10-CM

## 2021-02-24 LAB
ANION GAP SERPL CALCULATED.3IONS-SCNC: 5 MMOL/L (ref 3–14)
APTT PPP: 28 SEC (ref 22–37)
B-HCG FREE SERPL-ACNC: 1 [IU]/L (ref 0.86–1.14)
BASOPHILS # BLD AUTO: 0.1 10E9/L (ref 0–0.2)
BASOPHILS NFR BLD AUTO: 0.7 %
BUN SERPL-MCNC: 6 MG/DL (ref 7–30)
CALCIUM SERPL-MCNC: 9.8 MG/DL (ref 8.5–10.1)
CHLORIDE SERPL-SCNC: 105 MMOL/L (ref 94–109)
CO2 SERPL-SCNC: 25 MMOL/L (ref 20–32)
CREAT BLD-MCNC: 0.7 MG/DL (ref 0.52–1.04)
CREAT SERPL-MCNC: 0.78 MG/DL (ref 0.52–1.04)
DIFFERENTIAL METHOD BLD: ABNORMAL
EOSINOPHIL # BLD AUTO: 0.1 10E9/L (ref 0–0.7)
EOSINOPHIL NFR BLD AUTO: 1 %
ERYTHROCYTE [DISTWIDTH] IN BLOOD BY AUTOMATED COUNT: 13.2 % (ref 10–15)
GFR SERPL CREATININE-BSD FRML MDRD: >90 ML/MIN/{1.73_M2}
GFR SERPL CREATININE-BSD FRML MDRD: >90 ML/MIN/{1.73_M2}
GLUCOSE BLDC GLUCOMTR-MCNC: 108 MG/DL (ref 70–99)
GLUCOSE SERPL-MCNC: 107 MG/DL (ref 70–99)
HCT VFR BLD AUTO: 44.5 % (ref 35–47)
HGB BLD-MCNC: 15.4 G/DL (ref 11.7–15.7)
IMM GRANULOCYTES # BLD: 0.1 10E9/L (ref 0–0.4)
IMM GRANULOCYTES NFR BLD: 0.4 %
INR PPP: 1.1 (ref 0.86–1.14)
INTERPRETATION ECG - MUSE: NORMAL
LYMPHOCYTES # BLD AUTO: 4.2 10E9/L (ref 0.8–5.3)
LYMPHOCYTES NFR BLD AUTO: 29.3 %
MCH RBC QN AUTO: 28.3 PG (ref 26.5–33)
MCHC RBC AUTO-ENTMCNC: 34.6 G/DL (ref 31.5–36.5)
MCV RBC AUTO: 82 FL (ref 78–100)
MONOCYTES # BLD AUTO: 0.7 10E9/L (ref 0–1.3)
MONOCYTES NFR BLD AUTO: 4.8 %
NEUTROPHILS # BLD AUTO: 9.1 10E9/L (ref 1.6–8.3)
NEUTROPHILS NFR BLD AUTO: 63.8 %
NRBC # BLD AUTO: 0 10*3/UL
NRBC BLD AUTO-RTO: 0 /100
PLATELET # BLD AUTO: 397 10E9/L (ref 150–450)
POTASSIUM SERPL-SCNC: 3.5 MMOL/L (ref 3.4–5.3)
RADIOLOGIST FLAGS: ABNORMAL
RBC # BLD AUTO: 5.45 10E12/L (ref 3.8–5.2)
SODIUM SERPL-SCNC: 134 MMOL/L (ref 133–144)
TROPONIN I BLD-MCNC: 0 UG/L (ref 0–0.08)
WBC # BLD AUTO: 14.2 10E9/L (ref 4–11)

## 2021-02-24 PROCEDURE — 250N000013 HC RX MED GY IP 250 OP 250 PS 637: Performed by: EMERGENCY MEDICINE

## 2021-02-24 PROCEDURE — 70496 CT ANGIOGRAPHY HEAD: CPT | Mod: 26 | Performed by: RADIOLOGY

## 2021-02-24 PROCEDURE — 70551 MRI BRAIN STEM W/O DYE: CPT | Mod: 26 | Performed by: RADIOLOGY

## 2021-02-24 PROCEDURE — 96361 HYDRATE IV INFUSION ADD-ON: CPT | Performed by: EMERGENCY MEDICINE

## 2021-02-24 PROCEDURE — 93005 ELECTROCARDIOGRAM TRACING: CPT | Performed by: EMERGENCY MEDICINE

## 2021-02-24 PROCEDURE — 70496 CT ANGIOGRAPHY HEAD: CPT

## 2021-02-24 PROCEDURE — 85730 THROMBOPLASTIN TIME PARTIAL: CPT | Performed by: EMERGENCY MEDICINE

## 2021-02-24 PROCEDURE — 85025 COMPLETE CBC W/AUTO DIFF WBC: CPT | Performed by: EMERGENCY MEDICINE

## 2021-02-24 PROCEDURE — 70498 CT ANGIOGRAPHY NECK: CPT | Mod: 26 | Performed by: RADIOLOGY

## 2021-02-24 PROCEDURE — 80048 BASIC METABOLIC PNL TOTAL CA: CPT | Performed by: EMERGENCY MEDICINE

## 2021-02-24 PROCEDURE — 70551 MRI BRAIN STEM W/O DYE: CPT

## 2021-02-24 PROCEDURE — 93010 ELECTROCARDIOGRAM REPORT: CPT | Performed by: EMERGENCY MEDICINE

## 2021-02-24 PROCEDURE — 96374 THER/PROPH/DIAG INJ IV PUSH: CPT | Mod: 59 | Performed by: EMERGENCY MEDICINE

## 2021-02-24 PROCEDURE — 99207 CT HEAD W/O CONTRAST: CPT | Mod: 26 | Performed by: RADIOLOGY

## 2021-02-24 PROCEDURE — 99285 EMERGENCY DEPT VISIT HI MDM: CPT | Mod: 25 | Performed by: EMERGENCY MEDICINE

## 2021-02-24 PROCEDURE — 999N001017 HC STATISTIC GLUCOSE BY METER IP

## 2021-02-24 PROCEDURE — 82565 ASSAY OF CREATININE: CPT

## 2021-02-24 PROCEDURE — 250N000011 HC RX IP 250 OP 636: Performed by: STUDENT IN AN ORGANIZED HEALTH CARE EDUCATION/TRAINING PROGRAM

## 2021-02-24 PROCEDURE — 99204 OFFICE O/P NEW MOD 45 MIN: CPT | Mod: GC | Performed by: PSYCHIATRY & NEUROLOGY

## 2021-02-24 PROCEDURE — 85610 PROTHROMBIN TIME: CPT | Performed by: EMERGENCY MEDICINE

## 2021-02-24 PROCEDURE — 999N000176 HC STATISTIC STROKE CODE W/O ACCESS

## 2021-02-24 PROCEDURE — 258N000003 HC RX IP 258 OP 636: Performed by: EMERGENCY MEDICINE

## 2021-02-24 PROCEDURE — 70450 CT HEAD/BRAIN W/O DYE: CPT

## 2021-02-24 PROCEDURE — 96375 TX/PRO/DX INJ NEW DRUG ADDON: CPT | Performed by: EMERGENCY MEDICINE

## 2021-02-24 PROCEDURE — 85610 PROTHROMBIN TIME: CPT

## 2021-02-24 PROCEDURE — 84484 ASSAY OF TROPONIN QUANT: CPT

## 2021-02-24 PROCEDURE — 250N000011 HC RX IP 250 OP 636: Performed by: EMERGENCY MEDICINE

## 2021-02-24 RX ORDER — ONDANSETRON 8 MG/1
8 TABLET, ORALLY DISINTEGRATING ORAL EVERY 8 HOURS PRN
Qty: 12 TABLET | Refills: 0 | Status: SHIPPED | OUTPATIENT
Start: 2021-02-24 | End: 2023-01-26

## 2021-02-24 RX ORDER — METOCLOPRAMIDE HYDROCHLORIDE 5 MG/ML
10 INJECTION INTRAMUSCULAR; INTRAVENOUS ONCE
Status: COMPLETED | OUTPATIENT
Start: 2021-02-24 | End: 2021-02-24

## 2021-02-24 RX ORDER — IOPAMIDOL 755 MG/ML
75 INJECTION, SOLUTION INTRAVASCULAR ONCE
Status: COMPLETED | OUTPATIENT
Start: 2021-02-24 | End: 2021-02-24

## 2021-02-24 RX ORDER — ONDANSETRON 2 MG/ML
4 INJECTION INTRAMUSCULAR; INTRAVENOUS ONCE
Status: DISCONTINUED | OUTPATIENT
Start: 2021-02-24 | End: 2021-02-24 | Stop reason: HOSPADM

## 2021-02-24 RX ORDER — PROCHLORPERAZINE MALEATE 10 MG
10 TABLET ORAL EVERY 6 HOURS PRN
Qty: 30 TABLET | Refills: 0 | Status: ON HOLD | OUTPATIENT
Start: 2021-02-24 | End: 2021-05-11

## 2021-02-24 RX ORDER — ACETAMINOPHEN 500 MG
1000 TABLET ORAL ONCE
Status: COMPLETED | OUTPATIENT
Start: 2021-02-24 | End: 2021-02-24

## 2021-02-24 RX ORDER — OLANZAPINE 5 MG/1
10 TABLET, ORALLY DISINTEGRATING ORAL ONCE
Status: COMPLETED | OUTPATIENT
Start: 2021-02-24 | End: 2021-02-24

## 2021-02-24 RX ORDER — DIAZEPAM 5 MG
10 TABLET ORAL ONCE
Status: COMPLETED | OUTPATIENT
Start: 2021-02-24 | End: 2021-02-24

## 2021-02-24 RX ORDER — ONDANSETRON 2 MG/ML
4 INJECTION INTRAMUSCULAR; INTRAVENOUS ONCE
Status: COMPLETED | OUTPATIENT
Start: 2021-02-24 | End: 2021-02-24

## 2021-02-24 RX ADMIN — IOPAMIDOL 75 ML: 755 INJECTION, SOLUTION INTRAVENOUS at 12:32

## 2021-02-24 RX ADMIN — ONDANSETRON 4 MG: 2 INJECTION INTRAMUSCULAR; INTRAVENOUS at 14:48

## 2021-02-24 RX ADMIN — ACETAMINOPHEN 1000 MG: 500 TABLET ORAL at 13:28

## 2021-02-24 RX ADMIN — SODIUM CHLORIDE 1000 ML: 9 INJECTION, SOLUTION INTRAVENOUS at 14:27

## 2021-02-24 RX ADMIN — METOCLOPRAMIDE HYDROCHLORIDE 10 MG: 5 INJECTION INTRAMUSCULAR; INTRAVENOUS at 13:29

## 2021-02-24 RX ADMIN — DIAZEPAM 10 MG: 5 TABLET ORAL at 15:18

## 2021-02-24 RX ADMIN — OLANZAPINE 10 MG: 5 TABLET, ORALLY DISINTEGRATING ORAL at 16:29

## 2021-02-24 ASSESSMENT — VISUAL ACUITY
OU: NORMAL ACUITY

## 2021-02-24 NOTE — PLAN OF CARE
Arrival to Stroke Code: 1215    Stroke Team escalate/de-escalate interventions: de-escalated by Dr. Chandra at 1300    ED/Bedside Nurse providing handoff: Nabor    Time left for CT: 1225    Time Returned to ED/Unit: 1240    ED/Bedside Nurse given handoff to & Time: report given back to nabor KLEIN , neuro exam done at bedside.

## 2021-02-24 NOTE — DISCHARGE INSTRUCTIONS
Please make an appointment to follow up with Your Primary Care Provider in 5-7 days if not improving or if other concerns.  Follow up with bariatric surgery regarding the lap band.

## 2021-02-24 NOTE — ED NOTES
Stroke code de-escalated at 1252 per Dr. Chandra. Plan to do neuro checks q 1 hour per Dr. Chandra.

## 2021-02-24 NOTE — ED PROVIDER NOTES
"ED Provider Note  [facility]      History     Chief Complaint   Patient presents with     Dizziness     HPI  Maryanne Crockett is a 40 year old female with a PMH notable for seizures who presents to the ED today for evaluation of dizziness. I was asked to see the patient urgently in triage because she reports developing a severe occipital headache after arriving to the emergency department along with new onset left-sided lip numbness. Both the headache and the lip numbness started after she arrived at the emergency room. She initially called 911 because of lightheadedness starting earlier today. Patient denies any other focal numbness or weakness that is new. She describes having right hand numbness for years and numbness in the fingertips of both hands for months. She denies any numbness or weakness elsewhere except that she states that the dorsal thumb of the left foot feels \"funny\". Patient denies fever, cough, chest pain, shortness of breath, nausea, vomiting. She reports chronic abdominal pain, but denies dysuria. She reports neck pain that also is not new. Patient does report light sensitivity with this headache.      Per review of patient's chart she was seen for a PT visit yesterday for chronic neck and back pain. She had apparently mentioned facial numbness at that visit, only present with certain positions.    Past Medical History  Past Medical History:   Diagnosis Date     Depressive disorder      Family history of glioblastoma     screening MRIs every 2 years     Ovarian cyst      Seizures (H)      Past Surgical History:   Procedure Laterality Date     CHOLECYSTECTOMY       CYSTOSCOPY N/A 9/1/2020    Procedure: CYSTOSCOPY;  Surgeon: Hayley Zayas MD;  Location: [facility] OR     DAVINCI HYSTERECTOMY TOTAL, BILATERAL SALPINGO-OOPHORECTOMY, COMBINED Left 9/1/2020    Procedure: HYSTERECTOMY, TOTAL, ROBOT-ASSISTED, LAPAROSCOPIC,  left oophorectomy;  Surgeon: Hayley Zayas, " MD;  Location: [facility] OR     DILATION AND CURETTAGE SUCTION  4/30/15    retained POC     GASTRIC RESTRICTIVE PROCEDURE, OPEN, REMOVE/REPLACE SUBCUTANEOUS PORT       LAPAROSCOPIC OOPHORECTOMY Right 8/5/2016    Procedure: LAPAROSCOPIC OOPHORECTOMY;  Surgeon: Adrianne Vergara MD;  Location: [facility]     LAPAROSCOPIC SALPINGECTOMY Bilateral 8/5/2016    Procedure: LAPAROSCOPIC SALPINGECTOMY;  Surgeon: Adrianne Vergara MD;  Location: [facility]     LAPAROSCOPIC TUBAL LIGATION Bilateral 5/22/2015    Procedure: LAPAROSCOPIC TUBAL LIGATION;  Surgeon: Hayley Zayas MD;  [facility]: [facility]     LAPAROSCOPY OPERATIVE ADULT N/A 8/5/2016    Procedure: LAPAROSCOPY OPERATIVE ADULT;  Surgeon: Adrianne Vergara MD;  Location: [facility]     SOFT TISSUE SURGERY Right     cyst in hand     ondansetron (ZOFRAN ODT) 8 MG ODT tab  prochlorperazine (COMPAZINE) 10 MG tablet  acetaminophen 500 MG CAPS  ALPRAZolam (XANAX) 2 MG tablet  amphetamine-dextroamphetamine (ADDERALL) 20 MG tablet  B Complex Vitamins (VITAMIN B COMPLEX PO)  escitalopram (LEXAPRO) 20 MG tablet  estradiol (VIVELLE-DOT) 0.05 MG/24HR bi-weekly patch  Multiple Vitamins-Minerals (MULTIVITAMIN ADULT PO)  ondansetron (ZOFRAN) 4 MG tablet  SAPHRIS 5 MG SUBL sublingual tablet  SUBOXONE 8-2 MG per film  VENTOLIN  (90 BASE) MCG/ACT inhaler  vitamin B-12 (CYANOCOBALAMIN) 1000 MCG tablet      Allergies   Allergen Reactions     Ibuprofen GI Disturbance     Seroquel [Quetiapine] Other (See Comments) and Rash     Insomnia     Family History  Family History   Problem Relation Age of Onset     Other Cancer Brother 36        brain ca     Other Cancer Father         leukemia     Other Cancer Maternal Aunt         2 aunts with glioblastomas     Breast Cancer No family hx of         no ovarian cancer     Social History   Social History     Tobacco Use     Smoking status: Current Some Day Smoker     Packs/day: 0.25     Smokeless tobacco: Never Used     Tobacco comment: Trying   Substance Use  Topics     Alcohol use: Yes     Alcohol/week: 0.0 standard drinks     Comment: rare     Drug use: Yes     Types: Marijuana     Comment: pt says she is prescribed medical marijuana        Past medical history, past surgical history, medications, allergies, family history, and social history were reviewed with the patient. No additional pertinent items.       Review of Systems  A complete review of systems was performed with pertinent positives and negatives noted in the HPI, and all other systems negative.    Physical Exam   BP: (!) 146/95  Pulse: 113  Temp: 98.5  F (36.9  C)  Resp: 16  Weight: 83.5 kg (184 lb 1.6 oz)  SpO2: 96 %     Physical Exam  GEN:  Well developed, is tearful during initial exam  HEENT:  PERRL, EOMI, Mucous membranes are moist.   Cardio:  RRR, no murmur, radial pulses equal bilaterally  PULM:  Lungs clear, good air movement, no wheezes, rales  Abd:  Soft, normal bowel sounds, no focal tenderness  Musculoskeletal:  normal range of motion, no lower extremity swelling or calf tenderness  Neuro:  Alert and oriented X3, Follows commands, CN exam is normal, finger to nose is normal, sensation and strength is normal throughout except that she reports numbness along the left side of both the upper and lower lips and a funny feeling on the dorsum of the left foot, but normal sensation in the toes of the left foot. no pronator drift   Skin:  Warm, dry    ED Course      Procedures             EKG Interpretation:      Interpreted by Tiffanie Anderson MD  Time reviewed: 13:03  Symptoms at time of EKG: headache and lip niumbness   Rhythm: normal sinus   Rate: normal  Axis: normal  Ectopy: none  Conduction: normal  ST Segments/ T Waves: No ST-T wave changes  Q Waves: none  Comparison to prior: Compared to EKG from February 11, 2019, the previously seen incomplete right bundle branch block is not seen today    Clinical Impression: normal EKG    Shortly after the patient developed the occipital headache  and left-sided lip numbness, stroke code was called.  She was seen by the stroke service immediately.            The patient has stroke symptoms:         ED Stroke specific documentation           NIHSS PDF     Patient last known well time: 5-10 minutes prior to stroke code activation  ED Provider first to bedside at: Patient was seen by ED provider in triage.    CT Results received at: Not sure the exact time, however, initial CT report was read as normal.  Radiology called later to report possible hypoattenuating area which could be possible defect and advised on limited stroke MRI.    Thrombolytics:   Not given due to minor/isolated/quickly resolving symptoms.    If treating with thrombolytics: Ensure SBP<180 and DBP<105 prior to treatment with thrombolytics.  Administering thrombolytics after treatment with IV labetalol, hydralazine, or nicardipine is reasonable once BP control is established.    Endovascular Retrieval:  Not initiated due to absence of proximal vessel occlusion    National Institutes of Health Stroke Scale (Baseline)  Time Performed: at time of symptom onset in triage     Score    Level of consciousness: (0)   Alert, keenly responsive    LOC questions: (0)   Answers both questions correctly    LOC commands: (0)   Performs both tasks correctly    Best gaze: (0)   Normal    Visual: (0)   No visual loss    Facial palsy: (0)   Normal symmetrical movements    Motor arm (left): (0)   No drift    Motor arm (right): (0)   No drift    Motor leg (left): (0)   No drift    Motor leg (right): (0)   No drift    Limb ataxia: (0)   Absent    Sensory: (0)   Normal- no sensory loss    Best language: (0)   Normal- no aphasia    Dysarthria: (0)   Normal    Extinction and inattention: (0)   No abnormality        Total Score:  0        Stroke Mimics were considered (including migraine headache, seizure disorder, hypoglycemia (or hyperglycemia), head or spinal trauma, CNS infection, Toxin ingestion and shock state  (e.g. sepsis) .      Labs were reviewed by me and results are shown below.  Imaging results are shown below as well.  CT of the head with CTA was done initially followed by MRI limited stroke protocol.  The MRI is reassuring and patient symptoms have improved.  Patient did have difficulty with nausea and was treated with several different antiemetics and she finally does feel better.  She reports that the nausea has been a chronic issue for her.  She has Zofran at home but would like a refill of both of Zofran and some Compazine.     Results for orders placed or performed during the hospital encounter of 02/24/21   CT Head w/o Contrast     Status: Abnormal   Result Value Ref Range    Radiologist flags Possible right temporal stroke (Urgent)     Narrative    CT HEAD W/O CONTRAST 2/24/2021 12:42 PM    History: Code Stroke to evaluate for potential thrombolysis and  thrombectomy.  PLEASE READ IMMEDIATELY.   ICD-10:    Comparison: 2/11/2019    Technique: Using multidetector thin collimation helical acquisition  technique, axial, coronal and sagittal CT images from the skull base  to the vertex were obtained without intravenous contrast.    Findings: There is no intracranial hemorrhage, mass effect, or midline  shift. Questionable loss of gray-white matter differentiation in the  right temporal lobe (series 8 image 22). Ventricles are proportionate  to the cerebral sulci. The basal cisterns are clear.    The bony calvaria and the bones of the skull base are normal. The  visualized portions of the paranasal sinuses and mastoid air cells are  clear.       Impression    Impression: Questionable loss of gray-white matter differentiation in  the right temporal lobe. Recommend obtaining quick brain MRI for  further evaluation.    [Urgent Result: Possible right temporal stroke]    Finding was identified on 2/24/2021 12:54 PM.     Dr. Anderson was contacted by Dr. Beach at 2/24/2021 2:16 PM and  verbalized understanding of the  urgent finding.     I have personally reviewed the examination and initial interpretation  and I agree with the findings.    TISH WILDE MD   CTA Head Neck with Contrast     Status: None    Narrative    CTA  HEAD NECK WITH CONTRAST 2/24/2021 12:47 PM    CT angiogram of the neck   CT angiogram of the base of the brain with contrast  Reconstruction by the Radiologist on the 3D workstation    Provided History:  Code Stroke to evaluate for potential thrombolysis  and thrombectomy.  PLEASE READ IMMEDIATELY.  ICD-10:    Comparison:  Same day head CT.      Technique:  HEAD and NECK CTA: During rapid bolus intravenous injection of  nonionic contrast material, axial images were obtained using thin  collimation multidetector helical technique from the base of the upper  aortic arch through the [place]. This CT angiogram data was  reconstructed at thin intervals with mild overlap. Images were sent to  the [employer] workstation, and 3D reconstructions were obtained. The  axial source images, multiplanar reformations, 3D reconstructions in  both maximum intensity projection display and volume rendered models  were reviewed, with reconstructions performed by the technologist and  the radiologist.    Contrast: iopamidol (ISOVUE-370) solution 75 mL    Findings:    Head CTA demonstrates no stenosis of the major intracranial arteries.  There is a medially directed outpouching off the terminal internal  carotid artery, which measures 3 mm across the base and 2 mm from tip  to base (series 7, image 198). Additionally, there is a posterior of  medial superiorly directed outpouching off the superior aspect of the  right cavernous internal carotid artery,, which measures 1 mm across  the base and 1.5 mm from tip to base. (Series 7, image 210). Anterior  communicating artery is patent. Severe commuting arteries are not  well-visualized.    Neck CTA demonstrates no stenosis of the major cervical arteries. The  origins of the  great vessels from the aortic arch are patent. The  normal distal right internal carotid artery measures 5 mm. The normal  distal left internal carotid artery measures 5.9 mm.     No mass within the visualized portions of the cervical soft tissues or  lung apices.       Impression    Impression:    1. 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 Second  aneurysm off the right cavernous carotid artery measures up to 1.5 mm.  No definite stenosis or occlusion of the major intracranial arteries.  2. Neck CTA demonstrates no stenosis of the major cervical arteries.    I have personally reviewed the examination and initial interpretation  and I agree with the findings.    TISH WILDE MD   MR Brain for Stroke Limited     Status: None    Narrative    MRI brain without contrast    Provided History:  vertigo, headache.  ICD-10:    Comparison:  No MRI comparison. Same day head CTA.      Technique:   Axial and sagittal diffusion-weighted (with ADC map), axial FLAIR, and  axial susceptibility-weighted images of the brain were obtained  without the administration of intravenous contrast.    Findings:   Diffusion weighted images demonstrate no definite acute infarct. Axial  FLAIR images are within normal limits. The ventricles are proportional  to the cerebral sulci. No evidence of intracranial hemorrhage on  susceptibility-weighted images. No mass effect or midline shift. No  extra-axial fluid collection. Fluid-filled left anterior ethmoid air  cells. The remaining paranasal sinuses are relatively clear. Mastoid  air cells are clear.      Impression    Impression:  No evidence of acute infarction or intracranial hemorrhage.    I have personally reviewed the examination and initial interpretation  and I agree with the findings.    TISH WILDE MD   CBC with Platelets & Differential     Status: Abnormal   Result Value Ref Range    WBC  14.2 (H) 4.0 - 11.0 10e9/L    RBC Count 5.45 (H) 3.8 - 5.2 10e12/L    Hemoglobin 15.4 11.7 - 15.7 g/dL    Hematocrit 44.5 35.0 - 47.0 %    MCV 82 78 - 100 fl    MCH 28.3 26.5 - 33.0 pg    MCHC 34.6 31.5 - 36.5 g/dL    RDW 13.2 10.0 - 15.0 %    Platelet Count 397 150 - 450 10e9/L    Diff Method Automated Method     % Neutrophils 63.8 %    % Lymphocytes 29.3 %    % Monocytes 4.8 %    % Eosinophils 1.0 %    % Basophils 0.7 %    % Immature Granulocytes 0.4 %    Nucleated RBCs 0 0 /100    Absolute Neutrophil 9.1 (H) 1.6 - 8.3 10e9/L    Absolute Lymphocytes 4.2 0.8 - 5.3 10e9/L    Absolute Monocytes 0.7 0.0 - 1.3 10e9/L    Absolute Eosinophils 0.1 0.0 - 0.7 10e9/L    Absolute Basophils 0.1 0.0 - 0.2 10e9/L    Abs Immature Granulocytes 0.1 0 - 0.4 10e9/L    Absolute Nucleated RBC 0.0    Basic metabolic panel     Status: Abnormal   Result Value Ref Range    Sodium 134 133 - 144 mmol/L    Potassium 3.5 3.4 - 5.3 mmol/L    Chloride 105 94 - 109 mmol/L    Carbon Dioxide 25 20 - 32 mmol/L    Anion Gap 5 3 - 14 mmol/L    Glucose 107 (H) 70 - 99 mg/dL    Urea Nitrogen 6 (L) 7 - 30 mg/dL    Creatinine 0.78 0.52 - 1.04 mg/dL    GFR Estimate >90 >60 mL/min/[1.73_m2]    GFR Estimate If Black >90 >60 mL/min/[1.73_m2]    Calcium 9.8 8.5 - 10.1 mg/dL   INR     Status: None   Result Value Ref Range    INR 1.10 0.86 - 1.14   Partial thromboplastin time     Status: None   Result Value Ref Range    PTT 28 22 - 37 sec   Glucose by meter     Status: Abnormal   Result Value Ref Range    Glucose 108 (H) 70 - 99 mg/dL   Creatinine POCT     Status: None   Result Value Ref Range    Creatinine 0.7 0.52 - 1.04 mg/dL    GFR Estimate >90 >60 mL/min/[1.73_m2]    GFR Estimate If Black >90 >60 mL/min/[1.73_m2]   EKG 12-lead, tracing only     Status: None   Result Value Ref Range    Interpretation ECG Click View Image link to view waveform and result    ISTAT INR POCT     Status: None   Result Value Ref Range    ISTAT INR 1.0 0.86 - 1.14   Troponin POCT      Status: None   Result Value Ref Range    Troponin I 0.00 0.00 - 0.08 ug/L     Medications   ondansetron (ZOFRAN) injection 4 mg (4 mg Intravenous Not Given 2/24/21 1520)   sodium chloride (PF) 0.9% PF flush 90 mL (90 mLs Intravenous Given 2/24/21 1232)   iopamidol (ISOVUE-370) solution 75 mL (75 mLs Intravenous Given 2/24/21 1232)   metoclopramide (REGLAN) injection 10 mg (10 mg Intravenous Given 2/24/21 1329)   acetaminophen (TYLENOL) tablet 1,000 mg (1,000 mg Oral Given 2/24/21 1328)   0.9% sodium chloride BOLUS (0 mLs Intravenous Stopped 2/24/21 1639)   diazepam (VALIUM) tablet 10 mg (10 mg Oral Given 2/24/21 1518)   ondansetron (ZOFRAN) injection 4 mg (4 mg Intravenous Given 2/24/21 1448)   OLANZapine zydis (zyPREXA) ODT tab 10 mg (10 mg Oral Given 2/24/21 1629)        Assessments & Plan (with Medical Decision Making)   Patient presents with nausea and some lightheadedness this morning.  She developed occipital headache with left-sided lip numbness after she arrived at the emergency department.  Stroke code was called and patient was evaluated immediately.  Her symptoms have improved and the imaging is negative for sign of stroke, intracranial hemorrhage, tumor, work-up is negative for suggestion of infection.  Patient feels better and would like to go home.  She was advised to follow-up with her primary care provider for her ongoing nausea.  She was advised to return if worsening symptoms or other concerns.    I have reviewed the nursing notes. I have reviewed the findings, diagnosis, plan and need for follow up with the patient.    Discharge Medication List as of 2/24/2021  5:35 PM      START taking these medications    Details   ondansetron (ZOFRAN ODT) 8 MG ODT tab Take 1 tablet (8 mg) by mouth every 8 hours as needed for nausea, Disp-12 tablet, R-0, Local Print      prochlorperazine (COMPAZINE) 10 MG tablet Take 1 tablet (10 mg) by mouth every 6 hours as needed for nausea, Disp-30 tablet, R-0, Local Print              Final diagnoses:   Other headache syndrome   Left facial numbness   Nausea       Of note, after discharge, the body of the CT a results were reviewed and I see that there are 2 small aneurysms.  I have just spoken with the neurosurgery attending who will review the images and let us know if the patient needs to return to the emergency department tonight for neurosurgery evaluation versus outpatient follow-up.  If the patient requires outpatient follow-up, the neurosurgery service will contact her to arrange this.    [facility] EMERGENCY DEPARTMENT  2/24/2021     Tiffanie Anderson MD  02/24/21 [id number]

## 2021-02-24 NOTE — PHARMACY
Pharmacy Stroke Code Response  Pharmacist responded as part of the Stroke Code Team activation to patient care area ED.  The Stroke Team determined that the patient was not a candidate for IV alteplase therapy and the pharmacy team was dismissed at 1230.     Gloria Perez, CastroD

## 2021-02-24 NOTE — CONSULTS
[facility]    Stroke Consult Note    Reason for Consult: Stroke Code    Chief Complaint: Dizziness      HPI  Maryanne Crockett is a 40 year old female with past medical history of seizure, depression, ovarian cyst who presents with lightheadedness. While in triage she developed severe occipital headache and neck pain with left facial numbness which is why a stroke code was called. The numbness started in the lip and moved throughout the left side of the face, she states she previously had numbness in the left hand, mainly the finger tips, for the past few months. She reports she was so dizzy and lightheaded she could not walk prior to calling 911. She has been seeing PT for neck pain and back pain, and report vertigo at her recent visit.     TPA Treatment   Not given due to stroke mimic: multiple vague complaints.    Endovascular Treatment  Not initiated due to absence of proximal vessel occlusion    Impression  Left facial numbness.  This is accompanied by severe headache and neck pain. CT/CTA showed no acute pathology, no evidence of dissection. It is possible this could be atypical migraine with numbness.  Other possibilities would be a small focal seizure, but this is less likely with persistent symptoms and lack of other changes or change in mental status. Lastly anxiety or stress could provoke somatic symptoms or conversion symptoms.     Recommendations  MRI brain (stroke limited protocol)    Patient Follow-up    follow up with primary care     Addendum: MRI is reassuring with no acute stroke. No further neurology work up at this time.     Thank you for this consult. Patient is okay to discharge from our standpoint if headache is able to be controlled.     The patient was discussed with Stroke Fellow, Dr. Crabtree.  The Stroke Staff is Dr. Chandra.    Maryanne Newton MD  Neurology Resident  Pager:  [phone]  ______________________________________________________    Past  Medical History   Past Medical History:   Diagnosis Date     Depressive disorder      Family history of glioblastoma     screening MRIs every 2 years     Ovarian cyst      Seizures (H)      Past Surgical History   Past Surgical History:   Procedure Laterality Date     CHOLECYSTECTOMY       CYSTOSCOPY N/A 9/1/2020    Procedure: CYSTOSCOPY;  Surgeon: Hayley Zayas MD;  Location: [facility]     DAVINCI HYSTERECTOMY TOTAL, BILATERAL SALPINGO-OOPHORECTOMY, COMBINED Left 9/1/2020    Procedure: HYSTERECTOMY, TOTAL, ROBOT-ASSISTED, LAPAROSCOPIC,  left oophorectomy;  Surgeon: Hayley Zayas MD;  [facility]: [facility] OR     DILATION AND CURETTAGE SUCTION  4/30/15    retained POC     GASTRIC RESTRICTIVE PROCEDURE, OPEN, REMOVE/REPLACE SUBCUTANEOUS PORT       LAPAROSCOPIC OOPHORECTOMY Right 8/5/2016    Procedure: LAPAROSCOPIC OOPHORECTOMY;  Surgeon: Adrianne Vergara MD;  [facility]     LAPAROSCOPIC SALPINGECTOMY Bilateral 8/5/2016    Procedure: LAPAROSCOPIC SALPINGECTOMY;  Surgeon: Adrianne Vergara MD;  [facility]     LAPAROSCOPIC TUBAL LIGATION Bilateral 5/22/2015    Procedure: LAPAROSCOPIC TUBAL LIGATION;  Surgeon: Hayley Zayas MD;  [facility]     LAPAROSCOPY OPERATIVE ADULT N/A 8/5/2016    Procedure: LAPAROSCOPY OPERATIVE ADULT;  Surgeon: Adrianne Vergara MD;  [facility]     SOFT TISSUE SURGERY Right     cyst in hand     Medications   Home Meds  Prior to Admission medications    Medication Sig Start Date End Date Taking? Authorizing Provider   acetaminophen 500 MG CAPS Take 1,000 mg by mouth    Reported, Patient   ALPRAZolam (XANAX) 2 MG tablet  12/29/20   Reported, Patient   amphetamine-dextroamphetamine (ADDERALL) 20 MG tablet Take 20 mg by mouth    Reported, Patient   B Complex Vitamins (VITAMIN B COMPLEX PO)     Reported, Patient   escitalopram (LEXAPRO) 20 MG tablet 20 mg daily  7/5/20   Reported, Patient   estradiol (VIVELLE-DOT) 0.05 MG/24HR bi-weekly patch Place 1 patch onto  the skin twice a week 10/29/20   Hayley Zayas MD   Multiple Vitamins-Minerals (MULTIVITAMIN ADULT PO) Take 1 tablet by mouth daily    Reported, Patient   ondansetron (ZOFRAN) 4 MG tablet  7/31/20   Reported, Patient   SAPHRIS 5 MG SUBL sublingual tablet  1/13/21   Reported, Patient   SUBOXONE 8-2 MG per film  1/22/21   Reported, Patient   VENTOLIN  (90 BASE) MCG/ACT inhaler Inhale 1-2 puffs into the lungs every 6 hours as needed for shortness of breath / dyspnea or wheezing  8/4/16   Reported, Patient   vitamin B-12 (CYANOCOBALAMIN) 1000 MCG tablet Take 1,000 mcg by mouth daily    Unknown, Entered By History       Scheduled Meds      Infusion Meds      PRN Meds      Allergies   Allergies   Allergen Reactions     Ibuprofen GI Disturbance     Seroquel [Quetiapine] Other (See Comments) and Rash     Insomnia     Family History   Family History   Problem Relation Age of Onset     Other Cancer Brother 36        brain ca     Other Cancer Father         leukemia     Other Cancer Maternal Aunt         2 aunts with glioblastomas     Breast Cancer No family hx of         no ovarian cancer     Social History   Social History     Tobacco Use     Smoking status: Current Some Day Smoker     Packs/day: 0.25     Smokeless tobacco: Never Used     Tobacco comment: Trying   Substance Use Topics     Alcohol use: Yes     Alcohol/week: 0.0 standard drinks     Comment: rare     Drug use: Yes     Types: Marijuana     Comment: pt says she is prescribed medical marijuana       Review of Systems   The 10 point Review of Systems is negative other than noted in the HPI or here.  Lightheadedness, neck pain, headache, blurred vision.        Physical Examination   Vitals: /86   Pulse 89   Temp 98.5  F (36.9  C) (Oral)   Resp 16   Wt 83.5 kg (184 lb 1.6 oz)   LMP 03/14/2018 (Approximate)   SpO2 99%   BMI 27.19 kg/m    General: Patient lying in bed, NAD  HEENT: NC/AT, no icterus, moist mucous membranes  Cardiac:  RRR  Chest: non-labored on RA  Abdomen: S/NT/ND  Extremities: Warm, no edema  Skin: No rash or lesion   Psych: Affect appropriate for situation   Neuro:  Mental status: Awake, alert, attentive, oriented to self, time, place, and circumstance. Language is fluent with intact comprehension of complex commands, naming and repetition.  Cranial nerves: PERRL, conjugate gaze, EOMI, visual fields intact, face symmetric, shoulder shrug strong, tongue protrusion/uvula midline, no dysarthria. Drooling.   Motor: Normal muscle bulk and tone. No abnormal movements. 5/5 strength in 4/4 extremities.   Reflexes: No clonus, toes down-going.  Sensory: Intact to light touch   Coordination: FNF without ataxia or dysmetria.    Gait: Normal width, stride length, turn, with symmetric arm swing.    Dysphagia Screen  Passed screening, no dysarthria - Regular Diet with thin liquids  02/24/2021 1250    Stroke Scales    NIHSS  Interval     Interval Comments     1a. Level of Consciousness 0-->Alert, keenly responsive   1b. LOC Questions 0-->Answers both questions correctly   1c. LOC Commands 0-->Performs both tasks correctly   2.   Best Gaze 0-->Normal   3.   Visual 0-->No visual loss   4.   Facial Palsy 0-->Normal symmetrical movements   5a. Motor Arm, Left 0-->No drift, limb holds 90 (or 45) degrees for full 10 secs   5b. Motor Arm, Right 0-->No drift, limb holds 90 (or 45) degrees for full 10 secs   6a. Motor Leg, Left 0-->No drift, leg holds 30 degree position for full 5 secs   6b. Motor Leg, right 0-->No drift, leg holds 30 degree position for full 5 secs   7.   Limb Ataxia 0-->Absent   8.   Sensory 1-->Mild-to-moderate sensory loss, patient feels pinprick is less sharp or is dull on the affected side, or there is a loss of superficial pain with pinprick, but patient is aware of being touched   9.   Best Language 0-->No aphasia, normal   10. Dysarthria 0-->Normal   11. Extinction and Inattention  0-->No abnormality   Total 1 (02/24/21 2797)        Imaging  I personally reviewed all imaging; relevant findings per HPI.     Lab Results Data   CBC  Recent Labs   Lab 02/24/21  1219   WBC 14.2*   RBC 5.45*   HGB 15.4   HCT 44.5        Basic Metabolic Panel    Recent Labs   Lab 02/24/21  1219      POTASSIUM 3.5   CHLORIDE 105   CO2 25   BUN 6*   CR 0.78   *   SOFI 9.8     Liver Panel  No results for input(s): PROTTOTAL, ALBUMIN, BILITOTAL, ALKPHOS, AST, ALT, BILIDIRECT in the last 168 hours.  INR    Recent Labs   Lab Test 02/24/21  1219 04/29/15  0053   INR 1.10 1.18*      Lipid Profile    Recent Labs   Lab Test 06/24/20  0734   CHOL 168   HDL 48*   LDL 91   TRIG 143     A1C  No lab results found.  Troponin I  No results for input(s): TROPI in the last 168 hours.       Stroke Code / Stroke Consult Data Data   Stroke Code Data  (for stroke code without tele)  Stroke code activated 02/24/21   1213   First stroke provider response 02/24/21   1217   Last known normal 02/24/21   1250   Time of discovery   (or onset of symptoms) 02/24/21   1250   Head CT read by me 02/24/21   1240   Was stroke code de-escalated? Yes 02/24/21 1259  symptoms not likely caused by stroke

## 2021-02-24 NOTE — ED NOTES
Pt c/o bad headache, left lip numbness that started in waiting room. Dr. Thibodeaux evaluated pt. Chronic numbness in left arm.

## 2021-03-01 ENCOUNTER — TELEPHONE (OUTPATIENT)
Dept: OBGYN | Facility: CLINIC | Age: 41
End: 2021-03-01

## 2021-03-01 NOTE — TELEPHONE ENCOUNTER
Call received from patient's mother Rachel regarding pt requesting her gastric band be removed. Informed her that this was her OBGYN's office and she should reach out to the clinic/provider that performed the surgery and is following her. She verbalized understanding and apologized, thought she was calling her weight loss surgery and management clinic. States she will call them now.

## 2021-03-02 ENCOUNTER — TELEPHONE (OUTPATIENT)
Dept: NEUROLOGY | Facility: CLINIC | Age: 41
End: 2021-03-02

## 2021-03-02 ENCOUNTER — TELEPHONE (OUTPATIENT)
Dept: EMERGENCY MEDICINE | Facility: CLINIC | Age: 41
End: 2021-03-02

## 2021-03-02 ENCOUNTER — PATIENT OUTREACH (OUTPATIENT)
Dept: ENDOCRINOLOGY | Facility: CLINIC | Age: 41
End: 2021-03-02

## 2021-03-02 NOTE — PROGRESS NOTES
"Contacted patient's mom, Rachel, regarding questions about patient' current status.  She states patient has had a small stroke from a clot from one of the two aneurysm.  Patient is having frequent episodes of vomiting.      Explained to mom, Maryanne needs to have UGI and labs done as ordered.  Explained that the UGI will help determine if the band has slipped.  Patient argumentative stating \"the band hasn't moved\".  Mom given Radiology scheduling number to call and make appt for UGI.      Explained that the vomiting may also be caused by the aneurysms as they are causing headaches.  Patient and mother are waiting to hear back from neurology regarding next steps for treatment.  "

## 2021-03-02 NOTE — TELEPHONE ENCOUNTER
[facility] Call Center    Phone Message    May a detailed message be left on voicemail: yes     Reason for Call: Other: Pt has been seen in our clinic in the past by Dr. Walker Schaefer for seizures. But now with the aneuryms and loss of grey matter, pt's mother is asking neurology clinic to review asap and help pt to be seen to move her care forward. Pt went to the ER on 21 because of double vision, sudden abrupt headache in the back of head, pt felt like she had been hit with a 2X4, pt was confused, numbness in her both hands, and left toe as well. Pt's mother also reporting that pt has been experiencing behavior changes and pt's grandfather on pt's mother side,  of an aneuryms (in the head).     Currently, pt is not able to eat, because she is wretching, not vomiting, this has been happening since last night. Pt has lost a lot of weight in a very short time.     Pt was not called by Neurology clinic yet, since 21, since the radiologist had found the aneuryms and loss of grey matter. Please review and call pt's mother, Rachel, at ph# [phone] asap to move her care forward. Thank you.      Action Taken: Message routed to:  [facility]    Travel Screening: Not Applicable

## 2021-03-02 NOTE — TELEPHONE ENCOUNTER
[facility] Emergency Department:    Reason for call  Wanting to know why they haven't heard from a neurologist after the ED visit.     ED visit Date: 2/24/21    Current symptoms  12;27PM: Patient and patient's mom calling in. States they have been waiting for someone from neurology to contact them to set up an appointment.   Recommendations/Instructions  Provided the patient with the phone number for [facility] Neuro[facility] clinic as requested as they do not want to follow up with the patient's previous neurologist. Advised to contact their PCP if they are wanting more guidance on a referral or to contact their previous neurologist as they have established care with them.   This was the only information requested during this call.     [RN Name]  Rafaela Cleveland RN  [facility] [employer] [facility] RN  Lung Nodule and ED Lab Result RN  Epic pool (ED late result f/u RN): [id number]  [facility] INCIDENTAL RADIOLOGY F/U NURSES: [id number]  # [phone]

## 2021-03-03 NOTE — TELEPHONE ENCOUNTER
[facility] Call Center    Phone Message    May a detailed message be left on voicemail: yes     Reason for Call: Other: pt's mother, Rachel, calling for the Neurology clinic to f/u with pt. Rachel's ph# [phone]. Rachel is concerned about the timeframe for pt to f/u with two aneuryms and loss of grey matter found in ER 21 from the radiology report. Rachel is needing reassurance that pt is okay due to the family hx. Pt's grandfather  of an aneurysm.  What is the f/u, please? The radiology report came after pt was discharged from ER to Rachel's knowledge.     Rachel is asking for a call back please. This family just lost her son. Rachel is deallng with a lot of trauma now. She needs a call to be reassured, please. Thank you.    Please see encounter on 3/02/21 at 12:55pm--Rachel's first call to our Neurology Clinic.    Action Taken: Message routed to:  [facility] ([facility]): [facility]    Travel Screening: Not Applicable

## 2021-03-04 ENCOUNTER — ANCILLARY PROCEDURE (OUTPATIENT)
Dept: GENERAL RADIOLOGY | Facility: CLINIC | Age: 41
End: 2021-03-04
Attending: NURSE PRACTITIONER
Payer: COMMERCIAL

## 2021-03-04 ENCOUNTER — MYC MEDICAL ADVICE (OUTPATIENT)
Dept: NEUROLOGY | Facility: CLINIC | Age: 41
End: 2021-03-04

## 2021-03-04 DIAGNOSIS — Z98.84 BARIATRIC SURGERY STATUS: ICD-10-CM

## 2021-03-04 PROCEDURE — 74240 X-RAY XM UPR GI TRC 1CNTRST: CPT | Mod: GC | Performed by: RADIOLOGY

## 2021-03-04 NOTE — TELEPHONE ENCOUNTER
RECORDS RECEIVED FROM: Internal   Date of Appt: 3/9/21   NOTES (FOR ALL VISITS) STATUS DETAILS   OFFICE NOTE from referring provider Internal SEE INPATIENT NOTES   OFFICE NOTE from other specialist N/A    DISCHARGE SUMMARY from hospital N/A    DISCHARGE REPORT from the ER Internal [facility]:  2/24/21   OPERATIVE REPORT N/A    MEDICATION LIST Internal    IMAGING  (FOR ALL VISITS)     EMG N/A    EEG N/A    LUMBAR PUNCTURE N/A    LINDA SCAN N/A    ULTRASOUND (CAROTID BILAT) *VASCULAR* N/A    MRI (HEAD, NECK, SPINE) Received [facility]:  MRI Brain 2/24/21    [facility]:  MRI Brain 5/12/18   CT (HEAD, NECK, SPINE) Received [facility]:  CTA Head Neck 2/24/21  CT Head 2/24/21    [facility]:  CT Head 2/11/19    [facility]:  CT Head 5/11/18    [facility]:  CT Head 5/7/18      Action 3/4/21 MV 11.03am   Action Taken Imaging request faxed to [facility] for:  MRI Brain 5/12/18  CT Head 5/11/18    Imaging request faxed to [facility] for:  CT Head 5/7/18     Imaging Received  3/5/21 MV 7.00am  [facility]   Image Type (x): Disc:   PACS: x   Exam Date/Name MRI Brain 5/12/18  CT Head 5/11/18  CT Head 5/7/18 Comments: images resolved in PACS

## 2021-03-05 NOTE — TELEPHONE ENCOUNTER
Consent to communicate with mom, Rachel, signed 5/21/19.    Spoke with mom. Patient has appointment with Dr. Webster on 3/9/21 to discuss incidental aneurysms, and appointment with bariatric surgery on 3/11/21 to discuss lap band removal, which may be the cause of patients retching. Mom is aware of appointments. Explained what appointments are for. Answered questions. Patient verbalized understanding.  Mom appreciative of call. Will follow-up as scheduled. Contact information provided and encouraged to call with questions/concerns. Missy Sandra RN 3/5/2021 10:52 AM

## 2021-03-09 ENCOUNTER — VIRTUAL VISIT (OUTPATIENT)
Dept: NEUROSURGERY | Facility: CLINIC | Age: 41
End: 2021-03-09
Payer: COMMERCIAL

## 2021-03-09 ENCOUNTER — PRE VISIT (OUTPATIENT)
Dept: NEUROSURGERY | Facility: CLINIC | Age: 41
End: 2021-03-09

## 2021-03-09 ENCOUNTER — TELEPHONE (OUTPATIENT)
Dept: NEUROLOGY | Facility: CLINIC | Age: 41
End: 2021-03-09

## 2021-03-09 DIAGNOSIS — Z11.59 ENCOUNTER FOR SCREENING FOR OTHER VIRAL DISEASES: ICD-10-CM

## 2021-03-09 DIAGNOSIS — I67.1 CEREBRAL ANEURYSM, NONRUPTURED: Primary | ICD-10-CM

## 2021-03-09 DIAGNOSIS — I67.1 NONRUPTURED CEREBRAL ANEURYSM: Primary | ICD-10-CM

## 2021-03-09 PROCEDURE — 99442 PR PHYSICIAN TELEPHONE EVALUATION 11-20 MIN: CPT | Mod: 95 | Performed by: RADIOLOGY

## 2021-03-09 RX ORDER — ALPRAZOLAM 1 MG
1 TABLET ORAL DAILY PRN
COMMUNITY
Start: 2021-03-03 | End: 2021-03-09

## 2021-03-09 RX ORDER — CLONIDINE HYDROCHLORIDE 0.1 MG/1
1 TABLET ORAL 2 TIMES DAILY
COMMUNITY
Start: 2021-03-03 | End: 2023-01-26 | Stop reason: DRUGHIGH

## 2021-03-09 NOTE — PATIENT INSTRUCTIONS
We will contact you to schedule an angiogram.    If you have any questions please contact me at [phone], option 3.    Silvio Sandra RN, CNRN  Stroke & Endovascular Care Coordinator    Thank you for choosing [facility] for your health care needs.

## 2021-03-09 NOTE — TELEPHONE ENCOUNTER
Informed patient of procedure instructions. Confirmed date and time. Patient verbalized understanding. All questions answered. Patient instructions sent via [employer]. Patient will call to schedule lab and COVID. Patient has my contact information and was encouraged to call with questions/concerns. Missy Sandra RN 3/9/2021 12:24 PM

## 2021-03-09 NOTE — PROGRESS NOTES
We spoke with Ms. Crockett over a video visit in regards to her appointment today.  There were technical difficulties after the initial 10 minutes therefore we switched to a telephone visit.  She is a 40-year-old female with a past medical history of seizure, depression, ovarian cysts, chronic right hand pain with numbness and chronic neck pain.  She presented to the ED in 2021 with severe occipital headache and neck pain along with left facial numbness.  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    She has a complex medical history with chronic right- arm and hand pain and neck stiffness along with carpal tunnel syndrome as well as some memory loss from her seizures which began in 2014.  She could not tolerate antiseizure medications and stopped those.  She uses medical marijuana daily in the form of lozenges.  As regards her risk factors for cerebral aneurysms she has a 20-year history of smoking and says she quit recently approximately 3 weeks ago.  She used to smoke approximately 3 to 4 cigarettes a day before being unemployed after which she became a chain smoker over this past year.  She is not hypertensive however her grandfather  of a cerebral aneurysm.    She had a hysterectomy last year in October and has had multiple prior surgeries for ovarian cysts.  She states that she has felt drained and weak since the hysterectomy. She also had a laparoscopic band placed many years ago around her stomach and was going to follow-up with a doctor here at the [facility] for the removal of that band in the next week or so.  We answered her questions regarding if the cerebral aneurysm would cause any restrictions on her from getting her laparoscopic band removed, starting swimming or flying in an  [employer].  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      Ms. Crockett became a  2 years ago.  She has 2 children a 13-year-old girl and a 22-year-old boy as well as a 5-year-old grandson.  After her hysterectomy in October 2020 she felt she could not look after her 13-year-old daughter and sent her to live with her older son.  Apart from her grandfather dying of an aneurysm her brother passed away recently from a brain tumor.  Her mother regularly checks up on her and her entire family is very concerned for her wellbeing.  She likes to exercise regularly and wondered if he is able to swim after having been diagnosed with cerebral aneurysm which we told her is okay to do so.     On the video visit she seems to be neurologically intact with no evidence of dysarthria or any facial asymmetry.  However she states that she has right arm and hand weakness and is unable to lift things from her right hand.  Otherwise. she is independent at baseline.    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  Her MRI at that time did not show any diffusion restriction.     is concerned about her diagnosis of a cerebral aneurysm.  We did reassure her that statistically the chances of this aneurysm rupturing in the immediate short-term are low however, given her risk factors, we recommend follow-up with a cerebral diagnostic cerebral angiogram in order to better define the aneurysm and discuss potential treatment options to which she agreed.   We briefly discussed the risks risks of a diagnostic cerebral angiogram and she agrees to proceed.      Patient discussed with staff Dr. Webster.      Cole RAO Fellow  [phone].

## 2021-03-09 NOTE — PATIENT INSTRUCTIONS
You are scheduled for a Diagnostic Cerebral Angiogram with Dr. Webster on 3/19/21 at 10:00 AM.    Please follow these instructions:    * Prior to your procedure you will need to obtain COVID-19 testing and blood work (ordered by Dr. Pandya) at an [facility] facility within 2-4 days of your scheduled procedure. As discussed, contact the COVID Scheduling Team at [phone] to arrange COVID testing. Contact [facility] facility of your choice to schedule blood work 2-4 days prior to procedure.     * You should arrive at the [facility] waiting room at the [facility] at 8:30 AM. The address is [address] [place] [address]. The phone number is [phone]. A map is enclosed.    * Do not eat after Midnight; you may drink clear liquids (includes water, Jell-O, clear broth, apple juice or any non-carbonated beverage that you can see through) until 8:00 AM.     * You may take your medications with a sip of water the morning of the procedure.     * You will be discharged the same day. You must have a  home and someone that can stay with you through the night.     PLEASE NOTE our COVID-19 visitors policy: For the protection of our patients and visitors, patients are allowed one consistent visitor, 18 years of age or older, per adult patient in the hospital. Visitors must wear a mask at all times while in the hospital.     All discharge instructions will be given to the  or volunteer. Documentation for the post-operative plan will be given to the patient and . Patients are required to have someone to stay with them for 24 hours after their procedure.    If you have questions regarding your procedure, please contact me at [phone], option 3.    If you need to cancel, reschedule or have procedure scheduling related questions, please call [facility] at [phone].    Thank you,  Silvio Sandra RN, CNRN  Stroke & Endovascular Care  Coordinator

## 2021-03-09 NOTE — PROGRESS NOTES
Maryanne is a 40 year old who is being evaluated via a billable video visit.      How would you like to obtain your AVS? Mychart  If the video visit is dropped, the invitation should be resent by: [phone]

## 2021-03-09 NOTE — LETTER
3/9/2021       RE: Maryanne Crockett  [address]  [facility] [address]     Dear Colleague,    Thank you for referring your patient, Maryanne Crockett, to the [facility] NEUROSURGERY CLINIC [facility]. Please see a copy of my visit note below.    Maryanne is a 40 year old who is being evaluated via a billable video visit.      How would you like to obtain your AVS? Mychart  If the video visit is dropped, the invitation should be resent by: [phone]      We spoke with Ms. Crockett over a video visit in regards to her appointment today.  There were technical difficulties after the initial 10 minutes therefore we switched to a telephone visit.  She is a 40-year-old female with a past medical history of seizure, depression, ovarian cysts, chronic right hand pain with numbness and chronic neck pain.  She presented to the ED in 2021 with severe occipital headache and neck pain along with left facial numbness.  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    She has a complex medical history with chronic right- arm and hand pain and neck stiffness along with carpal tunnel syndrome as well as some memory loss from her seizures which began in 2014.  She could not tolerate antiseizure medications and stopped those.  She uses medical marijuana daily in the form of lozenges.  As regards her risk factors for cerebral aneurysms she has a 20-year history of smoking and says she quit recently approximately 3 weeks ago.  She used to smoke approximately 3 to 4 cigarettes a day before being unemployed after which she became a chain smoker over this past year.  She is not hypertensive however her grandfather  of a cerebral aneurysm.    She had a  hysterectomy last year in October and has had multiple prior surgeries for ovarian cysts.  She states that she has felt drained and weak since the hysterectomy. She also had a laparoscopic band placed many years ago around her stomach and was going to follow-up with a doctor here at the [facility] for the removal of that band in the next week or so.  We answered her questions regarding if the cerebral aneurysm would cause any restrictions on her from getting her laparoscopic band removed, starting swimming or flying in an airplane.  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      Ms. Crockett became a  2 years ago.  She has 2 children a 13-year-old girl and a 22-year-old boy as well as a 5-year-old grandson.  After her hysterectomy in October 2020 she felt she could not look after her 13-year-old daughter and sent her to live with her older son.  Apart from her grandfather dying of an aneurysm her brother passed away recently from a brain tumor.  Her mother regularly checks up on her and her entire family is very concerned for her wellbeing.  She likes to exercise regularly and wondered if he is able to swim after having been diagnosed with cerebral aneurysm which we told her is okay to do so.     On the video visit she seems to be neurologically intact with no evidence of dysarthria or any facial asymmetry.  However she states that she has right arm and hand weakness and is unable to lift things from her right hand.  Otherwise. she is independent at baseline.    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  Her MRI at that time did not show any diffusion restriction.     is concerned about her diagnosis of a cerebral aneurysm.  We did reassure her that statistically the chances of this aneurysm rupturing in the immediate short-term are low however, given her risk factors, we recommend follow-up  with a cerebral diagnostic cerebral angiogram in order to better define the aneurysm and discuss potential treatment options to which she agreed.   We briefly discussed the risks risks of a diagnostic cerebral angiogram and she agrees to proceed.    Patient discussed with staff Dr. Webster.    Cole Pandya  [facility] Fellow  [phone].    Attestation signed by Rich Webster MD at 3/13/2021  3:37 AM:  I have personally seen and evaluated the patient on March 09, 2021  and I agree with the note by Dr. Pandya             On March 13, 2021    Again, thank you for allowing me to participate in the care of your patient.      Sincerely,    Rich Webster MD

## 2021-03-11 ENCOUNTER — OFFICE VISIT (OUTPATIENT)
Dept: ENDOCRINOLOGY | Facility: CLINIC | Age: 41
End: 2021-03-11
Payer: COMMERCIAL

## 2021-03-11 VITALS
HEIGHT: 69 IN | DIASTOLIC BLOOD PRESSURE: 80 MMHG | BODY MASS INDEX: 28.22 KG/M2 | WEIGHT: 190.5 LBS | SYSTOLIC BLOOD PRESSURE: 135 MMHG | OXYGEN SATURATION: 97 % | HEART RATE: 104 BPM

## 2021-03-11 DIAGNOSIS — R13.19 ESOPHAGEAL DYSPHAGIA: Primary | ICD-10-CM

## 2021-03-11 PROCEDURE — 99203 OFFICE O/P NEW LOW 30 MIN: CPT | Performed by: SURGERY

## 2021-03-11 ASSESSMENT — PAIN SCALES - GENERAL: PAINLEVEL: NO PAIN (0)

## 2021-03-11 ASSESSMENT — MIFFLIN-ST. JEOR: SCORE: 1598.48

## 2021-03-11 NOTE — PATIENT INSTRUCTIONS
Call Ganga at [phone] for scheduling.      Plan laparoscopic removal of gastric band; general anesthesia.  PAC visit for preop history and physical.    Need note from neuroradiology for clearance to do lapband removal.

## 2021-03-11 NOTE — NURSING NOTE
"Chief Complaint   Patient presents with     Consult     discuss removal of lap band.       Vitals:    03/11/21 1407   BP: 135/80   BP Location: Left arm   Patient Position: Sitting   Cuff Size: Adult Regular   Pulse: 104   SpO2: 97%   Weight: 86.4 kg (190 lb 8 oz)   Height: 1.753 m (5' 9\")       Body mass index is 28.13 kg/m .                            JOSE BARNHART EMT    "

## 2021-03-11 NOTE — PROGRESS NOTES
"Return Bariatric Surgery Note    RE: Maryanne Crockett  MR#: [id number]  : 1980  VISIT DATE: Mar 11, 2021      Dear Dr. Hyde,    I had the pleasure of seeing your patient, Maryanne Crockett, in my post-bariatric surgery assessment clinic.    Assessment & Plan   Problem List Items Addressed This Visit     None      Visit Diagnoses     Esophageal dysphagia    -  Primary           I met for the first time with Maryanne to discuss lapband removal.    Maryanne had lapband in  era by Dr. Earl at [facility].  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  Dr. Earl left the practice ~.    She reports currently no fluid in her lapband.    She is unable to eat regular foods without regurgitation for most meals.  She instead eats softer foods or liquid foods in order to avoid regurgitation.  Has only been eating liquid foods for the past 6 weeks because of this problem.    Weighed 350 lbs at one time.  Has lost a large amount of weight recently due to cerebral aneurysms.  Was 240 lbs last  and has lost weight since that time.    She underwent UGI study last week and this documented no evidence of lapband complication (normal UGI study).    However, the UGI uses a liquid swallow; Maryanne can tolerate most liquids, but is unable to eat regular textured foods; this was never the intent of lapband.    Furthermore, she has one of the early models of lapband, which doesn't allow complete relaxation and gets in the way of swallowing function.    Finally, Maryanne has had problems with wretching, which makes problems with her cerebral aneurysm headaches much worse and this is another reason to remove the lapband.    Plan    \"Laparoscopic removal of gastric band, port, and tubing\"  General anesthesia.  Same day surgery.  [facility].  We will need prior authorization from insurance prior to removal.  Also need clearance from neuroradiology team for laparoscopy.  Call Ganga at [phone] for " scheduling.      Review of external notes as documented above     Independent interpretation of a test performed by another physician/other qualified health care professional (not separately reported) -     30 minutes spent on the date of the encounter doing chart review, patient visit and documentation       Maryanne Crockett is a 40 year old female who presents to clinic today for the following health issues     Weight History:  No flowsheet data found.  Initial Weight (lbs): 190 lbs  Weight: 86.4 kg (190 lb 8 oz)  Last Visits Weight: 86.2 kg (190 lb)  Cumulative weight loss (lbs): -0.5  Weight Loss Percentage: -0.26%    No flowsheet data found.    Eating Habits 1/29/2021   How many meals do you eat per day? 1   Do you snack between meals? Sometimes       Exercise Questions Reviewed With Patient 1/29/2021   How often do you exercise? 3 to 4 times per week   What is the duration of your exercise (in minutes)? 45 Minutes   What types of exercise do you do? gym membership   What keeps you from being more active?  Pain, I have recently been sick       Social History:  No flowsheet data found.    Medications:  Current Outpatient Medications   Medication     acetaminophen 500 MG CAPS     ALPRAZolam (XANAX) 2 MG tablet     amphetamine-dextroamphetamine (ADDERALL) 20 MG tablet     B Complex Vitamins (VITAMIN B COMPLEX PO)     cloNIDine (CATAPRES) 0.1 MG tablet     escitalopram (LEXAPRO) 20 MG tablet     estradiol (VIVELLE-DOT) 0.05 MG/24HR bi-weekly patch     medical cannabis (Patient's own supply)     Multiple Vitamins-Minerals (MULTIVITAMIN ADULT PO)     ondansetron (ZOFRAN ODT) 8 MG ODT tab     prochlorperazine (COMPAZINE) 10 MG tablet     SAPHRIS 5 MG SUBL sublingual tablet     SUBOXONE 8-2 MG per film     VENTOLIN  (90 BASE) MCG/ACT inhaler     vitamin B-12 (CYANOCOBALAMIN) 1000 MCG tablet     No current facility-administered medications for this visit.      No flowsheet data found.    ROS:  GI: No flowsheet data  "found.  Skin: No flowsheet data found.  Psych: No flowsheet data found.  Female Only:   BAR RBS ROS - FEMALE ONLY 1/29/2021   Female only: Loss of menstrual cycles       UGI reviewed.    PHYSICAL EXAM:  Objective    /80 (BP Location: Left arm, Patient Position: Sitting, Cuff Size: Adult Regular)   Pulse 104   Ht 1.753 m (5' 9\")   Wt 86.4 kg (190 lb 8 oz)   LMP 03/14/2018 (Approximate)   SpO2 97%   BMI 28.13 kg/m      Physical Exam   GENERAL: healthy, alert and no distress  NECK: no adenopathy, no asymmetry, masses, or scars and thyroid normal to palpation  RESP: lungs clear to auscultation - no rales, rhonchi or wheezes  CV: regular rate and rhythm, normal S1 S2, no S3 or S4, no murmur, click or rub, no peripheral edema and peripheral pulses strong  ABDOMEN: soft, nontender, no hepatosplenomegaly, no masses and bowel sounds normal  MS: no gross musculoskeletal defects noted, no edema      Sincerely,    Remy Gold MD      "

## 2021-03-11 NOTE — LETTER
3/11/2021       RE: Maryanne Crockett  [address]  [facility] [address]     Dear Colleague,    Thank you for referring your patient, Maryanne Crockett, to the [facility] WEIGHT MANAGEMENT CLINIC [place] [facility]. Please see a copy of my visit note below.    Return Bariatric Surgery Note    RE: Maryanne Crockett  MR#: [id number]  : 1980  VISIT DATE: Mar 11, 2021      Dear Dr. Hyde,    I had the pleasure of seeing your patient, Maryanne Crockett, in my post-bariatric surgery assessment clinic.    Assessment & Plan   Problem List Items Addressed This Visit     None      Visit Diagnoses     Esophageal dysphagia    -  Primary           I met for the first time with Maryanne to discuss lapband removal.    Maryanne had lapband in  era by Dr. Earl at [facility].  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  Dr. Earl left the practice ~.    She reports currently no fluid in her lapband.    She is unable to eat regular foods without regurgitation for most meals.  She instead eats softer foods or liquid foods in order to avoid regurgitation.  Has only been eating liquid foods for the past 6 weeks because of this problem.    Weighed 350 lbs at one time.  Has lost a large amount of weight recently due to cerebral aneurysms.  Was 240 lbs last  and has lost weight since that time.    She underwent UGI study last week and this documented no evidence of lapband complication (normal UGI study).    However, the UGI uses a liquid swallow; Maryanne can tolerate most liquids, but is unable to eat regular textured foods; this was never the intent of lapband.    Furthermore, she has one of the early models of lapband, which doesn't allow complete relaxation and gets in the way of swallowing function.    Finally, Maryanne has had problems with wretching, which makes problems with her cerebral aneurysm headaches much worse and this is another reason to  "remove the lapband.    Plan    \"Laparoscopic removal of gastric band, port, and tubing\"  General anesthesia.  Same day surgery.  [facility].  We will need prior authorization from insurance prior to removal.  Also need clearance from neuroradiology team for laparoscopy.  Call Ganga at [phone] for scheduling.      Review of external notes as documented above     Independent interpretation of a test performed by another physician/other qualified health care professional (not separately reported) -     30 minutes spent on the date of the encounter doing chart review, patient visit and documentation       Maryanne Crockett is a 40 year old female who presents to clinic today for the following health issues     Weight History:  No flowsheet data found.  Initial Weight (lbs): 190 lbs  Weight: 86.4 kg (190 lb 8 oz)  Last Visits Weight: 86.2 kg (190 lb)  Cumulative weight loss (lbs): -0.5  Weight Loss Percentage: -0.26%    No flowsheet data found.    Eating Habits 1/29/2021   How many meals do you eat per day? 1   Do you snack between meals? Sometimes       Exercise Questions Reviewed With Patient 1/29/2021   How often do you exercise? 3 to 4 times per week   What is the duration of your exercise (in minutes)? 45 Minutes   What types of exercise do you do? gym membership   What keeps you from being more active?  Pain, I have recently been sick       Social History:  No flowsheet data found.    Medications:  Current Outpatient Medications   Medication     acetaminophen 500 MG CAPS     ALPRAZolam (XANAX) 2 MG tablet     amphetamine-dextroamphetamine (ADDERALL) 20 MG tablet     B Complex Vitamins (VITAMIN B COMPLEX PO)     cloNIDine (CATAPRES) 0.1 MG tablet     escitalopram (LEXAPRO) 20 MG tablet     estradiol (VIVELLE-DOT) 0.05 MG/24HR bi-weekly patch     medical cannabis (Patient's own supply)     Multiple Vitamins-Minerals (MULTIVITAMIN ADULT PO)     ondansetron (ZOFRAN ODT) 8 MG ODT tab     prochlorperazine " "(COMPAZINE) 10 MG tablet     SAPHRIS 5 MG SUBL sublingual tablet     SUBOXONE 8-2 MG per film     VENTOLIN  (90 BASE) MCG/ACT inhaler     vitamin B-12 (CYANOCOBALAMIN) 1000 MCG tablet     No current facility-administered medications for this visit.      No flowsheet data found.    ROS:  GI: No flowsheet data found.  Skin: No flowsheet data found.  Psych: No flowsheet data found.  Female Only:   BAR RBS ROS - FEMALE ONLY 1/29/2021   Female only: Loss of menstrual cycles       UGI reviewed.    PHYSICAL EXAM:  Objective    /80 (BP Location: Left arm, Patient Position: Sitting, Cuff Size: Adult Regular)   Pulse 104   Ht 1.753 m (5' 9\")   Wt 86.4 kg (190 lb 8 oz)   LMP 03/14/2018 (Approximate)   SpO2 97%   BMI 28.13 kg/m      Physical Exam   GENERAL: healthy, alert and no distress  NECK: no adenopathy, no asymmetry, masses, or scars and thyroid normal to palpation  RESP: lungs clear to auscultation - no rales, rhonchi or wheezes  CV: regular rate and rhythm, normal S1 S2, no S3 or S4, no murmur, click or rub, no peripheral edema and peripheral pulses strong  ABDOMEN: soft, nontender, no hepatosplenomegaly, no masses and bowel sounds normal  MS: no gross musculoskeletal defects noted, no edema      Sincerely,    Remy Gold MD      "

## 2021-03-15 ENCOUNTER — MYC MEDICAL ADVICE (OUTPATIENT)
Dept: OBGYN | Facility: CLINIC | Age: 41
End: 2021-03-15

## 2021-03-16 DIAGNOSIS — Z11.59 ENCOUNTER FOR SCREENING FOR OTHER VIRAL DISEASES: ICD-10-CM

## 2021-03-16 DIAGNOSIS — N95.1 MENOPAUSAL SYNDROME (HOT FLASHES): Primary | ICD-10-CM

## 2021-03-16 DIAGNOSIS — I67.1 CEREBRAL ANEURYSM, NONRUPTURED: ICD-10-CM

## 2021-03-16 DIAGNOSIS — Z98.84 BARIATRIC SURGERY STATUS: ICD-10-CM

## 2021-03-16 LAB
ERYTHROCYTE [DISTWIDTH] IN BLOOD BY AUTOMATED COUNT: 14.8 % (ref 10–15)
ESTRADIOL SERPL-MCNC: 32 PG/ML
HBA1C MFR BLD: 5.3 % (ref 0–5.6)
HCT VFR BLD AUTO: 41 % (ref 35–47)
HGB BLD-MCNC: 13.3 G/DL (ref 11.7–15.7)
INR PPP: 1.07 (ref 0.86–1.14)
MCH RBC QN AUTO: 28.6 PG (ref 26.5–33)
MCHC RBC AUTO-ENTMCNC: 32.4 G/DL (ref 31.5–36.5)
MCV RBC AUTO: 88 FL (ref 78–100)
PLATELET # BLD AUTO: 324 10E9/L (ref 150–450)
PTH-INTACT SERPL-MCNC: 27 PG/ML (ref 18–80)
RBC # BLD AUTO: 4.65 10E12/L (ref 3.8–5.2)
SARS-COV-2 RNA RESP QL NAA+PROBE: NORMAL
SPECIMEN SOURCE: NORMAL
VIT B12 SERPL-MCNC: 576 PG/ML (ref 193–986)
WBC # BLD AUTO: 10.2 10E9/L (ref 4–11)

## 2021-03-16 PROCEDURE — 82565 ASSAY OF CREATININE: CPT | Performed by: STUDENT IN AN ORGANIZED HEALTH CARE EDUCATION/TRAINING PROGRAM

## 2021-03-16 PROCEDURE — 85027 COMPLETE CBC AUTOMATED: CPT | Performed by: STUDENT IN AN ORGANIZED HEALTH CARE EDUCATION/TRAINING PROGRAM

## 2021-03-16 PROCEDURE — 82607 VITAMIN B-12: CPT | Performed by: NURSE PRACTITIONER

## 2021-03-16 PROCEDURE — 84443 ASSAY THYROID STIM HORMONE: CPT | Performed by: NURSE PRACTITIONER

## 2021-03-16 PROCEDURE — 85610 PROTHROMBIN TIME: CPT | Performed by: STUDENT IN AN ORGANIZED HEALTH CARE EDUCATION/TRAINING PROGRAM

## 2021-03-16 PROCEDURE — 99000 SPECIMEN HANDLING OFFICE-LAB: CPT | Performed by: NURSE PRACTITIONER

## 2021-03-16 PROCEDURE — 82306 VITAMIN D 25 HYDROXY: CPT | Performed by: NURSE PRACTITIONER

## 2021-03-16 PROCEDURE — U0005 INFEC AGEN DETEC AMPLI PROBE: HCPCS | Performed by: STUDENT IN AN ORGANIZED HEALTH CARE EDUCATION/TRAINING PROGRAM

## 2021-03-16 PROCEDURE — 83036 HEMOGLOBIN GLYCOSYLATED A1C: CPT | Performed by: NURSE PRACTITIONER

## 2021-03-16 PROCEDURE — 83970 ASSAY OF PARATHORMONE: CPT | Performed by: NURSE PRACTITIONER

## 2021-03-16 PROCEDURE — 84590 ASSAY OF VITAMIN A: CPT | Mod: 90 | Performed by: NURSE PRACTITIONER

## 2021-03-16 PROCEDURE — 82670 ASSAY OF TOTAL ESTRADIOL: CPT | Performed by: NURSE PRACTITIONER

## 2021-03-16 PROCEDURE — 36415 COLL VENOUS BLD VENIPUNCTURE: CPT | Performed by: STUDENT IN AN ORGANIZED HEALTH CARE EDUCATION/TRAINING PROGRAM

## 2021-03-16 PROCEDURE — 82728 ASSAY OF FERRITIN: CPT | Performed by: NURSE PRACTITIONER

## 2021-03-16 PROCEDURE — U0003 INFECTIOUS AGENT DETECTION BY NUCLEIC ACID (DNA OR RNA); SEVERE ACUTE RESPIRATORY SYNDROME CORONAVIRUS 2 (SARS-COV-2) (CORONAVIRUS DISEASE [COVID-19]), AMPLIFIED PROBE TECHNIQUE, MAKING USE OF HIGH THROUGHPUT TECHNOLOGIES AS DESCRIBED BY CMS-2020-01-R: HCPCS | Performed by: STUDENT IN AN ORGANIZED HEALTH CARE EDUCATION/TRAINING PROGRAM

## 2021-03-16 PROCEDURE — 80053 COMPREHEN METABOLIC PANEL: CPT | Performed by: NURSE PRACTITIONER

## 2021-03-16 NOTE — TELEPHONE ENCOUNTER
Maryanne has CBC with platelets and creatinine ordered, as well as a pre-procedure covid test.  She was last seen here 10/2020 for post op visit following Da[facility], LSO.    Routed to Dr Zayas

## 2021-03-17 LAB
ALBUMIN SERPL-MCNC: 4 G/DL (ref 3.4–5)
ALP SERPL-CCNC: 85 U/L (ref 40–150)
ALT SERPL W P-5'-P-CCNC: 19 U/L (ref 0–50)
ANION GAP SERPL CALCULATED.3IONS-SCNC: 3 MMOL/L (ref 3–14)
AST SERPL W P-5'-P-CCNC: 7 U/L (ref 0–45)
BILIRUB SERPL-MCNC: 0.3 MG/DL (ref 0.2–1.3)
BUN SERPL-MCNC: 8 MG/DL (ref 7–30)
CALCIUM SERPL-MCNC: 9 MG/DL (ref 8.5–10.1)
CHLORIDE SERPL-SCNC: 108 MMOL/L (ref 94–109)
CO2 SERPL-SCNC: 28 MMOL/L (ref 20–32)
CREAT SERPL-MCNC: 0.73 MG/DL (ref 0.52–1.04)
CREAT SERPL-MCNC: 0.75 MG/DL (ref 0.52–1.04)
DEPRECATED CALCIDIOL+CALCIFEROL SERPL-MC: 48 UG/L (ref 20–75)
FERRITIN SERPL-MCNC: 57 NG/ML (ref 12–150)
GFR SERPL CREATININE-BSD FRML MDRD: >90 ML/MIN/{1.73_M2}
GFR SERPL CREATININE-BSD FRML MDRD: >90 ML/MIN/{1.73_M2}
GLUCOSE SERPL-MCNC: 98 MG/DL (ref 70–99)
LABORATORY COMMENT REPORT: NORMAL
POTASSIUM SERPL-SCNC: 3.7 MMOL/L (ref 3.4–5.3)
PROT SERPL-MCNC: 7.6 G/DL (ref 6.8–8.8)
SARS-COV-2 RNA RESP QL NAA+PROBE: NEGATIVE
SODIUM SERPL-SCNC: 139 MMOL/L (ref 133–144)
SPECIMEN SOURCE: NORMAL
TSH SERPL DL<=0.005 MIU/L-ACNC: 1.12 MU/L (ref 0.4–4)

## 2021-03-18 NOTE — TELEPHONE ENCOUNTER
Ordered by another provider and drawn on 3/16/21    Hayley Zayas MD Faragher, Cheri, RN   Caller: Unspecified (3 days ago, 11:22 AM)   Phone Number: [phone]             I added on estradiol and TSH. She already got her blood drawn, so I don't know if they will get done?     SMD

## 2021-03-18 NOTE — PROGRESS NOTES
[facility]     Endovascular Surgical Neuroradiology Pre-Procedure Note      HPI:  Maryanne Crockett is a 40 year old female with seizures, depression presents to ED with severe headaches and found to have incidentally noted right ICA terminus aneuerysm and right ICA cavernous segment aneurysm.  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  She has risk factors including history of smoking until she quit last year, and hypertension. We will perform a cerebral angiogram to assess for aneurysm size and morphology.  Medical History:  Past Medical History:   Diagnosis Date     Depressive disorder      Family history of glioblastoma     screening MRIs every 2 years     Ovarian cyst      Seizures (H)        Surgical History:  Past Surgical History:   Procedure Laterality Date     CHOLECYSTECTOMY       CYSTOSCOPY N/A 9/1/2020    Procedure: CYSTOSCOPY;  Surgeon: Hayley Zayas MD;  Location: [facility] OR     DAVINCI HYSTERECTOMY TOTAL, BILATERAL SALPINGO-OOPHORECTOMY, COMBINED Left 9/1/2020    Procedure: HYSTERECTOMY, TOTAL, ROBOT-ASSISTED, LAPAROSCOPIC,  left oophorectomy;  Surgeon: Hayley Zayas MD;  Location: [facility] OR     DILATION AND CURETTAGE SUCTION  4/30/15    retained POC     GASTRIC RESTRICTIVE PROCEDURE, OPEN, REMOVE/REPLACE SUBCUTANEOUS PORT       LAPAROSCOPIC OOPHORECTOMY Right 8/5/2016    Procedure: LAPAROSCOPIC OOPHORECTOMY;  Surgeon: Adrianne Vergara MD;  Location: [facility] OR     LAPAROSCOPIC SALPINGECTOMY Bilateral 8/5/2016    Procedure: LAPAROSCOPIC SALPINGECTOMY;  Surgeon: Adrianne Vergara MD;  Location: [facility] OR     LAPAROSCOPIC TUBAL LIGATION Bilateral 5/22/2015    Procedure: LAPAROSCOPIC TUBAL LIGATION;  Surgeon: Hayley Zayas MD;  Location: [facility] OR     LAPAROSCOPY OPERATIVE ADULT N/A 8/5/2016     Procedure: LAPAROSCOPY OPERATIVE ADULT;  Surgeon: Adrianne Vergara MD;  Location: [facility] OR     SOFT TISSUE SURGERY Right     cyst in hand       Family History:  Family History   Problem Relation Age of Onset     Other Cancer Brother 36        brain ca     Other Cancer Father         leukemia     Other Cancer Maternal Aunt         2 aunts with glioblastomas     Breast Cancer No family hx of         no ovarian cancer       Social History:  Social History     Socioeconomic History     Marital status: Single     Spouse name: Not on file     Number of children: Not on file     Years of education: Not on file     Highest education level: Not on file   Occupational History     Not on file   Social Needs     Financial resource strain: Not on file     Food insecurity     Worry: Not on file     Inability: Not on file     Transportation needs     Medical: Not on file     Non-medical: Not on file   Tobacco Use     Smoking status: Current Some Day Smoker     Packs/day: 0.25     Smokeless tobacco: Never Used     Tobacco comment: Trying   Substance and Sexual Activity     Alcohol use: Yes     Alcohol/week: 0.0 standard drinks     Comment: rare     Drug use: Yes     Types: Marijuana     Comment: pt says she is prescribed medical marijuana     Sexual activity: Yes     Partners: Male     Birth control/protection: Female Surgical     Comment: TBL/hys   Lifestyle     Physical activity     Days per week: Not on file     Minutes per session: Not on file     Stress: Not on file   Relationships     Social connections     Talks on phone: Not on file     Gets together: Not on file     Attends [employer] service: Not on file     Active member of club or organization: Not on file     Attends meetings of clubs or organizations: Not on file     Relationship status: Not on file     Intimate partner violence     Fear of current or ex partner: Not on file     Emotionally abused: Not on file     Physically abused: Not on file     Forced sexual  activity: Not on file   Other Topics Concern     Not on file   Social History Narrative     Not on file       Allergies:  Allergies   Allergen Reactions     Ibuprofen GI Disturbance     Seroquel [Quetiapine] Other (See Comments) and Rash     Insomnia       Is there a contrast allergy?  No    Medications:  No medications prior to admission.   .    ROS:  The 10 point Review of Systems is negative other than noted in the HPI or here.     PHYSICAL EXAMINATION  Vital Signs:  B/P: Data Unavailable,  T: Data Unavailable,  P: Data Unavailable,  R: Data Unavailable    Rrr, lungs clear bilaterally, abdomen soft/nt/nd  aao X 3, no facial droop, no pronator drift all 4 ext 5/5       LABS  (most recent Cr, BUN, GFR, PLT, INR, PTT within the past 7 days):  Recent Labs   Lab 03/16/21  1329 03/16/21  1314   CR 0.75 0.73   BUN 8  --    GFRESTIMATED >90 >90   GFRESTBLACK >90 >90   PLT  --  324   INR  --  1.07          A/P:  Proceed with Angiogram            PRE-PROCEDURE SEDATION ASSESSMENT     Pre-Procedure Sedation Assessment done at 0900.    Expected Level:  Moderate Sedation    Indication:  Sedation is required to allow for neurointerventional procedure.    Consent obtained from patient after discussing the risks, benefits and alternatives.     PO Intake:  Appropriately NPO for procedure    ASA Class:  Class 2 - MILD SYSTEMIC DISEASE, NO ACUTE PROBLEMS, NO FUNCTIONAL LIMITATIONS.    Mallampati:  Grade 2:  Soft palate, base of uvula, tonsillar pillars, and portion of posterior pharyngeal wall visible    History and physical reviewed and no updates needed. I have reviewed the lab findings, diagnostic data, medications, and the plan for sedation. I have determined this patient to be an appropriate candidate for the planned sedation and procedure and have reassessed the patient IMMEDIATELY PRIOR to sedation and procedure.    Patient was discussed with the Attending, Dr. Webster, who agrees with the plan.    Columba Early,  MD  Neuroendovascular Fellow  Pager: [phone]  03/18/2021 10:01 AM

## 2021-03-19 ENCOUNTER — APPOINTMENT (OUTPATIENT)
Dept: MEDSURG UNIT | Facility: CLINIC | Age: 41
End: 2021-03-19
Attending: RADIOLOGY
Payer: COMMERCIAL

## 2021-03-19 ENCOUNTER — APPOINTMENT (OUTPATIENT)
Dept: INTERVENTIONAL RADIOLOGY/VASCULAR | Facility: CLINIC | Age: 41
End: 2021-03-19
Attending: RADIOLOGY
Payer: COMMERCIAL

## 2021-03-19 ENCOUNTER — HOSPITAL ENCOUNTER (OUTPATIENT)
Facility: CLINIC | Age: 41
Discharge: HOME | End: 2021-03-19
Attending: RADIOLOGY | Admitting: RADIOLOGY
Payer: COMMERCIAL

## 2021-03-19 VITALS
WEIGHT: 190.48 LBS | HEIGHT: 69 IN | DIASTOLIC BLOOD PRESSURE: 71 MMHG | HEART RATE: 72 BPM | BODY MASS INDEX: 28.21 KG/M2 | RESPIRATION RATE: 16 BRPM | SYSTOLIC BLOOD PRESSURE: 119 MMHG | TEMPERATURE: 97.5 F | OXYGEN SATURATION: 100 %

## 2021-03-19 LAB
ANNOTATION COMMENT IMP: NORMAL
RETINYL PALMITATE SERPL-MCNC: 0.02 MG/L (ref 0–0.1)
VIT A SERPL-MCNC: 0.75 MG/L (ref 0.3–1.2)

## 2021-03-19 PROCEDURE — 272N000506 HC NEEDLE CR6

## 2021-03-19 PROCEDURE — 76377 3D RENDER W/INTRP POSTPROCES: CPT | Mod: XS

## 2021-03-19 PROCEDURE — 76377 3D RENDER W/INTRP POSTPROCES: CPT | Mod: 26 | Performed by: RADIOLOGY

## 2021-03-19 PROCEDURE — 99152 MOD SED SAME PHYS/QHP 5/>YRS: CPT

## 2021-03-19 PROCEDURE — 36224 PLACE CATH CAROTD ART: CPT

## 2021-03-19 PROCEDURE — C1760 CLOSURE DEV, VASC: HCPCS

## 2021-03-19 PROCEDURE — 99153 MOD SED SAME PHYS/QHP EA: CPT

## 2021-03-19 PROCEDURE — 272N000566 HC SHEATH CR3

## 2021-03-19 PROCEDURE — 999N000133 HC STATISTIC PP CARE STAGE 2

## 2021-03-19 PROCEDURE — 999N000127 HC STATISTIC PERIPHERAL IV START W US GUIDANCE

## 2021-03-19 PROCEDURE — 36224 PLACE CATH CAROTD ART: CPT | Mod: RT | Performed by: RADIOLOGY

## 2021-03-19 PROCEDURE — 99152 MOD SED SAME PHYS/QHP 5/>YRS: CPT | Mod: GC | Performed by: RADIOLOGY

## 2021-03-19 PROCEDURE — C1769 GUIDE WIRE: HCPCS

## 2021-03-19 PROCEDURE — 250N000011 HC RX IP 250 OP 636: Performed by: PSYCHIATRY & NEUROLOGY

## 2021-03-19 PROCEDURE — 255N000002 HC RX 255 OP 636: Performed by: RADIOLOGY

## 2021-03-19 PROCEDURE — C1887 CATHETER, GUIDING: HCPCS

## 2021-03-19 PROCEDURE — 250N000009 HC RX 250: Performed by: PSYCHIATRY & NEUROLOGY

## 2021-03-19 RX ORDER — NALOXONE HYDROCHLORIDE 0.4 MG/ML
0.4 INJECTION, SOLUTION INTRAMUSCULAR; INTRAVENOUS; SUBCUTANEOUS
Status: DISCONTINUED | OUTPATIENT
Start: 2021-03-19 | End: 2021-03-19 | Stop reason: HOSPADM

## 2021-03-19 RX ORDER — BUPROPION HYDROCHLORIDE 150 MG/1
150 TABLET ORAL EVERY MORNING
COMMUNITY
End: 2022-01-06

## 2021-03-19 RX ORDER — LIDOCAINE 40 MG/G
CREAM TOPICAL
Status: DISCONTINUED | OUTPATIENT
Start: 2021-03-19 | End: 2021-03-19 | Stop reason: HOSPADM

## 2021-03-19 RX ORDER — ONDANSETRON 2 MG/ML
4 INJECTION INTRAMUSCULAR; INTRAVENOUS EVERY 6 HOURS PRN
Status: DISCONTINUED | OUTPATIENT
Start: 2021-03-19 | End: 2021-03-19 | Stop reason: HOSPADM

## 2021-03-19 RX ORDER — SODIUM CHLORIDE 9 MG/ML
INJECTION, SOLUTION INTRAVENOUS CONTINUOUS
Status: DISCONTINUED | OUTPATIENT
Start: 2021-03-19 | End: 2021-03-19 | Stop reason: HOSPADM

## 2021-03-19 RX ORDER — DEXTROSE MONOHYDRATE 25 G/50ML
25-50 INJECTION, SOLUTION INTRAVENOUS
Status: DISCONTINUED | OUTPATIENT
Start: 2021-03-19 | End: 2021-03-19 | Stop reason: HOSPADM

## 2021-03-19 RX ORDER — FLUMAZENIL 0.1 MG/ML
0.2 INJECTION, SOLUTION INTRAVENOUS
Status: DISCONTINUED | OUTPATIENT
Start: 2021-03-19 | End: 2021-03-19 | Stop reason: HOSPADM

## 2021-03-19 RX ORDER — IODIXANOL 320 MG/ML
150 INJECTION, SOLUTION INTRAVASCULAR ONCE
Status: COMPLETED | OUTPATIENT
Start: 2021-03-19 | End: 2021-03-19

## 2021-03-19 RX ORDER — HEPARIN SODIUM 200 [USP'U]/100ML
1 INJECTION, SOLUTION INTRAVENOUS CONTINUOUS PRN
Status: DISCONTINUED | OUTPATIENT
Start: 2021-03-19 | End: 2021-03-19 | Stop reason: HOSPADM

## 2021-03-19 RX ORDER — NALOXONE HYDROCHLORIDE 0.4 MG/ML
0.2 INJECTION, SOLUTION INTRAMUSCULAR; INTRAVENOUS; SUBCUTANEOUS
Status: DISCONTINUED | OUTPATIENT
Start: 2021-03-19 | End: 2021-03-19 | Stop reason: HOSPADM

## 2021-03-19 RX ORDER — PROCHLORPERAZINE 25 MG
25 SUPPOSITORY, RECTAL RECTAL EVERY 12 HOURS PRN
Status: DISCONTINUED | OUTPATIENT
Start: 2021-03-19 | End: 2021-03-19 | Stop reason: HOSPADM

## 2021-03-19 RX ORDER — METOCLOPRAMIDE HYDROCHLORIDE 5 MG/ML
10 INJECTION INTRAMUSCULAR; INTRAVENOUS EVERY 6 HOURS PRN
Status: DISCONTINUED | OUTPATIENT
Start: 2021-03-19 | End: 2021-03-19 | Stop reason: HOSPADM

## 2021-03-19 RX ORDER — PROCHLORPERAZINE MALEATE 10 MG
10 TABLET ORAL EVERY 6 HOURS PRN
Status: DISCONTINUED | OUTPATIENT
Start: 2021-03-19 | End: 2021-03-19 | Stop reason: HOSPADM

## 2021-03-19 RX ORDER — ACETAMINOPHEN 325 MG/1
325 TABLET ORAL EVERY 4 HOURS PRN
Status: DISCONTINUED | OUTPATIENT
Start: 2021-03-19 | End: 2021-03-19 | Stop reason: HOSPADM

## 2021-03-19 RX ORDER — NICOTINE POLACRILEX 4 MG
15-30 LOZENGE BUCCAL
Status: DISCONTINUED | OUTPATIENT
Start: 2021-03-19 | End: 2021-03-19 | Stop reason: HOSPADM

## 2021-03-19 RX ORDER — METOCLOPRAMIDE 10 MG/1
10 TABLET ORAL EVERY 6 HOURS PRN
Status: DISCONTINUED | OUTPATIENT
Start: 2021-03-19 | End: 2021-03-19 | Stop reason: HOSPADM

## 2021-03-19 RX ORDER — ONDANSETRON 4 MG/1
4 TABLET, ORALLY DISINTEGRATING ORAL EVERY 6 HOURS PRN
Status: DISCONTINUED | OUTPATIENT
Start: 2021-03-19 | End: 2021-03-19 | Stop reason: HOSPADM

## 2021-03-19 RX ORDER — FENTANYL CITRATE 50 UG/ML
25-50 INJECTION, SOLUTION INTRAMUSCULAR; INTRAVENOUS EVERY 5 MIN PRN
Status: DISCONTINUED | OUTPATIENT
Start: 2021-03-19 | End: 2021-03-19 | Stop reason: HOSPADM

## 2021-03-19 RX ADMIN — HEPARIN SODIUM 1 BAG: 200 INJECTION, SOLUTION INTRAVENOUS at 10:45

## 2021-03-19 RX ADMIN — MIDAZOLAM 1 MG: 1 INJECTION INTRAMUSCULAR; INTRAVENOUS at 10:30

## 2021-03-19 RX ADMIN — FENTANYL CITRATE 50 MCG: 50 INJECTION, SOLUTION INTRAMUSCULAR; INTRAVENOUS at 10:39

## 2021-03-19 RX ADMIN — LIDOCAINE HYDROCHLORIDE 6 ML: 10 INJECTION, SOLUTION EPIDURAL; INFILTRATION; INTRACAUDAL; PERINEURAL at 10:44

## 2021-03-19 RX ADMIN — FENTANYL CITRATE 50 MCG: 50 INJECTION, SOLUTION INTRAMUSCULAR; INTRAVENOUS at 10:34

## 2021-03-19 RX ADMIN — HEPARIN SODIUM 2 BAG: 200 INJECTION, SOLUTION INTRAVENOUS at 10:45

## 2021-03-19 RX ADMIN — FENTANYL CITRATE 50 MCG: 50 INJECTION, SOLUTION INTRAMUSCULAR; INTRAVENOUS at 10:30

## 2021-03-19 RX ADMIN — MIDAZOLAM 1 MG: 1 INJECTION INTRAMUSCULAR; INTRAVENOUS at 10:34

## 2021-03-19 RX ADMIN — IODIXANOL 50 ML: 320 INJECTION, SOLUTION INTRAVASCULAR at 11:07

## 2021-03-19 RX ADMIN — FENTANYL CITRATE 50 MCG: 50 INJECTION, SOLUTION INTRAMUSCULAR; INTRAVENOUS at 10:49

## 2021-03-19 RX ADMIN — MIDAZOLAM 1 MG: 1 INJECTION INTRAMUSCULAR; INTRAVENOUS at 10:39

## 2021-03-19 ASSESSMENT — MIFFLIN-ST. JEOR: SCORE: 1598.63

## 2021-03-19 ASSESSMENT — VISUAL ACUITY
OU: NORMAL ACUITY

## 2021-03-19 NOTE — IP AVS SNAPSHOT
MRN:[id number]                      After Visit Summary   3/19/2021    Maryanne Crockett    MRN: [id number]           Visit Information        Department      3/19/2021  8:30 AM M [facility] [address] [place]          Review of your medicines      UNREVIEWED medicines. Ask your doctor about these medicines       Dose / Directions   acetaminophen 500 MG Caps      Dose: 1,000 mg  Take 1,000 mg by mouth  Refills: 0     ALPRAZolam 2 MG tablet  Commonly known as: XANAX      Refills: 0     amphetamine-dextroamphetamine 20 MG tablet  Commonly known as: ADDERALL      Dose: 20 mg  Take 20 mg by mouth  Refills: 0     buPROPion 150 MG 24 hr tablet  Commonly known as: WELLBUTRIN XL      Dose: 150 mg  Take 150 mg by mouth every morning  Refills: 0     cloNIDine 0.1 MG tablet  Commonly known as: CATAPRES      Dose: 1 tablet  Take 1 tablet by mouth daily  Refills: 0     escitalopram 20 MG tablet  Commonly known as: LEXAPRO      Dose: 20 mg  20 mg daily  Refills: 0     estradiol 0.05 MG/24HR bi-weekly patch  Commonly known as: VIVELLE-DOT  Used for: Menopausal syndrome (hot flashes)      Dose: 1 patch  Place 1 patch onto the skin twice a week  Quantity: 8 patch  Refills: 3     MULTIVITAMIN ADULT PO      Dose: 1 tablet  Take 1 tablet by mouth daily  Refills: 0     ondansetron 8 MG ODT tab  Commonly known as: Zofran ODT      Dose: 8 mg  Take 1 tablet (8 mg) by mouth every 8 hours as needed for nausea  Quantity: 12 tablet  Refills: 0     prochlorperazine 10 MG tablet  Commonly known as: COMPAZINE      Dose: 10 mg  Take 1 tablet (10 mg) by mouth every 6 hours as needed for nausea  Quantity: 30 tablet  Refills: 0     Saphris 5 MG Subl sublingual tablet  Generic drug: asenapine      Refills: 0     Ventolin  (90 Base) MCG/ACT inhaler  Generic drug: albuterol      Dose: 1-2 puff  Inhale 1-2 puffs into the lungs every 6 hours as needed for shortness of breath / dyspnea or wheezing  Refills: 5     VITAMIN B  COMPLEX PO      Refills: 0     vitamin B-12 1000 MCG tablet  Commonly known as: CYANOCOBALAMIN      Dose: 1,000 mcg  Take 1,000 mcg by mouth daily  Refills: 0        CONTINUE these medicines which have NOT CHANGED       Dose / Directions   medical cannabis  (Patient's own supply)      See Admin Instructions (The purpose of this order is to document that the patient reports taking medical cannabis.  This is not a prescription, and is not used to certify that the patient has a qualifying medical condition.)  Refills: 0              Protect others around you: Learn how to safely use, store and throw away your medicines at www.disposemymeds.org.       Follow-ups after your visit       Care Instructions       Further instructions from your care team       [facility]         Interventional Radiology  Discharge Instructions Post Angiogram (NEURO)    AFTER YOU GO HOME  ? If you were given sedation, for the first 24 hours: we recommend that an adult stay with you, DO NOT drive or operate machinery at home or at work, DO NOT smoke, DO NOT make any important or legal decisions.  ? DO NOT drink alcoholic beverages the day of your procedure  ? DO relax and take it easy for 24 hours  ? DO drink plenty of fluids  ? DO resume your regular diet, unless otherwise instructed by your Primary Physician  ? DO NOT take a shower for at least 12 hours following your procedure. No tub bath, hot tubs, or swimming for 5 days. Do NOT scrub the procedure site vigorously for 5 days.      Care of groin site  It is normal to have a small bruise or lump at the site.    For the first 2 days, when you cough, sneeze or move your bowels, hold your hand over the puncture site and press gently.    Do NOT lift more than 10 pounds or do any strenuous exercise for at least 3 to 5 days.    Do not use lotion or powder near the puncture site for 3 days.      If you start bleeding from the site in your groin: lie down flat and press firmly  "on the site. Call your doctor as soon as you can.   Call 911 right away if you have: Heavy bleeding, bleeding that does not stop.        CALL THE PHYSICIAN IF:  ? You start bleeding from the procedure site.  ? You have numbness, coolness or change in color of the arm or leg of the puncture site  ? You experience changes in your vision, hearing, balance, coordination, speech, thinking or memory  ? You experience weakness in one or more extremity  ? You experience pain or redness at the puncture site  ? You develop nausea or vomiting  ? You develop hives or a rash or unexplained itching  ? You develop a temperature of 101 degrees F or greater      ADDITIONAL INSTRUCTIONS  ? Instruction booklet given:  Angio Seal    [facility] INTERVENTIONAL RADIOLOGY DEPARTMENT  Procedure Physician:  Florence Webster and Nneka  Date of procedure: March 19, 2021  Telephone Numbers: [phone] Monday-Friday 8:00 am to 4:30 pm  [phone] After 4:30 pm Monday-Friday, Weekends & Holidays.   Ask for the Neuro-Interventional doctor on call.  Someone is on call 24 hrs/day  [facility] toll free number: [phone] Monday-Friday 8:00 am to 4:30 pm  [facility] Emergency Dept: [phone]        Additional Information About Your Visit       [employer] Information    [employer] gives you secure access to your electronic health record. If you see a primary care provider, you can also send messages to your care team and make appointments. If you have questions, please call your primary care clinic.  If you do not have a primary care provider, please call [phone] and they will assist you.       Care EveryWhere ID    This is your Care EveryWhere ID. This could be used by other organizations to access your [facility] medical records  [id number]       Your Vitals Were  Most recent update: 3/19/2021 12:01 PM    Blood Pressure   103/70          Pulse   63          Temperature   97.5  F (36.4  C) (Oral)          Respirations   16          Height   1.753 m (5' 9.02\")    "          Weight   86.4 kg (190 lb 7.6 oz)    Last Period   03/14/2018 (Approximate)    Pulse Oximetry   100%    BMI (Body Mass Index)   28.12 kg/m           Primary Care Provider Office Phone # Fax #    Shoshana Hyde [phone]      Equal Access to Services    [facility] : Hadii aad ku hadasho Soomaali, waaxda luqadaha, qaybta kaalmada adeegyada, waxay idiin hayaan adealejo khchristiano la'aan . So [facility] [phone].    ATENCIÓN: Si habla español, tiene a lilly disposición servicios gratuitos de asistencia lingüística. Fabiolaame al [phone].    We comply with applicable federal and state civil rights laws, including the [place] Human Rights Act. We do not discriminate on the basis of race, color, creed, Synagogue, national origin, marital status, age, disability, sex, sexual orientation, or gender identity.    If you would like an itemization of your charges they will now be available in [employer] 30 days after discharge. To access the itemized statements in [employer] go to billing/billing summary. From there select view account. There will be multiple tabs showing an overview of your account, detail, payments, and communications. From the communications tab you can see your monthly statements, your itemized statements, and any billing letters generated for your account. If you do not have a [employer] account and need help getting access please contact [employer] support at [phone].  If you would prefer to have your itemized statements mailed please contact our automated itemized bill request line at [phone] option  2.       Thank you!    Thank you for choosing [facility] for your care. Our goal is always to provide you with excellent care. Hearing back from our patients is one way we can continue to improve our services. Please take a few minutes to complete the written survey that you may receive in the mail after you visit with us. Thank you!            Medication List      Medications           Morning Afternoon Evening Bedtime As Needed    medical cannabis  (Patient's own supply)  INSTRUCTIONS: See Admin Instructions (The purpose of this order is to document that the patient reports taking medical cannabis.  This is not a prescription, and is not used to certify that the patient has a qualifying medical condition.)                       ASK your doctor about these medications          Morning Afternoon Evening Bedtime As Needed    acetaminophen 500 MG Caps  INSTRUCTIONS: Take 1,000 mg by mouth                     ALPRAZolam 2 MG tablet  Also known as: XANAX                     amphetamine-dextroamphetamine 20 MG tablet  Also known as: ADDERALL  INSTRUCTIONS: Take 20 mg by mouth                     buPROPion 150 MG 24 hr tablet  Also known as: WELLBUTRIN XL  INSTRUCTIONS: Take 150 mg by mouth every morning                     cloNIDine 0.1 MG tablet  Also known as: CATAPRES  INSTRUCTIONS: Take 1 tablet by mouth daily                     escitalopram 20 MG tablet  Also known as: LEXAPRO  INSTRUCTIONS: 20 mg daily                     estradiol 0.05 MG/24HR bi-weekly patch  Also known as: VIVELLE-DOT  INSTRUCTIONS: Place 1 patch onto the skin twice a week                     MULTIVITAMIN ADULT PO  INSTRUCTIONS: Take 1 tablet by mouth daily                     ondansetron 8 MG ODT tab  Also known as: Zofran ODT  INSTRUCTIONS: Take 1 tablet (8 mg) by mouth every 8 hours as needed for nausea                     prochlorperazine 10 MG tablet  Also known as: COMPAZINE  INSTRUCTIONS: Take 1 tablet (10 mg) by mouth every 6 hours as needed for nausea                     Saphris 5 MG Subl sublingual tablet  Generic drug: asenapine                     Ventolin  (90 Base) MCG/ACT inhaler  INSTRUCTIONS: Inhale 1-2 puffs into the lungs every 6 hours as needed for shortness of breath / dyspnea or wheezing  Generic drug: albuterol                     VITAMIN B COMPLEX PO                     vitamin B-12 1000 MCG  tablet  Also known as: CYANOCOBALAMIN  INSTRUCTIONS: Take 1,000 mcg by mouth daily

## 2021-03-19 NOTE — PROCEDURES
[facility]     Endovascular Surgical Neuroradiology Post-Procedure Note    Pre-Procedure Diagnosis:  aneurysm  Post-Procedure Diagnosis:  same    Procedure(s):   Diagnostic cervicocerebral angiography    Findings:    [] 3 X 5 mm right posterior communicating artery aneurysm     Plan:    [] Flat for 2 hours  [] I schedule follow up March 30, 2021 with Silvio Sandra RN for video clinic to discuss aneurysm results and next step with Dr Webster    Primary Surgeon:  Dr. Rich Webster    Fellow:  Nneka  Additional Assistants:  Mohit    Prior to the start of the procedure and with procedural staff participation, I verbally confirmed: the patient s identity using two indicators, relevant allergies, that the procedure was appropriate and matched the consent or emergent situation, and that the correct equipment/implants were available. Immediately prior to starting the procedure I conducted the Time Out with the procedural staff and re-confirmed the patient s name, procedure, and site/side. (The Joint Commission universal protocol was followed.)  Yes    PRU value: Not applicable    Anesthesia:  Conscious Sedation  Medications:  Fentanyl, Midazolam  Puncture site:  Right Femoral Artery    Fluoroscopy time (minutes):  6.4  Radiation dose (mGy):  348    Contrast amount (mL):  50     Estimated blood loss (mL):  minimal    Closure:  Device    Disposition:  Home after recovery.        Sedation Post-Procedure Summary    Sedatives: Fentanyl and Midazolam (Versed)    Vital signs and pulse oximetry were monitored and remained stable throughout the procedure, and sedation was maintained until the procedure was complete.  The patient was monitored by staff until sedation discharge criteria were met.    Patient tolerance:  Patient tolerated the procedure well with no immediate complications.    Time of sedation in minutes:  30 minutes from beginning to end of physician one to one  monitoring.    Columba Early MD  Neuroendovascular Fellow  Pager: [phone]  03/19/2021 11:17 AM

## 2021-03-19 NOTE — IP AVS SNAPSHOT
[facility] [address] [address]                                    After Visit Summary   3/19/2021    Maryanne Crockett    MRN: [id number]           After Visit Summary Signature Page    I have received my discharge instructions, and my questions have been answered. I have discussed any challenges I see with this plan with the nurse or doctor.    ..........................................................................................................................................  Patient/Patient Representative Signature      ..........................................................................................................................................  Patient Representative Print Name and Relationship to Patient    ..................................................               ................................................  Date                                   Time    ..........................................................................................................................................  Reviewed by Signature/Title    ...................................................              ..............................................  Date                                               Time          22EPIC Rev 08/18

## 2021-03-19 NOTE — PROGRESS NOTES
Patient Name: Maryanne Crockett  Medical Record Number: [id number]  Today's Date: 3/19/2021    Procedure: Diagnostic cerebral angiogram  Proceduralist: Dr. Webster, Dr. Early, Dr. Rueda    Procedure Start: 1029  Procedure end: 1108  Sedation medications administered: 200mcg fentanyl, 3mg versed     Report given to: Adrianne Paez RN 2A  : CAROLINE    Other Notes: Pt arrived to IR room 3 from . Consent reviewed. Pt denies any questions or concerns regarding procedure. Pt positioned supine and monitored per protocol. Pt tolerated procedure without any noted complications. Pt transferred back to .    Angioseal closure device deployed to right groin at 1105. Bedrest x 2 hours until 1310.

## 2021-03-19 NOTE — DISCHARGE INSTRUCTIONS
[facility]         Interventional Radiology  Discharge Instructions Post Angiogram (NEURO)    AFTER YOU GO HOME  ? If you were given sedation, for the first 24 hours: we recommend that an adult stay with you, DO NOT drive or operate machinery at home or at work, DO NOT smoke, DO NOT make any important or legal decisions.  ? DO NOT drink alcoholic beverages the day of your procedure  ? DO relax and take it easy for 24 hours  ? DO drink plenty of fluids  ? DO resume your regular diet, unless otherwise instructed by your Primary Physician  ? DO NOT take a shower for at least 12 hours following your procedure. No tub bath, hot tubs, or swimming for 5 days. Do NOT scrub the procedure site vigorously for 5 days.      Care of groin site  It is normal to have a small bruise or lump at the site.    For the first 2 days, when you cough, sneeze or move your bowels, hold your hand over the puncture site and press gently.    Do NOT lift more than 10 pounds or do any strenuous exercise for at least 3 to 5 days.    Do not use lotion or powder near the puncture site for 3 days.      If you start bleeding from the site in your groin: lie down flat and press firmly on the site. Call your doctor as soon as you can.   Call 911 right away if you have: Heavy bleeding, bleeding that does not stop.        CALL THE PHYSICIAN IF:  ? You start bleeding from the procedure site.  ? You have numbness, coolness or change in color of the arm or leg of the puncture site  ? You experience changes in your vision, hearing, balance, coordination, speech, thinking or memory  ? You experience weakness in one or more extremity  ? You experience pain or redness at the puncture site  ? You develop nausea or vomiting  ? You develop hives or a rash or unexplained itching  ? You develop a temperature of 101 degrees F or greater      ADDITIONAL INSTRUCTIONS  ? Instruction booklet given:  Angio Seal    [facility] INTERVENTIONAL RADIOLOGY  DEPARTMENT  Procedure Physician:  Florence Webster and Nneka  Date of procedure: March 19, 2021  Telephone Numbers: [phone] Monday-Friday 8:00 am to 4:30 pm  [phone] After 4:30 pm Monday-Friday, Weekends & Holidays.   Ask for the Neuro-Interventional doctor on call.  Someone is on call 24 hrs/day  [facility] toll free number: [phone] Monday-Friday 8:00 am to 4:30 pm  [facility] Emergency Dept: [phone]

## 2021-03-19 NOTE — PROGRESS NOTES
Arrived from home for a Cerebral angiogram.  VSS.  C/o pain in neck and right arm and hand.  States that left finger tips are numb and tingling.  Right arm very weak, with numbness, burning and tingling in right arm through hand.  States that she would like to be KO'd for procedure.  Consent obtained.  H&P current.  States that she will not leave here if she is in pain from procedure.

## 2021-03-19 NOTE — PROGRESS NOTES
Returned from IR s/p cerebral angiogram.  VSS.  Denies pain.  Neuro's unchanged.  Right groin site CDI.  Resting in bed.

## 2021-03-19 NOTE — PROGRESS NOTES
Tolerated bedrest without issues.  Tolerated food, fluids, ambulation and urination.  All neuro's unchanged from admission.  Right groin angio site CDI.  PIV removed.  Reviewed discharge instructions with patient.  Discharged to home with mother.

## 2021-03-22 ENCOUNTER — MYC MEDICAL ADVICE (OUTPATIENT)
Dept: OBGYN | Facility: CLINIC | Age: 41
End: 2021-03-22

## 2021-03-22 DIAGNOSIS — N95.1 MENOPAUSAL SYNDROME (HOT FLASHES): ICD-10-CM

## 2021-03-23 RX ORDER — ESTRADIOL 0.05 MG/D
1 PATCH, EXTENDED RELEASE TRANSDERMAL
Qty: 8 PATCH | Refills: 3 | Status: SHIPPED | OUTPATIENT
Start: 2021-03-25 | End: 2021-06-09

## 2021-03-24 ENCOUNTER — PATIENT OUTREACH (OUTPATIENT)
Dept: NEUROLOGY | Facility: CLINIC | Age: 41
End: 2021-03-24

## 2021-03-24 NOTE — PROGRESS NOTES
Endovascular Surgical Neuroradiology - Post Discharge Call    Admit: 3/19/21    Discharge: 3/19/21    MD/Service: Dr. Webster/JALEN    Pre-Procedure Diagnosis:  aneurysm  Post-Procedure Diagnosis:  same     Procedure(s):   Diagnostic cervicocerebral angiography (right femoral artery puncture)     Findings:    [] 3 X 5 mm right posterior communicating artery aneurysm      Plan:    [] Schedule follow up March 30, 2021 for video clinic to discuss aneurysm results and next step with Dr Webster    Unable to reach patient.  full. Message sent to scheduling to contact patient to arrange follow-up. Missy Sandra RN 3/24/2021 2:08 PM

## 2021-03-26 ENCOUNTER — TELEPHONE (OUTPATIENT)
Dept: RADIOLOGY | Facility: CLINIC | Age: 41
End: 2021-03-26

## 2021-03-30 ENCOUNTER — VIRTUAL VISIT (OUTPATIENT)
Dept: NEUROSURGERY | Facility: CLINIC | Age: 41
End: 2021-03-30
Payer: COMMERCIAL

## 2021-03-30 DIAGNOSIS — I67.1 CEREBRAL ANEURYSM, NONRUPTURED: Primary | ICD-10-CM

## 2021-03-30 PROCEDURE — 99203 OFFICE O/P NEW LOW 30 MIN: CPT | Mod: 95 | Performed by: RADIOLOGY

## 2021-03-30 RX ORDER — CLOPIDOGREL BISULFATE 75 MG/1
75 TABLET ORAL DAILY
Qty: 30 TABLET | Refills: 4 | Status: SHIPPED | OUTPATIENT
Start: 2021-03-30 | End: 2022-01-13

## 2021-03-30 RX ORDER — ASPIRIN 325 MG
325 TABLET ORAL DAILY
Qty: 90 TABLET | Refills: 3 | Status: SHIPPED | OUTPATIENT
Start: 2021-03-30

## 2021-03-30 RX ORDER — ALPRAZOLAM 1 MG
1 TABLET ORAL DAILY PRN
COMMUNITY
Start: 2021-03-22 | End: 2021-03-30

## 2021-03-30 RX ORDER — METHOCARBAMOL 500 MG/1
1 TABLET, FILM COATED ORAL DAILY
Status: ON HOLD | COMMUNITY
Start: 2021-03-19 | End: 2021-05-11

## 2021-03-30 RX ORDER — GABAPENTIN 300 MG/1
1 CAPSULE ORAL DAILY
Status: ON HOLD | COMMUNITY
Start: 2021-03-19 | End: 2021-05-11

## 2021-03-30 NOTE — PATIENT INSTRUCTIONS
[] It is ok to remove lap band prior to aneurysm treatment procedure from neuroendovascular side. We have discussed this with Dr Remy Gold.   [] We will contact you to schedule cerebral angiogram with treatment of aneurysm under general anesthesia in next 1-2 months  [] Start aspirin 325 mg daily and plavix 75 mg daily 7 days prior to angiogram with flow diverter stent treatment of right pcom aneurysm  [] PT referral for swimming     If you have questions regarding your procedure, please contact me at [phone], option 3.    If you need to cancel, reschedule or have procedure scheduling related questions, please call [place] at [phone].    Thank you,  Silvio Sandra RN, CNRN  Stroke & Endovascular Care Coordinator    Thank you for choosing [facility] for your health care needs.

## 2021-03-30 NOTE — PROGRESS NOTES
Maryanne is a 40 year old who is being evaluated via a billable video visit.      How would you like to obtain your AVS? Mychart  If the video visit is dropped, the invitation should be resent by: [phone]

## 2021-03-30 NOTE — PROGRESS NOTES
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 She presented to the ED in 2021 with severe occipital headache and neck pain along with left facial numbness.  A stroke code was called and further evaluation revealed.  She has risk factors including history of smoking until she quit last  year, and hypertension. She also has a grandfather  of a cerebral aneurysm.    She has complex history including chronic right- arm and hand pain and neck stiffness along with carpal tunnel syndrome as well as some memory loss from her seizures which began in  - she reportedly could not tolerate AEDs and now takes medical mariajuana daily. She also has had lap band placed that now needs to be removed as she is having daily nausea and vomiting.     I discussed the formal angiogram results I performed 3/19/21 and our recommendation for treatment. We discussed both endovascular options of flow diverter stent and stent assisted coiling, we also discussed clipping as possibility. We ultimately recommend flow diversion under general anesthesia. We discussed risks of procedure and anesthesia of groin hematoma, retroperitoneal bleed, pain at groin site, infection, risk of dissection of vessels and stroke. Patient is agreeable to procedure and risks.     Patient would like to have lap band removed prior to this procedure, and would also like PT referral for swimming. Both of which are reasonable from our end. She will need to take asa and plavix 7 days prior to procedure and we will obtain P2Y12 morning of procedure.      [] It is ok to remove lap band prior to aneurysm treatment procedure from neuroendovascular side.  I spoke to Dr Remy Gold about this and he agrees for lap band removal prior to angiogram.   [] Schedule cerebral  angiogram with treatment of aneurysm under general anesthesia in next 1-2 months  [] Start  mg qday and plavix 75 mg qday 7 days prior to angiogram with flow diverter stent treatment of right pcom aneurysm  [] P2Y12 morning of case  [] I have placed PT referral so patient can swim.     Columba Saleem MD  Neuroendovascular Fellow  Pager: [phone]  03/30/2021 11:47 AM     I have personally seen and evaluated the patient on March 30, 2021  and I agree with the note by Dr. SALEEM                   On April 4, 2021

## 2021-03-30 NOTE — LETTER
3/30/2021       RE: Maryanne Crockett  [address]  [facility] [address]     Dear Colleague,    Thank you for referring your patient, Maryanne Crockett, to the [facility] NEUROSURGERY [facility] [place] at [facility]. Please see a copy of my visit note below.    Maryanne is a 40 year old who is being evaluated via a billable video visit.      How would you like to obtain your AVS? Mychart  If the video visit is dropped, the invitation should be resent by: [phone]    Maryanne Crockett is a 40 year old female with seizures, depression  presents to ED with severe headaches and found to have incidentally found a right posterior communicating artery aneurysm measuring 5.2  mm neck with 2.5 mm base to dome. She presented to the ED in 2021 with severe occipital headache and neck pain along with left facial numbness.  A stroke code was called and further evaluation revealed.  She has risk factors including history of smoking until she quit last  year, and hypertension. She also has a grandfather  of a cerebral aneurysm.    She has complex history including chronic right- arm and hand pain and neck stiffness along with carpal tunnel syndrome as well as some memory loss from her seizures which began in  - she reportedly could not tolerate AEDs and now takes medical mariajuana daily. She also has had lap band placed that now needs to be removed as she is having daily nausea and vomiting.     I discussed the formal angiogram results I performed 3/19/21 and our recommendation for treatment. We discussed both endovascular options of flow diverter stent and stent assisted coiling, we also discussed clipping as possibility. We ultimately recommend flow diversion under general anesthesia. We discussed risks of procedure and anesthesia of groin hematoma, retroperitoneal bleed, pain at groin site, infection, risk of dissection of vessels and stroke. Patient is agreeable to  procedure and risks.     Patient would like to have lap band removed prior to this procedure, and would also like PT referral for swimming. Both of which are reasonable from our end. She will need to take asa and plavix 7 days prior to procedure and we will obtain P2Y12 morning of procedure.      [] It is ok to remove lap band prior to aneurysm treatment procedure from neuroendovascular side.  I spoke to Dr Remy Gold about this and he agrees for lap band removal prior to angiogram.   [] Schedule cerebral angiogram with treatment of aneurysm under general anesthesia in next 1-2 months  [] Start  mg qday and plavix 75 mg qday 7 days prior to angiogram with flow diverter stent treatment of right pcom aneurysm  [] P2Y12 morning of case  [] I have placed PT referral so patient can swim.     Columba Saleem MD  Neuroendovascular Fellow  Pager: [phone]  03/30/2021 11:47 AM     I have personally seen and evaluated the patient on March 30, 2021  and I agree with the note by Dr. SALEEM                   On April 4, 2021    Again, thank you for allowing me to participate in the care of your patient.      Sincerely,    Rich Webster MD

## 2021-04-07 ENCOUNTER — TRANSFERRED RECORDS (OUTPATIENT)
Dept: HEALTH INFORMATION MANAGEMENT | Facility: CLINIC | Age: 41
End: 2021-04-07

## 2021-04-08 ENCOUNTER — TELEPHONE (OUTPATIENT)
Dept: NEUROLOGY | Facility: CLINIC | Age: 41
End: 2021-04-08

## 2021-04-08 NOTE — TELEPHONE ENCOUNTER
Received VMM from LATOYA Metz at outside providers office. Patient requesting their office to manage pain following patients aneurysm treatment.     Spoke with Lashay. Informed her that pain medications are not typically prescribed for aneurysm treatment. However, if patient has headaches following treatment medrol may be prescribed, and very rarely narcotics. Lashay has no additional questions. They will let our team manage any pain patient may have. Missy Sandra RN 4/8/2021 4:08 PM

## 2021-04-21 ENCOUNTER — TELEPHONE (OUTPATIENT)
Dept: ENDOCRINOLOGY | Facility: CLINIC | Age: 41
End: 2021-04-21

## 2021-04-21 DIAGNOSIS — Z98.84 GASTRIC BANDING STATUS: ICD-10-CM

## 2021-04-21 DIAGNOSIS — R13.19 ESOPHAGEAL DYSPHAGIA: Primary | ICD-10-CM

## 2021-04-21 RX ORDER — CEFAZOLIN SODIUM 2 G/50ML
2 SOLUTION INTRAVENOUS SEE ADMIN INSTRUCTIONS
Status: CANCELLED | OUTPATIENT
Start: 2021-04-21

## 2021-04-21 RX ORDER — CEFAZOLIN SODIUM 2 G/50ML
2 SOLUTION INTRAVENOUS
Status: CANCELLED | OUTPATIENT
Start: 2021-04-21

## 2021-04-21 NOTE — TELEPHONE ENCOUNTER
Patient sent message that she called to schedule a PAC appointment and was told there is nothing available until June. Patient scheduled for adjustable gastric band removal on 5/11/21 with Dr. Gold.   Scheduled PAC 5/4 at 8:45 a.m. Discussed this may turn into a video visit after staff reviews patient's chart. If needs to come in, go up to the 5th floor [address] [place] [address].

## 2021-04-22 PROBLEM — R13.19 ESOPHAGEAL DYSPHAGIA: Status: ACTIVE | Noted: 2021-04-22

## 2021-04-28 DIAGNOSIS — Z11.59 ENCOUNTER FOR SCREENING FOR OTHER VIRAL DISEASES: ICD-10-CM

## 2021-04-30 ENCOUNTER — TELEPHONE (OUTPATIENT)
Dept: ENDOCRINOLOGY | Facility: CLINIC | Age: 41
End: 2021-04-30

## 2021-04-30 NOTE — TELEPHONE ENCOUNTER
FUTURE VISIT INFORMATION      SURGERY INFORMATION:    Date: 21    Location: uu or    Surgeon:  Rich Webster MD    Anesthesia Type:  general    Procedure: ANESTHESIA OUT OF OR Carotid Cerebral Angiogram With Aneurysm Treatment @0800    Consult: virtual visit 3/30/21    RECORDS REQUESTED FROM:       [facility]     Most recent EKG+ Tracin21

## 2021-04-30 NOTE — TELEPHONE ENCOUNTER
Called patient to scheduled COVID test on Friday, 5/7 for  procedure 5/11/21, band removal with Dr. Gold. The voicemail box is not set up and I could not leave a message. Will send Nebulahart message.

## 2021-05-03 ENCOUNTER — TELEPHONE (OUTPATIENT)
Dept: RADIOLOGY | Facility: CLINIC | Age: 41
End: 2021-05-03

## 2021-05-03 NOTE — TELEPHONE ENCOUNTER
Spoke with patient to confirm DOS with Dr Webster on 6/23/21.    PAC 6/15/21  Covid Test 6/21/21

## 2021-05-04 ENCOUNTER — ANESTHESIA EVENT (OUTPATIENT)
Dept: SURGERY | Facility: CLINIC | Age: 41
End: 2021-05-04
Payer: COMMERCIAL

## 2021-05-04 ENCOUNTER — OFFICE VISIT (OUTPATIENT)
Dept: SURGERY | Facility: CLINIC | Age: 41
End: 2021-05-04
Payer: COMMERCIAL

## 2021-05-04 ENCOUNTER — PRE VISIT (OUTPATIENT)
Dept: SURGERY | Facility: CLINIC | Age: 41
End: 2021-05-04

## 2021-05-04 VITALS
SYSTOLIC BLOOD PRESSURE: 110 MMHG | OXYGEN SATURATION: 98 % | DIASTOLIC BLOOD PRESSURE: 75 MMHG | BODY MASS INDEX: 31.1 KG/M2 | WEIGHT: 210 LBS | HEIGHT: 69 IN | HEART RATE: 71 BPM | RESPIRATION RATE: 16 BRPM | TEMPERATURE: 98 F

## 2021-05-04 DIAGNOSIS — Z01.818 PRE-OP EVALUATION: Primary | ICD-10-CM

## 2021-05-04 PROCEDURE — 99203 OFFICE O/P NEW LOW 30 MIN: CPT | Performed by: PHYSICIAN ASSISTANT

## 2021-05-04 SDOH — HEALTH STABILITY: MENTAL HEALTH: HOW OFTEN DO YOU HAVE A DRINK CONTAINING ALCOHOL?: NOT ASKED

## 2021-05-04 SDOH — HEALTH STABILITY: MENTAL HEALTH: HOW MANY STANDARD DRINKS CONTAINING ALCOHOL DO YOU HAVE ON A TYPICAL DAY?: NOT ASKED

## 2021-05-04 SDOH — HEALTH STABILITY: MENTAL HEALTH: HOW OFTEN DO YOU HAVE 6 OR MORE DRINKS ON ONE OCCASION?: NOT ASKED

## 2021-05-04 ASSESSMENT — MIFFLIN-ST. JEOR: SCORE: 1686.93

## 2021-05-04 ASSESSMENT — LIFESTYLE VARIABLES: TOBACCO_USE: 1

## 2021-05-04 ASSESSMENT — PAIN SCALES - GENERAL: PAINLEVEL: MODERATE PAIN (4)

## 2021-05-04 ASSESSMENT — ENCOUNTER SYMPTOMS: SEIZURES: 1

## 2021-05-04 NOTE — H&P (VIEW-ONLY)
Pre-Operative H & P     CC:  Preoperative exam to assess for increased cardiopulmonary risk while undergoing surgery and anesthesia.    Date of Encounter: 5/4/2021  Primary Care Physician:  Shoshana Hyde  associated diagnosis:     HPI  Maryanne Crockett is a 40 year old female who presents for pre-operative H & P in preparation for Gastric band removal with Dr. Gold on 5/11/21 at [facility]. Patient is being evaluated for comorbid conditions of  former tobacco use, seizure disorder, cerebral aneurysm, ADD, depression        Ms. Crockett has a history of obesity s/p lap band. She has developed dysphagia and forced maladaptive eating syndrome since that time. She was seen by Dr. Gold to discuss lap band removal. She is now scheduled for the above procedure. 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 She was seen by neurology and is planning on treatment in June.       History is obtained from the patient and chart review.      Past Medical History  Past Medical History:   Diagnosis Date     Depressive disorder      Family history of glioblastoma     screening MRIs every 2 years     Ovarian cyst      Seizures (H)        Past Surgical History  Past Surgical History:   Procedure Laterality Date     CHOLECYSTECTOMY       CYSTOSCOPY N/A 9/1/2020    Procedure: CYSTOSCOPY;  Surgeon: Hayley Zayas MD;  Location: [facility]     DAVINCI HYSTERECTOMY TOTAL, BILATERAL SALPINGO-OOPHORECTOMY, COMBINED Left 9/1/2020    Procedure: HYSTERECTOMY, TOTAL, ROBOT-ASSISTED, LAPAROSCOPIC,  left oophorectomy;  Surgeon: Hayley Zayas MD;  Location: [facility] OR     DILATION AND CURETTAGE SUCTION  4/30/15    retained POC     GASTRIC RESTRICTIVE PROCEDURE, OPEN, REMOVE/REPLACE SUBCUTANEOUS PORT       IR CAROTID CEREBRAL ANGIOGRAM BILATERAL  3/19/2021     LAPAROSCOPIC OOPHORECTOMY Right 8/5/2016    Procedure: LAPAROSCOPIC OOPHORECTOMY;   Surgeon: Adrianne Vergara MD;  [facility]     LAPAROSCOPIC SALPINGECTOMY Bilateral 8/5/2016    Procedure: LAPAROSCOPIC SALPINGECTOMY;  Surgeon: Adrianne Vergara MD;  [facility]     LAPAROSCOPIC TUBAL LIGATION Bilateral 5/22/2015    Procedure: LAPAROSCOPIC TUBAL LIGATION;  Surgeon: Hayley Zayas MD;  [facility]     LAPAROSCOPY OPERATIVE ADULT N/A 8/5/2016    Procedure: LAPAROSCOPY OPERATIVE ADULT;  Surgeon: Adrianne Vergara MD;  [facility]     SOFT TISSUE SURGERY Right     cyst in hand       Hx of Blood transfusions/reactions: denies     Hx of abnormal bleeding or anti-platelet use: denies- patient has  and plavix on med list for future neurological procedure     Menstrual history: Patient's last menstrual period was 03/14/2018 (approximate).    Steroid use in the last year: denies    Personal or FH with difficulty with Anesthesia:  denies    Prior to Admission Medications  Current Outpatient Medications   Medication Sig Dispense Refill     acetaminophen 500 MG CAPS Take 1,000 mg by mouth as needed        ALPRAZolam (XANAX) 2 MG tablet as needed        amphetamine-dextroamphetamine (ADDERALL) 20 MG tablet Take 20 mg by mouth 2 times daily        buPROPion (WELLBUTRIN XL) 150 MG 24 hr tablet Take 150 mg by mouth every morning       cloNIDine (CATAPRES) 0.1 MG tablet Take 1 tablet by mouth every morning        escitalopram (LEXAPRO) 20 MG tablet 20 mg every morning        estradiol (VIVELLE-DOT) 0.05 MG/24HR bi-weekly patch Place 1 patch onto the skin twice a week 8 patch 3     medical cannabis (Patient's own supply) See Admin Instructions (The purpose of this order is to document that the patient reports taking medical cannabis.  This is not a prescription, and is not used to certify that the patient has a qualifying medical condition.)        Multiple Vitamins-Minerals (MULTIVITAMIN ADULT PO) Take 1 tablet by mouth every morning        ondansetron (ZOFRAN ODT) 8 MG ODT tab Take  1 tablet (8 mg) by mouth every 8 hours as needed for nausea 12 tablet 0     SAPHRIS 5 MG SUBL sublingual tablet At Bedtime        VENTOLIN  (90 BASE) MCG/ACT inhaler Inhale 1-2 puffs into the lungs every 6 hours as needed for shortness of breath / dyspnea or wheezing   5     vitamin B-12 (CYANOCOBALAMIN) 1000 MCG tablet Take 1,000 mcg by mouth every morning        aspirin (ASA) 325 MG tablet Take 1 tablet (325 mg) by mouth daily (Patient taking differently: Take 325 mg by mouth daily ) 90 tablet 3     B Complex Vitamins (VITAMIN B COMPLEX PO)        clopidogrel (PLAVIX) 75 MG tablet Take 1 tablet (75 mg) by mouth daily (Patient taking differently: Take 75 mg by mouth daily ) 30 tablet 4     gabapentin (NEURONTIN) 300 MG capsule Take 1 capsule by mouth daily        methocarbamol (ROBAXIN) 500 MG tablet Take 1 tablet by mouth daily        prochlorperazine (COMPAZINE) 10 MG tablet Take 1 tablet (10 mg) by mouth every 6 hours as needed for nausea (Patient taking differently: Take 10 mg by mouth every 6 hours as needed for nausea ) 30 tablet 0       Allergies  Allergies   Allergen Reactions     Ibuprofen GI Disturbance     Seroquel [Quetiapine] Other (See Comments) and Rash     Insomnia       Social History  Social History     Socioeconomic History     Marital status: Single     Spouse name: Not on file     Number of children: Not on file     Years of education: Not on file     Highest education level: Not on file   Occupational History     Not on file   Social Needs     Financial resource strain: Not on file     Food insecurity     Worry: Not on file     Inability: Not on file     Transportation needs     Medical: Not on file     Non-medical: Not on file   Tobacco Use     Smoking status: Current Some Day Smoker     Packs/day: 0.25     Smokeless tobacco: Never Used     Tobacco comment: Trying   Substance and Sexual Activity     Alcohol use: Not Currently     Comment: rare     Drug use: Yes     Types: Marijuana      Comment: pt says she is prescribed medical marijuana     Sexual activity: Yes     Partners: Male     Birth control/protection: Female Surgical     Comment: TBL/hys   Lifestyle     Physical activity     Days per week: Not on file     Minutes per session: Not on file     Stress: Not on file   Relationships     Social connections     Talks on phone: Not on file     Gets together: Not on file     Attends Rastafari service: Not on file     Active member of club or organization: Not on file     Attends meetings of clubs or organizations: Not on file     Relationship status: Not on file     Intimate partner violence     Fear of current or ex partner: Not on file     Emotionally abused: Not on file     Physically abused: Not on file     Forced sexual activity: Not on file   Other Topics Concern     Parent/sibling w/ CABG, MI or angioplasty before 65F 55M? Yes   Social History Narrative     Not on file       Family History  Family History   Problem Relation Age of Onset     Other Cancer Brother 36        brain ca     Deep Vein Thrombosis (DVT) Brother      Other Cancer Father         leukemia     Deep Vein Thrombosis (DVT) Father      Other Cancer Maternal Aunt         2 aunts with glioblastomas     Breast Cancer No family hx of         no ovarian cancer     Anesthesia Reaction No family hx of        ROS/MED HX  ENT/Pulmonary:     (+) tobacco use (some day smoker),     Neurologic: Comment: Incidental ICA aneurysm recently found on imaging- will have procedure in the near future    ADD    (+) seizures,     Cardiovascular:     (+) -----Previous cardiac testing   Echo: Date: Results:    Stress Test: Date: Results:    ECG Reviewed: Date: 2/2021 Results:  NSR  Cath: Date: Results:   (-) taking anticoagulants/antiplatelets   METS/Exercise Tolerance:  4   Hematologic:  - neg hematologic  ROS  (-) history of blood transfusion   Musculoskeletal:  - neg musculoskeletal ROS     GI/Hepatic: Comment: H/o gastric band surgery now with  "dysphagia and forced maladaptive eating syndrome       Renal/Genitourinary:  - neg Renal ROS     Endo:  - neg endo ROS     Psychiatric/Substance Use:     (+) psychiatric history depression     Infectious Disease:  - neg infectious disease ROS     Malignancy:  - neg malignancy ROS     Other:            The complete review of systems is negative other than noted in the HPI or here.   Temp: 98  F (36.7  C) Temp src: Oral BP: 110/75 Pulse: 71   Resp: 16 SpO2: 98 %         210 lbs 0 oz  5' 9\"[PT REPORTED[   Body mass index is 31.01 kg/m .       Physical Exam  Constitutional: Awake, alert, cooperative, no apparent distress, and appears stated age.  Eyes: Pupils equal, round and reactive to light, extra ocular muscles intact, sclera clear, conjunctiva normal.  HENT: Normocephalic, oral pharynx with moist mucus membranes, edentulous  Respiratory: Clear to auscultation bilaterally, no crackles or wheezing.  Cardiovascular: Regular rate and rhythm, normal S1 and S2, and no murmur noted.  Carotids no bruits. No edema. Palpable pulses to radial arteries.   GI: Normal bowel sounds, soft, non-distended, non-tender  Genitourinary:  deferred  Skin: Warm and dry.   Musculoskeletal: Full ROM of neck. There is no redness, warmth, or swelling of the exposed joints. Gross motor strength is normal.    Neurologic: Awake, alert, oriented to name, place and time. Cranial nerves II-XII are grossly intact.   Neuropsychiatric: Calm, cooperative. Normal affect.     Labs: (personally reviewed)  Component      Latest Ref Rng & Units 3/16/2021   Sodium      133 - 144 mmol/L 139   Potassium      3.4 - 5.3 mmol/L 3.7   Chloride      94 - 109 mmol/L 108   Carbon Dioxide      20 - 32 mmol/L 28   Anion Gap      3 - 14 mmol/L 3   Glucose      70 - 99 mg/dL 98   Urea Nitrogen      7 - 30 mg/dL 8   Creatinine      0.52 - 1.04 mg/dL 0.75   GFR Estimate      >60 mL/min/1.73:m2 >90   GFR Estimate If Black      >60 mL/min/1.73:m2 >90   Calcium      8.5 - 10.1 " mg/dL 9.0   Bilirubin Total      0.2 - 1.3 mg/dL 0.3   Albumin      3.4 - 5.0 g/dL 4.0   Protein Total      6.8 - 8.8 g/dL 7.6   Alkaline Phosphatase      40 - 150 U/L 85   ALT      0 - 50 U/L 19   AST      0 - 45 U/L 7   WBC      4.0 - 11.0 10e9/L 10.2   RBC Count      3.8 - 5.2 10e12/L 4.65   Hemoglobin      11.7 - 15.7 g/dL 13.3   Hematocrit      35.0 - 47.0 % 41.0   MCV      78 - 100 fl 88   MCH      26.5 - 33.0 pg 28.6   MCHC      31.5 - 36.5 g/dL 32.4   RDW      10.0 - 15.0 % 14.8   Platelet Count      150 - 450 10e9/L 324   Hemoglobin A1C      0 - 5.6 % 5.3     EKG 2/2021    NSR            ASSESSMENT and PLAN  Maryanne Crockett is a 40 year old female scheduled for gastric band removal on 5/11/21 by Dr. Gold.  PAC referral for risk assessment and optimization for anesthesia with comorbid conditions of former tobacco use, seizure disorder, cerebral aneurysm, ADD, depression:                  Pre-operative considerations:         1.  Cardiac:  Functional status- METS 4. Denies cardiac symptoms. previous cardiac testing as above.  intermediate risk surgery with 0.9% (RCRI #) risk of major adverse cardiac event.                  2.  Pulm:  Airway feasible.  MARIELA risk: low.     ~some day tobacco use- encouraged smoking cessation and educated not to smoke DOS                3.  GI:  Risk of PONV score = 2.  If > 2, anti-emetic intervention recommended.         ~h/o gastric band now with dysphagia and forced maladaptive eating syndrome. She is now scheduled for the above procedure             4.  neuro: incidental cerebral aneurysm recently found. Patient was seen by neurosurgery and plans to undergo angiogram with treatment 6/2021. Per neurology, okay to proceed with lap band procedure prior to aneurysm treatment.   Recommend close hemodynamic monitoring and avoiding hypertension perioperatively.   ~ADD using Adderall    ~h/o seizures, not currently using AED. Patient using medical marijuana with improvement in symptoms.  "Reports most recent seizure was 7/2020.       5. psych: depression. currently using saphris, Lexapro, Wellbutrin and xanax      6. Anesthesia: Patient has a history of ketamine being used for anesthesia which has helped with postoperative pain. She has discussed undergoing \"ketamine coma\" treatment at [facility] for her pain, but is limited due to cost. She requests ketamine be used as part of anesthetic plan for the above procedure if appropriate.            VTE risk: 0.26%               Patient is optimized and is acceptable candidate for the proposed procedure.  No further diagnostic evaluation is needed.       Patient discussed with Dr. Ford      30 minutes were spent completing chart review, seeing the patient, reviewing labs and test results, discussing patient care with anesthesia and completing documentation       Naila Steve PA-C  Preoperative Assessment Center  [facility]  Phone: [phone]  Fax: [phone]  "

## 2021-05-04 NOTE — H&P
Pre-Operative H & P     CC:  Preoperative exam to assess for increased cardiopulmonary risk while undergoing surgery and anesthesia.    Date of Encounter: 5/4/2021  Primary Care Physician:  Shoshana Hyde  associated diagnosis:     HPI  Maryanne Crockett is a 40 year old female who presents for pre-operative H & P in preparation for Gastric band removal with Dr. Gold on 5/11/21 at [facility]. Patient is being evaluated for comorbid conditions of  former tobacco use, seizure disorder, cerebral aneurysm, ADD, depression        Ms. Crockett has a history of obesity s/p lap band. She has developed dysphagia and forced maladaptive eating syndrome since that time. She was seen by Dr. Gold to discuss lap band removal. She is now scheduled for the above procedure. 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 She was seen by neurology and is planning on treatment in June.       History is obtained from the patient and chart review.      Past Medical History  Past Medical History:   Diagnosis Date     Depressive disorder      Family history of glioblastoma     screening MRIs every 2 years     Ovarian cyst      Seizures (H)        Past Surgical History  Past Surgical History:   Procedure Laterality Date     CHOLECYSTECTOMY       CYSTOSCOPY N/A 9/1/2020    Procedure: CYSTOSCOPY;  Surgeon: Hayley Zayas MD;  [facility]     DAVINCI HYSTERECTOMY TOTAL, BILATERAL SALPINGO-OOPHORECTOMY, COMBINED Left 9/1/2020    Procedure: HYSTERECTOMY, TOTAL, ROBOT-ASSISTED, LAPAROSCOPIC,  left oophorectomy;  Surgeon: Hayley Zayas MD;  [facility]     DILATION AND CURETTAGE SUCTION  4/30/15    retained POC     GASTRIC RESTRICTIVE PROCEDURE, OPEN, REMOVE/REPLACE SUBCUTANEOUS PORT       IR CAROTID CEREBRAL ANGIOGRAM BILATERAL  3/19/2021     LAPAROSCOPIC OOPHORECTOMY Right 8/5/2016    Procedure: LAPAROSCOPIC OOPHORECTOMY;   Surgeon: Adrianne Vergara MD;  [facility]     LAPAROSCOPIC SALPINGECTOMY Bilateral 8/5/2016    Procedure: LAPAROSCOPIC SALPINGECTOMY;  Surgeon: Adrianne Vergara MD;  [facility]     LAPAROSCOPIC TUBAL LIGATION Bilateral 5/22/2015    Procedure: LAPAROSCOPIC TUBAL LIGATION;  Surgeon: Hayley Zayas MD;  [facility]     LAPAROSCOPY OPERATIVE ADULT N/A 8/5/2016    Procedure: LAPAROSCOPY OPERATIVE ADULT;  Surgeon: Adrianne Vergara MD;  [facility]     SOFT TISSUE SURGERY Right     cyst in hand       Hx of Blood transfusions/reactions: denies     Hx of abnormal bleeding or anti-platelet use: denies- patient has  and plavix on med list for future neurological procedure     Menstrual history: Patient's last menstrual period was 03/14/2018 (approximate).    Steroid use in the last year: denies    Personal or FH with difficulty with Anesthesia:  denies    Prior to Admission Medications  Current Outpatient Medications   Medication Sig Dispense Refill     acetaminophen 500 MG CAPS Take 1,000 mg by mouth as needed        ALPRAZolam (XANAX) 2 MG tablet as needed        amphetamine-dextroamphetamine (ADDERALL) 20 MG tablet Take 20 mg by mouth 2 times daily        buPROPion (WELLBUTRIN XL) 150 MG 24 hr tablet Take 150 mg by mouth every morning       cloNIDine (CATAPRES) 0.1 MG tablet Take 1 tablet by mouth every morning        escitalopram (LEXAPRO) 20 MG tablet 20 mg every morning        estradiol (VIVELLE-DOT) 0.05 MG/24HR bi-weekly patch Place 1 patch onto the skin twice a week 8 patch 3     medical cannabis (Patient's own supply) See Admin Instructions (The purpose of this order is to document that the patient reports taking medical cannabis.  This is not a prescription, and is not used to certify that the patient has a qualifying medical condition.)        Multiple Vitamins-Minerals (MULTIVITAMIN ADULT PO) Take 1 tablet by mouth every morning        ondansetron (ZOFRAN ODT) 8 MG ODT tab Take  1 tablet (8 mg) by mouth every 8 hours as needed for nausea 12 tablet 0     SAPHRIS 5 MG SUBL sublingual tablet At Bedtime        VENTOLIN  (90 BASE) MCG/ACT inhaler Inhale 1-2 puffs into the lungs every 6 hours as needed for shortness of breath / dyspnea or wheezing   5     vitamin B-12 (CYANOCOBALAMIN) 1000 MCG tablet Take 1,000 mcg by mouth every morning        aspirin (ASA) 325 MG tablet Take 1 tablet (325 mg) by mouth daily (Patient taking differently: Take 325 mg by mouth daily ) 90 tablet 3     B Complex Vitamins (VITAMIN B COMPLEX PO)        clopidogrel (PLAVIX) 75 MG tablet Take 1 tablet (75 mg) by mouth daily (Patient taking differently: Take 75 mg by mouth daily ) 30 tablet 4     gabapentin (NEURONTIN) 300 MG capsule Take 1 capsule by mouth daily        methocarbamol (ROBAXIN) 500 MG tablet Take 1 tablet by mouth daily        prochlorperazine (COMPAZINE) 10 MG tablet Take 1 tablet (10 mg) by mouth every 6 hours as needed for nausea (Patient taking differently: Take 10 mg by mouth every 6 hours as needed for nausea ) 30 tablet 0       Allergies  Allergies   Allergen Reactions     Ibuprofen GI Disturbance     Seroquel [Quetiapine] Other (See Comments) and Rash     Insomnia       Social History  Social History     Socioeconomic History     Marital status: Single     Spouse name: Not on file     Number of children: Not on file     Years of education: Not on file     Highest education level: Not on file   Occupational History     Not on file   Social Needs     Financial resource strain: Not on file     Food insecurity     Worry: Not on file     Inability: Not on file     Transportation needs     Medical: Not on file     Non-medical: Not on file   Tobacco Use     Smoking status: Current Some Day Smoker     Packs/day: 0.25     Smokeless tobacco: Never Used     Tobacco comment: Trying   Substance and Sexual Activity     Alcohol use: Not Currently     Comment: rare     Drug use: Yes     Types: Marijuana      Comment: pt says she is prescribed medical marijuana     Sexual activity: Yes     Partners: Male     Birth control/protection: Female Surgical     Comment: TBL/hys   Lifestyle     Physical activity     Days per week: Not on file     Minutes per session: Not on file     Stress: Not on file   Relationships     Social connections     Talks on phone: Not on file     Gets together: Not on file     Attends Baptism service: Not on file     Active member of club or organization: Not on file     Attends meetings of clubs or organizations: Not on file     Relationship status: Not on file     Intimate partner violence     Fear of current or ex partner: Not on file     Emotionally abused: Not on file     Physically abused: Not on file     Forced sexual activity: Not on file   Other Topics Concern     Parent/sibling w/ CABG, MI or angioplasty before 65F 55M? Yes   Social History Narrative     Not on file       Family History  Family History   Problem Relation Age of Onset     Other Cancer Brother 36        brain ca     Deep Vein Thrombosis (DVT) Brother      Other Cancer Father         leukemia     Deep Vein Thrombosis (DVT) Father      Other Cancer Maternal Aunt         2 aunts with glioblastomas     Breast Cancer No family hx of         no ovarian cancer     Anesthesia Reaction No family hx of        ROS/MED HX  ENT/Pulmonary:     (+) tobacco use (some day smoker),     Neurologic: Comment: Incidental ICA aneurysm recently found on imaging- will have procedure in the near future    ADD    (+) seizures,     Cardiovascular:     (+) -----Previous cardiac testing   Echo: Date: Results:    Stress Test: Date: Results:    ECG Reviewed: Date: 2/2021 Results:  NSR  Cath: Date: Results:   (-) taking anticoagulants/antiplatelets   METS/Exercise Tolerance:  4   Hematologic:  - neg hematologic  ROS  (-) history of blood transfusion   Musculoskeletal:  - neg musculoskeletal ROS     GI/Hepatic: Comment: H/o gastric band surgery now with  "dysphagia and forced maladaptive eating syndrome       Renal/Genitourinary:  - neg Renal ROS     Endo:  - neg endo ROS     Psychiatric/Substance Use:     (+) psychiatric history depression     Infectious Disease:  - neg infectious disease ROS     Malignancy:  - neg malignancy ROS     Other:            The complete review of systems is negative other than noted in the HPI or here.   Temp: 98  F (36.7  C) Temp src: Oral BP: 110/75 Pulse: 71   Resp: 16 SpO2: 98 %         210 lbs 0 oz  5' 9\"[PT REPORTED[   Body mass index is 31.01 kg/m .       Physical Exam  Constitutional: Awake, alert, cooperative, no apparent distress, and appears stated age.  Eyes: Pupils equal, round and reactive to light, extra ocular muscles intact, sclera clear, conjunctiva normal.  HENT: Normocephalic, oral pharynx with moist mucus membranes, edentulous  Respiratory: Clear to auscultation bilaterally, no crackles or wheezing.  Cardiovascular: Regular rate and rhythm, normal S1 and S2, and no murmur noted.  Carotids no bruits. No edema. Palpable pulses to radial arteries.   GI: Normal bowel sounds, soft, non-distended, non-tender  Genitourinary:  deferred  Skin: Warm and dry.   Musculoskeletal: Full ROM of neck. There is no redness, warmth, or swelling of the exposed joints. Gross motor strength is normal.    Neurologic: Awake, alert, oriented to name, place and time. Cranial nerves II-XII are grossly intact.   Neuropsychiatric: Calm, cooperative. Normal affect.     Labs: (personally reviewed)  Component      Latest Ref Rng & Units 3/16/2021   Sodium      133 - 144 mmol/L 139   Potassium      3.4 - 5.3 mmol/L 3.7   Chloride      94 - 109 mmol/L 108   Carbon Dioxide      20 - 32 mmol/L 28   Anion Gap      3 - 14 mmol/L 3   Glucose      70 - 99 mg/dL 98   Urea Nitrogen      7 - 30 mg/dL 8   Creatinine      0.52 - 1.04 mg/dL 0.75   GFR Estimate      >60 mL/min/1.73:m2 >90   GFR Estimate If Black      >60 mL/min/1.73:m2 >90   Calcium      8.5 - 10.1 " mg/dL 9.0   Bilirubin Total      0.2 - 1.3 mg/dL 0.3   Albumin      3.4 - 5.0 g/dL 4.0   Protein Total      6.8 - 8.8 g/dL 7.6   Alkaline Phosphatase      40 - 150 U/L 85   ALT      0 - 50 U/L 19   AST      0 - 45 U/L 7   WBC      4.0 - 11.0 10e9/L 10.2   RBC Count      3.8 - 5.2 10e12/L 4.65   Hemoglobin      11.7 - 15.7 g/dL 13.3   Hematocrit      35.0 - 47.0 % 41.0   MCV      78 - 100 fl 88   MCH      26.5 - 33.0 pg 28.6   MCHC      31.5 - 36.5 g/dL 32.4   RDW      10.0 - 15.0 % 14.8   Platelet Count      150 - 450 10e9/L 324   Hemoglobin A1C      0 - 5.6 % 5.3     EKG 2/2021    NSR            ASSESSMENT and PLAN  Maryanne Crockett is a 40 year old female scheduled for gastric band removal on 5/11/21 by Dr. Gold.  PAC referral for risk assessment and optimization for anesthesia with comorbid conditions of former tobacco use, seizure disorder, cerebral aneurysm, ADD, depression:                  Pre-operative considerations:         1.  Cardiac:  Functional status- METS 4. Denies cardiac symptoms. previous cardiac testing as above.  intermediate risk surgery with 0.9% (RCRI #) risk of major adverse cardiac event.                  2.  Pulm:  Airway feasible.  MARIELA risk: low.     ~some day tobacco use- encouraged smoking cessation and educated not to smoke DOS                3.  GI:  Risk of PONV score = 2.  If > 2, anti-emetic intervention recommended.         ~h/o gastric band now with dysphagia and forced maladaptive eating syndrome. She is now scheduled for the above procedure             4.  neuro: incidental cerebral aneurysm recently found. Patient was seen by neurosurgery and plans to undergo angiogram with treatment 6/2021. Per neurology, okay to proceed with lap band procedure prior to aneurysm treatment.   Recommend close hemodynamic monitoring and avoiding hypertension perioperatively.   ~ADD using Adderall    ~h/o seizures, not currently using AED. Patient using medical marijuana with improvement in symptoms.  "Reports most recent seizure was 7/2020.       5. psych: depression. currently using saphris, Lexapro, Wellbutrin and xanax      6. Anesthesia: Patient has a history of ketamine being used for anesthesia which has helped with postoperative pain. She has discussed undergoing \"ketamine coma\" treatment at [facility] for her pain, but is limited due to cost. She requests ketamine be used as part of anesthetic plan for the above procedure if appropriate.            VTE risk: 0.26%               Patient is optimized and is acceptable candidate for the proposed procedure.  No further diagnostic evaluation is needed.       Patient discussed with Dr. Ford      30 minutes were spent completing chart review, seeing the patient, reviewing labs and test results, discussing patient care with anesthesia and completing documentation       Naila Steve PA-C  Preoperative Assessment Center  [facility]  Phone: [phone]  Fax: [phone]  "

## 2021-05-04 NOTE — PATIENT INSTRUCTIONS
Preparing for Your Surgery      Name:  Maryanne Crockett   MRN:  [id number]   :  1980   Today's Date:  2021       Arriving for surgery:  Surgery date:  21  Arrival time:  9:50 am    Restrictions due to COVID 19:  One consistent visitor per patient is allowed.  The visitor will be allowed in the pre-op area.  Visitors are asked to leave the building during the surgery.  No ill visitors.  All visitors must wear face mask.    [employer] parking is available for anyone with mobility limitations or disabilities.  ([employer]  24 hours/ 7 days a week; [employer]  7 am- 3:30 pm, Mon- Fri)    Please come to:     [facility] [address]  [address] [place] [address]     -    On arrival to hospital, you will be asked some screening questions and directed to Patient Registration. Patient Registration will then give you further instructions.  [phone]?     - ?If you are in need of directions, wheelchair or escort please stop at the Information Desk in the lobby.  Inform the information person that you are here for surgery; a wheelchair and escort to [address] will be provided.?     What can I eat or drink?  -  Starting the day prior to surgery, begin clear liquid diet.  -  You may have clear liquids until 12 midnight, the day of surgery.  -  From 12 midnight until 8:50 the day of surgery, sips of water only with medications.  -  Nothing by mouth after 8:50 am the day of surgery.    Examples of clear liquids:  Water  Clear broth  Juices (apple, white grape, white cranberry  and cider) without pulp  Noncarbonated, powder based beverages  (lemonade and Alan-Aid)  Sodas (Sprite, 7-Up, ginger ale and seltzer)  Coffee or tea (without milk or cream)  Gatorade    -  No Alcohol for at least 24 hours before surgery     Which medicines can I take?  Hold Aspirin for 7 days before surgery.   * Hold Multivitamins for 7 days before surgery. Hold Multivitamin starting  now, if you have not already.  Hold Supplements for 7 days before surgery.  Hold Naproxen (Aleve) for 4 days before surgery.    -  DO NOT take these medications the day of surgery:  Amphetamine-Dextroamphetamine (Adderall), Vitamin B-12, B Complex vitamins    -  PLEASE TAKE these medications the day of surgery:  Bupropion (Wellbutrin), Clonidine (Catapres), Escitalopram (Lexapro), Gabapentin,   Acetaminophen (Tylenol) if needed  Alprazolam (Xanax) if needed  Ondansetron (Zofran) if needed  Ventolin inhaler if needed    How do I prepare myself?  -  Bring Ventolin inhaler to hospital  - Please take 2 showers before surgery using Scrubcare or Hibiclens soap.    Use this soap only from the neck to your toes.     Leave the soap on your skin for one minute--then rinse thoroughly.      You may use your own shampoo and conditioner; no other hair products.   - Please remove all jewelry and body piercings.  - No lotions, deodorants or fragrance.  - No makeup or fingernail polish.   - Bring your ID and insurance card.    - All patients are required to have a Covid-19 test within 4 days of surgery/procedure.      -Patients will be contacted by the [facility] scheduling team within 1 week of surgery to make an appointment.      - Patients may call the Scheduling team at [phone] if they have not been scheduled within 4 days of  surgery.      ALL PATIENTS GOING HOME THE SAME DAY OF SURGERY ARE REQUIRED TO HAVE A RESPONSIBLE ADULT TO DRIVE AND BE IN ATTENDANCE WITH THEM FOR 24 HOURS FOLLOWING SURGERY.    IF THE RESPONSIBLE ADULT IS REQUIRED FOR POST OP TEACHING THE POST OP RN WILL ASK THEM TO COME BACK TO THE RECOVERY AREA.    Questions or Concerns:    - For any questions regarding the day of surgery or your hospital stay, please contact the Pre Admission Nursing Office at [phone].       - If you have health changes between today and your surgery please call your surgeon.       For questions after surgery please  call your surgeons office.     AFTER YOUR SURGERY  Breathing exercises   Breathing exercises help you recover faster. Take deep breaths and let the air out slowly. This will:     Help you wake up after surgery.    Help prevent complications like pneumonia.  Preventing complications will help you go home sooner.   Nausea and vomiting   You may feel sick to your stomach after surgery; if so, let your nurse know.    Pain control:  After surgery, you may have pain. Our goal is to help you manage your pain. Pain medicine will help you feel comfortable enough to do activities that will help you heal.  These activities may include breathing exercises, walking and physical therapy.   To help your health care team treat your pain we will ask: 1) If you have pain  2) where it is located 3) describe your pain in your words  Methods of pain control include medications given by mouth, vein or by nerve block for some surgeries.  Sequential Compression Device (SCD):  You may need to wear SCD S (also called pneumo boots)on your legs or feet. These are wraps connected to a machine that pumps in air and releases it. The repeated pumping helps prevent blood clots from forming.

## 2021-05-04 NOTE — ANESTHESIA PREPROCEDURE EVALUATION
Anesthesia Pre-Procedure Evaluation    Patient: Maryanne Crockett   MRN: [id number] : 1980        Preoperative Diagnosis: Gastric banding status [Z98.84]  Esophageal dysphagia [R13.10]   Procedure : Procedure(s):  REMOVAL, GASTRIC BAND, ADJUSTABLE, LAPAROSCOPIC     Past Medical History:   Diagnosis Date     Depressive disorder      Family history of glioblastoma     screening MRIs every 2 years     Ovarian cyst      Seizures (H)       Past Surgical History:   Procedure Laterality Date     CHOLECYSTECTOMY       CYSTOSCOPY N/A 2020    Procedure: CYSTOSCOPY;  Surgeon: Hayley Zayas MD;  [facility]     DAVINCI HYSTERECTOMY TOTAL, BILATERAL SALPINGO-OOPHORECTOMY, COMBINED Left 2020    Procedure: HYSTERECTOMY, TOTAL, ROBOT-ASSISTED, LAPAROSCOPIC,  left oophorectomy;  Surgeon: Hayley Zayas MD;  [facility]     DILATION AND CURETTAGE SUCTION  4/30/15    retained POC     GASTRIC RESTRICTIVE PROCEDURE, OPEN, REMOVE/REPLACE SUBCUTANEOUS PORT       IR CAROTID CEREBRAL ANGIOGRAM BILATERAL  3/19/2021     LAPAROSCOPIC OOPHORECTOMY Right 2016    Procedure: LAPAROSCOPIC OOPHORECTOMY;  Surgeon: Adrianne Vergara MD;  [facility]     LAPAROSCOPIC SALPINGECTOMY Bilateral 2016    Procedure: LAPAROSCOPIC SALPINGECTOMY;  Surgeon: Adrianne Vergara MD;  [facility]     LAPAROSCOPIC TUBAL LIGATION Bilateral 2015    Procedure: LAPAROSCOPIC TUBAL LIGATION;  Surgeon: Hayley Zayas MD;  [facility]     LAPAROSCOPY OPERATIVE ADULT N/A 2016    Procedure: LAPAROSCOPY OPERATIVE ADULT;  Surgeon: Adrianne Vergara MD;  [facility]     SOFT TISSUE SURGERY Right     cyst in hand      Allergies   Allergen Reactions     Ibuprofen GI Disturbance     Seroquel [Quetiapine] Other (See Comments) and Rash     Insomnia      Social History     Tobacco Use     Smoking status: Current Some Day Smoker     Packs/day: 0.25     Smokeless tobacco: Never Used     Tobacco comment: Trying    Substance Use Topics     Alcohol use: Yes     Alcohol/week: 0.0 standard drinks     Comment: rare      Wt Readings from Last 1 Encounters:   03/19/21 86.4 kg (190 lb 7.6 oz)        Anesthesia Evaluation   Pt has had prior anesthetic.     No history of anesthetic complications       ROS/MED HX  ENT/Pulmonary:     (+) tobacco use (some day smoker), Intermittent, asthma Treatment: Inhaler prn,      Neurologic: Comment: Incidental ICA aneurysm recently found on imaging- will have procedure in the near future    ADD    (+) seizures, last seizure: 7/2020,     Cardiovascular:     (+) -----Previous cardiac testing   Echo: Date: Results:    Stress Test: Date: Results:    ECG Reviewed: Date: 2/2021 Results:  NSR  Cath: Date: Results:   (-) taking anticoagulants/antiplatelets and murmur   METS/Exercise Tolerance: 4 - Raking leaves, gardening Comment: Can walk multiple blocks and ascend stairs without exertional symptoms   Hematologic:  - neg hematologic  ROS  (-) history of blood transfusion   Musculoskeletal:  - neg musculoskeletal ROS     GI/Hepatic: Comment: H/o gastric band surgery now with dysphagia and forced maladaptive eating syndrome       Renal/Genitourinary:  - neg Renal ROS     Endo:  - neg endo ROS     Psychiatric/Substance Use:     (+) psychiatric history depression     Infectious Disease:  - neg infectious disease ROS     Malignancy:  - neg malignancy ROS     Other:            Physical Exam    Airway        Mallampati: II   TM distance: > 3 FB   Neck ROM: full   Mouth opening: > 3 cm    Respiratory Devices and Support         Dental       (+) upper dentures and lower dentures      Cardiovascular          Rhythm and rate: regular and normal (-) no peripheral edema and no murmur    Pulmonary   pulmonary exam normal        breath sounds clear to auscultation           OUTSIDE LABS:  CBC:   Lab Results   Component Value Date    WBC 10.2 03/16/2021    WBC 14.2 (H) 02/24/2021    HGB 13.3 03/16/2021    HGB 15.4  02/24/2021    HCT 41.0 03/16/2021    HCT 44.5 02/24/2021     03/16/2021     02/24/2021     BMP:   Lab Results   Component Value Date     03/16/2021     02/24/2021    POTASSIUM 3.7 03/16/2021    POTASSIUM 3.5 02/24/2021    CHLORIDE 108 03/16/2021    CHLORIDE 105 02/24/2021    CO2 28 03/16/2021    CO2 25 02/24/2021    BUN 8 03/16/2021    BUN 6 (L) 02/24/2021    CR 0.75 03/16/2021    CR 0.73 03/16/2021    GLC 98 03/16/2021     (H) 02/24/2021     COAGS:   Lab Results   Component Value Date    PTT 28 02/24/2021    INR 1.07 03/16/2021     POC:   Lab Results   Component Value Date     (H) 02/24/2021    HCG Negative 04/28/2020    HCGS Negative 06/23/2020     HEPATIC:   Lab Results   Component Value Date    ALBUMIN 4.0 03/16/2021    PROTTOTAL 7.6 03/16/2021    ALT 19 03/16/2021    AST 7 03/16/2021    ALKPHOS 85 03/16/2021    BILITOTAL 0.3 03/16/2021     OTHER:   Lab Results   Component Value Date    LACT 1.7 03/21/2018    A1C 5.3 03/16/2021    SOFI 9.0 03/16/2021    MAG 2.3 02/13/2019    LIPASE 83 06/23/2020    TSH 1.12 03/16/2021       Anesthesia Plan    ASA Status:  3   NPO Status:  NPO Appropriate    Anesthesia Type: General.     - Airway: ETT   Induction: Intravenous.   Maintenance: Inhalation.        Consents    Anesthesia Plan(s) and associated risks, benefits, and realistic alternatives discussed. Questions answered and patient/representative(s) expressed understanding.     - Discussed with:  Patient         Postoperative Care    Pain management: IV analgesics.   PONV prophylaxis: Ondansetron (or other 5HT-3), Dexamethasone or Solumedrol     Comments:              PAC Discussion and Assessment    ASA Classification: 3  Case is suitable for: Ronan  Anesthetic techniques and relevant risks discussed: GA                  PAC Resident/NP Anesthesia Assessment: Maryanne Crockett is a 40 year old female scheduled for gastric band removal on 5/11/21 by Dr. Gold.  PAC referral for risk  "assessment and optimization for anesthesia with comorbid conditions of former tobacco use, seizure disorder, cerebral aneurysm, ADD, depression:                  Pre-operative considerations:         1.  Cardiac:  Functional status- METS 4. Denies cardiac symptoms. previous cardiac testing as above.  intermediate risk surgery with 0.9% (RCRI #) risk of major adverse cardiac event.                  2.  Pulm:  Airway feasible.  MARIELA risk: low.     ~some day tobacco use- encouraged smoking cessation and educated not to smoke DOS                3.  GI:  Risk of PONV score = 2.  If > 2, anti-emetic intervention recommended.         ~h/o gastric band now with dysphagia and forced maladaptive eating syndrome. She is now scheduled for the above procedure             4.  neuro: incidental cerebral aneurysm recently found. Patient was seen by neurosurgery and plans to undergo angiogram with treatment 6/2021. Per neurology, okay to proceed with lap band procedure prior to aneurysm treatment.   Recommend close hemodynamic monitoring and avoiding hypertension perioperatively.   ~ADD using Adderall    ~h/o seizures, not currently using AED. Patient using medical marijuana with improvement in symptoms. Reports most recent seizure was 7/2020.       5. psych: depression. currently using saphris, Lexapro, Wellbutrin and xanax      6. Anesthesia: Patient has a history of ketamine being used for anesthesia which has helped with postoperative pain. She has discussed undergoing \"ketamine coma\" treatment at [facility] for her pain, but is limited due to cost. She requests ketamine be used as part of anesthetic plan for the above procedure if appropriate.            VTE risk: 0.26%               Patient is optimized and is acceptable candidate for the proposed procedure.  No further diagnostic evaluation is needed.       Patient discussed with Dr. Ford               **For further details of assessment, testing, and physical exam please see H and " P completed on same date.                           Naila Steve PA-C                     Attending Anesthesiologist Anesthesia Assessment: 40 year old for removal of lap band in patient with recently diagnosed ICA aneurysm. Neurology aware of this procedure, an dthey are comfortable with her having this procedure, then having the cerebral angio and repair of aneurysm.     Patient/case discussed with DANGELO/resident; agree with above assessment. No need to see patient. Patient is appropriate for the planned procedure without further work-up or medical management.    MD Naila Biswas PA-C     I have seen and evaluated the patient myself and agree with the above assessment and plan.  I have explained the risks/benefits of anesthesia to the patient who understands and agrees to proceed.    Nicole Do MD  Staff Anesthesiologist  Pager [phone]

## 2021-05-06 NOTE — OR NURSING
[place] back from Alla Mi RN for Dr. Gold and patient does not need to do a clear liquid prep. NPO after 12 midnight and sips of water only with medications after 12 midnight.   Alla was called and was told the above instructions. Had no questions at this time.

## 2021-05-10 ENCOUNTER — TELEPHONE (OUTPATIENT)
Dept: ENDOCRINOLOGY | Facility: CLINIC | Age: 41
End: 2021-05-10

## 2021-05-10 DIAGNOSIS — Z01.818 PREOP TESTING: Primary | ICD-10-CM

## 2021-05-10 NOTE — TELEPHONE ENCOUNTER
Patient scheduled for band removal tomorrow with Dr. Gold, states she did not get a COVID test. She is in [place]. I faxed a STAT COVID lab request to [facility] [phone]. Could not tell patient if this would be at the hospital or the clinic.

## 2021-05-11 ENCOUNTER — HOSPITAL ENCOUNTER (OUTPATIENT)
Facility: CLINIC | Age: 41
Discharge: HOME | End: 2021-05-11
Attending: SURGERY | Admitting: SURGERY
Payer: COMMERCIAL

## 2021-05-11 ENCOUNTER — ANESTHESIA (OUTPATIENT)
Dept: SURGERY | Facility: CLINIC | Age: 41
End: 2021-05-11
Payer: COMMERCIAL

## 2021-05-11 VITALS
OXYGEN SATURATION: 93 % | SYSTOLIC BLOOD PRESSURE: 141 MMHG | TEMPERATURE: 97.2 F | BODY MASS INDEX: 29.01 KG/M2 | RESPIRATION RATE: 16 BRPM | WEIGHT: 207.23 LBS | HEART RATE: 68 BPM | HEIGHT: 71 IN | DIASTOLIC BLOOD PRESSURE: 98 MMHG

## 2021-05-11 DIAGNOSIS — Z98.84 GASTRIC BANDING STATUS: ICD-10-CM

## 2021-05-11 DIAGNOSIS — R13.19 ESOPHAGEAL DYSPHAGIA: ICD-10-CM

## 2021-05-11 LAB — GLUCOSE BLDC GLUCOMTR-MCNC: 125 MG/DL (ref 70–99)

## 2021-05-11 PROCEDURE — 250N000011 HC RX IP 250 OP 636: Performed by: NURSE ANESTHETIST, CERTIFIED REGISTERED

## 2021-05-11 PROCEDURE — 258N000003 HC RX IP 258 OP 636: Performed by: ANESTHESIOLOGY

## 2021-05-11 PROCEDURE — 272N000001 HC OR GENERAL SUPPLY STERILE: Performed by: SURGERY

## 2021-05-11 PROCEDURE — 250N000011 HC RX IP 250 OP 636: Performed by: SURGERY

## 2021-05-11 PROCEDURE — 250N000009 HC RX 250: Performed by: NURSE ANESTHETIST, CERTIFIED REGISTERED

## 2021-05-11 PROCEDURE — 360N000077 HC SURGERY LEVEL 4, PER MIN: Performed by: SURGERY

## 2021-05-11 PROCEDURE — 250N000025 HC SEVOFLURANE, PER MIN: Performed by: SURGERY

## 2021-05-11 PROCEDURE — 88304 TISSUE EXAM BY PATHOLOGIST: CPT | Mod: 26 | Performed by: PATHOLOGY

## 2021-05-11 PROCEDURE — 710N000010 HC RECOVERY PHASE 1, LEVEL 2, PER MIN: Performed by: SURGERY

## 2021-05-11 PROCEDURE — 250N000013 HC RX MED GY IP 250 OP 250 PS 637: Performed by: ANESTHESIOLOGY

## 2021-05-11 PROCEDURE — 370N000017 HC ANESTHESIA TECHNICAL FEE, PER MIN: Performed by: SURGERY

## 2021-05-11 PROCEDURE — 88304 TISSUE EXAM BY PATHOLOGIST: CPT | Mod: TC | Performed by: SURGERY

## 2021-05-11 PROCEDURE — 999N000141 HC STATISTIC PRE-PROCEDURE NURSING ASSESSMENT: Performed by: SURGERY

## 2021-05-11 PROCEDURE — 82962 GLUCOSE BLOOD TEST: CPT

## 2021-05-11 PROCEDURE — 710N000012 HC RECOVERY PHASE 2, PER MINUTE: Performed by: SURGERY

## 2021-05-11 PROCEDURE — 250N000011 HC RX IP 250 OP 636: Performed by: ANESTHESIOLOGY

## 2021-05-11 RX ORDER — NALOXONE HYDROCHLORIDE 0.4 MG/ML
0.4 INJECTION, SOLUTION INTRAMUSCULAR; INTRAVENOUS; SUBCUTANEOUS
Status: DISCONTINUED | OUTPATIENT
Start: 2021-05-11 | End: 2021-05-11 | Stop reason: HOSPADM

## 2021-05-11 RX ORDER — FENTANYL CITRATE 50 UG/ML
INJECTION, SOLUTION INTRAMUSCULAR; INTRAVENOUS PRN
Status: DISCONTINUED | OUTPATIENT
Start: 2021-05-11 | End: 2021-05-11

## 2021-05-11 RX ORDER — FENTANYL CITRATE 50 UG/ML
25-50 INJECTION, SOLUTION INTRAMUSCULAR; INTRAVENOUS
Status: DISCONTINUED | OUTPATIENT
Start: 2021-05-11 | End: 2021-05-11 | Stop reason: HOSPADM

## 2021-05-11 RX ORDER — OXYCODONE AND ACETAMINOPHEN 5; 325 MG/1; MG/1
1 TABLET ORAL EVERY 6 HOURS PRN
Qty: 15 TABLET | Refills: 0 | Status: SHIPPED | OUTPATIENT
Start: 2021-05-11 | End: 2021-06-15

## 2021-05-11 RX ORDER — OXYCODONE AND ACETAMINOPHEN 5; 325 MG/1; MG/1
1 TABLET ORAL ONCE
Status: COMPLETED | OUTPATIENT
Start: 2021-05-11 | End: 2021-05-11

## 2021-05-11 RX ORDER — ONDANSETRON 2 MG/ML
INJECTION INTRAMUSCULAR; INTRAVENOUS PRN
Status: DISCONTINUED | OUTPATIENT
Start: 2021-05-11 | End: 2021-05-11

## 2021-05-11 RX ORDER — LABETALOL HYDROCHLORIDE 5 MG/ML
INJECTION, SOLUTION INTRAVENOUS PRN
Status: DISCONTINUED | OUTPATIENT
Start: 2021-05-11 | End: 2021-05-11

## 2021-05-11 RX ORDER — PROPOFOL 10 MG/ML
INJECTION, EMULSION INTRAVENOUS PRN
Status: DISCONTINUED | OUTPATIENT
Start: 2021-05-11 | End: 2021-05-11

## 2021-05-11 RX ORDER — ONDANSETRON 4 MG/1
4 TABLET, ORALLY DISINTEGRATING ORAL EVERY 30 MIN PRN
Status: DISCONTINUED | OUTPATIENT
Start: 2021-05-11 | End: 2021-05-11 | Stop reason: HOSPADM

## 2021-05-11 RX ORDER — HYDROCODONE BITARTRATE AND ACETAMINOPHEN 5; 325 MG/1; MG/1
1 TABLET ORAL EVERY 6 HOURS PRN
Qty: 15 TABLET | Refills: 0 | Status: SHIPPED | OUTPATIENT
Start: 2021-05-11 | End: 2021-05-11

## 2021-05-11 RX ORDER — LABETALOL HYDROCHLORIDE 5 MG/ML
10 INJECTION, SOLUTION INTRAVENOUS
Status: DISCONTINUED | OUTPATIENT
Start: 2021-05-11 | End: 2021-05-11 | Stop reason: HOSPADM

## 2021-05-11 RX ORDER — ONDANSETRON 2 MG/ML
4 INJECTION INTRAMUSCULAR; INTRAVENOUS EVERY 30 MIN PRN
Status: DISCONTINUED | OUTPATIENT
Start: 2021-05-11 | End: 2021-05-11 | Stop reason: HOSPADM

## 2021-05-11 RX ORDER — NALOXONE HYDROCHLORIDE 0.4 MG/ML
0.2 INJECTION, SOLUTION INTRAMUSCULAR; INTRAVENOUS; SUBCUTANEOUS
Status: DISCONTINUED | OUTPATIENT
Start: 2021-05-11 | End: 2021-05-11 | Stop reason: HOSPADM

## 2021-05-11 RX ORDER — DEXAMETHASONE SODIUM PHOSPHATE 4 MG/ML
INJECTION, SOLUTION INTRA-ARTICULAR; INTRALESIONAL; INTRAMUSCULAR; INTRAVENOUS; SOFT TISSUE PRN
Status: DISCONTINUED | OUTPATIENT
Start: 2021-05-11 | End: 2021-05-11

## 2021-05-11 RX ORDER — SODIUM CHLORIDE, SODIUM LACTATE, POTASSIUM CHLORIDE, CALCIUM CHLORIDE 600; 310; 30; 20 MG/100ML; MG/100ML; MG/100ML; MG/100ML
INJECTION, SOLUTION INTRAVENOUS CONTINUOUS
Status: DISCONTINUED | OUTPATIENT
Start: 2021-05-11 | End: 2021-05-11 | Stop reason: HOSPADM

## 2021-05-11 RX ORDER — LIDOCAINE 40 MG/G
CREAM TOPICAL
Status: CANCELLED | OUTPATIENT
Start: 2021-05-11

## 2021-05-11 RX ORDER — HYDROMORPHONE HYDROCHLORIDE 1 MG/ML
.3-.5 INJECTION, SOLUTION INTRAMUSCULAR; INTRAVENOUS; SUBCUTANEOUS EVERY 5 MIN PRN
Status: DISCONTINUED | OUTPATIENT
Start: 2021-05-11 | End: 2021-05-11 | Stop reason: HOSPADM

## 2021-05-11 RX ORDER — CEFAZOLIN SODIUM 2 G/100ML
2 INJECTION, SOLUTION INTRAVENOUS
Status: COMPLETED | OUTPATIENT
Start: 2021-05-11 | End: 2021-05-11

## 2021-05-11 RX ORDER — LIDOCAINE HYDROCHLORIDE 20 MG/ML
INJECTION, SOLUTION INFILTRATION; PERINEURAL PRN
Status: DISCONTINUED | OUTPATIENT
Start: 2021-05-11 | End: 2021-05-11

## 2021-05-11 RX ORDER — CEFAZOLIN SODIUM 2 G/100ML
2 INJECTION, SOLUTION INTRAVENOUS SEE ADMIN INSTRUCTIONS
Status: DISCONTINUED | OUTPATIENT
Start: 2021-05-11 | End: 2021-05-11 | Stop reason: HOSPADM

## 2021-05-11 RX ADMIN — FENTANYL CITRATE 50 MCG: 50 INJECTION, SOLUTION INTRAMUSCULAR; INTRAVENOUS at 12:13

## 2021-05-11 RX ADMIN — LABETALOL HYDROCHLORIDE 10 MG: 5 INJECTION INTRAVENOUS at 12:55

## 2021-05-11 RX ADMIN — MIDAZOLAM 2 MG: 1 INJECTION INTRAMUSCULAR; INTRAVENOUS at 14:03

## 2021-05-11 RX ADMIN — SODIUM CHLORIDE, POTASSIUM CHLORIDE, SODIUM LACTATE AND CALCIUM CHLORIDE: 600; 310; 30; 20 INJECTION, SOLUTION INTRAVENOUS at 12:13

## 2021-05-11 RX ADMIN — FENTANYL CITRATE 50 MCG: 50 INJECTION, SOLUTION INTRAMUSCULAR; INTRAVENOUS at 12:56

## 2021-05-11 RX ADMIN — LIDOCAINE HYDROCHLORIDE 80 MG: 20 INJECTION, SOLUTION INFILTRATION; PERINEURAL at 12:13

## 2021-05-11 RX ADMIN — FENTANYL CITRATE 50 MCG: 50 INJECTION, SOLUTION INTRAMUSCULAR; INTRAVENOUS at 14:44

## 2021-05-11 RX ADMIN — FENTANYL CITRATE 50 MCG: 50 INJECTION, SOLUTION INTRAMUSCULAR; INTRAVENOUS at 13:49

## 2021-05-11 RX ADMIN — FENTANYL CITRATE 50 MCG: 50 INJECTION, SOLUTION INTRAMUSCULAR; INTRAVENOUS at 13:36

## 2021-05-11 RX ADMIN — OXYCODONE HYDROCHLORIDE AND ACETAMINOPHEN 1 TABLET: 5; 325 TABLET ORAL at 15:46

## 2021-05-11 RX ADMIN — FENTANYL CITRATE 50 MCG: 50 INJECTION, SOLUTION INTRAMUSCULAR; INTRAVENOUS at 12:42

## 2021-05-11 RX ADMIN — ROCURONIUM BROMIDE 50 MG: 10 INJECTION INTRAVENOUS at 12:13

## 2021-05-11 RX ADMIN — LABETALOL HYDROCHLORIDE 10 MG: 5 INJECTION INTRAVENOUS at 12:49

## 2021-05-11 RX ADMIN — HYDROMORPHONE HYDROCHLORIDE 0.5 MG: 1 INJECTION, SOLUTION INTRAMUSCULAR; INTRAVENOUS; SUBCUTANEOUS at 14:24

## 2021-05-11 RX ADMIN — FENTANYL CITRATE 50 MCG: 50 INJECTION, SOLUTION INTRAMUSCULAR; INTRAVENOUS at 12:50

## 2021-05-11 RX ADMIN — PROPOFOL 140 MG: 10 INJECTION, EMULSION INTRAVENOUS at 12:13

## 2021-05-11 RX ADMIN — HYDROMORPHONE HYDROCHLORIDE 0.5 MG: 1 INJECTION, SOLUTION INTRAMUSCULAR; INTRAVENOUS; SUBCUTANEOUS at 13:55

## 2021-05-11 RX ADMIN — DEXAMETHASONE SODIUM PHOSPHATE 8 MG: 4 INJECTION, SOLUTION INTRA-ARTICULAR; INTRALESIONAL; INTRAMUSCULAR; INTRAVENOUS; SOFT TISSUE at 12:27

## 2021-05-11 RX ADMIN — FENTANYL CITRATE 50 MCG: 50 INJECTION, SOLUTION INTRAMUSCULAR; INTRAVENOUS at 13:44

## 2021-05-11 RX ADMIN — MIDAZOLAM 2 MG: 1 INJECTION INTRAMUSCULAR; INTRAVENOUS at 12:05

## 2021-05-11 RX ADMIN — SUGAMMADEX 200 MG: 100 INJECTION, SOLUTION INTRAVENOUS at 13:31

## 2021-05-11 RX ADMIN — CEFAZOLIN 2 G: 10 INJECTION, POWDER, FOR SOLUTION INTRAVENOUS at 12:25

## 2021-05-11 RX ADMIN — ONDANSETRON 4 MG: 2 INJECTION INTRAMUSCULAR; INTRAVENOUS at 12:27

## 2021-05-11 RX ADMIN — SODIUM CHLORIDE, POTASSIUM CHLORIDE, SODIUM LACTATE AND CALCIUM CHLORIDE: 600; 310; 30; 20 INJECTION, SOLUTION INTRAVENOUS at 13:50

## 2021-05-11 ASSESSMENT — MIFFLIN-ST. JEOR: SCORE: 1706.13

## 2021-05-11 NOTE — ANESTHESIA CARE TRANSFER NOTE
Patient: Maryanne Crockett    Procedure(s):  REMOVAL, GASTRIC BAND, ADJUSTABLE, LAPAROSCOPIC    Diagnosis: Gastric banding status [Z98.84]  Esophageal dysphagia [R13.10]  Diagnosis Additional Information: No value filed.    Anesthesia Type:   General     Note:    Oropharynx: oropharynx clear of all foreign objects and spontaneously breathing  Level of Consciousness: awake  Oxygen Supplementation: nasal cannula  Level of Supplemental Oxygen (L/min / FiO2): 4L  Independent Airway: airway patency satisfactory and stable  Dentition: dentition unchanged  Vital Signs Stable: post-procedure vital signs reviewed and stable  Report to RN Given: handoff report given  Patient transferred to: PACU    Handoff Report: Identifed the Patient, Identified the Reponsible Provider, Reviewed the pertinent medical history, Discussed the surgical course, Reviewed Intra-OP anesthesia mangement and issues during anesthesia, Set expectations for post-procedure period and Allowed opportunity for questions and acknowledgement of understanding      Vitals: (Last set prior to Anesthesia Care Transfer)  CRNA VITALS  5/11/2021 1308 - 5/11/2021 1342      5/11/2021             NIBP:  135/86    Pulse:  60    NIBP Mean:  109    SpO2:  100 %        Electronically Signed By: SANTIAGO Victor CRNA  May 11, 2021  1:42 PM

## 2021-05-11 NOTE — OR NURSING
1410  Patient requesting abdominal binder for extra support, has used in the past and found helpful. Page sent to surgical team for OK to use binder. OK given per Mahi PATIÑO.

## 2021-05-11 NOTE — ANESTHESIA POSTPROCEDURE EVALUATION
Patient: Maryanne Crockett    Procedure(s):  REMOVAL, GASTRIC BAND, ADJUSTABLE, LAPAROSCOPIC    Diagnosis:Gastric banding status [Z98.84]  Esophageal dysphagia [R13.10]  Diagnosis Additional Information: No value filed.    Anesthesia Type:  General    Note:  Disposition: Outpatient   Postop Pain Control: Uneventful            Sign Out: Well controlled pain   PONV: No   Neuro/Psych: Uneventful            Sign Out: Acceptable/Baseline neuro status   Airway/Respiratory: Uneventful            Sign Out: Acceptable/Baseline resp. status   CV/Hemodynamics: Uneventful            Sign Out: Acceptable CV status; No obvious hypovolemia; No obvious fluid overload   Other NRE: NONE   DID A NON-ROUTINE EVENT OCCUR? No           Last vitals:  Vitals:    05/11/21 1538 05/11/21 1547 05/11/21 1602   BP:  (!) 112/98 (!) 141/98   Pulse: 63 63 68   Resp: 16     Temp: 36.2  C (97.2  F)  36.2  C (97.2  F)   SpO2: 93%         Last vitals prior to Anesthesia Care Transfer:  CRNA VITALS  5/11/2021 1308 - 5/11/2021 1408      5/11/2021             NIBP:  135/86    Pulse:  60    NIBP Mean:  109    SpO2:  100 %          Electronically Signed By: Nicole Do MD  May 11, 2021  4:08 PM

## 2021-05-11 NOTE — OR NURSING
Patient's mother (Rachel) requested update from surgical team, OK per patient to receive update. Surgical resident notified.

## 2021-05-11 NOTE — OP NOTE
[facility], [place]    Operative Note    Pre-operative diagnosis: Gastric banding status [Z98.84]  Esophageal dysphagia [R13.10]; Band intolerance   Post-operative diagnosis Same as above    Procedure: Procedure(s):  REMOVAL, GASTRIC BAND, ADJUSTABLE, LAPAROSCOPIC   Surgeon: Surgeon(s) and Role:     * Remy Gold MD - Primary     * Jose Juan To MD - Assisting     * Kayla Champagne MD - Resident - Assisting      Anesthesia: General    Estimated blood loss: 5cc   Drains: None   Specimens: ID Type Source Tests Collected by Time Destination   A : Lapband, Port and Tubing for Gross Only Other (specify in comments) Other SURGICAL PATHOLOGY EXAM Remy Gold MD 5/11/2021  1:08 PM       Findings: Gastric band in place.  Intact stomach otherwise   Complications: None.   Implants: None.       NAME OF BAND: unknown    COMORBIDITIES:   Past Medical History:   Diagnosis Date     Depressive disorder      Family history of glioblastoma     screening MRIs every 2 years     Ovarian cyst      Seizures (H)        INDICATIONS FOR PROCEDURE  Maryanne Crockett is a 40 year old female who was morbidly obese and underwent prior band placement by Dr. Earl in 2009.  Patient interested in band removal as a result of the preoperative diagnosis outlined above.      General risks related to abdominal surgery were reviewed with the patient.    These include, but are not limited to, death, myocardial infarction, pneumonia, urinary tract infection, deep venous thrombosis with or without pulmonary embolus, abdominal infection from bowel injury or abscess, bowel obstruction, wound infection, and bleeding.    Risks related to adjustable band removal were previously reviewed with the patient.      OPERATIVE PROCEDURE:  Under the benefits of general endotracheal anesthesia, a left upper quadrant Veress needle was inserted.  4 ports were placed in total.  The camera port was a 5 mm  port.  A 12mm port was placed at the site of the subcutaneous port.    The operation was started by making an incision at the location of the subcutaneous port.  This was dissected using cautery.  The port was taken off of the underlying fascia.  The capsule was cauterized.    Attention was then turned towards the intraabdominal removal of the band.  The tubing was followed under the liver and to the upper stomach.  The capsule and adhesions to the band were divided using hook cautery.      The band was divided just to the left of the buckle using scissors.  The band was removed from its circumferential pathway.  It was removed from the abdomen using a grasper.    The capsule was divided to release the restriction of the upper stomach.    Hemostasis was assured.    3-0 Vicryl was used on the deeper layers of the skin and then 4-0 Monocryl and dermabond were used on the skin.  Sterile dressings were applied.  The patient tolerated the procedure well.      I ensured that all components of the band were removed and sent to pathology for gross identification.     I was present for all critical components of the operation and all needle and sponge counts were correct x2 at the end of the procedure.    Remy Glod MD  Surgery  [phone] (hospital )

## 2021-05-11 NOTE — OR NURSING
Patient requesting Percocet for discharge instead of Norco. Communicated in handoff to LATOYA Echols, page sent to surgical team.

## 2021-05-11 NOTE — ANESTHESIA PROCEDURE NOTES
Airway       Patient location during procedure: OR  Staff -        CRNA: Elisa Champion APRN CRNA       Performed By: CRNA  Consent for Airway        Urgency: elective  Indications and Patient Condition       Indications for airway management: aury-procedural       Induction type:intravenous       Mask difficulty assessment: 1 - vent by mask    Final Airway Details       Final airway type: endotracheal airway       Successful airway: ETT - single  Endotracheal Airway Details        ETT size (mm): 7.0       Cuffed: yes       Successful intubation technique: direct laryngoscopy       DL Blade Type: Burton 2       Grade View of Cords: 1 (cords open/clear)       Adjucts: stylet       Position: Right       Measured from: lips       Secured at (cm): 22       Bite block used: None    Post intubation assessment        Placement verified by: capnometry, equal breath sounds and chest rise        Number of attempts at approach: 1       Number of other approaches attempted: 0       Secured with: pink tape       Ease of procedure: easy       Dentition: Intact and Unchanged    Medication(s) Administered   Medication Administration Time: 5/11/2021 12:20 PM

## 2021-05-11 NOTE — DISCHARGE INSTRUCTIONS
[facility], [place]  Same-Day Surgery   Adult Discharge Orders & Instructions     For 24 hours after surgery    1. Get plenty of rest.  A responsible adult must stay with you for at least 24 hours after you leave the hospital.   2. Do not drive or use heavy equipment.  If you have weakness or tingling, don't drive or use heavy equipment until this feeling goes away.  3. Do not drink alcohol.  4. Avoid strenuous or risky activities.  Ask for help when climbing stairs.   5. You may feel lightheaded.  IF so, sit for a few minutes before standing.  Have someone help you get up.   6. If you have nausea (feel sick to your stomach): Drink only clear liquids such as apple juice, ginger ale, broth or 7-Up.  Rest may also help.  Be sure to drink enough fluids.  Move to a regular diet as you feel able.  7. You may have a slight fever. Call the doctor if your fever is over 100 F (37.7 C) (taken under the tongue) or lasts longer than 24 hours.  8. You may have a dry mouth, a sore throat, muscle aches or trouble sleeping.  These should go away after 24 hours.  9. Do not make important or legal decisions.   Call your doctor for any of the followin.  Signs of infection (fever, growing tenderness at the surgery site, a large amount of drainage or bleeding, severe pain, foul-smelling drainage, redness, swelling).    2. It has been over 8 to 10 hours since surgery and you are still not able to urinate (pass water).    3.  Headache for over 24 hours.        Emergency Department:    [facility]: [phone]       (TTY for hearing impaired: [phone])    [facility]: [phone]       (TTY for hearing impaired: [phone])

## 2021-05-13 ENCOUNTER — PATIENT OUTREACH (OUTPATIENT)
Dept: ENDOCRINOLOGY | Facility: CLINIC | Age: 41
End: 2021-05-13

## 2021-05-13 LAB — COPATH REPORT: NORMAL

## 2021-05-13 NOTE — PROGRESS NOTES
Maryanne Crockett is a patient of Dr. Maximilian Gold that underwent removal of laparoscopic adjustable band approximately 2 days ago (05/11).  Attempted to contact patient via telephone for a status update and review post op teaching. Unable to leave a message.  Patient voicemail not set up.    Of note:  Pathology:  NA   Wound:  Skin Sealant  Follow-up:  Routine  Restrictions:  - No lifting, pushing, pulling more than 15-20 pounds for 3-4 weeks  New medications:  oxycodone  Equipment/Supplies:  None

## 2021-05-18 NOTE — CONSULTS
Initial  Dictated   Ro Conti 1957     Office/Outpatient Visit    Visit Date: Wed, Aug 29, 2018 08:47 am    Provider: Jesusita Rodriguez N.P. (Assistant: Madhuri Camacho MA)    Location: [facility]        Electronically signed by Jesusita Rodriguez N.P. on  2018 09:18:45 AM                             SUBJECTIVE:        CC:     Ms. Conti is a 61 year old White female.  This is a follow-up visit.  6 month check up;         HPI:         Patient to be evaluated for hypertension.  Her current cardiac medication regimen includes a diuretic ( HCTZ ) and a beta-blocker ( Tenormin ).  Ms. Conti does not check her blood pressure other than at her clinic appointments.  She is tolerating the medication well without side effects.  Compliance with treatment has been good; she takes her medication as directed.          Additionally, she presents with history of hypercholesterolemia.  current treatment includes Lipitor.  Compliance with treatment has been good; she takes her medication as directed.  She denies experiencing any hypercholesterolemia related symptoms.  Most recent lab tests include Glucose, Serum:  114 (mg/dL) (2018), Total Cholesterol:  179 (mg/dL) (2018), HDL:  35 (mg/dL) (2018), Triglycerides:  192 (mg/dL) (2018), LDL:  106 (mg/dL) (2018), ALT (SGPT):  55 (U/L) (2018).      ROS:     CONSTITUTIONAL:  Negative for chills, fatigue, fever, and weight change.      CARDIOVASCULAR:  Negative for chest pain, palpitations, tachycardia, orthopnea, and edema.      RESPIRATORY:  Negative for cough, dyspnea, and hemoptysis.      INTEGUMENTARY/BREAST:  Positive for lump in right forearm for 2-3 years, no change, at times it bothers her.      NEUROLOGICAL:  Negative for dizziness, headaches, paresthesias, and weakness.          PMH/FMH/SH:     Last Reviewed on 2018 09:01 AM by Jesusita Rodriguez    Past Medical History:             GYNECOLOGICAL HISTORY:         Menopause at age 48.          PREVENTIVE HEALTH MAINTENANCE             COLORECTAL CANCER SCREENING: Up to date (colonoscopy q10y; sigmoidoscopy q5y; Cologuard q3y) was last done 3-2016, Results are in chart; colonoscopy with the following abnormalities noted-- scattered diverticulosis; The next colonoscopy is due  3-2021     Hepatitis C Medicare Screening: was last done ; negative     MAMMOGRAM: Done within last 2 years and results in are chart was last done 2018 stable         Surgical History:         : X 1;     Tubal Ligation Procedures: colonoscopy /normal     Hernia Repair: ; umbilical;     Procedures:    Colonoscopy ( 3-11-16 dr snyder, scattered divertiuclar dx )         Family History:     Father:  at age 89; Cause of death was septic meningitis;  Hypertension;  Colon Cancer; Prostate Cancer; Skin Cancer ( melanoma );  Dementia     Mother:  at age 84; Cause of death was brain bleed;  Hypertension;  Pulmonary Hypertension;  Renal Insufficency     Brother(s): 3 brother(s) total; 1 ;  Prostate Cancer;  MVA     Sister(s): 4 sister(s) total; 1 ;  Breast Cancer ( 2 sisters ); Colon Cancer     Son(s): Hernandez sarcoma     Maternal Grandmother: Coronary Artery Disease         Social History:     Occupation: Retired (Prior occupation: [employer])     Marital Status:      Children: 1 child     Hobbies/Recreation: she enjoys quilting;         Tobacco/Alcohol/Supplements:     Last Reviewed on 2018 08:49 AM by Madhuri Camacho    Tobacco: Current Smoker: Currently smokes cigarettes some days.          Alcohol: Frequency:    2-3 times per week; When she drinks, the average quantity of alcohol is 2-12 drinks.   She typically consumes beer.  but only socially         Substance Abuse History:     None             Immunizations:     None        Allergies:     Last Reviewed on 2018 08:49 AM by Madhuri Camacho      No Known  Drug Allergies.         Current Medications:     Last Reviewed on 8/29/2018 08:50 AM by Madhuri Camacho    Atenolol 50mg Tablet Take 1 tablet(s) by mouth daily     Hydrochlorothiazide (HCTZ) 25mg Tablet Take  one half  tablet(s) by mouth daily     Lipitor 20mg Tablet 1 tab daily     Omega 3 fatty acids Capsules 1000 mg 1 po QDay         OBJECTIVE:        Vitals:         Current: 8/29/2018 8:52:09 AM    Ht:  5 ft, 2.375 in;  Wt: 238.4 lbs;  BMI: 43.1    T: 98.1 F (oral);  BP: 148/84 mm Hg (right arm, sitting);  P: 53 bpm (right arm (BP Cuff), sitting);  sCr: 0.65 mg/dL;  GFR: 107.95        Repeat:     9:13:40 AM     BP:   136/86mm Hg (right arm, sitting)         Exams:     PHYSICAL EXAM:     GENERAL: vital signs recorded - well developed, well nourished;  no apparent distress;     NECK: carotid exam reveals no bruits;     RESPIRATORY: normal respiratory rate and pattern with no distress; normal breath sounds with no rales, rhonchi, wheezes or rubs;     CARDIOVASCULAR: normal rate; rhythm is regular;  no systolic murmur; no edema;     PSYCHIATRIC:  appropriate affect and demeanor; normal speech pattern; grossly normal memory;         ASSESSMENT           401.1   I10  Hypertension              DDx:     272.0   E78.5  Hypercholesterolemia              DDx:         ORDERS:         Meds Prescribed:       Refill of: Lipitor (Atorvastatin Calcium) 20mg Tablet 1 tab daily  #90 (Ninety) tablet(s) Refills: 1       Refill of: Atenolol 50mg Tablet Take 1 tablet(s) by mouth daily  #90 (Ninety) tablet(s) Refills: 1       Refill of: Hydrochlorothiazide (HCTZ) 25mg Tablet Take  one half  tablet(s) by mouth daily  #45 (Forty Five) tablet(s) Refills: 1         Lab Orders:       [id number]  HTNLP - [facility] CMP AND LIPID: [id number]  (Send-Out)           Other Orders:         Calculated BMI above the upper parameter and a follow-up plan was documented in the medical record  (In-House)                   PLAN:          Hypertension  advised to get hep a vaccines and shingles vaccines     LABORATORY:  Labs ordered to be performed today include HTN/Lipid Panel: CMP, Lipid.  MIPS     BMI Elevated - Follow-Up Plan: She was provided education on weight loss strategies     RECOMMENDATIONS given include: perform routine monitoring of blood pressure with home blood pressure cuff and weight loss.      FOLLOW-UP: Schedule a follow-up visit in 6 months.            Prescriptions:       Refill of: Atenolol 50mg Tablet Take 1 tablet(s) by mouth daily  #90 (Ninety) tablet(s) Refills: 1       Refill of: Hydrochlorothiazide (HCTZ) 25mg Tablet Take  one half  tablet(s) by mouth daily  #45 (Forty Five) tablet(s) Refills: 1           Orders:       [id number]  HTN - [facility] CMP AND LIPID: [id number]  (Send-Out)           Calculated BMI above the upper parameter and a follow-up plan was documented in the medical record  (In-House)             Patient Education Handouts:       Hepatitis A           Hypercholesterolemia           Prescriptions:       Refill of: Lipitor (Atorvastatin Calcium) 20mg Tablet 1 tab daily  #90 (Ninety) tablet(s) Refills: 1             Patient Recommendations:        For  Hypertension:     Begin monitoring your blood pressure by brief nurse visits at our office, a home blood pressure monitor, or by checking on the machines in pharmacies or stores.  Keep a log of the readings. Try to lose some weight; even modest weight reduction can improve your blood pressure.  Schedule a follow-up visit in 6 months.              CHARGE CAPTURE           **Please note: ICD descriptions below are intended for billing purposes only and may not represent clinical diagnoses**        Primary Diagnosis:         401.1 Hypertension            I10    Essential (primary) hypertension              Orders:          [id number]   Office/outpatient visit; established patient, level 3  (In-House)                Calculated BMI above the upper parameter and a follow-up plan  was documented in the medical record  (In-House)           272.0 Hypercholesterolemia            E78.5    Hyperlipidemia, unspecified

## 2021-05-30 DIAGNOSIS — Z11.59 ENCOUNTER FOR SCREENING FOR OTHER VIRAL DISEASES: ICD-10-CM

## 2021-06-07 ENCOUNTER — PATIENT OUTREACH (OUTPATIENT)
Dept: NEUROLOGY | Facility: CLINIC | Age: 41
End: 2021-06-07

## 2021-06-07 NOTE — PATIENT INSTRUCTIONS
You are scheduled for a cerebral angiogram with aneurysm treatment , under general anesthesia, with Dr. Webster on 6/23/21 at 8:00 AM.     Please follow these instructions:    * Prior to your procedure you will need to obtain COVID-19 testing within 2-4 days of your scheduled procedure. You are scheduled for 6/21/21 at 10:00 AM at [facility], located at [address] [place] [address]. Their phone number is [phone]. You will also need to obtain blood work (ordered by Dr. Columba Early) at time of COVID testing. You are scheduled for 6/21/21 at 10:15 AM at [facility].    * You will need a pre-op physical within 30 days prior to your procedure. You are scheduled for 6/15/21 at 10:45 (10:30 arrival time) with the Pre-Operative Assessment Center (PAC), located at [facility] at [address] [place] [address]. The PAC phone number is [phone].    * Begin taking 325 mg aspirin  and Plavix 75 mg daily on 6/16/21. You will remain on these medications for your procedure.     * You should arrive at the Surgery Admission Unit (3C), on the third floor, at the [facility] at 6:00 AM on 6/23/21. The address is [address] [place] [address]. The phone number is [phone].     * Do not eat after Midnight; you may drink clear liquids (includes water, Jell-O, clear broth, apple juice or any non-carbonated beverage that you can see through) until 6:00 AM.     * You may take your medications with a sip of water the morning of the procedure.     * You will stay overnight at the hospital for observation after the procedure. We aim to discharge you by 11:00 AM the following day. Please have your ride available by 10:00 AM.     PLEASE NOTE our COVID-19 visitors policy: For the protection of our patients and visitors, patients are allowed one consistent visitor, 18 years of age or  older, per adult patient in the hospital. Visitors must wear a mask at all times while in the hospital.     All discharge instructions will be given to the  or volunteer. Documentation for the post-operative plan will be given to the patient and . Patients are required to have someone to stay with them for 24 hours after their procedure.    If you have questions regarding your procedure, please contact me at [phone], option [phone].    If you need to cancel, reschedule or have procedure scheduling related questions, please call Gl[facility]y at [phone].    Thank you,  Silvio Sandra RN, CNRN  Stroke & Endovascular Care Coordinator

## 2021-06-07 NOTE — PROGRESS NOTES
Informed patient of procedure instructions. Confirmed date and time. Patient verbalized understanding. All questions answered. Patient instructions sent via [employer]. Patient has my contact information and was encouraged to call with questions/concerns. Missy Sandra RN 6/7/2021 1:22 PM

## 2021-06-09 DIAGNOSIS — N95.1 MENOPAUSAL SYNDROME (HOT FLASHES): ICD-10-CM

## 2021-06-09 RX ORDER — ESTRADIOL 0.05 MG/D
1 PATCH, EXTENDED RELEASE TRANSDERMAL
Qty: 24 PATCH | Refills: 0 | Status: ON HOLD | OUTPATIENT
Start: 2021-06-10 | End: 2021-06-23

## 2021-06-10 NOTE — TELEPHONE ENCOUNTER
FUTURE VISIT INFORMATION      SURGERY INFORMATION:    Date: 21    Location: uu or    Surgeon:  Rich Webster MD    Anesthesia Type:  general    Procedure: ANESTHESIA OUT OF OR Carotid Cerebral Angiogram With Aneurysm Treatment @0800    Consult: virtual visit 3/30/21     RECORDS REQUESTED FROM:         [facility]     Most recent EKG+ Tracin21

## 2021-06-15 ENCOUNTER — PRE VISIT (OUTPATIENT)
Dept: SURGERY | Facility: CLINIC | Age: 41
End: 2021-06-15

## 2021-06-15 NOTE — PHARMACY - PREOPERATIVE ASSESSMENT CENTER
Preoperative Assessment Center Medication History Note    Medication history completed on Vale 15, 2021 by this writer. See Epic admission navigator for prior to admission medications. Operating room staff will still need to confirm medications and last dose information on day of surgery.     Medication history interview sources:  Patient Intervew    Changes made to PTA medication list (reason)  Added: None    Deleted:  -- B complex  -- percocet    Changed: None    Additional medication history information (including reliability of information, actions taken by pharmacist):    -- No recent (within 30 days) course of antibiotics  -- No recent (within 30 days) course of systemic steroids  -- Patient declines being on any other prescription or over-the-counter medications  -- patient will be starting aspirin and plavix 6/16/21 per surgical team  -- currently not taking estradiol, will f/u with PCP if she needs to continue this at all    Prior to Admission medications    Medication Sig Last Dose Taking? Auth Provider   ALPRAZolam (XANAX) 1 MG tablet Take 2 mg by mouth 3 times daily as needed  Taking Yes Reported, Patient   amphetamine-dextroamphetamine (ADDERALL) 20 MG tablet Take 20 mg by mouth 2 times daily  Taking Yes Reported, Patient   buPROPion (WELLBUTRIN XL) 150 MG 24 hr tablet Take 150 mg by mouth every morning Taking Yes Reported, Patient   cloNIDine (CATAPRES) 0.1 MG tablet Take 1 tablet by mouth every morning  Taking Yes Reported, Patient   escitalopram (LEXAPRO) 20 MG tablet Take 20 mg by mouth every morning  Taking Yes Reported, Patient   medical cannabis (Patient's own supply) See Admin Instructions (The purpose of this order is to document that the patient reports taking medical cannabis.  This is not a prescription, and is not used to certify that the patient has a qualifying medical condition.)  Taking Yes Reported, Patient   Multiple Vitamins-Minerals (MULTIVITAMIN ADULT PO) Take 1 tablet by mouth  every morning  Taking Yes Reported, Patient   SAPHRIS 5 MG SUBL sublingual tablet Place 5 mg under the tongue 2 times daily  Taking Yes Reported, Patient   VENTOLIN  (90 BASE) MCG/ACT inhaler Inhale 1-2 puffs into the lungs every 6 hours as needed for shortness of breath / dyspnea or wheezing  Taking Yes Reported, Patient   vitamin B-12 (CYANOCOBALAMIN) 1000 MCG tablet Take 1,000 mcg by mouth every morning  Taking Yes Unknown, Entered By History   acetaminophen 500 MG CAPS Take 1,000 mg by mouth as needed    Reported, Patient   aspirin (ASA) 325 MG tablet Take 1 tablet (325 mg) by mouth daily  Patient not taking: Reported on 6/15/2021 Not Taking  Columba Early MD   clopidogrel (PLAVIX) 75 MG tablet Take 1 tablet (75 mg) by mouth daily  Patient not taking: Reported on 6/15/2021 Not Taking  Columba Early MD   estradiol (VIVELLE-DOT) 0.05 MG/24HR bi-weekly patch Place 1 patch onto the skin twice a week  Patient not taking: Reported on 6/15/2021 Not Taking  Hayley Zayas MD   ondansetron (ZOFRAN ODT) 8 MG ODT tab Take 1 tablet (8 mg) by mouth every 8 hours as needed for nausea   Tiffanie Anderson MD       Medication history completed by: Lalo Acevedo [facility]

## 2021-06-18 ENCOUNTER — VIRTUAL VISIT (OUTPATIENT)
Dept: SURGERY | Facility: CLINIC | Age: 41
End: 2021-06-18
Payer: COMMERCIAL

## 2021-06-18 ENCOUNTER — ANESTHESIA EVENT (OUTPATIENT)
Dept: SURGERY | Facility: CLINIC | Age: 41
End: 2021-06-18
Payer: COMMERCIAL

## 2021-06-18 ENCOUNTER — PRE VISIT (OUTPATIENT)
Dept: SURGERY | Facility: CLINIC | Age: 41
End: 2021-06-18

## 2021-06-18 DIAGNOSIS — Z01.818 PREOP EXAMINATION: Primary | ICD-10-CM

## 2021-06-18 PROCEDURE — 99203 OFFICE O/P NEW LOW 30 MIN: CPT | Mod: 95 | Performed by: PHYSICIAN ASSISTANT

## 2021-06-18 ASSESSMENT — PAIN SCALES - GENERAL: PAINLEVEL: MODERATE PAIN (4)

## 2021-06-18 ASSESSMENT — ENCOUNTER SYMPTOMS: SEIZURES: 1

## 2021-06-18 ASSESSMENT — LIFESTYLE VARIABLES: TOBACCO_USE: 1

## 2021-06-18 NOTE — H&P (VIEW-ONLY)
Pre-Operative H & P     CC:  Preoperative exam to assess for increased cardiopulmonary risk while undergoing surgery and anesthesia.    Date of Encounter: 6/18/2021  Primary Care Physician:  Shoshana Hyde  Reason for Visit: Cerebral aneurysm, nonruptured       VIDEO-VISIT DETAILS    Type of service:  Video Visit    Patient verbally consented to video service today:  YES    Video Start Time: 1126  Video End Time (time video stopped): 1141    Originating Location (pt. Location): Home    Distant Location (provider location):  [facility]    Mode of Communication:  Video Conference via "[employer]     Maryanne Crockett is a 39 y/o female who presents for pre-operative H&P in preparation for Carotid Cerebral Angiogram With Aneurysm Treatment with Rich Webster MD on 6/23/21 at [facility] for treatment of Cerebral aneurysm, nonruptured. Patient is being evaluated for comorbid conditions of tobacco dependence, depression, ADHD, seizure disorder, depression, hx suicidal ideation, esophageal dysphagia, s/p gastric banding, regional pain syndrome, obesity.     Ms. Crockett was incidentally found to have a right posterior communicating artery aneurysm measuring 5.2 mm neck with 2.5 mm base to dome during a work up for headaches. She presented to the ED in February 2021 with severe occipital headache and neck pain along with left facial numbness. She now presents for the above procedure.    PMH is also significant for chronic right- arm and hand pain and neck stiffness, carpal tunnel syndrome, some memory loss from seizures which began in 2014.       History was obtained from patient & chart review.     Past Medical History  Past Medical History:   Diagnosis Date     Cerebral aneurysm, nonruptured      Depressive disorder      Family history of glioblastoma     screening MRIs every 2 years     Ovarian cyst      Seizures (H)        Past Surgical  History  Past Surgical History:   Procedure Laterality Date     CHOLECYSTECTOMY       CYSTOSCOPY N/A 9/1/2020    Procedure: CYSTOSCOPY;  Surgeon: Hayley Zayas MD;  Location: [facility] OR     DAVINCI HYSTERECTOMY TOTAL, BILATERAL SALPINGO-OOPHORECTOMY, COMBINED Left 9/1/2020    Procedure: HYSTERECTOMY, TOTAL, ROBOT-ASSISTED, LAPAROSCOPIC,  left oophorectomy;  Surgeon: Hayley Zayas MD;  Location: [facility] OR     DILATION AND CURETTAGE SUCTION  4/30/15    retained POC     GASTRIC RESTRICTIVE PROCEDURE, OPEN, REMOVE/REPLACE SUBCUTANEOUS PORT       IR CAROTID CEREBRAL ANGIOGRAM BILATERAL  3/19/2021     LAPAROSCOPIC OOPHORECTOMY Right 8/5/2016    Procedure: LAPAROSCOPIC OOPHORECTOMY;  Surgeon: Adrianne Vergara MD;  [facility]: [facility] OR     LAPAROSCOPIC REMOVAL GASTRIC ADJUSTABLE BAND N/A 5/11/2021    Procedure: REMOVAL, GASTRIC BAND, ADJUSTABLE, LAPAROSCOPIC;  Surgeon: Remy Gold MD;  [facility]: [facility]     LAPAROSCOPIC SALPINGECTOMY Bilateral 8/5/2016    Procedure: LAPAROSCOPIC SALPINGECTOMY;  Surgeon: Adrianne Vergara MD;  [facility]     LAPAROSCOPIC TUBAL LIGATION Bilateral 5/22/2015    Procedure: LAPAROSCOPIC TUBAL LIGATION;  Surgeon: Hayley Zayas MD;  [facility]     LAPAROSCOPY OPERATIVE ADULT N/A 8/5/2016    Procedure: LAPAROSCOPY OPERATIVE ADULT;  Surgeon: Adrianne Vergara MD;  [facility]: [facility] OR     SOFT TISSUE SURGERY Right     cyst in hand       Hx of Blood transfusions/reactions: denies     Hx of abnormal bleeding or anti-platelet use: started  mg & Plavix on 6/16/21 for above procedure    Menstrual history: Patient's last menstrual period was 03/14/2018 (approximate).:      Steroid use in the last year: denies    Personal or FH with difficulty with Anesthesia:  Mild nausea    Prior to Admission Medications  Current Outpatient Medications   Medication Sig Dispense Refill     ALPRAZolam (XANAX) 1 MG tablet Take 2 mg by mouth 3 times daily as needed         amphetamine-dextroamphetamine (ADDERALL) 20 MG tablet Take 20 mg by mouth 2 times daily        buPROPion (WELLBUTRIN XL) 150 MG 24 hr tablet Take 150 mg by mouth every morning       cloNIDine (CATAPRES) 0.1 MG tablet Take 1 tablet by mouth every morning        escitalopram (LEXAPRO) 20 MG tablet Take 20 mg by mouth every morning        medical cannabis (Patient's own supply) See Admin Instructions (The purpose of this order is to document that the patient reports taking medical cannabis.  This is not a prescription, and is not used to certify that the patient has a qualifying medical condition.)        Multiple Vitamins-Minerals (MULTIVITAMIN ADULT PO) Take 1 tablet by mouth every morning        SAPHRIS 5 MG SUBL sublingual tablet Place 10 mg under the tongue 2 times daily        VENTOLIN  (90 BASE) MCG/ACT inhaler Inhale 1-2 puffs into the lungs every 6 hours as needed for shortness of breath / dyspnea or wheezing   5     vitamin B-12 (CYANOCOBALAMIN) 1000 MCG tablet Take 1,000 mcg by mouth every morning        acetaminophen 500 MG CAPS Take 1,000 mg by mouth as needed        aspirin (ASA) 325 MG tablet Take 1 tablet (325 mg) by mouth daily (Patient not taking: Reported on 6/15/2021) 90 tablet 3     clopidogrel (PLAVIX) 75 MG tablet Take 1 tablet (75 mg) by mouth daily (Patient not taking: Reported on 6/15/2021) 30 tablet 4     estradiol (VIVELLE-DOT) 0.05 MG/24HR bi-weekly patch Place 1 patch onto the skin twice a week (Patient not taking: Reported on 6/15/2021) 24 patch 0     ondansetron (ZOFRAN ODT) 8 MG ODT tab Take 1 tablet (8 mg) by mouth every 8 hours as needed for nausea 12 tablet 0       Allergies  Allergies   Allergen Reactions     Ibuprofen GI Disturbance     Seroquel [Quetiapine] Other (See Comments) and Rash     Insomnia       Social History  Social History     Socioeconomic History     Marital status: Single     Spouse name: Not on file     Number of children: Not on file     Years of  education: Not on file     Highest education level: Not on file   Occupational History     Not on file   Social Needs     Financial resource strain: Not on file     Food insecurity     Worry: Not on file     Inability: Not on file     Transportation needs     Medical: Not on file     Non-medical: Not on file   Tobacco Use     Smoking status: Current Some Day Smoker     Packs/day: 0.25     Smokeless tobacco: Never Used     Tobacco comment: Trying   Substance and Sexual Activity     Alcohol use: Not Currently     Comment: rare     Drug use: Yes     Types: Marijuana     Comment: pt says she is prescribed medical marijuana     Sexual activity: Yes     Partners: Male     Birth control/protection: Female Surgical     Comment: TBL/hys   Lifestyle     Physical activity     Days per week: Not on file     Minutes per session: Not on file     Stress: Not on file   Relationships     Social connections     Talks on phone: Not on file     Gets together: Not on file     Attends [employer] service: Not on file     Active member of club or organization: Not on file     Attends meetings of clubs or organizations: Not on file     Relationship status: Not on file     Intimate partner violence     Fear of current or ex partner: Not on file     Emotionally abused: Not on file     Physically abused: Not on file     Forced sexual activity: Not on file   Other Topics Concern     Parent/sibling w/ CABG, MI or angioplasty before 65F 55M? Yes   Social History Narrative     Not on file       Family History  Family History   Problem Relation Age of Onset     Other Cancer Brother 36        brain ca     Deep Vein Thrombosis (DVT) Brother      Other Cancer Father         leukemia     Deep Vein Thrombosis (DVT) Father      Other Cancer Maternal Aunt         2 aunts with glioblastomas     Coronary Stenting Mother      Breast Cancer No family hx of         no ovarian cancer     Anesthesia Reaction No family hx of           ROS/MED HX  The complete  review of systems is negative other than noted in the HPI or here.  Patient denies recent illness, fever and respiratory infection during past month.  Pt denies steroid use during past year.    ENT/Pulmonary: Comment: Uses inhaler in humid weather and during URIs. Last used inhaler >6 months ago.    (+) tobacco use, Current use, Intermittent, asthma Treatment: Inhaler prn,   (-) sleep apnea   Neurologic: Comment: Taking CBD for seizure prevention. Seizures are grand mal x5 minutes.      (+) seizures, last seizure: week of 6/7/21 (was 1 yr ago prior to that),  (-) no CVA and migraines   Cardiovascular:  - neg cardiovascular ROS   (+) -----Previous cardiac testing   Echo: Date: Results:    Stress Test: Date: Results:    ECG Reviewed: Date: 2/24/21 Results:  Sinus rhythm Normal ECG  Ventricular rate 84 bpm    Cath: Date: Results:      METS/Exercise Tolerance: 3 - Able to walk 1-2 blocks without stopping Comment: Has regional pain syndrome. Activity limited. Able to carry groceries up flight of stairs w/ forearms. Has lifting restrictions.     Hematologic: Comments: Started  mg & Plavix on 6/16 for upcoming surgery - neg hematologic  ROS  (-) history of blood clots and history of blood transfusion   Musculoskeletal: Comment: Regional pain syndrome - affects hands & neck    Carpal tunnel    S/p cyst removal right hand; has had regional pain since.      GI/Hepatic: Comment: S/P gastric banding. Band taken down 5/11/21 due to esophageal dysphagia.     (-) GERD and liver disease   Renal/Genitourinary:  - neg Renal ROS     Endo:     (+) Obesity,  (-) Type II DM   Psychiatric/Substance Use: Comment: ADHD    Hx suicidal ideation      (+) psychiatric history depression     Infectious Disease:  - neg infectious disease ROS     Malignancy:  - neg malignancy ROS     Other:  - neg other ROS                                     0 lbs 0 oz  Data Unavailable   There is no height or weight on file to calculate BMI.          Physical Exam  Constitutional: Awake, alert, cooperative, no apparent distress, and appears stated age.  Neurologic: Awake, alert, oriented to name, place and time.   Neuropsychiatric: Calm, cooperative. Normal affect. Answers questions appropriately.    ** Patient's visit was done virtually today due to the Covid 19 pandemic.  A full physical exam was not completed.  Please refer to the physical examination documented by the anesthesiologist in the anesthesia record on the day of surgery. **        PRIOR LABS/DIAGNOSTIC STUDIES:   All labs and imaging personally reviewed     CTA  HEAD NECK WITH CONTRAST 2/24/2021  Impression:    1. 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 Second  aneurysm off the right cavernous carotid artery measures up to 1.5 mm.  No definite stenosis or occlusion of the major intracranial arteries.  2. Neck CTA demonstrates no stenosis of the major cervical arteries.      MRI brain without contrast 2/24/21  Findings:   Diffusion weighted images demonstrate no definite acute infarct. Axial  FLAIR images are within normal limits. The ventricles are proportional  to the cerebral sulci. No evidence of intracranial hemorrhage on  susceptibility-weighted images. No mass effect or midline shift. No  extra-axial fluid collection. Fluid-filled left anterior ethmoid air  cells. The remaining paranasal sinuses are relatively clear. Mastoid  air cells are clear.                                                                   Impression:  No evidence of acute infarction or intracranial hemorrhage.        EKG 2/24/21  Sinus rhythm Normal ECG  Ventricular rate 84 bpm      CBC:   Lab Results   Component Value Date    WBC 10.2 03/16/2021    WBC 14.2 (H) 02/24/2021    HGB 13.3 03/16/2021    HGB 15.4 02/24/2021    HCT 41.0 03/16/2021    HCT 44.5 02/24/2021     03/16/2021     02/24/2021     BMP:   Lab Results    Component Value Date     03/16/2021     02/24/2021    POTASSIUM 3.7 03/16/2021    POTASSIUM 3.5 02/24/2021    CHLORIDE 108 03/16/2021    CHLORIDE 105 02/24/2021    CO2 28 03/16/2021    CO2 25 02/24/2021    BUN 8 03/16/2021    BUN 6 (L) 02/24/2021    CR 0.75 03/16/2021    CR 0.73 03/16/2021    GLC 98 03/16/2021     (H) 02/24/2021     COAGS:   Lab Results   Component Value Date    PTT 28 02/24/2021    INR 1.07 03/16/2021     POC:   Lab Results   Component Value Date     (H) 05/11/2021    HCG Negative 04/28/2020    HCGS Negative 06/23/2020     HEPATIC:   Lab Results   Component Value Date    ALBUMIN 4.0 03/16/2021    PROTTOTAL 7.6 03/16/2021    ALT 19 03/16/2021    AST 7 03/16/2021    ALKPHOS 85 03/16/2021    BILITOTAL 0.3 03/16/2021     OTHER:   Lab Results   Component Value Date    LACT 1.7 03/21/2018    A1C 5.3 03/16/2021    SOFI 9.0 03/16/2021    MAG 2.3 02/13/2019    LIPASE 83 06/23/2020    TSH 1.12 03/16/2021       Labs ordered for DOS: CBC, BMP, T&S    COVID19 testing scheduled on 6/21/21      ASSESSMENT and PLAN  Maryanne Crockett is a 40 year old female scheduled to undergo Carotid Cerebral Angiogram With Aneurysm Treatment with Rich Webster MD on 6/23/21 at [facility] for treatment of Cerebral aneurysm, nonruptured.        Pt has had prior anesthetic.     History of anesthetic complications:  mild PONV.    She has the following specific operative considerations:   # MARIELA 1/8 = low risk  # VTE risk: 0.26%  # Risk of PONV score = 3.  If > 2, anti-emetic intervention recommended.  # Anesthesia considerations:  Refer to PAC assessment in anesthesia records    # Increased risk of postoperative nausea/vomiting: Recommend use of antiemetic agents in the perioperative period.        CARDIAC: METS 3-4,  Has regional pain syndrome. Activity limited. Able to carry groceries up flight of stairs w/ forearms. Has lifting restrictions.     # RCRI :  High risk surgery.  0.9% risk of major adverse cardiac event.       PULMONARY:     # Current smoker, smokes 1/3 ppd    # Mild intermittent Asthma, Uses inhaler in humid weather and during URIs. Last used inhaler >6 months ago.        GI:     # S/P gastric banding. Band taken down 5/11/21 due to esophageal dysphagia.    # Denies GERD    ENDO: BMI 31    # No DM    HEME:     # Started  mg & Plavix on 6/16 for upcoming surgery    ORTHO:     # Regional pain syndrome - affects hands & neck    # Carpal tunnel    # S/p cyst removal right hand; has had regional pain since.    NEURO/PSYCH:     # Seizures, Taking CBD. Seizures are grand mal x5 minutes. Last one was week of 6/7/21; seizure prior to that was 7/2020    # Depression, ADHD, Hx suicidal ideation      Patient is optimized and is acceptable candidate for the proposed procedure. No further diagnostic evaluation is needed.    ** Patient's visit was done virtually today due to the Covid 19 pandemic.  A full physical exam was not completed.  Please refer to the physical examination documented by the anesthesiologist in the anesthesia record on the day of surgery. **      Final plan per anesthesiologist on day of surgery.     Arrival time, NPO, shower and medication instructions provided by nursing staff today.  Preparing For Your Surgery handout given.    39 minutes spent on the date of the encounter doing chart review, history and exam, documentation and further activities as noted below:    Prep time: 14 minutes  Visit time: 15 minutes  Documentation time: 10 minutes    Kasey Bowman PA-C  Preoperative Assessment Center  [facility]  Phone: [phone]  Fax: [phone]

## 2021-06-18 NOTE — PROGRESS NOTES
Maryanne is a 40 year old who is being evaluated via a billable video visit.      How would you like to obtain your AVS? MyChart  If the video visit is dropped, the invitation should be resent by: Text to cell phone: [phone]     HPI         Review of Systems     Physical Exam

## 2021-06-18 NOTE — ANESTHESIA PREPROCEDURE EVALUATION
Anesthesia Pre-Procedure Evaluation    Patient: Maryanne Crockett   MRN: [id number] : 1980        Preoperative Diagnosis: Cerebral aneurysm, nonruptured [I67.1]   Procedure : Procedure(s):  ANESTHESIA OUT OF OR Carotid Cerebral Angiogram With Aneurysm Treatment @0800     Past Medical History:   Diagnosis Date     Cerebral aneurysm, nonruptured      Depressive disorder      Family history of glioblastoma     screening MRIs every 2 years     Ovarian cyst      Seizures (H)       Past Surgical History:   Procedure Laterality Date     CHOLECYSTECTOMY       CYSTOSCOPY N/A 2020    Procedure: CYSTOSCOPY;  Surgeon: Hayley Zayas MD;  [facility]: [facility]     DAVINCI HYSTERECTOMY TOTAL, BILATERAL SALPINGO-OOPHORECTOMY, COMBINED Left 2020    Procedure: HYSTERECTOMY, TOTAL, ROBOT-ASSISTED, LAPAROSCOPIC,  left oophorectomy;  Surgeon: Hayley Zayas MD;  Location: [facility]     DILATION AND CURETTAGE SUCTION  4/30/15    retained POC     GASTRIC RESTRICTIVE PROCEDURE, OPEN, REMOVE/REPLACE SUBCUTANEOUS PORT       IR CAROTID CEREBRAL ANGIOGRAM BILATERAL  3/19/2021     LAPAROSCOPIC OOPHORECTOMY Right 2016    Procedure: LAPAROSCOPIC OOPHORECTOMY;  Surgeon: Adrianne Vergara MD;  [facility]     LAPAROSCOPIC REMOVAL GASTRIC ADJUSTABLE BAND N/A 2021    Procedure: REMOVAL, GASTRIC BAND, ADJUSTABLE, LAPAROSCOPIC;  Surgeon: Remy Gold MD;  [facility]     LAPAROSCOPIC SALPINGECTOMY Bilateral 2016    Procedure: LAPAROSCOPIC SALPINGECTOMY;  Surgeon: Adrianne Vergara MD;  [facility]     LAPAROSCOPIC TUBAL LIGATION Bilateral 2015    Procedure: LAPAROSCOPIC TUBAL LIGATION;  Surgeon: Hayley Zayas MD;  [facility]     LAPAROSCOPY OPERATIVE ADULT N/A 2016    Procedure: LAPAROSCOPY OPERATIVE ADULT;  Surgeon: Adrianne Vergara MD;  [facility]     SOFT TISSUE SURGERY Right     cyst in hand      Allergies   Allergen Reactions     Ibuprofen GI Disturbance      Seroquel [Quetiapine] Other (See Comments) and Rash     Insomnia      Social History     Tobacco Use     Smoking status: Current Some Day Smoker     Packs/day: 0.25     Smokeless tobacco: Never Used     Tobacco comment: Trying   Substance Use Topics     Alcohol use: Not Currently     Comment: rare      Wt Readings from Last 1 Encounters:   05/11/21 94 kg (207 lb 3.7 oz)        Anesthesia Evaluation   Pt has had prior anesthetic. Type: General.    No history of anesthetic complications       ROS/MED HX  ENT/Pulmonary: Comment: Uses inhaler in humid weather and during URIs. Last used inhaler >6 months ago.    (+) tobacco use, Current use, Intermittent, asthma Treatment: Inhaler prn,   (-) sleep apnea   Neurologic: Comment: Taking CBD for seizure prevention. Seizures are grand mal x5 minutes.      (+) seizures, last seizure: week of 6/7/21 (was 1 yr ago prior to that),  (-) no CVA and migraines   Cardiovascular:  - neg cardiovascular ROS   (+) -----Previous cardiac testing   Echo: Date: Results:    Stress Test: Date: Results:    ECG Reviewed: Date: 2/24/21 Results:  Sinus rhythm Normal ECG  Ventricular rate 84 bpm    Cath: Date: Results:      METS/Exercise Tolerance: >4 METS Comment: Has regional pain syndrome. Activity limited. Able to carry groceries up flight of stairs w/ forearms. Has lifting restrictions.     Hematologic: Comments: Started  mg & Plavix on 6/16 for upcoming surgery - neg hematologic  ROS  (-) history of blood clots and history of blood transfusion   Musculoskeletal: Comment: Regional pain syndrome - affects hands & neck    Carpal tunnel    S/p cyst removal right hand; has had regional pain since.      GI/Hepatic: Comment: S/P gastric banding. Band taken down 5/11/21 due to esophageal dysphagia.     (-) GERD and liver disease   Renal/Genitourinary:  - neg Renal ROS     Endo:     (+) Obesity,  (-) Type II DM   Psychiatric/Substance Use: Comment: ADHD    Hx suicidal ideation      (+)  psychiatric history depression     Infectious Disease:  - neg infectious disease ROS     Malignancy:  - neg malignancy ROS     Other:  - neg other ROS          Physical Exam    Airway        Mallampati: I   TM distance: > 3 FB   Neck ROM: full   Mouth opening: > 3 cm    Respiratory Devices and Support         Dental       (+) upper dentures      Cardiovascular             Pulmonary                   OUTSIDE LABS:  CBC:   Lab Results   Component Value Date    WBC 10.2 03/16/2021    WBC 14.2 (H) 02/24/2021    HGB 13.3 03/16/2021    HGB 15.4 02/24/2021    HCT 41.0 03/16/2021    HCT 44.5 02/24/2021     03/16/2021     02/24/2021     BMP:   Lab Results   Component Value Date     03/16/2021     02/24/2021    POTASSIUM 3.7 03/16/2021    POTASSIUM 3.5 02/24/2021    CHLORIDE 108 03/16/2021    CHLORIDE 105 02/24/2021    CO2 28 03/16/2021    CO2 25 02/24/2021    BUN 8 03/16/2021    BUN 6 (L) 02/24/2021    CR 0.75 03/16/2021    CR 0.73 03/16/2021    GLC 98 03/16/2021     (H) 02/24/2021     COAGS:   Lab Results   Component Value Date    PTT 28 02/24/2021    INR 1.07 03/16/2021     POC:   Lab Results   Component Value Date     (H) 05/11/2021    HCG Negative 04/28/2020    HCGS Negative 06/23/2020     HEPATIC:   Lab Results   Component Value Date    ALBUMIN 4.0 03/16/2021    PROTTOTAL 7.6 03/16/2021    ALT 19 03/16/2021    AST 7 03/16/2021    ALKPHOS 85 03/16/2021    BILITOTAL 0.3 03/16/2021     OTHER:   Lab Results   Component Value Date    LACT 1.7 03/21/2018    A1C 5.3 03/16/2021    SOFI 9.0 03/16/2021    MAG 2.3 02/13/2019    LIPASE 83 06/23/2020    TSH 1.12 03/16/2021       Anesthesia Plan    ASA Status:  3      Anesthesia Type: General.     - Airway: ETT   Induction: Intravenous.   Maintenance: Balanced.   Techniques and Equipment:     - Lines/Monitors: 2nd IV, Arterial Line     - Blood: T&S     - Drips/Meds: Sufentanil     Consents    Anesthesia Plan(s) and associated risks, benefits,  and realistic alternatives discussed. Questions answered and patient/representative(s) expressed understanding.     - Discussed with:  Patient      - Extended Intubation/Ventilatory Support Discussed: No.      - Patient is DNR/DNI Status: No    Use of blood products discussed: Yes.     - Discussed with: Patient.     - Consented: consented to blood products            Reason for refusal: other.     Postoperative Care    Pain management: IV analgesics.   PONV prophylaxis: Ondansetron (or other 5HT-3), Dexamethasone or Solumedrol     Comments:              PAC Discussion and Assessment    ASA Classification: 3  Case is suitable for: [place]  Anesthetic techniques and relevant risks discussed: GA  Invasive monitoring and risk discussed: No    Possibility and Risk of blood transfusion discussed: No            PAC Resident/NP Anesthesia Assessment: Maryanne Crockett is a 40 year old female scheduled to undergo Carotid Cerebral Angiogram With Aneurysm Treatment with Rich Webster MD on 6/23/21 at [facility] for treatment of Cerebral aneurysm, nonruptured.        Pt has had prior anesthetic.     History of anesthetic complications:  mild PONV.    She has the following specific operative considerations:   # MARIELA 1/8 = low risk  # VTE risk: 0.26%  # Risk of PONV score = 3.  If > 2, anti-emetic intervention recommended.  # Anesthesia considerations:  Refer to PAC assessment in anesthesia records    # Increased risk of postoperative nausea/vomiting: Recommend use of antiemetic agents in the perioperative period.        CARDIAC: METS 3-4,  Has regional pain syndrome. Activity limited. Able to carry groceries up flight of stairs w/ forearms. Has lifting restrictions.     # RCRI : High risk surgery.  0.9% risk of major adverse cardiac event.       PULMONARY:     # Current smoker, smokes 1/3 ppd    # Mild intermittent Asthma, Uses inhaler in humid weather and during URIs. Last used  inhaler >6 months ago.        GI:     # S/P gastric banding. Band taken down 5/11/21 due to esophageal dysphagia.    # Denies GERD    ENDO: BMI 31    # No DM    HEME:     # Started  mg & Plavix on 6/16 for upcoming surgery    ORTHO:     # Regional pain syndrome - affects hands & neck    # Carpal tunnel    # S/p cyst removal right hand; has had regional pain since.    NEURO/PSYCH:     # Seizures, Taking CBD. Seizures are grand mal x5 minutes. Last one was week of 6/7/21; seizure prior to that was 7/2020    # Depression, ADHD, Hx suicidal ideation      Patient is optimized and is acceptable candidate for the proposed procedure. No further diagnostic evaluation is needed.    ** Patient's visit was done virtually today due to the Covid 19 pandemic.  A full physical exam was not completed.  Please refer to the physical examination documented by the anesthesiologist in the anesthesia record on the day of surgery. **      Final plan per anesthesiologist on day of surgery.     Reviewed and Signed by PAC Mid-Level Provider/Resident  Mid-Level Provider/Resident: Kasey Bowman PA-C  Date: 6/18/21  Time: 1538                               Kasey Bowman PA-C

## 2021-06-18 NOTE — H&P
Pre-Operative H & P     CC:  Preoperative exam to assess for increased cardiopulmonary risk while undergoing surgery and anesthesia.    Date of Encounter: 6/18/2021  Primary Care Physician:  Shoshana Hyde  Reason for Visit: Cerebral aneurysm, nonruptured       VIDEO-VISIT DETAILS    Type of service:  Video Visit    Patient verbally consented to video service today:  YES    Video Start Time: 1126  Video End Time (time video stopped): 1141    Originating Location (pt. Location): Home    Distant Location (provider location):  [facility]    Mode of Communication:  [facility] Conference [facility]     Maryanne Crockett is a 41 y/o female who presents for pre-operative H&P in preparation for Carotid Cerebral Angiogram With Aneurysm Treatment with Rich Webster MD on 6/23/21 at [facility] for treatment of Cerebral aneurysm, nonruptured. Patient is being evaluated for comorbid conditions of tobacco dependence, depression, ADHD, seizure disorder, depression, hx suicidal ideation, esophageal dysphagia, s/p gastric banding, regional pain syndrome, obesity.     Ms. Crockett was incidentally found to have a right posterior communicating artery aneurysm measuring 5.2 mm neck with 2.5 mm base to dome during a work up for headaches. She presented to the ED in February 2021 with severe occipital headache and neck pain along with left facial numbness. She now presents for the above procedure.    PMH is also significant for chronic right- arm and hand pain and neck stiffness, carpal tunnel syndrome, some memory loss from seizures which began in 2014.       History was obtained from patient & chart review.     Past Medical History  Past Medical History:   Diagnosis Date     Cerebral aneurysm, nonruptured      Depressive disorder      Family history of glioblastoma     screening MRIs every 2 years     Ovarian cyst      Seizures (H)        Past Surgical  History  Past Surgical History:   Procedure Laterality Date     CHOLECYSTECTOMY       CYSTOSCOPY N/A 9/1/2020    Procedure: CYSTOSCOPY;  Surgeon: Hayley Zayas MD;  Location: [facility] OR     DAVINCI HYSTERECTOMY TOTAL, BILATERAL SALPINGO-OOPHORECTOMY, COMBINED Left 9/1/2020    Procedure: HYSTERECTOMY, TOTAL, ROBOT-ASSISTED, LAPAROSCOPIC,  left oophorectomy;  Surgeon: Hayley Zayas MD;  [facility] OR     DILATION AND CURETTAGE SUCTION  4/30/15    retained POC     GASTRIC RESTRICTIVE PROCEDURE, OPEN, REMOVE/REPLACE SUBCUTANEOUS PORT       IR CAROTID CEREBRAL ANGIOGRAM BILATERAL  3/19/2021     LAPAROSCOPIC OOPHORECTOMY Right 8/5/2016    Procedure: LAPAROSCOPIC OOPHORECTOMY;  Surgeon: Adrianne Vergara MD;  [facility]     LAPAROSCOPIC REMOVAL GASTRIC ADJUSTABLE BAND N/A 5/11/2021    Procedure: REMOVAL, GASTRIC BAND, ADJUSTABLE, LAPAROSCOPIC;  Surgeon: Remy Gold MD;  [facility]     LAPAROSCOPIC SALPINGECTOMY Bilateral 8/5/2016    Procedure: LAPAROSCOPIC SALPINGECTOMY;  Surgeon: Adrianne Vergara MD;  [facility]     LAPAROSCOPIC TUBAL LIGATION Bilateral 5/22/2015    Procedure: LAPAROSCOPIC TUBAL LIGATION;  Surgeon: Hayley Zayas MD;  [facility]     LAPAROSCOPY OPERATIVE ADULT N/A 8/5/2016    Procedure: LAPAROSCOPY OPERATIVE ADULT;  Surgeon: Adrianne Vergara MD;  [facility] OR     SOFT TISSUE SURGERY Right     cyst in hand       Hx of Blood transfusions/reactions: denies     Hx of abnormal bleeding or anti-platelet use: started  mg & Plavix on 6/16/21 for above procedure    Menstrual history: Patient's last menstrual period was 03/14/2018 (approximate).:      Steroid use in the last year: denies    Personal or FH with difficulty with Anesthesia:  Mild nausea    Prior to Admission Medications  Current Outpatient Medications   Medication Sig Dispense Refill     ALPRAZolam (XANAX) 1 MG tablet Take 2 mg by mouth 3 times daily as needed         amphetamine-dextroamphetamine (ADDERALL) 20 MG tablet Take 20 mg by mouth 2 times daily        buPROPion (WELLBUTRIN XL) 150 MG 24 hr tablet Take 150 mg by mouth every morning       cloNIDine (CATAPRES) 0.1 MG tablet Take 1 tablet by mouth every morning        escitalopram (LEXAPRO) 20 MG tablet Take 20 mg by mouth every morning        medical cannabis (Patient's own supply) See Admin Instructions (The purpose of this order is to document that the patient reports taking medical cannabis.  This is not a prescription, and is not used to certify that the patient has a qualifying medical condition.)        Multiple Vitamins-Minerals (MULTIVITAMIN ADULT PO) Take 1 tablet by mouth every morning        SAPHRIS 5 MG SUBL sublingual tablet Place 10 mg under the tongue 2 times daily        VENTOLIN  (90 BASE) MCG/ACT inhaler Inhale 1-2 puffs into the lungs every 6 hours as needed for shortness of breath / dyspnea or wheezing   5     vitamin B-12 (CYANOCOBALAMIN) 1000 MCG tablet Take 1,000 mcg by mouth every morning        acetaminophen 500 MG CAPS Take 1,000 mg by mouth as needed        aspirin (ASA) 325 MG tablet Take 1 tablet (325 mg) by mouth daily (Patient not taking: Reported on 6/15/2021) 90 tablet 3     clopidogrel (PLAVIX) 75 MG tablet Take 1 tablet (75 mg) by mouth daily (Patient not taking: Reported on 6/15/2021) 30 tablet 4     estradiol (VIVELLE-DOT) 0.05 MG/24HR bi-weekly patch Place 1 patch onto the skin twice a week (Patient not taking: Reported on 6/15/2021) 24 patch 0     ondansetron (ZOFRAN ODT) 8 MG ODT tab Take 1 tablet (8 mg) by mouth every 8 hours as needed for nausea 12 tablet 0       Allergies  Allergies   Allergen Reactions     Ibuprofen GI Disturbance     Seroquel [Quetiapine] Other (See Comments) and Rash     Insomnia       Social History  Social History     Socioeconomic History     Marital status: Single     Spouse name: Not on file     Number of children: Not on file     Years of  education: Not on file     Highest education level: Not on file   Occupational History     Not on file   Social Needs     Financial resource strain: Not on file     Food insecurity     Worry: Not on file     Inability: Not on file     Transportation needs     Medical: Not on file     Non-medical: Not on file   Tobacco Use     Smoking status: Current Some Day Smoker     Packs/day: 0.25     Smokeless tobacco: Never Used     Tobacco comment: Trying   Substance and Sexual Activity     Alcohol use: Not Currently     Comment: rare     Drug use: Yes     Types: Marijuana     Comment: pt says she is prescribed medical marijuana     Sexual activity: Yes     Partners: Male     Birth control/protection: Female Surgical     Comment: TBL/hys   Lifestyle     Physical activity     Days per week: Not on file     Minutes per session: Not on file     Stress: Not on file   Relationships     Social connections     Talks on phone: Not on file     Gets together: Not on file     Attends Mosque service: Not on file     Active member of club or organization: Not on file     Attends meetings of clubs or organizations: Not on file     Relationship status: Not on file     Intimate partner violence     Fear of current or ex partner: Not on file     Emotionally abused: Not on file     Physically abused: Not on file     Forced sexual activity: Not on file   Other Topics Concern     Parent/sibling w/ CABG, MI or angioplasty before 65F 55M? Yes   Social History Narrative     Not on file       Family History  Family History   Problem Relation Age of Onset     Other Cancer Brother 36        brain ca     Deep Vein Thrombosis (DVT) Brother      Other Cancer Father         leukemia     Deep Vein Thrombosis (DVT) Father      Other Cancer Maternal Aunt         2 aunts with glioblastomas     Coronary Stenting Mother      Breast Cancer No family hx of         no ovarian cancer     Anesthesia Reaction No family hx of           ROS/MED HX  The complete  review of systems is negative other than noted in the HPI or here.  Patient denies recent illness, fever and respiratory infection during past month.  Pt denies steroid use during past year.    ENT/Pulmonary: Comment: Uses inhaler in humid weather and during URIs. Last used inhaler >6 months ago.    (+) tobacco use, Current use, Intermittent, asthma Treatment: Inhaler prn,   (-) sleep apnea   Neurologic: Comment: Taking CBD for seizure prevention. Seizures are grand mal x5 minutes.      (+) seizures, last seizure: week of 6/7/21 (was 1 yr ago prior to that),  (-) no CVA and migraines   Cardiovascular:  - neg cardiovascular ROS   (+) -----Previous cardiac testing   Echo: Date: Results:    Stress Test: Date: Results:    ECG Reviewed: Date: 2/24/21 Results:  Sinus rhythm Normal ECG  Ventricular rate 84 bpm    Cath: Date: Results:      METS/Exercise Tolerance: 3 - Able to walk 1-2 blocks without stopping Comment: Has regional pain syndrome. Activity limited. Able to carry groceries up flight of stairs w/ forearms. Has lifting restrictions.     Hematologic: Comments: Started  mg & Plavix on 6/16 for upcoming surgery - neg hematologic  ROS  (-) history of blood clots and history of blood transfusion   Musculoskeletal: Comment: Regional pain syndrome - affects hands & neck    Carpal tunnel    S/p cyst removal right hand; has had regional pain since.      GI/Hepatic: Comment: S/P gastric banding. Band taken down 5/11/21 due to esophageal dysphagia.     (-) GERD and liver disease   Renal/Genitourinary:  - neg Renal ROS     Endo:     (+) Obesity,  (-) Type II DM   Psychiatric/Substance Use: Comment: ADHD    Hx suicidal ideation      (+) psychiatric history depression     Infectious Disease:  - neg infectious disease ROS     Malignancy:  - neg malignancy ROS     Other:  - neg other ROS                                     0 lbs 0 oz  Data Unavailable   There is no height or weight on file to calculate BMI.          Physical Exam  Constitutional: Awake, alert, cooperative, no apparent distress, and appears stated age.  Neurologic: Awake, alert, oriented to name, place and time.   Neuropsychiatric: Calm, cooperative. Normal affect. Answers questions appropriately.    ** Patient's visit was done virtually today due to the Covid 19 pandemic.  A full physical exam was not completed.  Please refer to the physical examination documented by the anesthesiologist in the anesthesia record on the day of surgery. **        PRIOR LABS/DIAGNOSTIC STUDIES:   All labs and imaging personally reviewed     CTA  HEAD NECK WITH CONTRAST 2/24/2021  Impression:    1. 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 Second  aneurysm off the right cavernous carotid artery measures up to 1.5 mm.  No definite stenosis or occlusion of the major intracranial arteries.  2. Neck CTA demonstrates no stenosis of the major cervical arteries.      MRI brain without contrast 2/24/21  Findings:   Diffusion weighted images demonstrate no definite acute infarct. Axial  FLAIR images are within normal limits. The ventricles are proportional  to the cerebral sulci. No evidence of intracranial hemorrhage on  susceptibility-weighted images. No mass effect or midline shift. No  extra-axial fluid collection. Fluid-filled left anterior ethmoid air  cells. The remaining paranasal sinuses are relatively clear. Mastoid  air cells are clear.                                                                   Impression:  No evidence of acute infarction or intracranial hemorrhage.        EKG 2/24/21  Sinus rhythm Normal ECG  Ventricular rate 84 bpm      CBC:   Lab Results   Component Value Date    WBC 10.2 03/16/2021    WBC 14.2 (H) 02/24/2021    HGB 13.3 03/16/2021    HGB 15.4 02/24/2021    HCT 41.0 03/16/2021    HCT 44.5 02/24/2021     03/16/2021     02/24/2021     BMP:   Lab Results    Component Value Date     03/16/2021     02/24/2021    POTASSIUM 3.7 03/16/2021    POTASSIUM 3.5 02/24/2021    CHLORIDE 108 03/16/2021    CHLORIDE 105 02/24/2021    CO2 28 03/16/2021    CO2 25 02/24/2021    BUN 8 03/16/2021    BUN 6 (L) 02/24/2021    CR 0.75 03/16/2021    CR 0.73 03/16/2021    GLC 98 03/16/2021     (H) 02/24/2021     COAGS:   Lab Results   Component Value Date    PTT 28 02/24/2021    INR 1.07 03/16/2021     POC:   Lab Results   Component Value Date     (H) 05/11/2021    HCG Negative 04/28/2020    HCGS Negative 06/23/2020     HEPATIC:   Lab Results   Component Value Date    ALBUMIN 4.0 03/16/2021    PROTTOTAL 7.6 03/16/2021    ALT 19 03/16/2021    AST 7 03/16/2021    ALKPHOS 85 03/16/2021    BILITOTAL 0.3 03/16/2021     OTHER:   Lab Results   Component Value Date    LACT 1.7 03/21/2018    A1C 5.3 03/16/2021    SOFI 9.0 03/16/2021    MAG 2.3 02/13/2019    LIPASE 83 06/23/2020    TSH 1.12 03/16/2021       Labs ordered for DOS: CBC, BMP, T&S    COVID19 testing scheduled on 6/21/21      ASSESSMENT and PLAN  Maryanne Crockett is a 40 year old female scheduled to undergo Carotid Cerebral Angiogram With Aneurysm Treatment with Rich Webster MD on 6/23/21 at [facility] for treatment of Cerebral aneurysm, nonruptured.        Pt has had prior anesthetic.     History of anesthetic complications:  mild PONV.    She has the following specific operative considerations:   # MARIELA 1/8 = low risk  # VTE risk: 0.26%  # Risk of PONV score = 3.  If > 2, anti-emetic intervention recommended.  # Anesthesia considerations:  Refer to PAC assessment in anesthesia records    # Increased risk of postoperative nausea/vomiting: Recommend use of antiemetic agents in the perioperative period.        CARDIAC: METS 3-4,  Has regional pain syndrome. Activity limited. Able to carry groceries up flight of stairs w/ forearms. Has lifting restrictions.     # RCRI :  High risk surgery.  0.9% risk of major adverse cardiac event.       PULMONARY:     # Current smoker, smokes 1/3 ppd    # Mild intermittent Asthma, Uses inhaler in humid weather and during URIs. Last used inhaler >6 months ago.        GI:     # S/P gastric banding. Band taken down 5/11/21 due to esophageal dysphagia.    # Denies GERD    ENDO: BMI 31    # No DM    HEME:     # Started  mg & Plavix on 6/16 for upcoming surgery    ORTHO:     # Regional pain syndrome - affects hands & neck    # Carpal tunnel    # S/p cyst removal right hand; has had regional pain since.    NEURO/PSYCH:     # Seizures, Taking CBD. Seizures are grand mal x5 minutes. Last one was week of 6/7/21; seizure prior to that was 7/2020    # Depression, ADHD, Hx suicidal ideation      Patient is optimized and is acceptable candidate for the proposed procedure. No further diagnostic evaluation is needed.    ** Patient's visit was done virtually today due to the Covid 19 pandemic.  A full physical exam was not completed.  Please refer to the physical examination documented by the anesthesiologist in the anesthesia record on the day of surgery. **      Final plan per anesthesiologist on day of surgery.     Arrival time, NPO, shower and medication instructions provided by nursing staff today.  Preparing For Your Surgery handout given.    39 minutes spent on the date of the encounter doing chart review, history and exam, documentation and further activities as noted below:    Prep time: 14 minutes  Visit time: 15 minutes  Documentation time: 10 minutes    Kasey Bowman PA-C  Preoperative Assessment Center  [facility]  Phone: [phone]  Fax: [phone]

## 2021-06-18 NOTE — PATIENT INSTRUCTIONS
Preparing for Your Surgery      Name:  Maryanne Crockett   MRN:  [id number]   :  1980   Today's Date:  2021         Arriving for surgery:  Surgery date:  21  Arrival time:  6:00 am    Restrictions due to COVID 19:  One consistent visitor is allowed per patient  No ill visitors  All visitors must wear face mask     parking is available for anyone with mobility limitations or disabilities. (Monday- Friday 7 am- 5 pm)    Please come to:    [facility]  [address] [place] [id number]    Please check in on the 5th floor at the Ambulatory Surgery Center       What can I eat or drink?    -  You may eat and drink normally until 8 hours before surgery. (Until midnight)  -  You may have clear liquids up to 4 hours before surgery. (Until 4:00 am)    Examples of clear liquids:  Water  Clear broth  Juices (apple, white grape, white cranberry  and cider) without pulp  Noncarbonated, powder based beverages  (lemonade and Alan-Aid)  Sodas (Sprite, 7-Up, ginger ale and seltzer)  Coffee or tea (without milk or cream)  Gatorade    --No alcohol for at least 24 hours before surgery    Which medicines can I take?    Hold Aspirin for 7 days before surgery.   Hold Multivitamins for 7 days before surgery.  Hold Supplements for 7 days before surgery.  Hold Ibuprofen (Advil, Motrin) for 1 day before surgery--unless otherwise directed by surgeon.  Hold Naproxen (Aleve) for 4 days before surgery.    -  DO NOT take the following medications the day of surgery:  Amphetamine-Dextroamphetamine (Adderall)  Plavix  Aspirin    -  PLEASE TAKE the following medications the day of surgery:   Alprazolam (Xanax)  Bupropion (Wellbutrin)  Clonidine (Catapres)  Escitalopram (Lexapro)  Saphris  Ventolin (90 Base) inhaler  Acetaminophen (Tylenol)  Ondansetron (Zofran)    How do I prepare myself?  - Please take 2 showers before surgery using Scrubcare or Hibiclens soap.    Use this soap only from the neck  to your toes.     Leave the soap on your skin for one minute--then rinse thoroughly.      You may use your own shampoo and conditioner; no other hair products.   - Please remove all jewelry and body piercings.  - No lotions, deodorants or fragrance.  - No makeup or fingernail polish.   - Bring your ID and insurance card.        - All patients are required to have a Covid-19 test within 4 days of surgery/procedure.      -Patients will be contacted by the [facility] scheduling team within 1 week of surgery to make an appointment.      - Patients may call the Scheduling team at [phone] if they have not been scheduled within 4 days of  surgery.      ALL PATIENTS ARE REQUIRED TO HAVE A RESPONSIBLE ADULT TO DRIVE AND BE IN ATTENDANCE WITH THEM FOR 24 HOURS FOLLOWING SURGERY        Questions or Concerns:    -For questions regarding the day of surgery please contact the Ambulatory Surgery Center at [phone].    -If you have health changes between today and your surgery please contact your surgeon.     For questions after surgery please call your surgeons office.

## 2021-06-21 DIAGNOSIS — Z11.59 ENCOUNTER FOR SCREENING FOR OTHER VIRAL DISEASES: ICD-10-CM

## 2021-06-21 DIAGNOSIS — I67.1 CEREBRAL ANEURYSM, NONRUPTURED: ICD-10-CM

## 2021-06-21 LAB
ANION GAP SERPL CALCULATED.3IONS-SCNC: 5 MMOL/L (ref 3–14)
APTT PPP: 29 SEC (ref 22–37)
BUN SERPL-MCNC: 12 MG/DL (ref 7–30)
CALCIUM SERPL-MCNC: 8.8 MG/DL (ref 8.5–10.1)
CHLORIDE SERPL-SCNC: 106 MMOL/L (ref 94–109)
CO2 SERPL-SCNC: 27 MMOL/L (ref 20–32)
CREAT SERPL-MCNC: 0.84 MG/DL (ref 0.52–1.04)
ERYTHROCYTE [DISTWIDTH] IN BLOOD BY AUTOMATED COUNT: 12.6 % (ref 10–15)
GFR SERPL CREATININE-BSD FRML MDRD: 86 ML/MIN/{1.73_M2}
GLUCOSE SERPL-MCNC: 99 MG/DL (ref 70–99)
HCT VFR BLD AUTO: 40.5 % (ref 35–47)
HGB BLD-MCNC: 13.2 G/DL (ref 11.7–15.7)
INR PPP: 1.01 (ref 0.86–1.14)
LABORATORY COMMENT REPORT: NORMAL
MCH RBC QN AUTO: 28.8 PG (ref 26.5–33)
MCHC RBC AUTO-ENTMCNC: 32.6 G/DL (ref 31.5–36.5)
MCV RBC AUTO: 88 FL (ref 78–100)
PLATELET # BLD AUTO: 277 10E9/L (ref 150–450)
POTASSIUM SERPL-SCNC: 4.5 MMOL/L (ref 3.4–5.3)
RBC # BLD AUTO: 4.58 10E12/L (ref 3.8–5.2)
SARS-COV-2 RNA RESP QL NAA+PROBE: NEGATIVE
SARS-COV-2 RNA RESP QL NAA+PROBE: NORMAL
SODIUM SERPL-SCNC: 139 MMOL/L (ref 133–144)
SPECIMEN SOURCE: NORMAL
SPECIMEN SOURCE: NORMAL
WBC # BLD AUTO: 7.6 10E9/L (ref 4–11)

## 2021-06-21 PROCEDURE — U0005 INFEC AGEN DETEC AMPLI PROBE: HCPCS | Mod: 90 | Performed by: PATHOLOGY

## 2021-06-21 PROCEDURE — 80048 BASIC METABOLIC PNL TOTAL CA: CPT | Performed by: PATHOLOGY

## 2021-06-21 PROCEDURE — 85027 COMPLETE CBC AUTOMATED: CPT | Performed by: PATHOLOGY

## 2021-06-21 PROCEDURE — U0003 INFECTIOUS AGENT DETECTION BY NUCLEIC ACID (DNA OR RNA); SEVERE ACUTE RESPIRATORY SYNDROME CORONAVIRUS 2 (SARS-COV-2) (CORONAVIRUS DISEASE [COVID-19]), AMPLIFIED PROBE TECHNIQUE, MAKING USE OF HIGH THROUGHPUT TECHNOLOGIES AS DESCRIBED BY CMS-2020-01-R: HCPCS | Mod: 90 | Performed by: PATHOLOGY

## 2021-06-21 PROCEDURE — 36415 COLL VENOUS BLD VENIPUNCTURE: CPT | Performed by: PATHOLOGY

## 2021-06-21 PROCEDURE — 85610 PROTHROMBIN TIME: CPT | Performed by: PATHOLOGY

## 2021-06-21 PROCEDURE — 85730 THROMBOPLASTIN TIME PARTIAL: CPT | Performed by: PATHOLOGY

## 2021-06-23 ENCOUNTER — APPOINTMENT (OUTPATIENT)
Dept: INTERVENTIONAL RADIOLOGY/VASCULAR | Facility: CLINIC | Age: 41
End: 2021-06-23
Attending: RADIOLOGY
Payer: COMMERCIAL

## 2021-06-23 ENCOUNTER — ANESTHESIA (OUTPATIENT)
Dept: SURGERY | Facility: CLINIC | Age: 41
End: 2021-06-23
Payer: COMMERCIAL

## 2021-06-23 ENCOUNTER — HOSPITAL ENCOUNTER (INPATIENT)
Facility: CLINIC | Age: 41
LOS: 1 days | Discharge: HOME | End: 2021-06-24
Attending: RADIOLOGY | Admitting: RADIOLOGY
Payer: COMMERCIAL

## 2021-06-23 DIAGNOSIS — I67.1 CEREBRAL ANEURYSM, NONRUPTURED: ICD-10-CM

## 2021-06-23 LAB
ABO + RH BLD: NORMAL
ABO + RH BLD: NORMAL
ANION GAP SERPL CALCULATED.3IONS-SCNC: 4 MMOL/L (ref 3–14)
BLD GP AB SCN SERPL QL: NORMAL
BLOOD BANK CMNT PATIENT-IMP: NORMAL
BUN SERPL-MCNC: 12 MG/DL (ref 7–30)
CALCIUM SERPL-MCNC: 8.8 MG/DL (ref 8.5–10.1)
CHLORIDE SERPL-SCNC: 108 MMOL/L (ref 94–109)
CO2 SERPL-SCNC: 26 MMOL/L (ref 20–32)
CREAT SERPL-MCNC: 0.82 MG/DL (ref 0.52–1.04)
ERYTHROCYTE [DISTWIDTH] IN BLOOD BY AUTOMATED COUNT: 12.9 % (ref 10–15)
GFR SERPL CREATININE-BSD FRML MDRD: 90 ML/MIN/{1.73_M2}
GLUCOSE BLDC GLUCOMTR-MCNC: 143 MG/DL (ref 70–99)
GLUCOSE SERPL-MCNC: 100 MG/DL (ref 70–99)
HCT VFR BLD AUTO: 40.9 % (ref 35–47)
HGB BLD-MCNC: 12.9 G/DL (ref 11.7–15.7)
MCH RBC QN AUTO: 28.8 PG (ref 26.5–33)
MCHC RBC AUTO-ENTMCNC: 31.5 G/DL (ref 31.5–36.5)
MCV RBC AUTO: 91 FL (ref 78–100)
PLATELET # BLD AUTO: 265 10E9/L (ref 150–450)
PLATELET REACTIVITY P2Y12: 49 PRU
POTASSIUM SERPL-SCNC: 4 MMOL/L (ref 3.4–5.3)
RBC # BLD AUTO: 4.48 10E12/L (ref 3.8–5.2)
SODIUM SERPL-SCNC: 139 MMOL/L (ref 133–144)
SPECIMEN EXP DATE BLD: NORMAL
WBC # BLD AUTO: 7.4 10E9/L (ref 4–11)

## 2021-06-23 PROCEDURE — 250N000025 HC SEVOFLURANE, PER MIN

## 2021-06-23 PROCEDURE — 87640 STAPH A DNA AMP PROBE: CPT | Performed by: STUDENT IN AN ORGANIZED HEALTH CARE EDUCATION/TRAINING PROGRAM

## 2021-06-23 PROCEDURE — 250N000009 HC RX 250: Performed by: NURSE ANESTHETIST, CERTIFIED REGISTERED

## 2021-06-23 PROCEDURE — 76937 US GUIDE VASCULAR ACCESS: CPT | Mod: 26 | Performed by: RADIOLOGY

## 2021-06-23 PROCEDURE — 272N000506 HC NEEDLE CR6

## 2021-06-23 PROCEDURE — 36224 PLACE CATH CAROTD ART: CPT

## 2021-06-23 PROCEDURE — 255N000002 HC RX 255 OP 636: Performed by: RADIOLOGY

## 2021-06-23 PROCEDURE — 200N000002 HC R&B ICU UMMC

## 2021-06-23 PROCEDURE — 87641 MR-STAPH DNA AMP PROBE: CPT | Performed by: STUDENT IN AN ORGANIZED HEALTH CARE EDUCATION/TRAINING PROGRAM

## 2021-06-23 PROCEDURE — 272N000572 HC SHEATH CR9

## 2021-06-23 PROCEDURE — C1887 CATHETER, GUIDING: HCPCS

## 2021-06-23 PROCEDURE — 272N000192 HC ACCESSORY CR2

## 2021-06-23 PROCEDURE — 76377 3D RENDER W/INTRP POSTPROCES: CPT | Mod: 26 | Performed by: RADIOLOGY

## 2021-06-23 PROCEDURE — C1769 GUIDE WIRE: HCPCS

## 2021-06-23 PROCEDURE — 75898 FOLLOW-UP ANGIOGRAPHY: CPT

## 2021-06-23 PROCEDURE — 272N000116 HC CATH CR1

## 2021-06-23 PROCEDURE — 36415 COLL VENOUS BLD VENIPUNCTURE: CPT | Performed by: STUDENT IN AN ORGANIZED HEALTH CARE EDUCATION/TRAINING PROGRAM

## 2021-06-23 PROCEDURE — 710N000010 HC RECOVERY PHASE 1, LEVEL 2, PER MIN

## 2021-06-23 PROCEDURE — 86900 BLOOD TYPING SEROLOGIC ABO: CPT | Performed by: ANESTHESIOLOGY

## 2021-06-23 PROCEDURE — 61624 TCAT PERM OCCLS/EMBOLJ CNS: CPT

## 2021-06-23 PROCEDURE — 370N000017 HC ANESTHESIA TECHNICAL FEE, PER MIN

## 2021-06-23 PROCEDURE — 999N001017 HC STATISTIC GLUCOSE BY METER IP

## 2021-06-23 PROCEDURE — 75894 X-RAYS TRANSCATH THERAPY: CPT

## 2021-06-23 PROCEDURE — 76377 3D RENDER W/INTRP POSTPROCES: CPT | Mod: XS

## 2021-06-23 PROCEDURE — 250N000011 HC RX IP 250 OP 636: Performed by: NURSE ANESTHETIST, CERTIFIED REGISTERED

## 2021-06-23 PROCEDURE — 258N000003 HC RX IP 258 OP 636: Performed by: NURSE ANESTHETIST, CERTIFIED REGISTERED

## 2021-06-23 PROCEDURE — 61635 INTRACRAN ANGIOPLSTY W/STENT: CPT | Mod: GC | Performed by: RADIOLOGY

## 2021-06-23 PROCEDURE — 85576 BLOOD PLATELET AGGREGATION: CPT | Mod: TC | Performed by: STUDENT IN AN ORGANIZED HEALTH CARE EDUCATION/TRAINING PROGRAM

## 2021-06-23 PROCEDURE — 85027 COMPLETE CBC AUTOMATED: CPT | Performed by: PHYSICIAN ASSISTANT

## 2021-06-23 PROCEDURE — 250N000013 HC RX MED GY IP 250 OP 250 PS 637: Performed by: STUDENT IN AN ORGANIZED HEALTH CARE EDUCATION/TRAINING PROGRAM

## 2021-06-23 PROCEDURE — 86901 BLOOD TYPING SEROLOGIC RH(D): CPT | Performed by: ANESTHESIOLOGY

## 2021-06-23 PROCEDURE — 272N000649 HC COIL/EMBOLIC DEVICE CR50

## 2021-06-23 PROCEDURE — 03VG3HZ RESTRICTION OF INTRACRANIAL ARTERY WITH INTRALUMINAL DEVICE, FLOW DIVERTER, PERCUTANEOUS APPROACH: ICD-10-PCS | Performed by: RADIOLOGY

## 2021-06-23 PROCEDURE — 80048 BASIC METABOLIC PNL TOTAL CA: CPT | Performed by: PHYSICIAN ASSISTANT

## 2021-06-23 PROCEDURE — 86850 RBC ANTIBODY SCREEN: CPT | Performed by: ANESTHESIOLOGY

## 2021-06-23 PROCEDURE — 250N000009 HC RX 250: Mod: JW | Performed by: STUDENT IN AN ORGANIZED HEALTH CARE EDUCATION/TRAINING PROGRAM

## 2021-06-23 PROCEDURE — 36415 COLL VENOUS BLD VENIPUNCTURE: CPT | Performed by: ANESTHESIOLOGY

## 2021-06-23 PROCEDURE — C1760 CLOSURE DEV, VASC: HCPCS

## 2021-06-23 PROCEDURE — 250N000011 HC RX IP 250 OP 636: Performed by: STUDENT IN AN ORGANIZED HEALTH CARE EDUCATION/TRAINING PROGRAM

## 2021-06-23 PROCEDURE — 250N000011 HC RX IP 250 OP 636: Performed by: ANESTHESIOLOGY

## 2021-06-23 PROCEDURE — 999N000141 HC STATISTIC PRE-PROCEDURE NURSING ASSESSMENT

## 2021-06-23 RX ORDER — ACETAMINOPHEN 500 MG
1000 TABLET ORAL EVERY 6 HOURS PRN
Status: DISCONTINUED | OUTPATIENT
Start: 2021-06-23 | End: 2021-06-24 | Stop reason: HOSPADM

## 2021-06-23 RX ORDER — PROCHLORPERAZINE 25 MG
25 SUPPOSITORY, RECTAL RECTAL EVERY 12 HOURS PRN
Status: DISCONTINUED | OUTPATIENT
Start: 2021-06-23 | End: 2021-06-24 | Stop reason: HOSPADM

## 2021-06-23 RX ORDER — BUPROPION HYDROCHLORIDE 150 MG/1
150 TABLET ORAL EVERY MORNING
Status: DISCONTINUED | OUTPATIENT
Start: 2021-06-24 | End: 2021-06-24 | Stop reason: HOSPADM

## 2021-06-23 RX ORDER — CLONIDINE HYDROCHLORIDE 0.1 MG/1
0.1 TABLET ORAL EVERY MORNING
Status: DISCONTINUED | OUTPATIENT
Start: 2021-06-24 | End: 2021-06-24 | Stop reason: HOSPADM

## 2021-06-23 RX ORDER — HEPARIN SODIUM 200 [USP'U]/100ML
1 INJECTION, SOLUTION INTRAVENOUS CONTINUOUS PRN
Status: DISCONTINUED | OUTPATIENT
Start: 2021-06-23 | End: 2021-06-23 | Stop reason: HOSPADM

## 2021-06-23 RX ORDER — SODIUM CHLORIDE 9 MG/ML
INJECTION, SOLUTION INTRAVENOUS CONTINUOUS
Status: DISCONTINUED | OUTPATIENT
Start: 2021-06-23 | End: 2021-06-24 | Stop reason: HOSPADM

## 2021-06-23 RX ORDER — PROCHLORPERAZINE MALEATE 10 MG
10 TABLET ORAL EVERY 6 HOURS PRN
Status: DISCONTINUED | OUTPATIENT
Start: 2021-06-23 | End: 2021-06-24 | Stop reason: HOSPADM

## 2021-06-23 RX ORDER — HEPARIN SODIUM 1000 [USP'U]/ML
INJECTION, SOLUTION INTRAVENOUS; SUBCUTANEOUS PRN
Status: DISCONTINUED | OUTPATIENT
Start: 2021-06-23 | End: 2021-06-23

## 2021-06-23 RX ORDER — CLOPIDOGREL BISULFATE 75 MG/1
75 TABLET ORAL DAILY
Status: DISCONTINUED | OUTPATIENT
Start: 2021-06-23 | End: 2021-06-24 | Stop reason: HOSPADM

## 2021-06-23 RX ORDER — NALOXONE HYDROCHLORIDE 0.4 MG/ML
0.2 INJECTION, SOLUTION INTRAMUSCULAR; INTRAVENOUS; SUBCUTANEOUS
Status: DISCONTINUED | OUTPATIENT
Start: 2021-06-23 | End: 2021-06-24 | Stop reason: HOSPADM

## 2021-06-23 RX ORDER — SODIUM CHLORIDE, SODIUM GLUCONATE, SODIUM ACETATE, POTASSIUM CHLORIDE AND MAGNESIUM CHLORIDE 526; 502; 368; 37; 30 MG/100ML; MG/100ML; MG/100ML; MG/100ML; MG/100ML
INJECTION, SOLUTION INTRAVENOUS CONTINUOUS PRN
Status: DISCONTINUED | OUTPATIENT
Start: 2021-06-23 | End: 2021-06-23

## 2021-06-23 RX ORDER — NALOXONE HYDROCHLORIDE 0.4 MG/ML
0.4 INJECTION, SOLUTION INTRAMUSCULAR; INTRAVENOUS; SUBCUTANEOUS
Status: DISCONTINUED | OUTPATIENT
Start: 2021-06-23 | End: 2021-06-24 | Stop reason: HOSPADM

## 2021-06-23 RX ORDER — SODIUM CHLORIDE 9 MG/ML
INJECTION, SOLUTION INTRAVENOUS CONTINUOUS
Status: DISCONTINUED | OUTPATIENT
Start: 2021-06-23 | End: 2021-06-23 | Stop reason: HOSPADM

## 2021-06-23 RX ORDER — CHOLECALCIFEROL (VITAMIN D3) 1250 MCG
1 CAPSULE ORAL
COMMUNITY
Start: 2021-06-20 | End: 2021-09-28

## 2021-06-23 RX ORDER — DEXTROSE MONOHYDRATE 25 G/50ML
25-50 INJECTION, SOLUTION INTRAVENOUS
Status: DISCONTINUED | OUTPATIENT
Start: 2021-06-23 | End: 2021-06-24 | Stop reason: HOSPADM

## 2021-06-23 RX ORDER — METOCLOPRAMIDE HYDROCHLORIDE 5 MG/ML
10 INJECTION INTRAMUSCULAR; INTRAVENOUS EVERY 6 HOURS PRN
Status: DISCONTINUED | OUTPATIENT
Start: 2021-06-23 | End: 2021-06-24 | Stop reason: HOSPADM

## 2021-06-23 RX ORDER — PHENYLEPHRINE HCL IN 0.9% NACL 50MG/250ML
.5-1.25 PLASTIC BAG, INJECTION (ML) INTRAVENOUS CONTINUOUS
Status: DISCONTINUED | OUTPATIENT
Start: 2021-06-23 | End: 2021-06-24 | Stop reason: HOSPADM

## 2021-06-23 RX ORDER — SODIUM CHLORIDE, SODIUM LACTATE, POTASSIUM CHLORIDE, CALCIUM CHLORIDE 600; 310; 30; 20 MG/100ML; MG/100ML; MG/100ML; MG/100ML
INJECTION, SOLUTION INTRAVENOUS CONTINUOUS
Status: DISCONTINUED | OUTPATIENT
Start: 2021-06-23 | End: 2021-06-23 | Stop reason: HOSPADM

## 2021-06-23 RX ORDER — NALOXONE HYDROCHLORIDE 0.4 MG/ML
0.4 INJECTION, SOLUTION INTRAMUSCULAR; INTRAVENOUS; SUBCUTANEOUS
Status: DISCONTINUED | OUTPATIENT
Start: 2021-06-23 | End: 2021-06-23 | Stop reason: HOSPADM

## 2021-06-23 RX ORDER — ALPRAZOLAM 1 MG
2 TABLET ORAL 3 TIMES DAILY PRN
Status: DISCONTINUED | OUTPATIENT
Start: 2021-06-23 | End: 2021-06-24 | Stop reason: HOSPADM

## 2021-06-23 RX ORDER — ASPIRIN 325 MG
325 TABLET ORAL ONCE
Status: COMPLETED | OUTPATIENT
Start: 2021-06-23 | End: 2021-06-23

## 2021-06-23 RX ORDER — ASENAPINE 5 MG/1
10 TABLET SUBLINGUAL 2 TIMES DAILY
Status: DISCONTINUED | OUTPATIENT
Start: 2021-06-23 | End: 2021-06-24 | Stop reason: HOSPADM

## 2021-06-23 RX ORDER — OXYCODONE HYDROCHLORIDE 10 MG/1
10 TABLET ORAL EVERY 4 HOURS PRN
Status: DISCONTINUED | OUTPATIENT
Start: 2021-06-23 | End: 2021-06-24 | Stop reason: HOSPADM

## 2021-06-23 RX ORDER — ONDANSETRON 4 MG/1
4 TABLET, ORALLY DISINTEGRATING ORAL EVERY 6 HOURS PRN
Status: DISCONTINUED | OUTPATIENT
Start: 2021-06-23 | End: 2021-06-24 | Stop reason: HOSPADM

## 2021-06-23 RX ORDER — NALOXONE HYDROCHLORIDE 0.4 MG/ML
0.2 INJECTION, SOLUTION INTRAMUSCULAR; INTRAVENOUS; SUBCUTANEOUS
Status: DISCONTINUED | OUTPATIENT
Start: 2021-06-23 | End: 2021-06-23 | Stop reason: HOSPADM

## 2021-06-23 RX ORDER — ONDANSETRON 4 MG/1
8 TABLET, ORALLY DISINTEGRATING ORAL EVERY 8 HOURS PRN
Status: DISCONTINUED | OUTPATIENT
Start: 2021-06-23 | End: 2021-06-23

## 2021-06-23 RX ORDER — ESMOLOL HYDROCHLORIDE 10 MG/ML
INJECTION INTRAVENOUS PRN
Status: DISCONTINUED | OUTPATIENT
Start: 2021-06-23 | End: 2021-06-23

## 2021-06-23 RX ORDER — ONDANSETRON 2 MG/ML
4 INJECTION INTRAMUSCULAR; INTRAVENOUS EVERY 30 MIN PRN
Status: DISCONTINUED | OUTPATIENT
Start: 2021-06-23 | End: 2021-06-23 | Stop reason: HOSPADM

## 2021-06-23 RX ORDER — ONDANSETRON 4 MG/1
4 TABLET, ORALLY DISINTEGRATING ORAL EVERY 30 MIN PRN
Status: DISCONTINUED | OUTPATIENT
Start: 2021-06-23 | End: 2021-06-23 | Stop reason: HOSPADM

## 2021-06-23 RX ORDER — ONDANSETRON 2 MG/ML
INJECTION INTRAMUSCULAR; INTRAVENOUS PRN
Status: DISCONTINUED | OUTPATIENT
Start: 2021-06-23 | End: 2021-06-23

## 2021-06-23 RX ORDER — PROPOFOL 10 MG/ML
INJECTION, EMULSION INTRAVENOUS PRN
Status: DISCONTINUED | OUTPATIENT
Start: 2021-06-23 | End: 2021-06-23

## 2021-06-23 RX ORDER — IODIXANOL 320 MG/ML
150 INJECTION, SOLUTION INTRAVASCULAR ONCE
Status: COMPLETED | OUTPATIENT
Start: 2021-06-23 | End: 2021-06-23

## 2021-06-23 RX ORDER — ONDANSETRON 2 MG/ML
4 INJECTION INTRAMUSCULAR; INTRAVENOUS EVERY 6 HOURS PRN
Status: DISCONTINUED | OUTPATIENT
Start: 2021-06-23 | End: 2021-06-23

## 2021-06-23 RX ORDER — ONDANSETRON 2 MG/ML
4 INJECTION INTRAMUSCULAR; INTRAVENOUS EVERY 6 HOURS PRN
Status: DISCONTINUED | OUTPATIENT
Start: 2021-06-23 | End: 2021-06-24 | Stop reason: HOSPADM

## 2021-06-23 RX ORDER — ONDANSETRON 4 MG/1
4 TABLET, ORALLY DISINTEGRATING ORAL EVERY 6 HOURS PRN
Status: DISCONTINUED | OUTPATIENT
Start: 2021-06-23 | End: 2021-06-23

## 2021-06-23 RX ORDER — FENTANYL CITRATE 50 UG/ML
50-100 INJECTION, SOLUTION INTRAMUSCULAR; INTRAVENOUS
Status: DISCONTINUED | OUTPATIENT
Start: 2021-06-23 | End: 2021-06-24 | Stop reason: HOSPADM

## 2021-06-23 RX ORDER — LABETALOL HYDROCHLORIDE 5 MG/ML
INJECTION, SOLUTION INTRAVENOUS PRN
Status: DISCONTINUED | OUTPATIENT
Start: 2021-06-23 | End: 2021-06-23

## 2021-06-23 RX ORDER — METOCLOPRAMIDE 10 MG/1
10 TABLET ORAL EVERY 6 HOURS PRN
Status: DISCONTINUED | OUTPATIENT
Start: 2021-06-23 | End: 2021-06-24 | Stop reason: HOSPADM

## 2021-06-23 RX ORDER — LIDOCAINE HYDROCHLORIDE 20 MG/ML
INJECTION, SOLUTION INFILTRATION; PERINEURAL PRN
Status: DISCONTINUED | OUTPATIENT
Start: 2021-06-23 | End: 2021-06-23

## 2021-06-23 RX ORDER — ASPIRIN 325 MG
325 TABLET ORAL DAILY
Status: DISCONTINUED | OUTPATIENT
Start: 2021-06-23 | End: 2021-06-24 | Stop reason: HOSPADM

## 2021-06-23 RX ORDER — PROTAMINE SULFATE 10 MG/ML
5-50 INJECTION, SOLUTION INTRAVENOUS
Status: DISCONTINUED | OUTPATIENT
Start: 2021-06-23 | End: 2021-06-23 | Stop reason: HOSPADM

## 2021-06-23 RX ORDER — PROCHLORPERAZINE 25 MG
25 SUPPOSITORY, RECTAL RECTAL EVERY 12 HOURS PRN
Status: DISCONTINUED | OUTPATIENT
Start: 2021-06-23 | End: 2021-06-23

## 2021-06-23 RX ORDER — DEXTROAMPHETAMINE SACCHARATE, AMPHETAMINE ASPARTATE, DEXTROAMPHETAMINE SULFATE AND AMPHETAMINE SULFATE 2.5; 2.5; 2.5; 2.5 MG/1; MG/1; MG/1; MG/1
20 TABLET ORAL 2 TIMES DAILY
Status: DISCONTINUED | OUTPATIENT
Start: 2021-06-23 | End: 2021-06-24 | Stop reason: HOSPADM

## 2021-06-23 RX ORDER — LIDOCAINE HYDROCHLORIDE 10 MG/ML
1-30 INJECTION, SOLUTION EPIDURAL; INFILTRATION; INTRACAUDAL; PERINEURAL
Status: COMPLETED | OUTPATIENT
Start: 2021-06-23 | End: 2021-06-23

## 2021-06-23 RX ORDER — LIDOCAINE 40 MG/G
CREAM TOPICAL
Status: DISCONTINUED | OUTPATIENT
Start: 2021-06-23 | End: 2021-06-24 | Stop reason: HOSPADM

## 2021-06-23 RX ORDER — HYDROMORPHONE HYDROCHLORIDE 1 MG/ML
.3-.5 INJECTION, SOLUTION INTRAMUSCULAR; INTRAVENOUS; SUBCUTANEOUS EVERY 5 MIN PRN
Status: DISCONTINUED | OUTPATIENT
Start: 2021-06-23 | End: 2021-06-23 | Stop reason: HOSPADM

## 2021-06-23 RX ORDER — OXYCODONE HYDROCHLORIDE 5 MG/1
5 TABLET ORAL EVERY 4 HOURS PRN
Status: DISCONTINUED | OUTPATIENT
Start: 2021-06-23 | End: 2021-06-23

## 2021-06-23 RX ORDER — NICOTINE POLACRILEX 4 MG
15-30 LOZENGE BUCCAL
Status: DISCONTINUED | OUTPATIENT
Start: 2021-06-23 | End: 2021-06-24 | Stop reason: HOSPADM

## 2021-06-23 RX ORDER — PROCHLORPERAZINE MALEATE 5 MG
10 TABLET ORAL EVERY 6 HOURS PRN
Status: DISCONTINUED | OUTPATIENT
Start: 2021-06-23 | End: 2021-06-23

## 2021-06-23 RX ORDER — FENTANYL CITRATE 50 UG/ML
25-50 INJECTION, SOLUTION INTRAMUSCULAR; INTRAVENOUS
Status: DISCONTINUED | OUTPATIENT
Start: 2021-06-23 | End: 2021-06-23 | Stop reason: HOSPADM

## 2021-06-23 RX ORDER — ESCITALOPRAM OXALATE 10 MG/1
20 TABLET ORAL EVERY MORNING
Status: DISCONTINUED | OUTPATIENT
Start: 2021-06-24 | End: 2021-06-24 | Stop reason: HOSPADM

## 2021-06-23 RX ORDER — LIDOCAINE 40 MG/G
CREAM TOPICAL
Status: DISCONTINUED | OUTPATIENT
Start: 2021-06-23 | End: 2021-06-23 | Stop reason: HOSPADM

## 2021-06-23 RX ORDER — FENTANYL CITRATE 50 UG/ML
INJECTION, SOLUTION INTRAMUSCULAR; INTRAVENOUS PRN
Status: DISCONTINUED | OUTPATIENT
Start: 2021-06-23 | End: 2021-06-23

## 2021-06-23 RX ADMIN — ESMOLOL HYDROCHLORIDE 30 MG: 10 INJECTION, SOLUTION INTRAVENOUS at 11:00

## 2021-06-23 RX ADMIN — ACETAMINOPHEN 1000 MG: 500 TABLET, FILM COATED ORAL at 20:48

## 2021-06-23 RX ADMIN — MIDAZOLAM 1 MG: 1 INJECTION INTRAMUSCULAR; INTRAVENOUS at 08:24

## 2021-06-23 RX ADMIN — PROPOFOL 50 MG: 10 INJECTION, EMULSION INTRAVENOUS at 09:06

## 2021-06-23 RX ADMIN — SODIUM CHLORIDE, SODIUM GLUCONATE, SODIUM ACETATE, POTASSIUM CHLORIDE AND MAGNESIUM CHLORIDE: 526; 502; 368; 37; 30 INJECTION, SOLUTION INTRAVENOUS at 08:11

## 2021-06-23 RX ADMIN — CLOPIDOGREL BISULFATE 75 MG: 75 TABLET ORAL at 07:59

## 2021-06-23 RX ADMIN — NICARDIPINE HYDROCHLORIDE 10 MG/HR: 0.2 INJECTION, SOLUTION INTRAVENOUS at 10:53

## 2021-06-23 RX ADMIN — SODIUM CHLORIDE, SODIUM GLUCONATE, SODIUM ACETATE, POTASSIUM CHLORIDE AND MAGNESIUM CHLORIDE: 526; 502; 368; 37; 30 INJECTION, SOLUTION INTRAVENOUS at 08:30

## 2021-06-23 RX ADMIN — ESMOLOL HYDROCHLORIDE 30 MG: 10 INJECTION, SOLUTION INTRAVENOUS at 09:05

## 2021-06-23 RX ADMIN — OXYCODONE HYDROCHLORIDE 5 MG: 5 TABLET ORAL at 20:48

## 2021-06-23 RX ADMIN — FENTANYL CITRATE 50 MCG: 50 INJECTION, SOLUTION INTRAMUSCULAR; INTRAVENOUS at 08:47

## 2021-06-23 RX ADMIN — ROCURONIUM BROMIDE 10 MG: 10 INJECTION INTRAVENOUS at 10:18

## 2021-06-23 RX ADMIN — IODIXANOL 150 ML: 320 INJECTION, SOLUTION INTRAVASCULAR at 10:56

## 2021-06-23 RX ADMIN — SUGAMMADEX 200 MG: 100 INJECTION, SOLUTION INTRAVENOUS at 10:55

## 2021-06-23 RX ADMIN — HYDROMORPHONE HYDROCHLORIDE 0.2 MG: 1 INJECTION, SOLUTION INTRAMUSCULAR; INTRAVENOUS; SUBCUTANEOUS at 12:44

## 2021-06-23 RX ADMIN — PROPOFOL 150 MG: 10 INJECTION, EMULSION INTRAVENOUS at 09:03

## 2021-06-23 RX ADMIN — FENTANYL CITRATE 50 MCG: 50 INJECTION, SOLUTION INTRAMUSCULAR; INTRAVENOUS at 18:19

## 2021-06-23 RX ADMIN — MIDAZOLAM 1 MG: 1 INJECTION INTRAMUSCULAR; INTRAVENOUS at 08:36

## 2021-06-23 RX ADMIN — SODIUM CHLORIDE, SODIUM GLUCONATE, SODIUM ACETATE, POTASSIUM CHLORIDE AND MAGNESIUM CHLORIDE: 526; 502; 368; 37; 30 INJECTION, SOLUTION INTRAVENOUS at 11:31

## 2021-06-23 RX ADMIN — OXYCODONE HYDROCHLORIDE 5 MG: 5 TABLET ORAL at 14:17

## 2021-06-23 RX ADMIN — ROCURONIUM BROMIDE 60 MG: 10 INJECTION INTRAVENOUS at 09:03

## 2021-06-23 RX ADMIN — LIDOCAINE HYDROCHLORIDE 8 ML: 10 INJECTION, SOLUTION EPIDURAL; INFILTRATION; INTRACAUDAL; PERINEURAL at 09:47

## 2021-06-23 RX ADMIN — ESMOLOL HYDROCHLORIDE 40 MG: 10 INJECTION, SOLUTION INTRAVENOUS at 10:59

## 2021-06-23 RX ADMIN — SODIUM CHLORIDE, SODIUM GLUCONATE, SODIUM ACETATE, POTASSIUM CHLORIDE AND MAGNESIUM CHLORIDE: 526; 502; 368; 37; 30 INJECTION, SOLUTION INTRAVENOUS at 08:16

## 2021-06-23 RX ADMIN — HEPARIN SODIUM 9000 UNITS: 1000 INJECTION INTRAVENOUS; SUBCUTANEOUS at 09:47

## 2021-06-23 RX ADMIN — FENTANYL CITRATE 50 MCG: 50 INJECTION, SOLUTION INTRAMUSCULAR; INTRAVENOUS at 11:23

## 2021-06-23 RX ADMIN — FENTANYL CITRATE 50 MCG: 50 INJECTION, SOLUTION INTRAMUSCULAR; INTRAVENOUS at 08:50

## 2021-06-23 RX ADMIN — ASPIRIN 325 MG ORAL TABLET 325 MG: 325 PILL ORAL at 07:59

## 2021-06-23 RX ADMIN — ACETAMINOPHEN 1000 MG: 500 TABLET, FILM COATED ORAL at 14:31

## 2021-06-23 RX ADMIN — FENTANYL CITRATE 50 MCG: 50 INJECTION, SOLUTION INTRAMUSCULAR; INTRAVENOUS at 11:33

## 2021-06-23 RX ADMIN — ONDANSETRON 4 MG: 2 INJECTION INTRAMUSCULAR; INTRAVENOUS at 10:52

## 2021-06-23 RX ADMIN — FENTANYL CITRATE 50 MCG: 50 INJECTION, SOLUTION INTRAMUSCULAR; INTRAVENOUS at 08:48

## 2021-06-23 RX ADMIN — SUFENTANIL CITRATE 0.2 MCG/KG/HR: 50 INJECTION EPIDURAL; INTRAVENOUS at 09:31

## 2021-06-23 RX ADMIN — ALPRAZOLAM 2 MG: 1 TABLET ORAL at 16:44

## 2021-06-23 RX ADMIN — ROCURONIUM BROMIDE 40 MG: 10 INJECTION INTRAVENOUS at 09:05

## 2021-06-23 RX ADMIN — FENTANYL CITRATE 50 MCG: 50 INJECTION, SOLUTION INTRAMUSCULAR; INTRAVENOUS at 08:39

## 2021-06-23 RX ADMIN — HYDROMORPHONE HYDROCHLORIDE 0.5 MG: 1 INJECTION, SOLUTION INTRAMUSCULAR; INTRAVENOUS; SUBCUTANEOUS at 11:18

## 2021-06-23 RX ADMIN — LIDOCAINE HYDROCHLORIDE 100 MG: 20 INJECTION, SOLUTION INFILTRATION; PERINEURAL at 09:03

## 2021-06-23 RX ADMIN — HYDROMORPHONE HYDROCHLORIDE 0.3 MG: 1 INJECTION, SOLUTION INTRAMUSCULAR; INTRAVENOUS; SUBCUTANEOUS at 11:57

## 2021-06-23 RX ADMIN — OXYCODONE HYDROCHLORIDE 10 MG: 10 TABLET ORAL at 21:49

## 2021-06-23 RX ADMIN — LABETALOL HYDROCHLORIDE 10 MG: 5 INJECTION INTRAVENOUS at 10:59

## 2021-06-23 RX ADMIN — HEPARIN SODIUM 4 BAG: 200 INJECTION, SOLUTION INTRAVENOUS at 09:47

## 2021-06-23 ASSESSMENT — MIFFLIN-ST. JEOR: SCORE: 1725.38

## 2021-06-23 ASSESSMENT — ACTIVITIES OF DAILY LIVING (ADL)
ADLS_ACUITY_SCORE: 13
ADLS_ACUITY_SCORE: 13

## 2021-06-23 ASSESSMENT — VISUAL ACUITY: OU: NORMAL ACUITY

## 2021-06-23 NOTE — PROGRESS NOTES
"The following page sent to Dr. Pandya: Self, E-PACU: How long does pt need bedrest? please place order. Thanks. Mouna  x [phone]    1152: Dr. Pandya called back and states bedrest for 2 hours and orders were just reconciled and can be released. MD will come to bedside to assess pt.     1228 Paged dr Webster to come to evaluate patient. She says her pain is different from what shes use to. In the back of head \"Brain\" and at base of skull/ neck. Waiting to hear back.     1305 Dr Reynolds Anesthesia at bedside to see patient and talked with patient about her pain in her head/neck. Paging Dr Pandya or Dr Webster to come see patient bedside before going to ICU.     1320 Report called to Irena KLEIN in ICU. Waiting tohear from Dr Pandya to see patient at bedside before transferring to the floor. Patient VSS and patient states pain is 4/10.    1330 Dr Pandya came to see patient bedside in PACU and per Dr Pandya can Discontinue the NICARDIPINE DRIP. Systolic to be under 180's. Per Dr Pandya pain is under control and expected and patient is clear to go to the floor and Neuros are intact.   "

## 2021-06-23 NOTE — PROGRESS NOTES
[facility]     Endovascular Surgical Neuroradiology Pre-Procedure Note      HPI:  Maryanne Crockett is a 40 year old female with a past medical history of seizure, depression, ovarian cysts, chronic right hand pain with numbness and chronic neck pain.  She presented to the ED in 2021 with severe occipital headache and neck pain along with left facial numbness. She found to have right posterior communicating artery aneurysm on diagnostic cerebral angiogram. We intend to place a flow diverter stent for treatment. She understands the risks and agrees to proceed. She uses medical marijuana daily in the form of lozenges.  As regards her risk factors for cerebral aneurysms she has a 20-year history of smoking and says she quit recently approximately 3 months ago.  She used to smoke approximately 3 to 4 cigarettes a day before being unemployed after which she became a chain smoker over this past year.  She is not hypertensive however her grandfather  of a cerebral aneurysm.    Medical History:  Past Medical History:   Diagnosis Date     Cerebral aneurysm, nonruptured      Depressive disorder      Family history of glioblastoma     screening MRIs every 2 years     Ovarian cyst      Seizures (H)        Surgical History:  Past Surgical History:   Procedure Laterality Date     CHOLECYSTECTOMY       CYSTOSCOPY N/A 2020    Procedure: CYSTOSCOPY;  Surgeon: Hayley Zayas MD;  [facility]     DAVINCI HYSTERECTOMY TOTAL, BILATERAL SALPINGO-OOPHORECTOMY, COMBINED Left 2020    Procedure: HYSTERECTOMY, TOTAL, ROBOT-ASSISTED, LAPAROSCOPIC,  left oophorectomy;  Surgeon: Hayley Zayas MD;  [facility]     DILATION AND CURETTAGE SUCTION  4/30/15    retained POC     GASTRIC RESTRICTIVE PROCEDURE, OPEN, REMOVE/REPLACE SUBCUTANEOUS PORT       IR CAROTID CEREBRAL ANGIOGRAM BILATERAL  3/19/2021     LAPAROSCOPIC OOPHORECTOMY Right 2016    Procedure:  LAPAROSCOPIC OOPHORECTOMY;  Surgeon: Adrianne Vergara MD;  [facility]     LAPAROSCOPIC REMOVAL GASTRIC ADJUSTABLE BAND N/A 5/11/2021    Procedure: REMOVAL, GASTRIC BAND, ADJUSTABLE, LAPAROSCOPIC;  Surgeon: Remy Gold MD;  [facility]     LAPAROSCOPIC SALPINGECTOMY Bilateral 8/5/2016    Procedure: LAPAROSCOPIC SALPINGECTOMY;  Surgeon: Adrianne Vergara MD;  [facility]     LAPAROSCOPIC TUBAL LIGATION Bilateral 5/22/2015    Procedure: LAPAROSCOPIC TUBAL LIGATION;  Surgeon: Hayley Zayas MD;  [facility]     LAPAROSCOPY OPERATIVE ADULT N/A 8/5/2016    Procedure: LAPAROSCOPY OPERATIVE ADULT;  Surgeon: Adrianne Vergara MD;  [facility]     SOFT TISSUE SURGERY Right     cyst in hand       Family History:  Family History   Problem Relation Age of Onset     Other Cancer Brother 36        brain ca     Deep Vein Thrombosis (DVT) Brother      Other Cancer Father         leukemia     Deep Vein Thrombosis (DVT) Father      Other Cancer Maternal Aunt         2 aunts with glioblastomas     Coronary Stenting Mother      Breast Cancer No family hx of         no ovarian cancer     Anesthesia Reaction No family hx of        Social History:  Social History     Socioeconomic History     Marital status: Single     Spouse name: Not on file     Number of children: Not on file     Years of education: Not on file     Highest education level: Not on file   Occupational History     Not on file   Social Needs     Financial resource strain: Not on file     Food insecurity     Worry: Not on file     Inability: Not on file     Transportation needs     Medical: Not on file     Non-medical: Not on file   Tobacco Use     Smoking status: Current Some Day Smoker     Packs/day: 0.25     Smokeless tobacco: Never Used     Tobacco comment: Trying   Substance and Sexual Activity     Alcohol use: Not Currently     Comment: rare     Drug use: Yes     Types: Marijuana     Comment: pt says she is prescribed medical  marijuana     Sexual activity: Yes     Partners: Male     Birth control/protection: Female Surgical     Comment: TBL/hys   Lifestyle     Physical activity     Days per week: Not on file     Minutes per session: Not on file     Stress: Not on file   Relationships     Social connections     Talks on phone: Not on file     Gets together: Not on file     Attends [employer] service: Not on file     Active member of club or organization: Not on file     Attends meetings of clubs or organizations: Not on file     Relationship status: Not on file     Intimate partner violence     Fear of current or ex partner: Not on file     Emotionally abused: Not on file     Physically abused: Not on file     Forced sexual activity: Not on file   Other Topics Concern     Parent/sibling w/ CABG, MI or angioplasty before 65F 55M? Yes   Social History Narrative     Not on file       Allergies:  Allergies   Allergen Reactions     Ibuprofen GI Disturbance     Seroquel [Quetiapine] Other (See Comments) and Rash     Insomnia       Is there a contrast allergy?  No    Medications:  Current Facility-Administered Medications   Medication     clopidogrel (PLAVIX) tablet 75 mg     lidocaine (LMX4) cream     lidocaine (LMX4) cream     lidocaine 1 % 0.1-1 mL     medication instruction     naloxone (NARCAN) injection 0.2 mg    Or     naloxone (NARCAN) injection 0.4 mg    Or     naloxone (NARCAN) injection 0.2 mg    Or     naloxone (NARCAN) injection 0.4 mg     niCARdipine 40 mg in 200 mL 0.83% NaCl (CARDENE) infusion     phenylephrine (VAMSI-SYNEPHRINE) 50 mg in NaCl 0.9 % 250 mL infusion PERIPHERAL     sodium chloride (PF) 0.9% PF flush 10-20 mL     sodium chloride (PF) 0.9% PF flush 3 mL     sodium chloride (PF) 0.9% PF flush 3 mL     sodium chloride 0.9% infusion     SUFentanil (SUFENTA) 100 mcg in sodium chloride 0.9 % 50 mL infusion     Facility-Administered Medications Ordered in Other Encounters   Medication     No abx ordered pre-op    .    ROS:  The 5 point Review of Systems is negative other than noted in the HPI or here.     PHYSICAL EXAMINATION  Vital Signs:  B/P: 115/79,  T: Data Unavailable,  P: Data Unavailable,  R: 20    Cardio:  RRR  Pulmonary:  no respiratory distress  Abdomen:  soft    Neurologic  Mental Status:  fully alert, attentive and oriented, follows commands, speech clear and fluent  Cranial Nerves:  visual fields intact, PERRL, EOMI with normal smooth pursuit, facial sensation intact and symmetric, facial movements symmetric, hearing not formally tested but intact to conversation, palate elevation symmetric and uvula midline, no dysarthria, shoulder shrug strong bilaterally, tongue protrusion midline  Motor:  no abnormal movements, normal tone throughout, normal muscle bulk, no pronator drift, normal and symmetric rapid finger tapping, able to move all limbs spontaneously, Left  4/5 and right 5/5,  Sensory:  intact/symmetric to light touch and pin prick throughout upper and lower extremities  Coordination:  FNF and HS intact without dysmetria    Pre-procedure National Institutes of Health Stroke Scale:   Not applicable    LABS  (most recent Cr, BUN, GFR, PLT, INR, PTT within the past 7 days):  Recent Labs   Lab 06/21/21  1028   CR 0.84   BUN 12   GFRESTIMATED 86   GFRESTBLACK >90      INR 1.01   PTT 29        Platelet Function P2Y12 (PRU):  Not applicable      ASSESSMENT: 40 year old with past medical history of seizure, depression, ovarian cysts, chronic right hand pain with numbness and chronic neck pain and left wrist carpal tunnel.    PLAN: Cerebral angiogram with intention to treat aneurysm under general anesthesia.        PRE-PROCEDURE SEDATION ASSESSMENT     Per note by general anesthesia.    Patient was discussed with the Attending, Dr. Webster, who agrees with the plan.    Cole Pandya MD   Pager: [phone].

## 2021-06-23 NOTE — ANESTHESIA POSTPROCEDURE EVALUATION
Patient: Maryanne Crockett    Procedure(s):  ANESTHESIA OUT OF OR Carotid Cerebral Angiogram With Aneurysm Treatment @0800    Diagnosis:Cerebral aneurysm, nonruptured [I67.1]  Diagnosis Additional Information: No value filed.    Anesthesia Type:  General    Note:  Disposition: ICU            ICU Sign Out: Anesthesiologist/ICU physician sign out WAS performed   Postop Pain Control: Uneventful            Sign Out: Well controlled pain   PONV: No   Neuro/Psych: Uneventful            Sign Out: Acceptable/Baseline neuro status   Airway/Respiratory: Uneventful            Sign Out: Acceptable/Baseline resp. status   CV/Hemodynamics: Uneventful            Sign Out: Acceptable CV status; No obvious hypovolemia; No obvious fluid overload   Other NRE: NONE   DID A NON-ROUTINE EVENT OCCUR? No           Last vitals:  Vitals:    06/23/21 1400 06/23/21 1415 06/23/21 1430   BP:      Pulse: 66 71 65   Resp:  16    Temp:  36.7  C (98.1  F)    SpO2: 96% 97% 94%       Last vitals prior to Anesthesia Care Transfer:  CRNA VITALS  6/23/2021 1043 - 6/23/2021 1143      6/23/2021             Resp Rate (observed):  14    EKG:  Sinus bradycardia          Electronically Signed By: Celina Reynolds MD  June 23, 2021  2:54 PM

## 2021-06-23 NOTE — PLAN OF CARE
Admitted/transferred from: IR  Reason for admission/transfer: postop   2 RN skin assessment: completed by Marlen  Result of skin assessment and interventions/actions: R groin site WDL   Height, weight, drug calc weight: Done  Patient belongings (see Flowsheet): cell phone, , wallet, clothing, and shoes remain in room with patient. Labeled medications and tobacco products sent to security.   MDRO education added to care plan: Yes  ?

## 2021-06-23 NOTE — ANESTHESIA POSTPROCEDURE EVALUATION
Patient: Maryanne Crockett    Procedure(s):  ANESTHESIA OUT OF OR Carotid Cerebral Angiogram With Aneurysm Treatment @0800    Diagnosis:Cerebral aneurysm, nonruptured [I67.1]  Diagnosis Additional Information: No value filed.    Anesthesia Type:  General    Note:  Disposition: Outpatient   Postop Pain Control: Uneventful            Sign Out: Well controlled pain   PONV: No   Neuro/Psych: Uneventful            Sign Out: Acceptable/Baseline neuro status   Airway/Respiratory: Uneventful            Sign Out: Acceptable/Baseline resp. status   CV/Hemodynamics: Uneventful            Sign Out: Acceptable CV status; No obvious hypovolemia; No obvious fluid overload   Other NRE: NONE   DID A NON-ROUTINE EVENT OCCUR? No           Last vitals:  Vitals:    06/23/21 0745   BP: 115/79   Resp: 20   SpO2: 100%       Last vitals prior to Anesthesia Care Transfer:  CRNA VITALS  6/23/2021 1043 - 6/23/2021 1120      6/23/2021             ART BP:  117/68    ART Mean:  87          Electronically Signed By: Celina Reynolds MD  June 23, 2021  11:20 AM

## 2021-06-23 NOTE — ANESTHESIA PROCEDURE NOTES
Airway       Patient location during procedure: OR  Staff -        CRNA: Katie Schuster APRN CRNA       Performed By: CRNA  Consent for Airway        Urgency: elective  Indications and Patient Condition       Indications for airway management: aury-procedural       Induction type:intravenous       Mask difficulty assessment: 1 - vent by mask    Final Airway Details       Final airway type: endotracheal airway       Successful airway: ETT - single  Endotracheal Airway Details        ETT size (mm): 7.0       Cuffed: yes       Successful intubation technique: direct laryngoscopy       DL Blade Type: MAC 3       Grade View of Cords: 1       Adjucts: stylet       Position: Right       Measured from: lips       Secured at (cm): 22       Bite block used: Soft    Post intubation assessment        Placement verified by: capnometry, equal breath sounds and chest rise        Number of attempts at approach: 2       Secured with: silk tape       Ease of procedure: easy       Dentition: Unchanged    Medication(s) Administered   Medication Administration Time: 6/23/2021 9:07 AM    Additional Comments       Edentulous.  DL x 1, cords not open.  Additional rocuronium given plus time.  Grade 1 view second look with cords open.

## 2021-06-23 NOTE — PHARMACY-ADMISSION MEDICATION HISTORY
Admission Medication History Completed by Pharmacy    See [facility] Admission Navigator for allergy information, preferred outpatient pharmacy, prior to admission medications and immunization status.     Medication History Sources:     Pre-operative medication history completed on 6/15/21, [facility]     Changes made to PTA medication list (reason):    Added: Vitamin D    Deleted: None    Changed: None    Additional Information:    None    Prior to Admission medications    Medication Sig Last Dose Taking? Auth Provider   ALPRAZolam (XANAX) 1 MG tablet Take 2 mg by mouth 3 times daily as needed  6/23/2021 at Unknown time Yes Reported, Patient   amphetamine-dextroamphetamine (ADDERALL) 20 MG tablet Take 20 mg by mouth 2 times daily  6/22/2021 at Unknown time Yes Reported, Patient   aspirin (ASA) 325 MG tablet Take 1 tablet (325 mg) by mouth daily 6/22/2021 at Unknown time Yes Columba Early MD   buPROPion (WELLBUTRIN XL) 150 MG 24 hr tablet Take 150 mg by mouth every morning 6/22/2021 at Unknown time Yes Reported, Patient   cloNIDine (CATAPRES) 0.1 MG tablet Take 1 tablet by mouth every morning  6/22/2021 at Unknown time Yes Reported, Patient   clopidogrel (PLAVIX) 75 MG tablet Take 1 tablet (75 mg) by mouth daily 6/22/2021 at Unknown time Yes Columba Early MD   escitalopram (LEXAPRO) 20 MG tablet Take 20 mg by mouth every morning  6/22/2021 at Unknown time Yes Reported, Patient   medical cannabis (Patient's own supply) See Admin Instructions (The purpose of this order is to document that the patient reports taking medical cannabis.  This is not a prescription, and is not used to certify that the patient has a qualifying medical condition.)  6/23/2021 at Unknown time Yes Reported, Patient   Multiple Vitamins-Minerals (MULTIVITAMIN ADULT PO) Take 1 tablet by mouth every morning  Past Week at Unknown time Yes Reported, Patient   ondansetron (ZOFRAN ODT) 8 MG ODT tab Take 1 tablet (8 mg) by mouth every 8 hours as needed for  nausea Past Month at Unknown time Yes Tiffanie Anderson MD   SAPHRIS 5 MG SUBL sublingual tablet Place 10 mg under the tongue 2 times daily  6/22/2021 at Unknown time Yes Reported, Patient   vitamin B-12 (CYANOCOBALAMIN) 1000 MCG tablet Take 1,000 mcg by mouth every morning  Past Month at Unknown time Yes Unknown, Entered By History   acetaminophen 500 MG CAPS Take 1,000 mg by mouth as needed  More than a month at Unknown time  Reported, Patient   cholecalciferol (VITAMIN D3) 1250 mcg (93867 units) capsule Take 1 capsule by mouth every 7 days For 2 doses   Unknown, Entered By History   VENTOLIN  (90 BASE) MCG/ACT inhaler Inhale 1-2 puffs into the lungs every 6 hours as needed for shortness of breath / dyspnea or wheezing  More than a month at Unknown time  Reported, Patient       Date completed: 06/23/21    Medication history completed by: Rusty Fallon [facility]

## 2021-06-23 NOTE — PLAN OF CARE
Major Shift Events: Pt arrived at 1415 from PACU. Neuros intact; baseline numbness and weakness d/t pain in RUE. R groin site WDL, palpable pulses and CMS WDL. Oxy, Tylenol, and Fent used for back and head pain. VSS on RA. Maintenance fluids running. Good PO intake. Luu removed, voided. Stood at bedside, tolerated well.   Plan: SBP <180. Neuros Q1H.   For vital signs and complete assessments, please see documentation flowsheets.

## 2021-06-23 NOTE — OR NURSING
"Patient's medical cannabis (x2, \"red\" and \"blue\") placed in medication envelope and sent to security.   "

## 2021-06-23 NOTE — ANESTHESIA CARE TRANSFER NOTE
Patient: Maryanne Crockett    Procedure(s):  ANESTHESIA OUT OF OR Carotid Cerebral Angiogram With Aneurysm Treatment @0800    Diagnosis: Cerebral aneurysm, nonruptured [I67.1]  Diagnosis Additional Information: No value filed.    Anesthesia Type:   General     Note:    Oropharynx: oropharynx clear of all foreign objects and spontaneously breathing  Level of Consciousness: drowsy  Oxygen Supplementation: face mask  Level of Supplemental Oxygen (L/min / FiO2): 10  Independent Airway: airway patency satisfactory and stable  Dentition: dentition unchanged  Vital Signs Stable: post-procedure vital signs reviewed and stable  Report to RN Given: handoff report given  Patient transferred to: PACU    Handoff Report: Identifed the Patient, Identified the Reponsible Provider, Reviewed the pertinent medical history, Discussed the surgical course, Reviewed Intra-OP anesthesia mangement and issues during anesthesia, Set expectations for post-procedure period and Allowed opportunity for questions and acknowledgement of understanding      Vitals: (Last set prior to Anesthesia Care Transfer)  CRNA VITALS  6/23/2021 1043 - 6/23/2021 1133      6/23/2021             Resp Rate (observed):  14    EKG:  Sinus bradycardia        Electronically Signed By: Katie Schuster CRNA, SANTIAGO SAVAGE  June 23, 2021  11:33 AM

## 2021-06-23 NOTE — ANESTHESIA PROCEDURE NOTES
Arterial Line Procedure Note  Pre-Procedure   Staff -        Anesthesiologist:  Celina Reynolds MD       Performed By: anesthesiologist       Location: OR  Procedure   Procedure: arterial line       Laterality: left       Insertion Site: radial.  Sterile Prep        Standard elements of sterile barrier followed       Skin prep: Chloraprep  Insertion/Injection        Technique: ultrasound guided        1. Ultrasound was used to evaluate the access site.       2. Artery evaluated via ultrasound for patency/adequacy.       3. Using real-time ultrasound the needle/catheter was observed entering the artery/vein.       Catheter Type/Size: 20 G, 12 cm  Narrative        Tegaderm dressing used.       Complications: None apparent,        Arterial waveform: Yes        IBP within 10% of NIBP: Yes

## 2021-06-23 NOTE — PROGRESS NOTES
Patient Name: Maryanne Crockett  Medical Record Number: [id number]  Today's Date: 6/23/2021    Procedure: Carotid Cerebral Angiogram With Aneurysm Treatment  Proceduralist: Dr. Rich Webster    Anesthesia case    Report given to: N/A  : N/A     Patient transferred to table, positioned and prepped per protocol.    Puncture to: Right Groin at  0941     Intervention done:   Pipeline placed to  artery at 1038  Intra-arterial Medications N/A,       Sheath removed at 1051 Manual pressure held for 15 minutes; Closure device deployed 1056. 2 hours bed rest-ambulation time 1310   Groin site appearance: CDI  Pedal pulse assessment:  Present   Additional Puncture site(s): N/A  Other:      Post Procedure Education:  The following information has been reviewed with patient   1. Maintain bedrest with right lower extremity straight until 1310.   2. Notify nurse immediately if having any bleeding from site, any changes in sensation, or changes in strength.   3. Notify nurse if you have to go the bathroom, the staff will assist you.   4. Nurses will be doing frequent assessments post procedure, which will include                   checking the pulses in your feet, groin/access site, vital signs, and neuro exam.    5. Please press your call light if you, or your family, have any questions or concerns        regarding your care.    Learner's response to angiogram education: patient needs reinforcement due to sedation.

## 2021-06-23 NOTE — PLAN OF CARE
When verifying patient belongings, RN found 2 bags of THC candy and a large unlabeled medication bottle with pills. Attempted to check in to security but due to contraband and unknown medication it was not accepted and unit was directed to destroy. The following were disposed of per protocol:     Aline sour medicated hardin THC 600mg 1oz package  Sour apple bites edibles 600mg   Veterinary pill bottle with 16 large green/brown capsules

## 2021-06-23 NOTE — PROCEDURES
[facility]     Endovascular Surgical Neuroradiology Post-Procedure Note    Pre-Procedure Diagnosis: Right PCoA aneurysm  Post-Procedure Diagnosis:  Same    Procedure(s):   Endovascular flow diversion for cerebral aneurysm    Findings:   Right PCoA aneurysm. A 4 x 18 Pipeline shield stent placed from right M1- to rt ICA. No immediate complication. Please refer to detail dictated report.    Plan:  Admit to ICU, 2 hr bedrest.    Primary Surgeon:  Dr. Rich Webster  Secondary Surgeon:  Not applicable  Secondary Surgeon Review:  None  Fellow:  Mumtaz  Additional Assistants:  -    Prior to the start of the procedure and with procedural staff participation, I verbally confirmed: the patient s identity using two indicators, relevant allergies, that the procedure was appropriate and matched the consent or emergent situation, and that the correct equipment/implants were available. Immediately prior to starting the procedure I conducted the Time Out with the procedural staff and re-confirmed the patient s name, procedure, and site/side. (The Joint Commission universal protocol was followed.)  Yes    PRU value: Not applicable    Anesthesia:  Performed by Anesthesia  Medications:  Heparin  Puncture site:  Right Femoral Artery    Fluoroscopy time (minutes):  25.5  Radiation dose (mGy):  1527    Contrast amount (mL):  150     Estimated blood loss (mL):  Minimal    Closure:  Device 8 F Angioseal.    Disposition:  Will be followed in hospital by the Neuro Endovascular team.        Sedation Post-Procedure Summary    Please refer to detail dictated note by anesthesia team.    Cole Pandya MD  Pager:  [phone].

## 2021-06-24 VITALS
OXYGEN SATURATION: 98 % | WEIGHT: 218.48 LBS | BODY MASS INDEX: 32.36 KG/M2 | RESPIRATION RATE: 15 BRPM | HEART RATE: 57 BPM | DIASTOLIC BLOOD PRESSURE: 59 MMHG | HEIGHT: 69 IN | TEMPERATURE: 97.7 F | SYSTOLIC BLOOD PRESSURE: 103 MMHG

## 2021-06-24 LAB
GLUCOSE BLDC GLUCOMTR-MCNC: 129 MG/DL (ref 70–99)
MRSA DNA SPEC QL NAA+PROBE: NEGATIVE
SPECIMEN SOURCE: NORMAL

## 2021-06-24 PROCEDURE — 999N001017 HC STATISTIC GLUCOSE BY METER IP

## 2021-06-24 PROCEDURE — 250N000013 HC RX MED GY IP 250 OP 250 PS 637: Performed by: STUDENT IN AN ORGANIZED HEALTH CARE EDUCATION/TRAINING PROGRAM

## 2021-06-24 RX ORDER — OXYCODONE HYDROCHLORIDE 5 MG/1
5 TABLET ORAL EVERY 6 HOURS PRN
Qty: 10 TABLET | Refills: 0 | Status: SHIPPED | OUTPATIENT
Start: 2021-06-24 | End: 2021-06-27

## 2021-06-24 RX ADMIN — CLONIDINE HYDROCHLORIDE 0.1 MG: 0.1 TABLET ORAL at 07:50

## 2021-06-24 RX ADMIN — CLOPIDOGREL BISULFATE 75 MG: 75 TABLET ORAL at 07:50

## 2021-06-24 RX ADMIN — ACETAMINOPHEN 1000 MG: 500 TABLET, FILM COATED ORAL at 04:52

## 2021-06-24 RX ADMIN — ALPRAZOLAM 2 MG: 1 TABLET ORAL at 00:23

## 2021-06-24 RX ADMIN — BUPROPION HYDROCHLORIDE 150 MG: 150 TABLET, FILM COATED, EXTENDED RELEASE ORAL at 07:49

## 2021-06-24 RX ADMIN — ESCITALOPRAM OXALATE 20 MG: 10 TABLET ORAL at 07:50

## 2021-06-24 RX ADMIN — OXYCODONE HYDROCHLORIDE 10 MG: 10 TABLET ORAL at 09:02

## 2021-06-24 RX ADMIN — ALPRAZOLAM 2 MG: 1 TABLET ORAL at 07:26

## 2021-06-24 RX ADMIN — DEXTROAMPHETAMINE SACCHARATE, AMPHETAMINE ASPARTATE, DEXTROAMPHETAMINE SULFATE AND AMPHETAMINE SULFATE 20 MG: 2.5; 2.5; 2.5; 2.5 TABLET ORAL at 07:49

## 2021-06-24 RX ADMIN — ASPIRIN 325 MG ORAL TABLET 325 MG: 325 PILL ORAL at 07:49

## 2021-06-24 RX ADMIN — OXYCODONE HYDROCHLORIDE 10 MG: 10 TABLET ORAL at 04:52

## 2021-06-24 ASSESSMENT — ACTIVITIES OF DAILY LIVING (ADL)
ADLS_ACUITY_SCORE: 14

## 2021-06-24 NOTE — UTILIZATION REVIEW
Admission Status; Secondary Review Determination       Under the authority of the Utilization Management Committee, the utilization review process indicated a secondary review on the above patient. The review outcome is based on review of the medical records, discussions with staff, and applying clinical experience noted on the date of the review.     (x) Inpatient Status Appropriate - This patient's medical care is consistent with medical management for inpatient care and reasonable inpatient medical practice.     RATIONALE FOR DETERMINATION   40-year-old woman with history of seizure depression and cerebral aneurysm requiring flow diversion, procedure done as inpatient because of the significant risk associated with this procedure. Complications can occur due to thromboembolic events or  device placement,or occlusion of side branches  Patient required intensive care monitoring. Inpatient admission is appropriate.     This document was produced using voice recognition software       The information on this document is developed by the utilization review team in order for the business office to ensure compliance. This only denotes the appropriateness of proper admission status and does not reflect the quality of care rendered.   The definitions of Inpatient Status and Observation Status used in making the determination above are those provided in the CMS Coverage Manual, Chapter 1 and Chapter 6, section 70.4.   Sincerely,   ALEYDA BASURTO MD   System Medical Director   Utilization Management   [facility].

## 2021-06-24 NOTE — PLAN OF CARE
Major Shift Events: Neuros intact, q1h. SR-SB, afebrile. RA- clear LS. No BM, regular diet. Voiding via commode. R groin site, CDI- pulses palpable. IVMF stopped @ 2200, pt having adequate intake. Bilat PIVs and L radial art line. PRN oxy/tyl given for pain and prn xanax given x1.    Plan: Discharge today  For vital signs and complete assessments, please see documentation flowsheets.

## 2021-06-24 NOTE — PLAN OF CARE
Patient discharged home at 1030 this morning. Neuro status remains intact and unchanged. Vitals stable. Up walking independently, tolerating oral intake, and voiding spontaneously. PRN oxy given for pain prior to discharge. Patient left unit w/ staff to meet family at hospital entrance.

## 2021-06-24 NOTE — DISCHARGE SUMMARY
[facility] Discharge Summary and Instructions    Maryanne Crockett MRN# [id number]   Age: 40 year old YOB: 1980     Date of Admission:  2021  Date of Discharge::  2021  Admitting Physician:  Rich Webster MD  Discharge Physician:  Rich Webster MD          Admission Diagnoses:   Cerebral aneurysm, nonruptured [I67.1]          Discharge Diagnosis:   Cerebral aneurysm, nonruptured [I67.1]          Procedures:   2021 - flow diversion of right pcomm aneurysm           Brief History of Illness:   Maryanne Crockett is a 40 year old female with a past medical history of seizure, depression, ovarian cysts, chronic right hand pain with numbness and chronic neck pain.  She presented to the ED in 2021 with severe occipital headache and neck pain along with left facial numbness. She found to have right posterior communicating artery aneurysm on diagnostic cerebral angiogram. We intend to place a flow diverter stent for treatment. She understands the risks and agrees to proceed. She uses medical marijuana daily in the form of lozenges.  As regards her risk factors for cerebral aneurysms she has a 20-year history of smoking and says she quit recently approximately 3 months ago.  She used to smoke approximately 3 to 4 cigarettes a day before being unemployed after which she became a chain smoker over this past year.  She is not hypertensive however her grandfather  of a cerebral aneurysm.Patient has elected to undergo above-mentioned procedure.           Hospital Course:   Patient underwent above-mentioned procedure on . The operation was uncomplicated and she was admitted to the surgical ICU for routine post operative cares. On post operative day 1, she was doing well: ambulating, voiding without a ventura, eating a regular diet, pain was well controlled and therefore he/she was discharged home    /59   Pulse 57   Temp 97.7  F (36.5  C) (Oral)   Resp 15   "  1.753 m (5' 9\")   Wt 99.1 kg (218 lb 7.6 oz)   LMP 03/14/2018 (Approximate)   SpO2 98%   BMI 32.26 kg/m      Pupils reactive  Smile symmetric  No drift, full strength throughout  Sensation intact  Groin intact          Discharge Medications:     Current Discharge Medication List      START taking these medications    Details   oxyCODONE (ROXICODONE) 5 MG tablet Take 1 tablet (5 mg) by mouth every 6 hours as needed for severe pain  Qty: 10 tablet, Refills: 0    Associated Diagnoses: Cerebral aneurysm, nonruptured         CONTINUE these medications which have NOT CHANGED    Details   ALPRAZolam (XANAX) 1 MG tablet Take 2 mg by mouth 3 times daily as needed       amphetamine-dextroamphetamine (ADDERALL) 20 MG tablet Take 20 mg by mouth 2 times daily       aspirin (ASA) 325 MG tablet Take 1 tablet (325 mg) by mouth daily  Qty: 90 tablet, Refills: 3    Comments: Please start taking 7 days prior to angiogram procedure  Associated Diagnoses: Cerebral aneurysm, nonruptured      buPROPion (WELLBUTRIN XL) 150 MG 24 hr tablet Take 150 mg by mouth every morning      cloNIDine (CATAPRES) 0.1 MG tablet Take 1 tablet by mouth every morning       clopidogrel (PLAVIX) 75 MG tablet Take 1 tablet (75 mg) by mouth daily  Qty: 30 tablet, Refills: 4    Comments: Please start taking 7 days before aneurysm treatment procedure.  Associated Diagnoses: Cerebral aneurysm, nonruptured      escitalopram (LEXAPRO) 20 MG tablet Take 20 mg by mouth every morning       medical cannabis (Patient's own supply) See Admin Instructions (The purpose of this order is to document that the patient reports taking medical cannabis.  This is not a prescription, and is not used to certify that the patient has a qualifying medical condition.)       Multiple Vitamins-Minerals (MULTIVITAMIN ADULT PO) Take 1 tablet by mouth every morning       ondansetron (ZOFRAN ODT) 8 MG ODT tab Take 1 tablet (8 mg) by mouth every 8 hours as needed for nausea  Qty: 12 " tablet, Refills: 0      SAPHRIS 5 MG SUBL sublingual tablet Place 10 mg under the tongue 2 times daily       vitamin B-12 (CYANOCOBALAMIN) 1000 MCG tablet Take 1,000 mcg by mouth every morning       acetaminophen 500 MG CAPS Take 1,000 mg by mouth as needed       cholecalciferol (VITAMIN D3) 1250 mcg (83522 units) capsule Take 1 capsule by mouth every 7 days For 2 doses      VENTOLIN  (90 BASE) MCG/ACT inhaler Inhale 1-2 puffs into the lungs every 6 hours as needed for shortness of breath / dyspnea or wheezing   Refills: 5                     Discharge Instructions and Follow-Up:   Discharge diet: Regular   Discharge activity: You may advance activity as tolerated. No strenuous exercise or heavy lifting greater than 10 lbs for 3-5 days.    Discharge follow-up: Follow-up with Dr. Rich Webster MD in 4 weeks   Wound care: Ok to shower,however no scrubbing of the wound and no soaking of the wound, meaning no bathtubs or swimming pools. Pat dry only. Leave wound open to air.       Please call if you have:  1. increased pain, redness, drainage, swelling at your incision  2. fevers > 101.5 F degrees  3. with any questions or concerns.  You may reach the Neurosurgery clinic at [phone] during regular work hours. ER at [phone].    and ask for the NeuroIR fellow on call at [phone], during off hours or weekends.         Discharge Disposition:   Discharged to home

## 2021-07-02 ENCOUNTER — PATIENT OUTREACH (OUTPATIENT)
Dept: NEUROLOGY | Facility: CLINIC | Age: 41
End: 2021-07-02

## 2021-07-02 NOTE — PROGRESS NOTES
Endovascular Surgical Neuroradiology - Post Discharge Call    Admit: 6/23/21    Discharge: 6/24/21    MD/Service: Dr. Webster/JALEN    Pre-Procedure Diagnosis: Right PCoA aneurysm  Post-Procedure Diagnosis:  Same     Procedure(s):   Endovascular flow diversion for cerebral aneurysm (RFA puncture)     Findings:   Right PCoA aneurysm. A 4 x 18 Pipeline shield stent placed from right M1- to rt ICA. No immediate complication. Please refer to detail dictated report.     Plan: Follow-up with Dr. Webster in 4 weeks. Scheduled for 7/20/21 at 1045 video visit.     Spoke with patient. States she is doing ok. States her left hand and wrist are sore. Was unable to type, lift milk. Improving. Bruised, looks much better.     Had headache for a few days following procedure - nagging. Tylenol without much relief. Headaches have resolved now. Groin puncture looks good. Continues on aspirin and Plavix. Eating and drinking ok. Will follow-up as scheduled.     Patient has my contact information and was encouraged to call with questions/concerns. Missy Sandra RN 7/2/2021 11:01 AM

## 2021-07-19 NOTE — PROGRESS NOTES
Endovascular surgical neuroradiology clinic note    40-year-old woman with a PMH of ovarian cysts and endometriosis  s/p hysterectomy and b/l oophorectomy, chronic neck pain/right hand pain reported to be due complex regional pain syndrome s/p right wrist gangion cyst removal - known to the ESN service after she presented to the ED in 2021 with severe occipital headaches and left facial numbness and was found to have a right P-comm aneurysm on DSA 3/19/2021 - treated with successful placement of a 4 mm x 18 mm Pipeline Shield stent (right M1 MCA to the petrous ICA) across the unruptured right posterior communicating artery aneurysm with preserved flow into the ipsilateral FRANKLIN on 21 - with a plan to continue with  DAPT with 325 mg Aspirin and 75 mg Plavix for 6 months, and a DSA in 6 months.    Since then, no episodes of focal sensory and or motor deficits. Had two episodes headaches, each lasting for about a day. Both were, bifrontal/retroorbital, pounding with photo and phonophobia and mild nausea, but no vomiting. Both episodes occurred during first few days of procedure, with no further episodes since then. No headaches at present.  Unfortunately, was bitten by neighbors dog on left lower leg, with some bleeding. I informed her that she should speak with her PCP regarding this incident.     Aneurysmal risk factors: 20-pack-year smoker who was reported to quit in early . Currently she reports that she is smoking 3 cigarettes a day. Currently unemployed, was in cosmetic products industry prior to tthat . No family history of aneurysms is reported.  She reports that her grandfather  of a cerebral aneurysm.      2021:  `     21        Plan:  [] DAPT x 5 months.  [] F/u DSA in 5 months.    JOANN Harvey    Endovascular surgical neuroradiology fellow  Pager [phone]

## 2021-07-20 ENCOUNTER — VIRTUAL VISIT (OUTPATIENT)
Dept: NEUROSURGERY | Facility: CLINIC | Age: 41
End: 2021-07-20
Payer: COMMERCIAL

## 2021-07-20 DIAGNOSIS — I67.1 CEREBRAL ANEURYSM, NONRUPTURED: Primary | ICD-10-CM

## 2021-07-20 PROCEDURE — 99207 PR SATISFY VISIT NUMBER: CPT | Performed by: RADIOLOGY

## 2021-07-20 NOTE — PROGRESS NOTES
Maryanne is a 40 year old who is being evaluated via a billable video visit.      How would you like to obtain your AVS? Mychart  If the video visit is dropped, the invitation should be resent by:[phone]\

## 2021-07-20 NOTE — LETTER
2021       RE: Maryanne Crockett  [address]  [facility] [address]     Dear Colleague,    Thank you for referring your patient, Maryanne Crockett, to the [facility] NEUROSURGERY [facility]IC [place] at . Please see a copy of my visit note below.    Endovascular surgical neuroradiology clinic note    40-year-old woman with a PMH of ovarian cysts and endometriosis  s/p hysterectomy and b/l oophorectomy, chronic neck pain/right hand pain reported to be due complex regional pain syndrome s/p right wrist gangion cyst removal - known to the ESN service after she presented to the ED in 2021 with severe occipital headaches and left facial numbness and was found to have a right P-comm aneurysm on DSA 3/19/2021 - treated with successful placement of a 4 mm x 18 mm Pipeline Shield stent (right M1 MCA to the petrous ICA) across the unruptured right posterior communicating artery aneurysm with preserved flow into the ipsilateral FRANKLIN on 21 - with a plan to continue with  DAPT with 325 mg Aspirin and 75 mg Plavix for 6 months, and a DSA in 6 months.    Since then, no episodes of focal sensory and or motor deficits. Had two episodes headaches, each lasting for about a day. Both were, bifrontal/retroorbital, pounding with photo and phonophobia and mild nausea, but no vomiting. Both episodes occurred during first few days of procedure, with no further episodes since then. No headaches at present.  Unfortunately, was bitten by neighbors dog on left lower leg, with some bleeding. I informed her that she should speak with her PCP regarding this incident.     Aneurysmal risk factors: 20-pack-year smoker who was reported to quit in early . Currently she reports that she is smoking 3 cigarettes a day. Currently unemployed, was in cosmetic products industry prior to tthat . No family history of aneurysms is reported.  She reports that her grandfather  of a  cerebral aneurysm.      March 2021:  `     6/23/21        Plan:  [] DAPT x 5 months.  [] F/u DSA in 5 months.    JOANN Harvey    Endovascular surgical neuroradiology fellow  Pager [phone]    Attestation signed by Rich Webster MD at 7/21/2021  8:01 AM:  I agree with the above note by Dr. Trevino         on  7/20/21      Again, thank you for allowing me to participate in the care of your patient.      Sincerely,    Rich Webster MD

## 2021-07-20 NOTE — PATIENT INSTRUCTIONS
Plan for follow-up angiogram in December. We will contact you closer to that time to schedule.    Continue aspirin and Plavix daily until angiogram.     If you have any questions please contact me at [phone], option 3.    Silvio Sandra RN, CNRN  Stroke & Endovascular Care Coordinator    Thank you for choosing [facility] for your health care needs.

## 2021-09-05 ENCOUNTER — HEALTH MAINTENANCE LETTER (OUTPATIENT)
Age: 41
End: 2021-09-05

## 2021-09-27 NOTE — PROGRESS NOTES
ASSESSMENT AND PLAN:     (R56.9) Convulsions, unspecified convulsion type (H)  (primary encounter diagnosis)  Comment: Continues to have convulsions. Will refer back to neurology.   Plan: Adult Neurology Referral          (F31.9) Bipolar I disorder (H)  (F98.8) Attention deficit disorder, unspecified hyperactivity presence  Comment: Stable. Follows with [facility], Dr. Valeriy Elizalde.   Plan:     (R73.09) Elevated glucose  Comment: New issue. Check A1c.   Plan: Hemoglobin A1c          (Z11.59) Encounter for hepatitis C screening test for low risk patient  Comment:   Plan: Hepatitis C Screen Reflex to HCV RNA Quant and         Genotype          (I67.1) Cerebral aneurysm, nonruptured  Comment: Follows with neurology. On DAPT at least through December. Reassured Maryanne that bruising is expected while on DAPT and as long as she's not having heavy, uncontrolled bleeding, the benefits likely outweigh the risks. Normal platelets 8/3/21.   Plan:     (G90.511) Complex regional pain syndrome type 1 of right upper extremity  Comment: Interested in re-exploring different modalities of treating her chronic right arm pain. Has been several years since she has seen pain management. Agreeable to not continuing opiates due to their risks. Will send to pain management.   Plan: Pain Management Referral          (F17.200) Smoking  Comment: Limited pharmacotherapy options given national shortage of Chantix and convulsions precluding Bupropion. Maryanne is motivated to quit. Offered NRT.   Plan:     Review of prior external note(s) from - CareEverywhere information from [facility]  reviewed  I spent a total of 44 minutes on the day of the visit.   Time spent doing chart review, history and exam, documentation and further activities per the note    Tristian Mera MD    [facility]  09/28/2021, 12:09 PM      SUBJECTIVE:   Maryanne is a 41 year old female who presents to clinic today to establish  "care and to discuss the following problem(s).    # Bruising  - arms and legs without clear trauma  - started when she started on ASA/plavix  - takes ASA 325mg daily and Plavix 75mg daily  - at last neurosurgery visit 21 they were recommending 5 more months of DAPT    # Elevated blood sugar  - 8/3/21 fasting glucose 109  - has switched to diet sodas as a result    # Cerebral Aneurysm   - presented to ED 2021 with severe occipital headache and left facial numbness, found to have right Posterior communicating aneurysm 3/19/21  - treated with 4mm x 18mm pipieline shield stent (right M1 MCA to petrous ICA) across the unruptured aneurysm on 21  - takes ASA 325mg daily and Plavix 75mg daily  - at last neurosurgery visit 21 they were recommending 5 more months of DAPT    - maternal grandfather  of aneurysm    # CRPS  - had surgery 2013 to remove ganglion cyst on anterior right wrist  - developed chronic pain afterwards  - had normal EMG at [facility] 6/15/15 (report not available but referring provider's note documents that it was normal though limited by pain)  - pain is from right antecubital fossa to hand, on anterior surface  - if she stretches out her thumb, pain will radiate up arm  - very sensitive - even the wind feels like it is being splashed with cold water  - worse when cold, wet    - pain greatly limits what she can do with her right arm/hand  - right-hand dominant    - does yoga and meditation  - has done PT in warm pool in the past, did PT for 2-3 years, feels like it was more painful than helpful  - casting hand actually seemed to help  - has had botox and other injections, \"semi-worked\"    - current regimen:  Medical cannabis   Tylenol #3 - takes when it rains, last fill #20 on , has about 5 pills left    - previous regimens:  Percocet - stopped due to desire to limit opiate exposure and concern about kids getting in  Gabapentin - helps with nerve pain, makes her " cry    # Seizures  - started around age [age]  - was previously on lamictal hamzah gabapentin  - gets 2-3 in a week and then will go a few months without  - grand mal seizures  - had been seeing neurology here, last visit 2019, recommended she go to psychiatry    2/24/21 MRI Brain  Findings:   Diffusion weighted images demonstrate no definite acute infarct. Axial  FLAIR images are within normal limits. The ventricles are proportional  to the cerebral sulci. No evidence of intracranial hemorrhage on  susceptibility-weighted images. No mass effect or midline shift. No  extra-axial fluid collection. Fluid-filled left anterior ethmoid air  cells. The remaining paranasal sinuses are relatively clear. Mastoid  air cells are clear.    # Bipolar  # ADHD  - has therapy once a week to work on family-related issues, another therapist every other week for individual issues  - daughter sees therapist at same center  - follows with psychiatry at [facility], Dr. Valeriy Elizalde    - Current Regimen:  Saphris 10mg - 9pm and 3-4am  In the process of switching from escitalopram to sertraline  Clonidine 0.1mg in the morning  Adderall 20mg twice a day  Xanax 1mg twice a day (prescribed as #28 for 7 days but fills ~2 weeks)      # Smoking  - smokes about 1/4 PPD  - would like to quit    Past Medical History:   Diagnosis Date     Bipolar I disorder (H)      Cerebral aneurysm, nonruptured      Family history of glioblastoma     screening MRIs every 2 years     Ovarian cyst      RSD (reflex sympathetic dystrophy)      Seizures (H)      Past Surgical History:   Procedure Laterality Date     CHOLECYSTECTOMY       CYSTOSCOPY N/A 9/1/2020    Procedure: CYSTOSCOPY;  Surgeon: Hayley Zayas MD;  Location: [facility] OR     DAVINCI HYSTERECTOMY TOTAL, BILATERAL SALPINGO-OOPHORECTOMY, COMBINED Left 9/1/2020    Procedure: HYSTERECTOMY, TOTAL, ROBOT-ASSISTED, LAPAROSCOPIC,  left oophorectomy;  Surgeon: Hayley Zayas MD;   Location: [facility] OR     DILATION AND CURETTAGE SUCTION  4/30/15    retained POC     GASTRIC RESTRICTIVE PROCEDURE, OPEN, REMOVE/REPLACE SUBCUTANEOUS PORT       IR CAROTID CEREBRAL ANGIOGRAM BILATERAL  3/19/2021     IR CAROTID CEREBRAL ANGIOGRAM BILATERAL  6/23/2021     LAPAROSCOPIC OOPHORECTOMY Right 8/5/2016    Procedure: LAPAROSCOPIC OOPHORECTOMY;  Surgeon: Adrianne Vergara MD;  [facility]     LAPAROSCOPIC REMOVAL GASTRIC ADJUSTABLE BAND N/A 5/11/2021    Procedure: REMOVAL, GASTRIC BAND, ADJUSTABLE, LAPAROSCOPIC;  Surgeon: Remy Gold MD;  [facility]     LAPAROSCOPIC SALPINGECTOMY Bilateral 8/5/2016    Procedure: LAPAROSCOPIC SALPINGECTOMY;  Surgeon: Adrianne Vergara MD;  [facility]     LAPAROSCOPIC TUBAL LIGATION Bilateral 5/22/2015    Procedure: LAPAROSCOPIC TUBAL LIGATION;  Surgeon: Hayley Zayas MD;  [facility]     LAPAROSCOPY OPERATIVE ADULT N/A 8/5/2016    Procedure: LAPAROSCOPY OPERATIVE ADULT;  Surgeon: Adrianne Vergara MD;  [facility]     SOFT TISSUE SURGERY Right     cyst in hand     Family History   Problem Relation Age of Onset     Coronary Artery Disease Mother 50        stent     Leukemia Father      Deep Vein Thrombosis (DVT) Father      Brain Cancer Brother 36        glioblastoma     Deep Vein Thrombosis (DVT) Brother      Cerebrovascular Disease Brother 47        stroke secondary to DVT     Deep Vein Thrombosis (DVT) Brother      Cerebral aneurysm Maternal Grandfather      Brain Cancer Maternal Aunt         glioblastoma     Brain Cancer Maternal Aunt         glioblastoma     Breast Cancer No family hx of      Anesthesia Reaction No family hx of      Ovarian Cancer No family hx of      Colon Cancer No family hx of      Social History     Tobacco Use     Smoking status: Current Some Day Smoker     Packs/day: 0.25     Smokeless tobacco: Never Used     Tobacco comment: Trying   Vaping Use     Vaping Use: Never used   Substance Use Topics     Alcohol use: Not  "Currently     Comment: rare     Drug use: Yes     Types: Marijuana     Comment: pt says she is prescribed medical marijuana     Social History     Social History Narrative    Lives with 13yo daughter    Also has two homeless girls staying with them currently (6 months as of 09/2021)    Has a 24 yo son and 5 year old grandson (takes grandson to school every morning)       Current Outpatient Medications   Medication     acetaminophen-codeine (TYLENOL #3) 300-30 MG tablet     ALPRAZolam (XANAX) 1 MG tablet     amphetamine-dextroamphetamine (ADDERALL) 20 MG tablet     aspirin (ASA) 325 MG tablet     buPROPion (WELLBUTRIN XL) 150 MG 24 hr tablet     cloNIDine (CATAPRES) 0.1 MG tablet     clopidogrel (PLAVIX) 75 MG tablet     escitalopram (LEXAPRO) 20 MG tablet     medical cannabis (Patient's own supply)     Multiple Vitamins-Minerals (MULTIVITAMIN ADULT PO)     ondansetron (ZOFRAN ODT) 8 MG ODT tab     SAPHRIS 5 MG SUBL sublingual tablet     sertraline (ZOLOFT) 25 MG tablet     VENTOLIN  (90 BASE) MCG/ACT inhaler     vitamin B-12 (CYANOCOBALAMIN) 1000 MCG tablet     No current facility-administered medications for this visit.     I have reviewed the patient's past medical, surgical, family, and social history.     OBJECTIVE:   BP (!) 157/96 (BP Location: Left arm, Patient Position: Sitting, Cuff Size: Adult Large)   Pulse 84   Temp 97.3  F (36.3  C) (Skin)   Resp 14   Ht 1.77 m (5' 9.69\")   Wt 106.6 kg (235 lb)   LMP 03/14/2018 (Approximate)   SpO2 99%   BMI 34.02 kg/m      Constitutional: well-appearing, appears stated age  Eyes: conjunctivae without erythema, sclera anicteric.   Cardiac: regular rate and rhythm, normal S1/S2, no murmur/rubs/gallops  Respiratory: lungs clear to auscultation bilaterally, normal work of breathing, no wheezes/crackles  Skin: no rashes, lesions, or wounds  Psych: affect is full and appropriate, speech is fluent and non-pressured  "

## 2021-09-28 ENCOUNTER — OFFICE VISIT (OUTPATIENT)
Dept: FAMILY MEDICINE | Facility: CLINIC | Age: 41
End: 2021-09-28
Payer: COMMERCIAL

## 2021-09-28 VITALS
TEMPERATURE: 97.3 F | HEART RATE: 84 BPM | RESPIRATION RATE: 14 BRPM | SYSTOLIC BLOOD PRESSURE: 157 MMHG | HEIGHT: 70 IN | BODY MASS INDEX: 33.64 KG/M2 | DIASTOLIC BLOOD PRESSURE: 96 MMHG | WEIGHT: 235 LBS | OXYGEN SATURATION: 99 %

## 2021-09-28 DIAGNOSIS — G90.511 COMPLEX REGIONAL PAIN SYNDROME TYPE 1 OF RIGHT UPPER EXTREMITY: ICD-10-CM

## 2021-09-28 DIAGNOSIS — I67.1 CEREBRAL ANEURYSM, NONRUPTURED: Chronic | ICD-10-CM

## 2021-09-28 DIAGNOSIS — R73.09 ELEVATED GLUCOSE: ICD-10-CM

## 2021-09-28 DIAGNOSIS — Z11.59 ENCOUNTER FOR HEPATITIS C SCREENING TEST FOR LOW RISK PATIENT: ICD-10-CM

## 2021-09-28 DIAGNOSIS — F98.8 ATTENTION DEFICIT DISORDER, UNSPECIFIED HYPERACTIVITY PRESENCE: Chronic | ICD-10-CM

## 2021-09-28 DIAGNOSIS — F17.200 SMOKING: Chronic | ICD-10-CM

## 2021-09-28 DIAGNOSIS — F31.9 BIPOLAR I DISORDER (H): ICD-10-CM

## 2021-09-28 DIAGNOSIS — R56.9 CONVULSIONS, UNSPECIFIED CONVULSION TYPE (H): Primary | ICD-10-CM

## 2021-09-28 PROBLEM — R45.851 SUICIDAL IDEATION: Chronic | Status: ACTIVE | Noted: 2020-06-24

## 2021-09-28 PROBLEM — F32.A DEPRESSION: Status: RESOLVED | Noted: 2019-02-12 | Resolved: 2021-09-28

## 2021-09-28 PROBLEM — R11.2 NAUSEA & VOMITING: Chronic | Status: ACTIVE | Noted: 2018-03-21

## 2021-09-28 PROBLEM — R13.19 ESOPHAGEAL DYSPHAGIA: Status: RESOLVED | Noted: 2021-04-22 | Resolved: 2021-09-28

## 2021-09-28 PROBLEM — F33.2 SEVERE EPISODE OF RECURRENT MAJOR DEPRESSIVE DISORDER, WITHOUT PSYCHOTIC FEATURES (H): Status: RESOLVED | Noted: 2021-01-29 | Resolved: 2021-09-28

## 2021-09-28 PROBLEM — N80.9 ENDOMETRIOSIS: Status: RESOLVED | Noted: 2020-07-21 | Resolved: 2021-09-28

## 2021-09-28 PROBLEM — Z90.710 S/P HYSTERECTOMY: Chronic | Status: ACTIVE | Noted: 2020-09-01

## 2021-09-28 LAB — HBA1C MFR BLD: 5.2 % (ref 0–5.6)

## 2021-09-28 PROCEDURE — 86803 HEPATITIS C AB TEST: CPT | Performed by: FAMILY MEDICINE

## 2021-09-28 RX ORDER — SERTRALINE HYDROCHLORIDE 25 MG/1
25 TABLET, FILM COATED ORAL DAILY
COMMUNITY
End: 2023-01-26 | Stop reason: DRUGHIGH

## 2021-09-28 ASSESSMENT — MIFFLIN-ST. JEOR: SCORE: 1806.2

## 2021-09-28 NOTE — NURSING NOTE
"41 year old  Chief Complaint   Patient presents with     RSD     Seizures     last one was 3-4 weeks ago     Smoking Cessation       Blood pressure (!) 142/86, pulse 84, temperature 97.3  F (36.3  C), temperature source Skin, resp. rate 14, height 1.77 m (5' 9.69\"), weight 106.6 kg (235 lb), last menstrual period 03/14/2018, SpO2 99 %, not currently breastfeeding. Body mass index is 34.02 kg/m .  Patient Active Problem List   Diagnosis     Chronic pain syndrome     MENTAL HEALTH     Sterilization consult     Abdominal pain     Constipation     Nausea & vomiting     Depression     Convulsions, unspecified convulsion type (H)     Suicidal ideation     Endometriosis     S/P hysterectomy     Attention deficit disorder     CRPS (complex regional pain syndrome type II)     Severe episode of recurrent major depressive disorder, without psychotic features (H)     Gastric banding status     Esophageal dysphagia     Cerebral aneurysm, nonruptured       Wt Readings from Last 2 Encounters:   09/28/21 106.6 kg (235 lb)   06/23/21 99.1 kg (218 lb 7.6 oz)     BP Readings from Last 3 Encounters:   09/28/21 (!) 142/86   06/23/21 103/59   05/11/21 (!) 141/98         Current Outpatient Medications   Medication     acetaminophen-codeine (TYLENOL #3) 300-30 MG tablet     ALPRAZolam (XANAX) 1 MG tablet     amphetamine-dextroamphetamine (ADDERALL) 20 MG tablet     aspirin (ASA) 325 MG tablet     buPROPion (WELLBUTRIN XL) 150 MG 24 hr tablet     cloNIDine (CATAPRES) 0.1 MG tablet     clopidogrel (PLAVIX) 75 MG tablet     escitalopram (LEXAPRO) 20 MG tablet     medical cannabis (Patient's own supply)     Multiple Vitamins-Minerals (MULTIVITAMIN ADULT PO)     ondansetron (ZOFRAN ODT) 8 MG ODT tab     SAPHRIS 5 MG SUBL sublingual tablet     sertraline (ZOLOFT) 25 MG tablet     VENTOLIN  (90 BASE) MCG/ACT inhaler     vitamin B-12 (CYANOCOBALAMIN) 1000 MCG tablet     acetaminophen 500 MG CAPS     cholecalciferol (VITAMIN D3) 1250 mcg " (44032 units) capsule     No current facility-administered medications for this visit.       Social History     Tobacco Use     Smoking status: Current Some Day Smoker     Packs/day: 0.25     Smokeless tobacco: Never Used     Tobacco comment: Trying   Vaping Use     Vaping Use: Never used   Substance Use Topics     Alcohol use: Not Currently     Comment: rare     Drug use: Yes     Types: Marijuana     Comment: pt says she is prescribed medical marijuana       Health Maintenance Due   Topic Date Due     PREVENTIVE CARE VISIT  Never done     Pneumococcal Vaccine: Pediatrics (0 to 5 Years) and At-Risk Patients (6 to 64 Years) (1 of 2 - PPSV23) Never done     COVID-19 Vaccine (1) Never done     HEPATITIS C SCREENING  Never done     URINE DRUG SCREEN  06/23/2021     INFLUENZA VACCINE (1) 09/01/2021       Lab Results   Component Value Date    PAP NIL 07/20/2020 September 28, 2021 11:22 AM

## 2021-09-29 ENCOUNTER — TELEPHONE (OUTPATIENT)
Dept: FAMILY MEDICINE | Facility: CLINIC | Age: 41
End: 2021-09-29

## 2021-09-29 PROBLEM — F17.200 SMOKING: Chronic | Status: ACTIVE | Noted: 2021-09-29

## 2021-09-29 LAB — HCV AB SERPL QL IA: NONREACTIVE

## 2021-09-29 NOTE — TELEPHONE ENCOUNTER
Who is calling? Patient  Reason for Call: Wants medication to stop smoking - saw Dr. Mera 9/28 but did not talk to him about this. Informed her that she should schedule an appointment for this but she declined. Also wants to discuss getting prescribed tylenol .

## 2021-09-29 NOTE — TELEPHONE ENCOUNTER
"Patient interested in smoking cessation start and wants appointment with Dr. Mera, appointment scheduled. Patient also asking for Tylenol #3, \"I'm going camping and I know it's important to have something along\". Informed patient that this is a controlled medication and cannot readily be ordered over the phone. Patient verbalized understanding.   Fatou Ma MS RN-BC  09/29/21  3:43 PM      "

## 2021-10-15 ENCOUNTER — TELEPHONE (OUTPATIENT)
Dept: NEUROLOGY | Facility: CLINIC | Age: 41
End: 2021-10-15

## 2021-10-15 DIAGNOSIS — I67.1 NONRUPTURED CEREBRAL ANEURYSM: Primary | ICD-10-CM

## 2021-10-15 NOTE — TELEPHONE ENCOUNTER
Patient due for angio 12/2021 to follow-up on treated PComm aneurysm.     Spoke with patient. Informed:  Scheduling will reach out in the next week to arrange at [facility].   Will be at hospital for minimum of 5-6 hours.  Will need  home.  Will need COVID test 2-4 days prior.  Visitor policy.   Will send instructions via [employer] after scheduled.     Patient verbalized understanding, and has no questions. Patient has my contact information and was encouraged to call with questions/concerns. Missy Sandra RN 10/15/2021 3:20 PM

## 2021-10-18 ENCOUNTER — OFFICE VISIT (OUTPATIENT)
Dept: FAMILY MEDICINE | Facility: CLINIC | Age: 41
End: 2021-10-18
Payer: COMMERCIAL

## 2021-10-18 ENCOUNTER — OFFICE VISIT (OUTPATIENT)
Dept: NEUROLOGY | Facility: CLINIC | Age: 41
End: 2021-10-18
Payer: COMMERCIAL

## 2021-10-18 VITALS
HEART RATE: 87 BPM | DIASTOLIC BLOOD PRESSURE: 94 MMHG | TEMPERATURE: 97.1 F | OXYGEN SATURATION: 96 % | RESPIRATION RATE: 14 BRPM | SYSTOLIC BLOOD PRESSURE: 131 MMHG | BODY MASS INDEX: 35.47 KG/M2 | WEIGHT: 245 LBS

## 2021-10-18 VITALS
HEART RATE: 92 BPM | BODY MASS INDEX: 35.79 KG/M2 | DIASTOLIC BLOOD PRESSURE: 95 MMHG | WEIGHT: 247.2 LBS | TEMPERATURE: 97.7 F | SYSTOLIC BLOOD PRESSURE: 157 MMHG

## 2021-10-18 DIAGNOSIS — R56.9 SEIZURES (H): Primary | ICD-10-CM

## 2021-10-18 DIAGNOSIS — R03.0 ELEVATED BP WITHOUT DIAGNOSIS OF HYPERTENSION: ICD-10-CM

## 2021-10-18 DIAGNOSIS — F17.200 SMOKING: Primary | ICD-10-CM

## 2021-10-18 RX ORDER — POLYETHYLENE GLYCOL 3350 17 G
2 POWDER IN PACKET (EA) ORAL
Qty: 108 LOZENGE | Refills: 11 | Status: SHIPPED | OUTPATIENT
Start: 2021-10-18 | End: 2023-01-26

## 2021-10-18 RX ORDER — NICOTINE 21 MG/24HR
1 PATCH, TRANSDERMAL 24 HOURS TRANSDERMAL EVERY 24 HOURS
Qty: 42 PATCH | Refills: 0 | Status: SHIPPED | OUTPATIENT
Start: 2021-10-18 | End: 2021-11-29

## 2021-10-18 ASSESSMENT — PATIENT HEALTH QUESTIONNAIRE - PHQ9: SUM OF ALL RESPONSES TO PHQ QUESTIONS 1-9: 8
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
## 2021-10-18 NOTE — PATIENT INSTRUCTIONS
Seizures.    Plan:  1. EEG as outpatient.  2. Will bring her seizure video to the clinic during next visit.  3. RTC in 6 weeks. May consider Trileptal in the future.

## 2021-10-18 NOTE — NURSING NOTE
41 year old  Chief Complaint   Patient presents with     Hand Pain     wants new scans of both hands     Neck Pain     wants new scans     Seizures     had on a weeks ago saw provider today at neurologist     Smoking Cessation     referral to specalist at the [facility]       Blood pressure (!) 141/97, pulse 87, temperature 97.1  F (36.2  C), temperature source Skin, resp. rate 14, weight 111.1 kg (245 lb), last menstrual period 03/14/2018, SpO2 96 %, not currently breastfeeding. Body mass index is 35.47 kg/m .  Patient Active Problem List   Diagnosis     Nausea & vomiting     Convulsions, unspecified convulsion type (H)     Suicidal ideation     S/P hysterectomy     Attention deficit disorder     Complex regional pain syndrome type 1 of right upper extremity     Cerebral aneurysm, nonruptured     Family history of glioblastoma     Bipolar I disorder (H)     Smoking       Wt Readings from Last 2 Encounters:   10/18/21 111.1 kg (245 lb)   10/18/21 112.1 kg (247 lb 3.2 oz)     BP Readings from Last 3 Encounters:   10/18/21 (!) 141/97   10/18/21 (!) 157/95   09/28/21 (!) 157/96         Current Outpatient Medications   Medication     acetaminophen-codeine (TYLENOL #3) 300-30 MG tablet     ALPRAZolam (XANAX) 1 MG tablet     amphetamine-dextroamphetamine (ADDERALL) 20 MG tablet     aspirin (ASA) 325 MG tablet     buPROPion (WELLBUTRIN XL) 150 MG 24 hr tablet     cloNIDine (CATAPRES) 0.1 MG tablet     clopidogrel (PLAVIX) 75 MG tablet     escitalopram (LEXAPRO) 20 MG tablet     medical cannabis (Patient's own supply)     Multiple Vitamins-Minerals (MULTIVITAMIN ADULT PO)     ondansetron (ZOFRAN ODT) 8 MG ODT tab     SAPHRIS 5 MG SUBL sublingual tablet     sertraline (ZOLOFT) 25 MG tablet     VENTOLIN  (90 BASE) MCG/ACT inhaler     vitamin B-12 (CYANOCOBALAMIN) 1000 MCG tablet     No current facility-administered medications for this visit.       Social History     Tobacco Use     Smoking status: Current Some Day Smoker      Packs/day: 0.25     Smokeless tobacco: Never Used     Tobacco comment: Trying   Vaping Use     Vaping Use: Never used   Substance Use Topics     Alcohol use: Not Currently     Comment: rare     Drug use: Yes     Types: Marijuana     Comment: pt says she is prescribed medical marijuana       Health Maintenance Due   Topic Date Due     PREVENTIVE CARE VISIT  Never done     Pneumococcal Vaccine: Pediatrics (0 to 5 Years) and At-Risk Patients (6 to 64 Years) (1 of 2 - PPSV23) Never done     COVID-19 Vaccine (1) Never done     URINE DRUG SCREEN  06/23/2021     INFLUENZA VACCINE (1) 09/01/2021       Lab Results   Component Value Date    PAP NIL 07/20/2020 October 18, 2021 1:31 PM

## 2021-10-18 NOTE — PROGRESS NOTES
ASSESSMENT AND PLAN:     (F17.200) Smoking  (primary encounter diagnosis)  Comment: Motivated to quit. Set quit date for [place] 2021.  Already on Bupropion with psychiatry.  National Chantix shortage ongoing.  Start on NRT with medium dose patch (advised we can go up if has continued cravings on medium dose patch). Can supplement with lozenge PRN (advised to carefully read instructions for proper lozenge use).  Advise she doesn't have to (and shouldn't) take off the patch to smoke.  Plan to follow up in January on success of cessation effort.     Plan: nicotine (NICODERM CQ) 14 MG/24HR 24 hr patch,         nicotine (NICODERM CQ) 7 MG/24HR 24 hr patch,         nicotine (COMMIT) 2 MG lozenge          (R03.0) Elevated BP without diagnosis of hypertension  Comment: BP has varied widely this past year. Unclear what Maryanne's actual BP is. Mother has automated cuff at home. I've provided Maryanne with instructions on how to check an accurate home pressure and asked her to check for 7 days and message me on [employer] with those numbers.  Plan:     I spent a total of 34 minutes on the day of the visit.   Time spent doing chart review, history and exam, documentation and further activities per the note    Tristian Mera MD   [facility]  10/18/2021, 2:12 PM      SUBJECTIVE:   Maryanne is a 41 year old female who presents to clinic today for a return visit.    # Smoking  - smokes 1/2 PPD  - has quit the longest for 5 days (when in hospital)  - has never used patches  - takes low-dose Bupropion 150mg XL with psychiatrist  - has history of convulsions, scheduled for vEEG with neurology to evaluate further    - would like to quit by Christmas    BP Readings from Last 6 Encounters:   10/18/21 (!) 131/94   10/18/21 (!) 157/95   09/28/21 (!) 157/96   06/23/21 103/59   05/11/21 (!) 141/98   05/04/21 110/75     # CRPS  - has new appointment with Dr. Morgan at pain clinic on 11/4/21      Patient Active Problem List    Diagnosis     Nausea & vomiting     Convulsions, unspecified convulsion type (H)     Suicidal ideation     S/P hysterectomy     Attention deficit disorder     Complex regional pain syndrome type 1 of right upper extremity     Cerebral aneurysm, nonruptured     Family history of glioblastoma     Bipolar I disorder (H)     Smoking     Current Outpatient Medications   Medication     acetaminophen-codeine (TYLENOL #3) 300-30 MG tablet     ALPRAZolam (XANAX) 1 MG tablet     amphetamine-dextroamphetamine (ADDERALL) 20 MG tablet     aspirin (ASA) 325 MG tablet     buPROPion (WELLBUTRIN XL) 150 MG 24 hr tablet     cloNIDine (CATAPRES) 0.1 MG tablet     clopidogrel (PLAVIX) 75 MG tablet     escitalopram (LEXAPRO) 20 MG tablet     medical cannabis (Patient's own supply)     Multiple Vitamins-Minerals (MULTIVITAMIN ADULT PO)     nicotine (COMMIT) 2 MG lozenge     nicotine (NICODERM CQ) 14 MG/24HR 24 hr patch     nicotine (NICODERM CQ) 7 MG/24HR 24 hr patch     ondansetron (ZOFRAN ODT) 8 MG ODT tab     SAPHRIS 5 MG SUBL sublingual tablet     sertraline (ZOLOFT) 25 MG tablet     VENTOLIN  (90 BASE) MCG/ACT inhaler     vitamin B-12 (CYANOCOBALAMIN) 1000 MCG tablet     No current facility-administered medications for this visit.       I have reviewed the patient's relevant past medical history.     OBJECTIVE:   BP (!) 131/94 (BP Location: Left arm, Patient Position: Sitting, Cuff Size: Adult Large)   Pulse 87   Temp 97.1  F (36.2  C) (Skin)   Resp 14   Wt 111.1 kg (245 lb)   LMP 03/14/2018 (Approximate)   SpO2 96%   BMI 35.47 kg/m      Constitutional: well-appearing, appears stated age  Eyes: conjunctivae without erythema, sclera anicteric.   Skin: no rashes, lesions, or wounds  Psych: affect is full and appropriate, speech is fluent and non-pressured

## 2021-10-18 NOTE — PATIENT INSTRUCTIONS
Quit Date: Christmas 2021    Start using the 14mg nicotine patch (medium dose patch) ever day.   It's okay to continue smoking while wearing the patch - Don't take the patch off just because you smoke.    After the first 6 weeks of being on the patch, if you have cut down your smoking to about a quarter of a pack a day, then switch to using the low-dose 7mg nicotine patch.    In addition to the patch, if you have cravings, you can also use the nicotine lozenge every 2 hours as needed. Read the instructions on the lozenge!    ------------------------------------------------      I recommend you check your blood pressure once a day, at different times of the day, for 7 days, and then send me those numbers in [employer].    How to check your blood pressure at home:  1) Make sure you use the correct size blood pressure cuff. Usually there will be symptoms on the cuff that will show you if it is too big or too small when you put it on your arm  2) Put the cuff on your bare arm. Don't have clothes between the cuff and your arm  3) Do not talk right before or during the check  4) Have you legs uncrossed and feet flat on the floor  5) Rest in the chair you are going to check your pressure in for at least 5 minutes before checking your pressure  6) Have you arm supported while the machine is checking your pressure  7) Make sure your bladder is empty before checking  8) Check your pressure in both arms and see which one has a higher pressure. From then on, continue to check your pressure in the arm you know has the higher pressure.

## 2021-10-21 ENCOUNTER — HOSPITAL ENCOUNTER (OUTPATIENT)
Facility: CLINIC | Age: 41
End: 2021-10-21
Admitting: RADIOLOGY
Payer: COMMERCIAL

## 2021-10-21 ENCOUNTER — TELEPHONE (OUTPATIENT)
Dept: RADIOLOGY | Facility: CLINIC | Age: 41
End: 2021-10-21

## 2021-10-21 NOTE — TELEPHONE ENCOUNTER
Maryanne is scheduled for her procedure with Dr. Webster on 12/8/21 at [facility]. Her procedure will begin at 10:00 a.m. with an arrival time of 8:30 a.m.    She is scheduled for her COVID test at the [facility] on 12/6/21.    Thank you,    Janet ODELL  Perioperative

## 2021-11-04 ENCOUNTER — OFFICE VISIT (OUTPATIENT)
Dept: ANESTHESIOLOGY | Facility: CLINIC | Age: 41
End: 2021-11-04
Attending: FAMILY MEDICINE
Payer: COMMERCIAL

## 2021-11-04 VITALS — HEART RATE: 80 BPM | SYSTOLIC BLOOD PRESSURE: 127 MMHG | OXYGEN SATURATION: 97 % | DIASTOLIC BLOOD PRESSURE: 89 MMHG

## 2021-11-04 DIAGNOSIS — G90.511 COMPLEX REGIONAL PAIN SYNDROME TYPE 1 OF RIGHT UPPER EXTREMITY: ICD-10-CM

## 2021-11-04 DIAGNOSIS — G56.03 CARPAL TUNNEL SYNDROME ON BOTH SIDES: Primary | ICD-10-CM

## 2021-11-04 PROCEDURE — 99204 OFFICE O/P NEW MOD 45 MIN: CPT | Performed by: ANESTHESIOLOGY

## 2021-11-04 RX ORDER — PREGABALIN 25 MG/1
25 CAPSULE ORAL 2 TIMES DAILY
Qty: 60 CAPSULE | Refills: 0 | Status: CANCELLED | OUTPATIENT
Start: 2021-11-04

## 2021-11-04 RX ORDER — GABAPENTIN 600 MG/1
300 TABLET ORAL 3 TIMES DAILY
Qty: 90 TABLET | Refills: 0 | Status: SHIPPED | OUTPATIENT
Start: 2021-11-04 | End: 2021-12-30

## 2021-11-04 RX ORDER — LIDOCAINE 50 MG/G
OINTMENT TOPICAL 3 TIMES DAILY
Qty: 30 G | Refills: 1 | Status: SHIPPED | OUTPATIENT
Start: 2021-11-04 | End: 2022-07-01

## 2021-11-04 RX ORDER — AMITRIPTYLINE HYDROCHLORIDE 10 MG/1
10 TABLET ORAL AT BEDTIME
Qty: 30 TABLET | Refills: 0 | Status: CANCELLED | OUTPATIENT
Start: 2021-11-04

## 2021-11-04 ASSESSMENT — ENCOUNTER SYMPTOMS
TINGLING: 0
CHILLS: 0
FEVER: 0
TREMORS: 0
SENSORY CHANGE: 1
SPUTUM PRODUCTION: 0
DEPRESSION: 1
DIZZINESS: 0
DYSURIA: 0
FALLS: 1
HEMOPTYSIS: 0
COUGH: 0
HEADACHES: 0
WEIGHT LOSS: 0
FREQUENCY: 0
MYALGIAS: 1
NECK PAIN: 1
BACK PAIN: 1
FOCAL WEAKNESS: 1
PALPITATIONS: 1
EYES NEGATIVE: 1

## 2021-11-04 ASSESSMENT — PAIN SCALES - GENERAL: PAINLEVEL: MILD PAIN (3)

## 2021-11-04 NOTE — NURSING NOTE
Patient presents with:  Consult: [facility] NEW, RM 14, patient reports, 3/10 pain in her neck and hands on going 10 years      Mild Pain (3)     Pain Medications     Opioid Combinations Refills Start End     acetaminophen-codeine (TYLENOL #3) 300-30 MG tablet          Sig - Route: Take 1 tablet by mouth every 6 hours as needed for severe pain - Oral    Class: Historical    Salicylates Refills Start End     aspirin (ASA) 325 MG tablet    3 3/30/2021     Sig - Route: Take 1 tablet (325 mg) by mouth daily - Oral    Class: E-Prescribe    Notes to Pharmacy: Please start taking 7 days prior to angiogram procedure          What medications are you using for pain?   N/A    (New patients only) Have you been seen by another pain clinic/ provider? Years ago,   Primary care referral, looking for a consult.      Michael Licea, EMT

## 2021-11-04 NOTE — PATIENT INSTRUCTIONS
Medications:    Restart Gabapentin- 300 mg three times daily- for 1 week.     Then increase to 600 mg three times daily for 1 week.     Then increase to 900 mg three times daily. Continue at this dose.     Please let us know when you are at this dose- so that we can refill you appropriately.     lidocaine (XYLOCAINE) 5 % external ointment-  Apply topically 3 times daily.      Please provide the clinic with a minium of 1 week notice, on all prescription refills.     Referrals:    Physical Therapy Referral placed-   If you have not heard from the scheduling office within 2 business days, please call [phone] for all locations    Hand Surgery Referral- Evaluation of carpal tunnel.   please call [phone] to make your appointment.         Recommended Follow up:      3-6 months with Dr. Morgan, or sooner if indicated.        To speak with a nurse, schedule/reschedule/cancel a clinic appointment, or request a medication refill call: [phone], option #1.    You can also reach us by [employer]: [email or web address]
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
## 2021-11-09 DIAGNOSIS — Z11.59 ENCOUNTER FOR SCREENING FOR OTHER VIRAL DISEASES: ICD-10-CM

## 2021-11-17 ENCOUNTER — PATIENT OUTREACH (OUTPATIENT)
Dept: NEUROLOGY | Facility: CLINIC | Age: 41
End: 2021-11-17
Payer: COMMERCIAL

## 2021-11-17 NOTE — PROGRESS NOTES
ELIZABETHMM informing patient instructions sent via [employer]OCK[employer]. Encouraged to return call with questions. Missy Sandra RN 11/17/2021 9:26 AM

## 2021-11-17 NOTE — PATIENT INSTRUCTIONS
You are scheduled for a Diagnostic Cerebral Angiogram with Dr. Webster on 12/8/21 at 10:00 AM.     Please follow these instructions:    * Prior to your procedure you will need to obtain COVID-19 testing within 2-4 days of your scheduled procedure. You are scheduled for 12/6/21 at 1:30 PM at [facility], located at [address] [place] [phone]. Their phone number is [phone].     * You should arrive at the [facility] at [facility] at 8:30 AM. The address is [facility] [place] [address]. Enter at door 2, closest to [address] The phone number is [phone].     * Do not eat after Midnight; you may drink clear liquids (includes water, Jell-O, clear broth, apple juice or any non-carbonated beverage that you can see through) until 8:00 AM.     * You may take your medications with a sip of water the morning of the procedure.     * You will be discharged the same day. You must have a  home and someone that can stay with you through the night.     PLEASE NOTE our COVID-19 visitors policy: For the protection of our patients and visitors, patients are allowed one consistent visitor, 18 years of age or older, per adult patient in the hospital. Visitors must wear a mask at all times while in the hospital.     All discharge instructions will be given to the  or volunteer. Documentation for the post-operative plan will be given to the patient and . Patients are required to have someone to stay with them for 24 hours after their procedure.    If you have questions regarding your procedure, please contact me at [phone], option 3.    If you need to cancel, reschedule or have procedure scheduling related questions, please call Jennifer at [phone].    Thank you,  Silvio Sandra, RN, CNRN  Stroke & Endovascular Care Coordinator    Directions for a Cerebral Angiogram     What to Expect:    You will be scheduled  for an angiogram, which is a procedure that uses x-rays to view the arteries (blood vessels that carry blood from the heart out to the body).     After you arrive, the nursing staff will take a short history and assessment before the procedure begins. Your physician will explain the procedure to you and your family, including the possible risks and side effects.  Be sure to ask questions and address any concerns you may have. You will then be asked to sign a consent form for the procedure.     During the procedure a small tube or catheter will be placed into one of your arteries (usually the artery at the top of the leg; less commonly the artery on the inside of your upper arm) and maneuvered to the specific artery being studied.  Contrast medium (x-ray dye) will be injected into the blood vessel and x-rays will be taken of the area.    Once the procedure is completed the catheter will be removed and pressure held at the puncture site for 20 to 30 minutes to make sure the puncture site seals.    During the exam, you are routinely monitored by a heart monitor and an oxygen saturation monitor that lets us know how well you are breathing.  A blood pressure cuff will be on your arm.  An IV is started to provide you with medications and IV fluids during the procedure.    After the exam you will need to be on complete bed rest for 2 to 6 hours.  The nurse will monitor your vital signs, puncture site, pulses and temperature of the area or leg that was punctured.     After you are discharged do not drive until the next morning and an adult must be with you until then. You may resume your normal activities the day after the procedure.  Do not do any strenuous exercise or lifting for 48 hours after the procedure.       Preparation:   Laboratory tests will be obtained by your doctor on the day of the exam (Basic Metabolic Panel, CBCP, PTT and INR).  Someone will need to drive you to and from the hospital.  Be sure to have  someone who can stay with you through the night.   If you are allergic to x-ray contrast or iodine, contact your doctor or the nurse coordinator prior to the exam day for instructions.  If you are or may be pregnant contact your doctor or nurse coordinator prior to the day of the exam.  If you have diabetes, check with your doctor or the nurse coordinator to see if your insulin should be adjusted for the exam.  If you are taking a medication called Glucophage, Glucovance, or Metformin; these medications need to be held the day of the exam and for approximately 48 hours following the procedure.   If you are taking Coumadin (to thin your blood) please contact the nurse coordinator at least 7 days before the exam for special instructions.  You should not have received barium (x-ray contrast) within 48 hours of this procedure.   Do not smoke for 24 hours prior to the procedure.  Do not eat for 8 hours prior to the procedure. You may drink clear liquids (water, ginger ale, etc) until 2 hours prior to the procedure.  You may brush your teeth and take your medications as directed with a sip of water.

## 2021-11-22 ENCOUNTER — ANCILLARY PROCEDURE (OUTPATIENT)
Dept: GENERAL RADIOLOGY | Facility: CLINIC | Age: 41
End: 2021-11-22
Attending: STUDENT IN AN ORGANIZED HEALTH CARE EDUCATION/TRAINING PROGRAM
Payer: COMMERCIAL

## 2021-11-22 ENCOUNTER — OFFICE VISIT (OUTPATIENT)
Dept: ORTHOPEDICS | Facility: CLINIC | Age: 41
End: 2021-11-22
Attending: ANESTHESIOLOGY
Payer: COMMERCIAL

## 2021-11-22 DIAGNOSIS — M79.642 BILATERAL HAND PAIN: ICD-10-CM

## 2021-11-22 DIAGNOSIS — M79.644 CHRONIC PAIN OF RIGHT THUMB: ICD-10-CM

## 2021-11-22 DIAGNOSIS — G89.29 CHRONIC PAIN OF RIGHT THUMB: Primary | ICD-10-CM

## 2021-11-22 DIAGNOSIS — G56.03 CARPAL TUNNEL SYNDROME ON BOTH SIDES: ICD-10-CM

## 2021-11-22 DIAGNOSIS — G89.29 CHRONIC PAIN OF RIGHT THUMB: ICD-10-CM

## 2021-11-22 DIAGNOSIS — M79.641 BILATERAL HAND PAIN: ICD-10-CM

## 2021-11-22 DIAGNOSIS — M79.644 CHRONIC PAIN OF RIGHT THUMB: Primary | ICD-10-CM

## 2021-11-22 PROCEDURE — 73140 X-RAY EXAM OF FINGER(S): CPT | Mod: RT | Performed by: RADIOLOGY

## 2021-11-22 PROCEDURE — 99203 OFFICE O/P NEW LOW 30 MIN: CPT | Performed by: STUDENT IN AN ORGANIZED HEALTH CARE EDUCATION/TRAINING PROGRAM

## 2021-11-22 RX ORDER — ASCORBIC ACID 500 MG
500 TABLET ORAL DAILY
Qty: 50 TABLET | Refills: 0 | Status: SHIPPED | OUTPATIENT
Start: 2021-11-22 | End: 2022-01-12

## 2021-11-22 NOTE — PROGRESS NOTES
"Hand Surgery History & Physical    REFERRING PHYSICIAN: Scott Morgan   PRIMARY CARE PHYSICIAN: Senthil Cano     Chief Complaint:   Consult (Bilateral CTS , Hx of CRPS , 8/5/2013 right ganglion cyst excision )    History of Present Illness:     Maryanne Crockett is a 41 year old right-hand dominant female who presents for evaluation of right hand pain and weakness.  The patient states that in 2013 she had surgical excision of a volar wrist ganglion cyst which was complicated by complex regional pain syndrome.  She states she underwent several years of therapy and treatment for her CRPS.  This included desensitization, steroid injections, Botox injections.  She states that over the past several years she has had weakness, hypersensitivity, and diffuse pain in the right wrist.  She has thus needed to use her nondominant left hand more often, which has caused some intermittent discomfort.  Patient also states that she has numbness and tingling in the hand.  This is nonlocalizing on the right side, however in the left side this is in the thumb index and middle finger and will occasionally wake her up at night.  Patient had an EMG obtained in 2015 which was normal per chart review.  Patient is followed by neurology as well as pain management.  She has been taking gabapentin for her CRPS, which she feels does not help much.    Patient has a complex medical history including multiple cerebral aneurysms.  Patient states that she while her symptoms have been present for many years, she thought that she would always die young due to her aneurysms.  She was recently told that she likely has a good prognosis and \"has half of her life left to live\".  This made her want to seek treatment for her hand hypersensitivity, pain, and weakness.    Patient states that due to the pain, weakness, and hypersensitivity, she is unable to hold any items in her hand.  She uses her forearm to support items.  Cold air, air a hair dryer, or light " touch cause significant pain to the hand.  This occasionally radiates up into the forearm.  Today she points to the base of her thumb as area of pain.  Gripping items any range of motion cause pain in this area.    Past Medical History:     Past Medical History:   Diagnosis Date     Bipolar I disorder (H)      Cerebral aneurysm, nonruptured      Family history of glioblastoma     screening MRIs every 2 years     Ovarian cyst      RSD (reflex sympathetic dystrophy)      Seizures (H)        Past Surgical History:     Past Surgical History:   Procedure Laterality Date     CHOLECYSTECTOMY       CYSTOSCOPY N/A 09/01/2020    Procedure: CYSTOSCOPY;  Surgeon: Hayley Zayas MD;  [facility]     DAVINCI HYSTERECTOMY TOTAL, BILATERAL SALPINGO-OOPHORECTOMY, COMBINED Left 09/01/2020    Procedure: HYSTERECTOMY, TOTAL, ROBOT-ASSISTED, LAPAROSCOPIC,  left oophorectomy;  Surgeon: Hayley Zayas MD;  [facility]     DILATION AND CURETTAGE SUCTION  04/30/2015    retained POC     GASTRIC RESTRICTIVE PROCEDURE, OPEN, REMOVE/REPLACE SUBCUTANEOUS PORT       IR CAROTID CEREBRAL ANGIOGRAM BILATERAL  03/19/2021     IR CAROTID CEREBRAL ANGIOGRAM BILATERAL  06/23/2021     LAPAROSCOPIC OOPHORECTOMY Right 08/05/2016    Procedure: LAPAROSCOPIC OOPHORECTOMY;  Surgeon: Adrianne Vergara MD;  [facility]     LAPAROSCOPIC REMOVAL GASTRIC ADJUSTABLE BAND N/A 05/11/2021    Procedure: REMOVAL, GASTRIC BAND, ADJUSTABLE, LAPAROSCOPIC;  Surgeon: Remy Gold MD;  [facility]     LAPAROSCOPIC SALPINGECTOMY Bilateral 08/05/2016    Procedure: LAPAROSCOPIC SALPINGECTOMY;  Surgeon: Adrianne Vergara MD;  [facility]     LAPAROSCOPIC TUBAL LIGATION Bilateral 05/22/2015    Procedure: LAPAROSCOPIC TUBAL LIGATION;  Surgeon: Hayley Zayas MD;  [facility]     LAPAROSCOPY OPERATIVE ADULT N/A 08/05/2016    Procedure: LAPAROSCOPY OPERATIVE ADULT;  Surgeon: Adrianne Vergara MD;  [facility]      PANNICULECTOMY  04/22/2017    [facility]     SOFT TISSUE SURGERY Right     cyst in hand       Social History:     Social History     Tobacco Use     Smoking status: Current Some Day Smoker     Packs/day: 0.25     Smokeless tobacco: Never Used     Tobacco comment: Trying   Substance Use Topics     Alcohol use: Not Currently     Comment: rare       Family History:     Family History   Problem Relation Age of Onset     Coronary Artery Disease Mother 50        stent     Leukemia Father      Deep Vein Thrombosis (DVT) Father      Brain Cancer Brother 36        glioblastoma     Deep Vein Thrombosis (DVT) Brother      Cerebrovascular Disease Brother 47        stroke secondary to DVT     Deep Vein Thrombosis (DVT) Brother      Cerebral aneurysm Maternal Grandfather      Brain Cancer Maternal Aunt         glioblastoma     Brain Cancer Maternal Aunt         glioblastoma     Breast Cancer No family hx of      Anesthesia Reaction No family hx of      Ovarian Cancer No family hx of      Colon Cancer No family hx of        Allergies:     Allergies   Allergen Reactions     Ibuprofen GI Disturbance     Seroquel [Quetiapine] Other (See Comments) and Rash     Insomnia and rash       Medications:     Current Outpatient Medications   Medication     acetaminophen-codeine (TYLENOL #3) 300-30 MG tablet     ALPRAZolam (XANAX) 1 MG tablet     amphetamine-dextroamphetamine (ADDERALL) 20 MG tablet     aspirin (ASA) 325 MG tablet     buPROPion (WELLBUTRIN XL) 150 MG 24 hr tablet     cloNIDine (CATAPRES) 0.1 MG tablet     clopidogrel (PLAVIX) 75 MG tablet     escitalopram (LEXAPRO) 20 MG tablet     gabapentin (NEURONTIN) 600 MG tablet     lidocaine (XYLOCAINE) 5 % external ointment     medical cannabis (Patient's own supply)     Multiple Vitamins-Minerals (MULTIVITAMIN ADULT PO)     nicotine (COMMIT) 2 MG lozenge     nicotine (NICODERM CQ) 14 MG/24HR 24 hr patch     nicotine (NICODERM CQ) 7 MG/24HR 24 hr patch     ondansetron (ZOFRAN ODT) 8 MG ODT  tab     SAPHRIS 5 MG SUBL sublingual tablet     sertraline (ZOLOFT) 25 MG tablet     VENTOLIN  (90 BASE) MCG/ACT inhaler     vitamin B-12 (CYANOCOBALAMIN) 1000 MCG tablet     vitamin C (ASCORBIC ACID) 500 MG tablet     No current facility-administered medications for this visit.        Review of Systems:     A 10 point ROS was performed and reviewed. Specific responses to these questions are noted at the end of the document.    Physical Exam:   Physical Exam Adult:  General: Well-nourished, alert, cooperative with exam and in no acute distress  HEENT: Atraumatic, normocephalic, extraocular movements intact, moist mucous membranes, trachea midline  Pulmonary: Unlabored work of breathing, on room air  Cardiovascular: Warm and well-perfused extremities. 2+ radial pulses  Skin: Warm, intact without rashes to the upper extremities, head or neck   Gait: Normal  Neuro/psych: Oriented to time, place and person. Affect is appropriate    Musculoskeletal: A focused physical examination of the right upper extremity reveals:   Inspection -no obvious deformity.  No ecchymosis or edema.  No hyperemia.  No atrophy of thenar's or intrinsics.  Palpation - Tender to palpation at the base of the thumb CMC, scaphoid tubercle, snuffbox.  No pain at DRUJ, no DRUJ instability, no pain with palpation at the fovea, Pisa form, SL interval, metacarpals, remaining hand.  Neurovascular- intact light touch sensation and motor to distribution of the median, ulnar and radial nerves.  Two-point discrimination throughout the median ulnar nerve distributions bilaterally 2-3 mm, symmetric.  Brisk capillary refill to the distal fingers. 2+ radial pulse.   Range of Motion: Pain with end extension of fingers.  Tenderness of the wrist.  Pain with CMC grind.  Pain with end flexion and extension.    Muscle strength and tone: 4/5 strength EPL, FPL, APB, first dorsal interosseous.             Negative Moe's.  Negative ulnar impaction.  Negative  Finkelstein's.  Negative Tinel's at the carpal tunnel.  Negative Phalen's.    Imaging:   3 view X-ray of the right thumb was independently interpreted by me. The results were discussed with the patient.  Findings include:No acute osseous abnormalities.  No fractures, dislocations, significant degenerative changes.  No evidence of scaphoid fracture seen on x-ray today.    Assessment and Plan:   Assessment:  41 year old female with 9-year history of right wrist and hand pain, hypersensitivity, numbness and tingling.  History of right volar wrist ganglion cyst excision complicated by CRPS.  Negative EMG in 2015.  Unclear ideology of hand and wrist pain on exam today.  Left-sided nighttime numbness and tingling.     Plan: Recommended hand therapy for range of motion, strengthening, and working on hypersensitivity.  Recommended paraffin bath, which the patient states she has at home.  Recommended anti-inflammatories.  Would recommend repeating EMG of the bilateral upper extremities to evaluate for neuropathy.  Patient should follow-up following EMG to discuss results.  Recommended initiation of vitamin C for pain associated with CRPS.  Continue gabapentin as previously prescribed.    All questions were answered and the patient was in agreement with the plan.     Radiographs and plan discussed with Dr. Jonah Spears who is in agreement with the plan.     Naz Cantu PA-C   Orthopedic Surgery  11/22/2021 4:37 PM

## 2021-11-22 NOTE — LETTER
"    11/22/2021         RE: Maryanne Crockett  [address]  [facility] [address]        Dear Colleague,    Thank you for referring your patient, Maryanne Crockett, to the [facility] [place]. Please see a copy of my visit note below.    Hand Surgery History & Physical    REFERRING PHYSICIAN: Scott Morgan   PRIMARY CARE PHYSICIAN: Senthil Cano     Chief Complaint:   Consult (Bilateral CTS , Hx of CRPS , 8/5/2013 right ganglion cyst excision )    History of Present Illness:     Maryanne Crockett is a 41 year old right-hand dominant female who presents for evaluation of right hand pain and weakness.  The patient states that in 2013 she had surgical excision of a volar wrist ganglion cyst which was complicated by complex regional pain syndrome.  She states she underwent several years of therapy and treatment for her CRPS.  This included desensitization, steroid injections, Botox injections.  She states that over the past several years she has had weakness, hypersensitivity, and diffuse pain in the right wrist.  She has thus needed to use her nondominant left hand more often, which has caused some intermittent discomfort.  Patient also states that she has numbness and tingling in the hand.  This is nonlocalizing on the right side, however in the left side this is in the thumb index and middle finger and will occasionally wake her up at night.  Patient had an EMG obtained in 2015 which was normal per chart review.  Patient is followed by neurology as well as pain management.  She has been taking gabapentin for her CRPS, which she feels does not help much.    Patient has a complex medical history including multiple cerebral aneurysms.  Patient states that she while her symptoms have been present for many years, she thought that she would always die young due to her aneurysms.  She was recently told that she likely has a good prognosis and \"has half of her life left to live\".  This made her want to seek " treatment for her hand hypersensitivity, pain, and weakness.    Patient states that due to the pain, weakness, and hypersensitivity, she is unable to hold any items in her hand.  She uses her forearm to support items.  Cold air, air a hair dryer, or light touch cause significant pain to the hand.  This occasionally radiates up into the forearm.  Today she points to the base of her thumb as area of pain.  Gripping items any range of motion cause pain in this area.    Past Medical History:     Past Medical History:   Diagnosis Date     Bipolar I disorder (H)      Cerebral aneurysm, nonruptured      Family history of glioblastoma     screening MRIs every 2 years     Ovarian cyst      RSD (reflex sympathetic dystrophy)      Seizures (H)        Past Surgical History:     Past Surgical History:   Procedure Laterality Date     CHOLECYSTECTOMY       CYSTOSCOPY N/A 09/01/2020    Procedure: CYSTOSCOPY;  Surgeon: Hayley Zayas MD;  [facility]     DAVINCI HYSTERECTOMY TOTAL, BILATERAL SALPINGO-OOPHORECTOMY, COMBINED Left 09/01/2020    Procedure: HYSTERECTOMY, TOTAL, ROBOT-ASSISTED, LAPAROSCOPIC,  left oophorectomy;  Surgeon: Hayley Zayas MD;  [facility]     DILATION AND CURETTAGE SUCTION  04/30/2015    retained POC     GASTRIC RESTRICTIVE PROCEDURE, OPEN, REMOVE/REPLACE SUBCUTANEOUS PORT       IR CAROTID CEREBRAL ANGIOGRAM BILATERAL  03/19/2021     IR CAROTID CEREBRAL ANGIOGRAM BILATERAL  06/23/2021     LAPAROSCOPIC OOPHORECTOMY Right 08/05/2016    Procedure: LAPAROSCOPIC OOPHORECTOMY;  Surgeon: Adrianne Vergara MD;  [facility]     LAPAROSCOPIC REMOVAL GASTRIC ADJUSTABLE BAND N/A 05/11/2021    Procedure: REMOVAL, GASTRIC BAND, ADJUSTABLE, LAPAROSCOPIC;  Surgeon: Remy Gold MD;  [facility]     LAPAROSCOPIC SALPINGECTOMY Bilateral 08/05/2016    Procedure: LAPAROSCOPIC SALPINGECTOMY;  Surgeon: Adrianne Vergara MD;  [facility]     LAPAROSCOPIC TUBAL LIGATION Bilateral  05/22/2015    Procedure: LAPAROSCOPIC TUBAL LIGATION;  Surgeon: Hayley Zayas MD;  [facility]     LAPAROSCOPY OPERATIVE ADULT N/A 08/05/2016    Procedure: LAPAROSCOPY OPERATIVE ADULT;  Surgeon: Adrianne Vergara MD;  [facility]     PANNICULECTOMY  04/22/2017    [facility]     SOFT TISSUE SURGERY Right     cyst in hand       Social History:     Social History     Tobacco Use     Smoking status: Current Some Day Smoker     Packs/day: 0.25     Smokeless tobacco: Never Used     Tobacco comment: Trying   Substance Use Topics     Alcohol use: Not Currently     Comment: rare       Family History:     Family History   Problem Relation Age of Onset     Coronary Artery Disease Mother 50        stent     Leukemia Father      Deep Vein Thrombosis (DVT) Father      Brain Cancer Brother 36        glioblastoma     Deep Vein Thrombosis (DVT) Brother      Cerebrovascular Disease Brother 47        stroke secondary to DVT     Deep Vein Thrombosis (DVT) Brother      Cerebral aneurysm Maternal Grandfather      Brain Cancer Maternal Aunt         glioblastoma     Brain Cancer Maternal Aunt         glioblastoma     Breast Cancer No family hx of      Anesthesia Reaction No family hx of      Ovarian Cancer No family hx of      Colon Cancer No family hx of        Allergies:     Allergies   Allergen Reactions     Ibuprofen GI Disturbance     Seroquel [Quetiapine] Other (See Comments) and Rash     Insomnia and rash       Medications:     Current Outpatient Medications   Medication     acetaminophen-codeine (TYLENOL #3) 300-30 MG tablet     ALPRAZolam (XANAX) 1 MG tablet     amphetamine-dextroamphetamine (ADDERALL) 20 MG tablet     aspirin (ASA) 325 MG tablet     buPROPion (WELLBUTRIN XL) 150 MG 24 hr tablet     cloNIDine (CATAPRES) 0.1 MG tablet     clopidogrel (PLAVIX) 75 MG tablet     escitalopram (LEXAPRO) 20 MG tablet     gabapentin (NEURONTIN) 600 MG tablet     lidocaine (XYLOCAINE) 5 % external ointment     medical  cannabis (Patient's own supply)     Multiple Vitamins-Minerals (MULTIVITAMIN ADULT PO)     nicotine (COMMIT) 2 MG lozenge     nicotine (NICODERM CQ) 14 MG/24HR 24 hr patch     nicotine (NICODERM CQ) 7 MG/24HR 24 hr patch     ondansetron (ZOFRAN ODT) 8 MG ODT tab     SAPHRIS 5 MG SUBL sublingual tablet     sertraline (ZOLOFT) 25 MG tablet     VENTOLIN  (90 BASE) MCG/ACT inhaler     vitamin B-12 (CYANOCOBALAMIN) 1000 MCG tablet     vitamin C (ASCORBIC ACID) 500 MG tablet     No current facility-administered medications for this visit.        Review of Systems:     A 10 point ROS was performed and reviewed. Specific responses to these questions are noted at the end of the document.    Physical Exam:   Physical Exam Adult:  General: Well-nourished, alert, cooperative with exam and in no acute distress  HEENT: Atraumatic, normocephalic, extraocular movements intact, moist mucous membranes, trachea midline  Pulmonary: Unlabored work of breathing, on room air  Cardiovascular: Warm and well-perfused extremities. 2+ radial pulses  Skin: Warm, intact without rashes to the upper extremities, head or neck   Gait: Normal  Neuro/psych: Oriented to time, place and person. Affect is appropriate    Musculoskeletal: A focused physical examination of the right upper extremity reveals:   Inspection -no obvious deformity.  No ecchymosis or edema.  No hyperemia.  No atrophy of thenar's or intrinsics.  Palpation - Tender to palpation at the base of the thumb CMC, scaphoid tubercle, snuffbox.  No pain at DRUJ, no DRUJ instability, no pain with palpation at the fovea, Pisa form, SL interval, metacarpals, remaining hand.  Neurovascular- intact light touch sensation and motor to distribution of the median, ulnar and radial nerves.  Two-point discrimination throughout the median ulnar nerve distributions bilaterally 2-3 mm, symmetric.  Brisk capillary refill to the distal fingers. 2+ radial pulse.   Range of Motion: Pain with end  extension of fingers.  Tenderness of the wrist.  Pain with CMC grind.  Pain with end flexion and extension.    Muscle strength and tone: 4/5 strength EPL, FPL, APB, first dorsal interosseous.             Negative Moe's.  Negative ulnar impaction.  Negative Finkelstein's.  Negative Tinel's at the carpal tunnel.  Negative Phalen's.    Imaging:   3 view X-ray of the right thumb was independently interpreted by me. The results were discussed with the patient.  Findings include:No acute osseous abnormalities.  No fractures, dislocations, significant degenerative changes.  No evidence of scaphoid fracture seen on x-ray today.    Assessment and Plan:   Assessment:  41 year old female with 9-year history of right wrist and hand pain, hypersensitivity, numbness and tingling.  History of right volar wrist ganglion cyst excision complicated by CRPS.  Negative EMG in 2015.  Unclear ideology of hand and wrist pain on exam today.  Left-sided nighttime numbness and tingling.     Plan: Recommended hand therapy for range of motion, strengthening, and working on hypersensitivity.  Recommended paraffin bath, which the patient states she has at home.  Recommended anti-inflammatories.  Would recommend repeating EMG of the bilateral upper extremities to evaluate for neuropathy.  Patient should follow-up following EMG to discuss results.  Recommended initiation of vitamin C for pain associated with CRPS.  Continue gabapentin as previously prescribed.    All questions were answered and the patient was in agreement with the plan.     Radiographs and plan discussed with Dr. Jonah Spears who is in agreement with the plan.     Naz Cantu PA-C   Orthopedic Surgery  11/22/2021 4:37 PM      Attestation signed by Jonah Spears MD at 11/22/2021  7:59 PM:  Maryanne Crockett was seen and evaluated today as part of a shared visit. I reviewed today's physical exam and x-rays. My key exam findings include normal radiographs with exam limited by pain,  stiffness of multiple sites of her wrist.  Possible mild de Quervain's, versus sequela from prior CRPS.  Key management decisions made by me and carried out under my direction include hand therapy, and new bilateral EMG/NCS to evaluate for carpal tunnel, cubital tunnel and peripheral neuropathy.    Jonah Spears MD    Hand, Upper Extremity & Microvascular Surgery  Department of Orthopedic Surgery  [facility]

## 2021-11-22 NOTE — NURSING NOTE
Reason For Visit:   Chief Complaint   Patient presents with     Consult     Bilateral CTS , Hx of CRPS , 8/5/2013 right ganglion cyst excision        Primary MD: Senthil Cano.  Ref. MD:      Age: 41 year old    ?  No      LMP 03/14/2018 (Approximate)       Pain Assessment  Patient Currently in Pain: Yes  0-10 Pain Scale: 4  Primary Pain Location: Hand    Hand Dominance Evaluation  Hand Dominance: Right          QuickDASH Assessment  QuickDASH Main 11/12/2013   1. Open a tight or new jar. 5   2. Do heavy household chores (e.g., wash walls, floors). 4   3. Carry a shopping bag or briefcase. 5   4. Wash your back. 4   5. Use a knife to cut food. 4   6. Recreational activities in which you take some force or impact through your arm, shoulder or hand (e.g., golf, hammering, tennis, etc.). 5   7. During the past week, to what extent has your arm, shoulder or hand problem interfered with your normal social activities with family, friends, neighbours or groups? 5   8. During the past week, were you limited in your work or other regular daily activities as a result of your arm, shoulder or hand problem? 4   9. Arm, shoulder or hand pain. 4   10. Tingling (pins and needles) in your arm,shoulder or hand. 5   11. During the past week, how much difficulty have you had sleeping because of the pain in your arm, shoulder or hand? ([place] number) 4   SUM: 49          Current Outpatient Medications   Medication Sig Dispense Refill     acetaminophen-codeine (TYLENOL #3) 300-30 MG tablet Take 1 tablet by mouth every 6 hours as needed for severe pain       ALPRAZolam (XANAX) 1 MG tablet Take 2 mg by mouth 3 times daily as needed        amphetamine-dextroamphetamine (ADDERALL) 20 MG tablet Take 20 mg by mouth 2 times daily        aspirin (ASA) 325 MG tablet Take 1 tablet (325 mg) by mouth daily 90 tablet 3     buPROPion (WELLBUTRIN XL) 150 MG 24 hr tablet Take 150 mg by mouth every morning       cloNIDine (CATAPRES)  0.1 MG tablet Take 1 tablet by mouth every morning        clopidogrel (PLAVIX) 75 MG tablet Take 1 tablet (75 mg) by mouth daily 30 tablet 4     escitalopram (LEXAPRO) 20 MG tablet Take 20 mg by mouth every morning        gabapentin (NEURONTIN) 600 MG tablet Take 0.5 tablets (300 mg) by mouth 3 times daily 90 tablet 0     lidocaine (XYLOCAINE) 5 % external ointment Apply topically 3 times daily 30 g 1     medical cannabis (Patient's own supply) See Admin Instructions (The purpose of this order is to document that the patient reports taking medical cannabis.  This is not a prescription, and is not used to certify that the patient has a qualifying medical condition.)        Multiple Vitamins-Minerals (MULTIVITAMIN ADULT PO) Take 1 tablet by mouth every morning        nicotine (COMMIT) 2 MG lozenge Place 1 lozenge (2 mg) inside cheek every 2 hours as needed for smoking cessation 108 lozenge 11     nicotine (NICODERM CQ) 14 MG/24HR 24 hr patch Place 1 patch onto the skin every 24 hours 42 patch 0     nicotine (NICODERM CQ) 7 MG/24HR 24 hr patch Place 1 patch onto the skin every 24 hours . Start after finishing 14mg patch. 42 patch 3     ondansetron (ZOFRAN ODT) 8 MG ODT tab Take 1 tablet (8 mg) by mouth every 8 hours as needed for nausea 12 tablet 0     SAPHRIS 5 MG SUBL sublingual tablet Place 10 mg under the tongue 2 times daily        sertraline (ZOLOFT) 25 MG tablet Take 25 mg by mouth daily       VENTOLIN  (90 BASE) MCG/ACT inhaler Inhale 1-2 puffs into the lungs every 6 hours as needed for shortness of breath / dyspnea or wheezing   5     vitamin B-12 (CYANOCOBALAMIN) 1000 MCG tablet Take 1,000 mcg by mouth every morning          Allergies   Allergen Reactions     Ibuprofen GI Disturbance     Seroquel [Quetiapine] Other (See Comments) and Rash     Insomnia and rash       Renetta Yu, ATC

## 2021-11-29 NOTE — TELEPHONE ENCOUNTER
FUTURE VISIT INFORMATION      SURGERY INFORMATION:    ADJ GASTRIC BAND REMOVAL 21    RECORDS REQUESTED FROM:       [facility]    Most recent EKG+ Tracin21    
60.8

## 2021-12-06 ENCOUNTER — LAB (OUTPATIENT)
Dept: LAB | Facility: CLINIC | Age: 41
End: 2021-12-06
Payer: COMMERCIAL

## 2021-12-06 DIAGNOSIS — Z11.59 ENCOUNTER FOR SCREENING FOR OTHER VIRAL DISEASES: ICD-10-CM

## 2021-12-06 PROCEDURE — U0005 INFEC AGEN DETEC AMPLI PROBE: HCPCS

## 2021-12-06 PROCEDURE — U0003 INFECTIOUS AGENT DETECTION BY NUCLEIC ACID (DNA OR RNA); SEVERE ACUTE RESPIRATORY SYNDROME CORONAVIRUS 2 (SARS-COV-2) (CORONAVIRUS DISEASE [COVID-19]), AMPLIFIED PROBE TECHNIQUE, MAKING USE OF HIGH THROUGHPUT TECHNOLOGIES AS DESCRIBED BY CMS-2020-01-R: HCPCS

## 2021-12-07 ENCOUNTER — TELEPHONE (OUTPATIENT)
Dept: LAB | Facility: CLINIC | Age: 41
End: 2021-12-07
Payer: COMMERCIAL

## 2021-12-07 LAB — SARS-COV-2 RNA RESP QL NAA+PROBE: POSITIVE

## 2021-12-08 NOTE — TELEPHONE ENCOUNTER
"-Coronavirus (COVID-19) Notification    Caller Name (Patient, parent, daughter/son, grandparent, etc)  The patient       Reason for call  Notify of Positive Coronavirus (COVID-19) lab results, assess symptoms,  review  [employer] [place] recommendations    Lab Result    Lab test:  2019-nCoV rRt-PCR or SARS-CoV-2 PCR    Oropharyngeal AND/OR nasopharyngeal swabs is POSITIVE for 2019-nCoV RNA/SARS-COV-2 PCR (COVID-19 virus)    RN Recommendations/Instructions per [facility] Coronavirus COVID-19 recommendations    Brief introduction script  Introduce self then review script:  \"I am calling on behalf of [employer].  We were notified that your Coronavirus test (COVID-19) for was POSITIVE for the virus.  I have some information to relay to you but first I wanted to mention that the [place] Dept of Health will be contacting you shortly [it's possible MD already called Patient] to talk to you more about how you are feeling and other people you have had contact with who might now also have the virus.  Also, [facility] is Partnering with the [facility] for Covid-19 research, you may be contacted directly by research staff.\"    Assessment (Inquire about Patient's current symptoms)   Assessment   Current Symptoms at time of phone call: (if no symptoms, document No symptoms] Fatigued    Symptoms onset (if applicable) 11/25/21     If at time of call, Patients symptoms hare worsened, the Patient should contact 911 or have someone drive them to Emergency Dept promptly:      If Patient calling 911, inform 911 personal that you have tested positive for the Coronavirus (COVID-19).  Place mask on and await 911 to arrive.    If Emergency Dept, If possible, please have another adult drive you to the Emergency Dept but you need to wear mask when in contact with other people.      Monoclonal Antibody Administration    You may be eligible to receive a new treatment with a monoclonal antibody for preventing hospitalization " "in patients at high risk for complications from COVID-19.   This medication is still experimental and available on a limited basis; it is given through an IV and must be given at an infusion center. Please note that not all people who are eligible will receive the medication since it is in limited supply.     Are you interested in being considered for this medication?  No.   Does the patient fit the criteria: No    If patient qualifies based on above criteria:  \"You will be contacted if you are selected to receive this treatment in the next 1-2 business days.   This is time sensitive and if you are not selected in the next 1-2 business days, you will not receive the medication.  If you do not receive a call to schedule, you have not been selected.\"      Review information with Patient    Your result was positive. This means you have COVID-19 (coronavirus).  We have sent you a letter that reviews the information that I'll be reviewing with you now.    How can I protect others?    If you have symptoms: stay home and away from others (self-isolate) until:    You've had no fever--and no medicine that reduces fever--for 1 full day (24 hours). And       Your other symptoms have gotten better. For example, your cough or breathing has improved. And     At least 10 days have passed since your symptoms started. (If you've been told by a doctor that you have a weak immune system, wait 20 days.)     If you don't have symptoms: Stay home and away from others (self-isolate) until at least 10 days have passed since your first positive COVID-19 test. (Date test collected)    During this time:    Stay in your own room, including for meals. Use your own bathroom if you can.    Stay away from others in your home. No hugging, kissing or shaking hands. No visitors.     Don't go to work, school or anywhere else.     Clean  high touch  surfaces often (doorknobs, counters, handles, etc.). Use a household cleaning spray or wipes. You'll " find a full list on the EPA website at www.epa.gov/pesticide-registration/list-n-disinfectants-use-against-sars-cov-2.     Cover your mouth and nose with a mask, tissue or other face covering to avoid spreading germs.    Wash your hands and face often with soap and water.    Make a list of people you have been in close contact with recently, even if either of you wore a face covering.   ; Start your list from 2 days before you became ill or had a positive test.  ; Include anyone that was within 6 feet of you for a cumulative total of 15 minutes or more in 24 hours. (Example: if you sat next to Tony for 5 minutes in the morning and 10 minutes in the afternoon, then you were in close contact for 15 minutes total that day. Tony would be added to your list.)    A public health worker will call or text you. It is important that you answer. They will ask you questions about possible exposures to COVID-19, such as people you have been in direct contact with and places you have visited.    Tell the people on your list that you have COVID-19; they should stay away from others for 14 days starting from the last time they were in contact with you (unless you are told something different from a public health worker).     Caregivers in these groups are at risk for severe illness due to COVID-19:  o People 65 years and older  o People who live in a nursing home or long-term care facility  o People with chronic disease (lung, heart, cancer, diabetes, kidney, liver, immunologic)  o People who have a weakened immune system, including those who:  - Are in cancer treatment  - Take medicine that weakens the immune system, such as corticosteroids  - Had a bone marrow or organ transplant  - Have an immune deficiency  - Have poorly controlled HIV or AIDS  - Are obese (body mass index of 40 or higher)  - Smoke regularly    Caregivers should wear gloves while washing dishes, handling laundry and cleaning bedrooms and bathrooms.    Wash and  dry laundry with special caution. Don't shake dirty laundry, and use the warmest water setting you can.    If you have a weakened immune system, ask your doctor about other actions you should take.    For more tips, go to www.cdc.gov/coronavirus/2019-ncov/downloads/10Things.pdf.    You should not go back to work until you meet the guidelines above for ending your home isolation. You don't need to be retested for COVID-19 before going back to work--studies show that you won't spread the virus if it's been at least 10 days since your symptoms started (or 20 days, if you have a weak immune system).    Employers: This document serves as formal notice of your employee's medical guidelines for going back to work. They must meet the above guidelines before going back to work in person.    How can I take care of myself?    1. Get lots of rest. Drink extra fluids (unless a doctor has told you not to).    2. Take Tylenol (acetaminophen) for fever or pain. If you have liver or kidney problems, ask your family doctor if it's okay to take Tylenol.     Take either:     650 mg (two 325 mg pills) every 4 to 6 hours, or     1,000 mg (two 500 mg pills) every 8 hours as needed.     Note: Don't take more than 3,000 mg in one day. Acetaminophen is found in many medicines (both prescribed and over-the-counter medicines). Read all labels to be sure you don't take too much.    For children, check the Tylenol bottle for the right dose (based on their age or weight).    3. If you have other health problems (like cancer, heart failure, an organ transplant or severe kidney disease): Call your specialty clinic if you don't feel better in the next 2 days.    4. Know when to call 911: Emergency warning signs include:    Trouble breathing or shortness of breath    Pain or pressure in the chest that doesn't go away    Feeling confused like you haven't felt before, or not being able to wake up    Bluish-colored lips or face    5. Sign up for [facility]  Christina. We know it's scary to hear that you have COVID-19. We want to track your symptoms to make sure you're okay over the next 2 weeks. Please look for an email from [employer]--this is a free, online program that we'll use to keep in touch. To sign up, follow the link in the email. Learn more at [email or web address].    Where can I get more information?    [facility]: [email or web address]    Coronavirus Basics: www.health.[email or web address].[place]./diseases/coronavirus/basics.html    What to Do If You're Sick: www.cdc.gov/coronavirus/2019-ncov/about/steps-when-sick.html    Ending Home Isolation: www.cdc.gov/coronavirus/2019-ncov/hcp/disposition-in-home-patients.html     Caring for Someone with COVID-19: www.cdc.gov/coronavirus/2019-ncov/if-you-are-sick/care-for-someone.html     [facility] clinical trials (COVID-19 research studies): [email or web address].[email or web address]-clinical-trials     A Positive COVID-19 letter will be sent via [employer] or the mail. (Exception, no letters sent to Presurgerical/Preprocedure Patients)    Marilyn Baker LPN

## 2021-12-08 NOTE — TELEPHONE ENCOUNTER
Attempted to contact pt as she has 1 proc showing scheduled tomorrow. LM to cb if she has questions.

## 2021-12-22 ENCOUNTER — MYC MEDICAL ADVICE (OUTPATIENT)
Dept: FAMILY MEDICINE | Facility: CLINIC | Age: 41
End: 2021-12-22

## 2021-12-22 ENCOUNTER — VIRTUAL VISIT (OUTPATIENT)
Dept: FAMILY MEDICINE | Facility: CLINIC | Age: 41
End: 2021-12-22
Payer: COMMERCIAL

## 2021-12-22 ENCOUNTER — OFFICE VISIT (OUTPATIENT)
Dept: NEUROLOGY | Facility: CLINIC | Age: 41
End: 2021-12-22
Attending: STUDENT IN AN ORGANIZED HEALTH CARE EDUCATION/TRAINING PROGRAM
Payer: COMMERCIAL

## 2021-12-22 VITALS — BODY MASS INDEX: 35.55 KG/M2 | WEIGHT: 240 LBS | HEIGHT: 69 IN

## 2021-12-22 DIAGNOSIS — M79.642 BILATERAL HAND PAIN: ICD-10-CM

## 2021-12-22 DIAGNOSIS — M54.50 LOW BACK PAIN, UNSPECIFIED BACK PAIN LATERALITY, UNSPECIFIED CHRONICITY, UNSPECIFIED WHETHER SCIATICA PRESENT: Primary | ICD-10-CM

## 2021-12-22 DIAGNOSIS — M54.50 LOW BACK PAIN, UNSPECIFIED BACK PAIN LATERALITY, UNSPECIFIED CHRONICITY, UNSPECIFIED WHETHER SCIATICA PRESENT: ICD-10-CM

## 2021-12-22 DIAGNOSIS — M79.641 BILATERAL HAND PAIN: ICD-10-CM

## 2021-12-22 PROCEDURE — 95911 NRV CNDJ TEST 9-10 STUDIES: CPT | Performed by: PSYCHIATRY & NEUROLOGY

## 2021-12-22 PROCEDURE — 95886 MUSC TEST DONE W/N TEST COMP: CPT | Performed by: PSYCHIATRY & NEUROLOGY

## 2021-12-22 RX ORDER — CYCLOBENZAPRINE HCL 5 MG
5 TABLET ORAL 3 TIMES DAILY PRN
Qty: 12 TABLET | Refills: 0 | Status: SHIPPED | OUTPATIENT
Start: 2021-12-22 | End: 2021-12-30

## 2021-12-22 ASSESSMENT — PATIENT HEALTH QUESTIONNAIRE - PHQ9
SUM OF ALL RESPONSES TO PHQ QUESTIONS 1-9: 9
SUM OF ALL RESPONSES TO PHQ QUESTIONS 1-9: 9
10. IF YOU CHECKED OFF ANY PROBLEMS, HOW DIFFICULT HAVE THESE PROBLEMS MADE IT FOR YOU TO DO YOUR WORK, TAKE CARE OF THINGS AT HOME, OR GET ALONG WITH OTHER PEOPLE: SOMEWHAT DIFFICULT

## 2021-12-22 ASSESSMENT — ANXIETY QUESTIONNAIRES
4. TROUBLE RELAXING: MORE THAN HALF THE DAYS
1. FEELING NERVOUS, ANXIOUS, OR ON EDGE: SEVERAL DAYS
6. BECOMING EASILY ANNOYED OR IRRITABLE: NEARLY EVERY DAY
GAD7 TOTAL SCORE: 13
GAD7 TOTAL SCORE: 13
7. FEELING AFRAID AS IF SOMETHING AWFUL MIGHT HAPPEN: MORE THAN HALF THE DAYS
2. NOT BEING ABLE TO STOP OR CONTROL WORRYING: NEARLY EVERY DAY
GAD7 TOTAL SCORE: 13
5. BEING SO RESTLESS THAT IT IS HARD TO SIT STILL: SEVERAL DAYS
7. FEELING AFRAID AS IF SOMETHING AWFUL MIGHT HAPPEN: MORE THAN HALF THE DAYS
3. WORRYING TOO MUCH ABOUT DIFFERENT THINGS: SEVERAL DAYS

## 2021-12-22 ASSESSMENT — MIFFLIN-ST. JEOR: SCORE: 1818.01

## 2021-12-22 NOTE — PROGRESS NOTES
[facility]  Electrodiagnostic Laboratory                 Department of Neurology                                                                                                         Test Date:  2021    Patient: Maryanne Crockett : 1980 Physician: Naz Burger MD   Sex: Female AGE: 41 year Ref Phys: Dr. Spears   ID#: [id number]   Technician: Kristy Behling     Clinical Information:  Maryanne Crockett is referred by Dr. Spears for EMG of the bilateral upper extremities. 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     Techniques:  Motor and sensory conduction studies were done with surface recording electrodes. EMG was done with a concentric needle electrode.     Results:  Motor Nerve Conduction Studies:  Left median motor study recording over APB reveals a prolonged distal latency, normal amplitude and conduction velocity. Right median motor study recording over APB is within normal limits. Left ulnar study recorded over FDI is normal.Ulnar motor studies recording over ADM are within normal limits bilaterally. Lumbrical studies reveal a left sided prolonged distal latency with median nerve stimulation.    Sensory Nerve Conduction Studies:   Left median antidromic study reveals prolonged peak latency. Right antidromic median study is within normal limits. Palmar studies on the right is normal on the left there is no response with ulnar nerve study. The median palmar study is low amplitude. Left ulnar antidromic sensory is low amplitude and slowed conduction velocity. Right side antidromic ulnar nerve study is within normal limits. Left radial sensory study is within normal limits.       All examined muscles (as indicated in the following table) showed no evidence of electrical instability.        Interpretation:  The EMG is abnormal. The findings are consistent with :  1. Left sided median neuropathy at the wrist that is  moderate in severity. This may clinically correlate with carpal tunnel syndrome.  2. Left mild ulnar nerve injury localizing to the wrist.     ___________________________  Naz Burger MD        Nerve Conduction Studies  Motor Sites      Latency Amplitude Neg. Amp Diff Segment Distance Velocity Neg. Dur Neg Area Diff Temperature   Site (ms) Norm (mV) Norm %  cm m/s Norm ms %  C   Left Median (APB) Motor   Wrist 4.8  < 4.2 9.3  > 5.0  Wrist-APB 8   5.3  30.6   Elbow 8.5 - 9.4 - 1.08 Elbow-Wrist 20 54  > 48 5.3 2.4 30.6   Right Median (APB) Motor   Wrist 3.8  < 4.2 6.5  > 5.0  Wrist-APB 8   5.0  31.6   Elbow 8.1 - 6.6 - 1.54 Elbow-Wrist 26 60  > 48 5.1 -1.09 31.7   Left Median/Ulnar (Lumb-Dors Int II) Motor        Median (Lumb I)   Wrist 3.7 - 1.95 -      4.9  30.7        Ulnar (Dorsal Int (manus))   Wrist 3.1 - 2.2 -      4.7  30.7   Right Median/Ulnar (Lumb-Dors Int II) Motor        Median (Lumb I)   Wrist 3.1 - 2.3 -      5.8  31        Ulnar (Dorsal Int (manus))   Wrist 3.2 - 4.4 -      4.2  31.1   Left Ulnar (ADM) Motor   Wrist 3.2  < 3.5 7.9  > 3.0  Wrist-ADM 8   5.4  30.6   Bel Elbow 6.5 - 7.7 - -2.5 Bel Elbow-Wrist 21 64  > 48 5.7 0.88 30.6   Abv Elbow 8.3 - 6.4 - -16.9 Abv Elbow-Bel Elbow 10 56  > 48 5.7 -9.6 30.6   Right Ulnar (ADM) Motor   Wrist 2.7  < 3.5 7.6  > 3.0  Wrist-ADM 8   5.2  31.7   Bel Elbow 6.9 - 8.0 - 5.3 Bel Elbow-Wrist 24 57  > 48 5.1 -6.7 31.7   Abv Elbow 8.7 - 7.6 - -5.0 Abv Elbow-Bel Elbow 10 56  > 48 5.0 -3.1 31.7   Left Ulnar (FDI) Motor   Wrist 4.0 - 13.8 -      4.8  30.5   Bel Elbow 7.1 - 13.2 - -4.3 Bel Elbow-Wrist 21 68  > 48 4.9 -4.5 30.5   Abv Elbow 8.9 - 11.9 - -9.8 Abv Elbow-Bel Elbow 10 56  > 48 4.8 -9.2 30.5     Sensory Sites      Onset Lat Peak Lat Amp (O-P) Amp (P-P) Segment Distance Velocity Temperature   Site ms ms  V Norm  V  cm m/s Norm  C   Left Median Sensory   Wrist-Dig II 2.9 3.9 29  > 10 40 Wrist-Dig II 14 48  > 48 31.4   Right Median Sensory   Wrist-Dig II 2.7 3.5  32  > 10 38 Wrist-Dig II 14 52  > 48 31.7   Left Median-Ulnar Palmar Sensory        Median   Palm-Wrist 1.93 2.5 44 - 47 Palm-Wrist 8 41 - 30.7        Ulnar   Palm-Wrist NR NR NR - NR Palm-Wrist 8 NR - 30.7   Right Median-Ulnar Palmar Sensory        Median   Palm-Wrist 1.65 2.2 88 - 120 Palm-Wrist 8 48 - 31.3        Ulnar   Palm-Wrist 1.30 1.83 11 - 12 Palm-Wrist - - - 31.5   Left Radial Sensory   Forearm-Wrist 1.98 2.5 31  > 15 39 Forearm-Wrist 10 51 - 30.6   Left Ulnar Sensory   Wrist-Dig V 2.3 3.1 5  > 8 8 Wrist-Dig V 12.5 54  > 48 31   Right Ulnar Sensory   Wrist-Dig V 2.1 2.7 26  > 8 36 Wrist-Dig V 12.5 60  > 48 31.8         Electromyography     Side Muscle Ins Act Fibs/PSW Fasc HF Amp Dur Poly Recrt Int Pat   Left Biceps Nml None Nml 0 Nml Nml 0 Nml Nml   Left Triceps Nml None Nml 0 Nml Nml 0 Nml Nml   Left Pronator Teres Nml None Nml 0 Nml Nml 0 Nml Nml   Left FDI Nml None Nml 0 Nml Nml 0 Nml Nml   Left APB Nml None Nml 0 Nml Nml 0 Nml Nml   Right Biceps Nml None Nml 0 Nml Nml 0 Nml Nml   Right Triceps Nml None Nml 0 Nml Nml 0 Nml Nml   Right Pronator Teres Nml None Nml 0 Nml Nml 0 Nml Nml   Right FDI Nml None Nml 0 Nml Nml 0 Nml Nml   Right APB Nml None Nml 0 Nml Nml 0 Nml Nml         NCS Waveforms:    Motor                         Sensory                           Ultrasound Images:

## 2021-12-22 NOTE — PROGRESS NOTES
Maryanne is a 41 year old who is being evaluated via a billable telephone visit.      What phone number would you like to be contacted at? [phone]  How would you like to obtain your AVS? Danial Amaya   Maryanne is a 41 year old who presents for a telephone visit.  I have never met her.  She has been seen previously at our clinic by Dr. Mera.  She is calling in and reporting back pain and requesting a refill of cyclobenzaprine.  Reports to have had back pain for about a week.  She took a tablet of cyclobenzaprine and that made her feel better.  Denies any change in bowel or bladder.  Denies any fever or other symptoms of more systemic illness.    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  No further formal exam able to be performed over the telephone.  Of note, this was scheduled for a video visit but she was unable to have the video conferencing work.    A/low back pain requesting cyclobenzaprine    P/I expressed some hesitation with the medication but she was quite certain that this medication works for her and that she is unable to take NSAIDs or any other medication and that physical therapy would not be helpful.,  12 tablets of 5 mg Cyclobenzaprine were prescribed.  Asked her to follow-up if no improvement.          Phone call duration: 12 minutes  Answers for HPI/ROS submitted by the patient on 12/22/2021  If you checked off any problems, how difficult have these problems made it for you to do your work, take care of things at home, or get along with other people?: Somewhat difficult  PHQ9 TOTAL SCORE: 9  JOSÉ MIGUEL 7 TOTAL SCORE: 13

## 2021-12-22 NOTE — LETTER
2021       RE: Maryanne Crockett  [address]  [place] [facility] [address]     Dear Colleague,    Thank you for referring your patient, Maryanne Crockett, to the [facility] EMG CLINIC [place] at [facility]. Please see a copy of my visit note below.                        [facility]  Electrodiagnostic Laboratory                 Department of Neurology                                                                                                         Test Date:  2021    Patient: Maryanne Crockett : 1980 Physician: Naz Burger MD   Sex: Female AGE: 41 year Ref Phys: Dr. Spears   ID#: [id number]   Technician: Kristy Behling     Clinical Information:  Maryanne Crockett is referred by Dr. Spears for EMG of the bilateral upper extremities. 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     Techniques:  Motor and sensory conduction studies were done with surface recording electrodes. EMG was done with a concentric needle electrode.     Results:  Motor Nerve Conduction Studies:  Left median motor study recording over APB reveals a prolonged distal latency, normal amplitude and conduction velocity. Right median motor study recording over APB is within normal limits. Left ulnar study recorded over FDI is normal.Ulnar motor studies recording over ADM are within normal limits bilaterally. Lumbrical studies reveal a left sided prolonged distal latency with median nerve stimulation.    Sensory Nerve Conduction Studies:   Left median antidromic study reveals prolonged peak latency. Right antidromic median study is within normal limits. Palmar studies on the right is normal on the left there is no response with ulnar nerve study. The median palmar study is low amplitude. Left ulnar antidromic sensory is low amplitude and slowed conduction velocity. Right side antidromic ulnar nerve study is within normal limits.  Left radial sensory study is within normal limits.       All examined muscles (as indicated in the following table) showed no evidence of electrical instability.        Interpretation:  The EMG is abnormal. The findings are consistent with :  1. Left sided median neuropathy at the wrist that is moderate in severity. This may clinically correlate with carpal tunnel syndrome.  2. Left mild ulnar nerve injury localizing to the wrist.     ___________________________  Naz Burger MD        Nerve Conduction Studies  Motor Sites      Latency Amplitude Neg. Amp Diff Segment Distance Velocity Neg. Dur Neg Area Diff Temperature   Site (ms) Norm (mV) Norm %  cm m/s Norm ms %  C   Left Median (APB) Motor   Wrist 4.8  < 4.2 9.3  > 5.0  Wrist-APB 8   5.3  30.6   Elbow 8.5 - 9.4 - 1.08 Elbow-Wrist 20 54  > 48 5.3 2.4 30.6   Right Median (APB) Motor   Wrist 3.8  < 4.2 6.5  > 5.0  Wrist-APB 8   5.0  31.6   Elbow 8.1 - 6.6 - 1.54 Elbow-Wrist 26 60  > 48 5.1 -1.09 31.7   Left Median/Ulnar (Lumb-Dors Int II) Motor        Median (Lumb I)   Wrist 3.7 - 1.95 -      4.9  30.7        Ulnar (Dorsal Int (manus))   Wrist 3.1 - 2.2 -      4.7  30.7   Right Median/Ulnar (Lumb-Dors Int II) Motor        Median (Lumb I)   Wrist 3.1 - 2.3 -      5.8  31        Ulnar (Dorsal Int (manus))   Wrist 3.2 - 4.4 -      4.2  31.1   Left Ulnar (ADM) Motor   Wrist 3.2  < 3.5 7.9  > 3.0  Wrist-ADM 8   5.4  30.6   Bel Elbow 6.5 - 7.7 - -2.5 Bel Elbow-Wrist 21 64  > 48 5.7 0.88 30.6   Abv Elbow 8.3 - 6.4 - -16.9 Abv Elbow-Bel Elbow 10 56  > 48 5.7 -9.6 30.6   Right Ulnar (ADM) Motor   Wrist 2.7  < 3.5 7.6  > 3.0  Wrist-ADM 8   5.2  31.7   Bel Elbow 6.9 - 8.0 - 5.3 Bel Elbow-Wrist 24 57  > 48 5.1 -6.7 31.7   Abv Elbow 8.7 - 7.6 - -5.0 Abv Elbow-Bel Elbow 10 56  > 48 5.0 -3.1 31.7   Left Ulnar (FDI) Motor   Wrist 4.0 - 13.8 -      4.8  30.5   Bel Elbow 7.1 - 13.2 - -4.3 Bel Elbow-Wrist 21 68  > 48 4.9 -4.5 30.5   Abv Elbow 8.9 - 11.9 - -9.8 Abv Elbow-Bel Elbow 10  56  > 48 4.8 -9.2 30.5     Sensory Sites      Onset Lat Peak Lat Amp (O-P) Amp (P-P) Segment Distance Velocity Temperature   Site ms ms  V Norm  V  cm m/s Norm  C   Left Median Sensory   Wrist-Dig II 2.9 3.9 29  > 10 40 Wrist-Dig II 14 48  > 48 31.4   Right Median Sensory   Wrist-Dig II 2.7 3.5 32  > 10 38 Wrist-Dig II 14 52  > 48 31.7   Left Median-Ulnar Palmar Sensory        Median   Palm-Wrist 1.93 2.5 44 - 47 Palm-Wrist 8 41 - 30.7        Ulnar   Palm-Wrist NR NR NR - NR Palm-Wrist 8 NR - 30.7   Right Median-Ulnar Palmar Sensory        Median   Palm-Wrist 1.65 2.2 88 - 120 Palm-Wrist 8 48 - 31.3        Ulnar   Palm-Wrist 1.30 1.83 11 - 12 Palm-Wrist - - - 31.5   Left Radial Sensory   Forearm-Wrist 1.98 2.5 31  > 15 39 Forearm-Wrist 10 51 - 30.6   Left Ulnar Sensory   Wrist-Dig V 2.3 3.1 5  > 8 8 Wrist-Dig V 12.5 54  > 48 31   Right Ulnar Sensory   Wrist-Dig V 2.1 2.7 26  > 8 36 Wrist-Dig V 12.5 60  > 48 31.8         Electromyography     Side Muscle Ins Act Fibs/PSW Fasc HF Amp Dur Poly Recrt Int Pat   Left Biceps Nml None Nml 0 Nml Nml 0 Nml Nml   Left Triceps Nml None Nml 0 Nml Nml 0 Nml Nml   Left Pronator Teres Nml None Nml 0 Nml Nml 0 Nml Nml   Left FDI Nml None Nml 0 Nml Nml 0 Nml Nml   Left APB Nml None Nml 0 Nml Nml 0 Nml Nml   Right Biceps Nml None Nml 0 Nml Nml 0 Nml Nml   Right Triceps Nml None Nml 0 Nml Nml 0 Nml Nml   Right Pronator Teres Nml None Nml 0 Nml Nml 0 Nml Nml   Right FDI Nml None Nml 0 Nml Nml 0 Nml Nml   Right APB Nml None Nml 0 Nml Nml 0 Nml Nml         NCS Waveforms:    Motor                         Sensory                           Ultrasound Images:          Naz Burger MD

## 2021-12-22 NOTE — Clinical Note
EMG shows left sided median neuropathy moderately severe and a less severe left ulnar neuropathy. No cervical radiculopathy

## 2021-12-23 ASSESSMENT — PATIENT HEALTH QUESTIONNAIRE - PHQ9: SUM OF ALL RESPONSES TO PHQ QUESTIONS 1-9: 9

## 2021-12-23 ASSESSMENT — ANXIETY QUESTIONNAIRES: GAD7 TOTAL SCORE: 13

## 2021-12-28 ENCOUNTER — MYC MEDICAL ADVICE (OUTPATIENT)
Dept: NEUROLOGY | Facility: CLINIC | Age: 41
End: 2021-12-28
Payer: COMMERCIAL

## 2021-12-28 ENCOUNTER — TELEPHONE (OUTPATIENT)
Dept: NEUROLOGY | Facility: CLINIC | Age: 41
End: 2021-12-28
Payer: COMMERCIAL

## 2021-12-28 DIAGNOSIS — I67.1 CEREBRAL ANEURYSM, NONRUPTURED: ICD-10-CM

## 2021-12-28 NOTE — TELEPHONE ENCOUNTER
Contacted patient to reschedule angio due to COVID on 12/6/21.    Informed patient of procedure instructions. Confirmed date and time. Patient verbalized understanding. All questions answered. Patient instructions sent via [employer]. Patient has my contact information and was encouraged to call with questions/concerns. Missy Sandra RN 12/28/2021 4:35 PM

## 2021-12-28 NOTE — PATIENT INSTRUCTIONS
You are scheduled for a Diagnostic Cerebral Angiogram with Dr. Webster on 1/12/22 at 11:00 AM.     Please follow these instructions:    * Given you have tested positive for COVID in the past 90 days you do not need to test prior to your procedure.     * You should arrive at the [facility] at [facility] at 9:30 AM. The address is [address] [place] [address] [address]. Enter at door 2, closest to [address]. The phone number is [phone].     * Do not eat after Midnight; you may drink clear liquids (includes water, Jell-O, clear broth, apple juice or any non-carbonated beverage that you can see through) until 9:00 AM.     * You may take your medications with a sip of water the morning of the procedure.     * You will be discharged the same day. You must have a  home and someone that can stay with you through the night.     PLEASE NOTE our COVID-19 visitors policy: For the protection of our patients and visitors, patients are allowed one consistent visitor, 18 years of age or older, per adult patient in the hospital. Visitors must wear a mask at all times while in the hospital.     All discharge instructions will be given to the  or volunteer. Documentation for the post-operative plan will be given to the patient and . Patients are required to have someone to stay with them for 24 hours after their procedure.    If you have questions regarding your procedure, please contact me at [phone], option [phone].    If you need to cancel, reschedule or have procedure scheduling related questions, please call Jennifer at [phone].    Thank you,  Silvio Sandra, RN, CNRN  Stroke & Endovascular Care Coordinator

## 2021-12-30 DIAGNOSIS — M54.50 LOW BACK PAIN, UNSPECIFIED BACK PAIN LATERALITY, UNSPECIFIED CHRONICITY, UNSPECIFIED WHETHER SCIATICA PRESENT: ICD-10-CM

## 2021-12-30 DIAGNOSIS — G56.03 CARPAL TUNNEL SYNDROME ON BOTH SIDES: ICD-10-CM

## 2021-12-30 RX ORDER — CYCLOBENZAPRINE HCL 5 MG
5 TABLET ORAL 3 TIMES DAILY PRN
Qty: 12 TABLET | Refills: 0 | Status: SHIPPED | OUTPATIENT
Start: 2021-12-30 | End: 2022-01-06

## 2021-12-30 RX ORDER — GABAPENTIN 600 MG/1
TABLET ORAL
Qty: 90 TABLET | Refills: 1 | Status: SHIPPED | OUTPATIENT
Start: 2021-12-30 | End: 2022-01-06

## 2021-12-30 RX ORDER — CYCLOBENZAPRINE HCL 5 MG
TABLET ORAL
Qty: 12 TABLET | Refills: 0 | Status: SHIPPED | OUTPATIENT
Start: 2021-12-30 | End: 2022-01-06

## 2021-12-30 NOTE — TELEPHONE ENCOUNTER
Cyclobenzaprine (Flexeril) 5 mg    Last Office Visit: 12/22/21  [facility] Appointments: None  Medication last refilled: 12/20/21 #12 with 0 refill(s)    Prescription approved per [facility] Refill Protocol.    Whitney Shah, RN, BSN

## 2022-01-03 ENCOUNTER — TELEPHONE (OUTPATIENT)
Dept: ORTHOPEDICS | Facility: CLINIC | Age: 42
End: 2022-01-03

## 2022-01-03 ENCOUNTER — VIRTUAL VISIT (OUTPATIENT)
Dept: ORTHOPEDICS | Facility: CLINIC | Age: 42
End: 2022-01-03
Payer: COMMERCIAL

## 2022-01-03 DIAGNOSIS — M79.642 BILATERAL HAND PAIN: Primary | ICD-10-CM

## 2022-01-03 DIAGNOSIS — M79.641 BILATERAL HAND PAIN: Primary | ICD-10-CM

## 2022-01-03 PROCEDURE — 99442 PR PHYSICIAN TELEPHONE EVALUATION 11-20 MIN: CPT | Mod: 95 | Performed by: STUDENT IN AN ORGANIZED HEALTH CARE EDUCATION/TRAINING PROGRAM

## 2022-01-03 NOTE — TELEPHONE ENCOUNTER
Called patient and changed her appointment to a telephone call. Dr. Spears  Will call to discuss her EMG results.

## 2022-01-03 NOTE — LETTER
1/3/2022         RE: Maryanne Crockett  [address]  [place] [facility] [address]        Dear Colleague,    Thank you for referring your patient, Maryanne Crockett, to the [facility] [place]. Please see a copy of my visit note below.    Maryanne is a 41 year old who is being evaluated via a billable telephone visit.        Subjective   Maryanne is a 41 year old who was last evaluated in my clinic 11/22/21. Please refer to clinic note by Naz Cantu PA-C from that visit. 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 She presented with multiple sites of pain of bilateral wrists and hands. She had possible mild de Quervain's versus sequelae from prior CRPS. We obtained an EMG/NCS performed 12/22/21 by Dr. Burger to evaluate for carpal tunnel, cubital tunnel, and peripheral neuropathy. She presents today for a telephone visit to discuss results as she is not feeling well. Right and left hands remain equally symptomatic without significant change since the visit.      Objective         Vitals:  No vitals were obtained today due to virtual visit.    Physical Exam  General: Alert and oriented times 3  RESP: Mild cough, no audible wheezing, able to talk in full sentences  Remainder of exam unable to be completed due to telephone visits    Results:  EMG/NCS 12/22/21 demonstrates prolonged latency in left Median motor and sensory nerve conduction. Also no response left ulnar sensory nerve conduction int he palm. EMG normal. Right side normal    Assessment and Plan  41 year old female with history of right wrist ganglion cyst excision c/b CRPS 2013 with multiple sites of pain in bilateral hands.    Discussed the results of EMG/NCS and that they aren't entirely consistent with her symptoms or pain.    I requested that she make an in person follow-up visit to repeat and confirm her exam. I discussed that median/ulnar nerve decompression may not entirely  relieve her symptoms. She voiced understanding and agreement.      Phone call duration: 15 minutes    Jonah Spears MD    Hand, Upper Extremity & Microvascular Surgery  Department of Orthopedic Surgery  [facility]

## 2022-01-03 NOTE — PROGRESS NOTES
Maryanne is a 41 year old who is being evaluated via a billable telephone visit.        Subjective   Maryanne is a 41 year old who was last evaluated in my clinic 11/22/21. Please refer to clinic note by Naz Cantu PA-C from that visit. 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 She presented with multiple sites of pain of bilateral wrists and hands. She had possible mild de Quervain's versus sequelae from prior CRPS. We obtained an EMG/NCS performed 12/22/21 by Dr. Burger to evaluate for carpal tunnel, cubital tunnel, and peripheral neuropathy. She presents today for a telephone visit to discuss results as she is not feeling well. Right and left hands remain equally symptomatic without significant change since the visit.      Objective         Vitals:  No vitals were obtained today due to virtual visit.    Physical Exam  General: Alert and oriented times 3  RESP: Mild cough, no audible wheezing, able to talk in full sentences  Remainder of exam unable to be completed due to telephone visits    Results:  EMG/NCS 12/22/21 demonstrates prolonged latency in left Median motor and sensory nerve conduction. Also no response left ulnar sensory nerve conduction int he palm. EMG normal. Right side normal    Assessment and Plan  41 year old female with history of right wrist ganglion cyst excision c/b CRPS 2013 with multiple sites of pain in bilateral hands.    Discussed the results of EMG/NCS and that they aren't entirely consistent with her symptoms or pain.    I requested that she make an in person follow-up visit to repeat and confirm her exam. I discussed that median/ulnar nerve decompression may not entirely relieve her symptoms. She voiced understanding and agreement.      Phone call duration: 15 minutes    Jonah Spears MD    Hand, Upper Extremity & Microvascular Surgery  Department of Orthopedic Surgery  [facility]

## 2022-01-03 NOTE — TELEPHONE ENCOUNTER
[facility] Call Center    Phone Message    May a detailed message be left on voicemail: yes     Reason for Call: Other: Pt is sick, wants to know if her appt today at 9:00am can be changed to a telephone call. Please call regarding this.     Action Taken: Other: [facility]    Travel Screening: Not Applicable

## 2022-01-05 ENCOUNTER — NURSE TRIAGE (OUTPATIENT)
Dept: FAMILY MEDICINE | Facility: CLINIC | Age: 42
End: 2022-01-05
Payer: COMMERCIAL

## 2022-01-05 ENCOUNTER — TELEPHONE (OUTPATIENT)
Dept: INTERVENTIONAL RADIOLOGY/VASCULAR | Facility: CLINIC | Age: 42
End: 2022-01-05
Payer: COMMERCIAL

## 2022-01-05 ENCOUNTER — HOSPITAL ENCOUNTER (EMERGENCY)
Facility: CLINIC | Age: 42
Discharge: HOME | End: 2022-01-05
Attending: EMERGENCY MEDICINE
Payer: COMMERCIAL

## 2022-01-05 VITALS
TEMPERATURE: 98.2 F | HEIGHT: 69 IN | BODY MASS INDEX: 35.55 KG/M2 | DIASTOLIC BLOOD PRESSURE: 85 MMHG | WEIGHT: 240 LBS | OXYGEN SATURATION: 93 % | HEART RATE: 78 BPM | SYSTOLIC BLOOD PRESSURE: 132 MMHG | RESPIRATION RATE: 16 BRPM

## 2022-01-05 ASSESSMENT — MIFFLIN-ST. JEOR: SCORE: 1818.01

## 2022-01-05 NOTE — ED TRIAGE NOTES
Pt BIBA from home for cough.  Pt reports that she had covid one month ago.  She states that cough is worse after smoking and is causing back pain.

## 2022-01-05 NOTE — TELEPHONE ENCOUNTER
"Patient c/o \"hard time breathing\", with coughing \"to the point that I pee myself\". Reports history of asthma and asthma exacerbations. Does not have inhaler, nebulizer and no respiratory in problem list. Says she had COVID early December and \"I was so very sick with it\", but improved. States in last 3 days she has developed cough, wheezing. Can talk in full sentences. Denies fever, chest pain. She took home COVID test today and it was negative.  Unable to get patient seen here at the clinic today, only 1 provider available and appointments booked for in-clinic visits. Advised patient get seen this afternoon, checked for urgent care clinics near her, provided a phone number for her to call to schedule.     Reason for Disposition    [1] Wheezing (high pitched whistling sound) AND [2] previous asthma attacks or use of asthma medicines    [1] MODERATE asthma attack (e.g., SOB at rest, speaks in phrases, audible wheezes) AND [2] not resolved after 2 nebulizer or inhaler treatments given 20 minutes apart    Protocols used: BREATHING DIFFICULTY-A-AH, ASTHMA ATTACK-A-AH    Patient will call to schedule at Urgent care clinic for this afternoon. Advised her to follow up here in the clinic after she gets her exacerbation under control so that she is fully assessed. Call back if having problems getting seen today. Patient verbalized understanding and agrees with this plan.   Fatou Ma, MS RN-BC  01/05/22  12:26 PM      "

## 2022-01-05 NOTE — TELEPHONE ENCOUNTER
Attempted pre-procedural telephone call.     Voice message left including procedure date and time, arrival time, location, NPO status and need for a designated  home.     COVID test ordered; however, not scheduled. Scheduling phone number provided. Patient instructed to receive her COVID test within 4 days of her procedure date.

## 2022-01-06 ENCOUNTER — MYC MEDICAL ADVICE (OUTPATIENT)
Dept: FAMILY MEDICINE | Facility: CLINIC | Age: 42
End: 2022-01-06

## 2022-01-06 ENCOUNTER — VIRTUAL VISIT (OUTPATIENT)
Dept: FAMILY MEDICINE | Facility: CLINIC | Age: 42
End: 2022-01-06
Payer: COMMERCIAL

## 2022-01-06 DIAGNOSIS — Z72.0 TOBACCO ABUSE: ICD-10-CM

## 2022-01-06 DIAGNOSIS — J45.901 MODERATE ASTHMA WITH ACUTE EXACERBATION, UNSPECIFIED WHETHER PERSISTENT: Primary | ICD-10-CM

## 2022-01-06 DIAGNOSIS — M54.50 BILATERAL LOW BACK PAIN WITHOUT SCIATICA, UNSPECIFIED CHRONICITY: Primary | ICD-10-CM

## 2022-01-06 DIAGNOSIS — R11.0 NAUSEA: ICD-10-CM

## 2022-01-06 RX ORDER — CYCLOBENZAPRINE HCL 5 MG
5 TABLET ORAL 3 TIMES DAILY PRN
Qty: 20 TABLET | Refills: 0 | Status: SHIPPED | OUTPATIENT
Start: 2022-01-06 | End: 2022-07-01

## 2022-01-06 RX ORDER — ONDANSETRON 4 MG/1
4 TABLET, ORALLY DISINTEGRATING ORAL EVERY 8 HOURS PRN
Qty: 20 TABLET | Refills: 1 | Status: SHIPPED | OUTPATIENT
Start: 2022-01-06 | End: 2023-01-26

## 2022-01-06 RX ORDER — PREDNISONE 20 MG/1
40 TABLET ORAL DAILY
Qty: 10 TABLET | Refills: 0 | Status: SHIPPED | OUTPATIENT
Start: 2022-01-06 | End: 2022-05-09

## 2022-01-06 NOTE — TELEPHONE ENCOUNTER
Danial correspondence: visit with patient today who neglected to mention worsening low back pain associated with persistent cough. Will caution her of red flag symptoms concerning for nerve issues, but for now will write for med as noted below.     Diagnoses and all orders for this visit:    Bilateral low back pain without sciatica, unspecified chronicity  -     cyclobenzaprine (FLEXERIL) 5 MG tablet; Take 1 tablet (5 mg) by mouth 3 times daily as needed for muscle spasms      Senthil Cano MD  1:59 PM, January 6, 2022

## 2022-01-06 NOTE — PROGRESS NOTES
Maryanne is a 41 year old who is being evaluated via a billable video visit.      How would you like to obtain your AVS? MyChart  If the video visit is dropped, the invitation should be resent by: Send to e-mail at: [email or web address]  Will anyone else be joining your video visit? No    Video Start Time: 11:51 AM    Assessment & Plan   Problem List Items Addressed This Visit     None      Visit Diagnoses     Moderate asthma with acute exacerbation, unspecified whether persistent    -  Primary    Relevant Medications    predniSONE (DELTASONE) 20 MG tablet    mometasone-formoterol (DULERA) 100-5 MCG/ACT inhaler    Nausea        Relevant Medications    ondansetron (ZOFRAN-ODT) 4 MG ODT tab    Tobacco abuse             History and symptoms most consistent with exacerbation as noted above. Will start with steroid burst but will will also transfer patient over to new INDIRA recommendations for management of asthma with LABA/ICS. I do suspect a large contributor to Maryanne's persistent respiratory issues is her continued smoking. She expresses no interest in smoking cessation at this time.     Patient also requests refill of zofran for intermittent nausea which I am comfortable refilling at this time.        Tobacco Cessation:   reports that she has been smoking. She has been smoking about 0.25 packs per day. She has never used smokeless tobacco.  Tobacco Cessation Action Plan: Information offered: Patient not interested at this time      Senthil Cano MD  [facility]    Subjective   Maryanne is a 41 year old who presents for the following health issues    HPI   Possible asthma exacerbation  - per triage, coughing so bad she is leaking urine  - recent history of COVID, early December  - also has noted history of asthma  - per triage note from yesterday patient has no inhaler or nebulizer medications for asthma  - ED visit to [facility] yesterday, reports worsening shortness of breath after smoking, ended up leaving without being  seen  - home COVID 2 days ago was negative    Review of Systems   Constitutional, HEENT, cardiovascular, pulmonary, gi and gu systems are negative, except as otherwise noted.      Objective           Vitals:  No vitals were obtained today due to virtual visit.    Physical Exam   GENERAL: Healthy, alert and no distress  EYES: Eyes grossly normal to inspection.  No discharge or erythema, or obvious scleral/conjunctival abnormalities.  RESP: No audible wheeze, cough, or visible cyanosis.  No visible retractions or increased work of breathing.    SKIN: Visible skin clear. No significant rash, abnormal pigmentation or lesions.  NEURO: Cranial nerves grossly intact.  Mentation and speech appropriate for age.  PSYCH: Mentation appears normal, affect normal/bright, judgement and insight intact, normal speech and appearance well-groomed.      Video-Visit Details    Type of service:  Video Visit    Video End Time:12:08 PM    Originating Location (pt. Location): Home    Distant Location (provider location):  [facility]     Platform used for Video Visit: MyTinks

## 2022-01-06 NOTE — NURSING NOTE
41 year old  Chief Complaint   Patient presents with     Asthma     wants inhaler, no neb machine at home.      Cough       Last menstrual period 03/14/2018, not currently breastfeeding. There is no height or weight on file to calculate BMI.  Patient Active Problem List   Diagnosis     Nausea & vomiting     Convulsions, unspecified convulsion type (H)     Suicidal ideation     S/P hysterectomy     Attention deficit disorder     Complex regional pain syndrome type 1 of right upper extremity     Cerebral aneurysm, nonruptured     Family history of glioblastoma     Bipolar I disorder (H)     Smoking       Wt Readings from Last 2 Encounters:   12/22/21 108.9 kg (240 lb)   10/18/21 111.1 kg (245 lb)     BP Readings from Last 3 Encounters:   11/04/21 127/89   10/18/21 (!) 131/94   10/18/21 (!) 157/95         Current Outpatient Medications   Medication     ALPRAZolam (XANAX) 1 MG tablet     amphetamine-dextroamphetamine (ADDERALL) 20 MG tablet     aspirin (ASA) 325 MG tablet     cloNIDine (CATAPRES) 0.1 MG tablet     clopidogrel (PLAVIX) 75 MG tablet     gabapentin (NEURONTIN) 600 MG tablet     lidocaine (XYLOCAINE) 5 % external ointment     medical cannabis (Patient's own supply)     Multiple Vitamins-Minerals (MULTIVITAMIN ADULT PO)     nicotine (COMMIT) 2 MG lozenge     nicotine (NICODERM CQ) 7 MG/24HR 24 hr patch     ondansetron (ZOFRAN ODT) 8 MG ODT tab     SAPHRIS 10 MG SUBL sublingual tablet     sertraline (ZOLOFT) 25 MG tablet     vitamin B-12 (CYANOCOBALAMIN) 1000 MCG tablet     vitamin C (ASCORBIC ACID) 500 MG tablet     acetaminophen-codeine (TYLENOL #3) 300-30 MG tablet     buPROPion (WELLBUTRIN XL) 150 MG 24 hr tablet     cyclobenzaprine (FLEXERIL) 5 MG tablet     cyclobenzaprine (FLEXERIL) 5 MG tablet     escitalopram (LEXAPRO) 20 MG tablet     VENTOLIN  (90 BASE) MCG/ACT inhaler     No current facility-administered medications for this visit.       Social History     Tobacco Use     Smoking status:  Current Some Day Smoker     Packs/day: 0.25     Smokeless tobacco: Never Used     Tobacco comment: Trying   Vaping Use     Vaping Use: Never used   Substance Use Topics     Alcohol use: Not Currently     Comment: rare     Drug use: Yes     Types: Marijuana     Comment: pt says she is prescribed medical marijuana       Health Maintenance Due   Topic Date Due     PREVENTIVE CARE VISIT  Never done     COVID-19 Vaccine (1) Never done     Pneumococcal Vaccine: Pediatrics (0 to 5 Years) and At-Risk Patients (6 to 64 Years) (1 of 2 - PPSV23) Never done     URINE DRUG SCREEN  06/23/2021     INFLUENZA VACCINE (1) 09/01/2021       Lab Results   Component Value Date    PAP NIL 07/20/2020 January 6, 2022 11:48 AM

## 2022-01-12 ENCOUNTER — HOSPITAL ENCOUNTER (OUTPATIENT)
Facility: CLINIC | Age: 42
Discharge: HOME | End: 2022-01-12
Attending: RADIOLOGY | Admitting: RADIOLOGY
Payer: COMMERCIAL

## 2022-01-12 ENCOUNTER — APPOINTMENT (OUTPATIENT)
Dept: INTERVENTIONAL RADIOLOGY/VASCULAR | Facility: CLINIC | Age: 42
End: 2022-01-12
Attending: RADIOLOGY
Payer: COMMERCIAL

## 2022-01-12 VITALS
SYSTOLIC BLOOD PRESSURE: 152 MMHG | WEIGHT: 240 LBS | DIASTOLIC BLOOD PRESSURE: 86 MMHG | BODY MASS INDEX: 35.55 KG/M2 | OXYGEN SATURATION: 98 % | RESPIRATION RATE: 16 BRPM | HEIGHT: 69 IN | HEART RATE: 84 BPM | TEMPERATURE: 97 F

## 2022-01-12 DIAGNOSIS — I67.1 CEREBRAL ANEURYSM, NONRUPTURED: Primary | ICD-10-CM

## 2022-01-12 DIAGNOSIS — I67.1 NONRUPTURED CEREBRAL ANEURYSM: ICD-10-CM

## 2022-01-12 LAB
B-HCG SERPL-ACNC: <1 IU/L (ref 0–5)
CREAT SERPL-MCNC: 0.85 MG/DL (ref 0.52–1.04)
GFR SERPL CREATININE-BSD FRML MDRD: 88 ML/MIN/1.73M2
HGB BLD-MCNC: 11.8 G/DL (ref 11.7–15.7)
PLATELET # BLD AUTO: 413 10E3/UL (ref 150–450)

## 2022-01-12 PROCEDURE — 250N000009 HC RX 250: Performed by: STUDENT IN AN ORGANIZED HEALTH CARE EDUCATION/TRAINING PROGRAM

## 2022-01-12 PROCEDURE — 255N000002 HC RX 255 OP 636: Performed by: RADIOLOGY

## 2022-01-12 PROCEDURE — C1887 CATHETER, GUIDING: HCPCS

## 2022-01-12 PROCEDURE — 258N000003 HC RX IP 258 OP 636: Performed by: STUDENT IN AN ORGANIZED HEALTH CARE EDUCATION/TRAINING PROGRAM

## 2022-01-12 PROCEDURE — 250N000013 HC RX MED GY IP 250 OP 250 PS 637: Performed by: STUDENT IN AN ORGANIZED HEALTH CARE EDUCATION/TRAINING PROGRAM

## 2022-01-12 PROCEDURE — 76937 US GUIDE VASCULAR ACCESS: CPT

## 2022-01-12 PROCEDURE — 250N000011 HC RX IP 250 OP 636: Performed by: STUDENT IN AN ORGANIZED HEALTH CARE EDUCATION/TRAINING PROGRAM

## 2022-01-12 PROCEDURE — 99152 MOD SED SAME PHYS/QHP 5/>YRS: CPT | Mod: GC | Performed by: RADIOLOGY

## 2022-01-12 PROCEDURE — 999N000012 HC STATISTIC ANGIOGRAM, STENT, VERTEBRO PLASTY

## 2022-01-12 PROCEDURE — C1769 GUIDE WIRE: HCPCS

## 2022-01-12 PROCEDURE — 36224 PLACE CATH CAROTD ART: CPT | Mod: RT | Performed by: RADIOLOGY

## 2022-01-12 PROCEDURE — 82565 ASSAY OF CREATININE: CPT | Performed by: STUDENT IN AN ORGANIZED HEALTH CARE EDUCATION/TRAINING PROGRAM

## 2022-01-12 PROCEDURE — 272N000570 HC SHEATH CR7

## 2022-01-12 PROCEDURE — 84702 CHORIONIC GONADOTROPIN TEST: CPT | Mod: GZ | Performed by: STUDENT IN AN ORGANIZED HEALTH CARE EDUCATION/TRAINING PROGRAM

## 2022-01-12 PROCEDURE — 99152 MOD SED SAME PHYS/QHP 5/>YRS: CPT

## 2022-01-12 PROCEDURE — 76937 US GUIDE VASCULAR ACCESS: CPT | Mod: 26 | Performed by: RADIOLOGY

## 2022-01-12 PROCEDURE — 36415 COLL VENOUS BLD VENIPUNCTURE: CPT | Performed by: STUDENT IN AN ORGANIZED HEALTH CARE EDUCATION/TRAINING PROGRAM

## 2022-01-12 PROCEDURE — C1760 CLOSURE DEV, VASC: HCPCS

## 2022-01-12 PROCEDURE — 85018 HEMOGLOBIN: CPT | Performed by: STUDENT IN AN ORGANIZED HEALTH CARE EDUCATION/TRAINING PROGRAM

## 2022-01-12 PROCEDURE — 272N000567 HC SHEATH CR4

## 2022-01-12 PROCEDURE — 85049 AUTOMATED PLATELET COUNT: CPT | Performed by: STUDENT IN AN ORGANIZED HEALTH CARE EDUCATION/TRAINING PROGRAM

## 2022-01-12 PROCEDURE — 272N000196 HC ACCESSORY CR5

## 2022-01-12 RX ORDER — NALOXONE HYDROCHLORIDE 0.4 MG/ML
0.4 INJECTION, SOLUTION INTRAMUSCULAR; INTRAVENOUS; SUBCUTANEOUS
Status: DISCONTINUED | OUTPATIENT
Start: 2022-01-12 | End: 2022-01-12 | Stop reason: HOSPADM

## 2022-01-12 RX ORDER — IOPAMIDOL 612 MG/ML
100 INJECTION, SOLUTION INTRAVASCULAR ONCE
Status: COMPLETED | OUTPATIENT
Start: 2022-01-12 | End: 2022-01-12

## 2022-01-12 RX ORDER — ONDANSETRON 4 MG/1
4 TABLET, ORALLY DISINTEGRATING ORAL EVERY 6 HOURS PRN
Status: DISCONTINUED | OUTPATIENT
Start: 2022-01-12 | End: 2022-01-12 | Stop reason: HOSPADM

## 2022-01-12 RX ORDER — PROCHLORPERAZINE 25 MG
25 SUPPOSITORY, RECTAL RECTAL EVERY 12 HOURS PRN
Status: DISCONTINUED | OUTPATIENT
Start: 2022-01-12 | End: 2022-01-12 | Stop reason: HOSPADM

## 2022-01-12 RX ORDER — FENTANYL CITRATE 50 UG/ML
25-50 INJECTION, SOLUTION INTRAMUSCULAR; INTRAVENOUS EVERY 5 MIN PRN
Status: DISCONTINUED | OUTPATIENT
Start: 2022-01-12 | End: 2022-01-12 | Stop reason: HOSPADM

## 2022-01-12 RX ORDER — ONDANSETRON 2 MG/ML
4 INJECTION INTRAMUSCULAR; INTRAVENOUS EVERY 6 HOURS PRN
Status: DISCONTINUED | OUTPATIENT
Start: 2022-01-12 | End: 2022-01-12 | Stop reason: HOSPADM

## 2022-01-12 RX ORDER — NALOXONE HYDROCHLORIDE 0.4 MG/ML
0.2 INJECTION, SOLUTION INTRAMUSCULAR; INTRAVENOUS; SUBCUTANEOUS
Status: DISCONTINUED | OUTPATIENT
Start: 2022-01-12 | End: 2022-01-12 | Stop reason: HOSPADM

## 2022-01-12 RX ORDER — ALPRAZOLAM 1 MG
2 TABLET ORAL ONCE
Status: COMPLETED | OUTPATIENT
Start: 2022-01-12 | End: 2022-01-12

## 2022-01-12 RX ORDER — HEPARIN SODIUM 200 [USP'U]/100ML
1 INJECTION, SOLUTION INTRAVENOUS CONTINUOUS PRN
Status: DISCONTINUED | OUTPATIENT
Start: 2022-01-12 | End: 2022-01-12 | Stop reason: HOSPADM

## 2022-01-12 RX ORDER — HEPARIN SODIUM 1000 [USP'U]/ML
3000 INJECTION, SOLUTION INTRAVENOUS; SUBCUTANEOUS
Status: DISCONTINUED | OUTPATIENT
Start: 2022-01-12 | End: 2022-01-12

## 2022-01-12 RX ORDER — FLUMAZENIL 0.1 MG/ML
0.2 INJECTION, SOLUTION INTRAVENOUS
Status: DISCONTINUED | OUTPATIENT
Start: 2022-01-12 | End: 2022-01-12 | Stop reason: HOSPADM

## 2022-01-12 RX ORDER — PROCHLORPERAZINE MALEATE 10 MG
10 TABLET ORAL EVERY 6 HOURS PRN
Status: DISCONTINUED | OUTPATIENT
Start: 2022-01-12 | End: 2022-01-12 | Stop reason: HOSPADM

## 2022-01-12 RX ORDER — HEPARIN SODIUM 1000 [USP'U]/ML
2000 INJECTION, SOLUTION INTRAVENOUS; SUBCUTANEOUS ONCE
Status: COMPLETED | OUTPATIENT
Start: 2022-01-12 | End: 2022-01-12

## 2022-01-12 RX ORDER — OXYCODONE AND ACETAMINOPHEN 5; 325 MG/1; MG/1
1 TABLET ORAL ONCE
Status: COMPLETED | OUTPATIENT
Start: 2022-01-12 | End: 2022-01-12

## 2022-01-12 RX ORDER — SODIUM CHLORIDE 9 MG/ML
INJECTION, SOLUTION INTRAVENOUS CONTINUOUS
Status: DISCONTINUED | OUTPATIENT
Start: 2022-01-12 | End: 2022-01-12 | Stop reason: HOSPADM

## 2022-01-12 RX ADMIN — MIDAZOLAM 1 MG: 1 INJECTION INTRAMUSCULAR; INTRAVENOUS at 12:07

## 2022-01-12 RX ADMIN — MIDAZOLAM 1 MG: 1 INJECTION INTRAMUSCULAR; INTRAVENOUS at 12:13

## 2022-01-12 RX ADMIN — IOPAMIDOL 22 ML: 612 INJECTION, SOLUTION INTRAVENOUS at 12:36

## 2022-01-12 RX ADMIN — MIDAZOLAM 1 MG: 1 INJECTION INTRAMUSCULAR; INTRAVENOUS at 12:01

## 2022-01-12 RX ADMIN — ALPRAZOLAM 2 MG: 1 TABLET ORAL at 15:13

## 2022-01-12 RX ADMIN — FENTANYL CITRATE 50 MCG: 50 INJECTION, SOLUTION INTRAMUSCULAR; INTRAVENOUS at 12:33

## 2022-01-12 RX ADMIN — SODIUM CHLORIDE: 9 INJECTION, SOLUTION INTRAVENOUS at 13:19

## 2022-01-12 RX ADMIN — MIDAZOLAM 1 MG: 1 INJECTION INTRAMUSCULAR; INTRAVENOUS at 12:18

## 2022-01-12 RX ADMIN — HEPARIN SODIUM 2000 UNITS: 1000 INJECTION INTRAVENOUS; SUBCUTANEOUS at 12:20

## 2022-01-12 RX ADMIN — FENTANYL CITRATE 50 MCG: 50 INJECTION, SOLUTION INTRAMUSCULAR; INTRAVENOUS at 12:01

## 2022-01-12 RX ADMIN — HEPARIN SODIUM 5 BAG: 200 INJECTION, SOLUTION INTRAVENOUS at 12:27

## 2022-01-12 RX ADMIN — OXYCODONE HYDROCHLORIDE AND ACETAMINOPHEN 1 TABLET: 5; 325 TABLET ORAL at 13:56

## 2022-01-12 RX ADMIN — LIDOCAINE HYDROCHLORIDE 17 ML: 10 INJECTION, SOLUTION INFILTRATION; PERINEURAL at 12:21

## 2022-01-12 RX ADMIN — MIDAZOLAM 1 MG: 1 INJECTION INTRAMUSCULAR; INTRAVENOUS at 12:24

## 2022-01-12 RX ADMIN — FENTANYL CITRATE 50 MCG: 50 INJECTION, SOLUTION INTRAMUSCULAR; INTRAVENOUS at 12:13

## 2022-01-12 RX ADMIN — FENTANYL CITRATE 50 MCG: 50 INJECTION, SOLUTION INTRAMUSCULAR; INTRAVENOUS at 12:17

## 2022-01-12 RX ADMIN — FENTANYL CITRATE 50 MCG: 50 INJECTION, SOLUTION INTRAMUSCULAR; INTRAVENOUS at 12:07

## 2022-01-12 RX ADMIN — MIDAZOLAM 1 MG: 1 INJECTION INTRAMUSCULAR; INTRAVENOUS at 12:26

## 2022-01-12 ASSESSMENT — MIFFLIN-ST. JEOR: SCORE: 1818.01

## 2022-01-12 NOTE — PROGRESS NOTES
[facility]     Endovascular Surgical Neuroradiology Pre-Procedure Note      HPI:  Maryanne Crockett is a 41 year old female with past medical history of depression, seizures, ovarian cysts found to have incidental right posterior communicating artery aneurysm during work-up for occipital headaches status post placement of a 4 mm X 18 mm pipeline Shield flow diverter in June 2021.  The patient was discharged on aspirin 325 mg daily along with Plavix 75 mg daily.  She notes compliance with her antithrombotic therapy.  She presents today for a follow-up diagnostic angiogram.    Medical History:  Past Medical History:   Diagnosis Date     Bipolar I disorder (H)      Cerebral aneurysm, nonruptured      Family history of glioblastoma     screening MRIs every 2 years     Ovarian cyst      RSD (reflex sympathetic dystrophy)      Seizures (H)        Surgical History:  Past Surgical History:   Procedure Laterality Date     CHOLECYSTECTOMY       CYSTOSCOPY N/A 09/01/2020    Procedure: CYSTOSCOPY;  Surgeon: Hayley Zayas MD;  [facility]: [facility]     DAVINCI HYSTERECTOMY TOTAL, BILATERAL SALPINGO-OOPHORECTOMY, COMBINED Left 09/01/2020    Procedure: HYSTERECTOMY, TOTAL, ROBOT-ASSISTED, LAPAROSCOPIC,  left oophorectomy;  Surgeon: Hayley Zayas MD;  [facility]     DILATION AND CURETTAGE SUCTION  04/30/2015    retained POC     GASTRIC RESTRICTIVE PROCEDURE, OPEN, REMOVE/REPLACE SUBCUTANEOUS PORT       IR CAROTID CEREBRAL ANGIOGRAM BILATERAL  03/19/2021     IR CAROTID CEREBRAL ANGIOGRAM BILATERAL  06/23/2021     LAPAROSCOPIC OOPHORECTOMY Right 08/05/2016    Procedure: LAPAROSCOPIC OOPHORECTOMY;  Surgeon: Adrianne Vergara MD;  [facility]     LAPAROSCOPIC REMOVAL GASTRIC ADJUSTABLE BAND N/A 05/11/2021    Procedure: REMOVAL, GASTRIC BAND, ADJUSTABLE, LAPAROSCOPIC;  Surgeon: Remy Gold MD;  [facility]     LAPAROSCOPIC SALPINGECTOMY Bilateral 08/05/2016    Procedure:  LAPAROSCOPIC SALPINGECTOMY;  Surgeon: Adrianne Vergara MD;  [facility]     LAPAROSCOPIC TUBAL LIGATION Bilateral 05/22/2015    Procedure: LAPAROSCOPIC TUBAL LIGATION;  Surgeon: Hayley Zayas MD;  [facility]     LAPAROSCOPY OPERATIVE ADULT N/A 08/05/2016    Procedure: LAPAROSCOPY OPERATIVE ADULT;  Surgeon: Adrianne Vergara MD;  [facility]     PANNICULECTOMY  04/22/2017    [facility]     SOFT TISSUE SURGERY Right     cyst in hand       Family History:  Family History   Problem Relation Age of Onset     Coronary Artery Disease Mother 50        stent     Leukemia Father      Deep Vein Thrombosis (DVT) Father      Brain Cancer Brother 36        glioblastoma     Deep Vein Thrombosis (DVT) Brother      Cerebrovascular Disease Brother 47        stroke secondary to DVT     Deep Vein Thrombosis (DVT) Brother      Cerebral aneurysm Maternal Grandfather      Brain Cancer Maternal Aunt         glioblastoma     Brain Cancer Maternal Aunt         glioblastoma     Breast Cancer No family hx of      Anesthesia Reaction No family hx of      Ovarian Cancer No family hx of      Colon Cancer No family hx of        Social History:  Social History     Socioeconomic History     Marital status: Single     Spouse name: Not on file     Number of children: Not on file     Years of education: Not on file     Highest education level: Not on file   Occupational History     Not on file   Tobacco Use     Smoking status: Current Some Day Smoker     Packs/day: 0.25     Smokeless tobacco: Never Used     Tobacco comment: Trying   Vaping Use     Vaping Use: Never used   Substance and Sexual Activity     Alcohol use: Not Currently     Comment: rare     Drug use: Yes     Types: Marijuana     Comment: pt says she is prescribed medical marijuana     Sexual activity: Yes     Partners: Male     Birth control/protection: Female Surgical     Comment: TBL/hys   Other Topics Concern     Parent/sibling w/ CABG, MI or angioplasty before 65F  55M? Yes   Social History Narrative    Lives with 13yo daughter    Also has two homeless girls staying with them currently (6 months as of 09/2021)    Has a 22 yo son and 5 year old grandson (takes grandson to school every morning)     Social Determinants of Health     Financial Resource Strain: Not on file   Food Insecurity: Not on file   Transportation Needs: Not on file   Physical Activity: Not on file   Stress: Not on file   Social Connections: Not on file   Intimate Partner Violence: Not on file   Housing Stability: Not on file       Allergies:  Allergies   Allergen Reactions     Ibuprofen GI Disturbance     Seroquel [Quetiapine] Other (See Comments) and Rash     Insomnia and rash       Is there a contrast allergy?  No    Medications:  Medications Prior to Admission   Medication Sig Dispense Refill Last Dose     ALPRAZolam (XANAX) 1 MG tablet Take 2 mg by mouth 3 times daily as needed    1/11/2022 at 2000     amphetamine-dextroamphetamine (ADDERALL) 20 MG tablet Take 20 mg by mouth 2 times daily    Past Week at Unknown time     aspirin (ASA) 325 MG tablet Take 1 tablet (325 mg) by mouth daily 90 tablet 3 1/11/2022 at 2000     cloNIDine (CATAPRES) 0.1 MG tablet Take 1 tablet by mouth 2 times daily    1/11/2022 at 2000     clopidogrel (PLAVIX) 75 MG tablet Take 1 tablet (75 mg) by mouth daily 30 tablet 4 1/11/2022 at 2000     cyclobenzaprine (FLEXERIL) 5 MG tablet Take 1 tablet (5 mg) by mouth 3 times daily as needed for muscle spasms 20 tablet 0 Past Week at Unknown time     lidocaine (XYLOCAINE) 5 % external ointment Apply topically 3 times daily 30 g 1 More than a month at Unknown time     medical cannabis (Patient's own supply) See Admin Instructions (The purpose of this order is to document that the patient reports taking medical cannabis.  This is not a prescription, and is not used to certify that the patient has a qualifying medical condition.)    1/12/2022 at 0800     mometasone-formoterol (DULERA) 100-5  MCG/ACT inhaler Inhale 2 puffs into the lungs 2 times daily as needed (cough, short of breath) 13 g 6 1/11/2022 at Unknown time     Multiple Vitamins-Minerals (MULTIVITAMIN ADULT PO) Take 1 tablet by mouth every morning    1/11/2022 at Unknown time     nicotine (COMMIT) 2 MG lozenge Place 1 lozenge (2 mg) inside cheek every 2 hours as needed for smoking cessation 108 lozenge 11 More than a month at Unknown time     nicotine (NICODERM CQ) 7 MG/24HR 24 hr patch Place 1 patch onto the skin every 24 hours . Start after finishing 14mg patch. 42 patch 3 Past Week at Unknown time     ondansetron (ZOFRAN ODT) 8 MG ODT tab Take 1 tablet (8 mg) by mouth every 8 hours as needed for nausea 12 tablet 0 Past Month at Unknown time     ondansetron (ZOFRAN-ODT) 4 MG ODT tab Take 1 tablet (4 mg) by mouth every 8 hours as needed for nausea 20 tablet 1 Past Month at Unknown time     predniSONE (DELTASONE) 20 MG tablet Take 2 tablets (40 mg) by mouth daily 10 tablet 0 Past Week at Unknown time     SAPHRIS 10 MG SUBL sublingual tablet Place 10 mg under the tongue 2 times daily    1/11/2022 at 2000     sertraline (ZOLOFT) 25 MG tablet Take 25 mg by mouth daily   1/11/2022 at 2000     vitamin B-12 (CYANOCOBALAMIN) 1000 MCG tablet Take 1,000 mcg by mouth every morning    1/11/2022 at 2000     vitamin C (ASCORBIC ACID) 500 MG tablet Take 1 tablet (500 mg) by mouth daily 50 tablet 0 1/11/2022 at Unknown time   .    ROS:  The 10 point Review of Systems is negative other than noted in the HPI or here.     PHYSICAL EXAMINATION  Vital Signs:  B/P: 154/86,  T: 97,  P: 102,  R: 18    Young woman lying in bed in no acute distress.  She is awake, alert and oriented to person, place and situation.  In no acute distress.  No obvious gaze preference or neglect.  Language exam normal.  Head turn and shoulder shrug normal.  Motor strength is 5/5 in all 4 extremities.  Sensation intact to touch bilaterally symmetrical.  No obvious dysmetria.  Cognitive and  gait exams deferred        LABS  (most recent Cr, BUN, GFR, PLT, INR, PTT within the past 7 days):  No lab results found in last 7 days.     Platelet Function P2Y12 (PRU):  Not applicable      ASSESSMENT:  Proceed with diagnostic cerebral angiogram    PLAN:  Diagnostic cerebral angiogram       PRE-PROCEDURE SEDATION ASSESSMENT     Pre-Procedure Sedation Assessment done at 1030.    Expected Level:  Moderate Sedation    Indication:  Sedation is required to allow for neurointerventional procedure.    Consent obtained from patient after discussing the risks, benefits and alternatives.     PO Intake:  Appropriately NPO for procedure    ASA Class:  Class 2 - MILD SYSTEMIC DISEASE, NO ACUTE PROBLEMS, NO FUNCTIONAL LIMITATIONS.    Mallampati:  Grade 2:  Soft palate, base of uvula, tonsillar pillars, and portion of posterior pharyngeal wall visible    History and physical reviewed and no updates needed. I have reviewed the lab findings, diagnostic data, medications, and the plan for sedation. I have determined this patient to be an appropriate candidate for the planned sedation and procedure and have reassessed the patient IMMEDIATELY PRIOR to sedation and procedure.    Patient was discussed with the Attending, Dr. Ferrari, who agrees with the plan.    CORTEZ HOPE MD   Pager: [phone]

## 2022-01-12 NOTE — PROGRESS NOTES
Care Suites Post Procedure Note    Patient Information  Name: Maryanne Crockett  Age: 41 year old    Post Procedure  Time patient returned to Care Suites: 1300  Concerns/abnormal assessment: No immediate  If abnormal assessment, provider notified: N/A  Plan/Other: Continue post procedure plan of care.    Right groin site angioseal + manual pressure, CDI. VS stable, pt is taking po fluids well. C/o right groin pain rate 7/10.  Anticipate 4 hours bedrest until 1700.  Neuro remains unchanged.  Report back to Estela to continue monitoring pt.      Dee Dee Ricks RN

## 2022-01-12 NOTE — PROCEDURES
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      Procedure(s):   Diagnostic cervicocerebral angiography    Findings:    [] As above    Plan:   [] Keep right leg flat for 4 hours    Primary Surgeon:  Dr. Rich Webster  Secondary Surgeon:  Dr. Rich Webster  Secondary Surgeon Review:  Dr. Rich Webster  Fellow:  Belinda  Additional Assistants: None    Prior to the start of the procedure and with procedural staff participation, I verbally confirmed: the patient s identity using two indicators, relevant allergies, that the procedure was appropriate and matched the consent or emergent situation, and that the correct equipment/implants were available. Immediately prior to starting the procedure I conducted the Time Out with the procedural staff and re-confirmed the patient s name, procedure, and site/side. (The Joint Commission universal protocol was followed.)  Yes    PRU value: Not applicable    Anesthesia:  Conscious Sedation  Medications:  6 mg midazolam, 250 mcg fentanyl, 2000 units heparin  Puncture site:  Right Femoral Artery    Fluoroscopy time (minutes): 6.8  Radiation dose (mGy): 623.42  Contrast amount (mL): 22 cc Isovue-300    Estimated blood loss (mL): 15 cc    Closure:  Device 6 Citizen of the [place] Angio-Seal closure device    Disposition:  Home after recovery.        Sedation Post-Procedure Summary    Sedatives: Fentanyl and Midazolam (Versed)    Vital signs and pulse oximetry were monitored and remained stable throughout the procedure, and sedation was maintained until the procedure was complete.  The patient was monitored by staff until sedation discharge criteria were  met.    Patient tolerance:  Patient tolerated the procedure well with no immediate complications.    Time of sedation in minutes: 36 minutes from beginning to end of physician one to one monitoring.    CORTEZ HOPE MD  Pager: [phone]

## 2022-01-12 NOTE — DISCHARGE INSTRUCTIONS
Cerebral Angiogram Discharge Instructions - Femoral     After you go home:      Have an adult stay with you until tomorrow.    Drink extra fluids for 2 days.    You may resume your normal diet.    No smoking       For 24 hours - due to the sedation you received:    Relax and take it easy.    Do NOT make any important or legal decisions.    Do NOT drive or operate machines at home or at work.    Do NOT drink alcohol.    Care of Groin Puncture Site:      For the first 24 hrs - check the puncture site every 1-2 hours while awake.    For 2 days, when you cough, sneeze, laugh or move your bowels, hold your hand over the puncture site and press firmly.    Remove the bandaid after 24 hours. If there is minor oozing, apply another bandaid and remove it after 12 hours.    It is normal to have a small bruise or pea size lump at the site.    You may shower tomorrow. Do NOT take a bath, or use a hot tub or pool for at least 3 days. Do NOT scrub the site. Do not use lotion or powder near the puncture site.     Activity:            For 2 days:    No stooping or squatting    Do NOT do any heavy activity such as exercise, lifting, or straining.     No housework, yard work or any activity that make you sweat    Do NOT lift more than 10 pounds    Bleeding:      If you start bleeding from the site in your groin, lie down flat and press firmly on/above the site for 10 minutes.     Once bleeding stops, lay flat for 2 hours.     Call [facility] as soon as you can.       Call 911 right away if you have heavy bleeding or bleeding that does not stop.      Medicines:      If you are taking an antiplatelet medication such as Plavix, do not stop taking it until you talk to your provider.       Take your medications, including blood thinners, unless your provider tells you not to.        If you have stopped any medicines, check with your provider about when to restart them.        Follow Up Appointments:      Follow up with [facility] as directed.    Call the clinic if:      You have increased pain or a large or growing hard lump around the site.    The site is red, swollen, hot or tender.    Blood or fluid is draining from the site.    You have chills or a fever greater than 101 F (38 C).    Your leg feels numb, cool or changes color.    You have hives, a rash or unusual itching.    New pain in the back or belly that you cannot control with Tylenol.    You experience changes in your vision, hearing, balance, coordination, speech, thinking or memory.    You experience weakness in one or more extremity.    Any questions or concerns.    If you have questions or your original symptoms do not improve, call:         [facility]             Silvio Sandra RN Care Coordinator                   @ [phone] - option #3

## 2022-01-12 NOTE — PROGRESS NOTES
Pt had c/o pain in groin area, Percocet given as ordered. One hour later pt still c/o pain and also now anxiety, wants her xanax from PTA list. MD messaged and placed the order. Called pharmacy and waiting for med to arrive. Otherwise pt is medically stable and neuros are intact. Med arrived and this was given. Pt tolerated food and fluids without issue. Voided on bedpan. Report given to Eddi KLEIN for change of shift.

## 2022-01-12 NOTE — PROGRESS NOTES
Care Suites Admission Nursing Note    Patient Information  Name: Maryanne RAYO Self  Age: 41 year old  Reason for admission: Cerebral angiogram  Care Suites arrival time: 0930    Visitor Information  Name: None    Patient Admission/Assessment   Pre-procedure assessment complete: Yes  If abnormal assessment/labs, provider notified: N/A  NPO: Yes  Medications held per instructions/orders: N/A  Consent: obtained  If applicable, pregnancy test status: Yes  Patient oriented to room: Yes  Education/questions answered: Yes  Plan/other: To IR for planned procedure    Discharge Planning  Discharge name/phone number: Rafael Hickey [phone]  Overnight post sedation caregiver: sister and mom  Discharge location: home    PATIENT/VISITOR WELLNESS SCREENING    Step 1 Patient Screening    1. In the last month, have you been in contact with someone who was confirmed or suspected to have Coronavirus/COVID-19? No, pt had Covid and so did her family back in early December    2. Do you have the following symptoms?  Fever/Chills? No   Cough? No   Shortness of breath? No   New loss of taste or smell? No  Sore throat? No  Muscle or body aches? No  Headaches? No  Fatigue? No  Vomiting or diarrhea? No    Step 2 Visitor Screening    1. Name of Visitor (1 visitor per patient): None    Step 3 Refer to logic grid below for actions    NO SYMPTOM(S)    ACTIONS:  1. Standard rooming process  2. Provider to assess per normal protocol  3. Implement precautions as needed and per guidelines     POSITIVE SYMPTOM(S)  If positive for ANY of the following symptoms: fever, cough, shortness of breath, rash    ACTION:  1. Continue to have the patient wear a mask   2. Room patient as soon as possible  3. Don appropriate PPE when entering room  4. Provider evaluation      Estela Jonas RN

## 2022-01-12 NOTE — PROGRESS NOTES
Care Suites Discharge Nursing Note    Patient Information  Name: Maryanne RAYO Self  Age: 41 year old    Discharge instruction is given to pt.  All questions are answered.  Pain is better after taking percocet and xanax.    VS stable, taking po fluids well, voiding.  Off bedrest at 1645, site is stable.  Pt can be discharged after 1715    Discharge Education:  Discharge instructions reviewed: Yes  Additional education/resources provided: NA    Patient/patient representative verbalizes understanding: Yes  Patient discharging on new medications: No  Medication education completed: N/A    Discharge Plans:   Discharge location: home  Discharge ride contacted: Yes  Approximate discharge time: 1730    Discharge Criteria:  Discharge criteria met and vital signs stable: Yes    Patient Belongs:  Patient belongings returned to patient: Yes    Dee Dee Ricks RN

## 2022-01-12 NOTE — IR NOTE
Interventional Radiology Intra-procedural Nursing Note    Patient Name: Maryanne Crockett  Medical Record Number: [id number]  Today's Date: January 12, 2022    Procedure: cerebral angiogram femoral access  closure device with IV moderate sedation  Start time: 1210  End time: 1237  Report provided to: Estela KLEIN  Patient depart time and location: 1255 to care suites    Note: Patient entered Interventional Radiology Suite number 3 via cart. Patient awake, alert and orientated. Assisted onto procedural table in supine position. Prepped and draped.  Dr. Trevino and Eleanor  in room. Time out and procedure started. Vital signs stable. Telemetry reading SR.    Procedure well tolerated by patient without complications. Procedure end with debrief by Dr. Trevino.  hemostasis achieved with angio seal  and manual pressure quick clot and tegederm dressing applied to right groin.    Administered medication totals:  Lidocaine 1% 17 mL Intradermal    Heparin 2000Units IV   Versed 6 mg IVP  Fentanyl 250  mcg IVP    Last dose of sedation administered at 1233

## 2022-01-13 ENCOUNTER — MYC REFILL (OUTPATIENT)
Dept: FAMILY MEDICINE | Facility: CLINIC | Age: 42
End: 2022-01-13
Payer: COMMERCIAL

## 2022-01-13 DIAGNOSIS — I67.1 CEREBRAL ANEURYSM, NONRUPTURED: Primary | ICD-10-CM

## 2022-01-13 DIAGNOSIS — M54.50 BILATERAL LOW BACK PAIN WITHOUT SCIATICA, UNSPECIFIED CHRONICITY: ICD-10-CM

## 2022-01-13 RX ORDER — CYCLOBENZAPRINE HCL 5 MG
5 TABLET ORAL 3 TIMES DAILY PRN
Qty: 20 TABLET | Refills: 0 | Status: CANCELLED | OUTPATIENT
Start: 2022-01-13

## 2022-01-13 RX ORDER — CLOPIDOGREL BISULFATE 75 MG/1
75 TABLET ORAL DAILY
Qty: 30 TABLET | Refills: 6 | Status: SHIPPED | OUTPATIENT
Start: 2022-01-13 | End: 2023-09-07

## 2022-01-13 NOTE — TELEPHONE ENCOUNTER
Per Dr. Webster patient should continue dual anti-platelet therapy for another 6 months. Prescription sent. Patient informed via [employer]. Missy Sandra RN 1/13/2022 10:19 AM

## 2022-01-13 NOTE — PROGRESS NOTES
Order placed for MRA to be done 1/2023 prior to follow-up with Dr. Webster. Message sent to scheduling. Missy Sandra RN 1/13/2022 10:25 AM

## 2022-01-14 ENCOUNTER — TELEPHONE (OUTPATIENT)
Dept: NEUROLOGY | Facility: CLINIC | Age: 42
End: 2022-01-14
Payer: COMMERCIAL

## 2022-01-14 NOTE — TELEPHONE ENCOUNTER
LVM to schedule return appt w Dr[facility] provided clinic number for call back.

## 2022-01-17 ENCOUNTER — VIRTUAL VISIT (OUTPATIENT)
Dept: FAMILY MEDICINE | Facility: CLINIC | Age: 42
End: 2022-01-17
Payer: COMMERCIAL

## 2022-01-17 DIAGNOSIS — M54.50 BILATERAL LOW BACK PAIN WITHOUT SCIATICA, UNSPECIFIED CHRONICITY: Primary | ICD-10-CM

## 2022-01-17 RX ORDER — MELOXICAM 15 MG/1
15 TABLET ORAL DAILY
Qty: 21 TABLET | Refills: 0 | Status: SHIPPED | OUTPATIENT
Start: 2022-01-17 | End: 2022-02-04

## 2022-01-17 NOTE — PROGRESS NOTES
Maryanne is a 41 year old who is being evaluated via a billable video visit.    Can you please confirm what state you are currently located in? [place]     How would you like to obtain your AVS? MyChart  If the video visit is dropped, the invitation should be resent by: Text to cell phone: mobile  Will anyone else be joining your video visit? No    Video Start Time: 1:11 PM    Assessment & Plan   Problem List Items Addressed This Visit     None      Visit Diagnoses     Bilateral low back pain without sciatica, unspecified chronicity    -  Primary    Relevant Medications    meloxicam (MOBIC) 15 MG tablet    Other Relevant Orders    Spine Referral         Reassured by absence of significant sciatic symptoms or red flags for cauda equina. Nevertheless, pain has persisted for multiple weeks. History is not concise but suggestive that recent steroid burst may have been helpful. Will try a course of meloxicam as noted above. Will also refer to spine at this point as noted above. Considered imaging studies at this point but will defer to Spine specialist as indicated.      Senthil Cano MD  [facility]    Subjective   Maryanne is a 41 year old who presents for the following health issues     HPI   Persistent low back pain  - seen previously for this both by myself and Dr. Laurent  - initially prescribed flexeril  - was taking these but at some point felt she was hallucinating and thinks it was due to the flexeril so stopped taking it  - I saw her in early January for persistent back pain plus suspected asthma exacerbation  - given a short steroid burst; says the back pain felt better at some point around this time, for a couple of days but it is now getting worse again  - respiratory issues have since improved  - denies red flag symptoms for spine concerns    Review of Systems   Constitutional, HEENT, cardiovascular, pulmonary, gi and gu systems are negative, except as otherwise noted.      Objective           Vitals:  No  vitals were obtained today due to virtual visit.    Physical Exam   GENERAL: Healthy, alert and no distress  EYES: Eyes grossly normal to inspection.  No discharge or erythema, or obvious scleral/conjunctival abnormalities.  RESP: No audible wheeze, cough, or visible cyanosis.  No visible retractions or increased work of breathing.    SKIN: Visible skin clear. No significant rash, abnormal pigmentation or lesions.  NEURO: Cranial nerves grossly intact.  Mentation and speech appropriate for age.  PSYCH: Mentation appears normal, affect normal/bright, judgement and insight intact, normal speech and appearance well-groomed.      Video-Visit Details    Type of service:  Video Visit    Video End Time:1:25 PM    Originating Location (pt. Location): Home    Distant Location (provider location):  [facility]     Platform used for Video Visit: [employer]

## 2022-01-19 NOTE — TELEPHONE ENCOUNTER
SPINE PATIENTS - NEW PROTOCOL PREVISIT    RECORDS RECEIVED FROM: Internal   REASON FOR VISIT: Bilateral lbp w/o sciatica   Date of Appt: 1/28/22   NOTES (FOR ALL VISITS) STATUS DETAILS   OFFICE NOTE from referring provider Internal Dr Senthil Cano @ [facility] PCP:  1/17/22   OFFICE NOTE from other specialist N/A    DISCHARGE SUMMARY from hospital N/A    DISCHARGE REPORT from ER N/A    EMG REPORT N/A    MEDICATION LIST Internal    IMAGING  (FOR ALL VISITS)     MRI (HEAD, NECK, SPINE) N/A    XRAY (SPINE) *NEUROSURGERY* N/A    CT (HEAD, NECK, SPINE) N/A       NO IMAGING

## 2022-01-28 ENCOUNTER — PRE VISIT (OUTPATIENT)
Dept: NEUROSURGERY | Facility: CLINIC | Age: 42
End: 2022-01-28

## 2022-01-31 ENCOUNTER — OFFICE VISIT (OUTPATIENT)
Dept: ORTHOPEDICS | Facility: CLINIC | Age: 42
End: 2022-01-31
Payer: COMMERCIAL

## 2022-01-31 DIAGNOSIS — G89.29 CHRONIC PAIN OF RIGHT THUMB: Primary | ICD-10-CM

## 2022-01-31 DIAGNOSIS — M79.644 CHRONIC PAIN OF RIGHT THUMB: Primary | ICD-10-CM

## 2022-01-31 PROCEDURE — 99214 OFFICE O/P EST MOD 30 MIN: CPT | Mod: 25 | Performed by: STUDENT IN AN ORGANIZED HEALTH CARE EDUCATION/TRAINING PROGRAM

## 2022-01-31 PROCEDURE — 20600 DRAIN/INJ JOINT/BURSA W/O US: CPT | Mod: F5 | Performed by: STUDENT IN AN ORGANIZED HEALTH CARE EDUCATION/TRAINING PROGRAM

## 2022-01-31 RX ADMIN — BETAMETHASONE SODIUM PHOSPHATE AND BETAMETHASONE ACETATE 6 MG: 3; 3 INJECTION, SUSPENSION INTRA-ARTICULAR; INTRALESIONAL; INTRAMUSCULAR; SOFT TISSUE at 15:00

## 2022-01-31 RX ADMIN — LIDOCAINE HYDROCHLORIDE 0.5 ML: 10 INJECTION, SOLUTION EPIDURAL; INFILTRATION; INTRACAUDAL; PERINEURAL at 15:00

## 2022-01-31 NOTE — LETTER
2022         RE: Maryanne Crockett  [address]  [facility] [address]        Dear Colleague,    Thank you for referring your patient, Maryanne Crockett, to the [facility]. Please see a copy of my visit note below.    Hand / Upper Extremity Injection/Arthrocentesis: R thumb CMC    Date/Time: 2022 3:00 PM  Performed by: Jonah Spears MD  Authorized by: Jonah Spears MD     Needle Size:  25 G  Guidance: landmark    Condition: osteoarthritis    Location:  Thumb  Site:  R thumb CMC    Medications:  6 mg betamethasone acet & sod phos 6 (3-3) MG/ML; 0.5 mL lidocaine (PF) 1 %  Outcome:  Tolerated well, no immediate complications  Procedure discussed: discussed risks, benefits, and alternatives    Consent Given by:  Patient  Prep: patient was prepped and draped in usual sterile fashion        [facility]  [address] [facility] [address]  [phone]  Dept: [phone]  ______________________________________________________________________________    Patient: Maryanne Crockett   : 1980   MRN: [id number]   2022    INVASIVE PROCEDURE SAFETY CHECKLIST    Date: 2022   Procedure: Right thumb CMC joint cortisone injection  Patient Name: Maryanne Crockett  MRN: [id number]  YOB: 1980    Action: Complete sections as appropriate. Any discrepancy results in a HARD COPY until resolved.     PRE PROCEDURE:  Patient ID verified with 2 identifiers (name and  or MRN): Yes  Procedure and site verified with patient/designee (when able): Yes  Accurate consent documentation in medical record: Yes  H&P (or appropriate assessment) documented in medical record: Yes  H&P must be up to 20 days prior to procedure and updates within 24 hours of procedure as applicable: Yes  Relevant diagnostic and radiology test results appropriately labeled and displayed as applicable: Yes  Procedure site(s) marked with provider  initials: Yes    TIMEOUT:  Time-Out performed immediately prior to starting procedure, including verbal and active participation of all team members addressing the following:Yes  * Correct patient identify  * Confirmed that the correct side and site are marked  * An accurate procedure consent form  * Agreement on the procedure to be done  * Correct patient position  * Relevant images and results are properly labeled and appropriately displayed  * The need to administer antibiotics or fluids for irrigation purposes during the procedure as applicable   * Safety precautions based on patient history or medication use    DURING PROCEDURE: Verification of correct person, site, and procedures any time the responsibility for care of the patient is transferred to another member of the care team.       The following medications were given:         Prior to injection, verified patient identity using patient's name and date of birth.  Due to injection administration, patient instructed to remain in clinic for 15 minutes  afterwards, and to report any adverse reaction to me immediately.    Joint injection was performed.    Medication Name: Celestone NDC: 7946-2785-79  Drug Amount Wasted:  Yes: 4.5 mg/ml   Vial/Syringe: Single dose vial  Expiration Date:  7/1/22    Medication Name: Lidocaine NDC: 37355-972-46    Scribed by Mi Deal ATC for Dr. Spears on January 31, 2022 at 3:00 pm based on the provider's statements to me.     Mi Deal ATC          Hand Surgery Clinic Progress Note    REFERRING PHYSICIAN: Scott Morgan   PRIMARY CARE PHYSICIAN: Senthil Cano     Chief Complaint:   RECHECK (FU CTS )    History of Present Illness:     Maryanne Crockett is a 41 year old right-hand dominant female who presents for evaluation of right hand pain and weakness.  The patient states that in 2013 she had surgical excision of a volar wrist ganglion cyst which was complicated by complex regional pain syndrome.  She states she underwent  "several years of therapy and treatment for her CRPS.  This included desensitization, steroid injections, Botox injections.  She states that over the past several years she has had weakness, hypersensitivity, and diffuse pain in the right wrist.  She has thus needed to use her nondominant left hand more often, which has caused some intermittent discomfort.  Patient also states that she has numbness and tingling in the hand.  This is nonlocalizing on the right side, however in the left side this is in the thumb index and middle finger and will occasionally wake her up at night.  Patient had an EMG obtained in 2015 which was normal per chart review.  Patient is followed by neurology as well as pain management.  She has been taking gabapentin for her CRPS, which she feels does not help much.    Patient has a complex medical history including multiple cerebral aneurysms.  Patient states that she while her symptoms have been present for many years, she thought that she would always die young due to her aneurysms.  She was recently told that she likely has a good prognosis and \"has half of her life left to live\".  This made her want to seek treatment for her hand hypersensitivity, pain, and weakness.    Patient states that due to the pain, weakness, and hypersensitivity, she is unable to hold any items in her hand.  She uses her forearm to support items.  Cold air, air a hair dryer, or light touch cause significant pain to the hand.  This occasionally radiates up into the forearm.  Today she points to the base of her thumb as area of pain.  Gripping items any range of motion cause pain in this area.    She had a recent EMG/NCS completed 12/22/21. I previously called her with the results which showed prolonged latency in left median motor and sensory nerve conduction, no response left ulnar sensory nerve in the palm, with normal EMG. These were not entirely consistent with her symptoms.     Today, she describes pain " primarily at the base of the thumb. She describes pain aggravated by certain activities. She has never had any treatment for thumb CMC arthritis.      Physical Exam:   Physical Exam Adult:  General: Well-nourished, alert, cooperative with exam and in no acute distress  HEENT: Atraumatic, normocephalic, extraocular movements intact, moist mucous membranes, trachea midline  Pulmonary: Unlabored work of breathing, on room air  Cardiovascular: Warm and well-perfused extremities. 2+ radial pulses  Skin: Warm, intact without rashes to the upper extremities, head or neck   Gait: Normal  Neuro/psych: Oriented to time, place and person. Affect is appropriate    Musculoskeletal: A focused physical examination of the right upper extremity reveals:   Inspection -no obvious deformity.  No ecchymosis or edema.  No hyperemia.  No atrophy of thenar's or intrinsics.  Palpation - Tender to palpation at the CMC, positive grind, negative Finkelstein's, no tenderness at radial styloid, scaphoid tubercle, snuffbox.  No pain at DRUJ, no DRUJ instability, no pain with palpation at the fovea, Pisa form, SL interval, metacarpals, remaining hand.  Neurovascular- intact light touch sensation and motor to distribution of the median, ulnar and radial nerves.  Two-point discrimination throughout the median ulnar nerve distributions bilaterally 2-3 mm, symmetric.  Brisk capillary refill to the distal fingers. 2+ radial pulse.   Range of Motion: Pain with end extension of fingers.  Tenderness of the wrist.  Pain with CMC grind.  Pain with end flexion and extension.    Muscle strength and tone: 4/5 strength EPL, FPL, APB, first dorsal interosseous.             Negative Moe's.  Negative ulnar impaction.  Negative Finkelstein's.  Negative Tinel's at the carpal tunnel.  Negative Phalen's.    Imaging:   3 view X-ray of the right thumb 11/22/21 was independently interpreted by me. The results were discussed with the patient. Mild subluxation of thumb  CMC joint.    Assessment and Plan:   Assessment:  41 year old female with 9-year history of right wrist and hand pain, hypersensitivity, numbness and tingling.  History of right volar wrist ganglion cyst excision complicated by CRPS. Left-sided nighttime numbness and tingling consistent with carpal tunnel syndrome, demonstrated on EMG 12/2021. 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     Discussed conservative management of CMC arthritis. Referral placed for splinting, dynamic strengthening, and nerve gliding for carpal tunnel.    Discussed injections. Patient desires one today. After obtaining written consent, the skin over the thumb CMC joint was cleansed with chlorhexidine. The skin was anesthetized with ethyl chloride freeze spray, after which 0.5cc of betamethasone and 0.5cc of 1% lidocaine were injected into the CMC joint. The patient tolerated the procedure well and there were no complications.    Patient provided with nocturnal splints for carpal tunnel syndrome.     Follow-up as needed if symptoms do not improve.    All questions were answered and the patient was in agreement with the plan.     Jonah Spears MD    Hand, Upper Extremity & Microvascular Surgery  Department of Orthopedic Surgery  [facility]

## 2022-01-31 NOTE — NURSING NOTE
Reason For Visit:   Chief Complaint   Patient presents with     RECHECK     FU CTS        Primary MD: Tristian Mera  Ref. MD: willa     Age: 41 year old    ?  No      LMP 03/14/2018 (Approximate)       Pain Assessment  Patient Currently in Pain: Yes  0-10 Pain Scale: 3               QuickDASH Assessment  QuickDASH Main 11/12/2013   1. Open a tight or new jar. 5   2. Do heavy household chores (e.g., wash walls, floors). 4   3. Carry a shopping bag or briefcase. 5   4. Wash your back. 4   5. Use a knife to cut food. 4   6. Recreational activities in which you take some force or impact through your arm, shoulder or hand (e.g., golf, hammering, tennis, etc.). 5   7. During the past week, to what extent has your arm, shoulder or hand problem interfered with your normal social activities with family, friends, neighbours or groups? 5   8. During the past week, were you limited in your work or other regular daily activities as a result of your arm, shoulder or hand problem? 4   9. Arm, shoulder or hand pain. 4   10. Tingling (pins and needles) in your arm,shoulder or hand. 5   11. During the past week, how much difficulty have you had sleeping because of the pain in your arm, shoulder or hand? ([place] number) 4   SUM: 49          Current Outpatient Medications   Medication Sig Dispense Refill     ALPRAZolam (XANAX) 1 MG tablet Take 2 mg by mouth 3 times daily as needed        amphetamine-dextroamphetamine (ADDERALL) 20 MG tablet Take 20 mg by mouth 2 times daily        aspirin (ASA) 325 MG tablet Take 1 tablet (325 mg) by mouth daily 90 tablet 3     cloNIDine (CATAPRES) 0.1 MG tablet Take 1 tablet by mouth 2 times daily        clopidogrel (PLAVIX) 75 MG tablet Take 1 tablet (75 mg) by mouth daily 30 tablet 6     cyclobenzaprine (FLEXERIL) 5 MG tablet Take 1 tablet (5 mg) by mouth 3 times daily as needed for muscle spasms 20 tablet 0     lidocaine (XYLOCAINE) 5 % external ointment Apply topically 3 times daily  30 g 1     medical cannabis (Patient's own supply) See Admin Instructions (The purpose of this order is to document that the patient reports taking medical cannabis.  This is not a prescription, and is not used to certify that the patient has a qualifying medical condition.)        meloxicam (MOBIC) 15 MG tablet Take 1 tablet (15 mg) by mouth daily 21 tablet 0     mometasone-formoterol (DULERA) 100-5 MCG/ACT inhaler Inhale 2 puffs into the lungs 2 times daily as needed (cough, short of breath) 13 g 6     Multiple Vitamins-Minerals (MULTIVITAMIN ADULT PO) Take 1 tablet by mouth every morning        nicotine (COMMIT) 2 MG lozenge Place 1 lozenge (2 mg) inside cheek every 2 hours as needed for smoking cessation 108 lozenge 11     nicotine (NICODERM CQ) 7 MG/24HR 24 hr patch Place 1 patch onto the skin every 24 hours . Start after finishing 14mg patch. 42 patch 3     ondansetron (ZOFRAN ODT) 8 MG ODT tab Take 1 tablet (8 mg) by mouth every 8 hours as needed for nausea 12 tablet 0     ondansetron (ZOFRAN-ODT) 4 MG ODT tab Take 1 tablet (4 mg) by mouth every 8 hours as needed for nausea 20 tablet 1     predniSONE (DELTASONE) 20 MG tablet Take 2 tablets (40 mg) by mouth daily 10 tablet 0     SAPHRIS 10 MG SUBL sublingual tablet Place 10 mg under the tongue 2 times daily        sertraline (ZOLOFT) 25 MG tablet Take 25 mg by mouth daily       vitamin B-12 (CYANOCOBALAMIN) 1000 MCG tablet Take 1,000 mcg by mouth every morning          Allergies   Allergen Reactions     Ibuprofen GI Disturbance     Seroquel [Quetiapine] Other (See Comments) and Rash     Insomnia and rash       Renetta Yu, ATC

## 2022-02-01 NOTE — PROGRESS NOTES
Hand / Upper Extremity Injection/Arthrocentesis: R thumb CMC    Date/Time: 2022 3:00 PM  Performed by: Jonah Spears MD  Authorized by: Jonah Spears MD     Needle Size:  25 G  Guidance: landmark    Condition: osteoarthritis    Location:  Thumb  Site:  R thumb CMC    Medications:  6 mg betamethasone acet & sod phos 6 (3-3) MG/ML; 0.5 mL lidocaine (PF) 1 %  Outcome:  Tolerated well, no immediate complications  Procedure discussed: discussed risks, benefits, and alternatives    Consent Given by:  Patient  Prep: patient was prepped and draped in usual sterile fashion        [facility] [address]  [address] [facility] [id number] [phone]  Dept: [phone]  ______________________________________________________________________________    Patient: Maryanne Crockett   : 1980   MRN: [id number]   2022    INVASIVE PROCEDURE SAFETY CHECKLIST    Date: 2022   Procedure: Right thumb CMC joint cortisone injection  Patient Name: Maryanne Crockett  MRN: [id number]  YOB: 1980    Action: Complete sections as appropriate. Any discrepancy results in a HARD COPY until resolved.     PRE PROCEDURE:  Patient ID verified with 2 identifiers (name and  or MRN): Yes  Procedure and site verified with patient/designee (when able): Yes  Accurate consent documentation in medical record: Yes  H&P (or appropriate assessment) documented in medical record: Yes  H&P must be up to 20 days prior to procedure and updates within 24 hours of procedure as applicable: Yes  Relevant diagnostic and radiology test results appropriately labeled and displayed as applicable: Yes  Procedure site(s) marked with provider initials: Yes    TIMEOUT:  Time-Out performed immediately prior to starting procedure, including verbal and active participation of all team members addressing the following:Yes  * Correct patient identify  * Confirmed that the correct side and site are  marked  * An accurate procedure consent form  * Agreement on the procedure to be done  * Correct patient position  * Relevant images and results are properly labeled and appropriately displayed  * The need to administer antibiotics or fluids for irrigation purposes during the procedure as applicable   * Safety precautions based on patient history or medication use    DURING PROCEDURE: Verification of correct person, site, and procedures any time the responsibility for care of the patient is transferred to another member of the care team.       The following medications were given:         Prior to injection, verified patient identity using patient's name and date of birth.  Due to injection administration, patient instructed to remain in clinic for 15 minutes  afterwards, and to report any adverse reaction to me immediately.    Joint injection was performed.    Medication Name: Celestone NDC: 8265-0129-19  Drug Amount Wasted:  Yes: 4.5 mg/ml   Vial/Syringe: Single dose vial  Expiration Date:  7/1/22    Medication Name: Lidocaine NDC: 78598-077-93    Scribed by Mi Deal ATC for Dr. Spears on January 31, 2022 at 3:00 pm based on the provider's statements to me.     Mi Deal ATC

## 2022-02-03 DIAGNOSIS — M54.50 BILATERAL LOW BACK PAIN WITHOUT SCIATICA, UNSPECIFIED CHRONICITY: ICD-10-CM

## 2022-02-04 RX ORDER — MELOXICAM 15 MG/1
15 TABLET ORAL DAILY
Qty: 21 TABLET | Refills: 0 | Status: SHIPPED | OUTPATIENT
Start: 2022-02-04 | End: 2022-03-03

## 2022-02-04 NOTE — TELEPHONE ENCOUNTER
Meloxicam (Mobic) 15 mg    Last Office Visit: 1/17/22 (Virtual)  Future [facility] Appointments: None  Medication last refilled: 1/17/22 #21 with 0 refill(s)    Vital Signs 10/18/2021 11/4/2021 1/5/2022 1/12/22   Systolic 131 127 132 154   Diastolic 94 89 85 86     Required labs per protocol:    DRUG REF RANGE 3/16/21 6/23/21   Creatinine 0.8-1.25 mg/dL 0.75 0.82   ALT 0-70 U/L 19 --   AST 0-45 U/L 7 --     Routing refill request to provider for review/approval because:  This was prescribed for an acute sciatica.  Is it okay to renew or does she need to be seen again?    Whitney Shah, RN, BSN

## 2022-02-04 NOTE — TELEPHONE ENCOUNTER
Med refilled as requested. Would consider meeting with Maryanne again if symptoms continued unabated.     Maryanne was seen today for refill request.    Diagnoses and all orders for this visit:    Bilateral low back pain without sciatica, unspecified chronicity  -     meloxicam (MOBIC) 15 MG tablet; Take 1 tablet (15 mg) by mouth daily      Senthil Cano MD  1:22 PM, February 4, 2022

## 2022-02-06 RX ORDER — BETAMETHASONE SODIUM PHOSPHATE AND BETAMETHASONE ACETATE 3; 3 MG/ML; MG/ML
6 INJECTION, SUSPENSION INTRA-ARTICULAR; INTRALESIONAL; INTRAMUSCULAR; SOFT TISSUE
Status: SHIPPED | OUTPATIENT
Start: 2022-01-31

## 2022-02-06 RX ORDER — LIDOCAINE HYDROCHLORIDE 10 MG/ML
0.5 INJECTION, SOLUTION EPIDURAL; INFILTRATION; INTRACAUDAL; PERINEURAL
Status: SHIPPED | OUTPATIENT
Start: 2022-01-31

## 2022-02-07 NOTE — PROGRESS NOTES
"Hand Surgery Clinic Progress Note    REFERRING PHYSICIAN: Scott Morgan   PRIMARY CARE PHYSICIAN: Senthil Cano     Chief Complaint:   RECHECK (FU CTS )    History of Present Illness:     Maryanne Crockett is a 41 year old right-hand dominant female who presents for evaluation of right hand pain and weakness.  The patient states that in 2013 she had surgical excision of a volar wrist ganglion cyst which was complicated by complex regional pain syndrome.  She states she underwent several years of therapy and treatment for her CRPS.  This included desensitization, steroid injections, Botox injections.  She states that over the past several years she has had weakness, hypersensitivity, and diffuse pain in the right wrist.  She has thus needed to use her nondominant left hand more often, which has caused some intermittent discomfort.  Patient also states that she has numbness and tingling in the hand.  This is nonlocalizing on the right side, however in the left side this is in the thumb index and middle finger and will occasionally wake her up at night.  Patient had an EMG obtained in 2015 which was normal per chart review.  Patient is followed by neurology as well as pain management.  She has been taking gabapentin for her CRPS, which she feels does not help much.    Patient has a complex medical history including multiple cerebral aneurysms.  Patient states that she while her symptoms have been present for many years, she thought that she would always die young due to her aneurysms.  She was recently told that she likely has a good prognosis and \"has half of her life left to live\".  This made her want to seek treatment for her hand hypersensitivity, pain, and weakness.    Patient states that due to the pain, weakness, and hypersensitivity, she is unable to hold any items in her hand.  She uses her forearm to support items.  Cold air, air a hair dryer, or light touch cause significant pain to the hand.  This occasionally " radiates up into the forearm.  Today she points to the base of her thumb as area of pain.  Gripping items any range of motion cause pain in this area.    She had a recent EMG/NCS completed 12/22/21. I previously called her with the results which showed prolonged latency in left median motor and sensory nerve conduction, no response left ulnar sensory nerve in the palm, with normal EMG. These were not entirely consistent with her symptoms.     Today, she describes pain primarily at the base of the thumb. She describes pain aggravated by certain activities. She has never had any treatment for thumb CMC arthritis.      Physical Exam:   Physical Exam Adult:  General: Well-nourished, alert, cooperative with exam and in no acute distress  HEENT: Atraumatic, normocephalic, extraocular movements intact, moist mucous membranes, trachea midline  Pulmonary: Unlabored work of breathing, on room air  Cardiovascular: Warm and well-perfused extremities. 2+ radial pulses  Skin: Warm, intact without rashes to the upper extremities, head or neck   Gait: Normal  Neuro/psych: Oriented to time, place and person. Affect is appropriate    Musculoskeletal: A focused physical examination of the right upper extremity reveals:   Inspection -no obvious deformity.  No ecchymosis or edema.  No hyperemia.  No atrophy of thenar's or intrinsics.  Palpation - Tender to palpation at the CMC, positive grind, negative Finkelstein's, no tenderness at radial styloid, scaphoid tubercle, snuffbox.  No pain at DRUJ, no DRUJ instability, no pain with palpation at the fovea, Pisa form, SL interval, metacarpals, remaining hand.  Neurovascular- intact light touch sensation and motor to distribution of the median, ulnar and radial nerves.  Two-point discrimination throughout the median ulnar nerve distributions bilaterally 2-3 mm, symmetric.  Brisk capillary refill to the distal fingers. 2+ radial pulse.   Range of Motion: Pain with end extension of fingers.   Tenderness of the wrist.  Pain with CMC grind.  Pain with end flexion and extension.    Muscle strength and tone: 4/5 strength EPL, FPL, APB, first dorsal interosseous.             Negative Moe's.  Negative ulnar impaction.  Negative Finkelstein's.  Negative Tinel's at the carpal tunnel.  Negative Phalen's.    Imaging:   3 view X-ray of the right thumb 11/22/21 was independently interpreted by me. The results were discussed with the patient. Mild subluxation of thumb CMC joint.    Assessment and Plan:   Assessment:  41 year old female with 9-year history of right wrist and hand pain, hypersensitivity, numbness and tingling.  History of right volar wrist ganglion cyst excision complicated by CRPS. Left-sided nighttime numbness and tingling consistent with carpal tunnel syndrome, demonstrated on EMG 12/2021. 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     Discussed conservative management of CMC arthritis. Referral placed for splinting, dynamic strengthening, and nerve gliding for carpal tunnel.    Discussed injections. Patient desires one today. After obtaining written consent, the skin over the thumb CMC joint was cleansed with chlorhexidine. The skin was anesthetized with ethyl chloride freeze spray, after which 0.5cc of betamethasone and 0.5cc of 1% lidocaine were injected into the CMC joint. The patient tolerated the procedure well and there were no complications.    Patient provided with nocturnal splints for carpal tunnel syndrome.     Follow-up as needed if symptoms do not improve.    All questions were answered and the patient was in agreement with the plan.     Jonah Spears MD    Hand, Upper Extremity & Microvascular Surgery  Department of Orthopedic Surgery  [facility]

## 2022-02-14 DIAGNOSIS — M54.50 BILATERAL LOW BACK PAIN WITHOUT SCIATICA, UNSPECIFIED CHRONICITY: ICD-10-CM

## 2022-02-14 NOTE — TELEPHONE ENCOUNTER
Last time prescribed: 1/6/22 , 20 tabs x 0 refills  Last office visit: 1/17/22  Next appointment: No future appointments    Pharmacy and patient notified - no refill, patient needs to follow up with Spine referral. She had an appointment and was a NO-SHOW for that appointment. Called patient and advised she schedule with the spine clinic. No refills at this time.     Fatou Ma MS RN-BC  02/15/22  11:12 AM

## 2022-02-15 RX ORDER — CYCLOBENZAPRINE HCL 5 MG
5 TABLET ORAL 3 TIMES DAILY PRN
Qty: 20 TABLET | Refills: 0 | OUTPATIENT
Start: 2022-02-15

## 2022-02-20 ENCOUNTER — HEALTH MAINTENANCE LETTER (OUTPATIENT)
Age: 42
End: 2022-02-20

## 2022-03-02 DIAGNOSIS — M54.50 BILATERAL LOW BACK PAIN WITHOUT SCIATICA, UNSPECIFIED CHRONICITY: ICD-10-CM

## 2022-03-02 NOTE — TELEPHONE ENCOUNTER
Meloxicam (Mobic) 15 mg    Last Office Visit: 1/17/22 (Virtual)  [place] [facility] [place]  Medication last refilled: 2/4/22 #21 with 0 refill(s)    Vital Signs 10/18/21 11/4/21 1/5/2022   Systolic 131 127 132   Diastolic 94 89 85     Required labs per protocol:    LAB REF RANGE 6/5/18 6/24/20 8/3/21   ALT 0-70 U/L 15 27 --   AST 0-45 U/L 14 21 --   HCT 40.0 - 53.0 % 39.9 42.9 42.5   HGB 13.3 - 17.7 g/dL 13.5 13.8 14.3   Platelets 15.0 - 450.0 k/cmm 304 306 299     Routing refill request to provider for review/approval because:  Labs not current:  Needs CMP and CBC    Whitney Shah, RN, BSN

## 2022-03-03 PROBLEM — I67.1 CEREBRAL ANEURYSM, NONRUPTURED: Chronic | Status: ACTIVE | Noted: 2021-06-23

## 2022-03-03 RX ORDER — MELOXICAM 15 MG/1
15 TABLET ORAL DAILY
Qty: 7 TABLET | Refills: 0 | Status: SHIPPED | OUTPATIENT
Start: 2022-03-03 | End: 2022-05-09

## 2022-03-03 NOTE — CONFIDENTIAL NOTE
Refilling for 7 more days but no further refills without a visit to discuss the significant risks of long-term use of Mobic in combination with ASA and Plavix.  Message send to patient.    Tristian Mera MD on 3/3/2022 at 8:04 AM

## 2022-03-14 ENCOUNTER — TELEPHONE (OUTPATIENT)
Dept: NEUROLOGY | Facility: CLINIC | Age: 42
End: 2022-03-14
Payer: COMMERCIAL

## 2022-03-14 NOTE — TELEPHONE ENCOUNTER
LVM to schedule return appt w Dr. Fox, [facility], provided clinic number for call back.

## 2022-05-09 ENCOUNTER — OFFICE VISIT (OUTPATIENT)
Dept: FAMILY MEDICINE | Facility: CLINIC | Age: 42
End: 2022-05-09
Payer: COMMERCIAL

## 2022-05-09 VITALS
SYSTOLIC BLOOD PRESSURE: 153 MMHG | WEIGHT: 250.04 LBS | OXYGEN SATURATION: 97 % | DIASTOLIC BLOOD PRESSURE: 101 MMHG | TEMPERATURE: 98.1 F | HEIGHT: 69 IN | BODY MASS INDEX: 37.04 KG/M2 | HEART RATE: 113 BPM

## 2022-05-09 DIAGNOSIS — M54.2 CHRONIC NECK PAIN: Primary | ICD-10-CM

## 2022-05-09 DIAGNOSIS — G89.29 CHRONIC NECK PAIN: Primary | ICD-10-CM

## 2022-05-09 RX ORDER — NAPROXEN 500 MG/1
500 TABLET ORAL 2 TIMES DAILY WITH MEALS
Qty: 30 TABLET | Refills: 0 | Status: SHIPPED | OUTPATIENT
Start: 2022-05-09 | End: 2022-06-09

## 2022-05-09 ASSESSMENT — ASTHMA QUESTIONNAIRES
QUESTION_3 LAST FOUR WEEKS HOW OFTEN DID YOUR ASTHMA SYMPTOMS (WHEEZING, COUGHING, SHORTNESS OF BREATH, CHEST TIGHTNESS OR PAIN) WAKE YOU UP AT NIGHT OR EARLIER THAN USUAL IN THE MORNING: ONCE OR TWICE
QUESTION_4 LAST FOUR WEEKS HOW OFTEN HAVE YOU USED YOUR RESCUE INHALER OR NEBULIZER MEDICATION (SUCH AS ALBUTEROL): TWO OR THREE TIMES PER WEEK
QUESTION_5 LAST FOUR WEEKS HOW WOULD YOU RATE YOUR ASTHMA CONTROL: WELL CONTROLLED
ACT_TOTALSCORE: 19
QUESTION_2 LAST FOUR WEEKS HOW OFTEN HAVE YOU HAD SHORTNESS OF BREATH: ONCE OR TWICE A WEEK
QUESTION_1 LAST FOUR WEEKS HOW MUCH OF THE TIME DID YOUR ASTHMA KEEP YOU FROM GETTING AS MUCH DONE AT WORK, SCHOOL OR AT HOME: A LITTLE OF THE TIME
ACT_TOTALSCORE: 19

## 2022-05-09 NOTE — NURSING NOTE
"41 year old  Chief Complaint   Patient presents with     Headache     In the back of head and shoots down into her neck making her have neck pain and the left side     Forms       Blood pressure (!) 153/101, pulse 113, temperature 98.1  F (36.7  C), height 1.753 m (5' 9.02\"), weight 113.4 kg (250 lb 0.6 oz), last menstrual period 03/14/2018, SpO2 97 %, not currently breastfeeding. Body mass index is 36.91 kg/m .  Patient Active Problem List   Diagnosis     Nausea & vomiting     Convulsions, unspecified convulsion type (H)     Suicidal ideation     S/P hysterectomy     Attention deficit disorder     Complex regional pain syndrome type 1 of right upper extremity     Cerebral aneurysm, nonruptured     Family history of glioblastoma     Bipolar I disorder (H)     Smoking       Wt Readings from Last 2 Encounters:   05/09/22 113.4 kg (250 lb 0.6 oz)   01/12/22 108.9 kg (240 lb)     BP Readings from Last 3 Encounters:   05/09/22 (!) 153/101   01/12/22 (!) 152/86   11/04/21 127/89         Current Outpatient Medications   Medication     ALPRAZolam (XANAX) 1 MG tablet     amphetamine-dextroamphetamine (ADDERALL) 20 MG tablet     aspirin (ASA) 325 MG tablet     cloNIDine (CATAPRES) 0.1 MG tablet     clopidogrel (PLAVIX) 75 MG tablet     cyclobenzaprine (FLEXERIL) 5 MG tablet     lidocaine (XYLOCAINE) 5 % external ointment     medical cannabis (Patient's own supply)     meloxicam (MOBIC) 15 MG tablet     mometasone-formoterol (DULERA) 100-5 MCG/ACT inhaler     Multiple Vitamins-Minerals (MULTIVITAMIN ADULT PO)     nicotine (COMMIT) 2 MG lozenge     nicotine (NICODERM CQ) 7 MG/24HR 24 hr patch     ondansetron (ZOFRAN ODT) 8 MG ODT tab     ondansetron (ZOFRAN-ODT) 4 MG ODT tab     predniSONE (DELTASONE) 20 MG tablet     SAPHRIS 10 MG SUBL sublingual tablet     sertraline (ZOLOFT) 25 MG tablet     vitamin B-12 (CYANOCOBALAMIN) 1000 MCG tablet     Current Facility-Administered Medications   Medication     betamethasone acet & sod " phos (CELESTONE) injection 6 mg     lidocaine (PF) (XYLOCAINE) 1 % injection 0.5 mL       Social History     Tobacco Use     Smoking status: Current Some Day Smoker     Packs/day: 0.25     Smokeless tobacco: Never Used     Tobacco comment: Trying   Vaping Use     Vaping Use: Never used   Substance Use Topics     Alcohol use: Not Currently     Comment: rare     Drug use: Yes     Types: Marijuana     Comment: pt says she is prescribed medical marijuana       Health Maintenance Due   Topic Date Due     PREVENTIVE CARE VISIT  Never done     ASTHMA ACTION PLAN  Never done     ASTHMA CONTROL TEST  Never done     COVID-19 Vaccine (1) Never done     Pneumococcal Vaccine: Pediatrics (0 to 5 Years) and At-Risk Patients (6 to 64 Years) (1 - PCV) Never done     URINE DRUG SCREEN  06/23/2021       Lab Results   Component Value Date    PAP NIL 07/20/2020         May 9, 2022 2:37 PM

## 2022-05-09 NOTE — PROGRESS NOTES
ASSESSMENT AND PLAN:     (M54.2,  G89.29) Chronic neck pain  (primary encounter diagnosis)  Comment: Neck pain appears to be driving occipital headache.  Will give short course of high-dose Naproxen with PPI. We discussed the concern for bleeding with any longer use of NSAIDs due to Maryanne being on DAPT.  Sending to PT to start HEP to deal with chronic neck issues.   Plan: naproxen (NAPROSYN) 500 MG tablet, omeprazole         (PRILOSEC) 20 MG DR capsule, Physical Therapy         Referral    Maryanne requested today I fill out disability paperwork for emergency assistance through [facility]. I recommended scheduling a 40 minute follow up to talk through which conditions are disabling her. She's going to talk with her psychiatrist to see if they feel comfortable filling out the form.       Tristian Mera MD   [facility]  05/09/2022, 3:13 PM      SUBJECTIVE:   Maryanne is a 41 year old female who presents to clinic today for a return visit.    # Headache  - has chronic left neck pain in lower part of neck, limits her range of motion  - has had pain for years    - 8 days ago developed pain in occiput and higher up on neck, still on left side  - this is new and different, doesn't typically get headaches  - feels like a pressure, a throb, burning    - Diagnosed headache disorder: none  - had a seizure a few months ago, unsure how she fell, had a headache afterwards  - no other recent injuries    - Duration: 8 days ago, a little better today  - Location: back of neck    - Photophobia: no  - Phonophobia: no  - Nausea: no    - Worse with activity?: hasn't been active so unsure  - Change with position?: no, not worse with laying down or being upright   - worse with trying to move her neck, limited her neck movement  - Alleviating Factors: stretching neck helps, using neck brace helps, sleeping sitting up last night seems to have helped    - NSAID use: Tylenol 2 tablets every 4-6 hours - no  difference    - Vision: no changes  - Family history of headaches: no      2/24/21 MR Brain w/o contrast   Findings:   Diffusion weighted images demonstrate no definite acute infarct. Axial  FLAIR images are within normal limits. The ventricles are proportional  to the cerebral sulci. No evidence of intracranial hemorrhage on  susceptibility-weighted images. No mass effect or midline shift. No  extra-axial fluid collection. Fluid-filled left anterior ethmoid air  cells. The remaining paranasal sinuses are relatively clear. Mastoid  air cells are clear.                                                                   Impression:  No evidence of acute infarction or intracranial hemorrhage.    1/12/22 Cerebral angiogram  Impression:  1.  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   Plan:  1.  MRA COW in 1 year followed by clinic visit    2/11/19 CT Cervical Spine  FINDINGS: No visualized acute fracture of the cervical spine. Slight  gradual kyphosis of the cervical spine, possibly related to patient  positioning or muscle spasm. Otherwise, normal alignment of the  cervical spine. No prevertebral soft tissue swelling.       # Smoking  - quit date was 12/25/21  - unfortunately Maryanne was unable to quit  - takes low-dose Bupropion 150mg XL with psychiatrist    Patient Active Problem List   Diagnosis     Nausea & vomiting     Convulsions, unspecified convulsion type (H)     Suicidal ideation     S/P hysterectomy     Attention deficit disorder     Complex regional pain syndrome type 1 of right upper extremity     Cerebral aneurysm, nonruptured     Family history of glioblastoma     Bipolar I disorder (H)     Smoking     Current Outpatient Medications   Medication     ALPRAZolam (XANAX) 1 MG tablet     amphetamine-dextroamphetamine (ADDERALL) 20 MG tablet     aspirin (ASA) 325 MG tablet     cloNIDine (CATAPRES) 0.1 MG tablet      "clopidogrel (PLAVIX) 75 MG tablet     cyclobenzaprine (FLEXERIL) 5 MG tablet     lidocaine (XYLOCAINE) 5 % external ointment     medical cannabis (Patient's own supply)     meloxicam (MOBIC) 15 MG tablet     mometasone-formoterol (DULERA) 100-5 MCG/ACT inhaler     Multiple Vitamins-Minerals (MULTIVITAMIN ADULT PO)     nicotine (COMMIT) 2 MG lozenge     nicotine (NICODERM CQ) 7 MG/24HR 24 hr patch     ondansetron (ZOFRAN ODT) 8 MG ODT tab     ondansetron (ZOFRAN-ODT) 4 MG ODT tab     predniSONE (DELTASONE) 20 MG tablet     SAPHRIS 10 MG SUBL sublingual tablet     sertraline (ZOLOFT) 25 MG tablet     vitamin B-12 (CYANOCOBALAMIN) 1000 MCG tablet     Current Facility-Administered Medications   Medication     betamethasone acet & sod phos (CELESTONE) injection 6 mg     lidocaine (PF) (XYLOCAINE) 1 % injection 0.5 mL       I have reviewed the patient's relevant past medical history.     OBJECTIVE:   BP (!) 153/101   Pulse 113   Temp 98.1  F (36.7  C)   Ht 1.753 m (5' 9.02\")   Wt 113.4 kg (250 lb 0.6 oz)   LMP 03/14/2018 (Approximate)   SpO2 97%   BMI 36.91 kg/m      Constitutional: well-appearing, appears stated age  Eyes: conjunctivae without erythema, sclera anicteric.   Skin: no rashes, lesions, or wounds  Psych: affect is full and appropriate, speech is fluent and non-pressured    Neck:  ROM: flexion: full, extension: reduced, lateral rotation: R-full/ L-reduced, side bend: R-full/L-full.   Tenderness: Bony: no; Paraspinal: no; Muscular: YES - over left trapezius      "

## 2022-05-18 NOTE — PROGRESS NOTES
Physical Therapy Initial Examination/Evaluation  May 19, 2022    Maryanne Crockett is a 41 year old female referred to physical therapy by Tristian Mera MD for treatment of neck pain with Precautions/Restrictions/MD instructions Eval and treat.     Therapist Impression:   Patient has complaints of neck pain that has been ongoing for a few months, reports it may be from one of her seizures. Patient has also had headaches which is new, makes her anxious due to PMH of brain aneurysms and seizures. Patient is looking to be able to care for her two grandchildren 6 Month & 7yo. Patient has constant pain in posterior neck, pump up brace makes it better. Patient is also seeing hand therapy and psychiatry. Patient found to have impaired CROM, poor seated posture, tenderness to neck musculature and decreased joint mobility. Patient given HEP with supine cervical retraction and seated posture with lumbar roll. Patient responded poorly to manual suboccipital release, felt okay during, post felt increased pain and nausea.       Subjective:  DOI/onset: Months ago Order date: 05/09/22  Acute Injury or Gradual Onset?: Gradual injury over time  Mechanism of Injury: Maybe from a seizure   Related PMH: Ongoing neck pain  Imaging: None  Chief Complaint/Functional Limitations: neck pain and new onset of headaches and see below in therapy evaluation codes   Pain: rest 4 /10, activity 10/10 Location: Posterior C-spine down L side to UT Frequency: Constant Described as: stretching Previous Treatment: pump up neck brace, topical analgesic Effect of prior treatment: fair-good Alleviated by: Nothing Progression of Symptoms: Unchanged Time of day when pain is worse: All times of the day/constant  Sleeping: Interrupted due to current issue and Losing 3 hours of sleep per night   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, 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, 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 Job duties: prolonged sitting, traveling to MD appts  (Previously did nails, crafty)  Current HEP/exercise  "regimen: None   Patient's goals are see chief complaints \"To be able to turn my neck, not be in pain\"     Other pertinent PMH/Red Flags: Seizures, brain aneurysms  Barriers at home/work: Transportation  Pertinent Surgical History: OBGYN, gastric  Medications: See Epic  General health as reported by patient: fair  Return to MD:  PRN    Cervical Spine Evaluation    Posture  Forward Head: moderate  Rounded Shoulders: moderate   Slight R SB and R rotation at rest    Range of Motion (measured in % restriction)  Flexion: 25% limited, pain  Extension: 90% with pain  Sidebend Left: 75%  Right: 25%  Rotation Left: 60% with pain Right: 10%      Upper Extremity ROM * =painful  Shoulder MMT Flexion ER EXT/IR   Left 90* (felt nausea) 65 deg T9*   Right 90* (felt nausea) 65 deg T9*       Palpation  Severe tenderness to suboccipitals, HERNAN UTs, HERNAN scalenes, levators    Assessment/Plan:  Patient is a 41 year old female with cervical complaints.    Patient has the following significant findings with corresponding treatment plan.                Diagnosis 1:  Neck pain  Pain -  hot/cold therapy, manual therapy and directional preference exercise  Decreased ROM/flexibility - manual therapy and therapeutic exercise  Decreased function - therapeutic activities  Impaired posture - neuro re-education    Therapy Evaluation Codes:   1) History comprised of:   Personal factors that impact the plan of care:      Time since onset of symptoms.    Comorbidity factors that impact the plan of care are:      Depression, Migraines/headaches and Seizures.     Medications impacting care: See EPic.  2) Examination of Body Systems comprised of:   Body structures and functions that impact the plan of care:      Cervical spine. Shoulders.   Activity limitations that impact the plan of care are:      Bathing, Cooking, Driving, Dressing, Lifting, Reading/Computer work, Sitting, Stairs, Standing, Throwing, Walking, Working, Sleeping and Laying " down.  3) Clinical presentation characteristics are:   Evolving/Changing.  4) Decision-Making    Moderate complexity using standardized patient assessment instrument and/or measureable assessment of functional outcome.  Cumulative Therapy Evaluation is: Moderate complexity.    Previous and current functional limitations:  (See Goal Flow Sheet for this information)    Short term and Long term goals: (See Goal Flow Sheet for this information)     Communication ability:  Patient appears to be able to clearly communicate and understand verbal and written communication and follow directions correctly.  Treatment Explanation - The following has been discussed with the patient:   RX ordered/plan of care  Anticipated outcomes  Possible risks and side effects  This patient would benefit from PT intervention to resume normal activities.   Rehab potential is good.    Frequency:  1 X week, once daily  Duration:  for x4 weeks tapering to 2x/month x2 months  Discharge Plan:  Achieve all LTG.  Independent in home treatment program.  Reach maximal therapeutic benefit.    Please refer to the daily flowsheet for treatment today, total treatment time and time spent performing 1:1 timed codes.     .

## 2022-05-19 ENCOUNTER — THERAPY VISIT (OUTPATIENT)
Dept: OCCUPATIONAL THERAPY | Facility: CLINIC | Age: 42
End: 2022-05-19
Attending: STUDENT IN AN ORGANIZED HEALTH CARE EDUCATION/TRAINING PROGRAM
Payer: COMMERCIAL

## 2022-05-19 ENCOUNTER — THERAPY VISIT (OUTPATIENT)
Dept: PHYSICAL THERAPY | Facility: CLINIC | Age: 42
End: 2022-05-19
Attending: FAMILY MEDICINE
Payer: COMMERCIAL

## 2022-05-19 DIAGNOSIS — M79.641 CHRONIC HAND PAIN, RIGHT: ICD-10-CM

## 2022-05-19 DIAGNOSIS — M54.2 CHRONIC NECK PAIN: ICD-10-CM

## 2022-05-19 DIAGNOSIS — M79.644 CHRONIC PAIN OF RIGHT THUMB: ICD-10-CM

## 2022-05-19 DIAGNOSIS — G89.29 CHRONIC HAND PAIN, RIGHT: ICD-10-CM

## 2022-05-19 DIAGNOSIS — G89.29 CHRONIC THUMB PAIN, RIGHT: ICD-10-CM

## 2022-05-19 DIAGNOSIS — M79.644 CHRONIC THUMB PAIN, RIGHT: ICD-10-CM

## 2022-05-19 DIAGNOSIS — G89.29 CHRONIC PAIN OF RIGHT THUMB: ICD-10-CM

## 2022-05-19 DIAGNOSIS — G89.29 CHRONIC NECK PAIN: ICD-10-CM

## 2022-05-19 PROCEDURE — 97165 OT EVAL LOW COMPLEX 30 MIN: CPT | Mod: GO | Performed by: OCCUPATIONAL THERAPIST

## 2022-05-19 PROCEDURE — 97760 ORTHOTIC MGMT&TRAING 1ST ENC: CPT | Mod: GO | Performed by: OCCUPATIONAL THERAPIST

## 2022-05-19 PROCEDURE — 97110 THERAPEUTIC EXERCISES: CPT | Mod: GP

## 2022-05-19 PROCEDURE — 97161 PT EVAL LOW COMPLEX 20 MIN: CPT | Mod: GP

## 2022-05-19 NOTE — PROGRESS NOTES
Hand Therapy Initial Evaluation    Current Date:  5/19/2022    Diagnosis: Chronic pain of right thumb  DOI: Ongoing for years (Order date: 1/31/2022)  Procedure:  Right thumb CMC joint cortisone injection  Procedure date: 1/31/2022  Referring physician: Jonah Spears MD    Precautions: History of complex regional pain syndrome    Subjective:  Maryanne Crockett is a 41 year old female.    Patient reports symptoms of the right thumb which occurred following a surgical excision of a volar wrist ganglion cyst in 2013. She developed complex regional pain syndrome and underwent several years of therapy and treatment including desensitization, steroid injections, and Botox injections. Patient also reports numbness and tingling of bilateral hands. This is non-localizing on the right hand and in the thumb, index, and middle fingers of the left hand. Since onset symptoms are gradually getting worse.  General health as reported by patient is fair.  Pertinent medical history includes:  Past Medical History:   Diagnosis Date     Bipolar I disorder (H)      Cerebral aneurysm, nonruptured      Family history of glioblastoma     screening MRIs every 2 years     Ovarian cyst      RSD (reflex sympathetic dystrophy)      Seizures (H)      Past Surgical History:   Procedure Laterality Date     CHOLECYSTECTOMY       CYSTOSCOPY N/A 09/01/2020    Procedure: CYSTOSCOPY;  Surgeon: Hayley Zayas MD;  Location: [facility]     DAVINCI HYSTERECTOMY TOTAL, BILATERAL SALPINGO-OOPHORECTOMY, COMBINED Left 09/01/2020    Procedure: HYSTERECTOMY, TOTAL, ROBOT-ASSISTED, LAPAROSCOPIC,  left oophorectomy;  Surgeon: Hayley Zayas MD;  Location: [facility] OR     DILATION AND CURETTAGE SUCTION  04/30/2015    retained POC     GASTRIC RESTRICTIVE PROCEDURE, OPEN, REMOVE/REPLACE SUBCUTANEOUS PORT       IR CAROTID CEREBRAL ANGIOGRAM BILATERAL  03/19/2021     IR CAROTID CEREBRAL ANGIOGRAM BILATERAL  06/23/2021     IR CAROTID CEREBRAL ANGIOGRAM BILATERAL   1/12/2022     LAPAROSCOPIC OOPHORECTOMY Right 08/05/2016    Procedure: LAPAROSCOPIC OOPHORECTOMY;  Surgeon: Adrianne Vergara MD;  Location: [facility]     LAPAROSCOPIC REMOVAL GASTRIC ADJUSTABLE BAND N/A 05/11/2021    Procedure: REMOVAL, GASTRIC BAND, ADJUSTABLE, LAPAROSCOPIC;  Surgeon: Remy Gold MD;  [facility]     LAPAROSCOPIC SALPINGECTOMY Bilateral 08/05/2016    Procedure: LAPAROSCOPIC SALPINGECTOMY;  Surgeon: Adrianne Vergara MD;  [facility]     LAPAROSCOPIC TUBAL LIGATION Bilateral 05/22/2015    Procedure: LAPAROSCOPIC TUBAL LIGATION;  Surgeon: Hayley Zayas MD;  Location: [facility]     LAPAROSCOPY OPERATIVE ADULT N/A 08/05/2016    Procedure: LAPAROSCOPY OPERATIVE ADULT;  Surgeon: Adrianne Vergara MD;  Location: [facility]     PANNICULECTOMY  04/22/2017    [facility]     SOFT TISSUE SURGERY Right     cyst in hand     Current Outpatient Medications   Medication     ALPRAZolam (XANAX) 1 MG tablet     amphetamine-dextroamphetamine (ADDERALL) 20 MG tablet     aspirin (ASA) 325 MG tablet     cloNIDine (CATAPRES) 0.1 MG tablet     clopidogrel (PLAVIX) 75 MG tablet     cyclobenzaprine (FLEXERIL) 5 MG tablet     lidocaine (XYLOCAINE) 5 % external ointment     medical cannabis (Patient's own supply)     mometasone-formoterol (DULERA) 100-5 MCG/ACT inhaler     Multiple Vitamins-Minerals (MULTIVITAMIN ADULT PO)     naproxen (NAPROSYN) 500 MG tablet     nicotine (COMMIT) 2 MG lozenge     nicotine (NICODERM CQ) 7 MG/24HR 24 hr patch     omeprazole (PRILOSEC) 20 MG DR capsule     ondansetron (ZOFRAN ODT) 8 MG ODT tab     ondansetron (ZOFRAN-ODT) 4 MG ODT tab     SAPHRIS 10 MG SUBL sublingual tablet     sertraline (ZOLOFT) 25 MG tablet     vitamin B-12 (CYANOCOBALAMIN) 1000 MCG tablet     Current Facility-Administered Medications   Medication     betamethasone acet & sod phos (CELESTONE) injection 6 mg     lidocaine (PF) (XYLOCAINE) 1 % injection 0.5 mL      Allergies   Allergen Reactions     Ibuprofen GI  "Disturbance     Seroquel [Quetiapine] Other (See Comments) and Rash     Insomnia and rash         Occupational Profile Information:  Right hand dominant, uses left hand more often due to right hand pain  Current occupation is a   Job Tasks: Prolonged Sitting, Repetitive Tasks  Currently not working due to present treatment problem  Prior functional level:  independent-shared household chores  Mobility: No difficulty  Transportation: drives  Barriers include: none  Leisure activities/hobbies: Spending time with children and grandchildren, volleyball, crafting    Patient reports symptoms of pain, stiffness/loss of motion and weakness/loss of strength  Patient reported occupational performance deficits: bathing, dressing, eating/feeding self, hygiene, toileting and household chores  Special tests:  x-ray and EMG. Per report, \"no acute osseous abnormality. Mild first interphalangeal joint degenerative change. An EMG completed on 12/22/21 demonstrated \"Prolonged latency in the left median motor and sensory nerve conduction, no response left ulnar sensory nerve in the palm, with normal EMG.\"  Previous treatment: Hand therapy in 2013, previous cortisone and Botox injections  Other: Pain and difficulty opening jars/cans, buttoning pants, lifting purse or gallon of milk; drops items frequently. Patient reports hand can become discolored (turned purple) and become \"freezing cold.\"      Functional Outcome Measure:   Upper Extremity Functional Index Score:  SCORE: 15/80   (A lower score indicates greater disability.)          Objective:  Pain Level (Scale 0-10)   5/19/2022   At Rest 3/10   With Use Up to 6/10     Pain Description  Date 5/19/2022   Location Thumb CMC joint, small finger   Pain Quality \"Pulling, rubber band stretching, radiating.   Frequency constant     Pain is worst  daytime   Exacerbated by Use, lifting, gripping   Relieved by Rest, warmth   Progression Gradually worsening     ROM  Thumb " 5/19/2022 5/19/2022   AROM  (PROM) Left Right   MP 0/45 0/30   IP 0/30 0/15   RABD 55 37++, radial aspect of thumb   PABD 45 40++, radial aspect of thumb   Kapandji Opposition Scale (0-10/10) Small finger MP joint Tip of small finger     Thumb Observation/Appearance   - none  + mild    ++ moderate    +++ severe     5/19/2022   Shoulder deformity present over CMC R: Slight  L: -   Edema over the CMC joint R: -  L: -   Noted collapse of MP into hyperextension during pinch R: -  L: -      Provocative Tests  Pain Report:  - none    + mild    ++ moderate    +++ severe     5/19/2022   CMC Adduction Stress Test R: +  L: -   CMC Extension Stress Test R: +  L: -       Strength   (Measured in pounds)  Pain Report: - none  + mild    ++ moderate    +++ severe    5/19/2022 5/19/2022   Trials Left Right   1 8++ 6++  Adapted  without thumb     Lat Pinch 5/19/2022 5/19/2022   Trials Left Right   1 3 2  Radial aspect of thumb     3 Pt Pinch 5/19/2022 5/19/2022   Trials Left Right   1 4 2     Palpation   Pain Report:  - none    + mild    ++ moderate    +++ severe    5/19/2022   CMC Joint Line R: +  L: -   Thenar Eminence R: +  L: -   Web Space R: +  L: -   1st DC R: -  L: -     Assessment:  Patient presents with symptoms consistent with diagnosis of the above condition, with conservative intervention.    Patient's limitations or Problem List includes:  Pain, Decreased ROM/motion, Sensory disturbance, Decreased , Decreased pinch, Decreased coordination, Decreased dexterity and Tightness in musculature of the right hand and thumb which interferes with the patient's ability to perform Self Care Tasks (dressing, eating, bathing, hygiene/toileting), Work Tasks, Sleep Patterns, Recreational Activities, Household Chores and Driving  as compared to previous level of function.    Rehab Potential:  Good - Return to full activity, some limitations    Patient will benefit from skilled Occupational Therapy to increase ROM,   strength, pinch strength, stability of thumb, coordination, dexterity and sensation and decrease pain to return to previous activity level and resume normal daily tasks and to reach their rehab potential.    Barriers to Learning:  No barrier    Communication Issues:  Patient appears to be able to clearly communicate and understand verbal and written communication and follow directions correctly.    Chart Review: Chart Review    Identified Performance Deficits: bathing/showering, toileting, dressing, feeding, hygiene and grooming, child rearing, driving and community mobility, home establishment and management, meal preparation and cleanup, shopping, sleep, work and leisure activities    Assessment of Occupational Performance:  1-3 Performance Deficits    Clinical Decision Making (Complexity): Low complexity    Treatment Explanation:  The following has been discussed with the patient:    RX ordered/plan of care  Anticipated outcomes  Possible risks and side effects    Plan:  Frequency:  1 X week, once daily  Duration:  for 6 weeks    Treatment Plan:    Modalities:    US and Paraffin   Therapeutic Exercise:    AROM, AAROM, PROM, Tendon Gliding, Isotonics and Isometrics  Therapeutic Activities:   Functional activities   Neuromuscular re-ed:   Nerve Gliding, Desensitization and Kinesiotaping  Manual Techniques:   Joint mobilization and Myofascial release  Orthotic Fabrication:    Hand-based thumb spica orthosis and others as indicated  Self Care:    Joint protection techniques    Discharge Plan:  Achieve all LTG  George in home treatment program.  Reach maximal therapeutic benefit.    Home Program:  Thumb spica orthosis, wear at night and during painful activities    Next Visit:  Thumb stability program  Thumb adductor release

## 2022-05-19 NOTE — PROGRESS NOTES
[facility]    OUTPATIENT Occupational Therapy ORTHOPEDIC EVALUATION  PLAN OF TREATMENT FOR OUTPATIENT REHABILITATION  (COMPLETE FOR INITIAL CLAIMS ONLY)  Patient's Last Name, First Name, M.I.  YOB: 1980  SelfMaryanne    Provider s Name:  ELENA [facility]   Medical Record No.  [id number]   Start of Care Date:  05/19/22   Onset Date:       Type:     ___PT   _X__OT Medical Diagnosis:    Encounter Diagnoses   Name Primary?    Chronic pain of right thumb     Chronic hand pain, right     Chronic thumb pain, right         Treatment Diagnosis:  Right chronic thumb pain        Goals:     05/19/22 0500   Goal #1   Goal #1 dressing   Previous Performance Level Unable   Current Functional Task Button   Current Performance Level Unable to button pants due to right hand pain.   STG [employer] Performance Pants   STG [employer] Perform Level Able to button pants with 4/10 pain.   Due Date  06/18/22   LTG [employer] Task/Performance Pain free dressing   Due date 07/18/22   Goals #2   Goal #2 household chores   Previous Performance Level Unable   Current Functional Task Lift   Current Performance Level Unable to lift a shopping bag with right hand.   STG [employer] Perfomance Shopping bag   STG [employer] Perform Level Able to lift a shopping bag weighing less than 2 pounds with right hand.   Due Date 07/03/22   LTG [employer] Task/Performance Other - on additional line   Other Household Chores Able to lift a shopping bag weighing 5 pounds with right hand.   Due Date 07/28/22       Therapy Frequency:  1x/week  Predicted Duration of Therapy Intervention:  6 weeks    Pamela Nava OT, CHT                 I CERTIFY THE NEED FOR THESE SERVICES FURNISHED UNDER        THIS PLAN OF TREATMENT AND WHILE UNDER MY CARE     (Physician attestation of this document indicates review and certification of the therapy plan).                      Certification Date From:  05/19/22   Certification Date To:  06/30/22    Referring Provider:  Jonah Spears MD    Initial Assessment        See Epic Evaluation SOC Date: 05/19/22

## 2022-05-19 NOTE — PROGRESS NOTES
[facility]    OUTPATIENT Physical Therapy ORTHOPEDIC EVALUATION  PLAN OF TREATMENT FOR OUTPATIENT REHABILITATION  (COMPLETE FOR INITIAL CLAIMS ONLY)  Patient's Last Name, First Name, M.I.  YOB: 1980  SelfMaryanne    Provider s Name:  ELENA [facility]   Medical Record No.  [id number]   Start of Care Date:  05/19/22   Onset Date:   05/09/22 (Order date)   Type:     _X__PT   ___OT Medical Diagnosis:    Encounter Diagnosis   Name Primary?    Chronic neck pain         Treatment Diagnosis:  Neck pain        Goals:     05/19/22 0500   Body Part   Goals listed below are for Neck   Goal #1   Goal #1 sleeping   Previous Functional Level Due to other reasons, patient gets up the following number of times per night   Performance Level 3x/week   Current Functional Level 2-3 hours without sleep per night   Performance Level Pain 8/10   STG [employer] Performance 1-2 hours without sleep per night   Performance Level 4/10   Rationale to establish restorative sleep pattern   Due Date 06/16/22   LTG [employer] Performance 1-2 hours without sleep per night   Performance level Pain 3/10   Rationale to establish restorative sleep pattern   Due Date 08/17/22       Therapy Frequency:  1x/week  Predicted Duration of Therapy Intervention:  x4 weeks tapering to 2x/month x2 months    Katya Bennett, PT                 I CERTIFY THE NEED FOR THESE SERVICES FURNISHED UNDER        THIS PLAN OF TREATMENT AND WHILE UNDER MY CARE     (Physician attestation of this document indicates review and certification of the therapy plan).                     Certification Date From:  05/19/22   Certification Date To:  08/17/22    Referring Provider:  Tristian Mera    Initial Assessment        See Epic Evaluation SOC Date: 05/19/22

## 2022-06-08 ASSESSMENT — ENCOUNTER SYMPTOMS
LOSS OF CONSCIOUSNESS: 1
EYE IRRITATION: 0
FEVER: 0
SPEECH CHANGE: 0
EYE PAIN: 0
JOINT SWELLING: 1
MUSCLE CRAMPS: 0
PARALYSIS: 0
MEMORY LOSS: 1
MUSCLE WEAKNESS: 1
ARTHRALGIAS: 1
HALLUCINATIONS: 0
INCREASED ENERGY: 1
HEADACHES: 1
NECK PAIN: 1
ALTERED TEMPERATURE REGULATION: 1
TREMORS: 1
FATIGUE: 1
SEIZURES: 1
DIZZINESS: 1
MYALGIAS: 1
TINGLING: 1
POLYDIPSIA: 1
DOUBLE VISION: 1
POLYPHAGIA: 0
DISTURBANCES IN COORDINATION: 1
WEIGHT LOSS: 0
EYE WATERING: 0
CHILLS: 0
WEIGHT GAIN: 1
STIFFNESS: 1
NIGHT SWEATS: 1
DECREASED APPETITE: 0
BACK PAIN: 0
WEAKNESS: 1
EYE REDNESS: 0
NUMBNESS: 1

## 2022-06-08 ASSESSMENT — PATIENT HEALTH QUESTIONNAIRE - PHQ9
SUM OF ALL RESPONSES TO PHQ QUESTIONS 1-9: 13
10. IF YOU CHECKED OFF ANY PROBLEMS, HOW DIFFICULT HAVE THESE PROBLEMS MADE IT FOR YOU TO DO YOUR WORK, TAKE CARE OF THINGS AT HOME, OR GET ALONG WITH OTHER PEOPLE: SOMEWHAT DIFFICULT
SUM OF ALL RESPONSES TO PHQ QUESTIONS 1-9: 13

## 2022-06-08 ASSESSMENT — ANXIETY QUESTIONNAIRES
7. FEELING AFRAID AS IF SOMETHING AWFUL MIGHT HAPPEN: MORE THAN HALF THE DAYS
3. WORRYING TOO MUCH ABOUT DIFFERENT THINGS: SEVERAL DAYS
GAD7 TOTAL SCORE: 12
4. TROUBLE RELAXING: NEARLY EVERY DAY
8. IF YOU CHECKED OFF ANY PROBLEMS, HOW DIFFICULT HAVE THESE MADE IT FOR YOU TO DO YOUR WORK, TAKE CARE OF THINGS AT HOME, OR GET ALONG WITH OTHER PEOPLE?: SOMEWHAT DIFFICULT
7. FEELING AFRAID AS IF SOMETHING AWFUL MIGHT HAPPEN: MORE THAN HALF THE DAYS
6. BECOMING EASILY ANNOYED OR IRRITABLE: NEARLY EVERY DAY
1. FEELING NERVOUS, ANXIOUS, OR ON EDGE: SEVERAL DAYS
GAD7 TOTAL SCORE: 12
2. NOT BEING ABLE TO STOP OR CONTROL WORRYING: SEVERAL DAYS
GAD7 TOTAL SCORE: 12
5. BEING SO RESTLESS THAT IT IS HARD TO SIT STILL: SEVERAL DAYS

## 2022-06-09 ENCOUNTER — OFFICE VISIT (OUTPATIENT)
Dept: ANESTHESIOLOGY | Facility: CLINIC | Age: 42
End: 2022-06-09
Payer: COMMERCIAL

## 2022-06-09 VITALS — HEART RATE: 83 BPM | SYSTOLIC BLOOD PRESSURE: 142 MMHG | OXYGEN SATURATION: 99 % | DIASTOLIC BLOOD PRESSURE: 93 MMHG

## 2022-06-09 DIAGNOSIS — M54.81 OCCIPITAL NEURALGIA OF LEFT SIDE: Primary | ICD-10-CM

## 2022-06-09 PROCEDURE — 99214 OFFICE O/P EST MOD 30 MIN: CPT | Performed by: ANESTHESIOLOGY

## 2022-06-09 RX ORDER — PREGABALIN 50 MG/1
50 CAPSULE ORAL 2 TIMES DAILY
Qty: 60 CAPSULE | Refills: 0 | Status: SHIPPED | OUTPATIENT
Start: 2022-06-09 | End: 2022-07-01

## 2022-06-09 RX ORDER — DIAZEPAM 5 MG
5 TABLET ORAL ONCE
Status: CANCELLED | OUTPATIENT
Start: 2022-06-09 | End: 2022-06-09

## 2022-06-09 ASSESSMENT — PAIN SCALES - GENERAL: PAINLEVEL: SEVERE PAIN (6)

## 2022-06-09 NOTE — NURSING NOTE
RN read through the instructions with the patient for the recommended procedure: Occipital Nerve Block  Patient verbalized understanding to holding appropriate medication per protocol and was agreeable to NPO policy and needing a .    Anticoagulant: Plavix hold 7 days    Recommended Follow Up:  6-8 weeks after procedure    Ximena Bradford RN

## 2022-06-09 NOTE — NURSING NOTE
Patient presents with:  Follow Up: Follow-up RM 10 Head and Neck Pain Level 6/10      Severe Pain (6)     Pain Medications     Salicylates Refills Start End     aspirin (ASA) 325 MG tablet    3 3/30/2021     Sig - Route: Take 1 tablet (325 mg) by mouth daily - Oral    Class: E-Prescribe    Notes to Pharmacy: Please start taking 7 days prior to angiogram procedure          What medications are you using for pain? Tylenol    (New patients only) Have you been seen by another pain clinic/ provider? No    (Return Patients only) What refills are you needing today? No
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
## 2022-06-09 NOTE — PATIENT INSTRUCTIONS
Medications:    Lyrica 50 mg capsules- Start with 50 mg at bedtime. After 3-4 days if tolerating okay, increase to 50 mg in am and 50 mg at bedtime.      *Please provide the clinic with a minium of 1 week notice, on all prescription refills.       Procedures:    Call to schedule your procedure: [phone], option #2      Occipital nerve block ultrasound    Your pre-procedure instructions are below, please call our clinic if you have any questions.        Recommended Follow up:      Follow up 6-8 weeks after procedure.          Please call [phone], option #1 to schedule your clinic appointment if you don't already have an appointment scheduled.        Procedure Information related to COVID-19    Please call [phone] to schedule, reschedule, or cancel your procedure appointment.   Phones are answered Monday - Friday from 08:00 - 4:30pm.  Leave a voicemail with your name, birth date, and phone number if no one is available to take your call.     You will need to be tested for COVID-19 within 4 days (96 hours) of your procedure.  You will be called to schedule your COVID test by a central scheduling team. If you have not been contacted to schedule your test within 4 days of the scheduled procedure, call [phone]    Please be aware that the turn around time for the test is approximately 24-72 hours.   If your results are still pending the day of your procedure, you will be notified as soon as possible as the procedure may be cancelled.    Please note: You will only be contacted for positive and pending results.     The procedure center staff will call you several days before the procedure to review important information that you will need to know for the day of the procedure.   Please contact the clinic if you have further questions about this information.       Information related to Scheduling and Pre-Procedure Instructions:    Please Hold Plavix for 7 days before your procedure.      After calling to  schedule your procedure appointment, please contact the clinic to make your follow up clinic appointment 6-8 weeks after the procedure.  Clinic phone- [phone]      If you are having a Diagnostic procedure, you will be given a Pain Diary to document your pain relief.   Please fax the completed form to our office.   fax number is [phone]    If you must reschedule your procedure more than two times, you must follow up in clinic before rescheduling again.        Preparing for your procedure    CAUTION - FAILURE TO FOLLOW THESE PRE-PROCEDURE INSTRUCTIONS WILL RESULT IN YOUR PROCEDURE BEING RESCHEDULED.      Your procedure: Occipital Nerve Block with Ultrasound      Pregnancy  If you are pregnant, or think you may be pregnant, please notify our staff. This may or may not affect the ability to perform the procedure.       You must have a  take you home after your procedure. Transportation by taxi or para-transit is okay as long as you have a responsible adult accompany you. You must provide your 's full name and contact number at time of check in.     Fasting Protocol Please have nothing to eat and drink for 2 hours before the procedure.      Medications If you take any medications, DO NOT STOP. Take your medications as usual the day of your procedure with a sip of water AT LEAST 2 HOURS PRIOR TO ARRIVAL.    Antibiotics If you are currently taking antibiotics, you must complete the entire dose 7 days prior to your scheduled procedure. You must be clear of any signs or symptoms of infection. If you begin antibiotics, please contact our clinic for instructions.     Fever, Chills, or Rash If you experience a fever of higher than 100 degrees, chills, rash, or open wounds during the one week before your procedure, please call the clinic to see if you may proceed with your procedure.      Medication Hold List  **Patients under Cardiology/Neurology care should consult their provider prior to the pain  procedure to verify pre-procedure medication instructions. The information below contains general guidelines.**    Please Hold Plavix for 7 days before procedure.    Blood Thinners If you are taking daily ASPIRIN, PLAVIX, OR OTHER BLOOD THINNERS SUCH AS COUMADIN/WARFARIN, we will need your prescribing doctor to sign a release permitting you to stop these medications. Once approved by your prescribing doctor - STOP ALL BLOOD THINNERS BASED ON THE TIME TABLE BELOW PRIOR TO YOUR PROCEDURE. If you have been instructed to stop WARFARIN(COUMADIN), you must have an INR lab drawn the day before your procedure. . Your INR must be within normal limits before we can perform your injection. MEDICATIONS CAN BE RESTARTED AFTER YOUR PROCEDURE.    14 DAY HOLD  Ticlid (ticlopidine)    10 DAY HOLD  Effient (Prasugel)    3 DAY HOLD  Xarelto (rivaroxaban) 7 DAY HOLD  Anacin, Bufferin, Ecotrin, Excedrin, Aggrenox (Aspirin)  Brilinta (ticagrelor)  Coumadin (Warfarin)  Pradexa (Dabigatran)  Elmiron (Pentosan)  Plavix (Clopidogrel Bisulfate)  Pletal (Cilostazol)    24 HOUR HOLD  Lovenox (enoxaparin)  Agrylin (Anagrelide)        Non-steroidal Anti-inflammatories (NSAIDs) DO NOT TAKE any non-steroidal anti-inflammatory medications (NSAIDs) listed on the table below. MEDICATIONS CAN BE RESTARTED AFTER YOUR PROCEDURE. Celebrex is OK to take and does not need to be discontinued.     Medications to stop:  3 DAY HOLD  Advil, Motrin (Ibuprofen)  Arthrotec (diciofenac sodium/misoprostol)  Clinoril (Sulindac)  Indocin (Indomethacin)  Lodine (Etodolac)  Toradol (Ketorolac)  Vicoprofen (Hydrocodone and Ibuprofen)  Voltaren (Diclotenac)    14 DAY HOLD  Daypro (Oxaprozin)  Feldene (Piroxicam)   7 DAY HOLD  Aleve (Naproxen sodium)  Darvon compound (contains aspirin)  Naprosyn (Naproxen)  Norgesic Forte (contains aspirin)  Mobic (Meloxicam)  Oruvall (Ketoprofen)  Percodan (contains aspirin)  Relafen (Nabumetone)  Salsalate  Trilisate  Vitamin E (more than  400 mg per day)  Any medication containing aspirin                To speak with a nurse, schedule/reschedule/cancel a clinic appointment, or request a medication refill call: [phone]     You can also reach us by [employer]: [email or web address]
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
## 2022-06-10 ENCOUNTER — TELEPHONE (OUTPATIENT)
Dept: ANESTHESIOLOGY | Facility: CLINIC | Age: 42
End: 2022-06-10
Payer: COMMERCIAL

## 2022-06-10 PROBLEM — M54.81 OCCIPITAL NEURALGIA OF LEFT SIDE: Status: ACTIVE | Noted: 2022-06-10

## 2022-06-13 ENCOUNTER — TELEPHONE (OUTPATIENT)
Dept: ANESTHESIOLOGY | Facility: CLINIC | Age: 42
End: 2022-06-13
Payer: COMMERCIAL

## 2022-06-13 NOTE — TELEPHONE ENCOUNTER
----- Message from Tristian Mera MD sent at 6/9/2022  4:34 PM CDT -----  Dagoberto Bueno,    I heard back from Dr. Webster.    He's okay with Maryanne just stopping the Plavix at this point. He recommended continuing her aspirin.    Robin    ----- Message -----  From: Ximena Bradford RN  Sent: 6/9/2022  11:43 AM CDT  To: Tristian Mera MD, Ximena Bradford RN    Hello,    My name is Ximena, I am one of the Nurses working with the [facility]. Our mutual patient, Maryanne Crockett, had an appointment with one of our providers, Dr. Morgan, and ordered the following procedure: Occipital Nerve Block.    This would require the patient to hold their Plavix for 7 days before the procedure, per FABIANA guidelines.     Is this something that would be agreeable to you?      Please reach out to our staff if you have any questions or concerns.   Thank you for your time.       Ximena Bradford, RN  [facility]  [phone]

## 2022-06-15 ENCOUNTER — THERAPY VISIT (OUTPATIENT)
Dept: OCCUPATIONAL THERAPY | Facility: CLINIC | Age: 42
End: 2022-06-15
Payer: COMMERCIAL

## 2022-06-15 DIAGNOSIS — G89.29 CHRONIC THUMB PAIN, RIGHT: ICD-10-CM

## 2022-06-15 DIAGNOSIS — G89.29 CHRONIC HAND PAIN, RIGHT: Primary | ICD-10-CM

## 2022-06-15 DIAGNOSIS — M79.641 CHRONIC HAND PAIN, RIGHT: Primary | ICD-10-CM

## 2022-06-15 DIAGNOSIS — M79.644 CHRONIC THUMB PAIN, RIGHT: ICD-10-CM

## 2022-06-15 PROCEDURE — 97763 ORTHC/PROSTC MGMT SBSQ ENC: CPT | Mod: GO | Performed by: OCCUPATIONAL THERAPIST

## 2022-06-23 ENCOUNTER — TELEPHONE (OUTPATIENT)
Dept: ANESTHESIOLOGY | Facility: CLINIC | Age: 42
End: 2022-06-23

## 2022-06-23 NOTE — TELEPHONE ENCOUNTER
Patient is scheduled for procedure with Dr. Morgan    Spoke with: Patient    Date of Procedure: 06-30-22    Location: [facility] [place]    Informed patient they will need an adult  Yes    Pre-procedure COVID-19 Test: @ home    Additional comments: N/A    Patient is aware pre-op RN will call 2-3 days prior to procedure with arrival time and instructions. Yes      Nahomi Khan on 6/23/2022 at 10:28 AM

## 2022-06-27 ENCOUNTER — OFFICE VISIT (OUTPATIENT)
Dept: ORTHOPEDICS | Facility: CLINIC | Age: 42
End: 2022-06-27
Payer: COMMERCIAL

## 2022-06-27 DIAGNOSIS — M79.644 CHRONIC PAIN OF RIGHT THUMB: ICD-10-CM

## 2022-06-27 DIAGNOSIS — G89.29 CHRONIC PAIN OF RIGHT THUMB: ICD-10-CM

## 2022-06-27 DIAGNOSIS — M79.642 BILATERAL HAND PAIN: Primary | ICD-10-CM

## 2022-06-27 DIAGNOSIS — M79.641 BILATERAL HAND PAIN: Primary | ICD-10-CM

## 2022-06-27 PROCEDURE — 99215 OFFICE O/P EST HI 40 MIN: CPT | Performed by: PHYSICIAN ASSISTANT

## 2022-06-27 NOTE — PROGRESS NOTES
"Date of Service: Jun 27, 2022    Chief Complaint:   Chief Complaint   Patient presents with     Consult     Bilateral hand pain       Interval events: Maryanne Crockett is a 41 year old female who presents today in follow-up for bilateral hand pain.  Patient was last seen in January 2022 by Dr. Spears at which time she underwent a right thumb CMC corticosteroid injection.  Patient states that she had severe pain at the base of the right thumb following this injection.  She stated the pain was 10 out of 10 and lasted several days.  Was also recommended that she see hand therapy, however she states due to other medical problems she did not see hand therapy until a month ago.  She was given a hand-based thumb spica orthosis as well as tried a forearm-based thumb spica orthoses for treatment of thumb CMC OA.  Patient does not recall receiving instruction on any strengthening exercises and has thus not done any of this.  Patient also received bilateral wrist splints to wear at night for carpal tunnel syndrome, patient reports she has not been wearing these, but she does wear her thumb spica orthoses at night as it helps with pain.    Regards to her right hand pain, patient states she has burning pain throughout the wrist, thumb, and small finger.  Pain is the worst at the base of the thumb.  Pain is a 3 out of 10 if she wears her hand-based thumb spica orthoses.  If she removes the splint for washing her hair or bathing she has 10 out of 10 pain.  Patient states she is unable to do simple activities like typing, using her phone, remote due to pain.  She is unable to do  activities such as opening a jar or a can opener.  Patient states that many years ago she had an MRI of the right wrist and reports that she has had \"torn tendons\".  She states that she would like to have an MRI of the wrist to further evaluate these torn tendons.  Upon further chart review back in 2014 she saw Dr. Tate who was able to find this MRI report " which demonstrated ECU tendinopathy.  Patient states her wrist pain was with any wrist range of motion.    In regards to her left hand pain, she states it is a burning pain throughout the whole hand and wrist describes it as a stretching sensation.  She has severe weakness of the hand.  She is unable to localize any numbness or tingling to any certain fingers.    Patient is followed by pain management for treatment of her chronic pain.  She states she recently started on Lyrica which has helped her bilateral hand pain, however she feels the medicine does not last long enough.  Patient is frustrated by the chronic debilitating pain in her bilateral hands.  She is frustrated with her prior to her diagnosis of complex regional pain syndrome and feels that nothing helps her discomfort and she is unable to do daily activities of living due to the pain and disability.    The past medical history was reviewed updated in the EMR. This includes medications, surgeries, social history, and review of systems.    Physical examination:  Well-developed, well-nourished and in no acute distress.  Alert and oriented to surroundings.  Musculoskeletal: A focused physical examination of the right upper extremity reveals:   Inspection -no obvious deformity.  No ecchymosis or edema.  No hyperemia.  No atrophy of thenar's or intrinsics. Friction calluses from orthosis.   Palpation - Tender to palpation at the CMC, positive grind, negative Finkelstein's. Mild tenderness diffusely throughout the wrist.   Neurovascular- intact light touch sensation and motor to distribution of the median, ulnar and radial nerves.  Brisk capillary refill to the distal fingers. 2+ radial pulse.   Range of Motion:  Pain with CMC grind.  Pain with end flexion and extension.    Muscle strength and tone: 4/5 strength EPL, FPL, APB, first dorsal interosseous.                                  Negative Moe's.  Negative ulnar impaction.  Negative Finkelstein's.   Negative Tinel's at the carpal tunnel.  Negative Phalen's. Negative fromets.    Musculoskeletal: A focused physical examination of the left upper extremity reveals:   Inspection -no obvious deformity.  No ecchymosis or edema.  No hyperemia.  No atrophy of thenar's or intrinsics.  Palpation - Tender to palpation at the CMC, positive grind, negative Finkelstein'. no tenderness at radial styloid, scaphoid tubercle, snuffbox.  No pain at DRUJ, no DRUJ instability, no pain with palpation at the fovea, Pisa form, SL interval, metacarpals, remaining hand.  Neurovascular- intact light touch sensation and motor to distribution of the median, ulnar and radial nerves.   Brisk capillary refill to the distal fingers. 2+ radial pulse.   Range of Motion: Pain with CMC grind.  Muscle strength and tone: 4/5 strength EPL, FPL, APB, first dorsal interosseous.                                  Negative Moe's.  Negative ulnar impaction.  Negative Finkelstein's.  Negative Tinel's at the carpal tunnel.  Negative Phalen's. Negative Fromets    Assessment: 41 year old female with 9-year history of right wrist and hand pain, hypersensitivity, numbness and tingling.  History of right volar wrist ganglion cyst excision complicated by CRPS. Bilateral thumb pain, R>L consistent with thumb CMC OA. Chronic hypersensitivity to the bilateral hands.     Plan: Discussed at length with the patient the mainstay for treatment of symptomatic bilateral thumb CMC OA is anti-inflammatories (which she states she is unable to take), corticosteroid injections (which she states caused worsening pain) and hand therapy for splinting and strengthening of the hand.  Recommended referral back to hand therapy for bilateral splinting and strengthening of the thumbs.  Also discussed mirror therapy or other modalities for treating her CRPS. Discussed that we would not recommend advanced imaging of the wrist at this time as it likely would not change the treatment plan.  Continue to work with pain team for multimodal pain therapies. Follow up on an as needed basis.     Total time spent on date of encounter: 60 minutes  Activities performed: Chart review: Past hand surgery notes and >50% Face to face counseling with the patient.     TOBY STOKES PA-C  June 27, 2022 4:09 PM   Orthopaedic Surgery

## 2022-06-27 NOTE — LETTER
6/27/2022         RE: Maryanne Crockett  [address]  [place] [address]        Dear Colleague,    Thank you for referring your patient, Maryanne Crockett, to the [facility] [place]. Please see a copy of my visit note below.    Date of Service: Jun 27, 2022    Chief Complaint:   Chief Complaint   Patient presents with     Consult     Bilateral hand pain       Interval events: Maryanne Crockett is a 41 year old female who presents today in follow-up for bilateral hand pain.  Patient was last seen in January 2022 by Dr. Spears at which time she underwent a right thumb CMC corticosteroid injection.  Patient states that she had severe pain at the base of the right thumb following this injection.  She stated the pain was 10 out of 10 and lasted several days.  Was also recommended that she see hand therapy, however she states due to other medical problems she did not see hand therapy until a month ago.  She was given a hand-based thumb spica orthosis as well as tried a forearm-based thumb spica orthoses for treatment of thumb CMC OA.  Patient does not recall receiving instruction on any strengthening exercises and has thus not done any of this.  Patient also received bilateral wrist splints to wear at night for carpal tunnel syndrome, patient reports she has not been wearing these, but she does wear her thumb spica orthoses at night as it helps with pain.    Regards to her right hand pain, patient states she has burning pain throughout the wrist, thumb, and small finger.  Pain is the worst at the base of the thumb.  Pain is a 3 out of 10 if she wears her hand-based thumb spica orthoses.  If she removes the splint for washing her hair or bathing she has 10 out of 10 pain.  Patient states she is unable to do simple activities like typing, using her phone, remote due to pain.  She is unable to do  activities such as opening a jar or a can opener.  Patient states that many years ago she had an MRI of the  "right wrist and reports that she has had \"torn tendons\".  She states that she would like to have an MRI of the wrist to further evaluate these torn tendons.  Upon further chart review back in 2014 she saw Dr. Tate who was able to find this MRI report which demonstrated ECU tendinopathy.  Patient states her wrist pain was with any wrist range of motion.    In regards to her left hand pain, she states it is a burning pain throughout the whole hand and wrist describes it as a stretching sensation.  She has severe weakness of the hand.  She is unable to localize any numbness or tingling to any certain fingers.    Patient is followed by pain management for treatment of her chronic pain.  She states she recently started on Lyrica which has helped her bilateral hand pain, however she feels the medicine does not last long enough.  Patient is frustrated by the chronic debilitating pain in her bilateral hands.  She is frustrated with her prior to her diagnosis of complex regional pain syndrome and feels that nothing helps her discomfort and she is unable to do daily activities of living due to the pain and disability.    The past medical history was reviewed updated in the EMR. This includes medications, surgeries, social history, and review of systems.    Physical examination:  Well-developed, well-nourished and in no acute distress.  Alert and oriented to surroundings.  Musculoskeletal: A focused physical examination of the right upper extremity reveals:   Inspection -no obvious deformity.  No ecchymosis or edema.  No hyperemia.  No atrophy of thenar's or intrinsics. Friction calluses from orthosis.   Palpation - Tender to palpation at the CMC, positive grind, negative Finkelstein's. Mild tenderness diffusely throughout the wrist.   Neurovascular- intact light touch sensation and motor to distribution of the median, ulnar and radial nerves.  Brisk capillary refill to the distal fingers. 2+ radial pulse.   Range of " Motion:  Pain with CMC grind.  Pain with end flexion and extension.    Muscle strength and tone: 4/5 strength EPL, FPL, APB, first dorsal interosseous.                                  Negative Moe's.  Negative ulnar impaction.  Negative Finkelstein's.  Negative Tinel's at the carpal tunnel.  Negative Phalen's. Negative fromets.    Musculoskeletal: A focused physical examination of the left upper extremity reveals:   Inspection -no obvious deformity.  No ecchymosis or edema.  No hyperemia.  No atrophy of thenar's or intrinsics.  Palpation - Tender to palpation at the CMC, positive grind, negative Finkelstein'. no tenderness at radial styloid, scaphoid tubercle, snuffbox.  No pain at DRUJ, no DRUJ instability, no pain with palpation at the fovea, Pisa form, SL interval, metacarpals, remaining hand.  Neurovascular- intact light touch sensation and motor to distribution of the median, ulnar and radial nerves.   Brisk capillary refill to the distal fingers. 2+ radial pulse.   Range of Motion: Pain with CMC grind.  Muscle strength and tone: 4/5 strength EPL, FPL, APB, first dorsal interosseous.                                  Negative Moe's.  Negative ulnar impaction.  Negative Finkelstein's.  Negative Tinel's at the carpal tunnel.  Negative Phalen's. Negative Fromets    Assessment: 41 year old female with 9-year history of right wrist and hand pain, hypersensitivity, numbness and tingling.  History of right volar wrist ganglion cyst excision complicated by CRPS. Bilateral thumb pain, R>L consistent with thumb CMC OA. Chronic hypersensitivity to the bilateral hands.     Plan: Discussed at length with the patient the mainstay for treatment of symptomatic bilateral thumb CMC OA is anti-inflammatories (which she states she is unable to take), corticosteroid injections (which she states caused worsening pain) and hand therapy for splinting and strengthening of the hand.  Recommended referral back to hand therapy for  bilateral splinting and strengthening of the thumbs.  Also discussed mirror therapy or other modalities for treating her CRPS. Discussed that we would not recommend advanced imaging of the wrist at this time as it likely would not change the treatment plan. Continue to work with pain team for multimodal pain therapies. Follow up on an as needed basis.     Total time spent on date of encounter: 60 minutes  Activities performed: Chart review: Past hand surgery notes and >50% Face to face counseling with the patient.     TOBY STOKES PA-C  June 27, 2022 4:09 PM   Orthopaedic Surgery         Attestation signed by Jonah Spears MD at 6/27/2022 11:43 PM:  I saw and evaluated the patient and agree with the findings and plan of care as documented in the note.      Jonah Spears MD    Hand, Upper Extremity & Microvascular Surgery  Department of Orthopedic Surgery  [facility]

## 2022-06-27 NOTE — NURSING NOTE
Reason For Visit:   Chief Complaint   Patient presents with     Consult     Bilateral hand pain       Primary MD: Tristian Mera  Ref. MD: MAC    Age: 41 year old    ?  No      LMP 03/14/2018 (Approximate)       Pain Assessment  Patient Currently in Pain: Yes  0-10 Pain Scale: 3 (10/10 pain with activity)  Primary Pain Location: Hand (Bilateral hand pain, also reports neck pain)               QuickDASH Assessment  QuickDA Main 6/27/2022   1. Open a tight or new jar. Severe difficulty   2. Do heavy household chores (e.g., wash walls, floors) Severe difficulty   3. Carry a shopping bag or briefcase. Unable   4. Wash your back. Severe difficulty   5. Use a knife to cut food. Severe difficulty   6. Recreational activities in which you take some force or impact through your arm, shoulder or hand (e.g., golf, hammering, tennis, etc.). Unable   7. During the past week, to what extent has your arm, shoulder or hand problem interfered with your normal social activities with family, friends, neighbours or groups? Extremely   8. During the past week, were you limited in your work or other regular daily activities as a result of your arm, shoulder or hand problem? Very limited   9. Arm, shoulder or hand pain. Moderate   10.Tingling (pins and needles) in your arm,shoulder or hand. Moderate   11. During the past week, how much difficulty have you had sleeping because of the pain in your arm, shoulder or hand? Mild difficulty   Quickdash Ability Score 72.73   1. Open a tight or new jar. -   2. Do heavy household chores (e.g., wash walls, floors). -   3. Carry a shopping bag or briefcase. -   4. Wash your back. -   5. Use a knife to cut food. -   6. Recreational activities in which you take some force or impact through your arm, shoulder or hand (e.g., golf, hammering, tennis, etc.). -   7. During the past week, to what extent has your arm, shoulder or hand problem interfered with your normal social activities with  family, friends, neighbours or groups? -   8. During the past week, were you limited in your work or other regular daily activities as a result of your arm, shoulder or hand problem? -   9. Arm, shoulder or hand pain. -   10. Tingling (pins and needles) in your arm,shoulder or hand. -   11. During the past week, how much difficulty have you had sleeping because of the pain in your arm, shoulder or hand? ([place] number) -   SUM: -          Current Outpatient Medications   Medication Sig Dispense Refill     ALPRAZolam (XANAX) 1 MG tablet Take 2 mg by mouth 3 times daily as needed        amphetamine-dextroamphetamine (ADDERALL) 20 MG tablet Take 20 mg by mouth 2 times daily        aspirin (ASA) 325 MG tablet Take 1 tablet (325 mg) by mouth daily 90 tablet 3     cloNIDine (CATAPRES) 0.1 MG tablet Take 1 tablet by mouth 2 times daily        clopidogrel (PLAVIX) 75 MG tablet Take 1 tablet (75 mg) by mouth daily 30 tablet 6     cyclobenzaprine (FLEXERIL) 5 MG tablet Take 1 tablet (5 mg) by mouth 3 times daily as needed for muscle spasms (Patient not taking: Reported on 6/9/2022) 20 tablet 0     lidocaine (XYLOCAINE) 5 % external ointment Apply topically 3 times daily (Patient not taking: Reported on 6/9/2022) 30 g 1     medical cannabis (Patient's own supply) See Admin Instructions (The purpose of this order is to document that the patient reports taking medical cannabis.  This is not a prescription, and is not used to certify that the patient has a qualifying medical condition.)       mometasone-formoterol (DULERA) 100-5 MCG/ACT inhaler Inhale 2 puffs into the lungs 2 times daily as needed (cough, short of breath) 13 g 6     Multiple Vitamins-Minerals (MULTIVITAMIN ADULT PO) Take 1 tablet by mouth every morning        nicotine (COMMIT) 2 MG lozenge Place 1 lozenge (2 mg) inside cheek every 2 hours as needed for smoking cessation 108 lozenge 11     nicotine (NICODERM CQ) 7 MG/24HR 24 hr patch Place 1 patch onto the skin  every 24 hours . Start after finishing 14mg patch. 42 patch 3     omeprazole (PRILOSEC) 20 MG DR capsule Take 1 capsule (20 mg) by mouth daily While on high-dose Naproxen (Patient not taking: Reported on 6/9/2022) 30 capsule 0     ondansetron (ZOFRAN ODT) 8 MG ODT tab Take 1 tablet (8 mg) by mouth every 8 hours as needed for nausea 12 tablet 0     ondansetron (ZOFRAN-ODT) 4 MG ODT tab Take 1 tablet (4 mg) by mouth every 8 hours as needed for nausea 20 tablet 1     pregabalin (LYRICA) 50 MG capsule Take 1 capsule (50 mg) by mouth 2 times daily 60 capsule 0     SAPHRIS 10 MG SUBL sublingual tablet Place 10 mg under the tongue 2 times daily        sertraline (ZOLOFT) 25 MG tablet Take 25 mg by mouth daily       vitamin B-12 (CYANOCOBALAMIN) 1000 MCG tablet Take 1,000 mcg by mouth every morning          Allergies   Allergen Reactions     Ibuprofen GI Disturbance     Seroquel [Quetiapine] Other (See Comments) and Rash     Insomnia and rash       Charmaine Wilson, AEMT

## 2022-06-30 ENCOUNTER — HOSPITAL ENCOUNTER (OUTPATIENT)
Facility: AMBULATORY SURGERY CENTER | Age: 42
Discharge: HOME | End: 2022-06-30
Attending: ANESTHESIOLOGY | Admitting: ANESTHESIOLOGY
Payer: COMMERCIAL

## 2022-06-30 VITALS
DIASTOLIC BLOOD PRESSURE: 66 MMHG | SYSTOLIC BLOOD PRESSURE: 101 MMHG | OXYGEN SATURATION: 98 % | TEMPERATURE: 97.1 F | HEART RATE: 64 BPM | RESPIRATION RATE: 16 BRPM

## 2022-06-30 DIAGNOSIS — M54.81 OCCIPITAL NEURALGIA OF LEFT SIDE: ICD-10-CM

## 2022-06-30 PROCEDURE — 64405 NJX AA&/STRD GR OCPL NRV: CPT | Mod: LT

## 2022-06-30 RX ORDER — BUPIVACAINE HYDROCHLORIDE 2.5 MG/ML
INJECTION, SOLUTION INFILTRATION; PERINEURAL PRN
Status: DISCONTINUED | OUTPATIENT
Start: 2022-06-30 | End: 2022-06-30 | Stop reason: HOSPADM

## 2022-06-30 RX ORDER — DEXAMETHASONE SODIUM PHOSPHATE 10 MG/ML
INJECTION INTRAMUSCULAR; INTRAVENOUS PRN
Status: DISCONTINUED | OUTPATIENT
Start: 2022-06-30 | End: 2022-06-30 | Stop reason: HOSPADM

## 2022-06-30 RX ORDER — LIDOCAINE HYDROCHLORIDE 10 MG/ML
INJECTION, SOLUTION EPIDURAL; INFILTRATION; INTRACAUDAL; PERINEURAL PRN
Status: DISCONTINUED | OUTPATIENT
Start: 2022-06-30 | End: 2022-06-30 | Stop reason: HOSPADM

## 2022-06-30 RX ORDER — DIAZEPAM 5 MG
5 TABLET ORAL ONCE
Status: COMPLETED | OUTPATIENT
Start: 2022-06-30 | End: 2022-06-30

## 2022-06-30 RX ADMIN — DIAZEPAM 5 MG: 5 TABLET ORAL at 10:50

## 2022-06-30 NOTE — H&P
Maryanne Crockett  [id number]  female  41 year old      Reason for procedure/surgery: left occipital neuralgia    Patient Active Problem List   Diagnosis     Nausea & vomiting     Convulsions, unspecified convulsion type (H)     Suicidal ideation     S/P hysterectomy     Attention deficit disorder     Complex regional pain syndrome type 1 of right upper extremity     Cerebral aneurysm, nonruptured     Family history of glioblastoma     Bipolar I disorder (H)     Smoking     Chronic neck pain     Chronic hand pain, right     Chronic thumb pain, right     Occipital neuralgia of left side       Past Surgical History:    Past Surgical History:   Procedure Laterality Date     CHOLECYSTECTOMY       CYSTOSCOPY N/A 09/01/2020    Procedure: CYSTOSCOPY;  Surgeon: Hayley Zayas MD;  [facility]: [facility]     DAVINCI HYSTERECTOMY TOTAL, BILATERAL SALPINGO-OOPHORECTOMY, COMBINED Left 09/01/2020    Procedure: HYSTERECTOMY, TOTAL, ROBOT-ASSISTED, LAPAROSCOPIC,  left oophorectomy;  Surgeon: Hayley Zayas MD;  [facility]: [facility]     DILATION AND CURETTAGE SUCTION  04/30/2015    retained POC     GASTRIC RESTRICTIVE PROCEDURE, OPEN, REMOVE/REPLACE SUBCUTANEOUS PORT       IR CAROTID CEREBRAL ANGIOGRAM BILATERAL  03/19/2021     IR CAROTID CEREBRAL ANGIOGRAM BILATERAL  06/23/2021     IR CAROTID CEREBRAL ANGIOGRAM BILATERAL  1/12/2022     LAPAROSCOPIC OOPHORECTOMY Right 08/05/2016    Procedure: LAPAROSCOPIC OOPHORECTOMY;  Surgeon: Adrianne Vergara MD;  [facility]     LAPAROSCOPIC REMOVAL GASTRIC ADJUSTABLE BAND N/A 05/11/2021    Procedure: REMOVAL, GASTRIC BAND, ADJUSTABLE, LAPAROSCOPIC;  Surgeon: Remy Gold MD;  [facility]     LAPAROSCOPIC SALPINGECTOMY Bilateral 08/05/2016    Procedure: LAPAROSCOPIC SALPINGECTOMY;  Surgeon: Adrianne Vergara MD;  [facility]     LAPAROSCOPIC TUBAL LIGATION Bilateral 05/22/2015    Procedure: LAPAROSCOPIC TUBAL LIGATION;  Surgeon: Hayley Zayas MD;  [facility]     LAPAROSCOPY OPERATIVE ADULT N/A 08/05/2016    Procedure: LAPAROSCOPY OPERATIVE ADULT;  Surgeon: Adrianne Vergara MD;  [facility]     PANNICULECTOMY  04/22/2017    [facility]     SOFT TISSUE SURGERY Right     cyst in hand       Past Medical History:   Past Medical History:   Diagnosis Date     Bipolar I disorder (H)      Cerebral aneurysm, nonruptured      Family history of glioblastoma     screening MRIs every 2 years     Ovarian cyst      RSD (reflex sympathetic dystrophy)      Seizures (H)        Social History:   Social History     Tobacco Use     Smoking status: Current Some Day Smoker     Packs/day: 0.25     Smokeless tobacco: Never Used     Tobacco comment: Trying   Substance Use Topics     Alcohol use: Not Currently     Comment: rare       Family History:   Family History   Problem Relation Age of Onset     Coronary Artery Disease Mother 50        stent     Leukemia Father      Deep Vein Thrombosis (DVT) Father      Brain Cancer Brother 36        glioblastoma     Deep Vein Thrombosis (DVT) Brother      Cerebrovascular Disease Brother 47        stroke secondary to DVT     Deep Vein Thrombosis (DVT) Brother      Cerebral aneurysm Maternal Grandfather      Brain Cancer Maternal Aunt         glioblastoma     Brain Cancer Maternal Aunt         glioblastoma     Breast Cancer No family hx of      Anesthesia Reaction No family hx of      Ovarian Cancer No family hx of      Colon Cancer No family hx of        Allergies:   Allergies   Allergen Reactions     Ibuprofen GI Disturbance     Seroquel [Quetiapine] Other (See Comments) and Rash     Insomnia and rash       Active Medications:   Current Outpatient Medications   Medication Sig Dispense Refill     ALPRAZolam (XANAX) 1 MG tablet Take 2 mg by mouth 3 times daily as needed        amphetamine-dextroamphetamine (ADDERALL) 20 MG tablet Take 20 mg by mouth 2 times daily        aspirin (ASA) 325 MG tablet Take 1 tablet (325 mg) by mouth daily 90 tablet 3      cloNIDine (CATAPRES) 0.1 MG tablet Take 1 tablet by mouth 2 times daily        mometasone-formoterol (DULERA) 100-5 MCG/ACT inhaler Inhale 2 puffs into the lungs 2 times daily as needed (cough, short of breath) 13 g 6     ondansetron (ZOFRAN ODT) 8 MG ODT tab Take 1 tablet (8 mg) by mouth every 8 hours as needed for nausea 12 tablet 0     pregabalin (LYRICA) 50 MG capsule Take 1 capsule (50 mg) by mouth 2 times daily 60 capsule 0     SAPHRIS 10 MG SUBL sublingual tablet Place 10 mg under the tongue 2 times daily        sertraline (ZOLOFT) 25 MG tablet Take 25 mg by mouth daily       vitamin B-12 (CYANOCOBALAMIN) 1000 MCG tablet Take 1,000 mcg by mouth every morning        clopidogrel (PLAVIX) 75 MG tablet Take 1 tablet (75 mg) by mouth daily 30 tablet 6     cyclobenzaprine (FLEXERIL) 5 MG tablet Take 1 tablet (5 mg) by mouth 3 times daily as needed for muscle spasms (Patient not taking: Reported on 6/9/2022) 20 tablet 0     lidocaine (XYLOCAINE) 5 % external ointment Apply topically 3 times daily (Patient not taking: Reported on 6/9/2022) 30 g 1     medical cannabis (Patient's own supply) See Admin Instructions (The purpose of this order is to document that the patient reports taking medical cannabis.  This is not a prescription, and is not used to certify that the patient has a qualifying medical condition.)       Multiple Vitamins-Minerals (MULTIVITAMIN ADULT PO) Take 1 tablet by mouth every morning        nicotine (COMMIT) 2 MG lozenge Place 1 lozenge (2 mg) inside cheek every 2 hours as needed for smoking cessation 108 lozenge 11     nicotine (NICODERM CQ) 7 MG/24HR 24 hr patch Place 1 patch onto the skin every 24 hours . Start after finishing 14mg patch. 42 patch 3     omeprazole (PRILOSEC) 20 MG DR capsule Take 1 capsule (20 mg) by mouth daily While on high-dose Naproxen (Patient not taking: Reported on 6/9/2022) 30 capsule 0     ondansetron (ZOFRAN-ODT) 4 MG ODT tab Take 1 tablet (4 mg) by mouth every 8 hours  as needed for nausea 20 tablet 1       Systemic Review:   CONSTITUTIONAL: NEGATIVE for fever, chills, change in weight  ENT/MOUTH: NEGATIVE for ear, mouth and throat problems  RESP: NEGATIVE for significant cough or SOB  CV: NEGATIVE for chest pain, palpitations or peripheral edema    Physical Examination:   Vital Signs: /80   Temp 97.1  F (36.2  C) (Temporal)   Resp 16   LMP 03/14/2018 (Approximate)   SpO2 98%   GENERAL: healthy, alert and no distress  NECK: no adenopathy, no asymmetry, masses, or scars  RESP: normal work of breathing on RA  CV: warm well perfused extremities  ABDOMEN: soft, nontender, no hepatosplenomegaly, no masses  MS: no gross musculoskeletal defects noted, no edema    Plan: Appropriate to proceed as scheduled.      Rod Menjivar MD  Pain Medicine Fellow  [facility]  6/30/2022

## 2022-06-30 NOTE — OP NOTE
Patient: Maryanne Crockett Age: 41 year old   MRN: [id number] Attending: Dr. Morgan     Date of Visit: June 30, 2022      PAIN MEDICINE CLINIC PROCEDURE NOTE    ATTENDING CLINICIAN:    Scott Morgan MD    Fellow:   Rod Menjivar MD    PROCEDURE(S) PERFORMED:  Left greater occipital nerve block     INDICATIONS:  Maryanne Crockett is a 41 year old female with a history of chronic headache possibly secondary to left occipital neuralgia.  The patient states that the patient was in their usual state of health and denied recent anticoagulant use or recent infections.  Therefore, the plan is to perform above mentioned procedure.     Procedure Details:  The patient was met in the exam room, where the patient was identified by name, medical record number and date of birth.  All of the patient s last minute questions were answered. Written informed consent was obtained and saved in the electronic medical record, after the risks, benefits, and alternatives were discussed with the patient.      A formal time-out procedure was performed, as per protocol, including patient name, title of procedure, and site of procedure, and all in the room concurred.  Routine monitors were applied.      The patient was placed in the prone position on the exam room table. An area is marked 3 cm below occipital protuberance and 1.5 cm lateral to the midline. A chlorhexidine prep was completed on this area followed by sterile draping per standard procedure.  We then placed the linear ultrasound probe in a transverse orientation over the superior nuchal line lateral to the inion. The needle was placed with live ultrasound guidance. Next, 2 ml bupivacaine (0.25%) with 10 mg dexamthasone was injected in increments into the muscles using a 21 gauge 80 mm needle. No paraesthesias occurred and aspiration was negative The needle was removed. A sterile bandage was applied.       Light pressure was held at the puncture site(s) to prevent ecchymosis and oozing.  The  patient's skin was cleansed, and hemostasis was confirmed.      Condition:    The patient remained awake and alert throughout the procedure.  The patient tolerated the procedure well and was monitored for approximately 15 minutes afterward in the post procedure area.  There were no immediate post procedure complications noted.  The patient was then discharged to home as per protocol.

## 2022-06-30 NOTE — DISCHARGE INSTRUCTIONS
PAIN INJECTION HOME CARE INSTRUCTIONS  Activity  -You may resume most normal activity levels with the exception of strenuous activity. It may help to move in ways that hurt before the injection, to see if the pain is still there, but do not overdo it. Take it easy for the rest of the day.    -DO NOT remove bandaid for 24 hours  -DO NOT shower for 24 hours      Pain  -You may feel immediate pain relief and numbness for a period of time after the injection. This may indicate the medication has reached the right spot.  -Your pain may return after this short pain-free period, or may even be a little worse for a day or two. It may be caused by needle irritation or by the medication itself. The medications usually take two or three days to start working, but can take as long as a week.    -You may use an ice pack for 20 minutes every 2 hours for the first 24 hours  -You may use a heating pad after the first 24 hours  -You may use Tylenol (acetaminophen) every 4 hours or other pain medicines as directed by your physician      DID YOU RECEIVE STEROIDS TODAY?  {YES / NO:808781}    Common side effects of steroids:  Not everyone will experience corticosteroid side effects. If side effects are experienced, they will gradually subside in the 7-10 day period following an injection. Most common side effects include:  -Flushed face and/or chest  -Feeling of warmth, particularly in the face but could be an overall feeling of warmth  -Increased blood sugar in diabetic patients  -Menstrual irregularities my occur. If taking hormone-based birth control an alternate method of birth control is recommended  -Sleep disturbances and/or mood swings are possible  -Leg cramps    PLEASE KEEP TRACK OF YOUR SYMPTOMS AND NOTE ANY CHANGES FOR YOUR DOCTOR.       Please contact us if you have:  -Severe pain  -Fever more than 101.5 degrees Fahrenheit  -Signs of infection at the injection site (redness, swelling, or drainage)      FOR PAIN CENTER  PATIENTS of Dr Morgan:  If you have questions, please contact the Pain Clinic at [phone] Option #1 between the hours of 7:00 am and 3:00 pm Monday through Friday. After office hours you can contact the on call provider by dialing [phone]. If you need immediate attention, we recommend that you go to a hospital emergency room or dial [phone].

## 2022-07-01 ENCOUNTER — MYC REFILL (OUTPATIENT)
Dept: ANESTHESIOLOGY | Facility: CLINIC | Age: 42
End: 2022-07-01

## 2022-07-01 ENCOUNTER — MYC REFILL (OUTPATIENT)
Dept: FAMILY MEDICINE | Facility: CLINIC | Age: 42
End: 2022-07-01

## 2022-07-01 DIAGNOSIS — G89.29 CHRONIC NECK PAIN: ICD-10-CM

## 2022-07-01 DIAGNOSIS — M54.2 CHRONIC NECK PAIN: ICD-10-CM

## 2022-07-01 DIAGNOSIS — M54.50 BILATERAL LOW BACK PAIN WITHOUT SCIATICA, UNSPECIFIED CHRONICITY: ICD-10-CM

## 2022-07-01 DIAGNOSIS — M54.81 OCCIPITAL NEURALGIA OF LEFT SIDE: ICD-10-CM

## 2022-07-01 RX ORDER — CYCLOBENZAPRINE HCL 5 MG
5 TABLET ORAL 3 TIMES DAILY PRN
Qty: 20 TABLET | Refills: 0 | Status: SHIPPED | OUTPATIENT
Start: 2022-07-01 | End: 2022-09-08

## 2022-07-01 RX ORDER — PREGABALIN 50 MG/1
50 CAPSULE ORAL 3 TIMES DAILY
Qty: 90 CAPSULE | Refills: 0 | Status: SHIPPED | OUTPATIENT
Start: 2022-07-01 | End: 2022-07-26

## 2022-07-01 NOTE — TELEPHONE ENCOUNTER
Pregabalin refill requested.   Pt was last seen on 6/9/22.     Refill encounter routed to Dr. Morgan to review.     Dasia Hi LPN

## 2022-07-01 NOTE — TELEPHONE ENCOUNTER
Cyclobenzaprine (Flexeril) 5 mg    Last Office Visit: 5/9/22  Future [facility] Appointments: None  Medication last refilled:     1/6/22 #20 with 0 refill(s) - Flexeril    Prescription approved per [facility] Refill Protocol.    GEORGE FarleyN, RN, CCM

## 2022-07-01 NOTE — TELEPHONE ENCOUNTER
Per Dr. Morgan- the dose was increased to 50 mg TID.     LPN Called and updated the pt, who verbalized understanding.      Dasia Hi LPN

## 2022-07-25 DIAGNOSIS — M54.81 OCCIPITAL NEURALGIA OF LEFT SIDE: ICD-10-CM

## 2022-07-26 NOTE — TELEPHONE ENCOUNTER
Pregabalin refill requested. Pt last seen 6/9/22. Pt scheduled for follow up on 9/8/22.  Refill pended and routed to the provider.     Dasia Hi LPN

## 2022-07-28 RX ORDER — PREGABALIN 50 MG/1
50 CAPSULE ORAL 3 TIMES DAILY
Qty: 90 CAPSULE | Refills: 3 | Status: SHIPPED | OUTPATIENT
Start: 2022-07-28 | End: 2022-08-31

## 2022-08-19 PROBLEM — M79.641 CHRONIC HAND PAIN, RIGHT: Status: RESOLVED | Noted: 2022-05-19 | Resolved: 2022-08-19

## 2022-08-19 PROBLEM — M79.644 CHRONIC THUMB PAIN, RIGHT: Status: RESOLVED | Noted: 2022-05-19 | Resolved: 2022-08-19

## 2022-08-19 PROBLEM — G89.29 CHRONIC HAND PAIN, RIGHT: Status: RESOLVED | Noted: 2022-05-19 | Resolved: 2022-08-19

## 2022-08-19 PROBLEM — G89.29 CHRONIC THUMB PAIN, RIGHT: Status: RESOLVED | Noted: 2022-05-19 | Resolved: 2022-08-19

## 2022-08-26 ENCOUNTER — DOCUMENTATION ONLY (OUTPATIENT)
Dept: ANESTHESIOLOGY | Facility: CLINIC | Age: 42
End: 2022-08-26

## 2022-08-26 NOTE — PROGRESS NOTES
I spoke with the patient.  Patient reports recent increase of her pain in the shoulder, neck, and back of her head.  She had occipital nerve block with me which was helpful.  In addition she has been taking pregabalin 50 mg 3 times daily.  She states the pregabalin helps her  but the effect does not last very long.  I advised her to gradually increase pregabalin 100 mg 3 times daily over the next 3 to 4 days.  If she can tolerate increased dose of pregabalin I will send a prescription next week..

## 2022-08-31 DIAGNOSIS — M54.81 OCCIPITAL NEURALGIA OF LEFT SIDE: ICD-10-CM

## 2022-08-31 RX ORDER — PREGABALIN 100 MG/1
100 CAPSULE ORAL 3 TIMES DAILY
Qty: 90 CAPSULE | Refills: 1 | Status: SHIPPED | OUTPATIENT
Start: 2022-08-31 | End: 2022-11-08 | Stop reason: DRUGHIGH

## 2022-09-08 ENCOUNTER — OFFICE VISIT (OUTPATIENT)
Dept: ANESTHESIOLOGY | Facility: CLINIC | Age: 42
End: 2022-09-08
Payer: COMMERCIAL

## 2022-09-08 ENCOUNTER — TELEPHONE (OUTPATIENT)
Dept: ANESTHESIOLOGY | Facility: CLINIC | Age: 42
End: 2022-09-08

## 2022-09-08 ENCOUNTER — ANCILLARY PROCEDURE (OUTPATIENT)
Dept: GENERAL RADIOLOGY | Facility: CLINIC | Age: 42
End: 2022-09-08
Attending: ANESTHESIOLOGY
Payer: COMMERCIAL

## 2022-09-08 VITALS — OXYGEN SATURATION: 100 % | HEART RATE: 75 BPM | DIASTOLIC BLOOD PRESSURE: 71 MMHG | SYSTOLIC BLOOD PRESSURE: 118 MMHG

## 2022-09-08 DIAGNOSIS — M47.816 SPONDYLOSIS OF LUMBAR REGION WITHOUT MYELOPATHY OR RADICULOPATHY: ICD-10-CM

## 2022-09-08 DIAGNOSIS — M54.2 NECK PAIN, CHRONIC: ICD-10-CM

## 2022-09-08 DIAGNOSIS — R56.9 CONVULSIONS, UNSPECIFIED CONVULSION TYPE (H): ICD-10-CM

## 2022-09-08 DIAGNOSIS — F31.9 BIPOLAR I DISORDER (H): ICD-10-CM

## 2022-09-08 DIAGNOSIS — M54.81 OCCIPITAL NEURALGIA OF LEFT SIDE: Primary | ICD-10-CM

## 2022-09-08 DIAGNOSIS — G89.29 NECK PAIN, CHRONIC: ICD-10-CM

## 2022-09-08 DIAGNOSIS — I67.1 CEREBRAL ANEURYSM, NONRUPTURED: ICD-10-CM

## 2022-09-08 DIAGNOSIS — F98.8 ATTENTION DEFICIT DISORDER, UNSPECIFIED HYPERACTIVITY PRESENCE: ICD-10-CM

## 2022-09-08 DIAGNOSIS — R45.851 SUICIDAL IDEATION: ICD-10-CM

## 2022-09-08 DIAGNOSIS — M54.50 BILATERAL LOW BACK PAIN WITHOUT SCIATICA, UNSPECIFIED CHRONICITY: ICD-10-CM

## 2022-09-08 PROCEDURE — 99214 OFFICE O/P EST MOD 30 MIN: CPT | Performed by: ANESTHESIOLOGY

## 2022-09-08 PROCEDURE — 72110 X-RAY EXAM L-2 SPINE 4/>VWS: CPT | Performed by: STUDENT IN AN ORGANIZED HEALTH CARE EDUCATION/TRAINING PROGRAM

## 2022-09-08 RX ORDER — CYCLOBENZAPRINE HCL 5 MG
5 TABLET ORAL 3 TIMES DAILY PRN
Qty: 20 TABLET | Refills: 0 | Status: SHIPPED | OUTPATIENT
Start: 2022-09-08 | End: 2022-10-13

## 2022-09-08 RX ORDER — DIAZEPAM 5 MG
5 TABLET ORAL ONCE
Status: CANCELLED | OUTPATIENT
Start: 2022-09-08 | End: 2022-09-08

## 2022-09-08 ASSESSMENT — PAIN SCALES - GENERAL: PAINLEVEL: MODERATE PAIN (5)
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
## 2022-09-08 NOTE — PATIENT INSTRUCTIONS
Medications:    cyclobenzaprine (FLEXERIL) 5 MG tablet. Take 1 tablet (5 mg) by mouth 3 times daily as needed for muscle spasms.        Please provide the clinic with a minium of 1 week notice, on all prescription refills.       Referrals:    Physical Therapy Referral placed-   If you have not heard from the scheduling office within 2 business days, please call [phone] for all locations       Imaging:    XR Cervical Spine w Flex/Ext G/E 4 Views    XR Lumbar Spine G/E 4 Views.     IMAGING SERVICES HOURS:    All imaging modalities are available from 7 a.m. - 9 p.m. Monday through Friday  X-ray, CT, MRI, and General Ultrasound appointments are available from 7 a.m. -3:30 p.m. on Saturdays  X-ray, CT and MRI appointments are available from 8 a.m. - 4:30 p.m. on Sundays  Please call [phone] to schedule imaging exams       Procedures:    Left occipital nerve block under ultrasound guidance and left upper back trigger point injection.        Call to schedule your procedure: [phone] option #2  Procedure Information related to COVID-19     Please call [phone] to schedule, reschedule, or cancel your procedure appointment.   Phones are answered Monday - Friday from 08:00 - 4:30pm.  Leave a voicemail with your name, birth date, and phone number if no one is available to take your call.         You will need to be tested for COVID-19 before your procedure. If you're going home on the same day as your procedure (surgery), you may take an at-home rapid antigen test 1 to 2 days before your procedure. If your test is negative, please take a photo of the test. Show the photo to the nurse when you come in for your procedure. If your test is positive, please update our office right away and your procedure may have to be postponed.     If you do not have access to an at-home rapid test, you will have to be tested at a lab within 4 days of your procedure.  You will be called to schedule your COVID test by a central  scheduling team. If you have not been contacted to schedule your test within 4 days of the scheduled procedure, call [phone]     Please be aware that the turn around time for the test is approximately 24-72 hours.   If your results are still pending the day of your procedure, you will be notified as soon as possible as the procedure may be cancelled.     Please note: You will only be contacted for positive and pending results.      The procedure center staff will call you several days before the procedure to review important information that you will need to know for the day of the procedure.   Please contact the clinic if you have further questions about this information.         Information related to Scheduling and Pre-Procedure Instructions:    If you must reschedule your procedure more than two times, you must follow up in clinic before rescheduling again.    Hold Plavix for 7 days before your procedure.    Preparing for your procedure    CAUTION - FAILURE TO FOLLOW THESE PRE-PROCEDURE INSTRUCTIONS WILL RESULT IN YOUR PROCEDURE BEING RESCHEDULED.    Your Procedure: Left occipital nerve block under ultrasound guidance and left upper back trigger point injection      Pregnancy  If you are pregnant, or think you may be pregnant, please notify our staff. This may or may not affect the ability to perform the procedure.       You must have a  take you home after your procedure. Transportation by taxi or para-transit is okay as long as you have a responsible adult accompany you. You must provide your 's full name and contact number at time of check in.     Fasting Protocol Please have nothing to eat and drink for 2 hours before the procedure.      Medications If you take any medications, DO NOT STOP. Take your medications as usual the day of your procedure with a sip of water AT LEAST 2 HOURS PRIOR TO ARRIVAL.    Antibiotics If you are currently taking antibiotics, you must complete the entire  dose 7 days prior to your scheduled procedure. You must be clear of any signs or symptoms of infection. If you begin antibiotics, please contact our clinic for instructions.     Fever, Chills, or Rash If you experience a fever of higher than 100 degrees, chills, rash, or open wounds during the one week before your procedure, please call the clinic to see if you may proceed with your procedure.      Medication Hold List  **Patients under Cardiology/Neurology care should consult their provider prior to the pain procedure to verify pre-procedure medication instructions. The information below contains general guidelines.**      Hold Plavix for 7 days before your procedure.    Blood Thinners If you are taking daily ASPIRIN, PLAVIX, OR OTHER BLOOD THINNERS SUCH AS COUMADIN/WARFARIN, we will need your prescribing doctor to sign a release permitting you to stop these medications. Once approved by your prescribing doctor - STOP ALL BLOOD THINNERS BASED ON THE TIME TABLE BELOW PRIOR TO YOUR PROCEDURE. If you have been instructed to stop WARFARIN(COUMADIN), you must have an INR lab drawn the day before your procedure. . Your INR must be within normal limits before we can perform your injection. MEDICATIONS CAN BE RESTARTED AFTER YOUR PROCEDURE.    14 DAY HOLD  Ticlid (ticlopidine)    10 DAY HOLD  Effient (Prasugel)    3 DAY HOLD  Xarelto (rivaroxaban) 7 DAY HOLD  Anacin, Bufferin, Ecotrin, Excedrin, Aggrenox (Aspirin)  Brilinta (ticagrelor)  Coumadin (Warfarin)  Pradexa (Dabigatran)  Elmiron (Pentosan)  Plavix (Clopidogrel Bisulfate)  Pletal (Cilostazol)    24 HOUR HOLD  Lovenox (enoxaparin)  Agrylin (Anagrelide)        Non-steroidal Anti-inflammatories (NSAIDs) DO NOT TAKE any non-steroidal anti-inflammatory medications (NSAIDs) listed on the table below. MEDICATIONS CAN BE RESTARTED AFTER YOUR PROCEDURE. Celebrex is OK to take and does not need to be discontinued.     Medications to stop:  3 DAY HOLD  Advil, Motrin  (Ibuprofen)  Arthrotec (diciofenac sodium/misoprostol)  Clinoril (Sulindac)  Indocin (Indomethacin)  Lodine (Etodolac)  Toradol (Ketorolac)  Vicoprofen (Hydrocodone and Ibuprofen)  Voltaren (Diclotenac)    14 DAY HOLD  Daypro (Oxaprozin)  Feldene (Piroxicam)   7 DAY HOLD  Aleve (Naproxen sodium)  Darvon compound (contains aspirin)  Naprosyn (Naproxen)  Norgesic Forte (contains aspirin)  Mobic (Meloxicam)  Oruvall (Ketoprofen)  Percodan (contains aspirin)  Relafen (Nabumetone)  Salsalate  Trilisate  Vitamin E (more than 400 mg per day)  Any medication containing aspirin                To speak with a nurse, schedule/reschedule/cancel a clinic appointment, or request a medication refill call: [phone]    You can also reach us by [email or web address]: [email or web address]        Recommended Follow up:      Follow up in 3-4 months or as needed.         To speak with a nurse, schedule/reschedule/cancel a clinic appointment, or request a medication refill call: [phone].    You can also reach us by [email or web address]: [email or web address]

## 2022-09-08 NOTE — NURSING NOTE
RN read through the instructions with the patient for the recommended procedure: Left occipital nerve block under ultrasound guidance and left upper back trigger point injection  Patient verbalized understanding to holding appropriate medication per protocol and was agreeable to NPO policy and needing a .    Anticoagulant: Plavix hold 7 days before procedure    Recommended Follow Up:  3-4 months or as needed    Ximena Bradford RN
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
## 2022-09-08 NOTE — NURSING NOTE
Patient presents with:  Follow Up: Follow-up Neck and lower back pain level 5/10      Moderate Pain (5)     Pain Medications     Salicylates Refills Start End     aspirin (ASA) 325 MG tablet    3 3/30/2021     Sig - Route: Take 1 tablet (325 mg) by mouth daily - Oral    Class: E-Prescribe    Notes to Pharmacy: Please start taking 7 days prior to angiogram procedure          What medications are you using for pain? Tylenol, Lyrica    (New patients only) Have you been seen by another pain clinic/ provider? no    (Return Patients only) What refills are you needing today? no

## 2022-09-09 ENCOUNTER — TELEPHONE (OUTPATIENT)
Dept: ANESTHESIOLOGY | Facility: CLINIC | Age: 42
End: 2022-09-09

## 2022-09-12 NOTE — TELEPHONE ENCOUNTER
Patient is scheduled for procedure with Dr. Morgan    Spoke with: Patient    Date of Procedure: 09-15-22    Location: [facility]    Informed patient they will need an adult  yes    Pre-procedure COVID-19 Test: @ home    Additional comments: Arrival @ 1 pm    Patient is aware pre-op RN will call 2-3 days prior to procedure with arrival time and instructions. yes      Nahomi Khan on 9/12/2022 at 9:37 AM

## 2022-09-14 NOTE — TELEPHONE ENCOUNTER
Affect blunted,mood anxious. Pt attended and participated in scheduled group activities. Pt was social with staff and peers and interacted appropriately. Pt ate 100% of snack. Pt vital signs stable with no complaints of pain. Pt denies SI/HI/AVH. Pt was compliant with medications and requested a PRN. Pt is in no apparent distress at this time and made no delusional statements. Pt rested in her bed with her eyes closed for 7 hours.     PRN Medication Documentation    Specific patient behavior that led to need for PRN medication: restless  Staff interventions attempted prior to PRN being given: Pt request  PRN medication given: Trazodone 50 mg PO @ 2106  Patient response/effectiveness of PRN medication: Pt rested LPN called and left 2 voicemail's for pt- asking that they call back to confirm if they are taking or holding their plavix.     Pt was informed on VM that is they are not holding Plavix prior to procedure on 9/15/22- Dr. Morgan has requested that she reschedule.     2 voicemail's were left at phone number- [phone].    Pt's provider responded to request- and they are agreeable to the pt holding medication, as noted in staff message below.     Dasia iH LPN

## 2022-09-14 NOTE — TELEPHONE ENCOUNTER
----- Message from Rich Webster MD sent at 9/14/2022 11:29 AM CDT -----  Regarding: RE: Holding Plavix prior to procedure  Dagoberto Olsen,  It is ok to hold Plavix for the procedure  ----- Message -----  From: Dasia Hi LPN  Sent: 9/13/2022   3:58 PM CDT  To: Rich Webster MD, #  Subject: Holding Plavix prior to procedure                Kermit,  My name is Pretty, I am one of the Nurses working with the [facility]. Our mutual patient, Maryanne Crockett, had an appointment with one of our providers, Dr. Scott Morgan, and would like to order the following procedure: Left Occipital Nerve Block and Trigger Point injections.     This would require the patient to hold their Plavix for 7 days.     Is this something that would be agreeable to you?      Please reach out to our staff if you have any questions or concerns.   Thank you for your time.     JANN Forman  [facility]  [phone]

## 2022-09-15 ENCOUNTER — HOSPITAL ENCOUNTER (OUTPATIENT)
Facility: AMBULATORY SURGERY CENTER | Age: 42
Discharge: HOME | End: 2022-09-15
Attending: ANESTHESIOLOGY | Admitting: ANESTHESIOLOGY
Payer: COMMERCIAL

## 2022-09-15 VITALS
HEIGHT: 69 IN | RESPIRATION RATE: 16 BRPM | TEMPERATURE: 97.9 F | WEIGHT: 260 LBS | OXYGEN SATURATION: 96 % | HEART RATE: 74 BPM | DIASTOLIC BLOOD PRESSURE: 87 MMHG | SYSTOLIC BLOOD PRESSURE: 123 MMHG | BODY MASS INDEX: 38.51 KG/M2

## 2022-09-15 DIAGNOSIS — M54.81 OCCIPITAL NEURALGIA OF LEFT SIDE: ICD-10-CM

## 2022-09-15 PROCEDURE — 76942 ECHO GUIDE FOR BIOPSY: CPT | Mod: 26 | Performed by: ANESTHESIOLOGY

## 2022-09-15 PROCEDURE — 64405 NJX AA&/STRD GR OCPL NRV: CPT | Mod: LT | Performed by: ANESTHESIOLOGY

## 2022-09-15 PROCEDURE — 64405 NJX AA&/STRD GR OCPL NRV: CPT | Mod: LT

## 2022-09-15 RX ORDER — DIAZEPAM 5 MG
5 TABLET ORAL ONCE
Status: COMPLETED | OUTPATIENT
Start: 2022-09-15 | End: 2022-09-15

## 2022-09-15 RX ORDER — LIDOCAINE HYDROCHLORIDE 10 MG/ML
INJECTION, SOLUTION EPIDURAL; INFILTRATION; INTRACAUDAL; PERINEURAL PRN
Status: DISCONTINUED | OUTPATIENT
Start: 2022-09-15 | End: 2022-09-15 | Stop reason: HOSPADM

## 2022-09-15 RX ORDER — BUPIVACAINE HYDROCHLORIDE 2.5 MG/ML
INJECTION, SOLUTION INFILTRATION; PERINEURAL PRN
Status: DISCONTINUED | OUTPATIENT
Start: 2022-09-15 | End: 2022-09-15 | Stop reason: HOSPADM

## 2022-09-15 RX ORDER — DEXAMETHASONE SODIUM PHOSPHATE 10 MG/ML
INJECTION INTRAMUSCULAR; INTRAVENOUS PRN
Status: DISCONTINUED | OUTPATIENT
Start: 2022-09-15 | End: 2022-09-15 | Stop reason: HOSPADM

## 2022-09-15 RX ADMIN — DIAZEPAM 5 MG: 5 TABLET ORAL at 13:59

## 2022-09-15 NOTE — DISCHARGE INSTRUCTIONS
PAIN INJECTION HOME CARE INSTRUCTIONS  Activity  -You may resume most normal activity levels with the exception of strenuous activity. It may help to move in ways that hurt before the injection, to see if the pain is still there, but do not overdo it.     -DO NOT remove bandaid for 24 hours  -DO NOT shower for 24 hours      Pain  -You may feel immediate pain relief and numbness for a period of time after the injection. This may indicate the medication has reached the right spot.  -Your pain may return after this short pain-free period, or may even be a little worse for a day or two. It may be caused by needle irritation or by the medication itself. The medications usually take two or three days to start working, but can take as long as a week.    -You may use an ice pack for 20 minutes every 2 hours for the first 24 hours  -You may use a heating pad after the first 24 hours  -You may use Tylenol (acetaminophen) every 4 hours or other pain medicines as directed by your physician    DID YOU RECEIVE STEROIDS TODAY?  Yes    Common side effects of steroids:  Not everyone will experience corticosteroid side effects. If side effects are experienced, they will gradually subside in the 7-10 day period following an injection. Most common side effects include:  -Flushed face and/or chest  -Feeling of warmth, particularly in the face but could be an overall feeling of warmth  -Increased blood sugar in diabetic patients  -Menstrual irregularities my occur. If taking hormone-based birth control an alternate method of birth control is recommended  -Sleep disturbances and/or mood swings are possible  -Leg cramps    PLEASE KEEP TRACK OF YOUR SYMPTOMS AND NOTE ANY CHANGES FOR YOUR DOCTOR.       Please contact us if you have:  -Severe pain  -Fever more than 101.5 degrees Fahrenheit  -Signs of infection at the injection site (redness, swelling, or drainage)      FOR PAIN CENTER PATIENTS of Dr Echols:  If you have questions, please  contact the Pain Clinic at [phone] Option #1 between the hours of 7:00 am and 3:00 pm Monday through Friday. After office hours you can contact the on call provider by dialing [phone]. If you need immediate attention, we recommend that you go to a hospital emergency room or dial [phone].      FOR PM&R PATIENTS of Dr Adan:  For patients seen by the PM and R service, please call [phone].(Monday through Friday 8a-5p.  After business hours and weekends call [phone] and ask for the PM and R doctor on call). If you need to fax a pain diary to PM&R the fax number is [phone]. If you are unable to fax, uploading to [employer] is encouraged, then send to provider. If you have procedure scheduling questions please call [phone] Option #2

## 2022-09-15 NOTE — OP NOTE
Patient: Maryanne Crockett Age: 41 year old   MRN: [id number] Attending: Dr. Morgan      Date of Visit: June 30, 2022        PAIN MEDICINE CLINIC PROCEDURE NOTE     ATTENDING CLINICIAN:    Scott Morgan MD     Fellow:   Dennis Nuñez DO    PROCEDURE(S) PERFORMED:  1. Left greater and lesser occipital nerve block   2. Left upper back trigger point injection  3. Ultrasound guidance of procedure #1    INDICATIONS:  Maryanne Crockett is a 42 year old female with a history of chronic headache possibly secondary to left occipital neuralgia.  The patient states that the patient was in their usual state of health and denied recent anticoagulant use or recent infections.  Therefore, the plan is to perform above mentioned procedure.      Procedure Details:  The patient was met in the exam room, where the patient was identified by name, medical record number and date of birth.  All of the patient s last minute questions were answered. Written informed consent was obtained and saved in the electronic medical record, after the risks, benefits, and alternatives were discussed with the patient.       A formal time-out procedure was performed, as per protocol, including patient name, title of procedure, and site of procedure, and all in the room concurred.  Routine monitors were applied.       The patient was placed in the prone position on the exam room table. An area is marked 3 cm below occipital protuberance and 1.5 cm lateral to the midline. A chlorhexidine prep was completed on this area followed by sterile draping per standard procedure.  We then placed the linear ultrasound probe in a transverse orientation over the superior nuchal line lateral to the inion. The needle was placed with live ultrasound guidance. Next, 2 ml bupivacaine (0.25%) with 10 mg dexamthasone was injected in increments into the muscles using a 21 gauge 80 mm needle. No paraesthesias occurred and aspiration was negative The needle was removed. A sterile bandage  was applied.     Next we turned our attention to the left upper back trigger point injection. Under manual palpation we identified 6 trigger points in the left upper back. 1% lidocaine was injected into these trigger points 0.5-1 ml each. Following muscles are injected      Trapezius-  left   Left cervical paraspinals     Light pressure was held at the puncture site(s) to prevent ecchymosis and oozing.  The patient's skin was cleansed, and hemostasis was confirmed.       Condition:     The patient remained awake and alert throughout the procedure.  The patient tolerated the procedure well and was monitored for approximately 15 minutes afterward in the post procedure area.  There were no immediate post procedure complications noted.  The patient was then discharged to home as per protocol.

## 2022-09-19 DIAGNOSIS — M54.81 OCCIPITAL NEURALGIA OF LEFT SIDE: ICD-10-CM

## 2022-09-19 NOTE — TELEPHONE ENCOUNTER
Pregabalin refill requested.   Routed to the provider to review and process.     Pt last seen by Dr. Morgan on 9/8/22    Daisa Hi LPN

## 2022-09-21 RX ORDER — PREGABALIN 50 MG/1
50 CAPSULE ORAL 3 TIMES DAILY
Qty: 90 CAPSULE | Refills: 0 | Status: SHIPPED | OUTPATIENT
Start: 2022-09-21 | End: 2022-11-08 | Stop reason: DRUGHIGH

## 2022-09-28 ENCOUNTER — THERAPY VISIT (OUTPATIENT)
Dept: PHYSICAL THERAPY | Facility: CLINIC | Age: 42
End: 2022-09-28
Payer: COMMERCIAL

## 2022-09-28 DIAGNOSIS — M54.2 CERVICALGIA: Primary | ICD-10-CM

## 2022-09-28 PROCEDURE — 97162 PT EVAL MOD COMPLEX 30 MIN: CPT | Mod: GP

## 2022-09-28 PROCEDURE — 97110 THERAPEUTIC EXERCISES: CPT | Mod: GP

## 2022-09-28 NOTE — PROGRESS NOTES
Physical Therapy Initial Examination/Evaluation  September 28, 2022     Maryanne Crockett is a 42 year old female referred to physical therapy by Scott Morgan MD for treatment of chronic neck pain with Precautions/Restrictions/MD instructions evaluate and treat     Therapist Impression:   Patient has complaints of chronic neck pain that has been on for a really long time. She has had several injections in her neck due to a nerve blockage. She reports increased pain with carrying her purse cross body, turning her head while driving.  She reports pain is on L side of her neck and travels down to L shoulder. She reports she has been nervous to get back to regular exercise because she always has a seizure when she exerts herself. She reports during massages in the past she has experienced nausea so she has stopped getting massages even though her neck and shoulders still feel very tight. Patient found to have decreased shoulder and cervical ROM, decreased UE strength, and impaired posture. Patient given HEP with chin tucks, scapular retraction and education on posture.      Subjective:  DOI/onset: 9/8/22 - referral   Acute Injury or Gradual Onset?: Gradual injury over time  Mechanism of Injury: unknown  Related PMH:  brain aneurysm and hx of seizures    Imaging: x-ray  Chief Complaint/Functional Limitations:   Driving, carrying purse  and see below in therapy evaluation codes   Pain: rest 3 /10, activity 10/10 Location: B suboccipitals, L cervical paraspinals and L UT Frequency: Intermittent Described as: burning and tender Previous Treatment: Injection Effect of prior treatment: goodAlleviated by: Rest and Pain medications Progression of Symptoms: Unchanged Time of day when pain is worse: Position related and All times of the day/constant  Sleeping: Losing 1-2 hours of sleep per night   Occupation: unemployed        Other pertinent PMH/Red Flags: Asthma, Concussions/Dizziness, Depression, Fibromyalgia, Menopausal, Mental  Illness, Migraines/Headaches, Numbness/Tingling, Overweight, Seizures, Stroke, Change in Skin Color, Cold/Hot Extremity, Pain at Night/Rest, Severe Headaches   Barriers at home/work: None as reported by patient  Pertinent Surgical History: brain aneurysm   Medications: Anti-seizure, Sleep and Anti-depressants  General health as reported by patient: fair     SHOULDER EXAMINATION        CERVICAL SCREEN  AROM: Pain with L and R SB and Rotation and 50% restriction with Extension     STATIC POSTURE  Forward head: moderate                                Rounded shoulders:moderate  Shoulder internally rotated: mild           Visual inspection: Protracted scapula B and Medial boarder prominence B        SHOULDER RANGE OF MOTION  AROM Flexion Abduction Ext/IR Extension   Left 90 - reported heaviness 90d - reported heaviness    WNL  WNL    Right 90d reported heaviness  90d reported heaviness in UE     WNL WNL          JOINT MOBILITY  Hypomobile T1-C3 and pain free      SHOULDER STRENGTH  MMT Flexion Abduction ER IR   Left 4-/5 4/5 4-/5 4-/5   Right 4-/5 4/5 4-/5 4-/5      SPECIAL TESTS          Sharp Jade               Negative     PALPATION  Left: Mild tenderness to palpation at L UT   Right: No tenderness to palpation     Assessment/Plan:  Patient is a 42 year old female with cervical complaints.    Patient has the following significant findings with corresponding treatment plan.                Diagnosis 1:  Chronic neck pain  Pain -  hot/cold therapy, manual therapy, self management, education and home program  Decreased ROM/flexibility - manual therapy, therapeutic exercise, therapeutic activity and home program  Decreased joint mobility - manual therapy, therapeutic exercise, therapeutic activity and home program  Decreased strength - therapeutic exercise, therapeutic activities and home program  Decreased function - therapeutic activities, home program and functional performance testing  Impaired posture - neuro  re-education, therapeutic activities and home program     Therapy Evaluation Codes:   1. History comprised of:              Personal factors that impact the plan of care:                            Cognition, Time since onset of symptoms and Work status.               Comorbidity factors that impact the plan of care are:                            Depression, Dizziness, Fibromyalgia, Menopausal, Mental illness, Migraines/headaches, Numbness/tingling, Overweight, Pain at night/rest, Seizures and Stroke.                Medications impacting care: Anti-depressant and Sleep.  2. Examination of Body Systems comprised of:              Body structures and functions that impact the plan of care:                            Cervical spine and Shoulder.              Activity limitations that impact the plan of care are:                            Driving, Dressing, Lifting, Reading/Computer work and Sleeping.  3. Clinical presentation characteristics are:              Evolving/Changing.  4. Decision-Making                          Moderate complexity using standardized patient assessment instrument and/or measureable assessment of functional outcome.  Cumulative Therapy Evaluation is: Moderate complexity.     Previous and current functional limitations:  (See Goal Flow Sheet for this information)    Short term and Long term goals: (See Goal Flow Sheet for this information)      Communication ability:  Patient appears to be able to clearly communicate and understand verbal and written communication and follow directions correctly.  Treatment Explanation - The following has been discussed with the patient:   RX ordered/plan of care  Anticipated outcomes  Possible risks and side effects  This patient would benefit from PT intervention to resume normal activities.   Rehab potential is good.     Frequency:  1 X week, once daily  Duration:  for 8 weeks  Discharge Plan:  Achieve all LTG.  Independent in home treatment program.  Reach  maximal therapeutic benefit.     Please refer to the daily flowsheet for treatment today, total treatment time and time spent performing 1:1 timed codes.

## 2022-09-29 NOTE — PROGRESS NOTES
[facility]    OUTPATIENT Physical Therapy ORTHOPEDIC EVALUATION  PLAN OF TREATMENT FOR OUTPATIENT REHABILITATION  (COMPLETE FOR INITIAL CLAIMS ONLY)  Patient's Last Name, First Name, M.I.  YOB: 1980  SelfMaryanne    Provider s Name:  [facility]   Medical Record No.  [id number]   Start of Care Date:  09/28/22   Onset Date:   09/08/22   Type:     _X__PT   ___OT Medical Diagnosis:  No diagnosis found.     Treatment Diagnosis:  chronic neck pain        Goals:     09/28/22 0500   Body Part   Goals listed below are for neck   Goal #1   Goal #1 sleeping   Previous Functional Level No restrictions   Current Functional Level 2-3 hours without sleep per night   STG Target Performance 1-2 hours without sleep per night   Rationale to establish restorative sleep pattern   Due Date 10/20/22   LTG Target Performance Less than 1 hour without sleep per night   Rationale to establish restorative sleep pattern   Due Date 11/24/22       Therapy Frequency:  1x/week  Predicted Duration of Therapy Intervention:  8 weeks    Mi Lucas, PT                 I CERTIFY THE NEED FOR THESE SERVICES FURNISHED UNDER        THIS PLAN OF TREATMENT AND WHILE UNDER MY CARE     (Physician co-signature of this document indicates review and certification of the therapy plan).                     Certification Date From:  09/28/22   Certification Date To:  11/23/22    Referring Provider:  Scott Morgan    Initial Assessment        See Epic Evaluation SOC Date: 09/28/22

## 2022-09-30 ENCOUNTER — TELEPHONE (OUTPATIENT)
Dept: ANESTHESIOLOGY | Facility: CLINIC | Age: 42
End: 2022-09-30

## 2022-09-30 NOTE — TELEPHONE ENCOUNTER
Patient is currently scheduled on Thursday 10/13/22(first available) for a return appointment with Dr. Morgan, via my chart scheduling request. This appointment has been placed on the wait list, please see patient comment below:    I need like an urgent appointment. My pain in back and neck. I need talk to  Please I can t send him message. I m at the point I can t sleep and I can hardly move and I need help.

## 2022-09-30 NOTE — TELEPHONE ENCOUNTER
RN spoke with patient regarding her increased pain. No earlier appts available with Dr. Morgan or NP. Patient experiencing pain in neck and back. Patient states no alternative treatments are improving symptoms. Writer advised patient to be seen by urgent care or ED if having numbness, weakness, or increased unmanaged pain. Patient stated she will go to ED to be evaluated.    Raquel Wright RN

## 2022-10-13 ENCOUNTER — OFFICE VISIT (OUTPATIENT)
Dept: ANESTHESIOLOGY | Facility: CLINIC | Age: 42
End: 2022-10-13
Payer: COMMERCIAL

## 2022-10-13 VITALS — SYSTOLIC BLOOD PRESSURE: 137 MMHG | DIASTOLIC BLOOD PRESSURE: 89 MMHG | OXYGEN SATURATION: 98 % | HEART RATE: 75 BPM

## 2022-10-13 DIAGNOSIS — M54.50 BILATERAL LOW BACK PAIN WITHOUT SCIATICA, UNSPECIFIED CHRONICITY: ICD-10-CM

## 2022-10-13 DIAGNOSIS — I67.1 CEREBRAL ANEURYSM, NONRUPTURED: Chronic | ICD-10-CM

## 2022-10-13 DIAGNOSIS — R56.9 CONVULSIONS, UNSPECIFIED CONVULSION TYPE (H): Chronic | ICD-10-CM

## 2022-10-13 DIAGNOSIS — G56.03 CARPAL TUNNEL SYNDROME ON BOTH SIDES: ICD-10-CM

## 2022-10-13 DIAGNOSIS — M79.641 RIGHT HAND PAIN: Primary | ICD-10-CM

## 2022-10-13 DIAGNOSIS — M54.2 NECK PAIN, CHRONIC: ICD-10-CM

## 2022-10-13 DIAGNOSIS — G90.511 COMPLEX REGIONAL PAIN SYNDROME TYPE 1 OF RIGHT UPPER EXTREMITY: ICD-10-CM

## 2022-10-13 DIAGNOSIS — G89.29 NECK PAIN, CHRONIC: ICD-10-CM

## 2022-10-13 DIAGNOSIS — F31.9 BIPOLAR I DISORDER (H): ICD-10-CM

## 2022-10-13 DIAGNOSIS — M79.7 FIBROMYALGIA AFFECTING HAND: ICD-10-CM

## 2022-10-13 DIAGNOSIS — M54.81 OCCIPITAL NEURALGIA OF LEFT SIDE: ICD-10-CM

## 2022-10-13 PROCEDURE — 99214 OFFICE O/P EST MOD 30 MIN: CPT | Performed by: ANESTHESIOLOGY

## 2022-10-13 RX ORDER — PREGABALIN 150 MG/1
150 CAPSULE ORAL 3 TIMES DAILY
Qty: 90 CAPSULE | Refills: 0 | Status: SHIPPED | OUTPATIENT
Start: 2022-10-13 | End: 2022-11-08

## 2022-10-13 RX ORDER — DIAZEPAM 5 MG
5-10 TABLET ORAL
Status: CANCELLED | OUTPATIENT
Start: 2022-10-13

## 2022-10-13 ASSESSMENT — ENCOUNTER SYMPTOMS
SPEECH CHANGE: 0
DECREASED CONCENTRATION: 1
MUSCLE CRAMPS: 0
HEADACHES: 0
MYALGIAS: 1
SEIZURES: 0
WEAKNESS: 1
TREMORS: 0
NECK PAIN: 1
JOINT SWELLING: 0
MUSCLE WEAKNESS: 1
DIZZINESS: 1
DEPRESSION: 0
LOSS OF CONSCIOUSNESS: 0
DISTURBANCES IN COORDINATION: 1
MEMORY LOSS: 1
INSOMNIA: 1
PANIC: 1
NERVOUS/ANXIOUS: 1
STIFFNESS: 1
TINGLING: 1
NUMBNESS: 1
ARTHRALGIAS: 1
BACK PAIN: 1
PARALYSIS: 0

## 2022-10-13 ASSESSMENT — PAIN SCALES - PAIN ENJOYMENT GENERAL ACTIVITY SCALE (PEG)
INTERFERED_GENERAL_ACTIVITY: 8
PEG_TOTALSCORE: 7
INTERFERED_GENERAL_ACTIVITY: 8
AVG_PAIN_PASTWEEK: 5
INTERFERED_ENJOYMENT_LIFE: 8
INTERFERED_ENJOYMENT_LIFE: 8
PEG_TOTALSCORE: 7
AVG_PAIN_PASTWEEK: 5

## 2022-10-13 ASSESSMENT — ANXIETY QUESTIONNAIRES
7. FEELING AFRAID AS IF SOMETHING AWFUL MIGHT HAPPEN: SEVERAL DAYS
3. WORRYING TOO MUCH ABOUT DIFFERENT THINGS: SEVERAL DAYS
5. BEING SO RESTLESS THAT IT IS HARD TO SIT STILL: MORE THAN HALF THE DAYS
6. BECOMING EASILY ANNOYED OR IRRITABLE: NEARLY EVERY DAY
8. IF YOU CHECKED OFF ANY PROBLEMS, HOW DIFFICULT HAVE THESE MADE IT FOR YOU TO DO YOUR WORK, TAKE CARE OF THINGS AT HOME, OR GET ALONG WITH OTHER PEOPLE?: SOMEWHAT DIFFICULT
4. TROUBLE RELAXING: SEVERAL DAYS
2. NOT BEING ABLE TO STOP OR CONTROL WORRYING: NEARLY EVERY DAY
GAD7 TOTAL SCORE: 13
7. FEELING AFRAID AS IF SOMETHING AWFUL MIGHT HAPPEN: SEVERAL DAYS
1. FEELING NERVOUS, ANXIOUS, OR ON EDGE: MORE THAN HALF THE DAYS
GAD7 TOTAL SCORE: 13
IF YOU CHECKED OFF ANY PROBLEMS ON THIS QUESTIONNAIRE, HOW DIFFICULT HAVE THESE PROBLEMS MADE IT FOR YOU TO DO YOUR WORK, TAKE CARE OF THINGS AT HOME, OR GET ALONG WITH OTHER PEOPLE: SOMEWHAT DIFFICULT

## 2022-10-13 ASSESSMENT — PAIN SCALES - GENERAL: PAINLEVEL: MODERATE PAIN (5)

## 2022-10-13 NOTE — PATIENT INSTRUCTIONS
Medications:  pregabalin (LYRICA) 150 MG capsule- Take 1 capsule (150 mg) by mouth 3 times daily    Lyrica 1 capsule= 150mg   The prescription will not give the full details as outlined below.    AM    PM       BEDTIME                  100mg    100mg      150mg If tolerating, after 3-4 days, increase as tolerated to next line   100mg            150mg       150mg If tolerating, after 3-4 days, increase as tolerated to next line   150mg      150mg      150mg   Call us when you are at this dose, or with any concerns.     Don't drive on this medication until you know how it effects you.  Common side effects are drowsiness and dizziness  You can go slower if any side effects  Once on higher doses, you cannot stop this medication abruptly.  Tapering instructions would need to be provided by a medical professional.         camphor-menthol (DERMASARRA) 0.5-0.5 % external lotion - Apply 1 mL topically every 6 hours as needed for skin care      For refills of opioid medications - You MUST call (Or MyChart) the clinic DIRECTLY and at least 7 days before you run out of your medication.    Please provide the clinic with a minium of 1 week notice, on all prescription refills.     Referrals:    Physical Therapy Referral placed-   If you have not heard from the scheduling office within 2 business days, please call [phone] for all locations         Procedures:    Call to schedule your procedure: [phone] option #2  Left occipital nerve block under ultrasound guidance and left upper back trigger point injection    Your pre-procedure instructions are below, please call our clinic if you have any questions.        Recommended Follow up:      Follow up in 3-4 months after procedure.      To speak with a nurse, schedule/reschedule/cancel a clinic appointment, or request a medication refill call: [phone].    You can also reach us by [employer]: [email or web address]        Please call [phone] to schedule,  reschedule, or cancel your procedure appointment.   Phones are answered Monday - Friday from 08:00 - 4:30pm.  Leave a voicemail with your name, birth date, and phone number if no one is available to take your call.         You will need to be tested for COVID-19 before your procedure. If you're going home on the same day as your procedure (surgery), you may take an at-home rapid antigen test 1 to 2 days before your procedure. If your test is negative, please take a photo of the test. Show the photo to the nurse when you come in for your procedure. If your test is positive, please update our office right away and your procedure may have to be postponed.     If you do not have access to an at-home rapid test, you will have to be tested at a lab within 4 days of your procedure.  You will be called to schedule your COVID test by a central scheduling team. If you have not been contacted to schedule your test within 4 days of the scheduled procedure, call [phone]     Please be aware that the turn around time for the test is approximately 24-72 hours.   If your results are still pending the day of your procedure, you will be notified as soon as possible as the procedure may be cancelled.     Please note: You will only be contacted for positive and pending results.      The procedure center staff will call you several days before the procedure to review important information that you will need to know for the day of the procedure.   Please contact the clinic if you have further questions about this information.         Information related to Scheduling and Pre-Procedure Instructions:     If you must reschedule your procedure more than two times, you must follow up in clinic before rescheduling again.     Hold Plavix for 7 days before your procedure.     Preparing for your procedure     CAUTION - FAILURE TO FOLLOW THESE PRE-PROCEDURE INSTRUCTIONS WILL RESULT IN YOUR PROCEDURE BEING RESCHEDULED.     Your Procedure: Left  occipital nerve block under ultrasound guidance and left upper back trigger point injection        Pregnancy  If you are pregnant, or think you may be pregnant, please notify our staff. This may or may not affect the ability to perform the procedure.        You must have a  take you home after your procedure. Transportation by taxi or para-transit is okay as long as you have a responsible adult accompany you. You must provide your 's full name and contact number at time of check in.      Fasting Protocol Please have nothing to eat and drink for 2 hours before the procedure.       Medications If you take any medications, DO NOT STOP. Take your medications as usual the day of your procedure with a sip of water AT LEAST 2 HOURS PRIOR TO ARRIVAL.    Antibiotics If you are currently taking antibiotics, you must complete the entire dose 7 days prior to your scheduled procedure. You must be clear of any signs or symptoms of infection. If you begin antibiotics, please contact our clinic for instructions.      Fever, Chills, or Rash If you experience a fever of higher than 100 degrees, chills, rash, or open wounds during the one week before your procedure, please call the clinic to see if you may proceed with your procedure.       Medication Hold List  **Patients under Cardiology/Neurology care should consult their provider prior to the pain procedure to verify pre-procedure medication instructions. The information below contains general guidelines.**        Hold Plavix for 7 days before your procedure.     Blood Thinners If you are taking daily ASPIRIN, PLAVIX, OR OTHER BLOOD THINNERS SUCH AS COUMADIN/WARFARIN, we will need your prescribing doctor to sign a release permitting you to stop these medications. Once approved by your prescribing doctor - STOP ALL BLOOD THINNERS BASED ON THE TIME TABLE BELOW PRIOR TO YOUR PROCEDURE. If you have been instructed to stop WARFARIN(COUMADIN), you must have an INR lab  drawn the day before your procedure. . Your INR must be within normal limits before we can perform your injection. MEDICATIONS CAN BE RESTARTED AFTER YOUR PROCEDURE.     14 DAY HOLD  Ticlid (ticlopidine)     10 DAY HOLD  Effient (Prasugel)     3 DAY HOLD  Xarelto (rivaroxaban) 7 DAY HOLD  Anacin, Bufferin, Ecotrin, Excedrin, Aggrenox (Aspirin)  Brilinta (ticagrelor)  Coumadin (Warfarin)  Pradexa (Dabigatran)  Elmiron (Pentosan)  Plavix (Clopidogrel Bisulfate)  Pletal (Cilostazol)     24 HOUR HOLD  Lovenox (enoxaparin)  Agrylin (Anagrelide)          Non-steroidal Anti-inflammatories (NSAIDs) DO NOT TAKE any non-steroidal anti-inflammatory medications (NSAIDs) listed on the table below. MEDICATIONS CAN BE RESTARTED AFTER YOUR PROCEDURE. Celebrex is OK to take and does not need to be discontinued.      Medications to stop:  3 DAY HOLD  Advil, Motrin (Ibuprofen)  Arthrotec (diciofenac sodium/misoprostol)  Clinoril (Sulindac)  Indocin (Indomethacin)  Lodine (Etodolac)  Toradol (Ketorolac)  Vicoprofen (Hydrocodone and Ibuprofen)  Voltaren (Diclotenac)     14 DAY HOLD  Daypro (Oxaprozin)  Feldene (Piroxicam)    7 DAY HOLD  Aleve (Naproxen sodium)  Darvon compound (contains aspirin)  Naprosyn (Naproxen)  Norgesic Forte (contains aspirin)  Mobic (Meloxicam)  Oruvall (Ketoprofen)  Percodan (contains aspirin)  Relafen (Nabumetone)  Salsalate  Trilisate  Vitamin E (more than 400 mg per day)  Any medication containing aspirin                      To speak with a nurse, schedule/reschedule/cancel a clinic appointment, or request a medication refill call: [phone]     You can also reach us by [employer]: [email or web address]          Recommended Follow up:       Follow up in 3-4 months or as needed.          To speak with a nurse, schedule/reschedule/cancel a clinic appointment, or request a medication refill call: [phone].     You can also reach us by [employer]: [email or web address]

## 2022-10-13 NOTE — NURSING NOTE
Patient presents with:  Follow Up: Follow-up Neck and back       Moderate Pain (5)     Pain Medications     Salicylates Refills Start End     aspirin (ASA) 325 MG tablet    3 3/30/2021     Sig - Route: Take 1 tablet (325 mg) by mouth daily - Oral    Class: E-Prescribe    Notes to Pharmacy: Please start taking 7 days prior to angiogram procedure          What medications are you using for pain? Lyrica, Tylenol    (New patients only) Have you been seen by another pain clinic/ provider? no    (Return Patients only) What refills are you needing today? no

## 2022-10-13 NOTE — NURSING NOTE
RN read through the instructions with the patient for the recommended procedure: pregabalin (LYRICA) 150 MG capsule- Take 1 capsule (150 mg) by mouth 3 times daily  Patient verbalized understanding to holding appropriate medication per protocol and was agreeable to NPO policy and needing a .    Anticoagulant: Hold Plavix x 7 days prior to procedure.    Recommended Follow Up:  3-4 months after procedure.    Raquel Wright RN
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
## 2022-10-14 DIAGNOSIS — M79.7 FIBROMYALGIA AFFECTING HAND: ICD-10-CM

## 2022-10-14 RX ORDER — PREGABALIN 150 MG/1
150 CAPSULE ORAL 3 TIMES DAILY
Qty: 90 CAPSULE | Refills: 0 | OUTPATIENT
Start: 2022-10-14

## 2022-10-14 NOTE — TELEPHONE ENCOUNTER
Patient is scheduled for procedure with Dr. Morgan    Spoke with: patient    Date of Procedure: 10-27-22    [place] [facility]    Informed patient they will need an adult  yes    Pre-procedure COVID-19 Test: @ home    Additional comments: Arrival @ 11:30 am     Patient is aware pre-op RN will call 2-3 days prior to procedure with arrival time and instructions. yes      Nahomi Khan on 10/14/2022 at 10:15 AM

## 2022-10-23 ENCOUNTER — HEALTH MAINTENANCE LETTER (OUTPATIENT)
Age: 42
End: 2022-10-23

## 2022-11-07 DIAGNOSIS — M79.7 FIBROMYALGIA AFFECTING HAND: ICD-10-CM

## 2022-11-08 NOTE — TELEPHONE ENCOUNTER
Pregabalin refill requested. Pt was last seen 10/13/22.   Refill sent to Dr. Morgan to review and process.     Dasia Hi LPN

## 2022-11-10 ENCOUNTER — OFFICE VISIT (OUTPATIENT)
Dept: ANESTHESIOLOGY | Facility: CLINIC | Age: 42
End: 2022-11-10
Payer: COMMERCIAL

## 2022-11-10 VITALS
HEIGHT: 69 IN | WEIGHT: 259.92 LBS | SYSTOLIC BLOOD PRESSURE: 136 MMHG | OXYGEN SATURATION: 97 % | DIASTOLIC BLOOD PRESSURE: 91 MMHG | HEART RATE: 101 BPM | BODY MASS INDEX: 38.5 KG/M2

## 2022-11-10 DIAGNOSIS — I67.1 CEREBRAL ANEURYSM, NONRUPTURED: ICD-10-CM

## 2022-11-10 DIAGNOSIS — R45.851 SUICIDAL IDEATION: ICD-10-CM

## 2022-11-10 DIAGNOSIS — G90.511 COMPLEX REGIONAL PAIN SYNDROME TYPE 1 OF RIGHT UPPER EXTREMITY: ICD-10-CM

## 2022-11-10 DIAGNOSIS — F31.9 BIPOLAR I DISORDER (H): ICD-10-CM

## 2022-11-10 DIAGNOSIS — E66.9 OBESITY (BMI 35.0-39.9 WITHOUT COMORBIDITY): ICD-10-CM

## 2022-11-10 DIAGNOSIS — M79.7 FIBROMYALGIA AFFECTING HAND: ICD-10-CM

## 2022-11-10 DIAGNOSIS — M54.81 OCCIPITAL NEURALGIA OF LEFT SIDE: Primary | ICD-10-CM

## 2022-11-10 DIAGNOSIS — M79.641 RIGHT HAND PAIN: ICD-10-CM

## 2022-11-10 DIAGNOSIS — F98.8 ATTENTION DEFICIT DISORDER, UNSPECIFIED HYPERACTIVITY PRESENCE: ICD-10-CM

## 2022-11-10 DIAGNOSIS — R56.9 CONVULSIONS, UNSPECIFIED CONVULSION TYPE (H): ICD-10-CM

## 2022-11-10 DIAGNOSIS — G56.03 CARPAL TUNNEL SYNDROME ON BOTH SIDES: ICD-10-CM

## 2022-11-10 PROCEDURE — 99214 OFFICE O/P EST MOD 30 MIN: CPT | Performed by: ANESTHESIOLOGY

## 2022-11-10 ASSESSMENT — PAIN SCALES - GENERAL: PAINLEVEL: MODERATE PAIN (4)
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
## 2022-11-10 NOTE — PATIENT INSTRUCTIONS
Medications:    Refilled Pregabalin 150 mg three times daily.    *Please provide the clinic with a minium of 1 week notice, on all prescription refills.       Referrals:    Comprehensive Weight Management.      Orthopedic  Referral. (Right hand pain)      Procedures:    Call to schedule your procedure: [phone]    Reschedule procedure.     Your pre-procedure instructions are below, please call our clinic if you have any questions.      Recommended Follow up:      Follow up as needed after procedure.         To speak with a nurse, schedule/reschedule/cancel a clinic appointment, or request a medication refill call: [phone]      Procedure Information related to COVID-19     Please call [phone] to schedule, reschedule, or cancel your procedure appointment.   Phones are answered Monday - Friday from 08:00 - 4:30pm.  Leave a voicemail with your name, birth date, and phone number if no one is available to take your call.         You will need to be tested for COVID-19 before your procedure. If you're going home on the same day as your procedure (surgery), you may take an at-home rapid antigen test 1 to 2 days before your procedure. If your test is negative, please take a photo of the test. Show the photo to the nurse when you come in for your procedure. If your test is positive, please update our office right away and your procedure may have to be postponed.     If you do not have access to an at-home rapid test, you will have to be tested at a lab within 4 days of your procedure.  You will be called to schedule your COVID test by a central scheduling team. If you have not been contacted to schedule your test within 4 days of the scheduled procedure, call [phone]     Please be aware that the turn around time for the test is approximately 24-72 hours.   If your results are still pending the day of your procedure, you will be notified as soon as possible as the procedure may be cancelled.      Please note: You will only be contacted for positive and pending results.      The procedure center staff will call you several days before the procedure to review important information that you will need to know for the day of the procedure.   Please contact the clinic if you have further questions about this information.         Information related to Scheduling and Pre-Procedure Instructions:    If you must reschedule your procedure more than two times, you must follow up in clinic before rescheduling again.    Hold Plavix for 7 days before your procedure.    Preparing for your procedure    CAUTION - FAILURE TO FOLLOW THESE PRE-PROCEDURE INSTRUCTIONS WILL RESULT IN YOUR PROCEDURE BEING RESCHEDULED.    Your Procedure: Left occipital nerve block under ultrasound guidance and left upper back trigger point injection      Pregnancy  If you are pregnant, or think you may be pregnant, please notify our staff. This may or may not affect the ability to perform the procedure.       You must have a  take you home after your procedure. Transportation by taxi or para-transit is okay as long as you have a responsible adult accompany you. You must provide your 's full name and contact number at time of check in.     Fasting Protocol  Please have nothing to eat or drink 1 hour prior to arrival.     Medications If you take any medications, DO NOT STOP. Take your medications as usual the day of your procedure with a sip of water AT LEAST 2 HOURS PRIOR TO ARRIVAL.    Antibiotics If you are currently taking antibiotics, you must complete the entire dose 7 days prior to your scheduled procedure. You must be clear of any signs or symptoms of infection. If you begin antibiotics, please contact our clinic for instructions.     Fever, Chills, or Rash If you experience a fever of higher than 100 degrees, chills, rash, or open wounds during the one week before your procedure, please call the clinic to see if you may  proceed with your procedure.      Medication Hold List  **Patients under Cardiology/Neurology care should consult their provider prior to the pain procedure to verify pre-procedure medication instructions. The information below contains general guidelines.**      Hold Plavix for 7 days before your procedure.    Blood Thinners If you are taking daily ASPIRIN, PLAVIX, OR OTHER BLOOD THINNERS SUCH AS COUMADIN/WARFARIN, we will need your prescribing doctor to sign a release permitting you to stop these medications. Once approved by your prescribing doctor - STOP ALL BLOOD THINNERS BASED ON THE TIME TABLE BELOW PRIOR TO YOUR PROCEDURE. If you have been instructed to stop WARFARIN(COUMADIN), you must have an INR lab drawn the day before your procedure. . Your INR must be within normal limits before we can perform your injection. MEDICATIONS CAN BE RESTARTED AFTER YOUR PROCEDURE.    14 DAY HOLD  Ticlid (ticlopidine)    10 DAY HOLD  Effient (Prasugel)    3 DAY HOLD  Xarelto (rivaroxaban) 7 DAY HOLD  Anacin, Bufferin, Ecotrin, Excedrin, Aggrenox (Aspirin)  Brilinta (ticagrelor)  Coumadin (Warfarin)  Pradexa (Dabigatran)  Elmiron (Pentosan)  Plavix (Clopidogrel Bisulfate)  Pletal (Cilostazol)    24 HOUR HOLD  Lovenox (enoxaparin)  Agrylin (Anagrelide)        Non-steroidal Anti-inflammatories (NSAIDs) DO NOT TAKE any non-steroidal anti-inflammatory medications (NSAIDs) listed on the table below. MEDICATIONS CAN BE RESTARTED AFTER YOUR PROCEDURE. Celebrex is OK to take and does not need to be discontinued.     Medications to stop:  3 DAY HOLD  Advil, Motrin (Ibuprofen)  Arthrotec (diciofenac sodium/misoprostol)  Clinoril (Sulindac)  Indocin (Indomethacin)  Lodine (Etodolac)  Toradol (Ketorolac)  Vicoprofen (Hydrocodone and Ibuprofen)  Voltaren (Diclotenac)    14 DAY HOLD  Daypro (Oxaprozin)  Feldene (Piroxicam)   7 DAY HOLD  Aleve (Naproxen sodium)  Darvon compound (contains aspirin)  Naprosyn (Naproxen)  Norgesic Forte  (contains aspirin)  Mobic (Meloxicam)  Oruvall (Ketoprofen)  Percodan (contains aspirin)  Relafen (Nabumetone)  Salsalate  Trilisate  Vitamin E (more than 400 mg per day)  Any medication containing aspirin                To speak with a nurse, schedule/reschedule/cancel a clinic appointment, or request a medication refill call: [phone]    You can also reach us by [employer]: [email or web address]

## 2022-11-10 NOTE — NURSING NOTE
Patient presents with:  Follow Up: Follow-up Neck, Lower Back       Moderate Pain (4)     Pain Medications     Salicylates Refills Start End     aspirin (ASA) 325 MG tablet    3 3/30/2021     Sig - Route: Take 1 tablet (325 mg) by mouth daily - Oral    Class: E-Prescribe    Notes to Pharmacy: Please start taking 7 days prior to angiogram procedure          What medications are you using for pain? Tylenol, Pregabalin    (New patients only) Have you been seen by another pain clinic/ provider? no    (Return Patients only) What refills are you needing today? no

## 2022-11-10 NOTE — NURSING NOTE
RN read through the instructions with the patient for the recommended procedure: Left occipital nerve block under ultrasound guidance and left upper back trigger point injection  Patient verbalized understanding to holding appropriate medication per protocol and was agreeable to NPO policy and needing a .    Anticoagulant: Plavix- hold 7 day hold    Recommended Follow Up:  As needed    Ximena Bradford RN

## 2022-11-11 RX ORDER — PREGABALIN 150 MG/1
150 CAPSULE ORAL 3 TIMES DAILY
Qty: 90 CAPSULE | Refills: 3 | Status: SHIPPED | OUTPATIENT
Start: 2022-11-11 | End: 2023-03-02

## 2022-11-14 NOTE — TELEPHONE ENCOUNTER
Patient is scheduled for procedure with Dr. Morgan    Spoke with: patient    Date of Procedure: 01-05-23    [place] [facility] [place]    Informed patient they will need an adult  yes    Pre-procedure COVID-19 Test: N/A    Additional comments: Arrival @ 10 am     Patient is aware pre-op RN will call 2-3 days prior to procedure with arrival time and instructions. yes      Nahomi Khan on 11/14/2022 at 11:17 AM

## 2022-11-16 ENCOUNTER — VIRTUAL VISIT (OUTPATIENT)
Dept: URGENT CARE | Facility: CLINIC | Age: 42
End: 2022-11-16
Payer: COMMERCIAL

## 2022-11-16 DIAGNOSIS — Z53.9 ERRONEOUS ENCOUNTER--DISREGARD: Primary | ICD-10-CM

## 2022-11-16 NOTE — PROGRESS NOTES
Spoke with Maryanne over the phone. Is experiencing swollen lymph nodes in her neck and armpits.  Recommend she come in to either urgent care or her primary care provider to be seen in regards to this.  Could also go to the emergency room if she is unable to get in anywhere.

## 2022-11-21 ENCOUNTER — TELEPHONE (OUTPATIENT)
Dept: NEUROSURGERY | Facility: CLINIC | Age: 42
End: 2022-11-21

## 2022-11-22 ENCOUNTER — HOSPITAL ENCOUNTER (EMERGENCY)
Facility: CLINIC | Age: 42
Discharge: HOME | End: 2022-11-23
Attending: EMERGENCY MEDICINE | Admitting: EMERGENCY MEDICINE
Payer: COMMERCIAL

## 2022-11-22 ENCOUNTER — APPOINTMENT (OUTPATIENT)
Dept: CT IMAGING | Facility: CLINIC | Age: 42
End: 2022-11-22
Attending: EMERGENCY MEDICINE
Payer: COMMERCIAL

## 2022-11-22 ENCOUNTER — APPOINTMENT (OUTPATIENT)
Dept: GENERAL RADIOLOGY | Facility: CLINIC | Age: 42
End: 2022-11-22
Attending: EMERGENCY MEDICINE
Payer: COMMERCIAL

## 2022-11-22 DIAGNOSIS — R51.9 NONINTRACTABLE HEADACHE, UNSPECIFIED CHRONICITY PATTERN, UNSPECIFIED HEADACHE TYPE: ICD-10-CM

## 2022-11-22 LAB
BASOPHILS # BLD AUTO: 0.1 10E3/UL (ref 0–0.2)
BASOPHILS NFR BLD AUTO: 1 %
EOSINOPHIL # BLD AUTO: 0.5 10E3/UL (ref 0–0.7)
EOSINOPHIL NFR BLD AUTO: 4 %
ERYTHROCYTE [DISTWIDTH] IN BLOOD BY AUTOMATED COUNT: 13 % (ref 10–15)
HCT VFR BLD AUTO: 42.3 % (ref 35–47)
HGB BLD-MCNC: 14.1 G/DL (ref 11.7–15.7)
IMM GRANULOCYTES # BLD: 0 10E3/UL
IMM GRANULOCYTES NFR BLD: 0 %
LYMPHOCYTES # BLD AUTO: 4.6 10E3/UL (ref 0.8–5.3)
LYMPHOCYTES NFR BLD AUTO: 37 %
MCH RBC QN AUTO: 27.4 PG (ref 26.5–33)
MCHC RBC AUTO-ENTMCNC: 33.3 G/DL (ref 31.5–36.5)
MCV RBC AUTO: 82 FL (ref 78–100)
MONOCYTES # BLD AUTO: 0.8 10E3/UL (ref 0–1.3)
MONOCYTES NFR BLD AUTO: 6 %
MONOCYTES NFR BLD AUTO: NEGATIVE %
NEUTROPHILS # BLD AUTO: 6.6 10E3/UL (ref 1.6–8.3)
NEUTROPHILS NFR BLD AUTO: 52 %
NRBC # BLD AUTO: 0 10E3/UL
NRBC BLD AUTO-RTO: 0 /100
PLATELET # BLD AUTO: 311 10E3/UL (ref 150–450)
RBC # BLD AUTO: 5.15 10E6/UL (ref 3.8–5.2)
WBC # BLD AUTO: 12.5 10E3/UL (ref 4–11)

## 2022-11-22 PROCEDURE — 80053 COMPREHEN METABOLIC PANEL: CPT | Performed by: EMERGENCY MEDICINE

## 2022-11-22 PROCEDURE — 71046 X-RAY EXAM CHEST 2 VIEWS: CPT

## 2022-11-22 PROCEDURE — 96361 HYDRATE IV INFUSION ADD-ON: CPT | Performed by: EMERGENCY MEDICINE

## 2022-11-22 PROCEDURE — 99285 EMERGENCY DEPT VISIT HI MDM: CPT | Mod: 25 | Performed by: EMERGENCY MEDICINE

## 2022-11-22 PROCEDURE — 70450 CT HEAD/BRAIN W/O DYE: CPT

## 2022-11-22 PROCEDURE — 85610 PROTHROMBIN TIME: CPT | Performed by: EMERGENCY MEDICINE

## 2022-11-22 PROCEDURE — 70450 CT HEAD/BRAIN W/O DYE: CPT | Mod: 26 | Performed by: RADIOLOGY

## 2022-11-22 PROCEDURE — 36415 COLL VENOUS BLD VENIPUNCTURE: CPT | Performed by: EMERGENCY MEDICINE

## 2022-11-22 PROCEDURE — 71046 X-RAY EXAM CHEST 2 VIEWS: CPT | Mod: 26 | Performed by: RADIOLOGY

## 2022-11-22 PROCEDURE — 93010 ELECTROCARDIOGRAM REPORT: CPT | Performed by: EMERGENCY MEDICINE

## 2022-11-22 PROCEDURE — 85025 COMPLETE CBC W/AUTO DIFF WBC: CPT | Performed by: EMERGENCY MEDICINE

## 2022-11-22 PROCEDURE — 93005 ELECTROCARDIOGRAM TRACING: CPT | Performed by: EMERGENCY MEDICINE

## 2022-11-22 PROCEDURE — 258N000003 HC RX IP 258 OP 636: Performed by: EMERGENCY MEDICINE

## 2022-11-22 PROCEDURE — 86308 HETEROPHILE ANTIBODY SCREEN: CPT | Performed by: EMERGENCY MEDICINE

## 2022-11-22 RX ADMIN — SODIUM CHLORIDE 1000 ML: 9 INJECTION, SOLUTION INTRAVENOUS at 23:43

## 2022-11-22 ASSESSMENT — ACTIVITIES OF DAILY LIVING (ADL): ADLS_ACUITY_SCORE: 33

## 2022-11-22 NOTE — TELEPHONE ENCOUNTER
Pt states that she is returning a phone call to Jeana but no information is documented in this encounter.  Please try calling Pt back.  Thanks.

## 2022-11-23 ENCOUNTER — APPOINTMENT (OUTPATIENT)
Dept: CT IMAGING | Facility: CLINIC | Age: 42
End: 2022-11-23
Attending: EMERGENCY MEDICINE
Payer: COMMERCIAL

## 2022-11-23 VITALS
RESPIRATION RATE: 20 BRPM | WEIGHT: 260.14 LBS | OXYGEN SATURATION: 97 % | DIASTOLIC BLOOD PRESSURE: 85 MMHG | HEIGHT: 68 IN | BODY MASS INDEX: 39.43 KG/M2 | SYSTOLIC BLOOD PRESSURE: 111 MMHG | HEART RATE: 65 BPM | TEMPERATURE: 98.1 F

## 2022-11-23 LAB
ALBUMIN SERPL BCG-MCNC: 4.5 G/DL (ref 3.5–5.2)
ALBUMIN UR-MCNC: NEGATIVE MG/DL
ALP SERPL-CCNC: 102 U/L (ref 35–104)
ALT SERPL W P-5'-P-CCNC: 25 U/L (ref 10–35)
ANION GAP SERPL CALCULATED.3IONS-SCNC: 18 MMOL/L (ref 7–15)
APPEARANCE UR: CLEAR
AST SERPL W P-5'-P-CCNC: 23 U/L (ref 10–35)
BILIRUB SERPL-MCNC: 0.2 MG/DL
BILIRUB UR QL STRIP: NEGATIVE
BUN SERPL-MCNC: 12.9 MG/DL (ref 6–20)
CALCIUM SERPL-MCNC: 9.7 MG/DL (ref 8.6–10)
CHLORIDE SERPL-SCNC: 98 MMOL/L (ref 98–107)
COLOR UR AUTO: ABNORMAL
CREAT BLD-MCNC: 0.8 MG/DL (ref 0.5–1)
CREAT SERPL-MCNC: 0.83 MG/DL (ref 0.51–0.95)
DEPRECATED HCO3 PLAS-SCNC: 21 MMOL/L (ref 22–29)
GFR SERPL CREATININE-BSD FRML MDRD: 90 ML/MIN/1.73M2
GFR SERPL CREATININE-BSD FRML MDRD: >60 ML/MIN/1.73M2
GLUCOSE SERPL-MCNC: 89 MG/DL (ref 70–99)
GLUCOSE UR STRIP-MCNC: NEGATIVE MG/DL
HGB UR QL STRIP: NEGATIVE
INR PPP: 1.04 (ref 0.85–1.15)
KETONES UR STRIP-MCNC: NEGATIVE MG/DL
LEUKOCYTE ESTERASE UR QL STRIP: NEGATIVE
MUCOUS THREADS #/AREA URNS LPF: PRESENT /LPF
NITRATE UR QL: NEGATIVE
PH UR STRIP: 5 [PH] (ref 5–7)
POTASSIUM SERPL-SCNC: 4.2 MMOL/L (ref 3.4–5.3)
PROT SERPL-MCNC: 7.9 G/DL (ref 6.4–8.3)
RBC URINE: 1 /HPF
SODIUM SERPL-SCNC: 137 MMOL/L (ref 136–145)
SP GR UR STRIP: 1.01 (ref 1–1.03)
SQUAMOUS EPITHELIAL: 3 /HPF
UROBILINOGEN UR STRIP-MCNC: NORMAL MG/DL
WBC URINE: 2 /HPF

## 2022-11-23 PROCEDURE — 96375 TX/PRO/DX INJ NEW DRUG ADDON: CPT | Mod: 59 | Performed by: EMERGENCY MEDICINE

## 2022-11-23 PROCEDURE — 82565 ASSAY OF CREATININE: CPT

## 2022-11-23 PROCEDURE — 81001 URINALYSIS AUTO W/SCOPE: CPT | Performed by: EMERGENCY MEDICINE

## 2022-11-23 PROCEDURE — 70498 CT ANGIOGRAPHY NECK: CPT

## 2022-11-23 PROCEDURE — 70496 CT ANGIOGRAPHY HEAD: CPT

## 2022-11-23 PROCEDURE — 250N000011 HC RX IP 250 OP 636: Performed by: EMERGENCY MEDICINE

## 2022-11-23 PROCEDURE — 99207 PR NO BILLABLE SERVICE THIS VISIT: CPT | Performed by: STUDENT IN AN ORGANIZED HEALTH CARE EDUCATION/TRAINING PROGRAM

## 2022-11-23 PROCEDURE — 70496 CT ANGIOGRAPHY HEAD: CPT | Mod: 26 | Performed by: RADIOLOGY

## 2022-11-23 PROCEDURE — 258N000003 HC RX IP 258 OP 636: Performed by: EMERGENCY MEDICINE

## 2022-11-23 PROCEDURE — 250N000013 HC RX MED GY IP 250 OP 250 PS 637: Performed by: EMERGENCY MEDICINE

## 2022-11-23 PROCEDURE — 70498 CT ANGIOGRAPHY NECK: CPT | Mod: 26 | Performed by: RADIOLOGY

## 2022-11-23 PROCEDURE — 250N000009 HC RX 250: Performed by: EMERGENCY MEDICINE

## 2022-11-23 PROCEDURE — 96374 THER/PROPH/DIAG INJ IV PUSH: CPT | Mod: 59 | Performed by: EMERGENCY MEDICINE

## 2022-11-23 PROCEDURE — 96361 HYDRATE IV INFUSION ADD-ON: CPT

## 2022-11-23 RX ORDER — DIPHENHYDRAMINE HYDROCHLORIDE 50 MG/ML
25 INJECTION INTRAMUSCULAR; INTRAVENOUS ONCE
Status: DISCONTINUED | OUTPATIENT
Start: 2022-11-23 | End: 2022-11-23 | Stop reason: HOSPADM

## 2022-11-23 RX ORDER — IOPAMIDOL 755 MG/ML
75 INJECTION, SOLUTION INTRAVASCULAR ONCE
Status: COMPLETED | OUTPATIENT
Start: 2022-11-23 | End: 2022-11-23

## 2022-11-23 RX ORDER — LORAZEPAM 2 MG/ML
1 INJECTION INTRAMUSCULAR ONCE
Status: COMPLETED | OUTPATIENT
Start: 2022-11-23 | End: 2022-11-23

## 2022-11-23 RX ORDER — KETOROLAC TROMETHAMINE 15 MG/ML
15 INJECTION, SOLUTION INTRAMUSCULAR; INTRAVENOUS ONCE
Status: COMPLETED | OUTPATIENT
Start: 2022-11-23 | End: 2022-11-23

## 2022-11-23 RX ORDER — ACETAMINOPHEN 500 MG
1000 TABLET ORAL ONCE
Status: COMPLETED | OUTPATIENT
Start: 2022-11-23 | End: 2022-11-23

## 2022-11-23 RX ORDER — OXYCODONE HYDROCHLORIDE 5 MG/1
5 TABLET ORAL ONCE
Status: COMPLETED | OUTPATIENT
Start: 2022-11-23 | End: 2022-11-23

## 2022-11-23 RX ADMIN — ACETAMINOPHEN 1000 MG: 325 TABLET, FILM COATED ORAL at 01:02

## 2022-11-23 RX ADMIN — PROCHLORPERAZINE EDISYLATE 10 MG: 5 INJECTION INTRAMUSCULAR; INTRAVENOUS at 04:35

## 2022-11-23 RX ADMIN — SODIUM CHLORIDE, PRESERVATIVE FREE 90 ML: 5 INJECTION INTRAVENOUS at 03:47

## 2022-11-23 RX ADMIN — KETOROLAC TROMETHAMINE 15 MG: 15 INJECTION, SOLUTION INTRAMUSCULAR; INTRAVENOUS at 04:36

## 2022-11-23 RX ADMIN — OXYCODONE HYDROCHLORIDE 5 MG: 5 TABLET ORAL at 02:13

## 2022-11-23 RX ADMIN — IOPAMIDOL 75 ML: 755 INJECTION, SOLUTION INTRAVENOUS at 03:47

## 2022-11-23 RX ADMIN — SODIUM CHLORIDE 1000 ML: 9 INJECTION, SOLUTION INTRAVENOUS at 04:35

## 2022-11-23 RX ADMIN — LORAZEPAM 1 MG: 2 INJECTION INTRAMUSCULAR at 01:02

## 2022-11-23 ASSESSMENT — ACTIVITIES OF DAILY LIVING (ADL)
ADLS_ACUITY_SCORE: 35
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
## 2022-11-23 NOTE — ED TRIAGE NOTES
Pt presents with c/o headache that started a few days ago.. pt states she also notes lumps back of head down neck and into her right axilla. Pt stated she went to urgent care two days ago and was told to be seen in ER.      Triage Assessment     Row Name 11/22/22 1845       Triage Assessment (Adult)    Airway WDL WDL       Respiratory WDL    Respiratory WDL WDL       Skin Circulation/Temperature WDL    Skin Circulation/Temperature WDL WDL       Cardiac WDL    Cardiac WDL WDL       Peripheral/Neurovascular WDL    Peripheral Neurovascular WDL WDL       Cognitive/Neuro/Behavioral WDL    Cognitive/Neuro/Behavioral WDL WDL

## 2022-11-23 NOTE — CONSULTS
[facility]  Neurology Consultation Note    Patient Name:  Maryanne Crockett    MRN:  [id number]   :  1980  Date of Service:  2022  Primary care provider:  Tristian Mera      History of Present Illness:   Maryanne Crockett is a 42 year old woman with PMH of asthma, ovarian cysts, endometriosis s/p hysterectomy and bilateral oophorectomy, mood disorder with marked anxiety, spells of altered awareness seen at [facility] with current unclear etiology although favoring nonepileptic spells, right posterior communicating artery aneurysm s/p pipeline diverter stent in 2021 with resolution of aneurysm.  She presents to the ED with severe headache that has developed over the last 2 days.  Neurology is consulted due to his headache.    Briefly in regard to her spells of altered awareness, she was seen at [facility] by Dr. Fox, and has a significant history that is suggestive of nonepileptic spells including refractory mood disorder and historical trauma.  However, she does have quite a bit of stereotypic with the spells as most of the characteristics are similar each time, and she has lateralized tongue biting and incontinence with almost all episodes.  Unfortunately, she has been lost to follow-up in that clinic.     Her primary concern and headache started about 2 days ago and has gotten worse over the course of time until the last few hours where its been at the peak intensity of pain.  She denies a sudden onset and peak intensity of the pain and rather that its gotten worse over several days as above.  Her headache is mostly in her right head with a strong pressure component and only rare episodes of throbbing.  It started in her occiput but now it involves her whole right head and even some of the left side now that it is more severe.  She does have known occipital neuralgia bilaterally which she receives injections for.  She said it started out as a more severe  "variety of that but now it is much more symptomatic.  She gets significant photophobia and phonophobia, but is not currently nauseous.  She only gets headaches this severe very rarely, and the last time it happened she thinks that she was diagnosed with the aneurysm so she is very worried about that.  Additionally, she has a sibling that had a brain tumor in the past that presented with a headache so she is very anxious that something more severe is going on.  She admits to some increased psychosocial stressors in her life in recent weeks due to her kids being off school and home for the Thanksgiving week and other responsibilities because of this.      Physical Examination  Vitals: /82   Pulse 90   Temp 98.1  F (36.7  C) (Temporal)   Resp 20   Ht 1.727 m (5' 8\")   Wt 118 kg (260 lb 2.3 oz)   LMP 03/14/2018 (Approximate)   SpO2 99%   BMI 39.55 kg/m      General: Alert, interactive; Sitting in bedside chair, quite distressed due to head pain and anxiety  HEENT: Normocephalic, atraumatic, nares patent, no oral lesions, edentulous with dentures in place  Resp: No increased work of breathing  Cardio: Tachycardic  Extremities: Warm and well perfused, peripheral pulses present, mild edema  Skin: Not jaundiced, no rash, no ecchymoses  Psych: Distressed and anxious, occasionally tangential  Neuro:   Mental status: Attentive, interactive; Recalls remote and recent history with relative accuracy  Speech/Language: Fluent speech without paraphasic errors; No dysarthria  Cranial nerves: Visual fields intact to confrontation, Eyes conjugate, Pupils 4mm and brisk, EOMI w/ normal and smooth pursuit, mild asymmetry of the right hemiface (admits that persistent activation worsens her head pain), facial sensation intact and symmetric, hearing intact to conversation, shoulder shrug strong, palate rise symmetric, tongue/uvula midline.  Motor:   Bulk: Appropriate  Tone: Appropriate with some occasional " paratonia  Spontaneous movements: No myoclonus or asterixis; otherwise appropriate at rest  Power: *All strength assessments based on MRC grading  *Impersistence noted throughout the exam and unable to perform full power testing due to this.  Otherwise moves all extremities symmetrically, strongly, and purposefully, but not fully effortful during formal isolated power testing  Reflexes: 2+ and symmetric biceps, triceps, brachioradialis, patellar, and achilles. Toes down-going  No crossed adductors or spread.   Sensory:   Patchy and inconsistent decrease in sensation in all extremities  Coordination:   FNF intact bilaterally without dysmetria   Gait:  Deferred      INVESTIGATIONS:  I have personally reviewed pertinent labs, tests, and radiological imaging.   CT (11/22/22):   IMPRESSION:  1.  No acute intracranial pathology.  2.  Pneumatized fluid in the right sphenoid locule. Correlate for acute sinusitis.    CTA (11/23/22):   IMPRESSION:   HEAD CTA:   1.  No high-grade stenosis or branch occlusion.  2.  Right ICA and M1 stent for aneurysm treatment. No residual aneurysm identified.  NECK CTA:  1.  Normal neck CTA.      Was patient transferred from outside hospital?   No      IMPRESSION/PLAN:  Maryanne Crockett is a 42 year old woman with PMH of asthma, ovarian cysts, endometriosis s/p hysterectomy and bilateral oophorectomy, mood disorder with marked anxiety, spells of altered awareness seen at [facility] with current unclear etiology although favoring nonepileptic spells, right posterior communicating artery aneurysm s/p pipeline diverter stent in June 2021 with resolution of aneurysm.  She presents to the ED with severe headache that has developed over the last 2 days and consistently says it did not onset abruptly, which is reassuring against SAH.  This headache was present during her previous workup that found an aneurysm, and this is anxiety-provoking for her.  She also denies any associated symptoms, which is  reassuring.   - CT head, CTA head/neck to rule out acute pathology and assess aneurysm   - No further imaging indicated at this time  - Migraine cocktail   - Compazine 10mg, Toradol 60mg, Benadryl 50mg, and IV Mag 1-2g  - No need for admission if she returns near baseline  - Will re-evaluate in AM if she is still in ED      Patient discussed with Attending Dr. Friend, will be seen in AM by Dr. Nath if she is still in ED.       Steve Sellers MD  Neurology Resident, PGY-4  Securely message with the Vocera Web Console (learn more here)  Text page via [facility]   11/23/2022      --------------------------    ROS  A comprehensive ROS was performed and pertinent findings were included in HPI.     PMH  Past Medical History:   Diagnosis Date     Bipolar I disorder (H)      Cerebral aneurysm, nonruptured      Family history of glioblastoma     screening MRIs every 2 years     Ovarian cyst      RSD (reflex sympathetic dystrophy)      Seizures (H)      Past Surgical History:   Procedure Laterality Date     CHOLECYSTECTOMY       CYSTOSCOPY N/A 09/01/2020    Procedure: CYSTOSCOPY;  Surgeon: Hayley Zayas MD;  Location: [facility] OR     DAVINCI HYSTERECTOMY TOTAL, BILATERAL SALPINGO-OOPHORECTOMY, COMBINED Left 09/01/2020    Procedure: HYSTERECTOMY, TOTAL, ROBOT-ASSISTED, LAPAROSCOPIC,  left oophorectomy;  Surgeon: Hayley Zayas MD;  Location: [facility] OR     DILATION AND CURETTAGE SUCTION  04/30/2015    retained POC     GASTRIC RESTRICTIVE PROCEDURE, OPEN, REMOVE/REPLACE SUBCUTANEOUS PORT       INJECT NERVE BLOCK OCCIPITAL Left 6/30/2022    Procedure: Left occipital nerve block under ultrasound guidance and left upper back trigger point injection;  Surgeon: Scott Morgan MD;  Location: [facility] OR     INJECT NERVE BLOCK OCCIPITAL Left 9/15/2022    Procedure: Left occipital nerve block under ultrasound guidance and left upper back trigger point injection;  Surgeon: Scott Morgan MD;  Location:  [facility] OR     IR CAROTID CEREBRAL ANGIOGRAM BILATERAL  03/19/2021     IR CAROTID CEREBRAL ANGIOGRAM BILATERAL  06/23/2021     IR CAROTID CEREBRAL ANGIOGRAM BILATERAL  1/12/2022     LAPAROSCOPIC OOPHORECTOMY Right 08/05/2016    Procedure: LAPAROSCOPIC OOPHORECTOMY;  Surgeon: Adrianne Vergara MD;  [facility]: [facility]     LAPAROSCOPIC REMOVAL GASTRIC ADJUSTABLE BAND N/A 05/11/2021    Procedure: REMOVAL, GASTRIC BAND, ADJUSTABLE, LAPAROSCOPIC;  Surgeon: Remy Gold MD;  Location: [facility] OR     LAPAROSCOPIC SALPINGECTOMY Bilateral 08/05/2016    Procedure: LAPAROSCOPIC SALPINGECTOMY;  Surgeon: Adrianne Vergara MD;  [facility]     LAPAROSCOPIC TUBAL LIGATION Bilateral 05/22/2015    Procedure: LAPAROSCOPIC TUBAL LIGATION;  Surgeon: Hayley Zayas MD;  [facility]     LAPAROSCOPY OPERATIVE ADULT N/A 08/05/2016    Procedure: LAPAROSCOPY OPERATIVE ADULT;  Surgeon: Adrianne Vergara MD;  [facility]     PANNICULECTOMY  04/22/2017    [facility]     SOFT TISSUE SURGERY Right     cyst in hand       Medications   I have personally reviewed the patient's medication list.     Allergies  I have personally reviewed the patient's allergy list.     Social History   Social History     Socioeconomic History     Marital status: Single     Spouse name: Not on file     Number of children: Not on file     Years of education: Not on file     Highest education level: Not on file   Occupational History     Not on file   Tobacco Use     Smoking status: Some Days     Packs/day: 0.25     Types: Cigarettes     Smokeless tobacco: Current     Tobacco comments:     Trying   Vaping Use     Vaping Use: Never used   Substance and Sexual Activity     Alcohol use: Not Currently     Comment: rare     Drug use: Yes     Types: Marijuana     Comment: pt says she is prescribed medical marijuana     Sexual activity: Yes     Partners: Male     Birth control/protection: Female Surgical     Comment: TBL/hys   Other Topics Concern      Parent/sibling w/ CABG, MI or angioplasty before 65F 55M? Yes   Social History Narrative    Lives with 15yo daughter    Also has two homeless girls staying with them currently (6 months as of 09/2021)    Has a 22 yo son and 5 year old grandson (takes grandson to school every morning)     Social Determinants of Health     Financial Resource Strain: Not on file   Food Insecurity: Not on file   Transportation Needs: Not on file   Physical Activity: Not on file   Stress: Not on file   Social Connections: Not on file   Intimate Partner Violence: Not on file   Housing Stability: Not on file        Family History    Family History   Problem Relation Age of Onset     Coronary Artery Disease Mother 50        stent     Leukemia Father      Deep Vein Thrombosis (DVT) Father      Brain Cancer Brother 36        glioblastoma     Deep Vein Thrombosis (DVT) Brother      Cerebrovascular Disease Brother 47        stroke secondary to DVT     Deep Vein Thrombosis (DVT) Brother      Cerebral aneurysm Maternal Grandfather      Brain Cancer Maternal Aunt         glioblastoma     Brain Cancer Maternal Aunt         glioblastoma     Breast Cancer No family hx of      Anesthesia Reaction No family hx of      Ovarian Cancer No family hx of      Colon Cancer No family hx of

## 2022-11-24 ASSESSMENT — ENCOUNTER SYMPTOMS
HEADACHES: 1
WEAKNESS: 0
SHORTNESS OF BREATH: 0
COUGH: 1
VOMITING: 0
NECK STIFFNESS: 0
FACIAL ASYMMETRY: 0
FEVER: 0
LIGHT-HEADEDNESS: 0
NERVOUS/ANXIOUS: 1
NAUSEA: 1
MUSCULOSKELETAL NEGATIVE: 1
NECK PAIN: 0
EYES NEGATIVE: 1
NUMBNESS: 0
DIZZINESS: 0
SORE THROAT: 0
TROUBLE SWALLOWING: 0
PHOTOPHOBIA: 0
FATIGUE: 1
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
## 2022-12-20 ENCOUNTER — TELEPHONE (OUTPATIENT)
Dept: NEUROSURGERY | Facility: CLINIC | Age: 42
End: 2022-12-20

## 2022-12-23 ENCOUNTER — TELEPHONE (OUTPATIENT)
Dept: NEUROSURGERY | Facility: CLINIC | Age: 42
End: 2022-12-23

## 2022-12-26 ENCOUNTER — MEDICAL CORRESPONDENCE (OUTPATIENT)
Dept: HEALTH INFORMATION MANAGEMENT | Facility: CLINIC | Age: 42
End: 2022-12-26

## 2023-01-05 ENCOUNTER — ANCILLARY PROCEDURE (OUTPATIENT)
Dept: RADIOLOGY | Facility: AMBULATORY SURGERY CENTER | Age: 43
End: 2023-01-05
Attending: ANESTHESIOLOGY
Payer: COMMERCIAL

## 2023-01-05 ENCOUNTER — HOSPITAL ENCOUNTER (OUTPATIENT)
Facility: AMBULATORY SURGERY CENTER | Age: 43
Discharge: HOME | End: 2023-01-05
Attending: ANESTHESIOLOGY | Admitting: ANESTHESIOLOGY
Payer: COMMERCIAL

## 2023-01-05 VITALS
DIASTOLIC BLOOD PRESSURE: 123 MMHG | WEIGHT: 260 LBS | BODY MASS INDEX: 39.4 KG/M2 | SYSTOLIC BLOOD PRESSURE: 187 MMHG | HEART RATE: 107 BPM | RESPIRATION RATE: 16 BRPM | TEMPERATURE: 97 F | HEIGHT: 68 IN | OXYGEN SATURATION: 97 %

## 2023-01-05 DIAGNOSIS — R52 PAIN: ICD-10-CM

## 2023-01-05 DIAGNOSIS — M54.81 OCCIPITAL NEURALGIA OF LEFT SIDE: ICD-10-CM

## 2023-01-05 PROCEDURE — 64450 NJX AA&/STRD OTHER PN/BRANCH: CPT | Mod: LT

## 2023-01-05 PROCEDURE — 64450 NJX AA&/STRD OTHER PN/BRANCH: CPT | Mod: LT | Performed by: ANESTHESIOLOGY

## 2023-01-05 PROCEDURE — 64405 NJX AA&/STRD GR OCPL NRV: CPT | Mod: LT | Performed by: ANESTHESIOLOGY

## 2023-01-05 PROCEDURE — 76942 ECHO GUIDE FOR BIOPSY: CPT | Mod: 26 | Performed by: ANESTHESIOLOGY

## 2023-01-05 PROCEDURE — 20552 NJX 1/MLT TRIGGER POINT 1/2: CPT

## 2023-01-05 PROCEDURE — 64405 NJX AA&/STRD GR OCPL NRV: CPT | Mod: LT

## 2023-01-05 PROCEDURE — 20552 NJX 1/MLT TRIGGER POINT 1/2: CPT | Mod: XS | Performed by: ANESTHESIOLOGY

## 2023-01-05 RX ORDER — DIAZEPAM 5 MG
5-10 TABLET ORAL
Status: COMPLETED | OUTPATIENT
Start: 2023-01-05 | End: 2023-01-05

## 2023-01-05 RX ORDER — CLONIDINE HYDROCHLORIDE 0.1 MG/1
0.1 TABLET ORAL ONCE
Status: COMPLETED | OUTPATIENT
Start: 2023-01-05 | End: 2023-01-05

## 2023-01-05 RX ORDER — BUPIVACAINE HYDROCHLORIDE 2.5 MG/ML
INJECTION, SOLUTION INFILTRATION; PERINEURAL DAILY PRN
Status: DISCONTINUED | OUTPATIENT
Start: 2023-01-05 | End: 2023-01-05 | Stop reason: HOSPADM

## 2023-01-05 RX ORDER — DEXAMETHASONE SODIUM PHOSPHATE 10 MG/ML
INJECTION INTRAMUSCULAR; INTRAVENOUS DAILY PRN
Status: DISCONTINUED | OUTPATIENT
Start: 2023-01-05 | End: 2023-01-05 | Stop reason: HOSPADM

## 2023-01-05 RX ORDER — LIDOCAINE HYDROCHLORIDE 10 MG/ML
INJECTION, SOLUTION EPIDURAL; INFILTRATION; INTRACAUDAL; PERINEURAL DAILY PRN
Status: DISCONTINUED | OUTPATIENT
Start: 2023-01-05 | End: 2023-01-05 | Stop reason: HOSPADM

## 2023-01-05 RX ORDER — PREGABALIN 150 MG/1
150 CAPSULE ORAL ONCE
Status: DISCONTINUED | OUTPATIENT
Start: 2023-01-05 | End: 2023-01-06 | Stop reason: HOSPADM

## 2023-01-05 RX ADMIN — CLONIDINE HYDROCHLORIDE 0.1 MG: 0.1 TABLET ORAL at 11:34

## 2023-01-05 RX ADMIN — DIAZEPAM 10 MG: 5 TABLET ORAL at 10:24

## 2023-01-05 NOTE — OP NOTE
Patient: Maryanne Crockett     MRN: [id number] Attending: Dr. Morgan              [facility] PROCEDURE NOTE     ATTENDING CLINICIAN:    Scott Morgan MD       Procedure diagnosis:  1.  Greater and lesser occipital neuralgia  2.  Myofascial muscle pain      PROCEDURE(S) PERFORMED:  1. Left greater and lesser occipital nerve block   2. Left upper back trigger point injection  3. Ultrasound guidance of procedure #1     INDICATIONS:  Maryanne Crockett is a 42 year old female with a history of chronic headache possibly secondary to left occipital neuralgia.  The patient states that the patient was in their usual state of health and denied recent anticoagulant use or recent infections.  Therefore, the plan is to perform above mentioned procedure.      Procedure Details:  The patient was met in the exam room, where the patient was identified by name, medical record number and date of birth.  All of the patient s last minute questions were answered. Written informed consent was obtained and saved in the electronic medical record, after the risks, benefits, and alternatives were discussed with the patient.       A formal time-out procedure was performed, as per protocol, including patient name, title of procedure, and site of procedure, and all in the room concurred.  Routine monitors were applied.       The patient was placed in the prone position on the exam room table. An area is marked 3 cm below occipital protuberance and 1.5 cm lateral to the midline for greater occipital nerve block. A chlorhexidine prep was completed on this area followed by sterile draping per standard procedure.  We then placed the linear ultrasound probe in a transverse orientation over the superior nuchal line lateral to the inion. The needle was placed with live ultrasound guidance. Next, 2 ml bupivacaine (0.25%) with 10 mg dexamthasone was injected in increments into the muscles using a 21 gauge 80 mm needle. No paraesthesias occurred and  aspiration was negative.  We then performed lesser occipital nerve block in the lateral one third of the line from mastoid process to occipital protuberance.  0.5 mL of the above-mentioned solution was injected.    The needle was removed. A sterile bandage was applied.      Next we turned our attention to the left upper back trigger point injection. Under manual palpation we identified 6 trigger points in the left upper back. 1% lidocaine was injected into these trigger points 0.5-1 ml each. Following muscles are injected       Trapezius-  left   Left cervical paraspinals     Light pressure was held at the puncture site(s) to prevent ecchymosis and oozing.  The patient's skin was cleansed, and hemostasis was confirmed.       Condition:     The patient remained awake and alert throughout the procedure.  The patient tolerated the procedure well and was monitored for approximately 15 minutes afterward in the post procedure area.  There were no immediate post procedure complications noted.  The patient was then discharged to home as per protocol.

## 2023-01-05 NOTE — DISCHARGE INSTRUCTIONS
PAIN INJECTION HOME CARE INSTRUCTIONS  Activity  -You may resume most normal activity levels with the exception of strenuous activity. It may help to move in ways that hurt before the injection, to see if the pain is still there, but do not overdo it.     -DO NOT remove bandaid for 24 hours  -DO NOT shower for 24 hours      Pain  -You may feel immediate pain relief and numbness for a period of time after the injection. This may indicate the medication has reached the right spot.  -Your pain may return after this short pain-free period, or may even be a little worse for a day or two. It may be caused by needle irritation or by the medication itself. The medications usually take two or three days to start working, but can take as long as a week.    -You may use an ice pack for 20 minutes every 2 hours for the first 24 hours  -You may use a heating pad after the first 24 hours  -You may use Tylenol (acetaminophen) every 4 hours or other pain medicines as directed by your physician      Common side effects of steroids:  Not everyone will experience corticosteroid side effects. If side effects are experienced, they will gradually subside in the 7-10 day period following an injection. Most common side effects include:  -Flushed face and/or chest  -Feeling of warmth, particularly in the face but could be an overall feeling of warmth  -Increased blood sugar in diabetic patients  -Menstrual irregularities my occur. If taking hormone-based birth control an alternate method of birth control is recommended  -Sleep disturbances and/or mood swings are possible  -Leg cramps    PLEASE KEEP TRACK OF YOUR SYMPTOMS AND NOTE ANY CHANGES FOR YOUR DOCTOR.       Please contact us if you have:  -Severe pain  -Fever more than 101.5 degrees Fahrenheit  -Signs of infection at the injection site (redness, swelling, or drainage)      FOR PAIN CENTER PATIENTS:  If you have questions, please contact the Pain Clinic at [phone] Option 1  between the hours of 7:00 am and 3:00 pm Monday through Friday. After office hours you can contact the on call provider by dialing [phone]. If you need immediate attention, we recommend that you go to a hospital emergency room or dial [phone]. If you need to Fax information, the number is [phone].      FOR [facility] PATIENTS:  For patients seen by the PM and [facility] service, please call [phone]. (Monday through Friday 8a-5p.  After business hours and weekends call [phone] and ask for the [facility] and [facility] doctor on call). If you need to fax a pain diary to [facility] the fax number is [phone]. If you are unable to fax, uploading to [employer] is encouraged, then send to provider. If you have procedure scheduling questions please call [phone] Option #2.

## 2023-01-05 NOTE — OR NURSING
Pt's blood pressure elevated prior to pain injection. Pt stated she lost her medications so she has not been taking them for the past few days. Notified Dr Morgan who assessed pt. Prior to going in for procedure, BP had decreased so procedure was performed. After procedure, BP was 187/123. Dr Morgan ordered pt to have home clonidine dose; given. Pt not symptomatic at this time. Counseled pt on going to the ED if symptomatic and taking her regular medications ASAP. She has a BP cuff at home and will continue to check it today.

## 2023-01-10 ENCOUNTER — HOSPITAL ENCOUNTER (OUTPATIENT)
Dept: MRI IMAGING | Facility: CLINIC | Age: 43
Discharge: HOME | End: 2023-01-10
Attending: RADIOLOGY | Admitting: RADIOLOGY
Payer: COMMERCIAL

## 2023-01-10 DIAGNOSIS — I67.1 CEREBRAL ANEURYSM, NONRUPTURED: ICD-10-CM

## 2023-01-10 PROCEDURE — 70544 MR ANGIOGRAPHY HEAD W/O DYE: CPT

## 2023-01-26 ENCOUNTER — APPOINTMENT (OUTPATIENT)
Dept: CT IMAGING | Facility: CLINIC | Age: 43
End: 2023-01-26
Attending: EMERGENCY MEDICINE
Payer: COMMERCIAL

## 2023-01-26 ENCOUNTER — APPOINTMENT (OUTPATIENT)
Dept: GENERAL RADIOLOGY | Facility: CLINIC | Age: 43
End: 2023-01-26
Attending: EMERGENCY MEDICINE
Payer: COMMERCIAL

## 2023-01-26 ENCOUNTER — HOSPITAL ENCOUNTER (OUTPATIENT)
Facility: CLINIC | Age: 43
Setting detail: OBSERVATION
Discharge: HOME | End: 2023-01-27
Attending: EMERGENCY MEDICINE | Admitting: HOSPITALIST
Payer: COMMERCIAL

## 2023-01-26 ENCOUNTER — DOCUMENTATION ONLY (OUTPATIENT)
Dept: OTHER | Facility: CLINIC | Age: 43
End: 2023-01-26

## 2023-01-26 DIAGNOSIS — Z86.79 HISTORY OF INTRACRANIAL ANEURYSM: ICD-10-CM

## 2023-01-26 DIAGNOSIS — R11.10 VOMITING, UNSPECIFIED VOMITING TYPE, UNSPECIFIED WHETHER NAUSEA PRESENT: ICD-10-CM

## 2023-01-26 DIAGNOSIS — I16.0 HYPERTENSIVE URGENCY: ICD-10-CM

## 2023-01-26 LAB
ALBUMIN SERPL BCG-MCNC: 5.1 G/DL (ref 3.5–5.2)
ALBUMIN UR-MCNC: 70 MG/DL
ALP SERPL-CCNC: 121 U/L (ref 35–104)
ALT SERPL W P-5'-P-CCNC: 38 U/L (ref 10–35)
ANION GAP SERPL CALCULATED.3IONS-SCNC: 13 MMOL/L (ref 7–15)
APPEARANCE UR: CLEAR
AST SERPL W P-5'-P-CCNC: 27 U/L (ref 10–35)
ATRIAL RATE - MUSE: 105 BPM
BASOPHILS # BLD AUTO: 0.1 10E3/UL (ref 0–0.2)
BASOPHILS NFR BLD AUTO: 0 %
BILIRUB SERPL-MCNC: 0.3 MG/DL
BILIRUB UR QL STRIP: NEGATIVE
BUN SERPL-MCNC: 12.2 MG/DL (ref 6–20)
CALCIUM SERPL-MCNC: 10.4 MG/DL (ref 8.6–10)
CHLORIDE SERPL-SCNC: 102 MMOL/L (ref 98–107)
COLOR UR AUTO: YELLOW
CREAT SERPL-MCNC: 0.76 MG/DL (ref 0.51–0.95)
DEPRECATED HCO3 PLAS-SCNC: 26 MMOL/L (ref 22–29)
DIASTOLIC BLOOD PRESSURE - MUSE: NORMAL MMHG
EOSINOPHIL # BLD AUTO: 0 10E3/UL (ref 0–0.7)
EOSINOPHIL NFR BLD AUTO: 0 %
ERYTHROCYTE [DISTWIDTH] IN BLOOD BY AUTOMATED COUNT: 14.5 % (ref 10–15)
FLUAV RNA SPEC QL NAA+PROBE: NEGATIVE
FLUAV RNA SPEC QL NAA+PROBE: NEGATIVE
FLUBV RNA RESP QL NAA+PROBE: NEGATIVE
FLUBV RNA RESP QL NAA+PROBE: NEGATIVE
GFR SERPL CREATININE-BSD FRML MDRD: >90 ML/MIN/1.73M2
GLUCOSE SERPL-MCNC: 185 MG/DL (ref 70–99)
GLUCOSE UR STRIP-MCNC: NEGATIVE MG/DL
HCT VFR BLD AUTO: 45.9 % (ref 35–47)
HGB BLD-MCNC: 15.2 G/DL (ref 11.7–15.7)
HGB UR QL STRIP: ABNORMAL
HYALINE CASTS: 1 /LPF
IMM GRANULOCYTES # BLD: 0 10E3/UL
IMM GRANULOCYTES NFR BLD: 0 %
INTERPRETATION ECG - MUSE: NORMAL
KETONES UR STRIP-MCNC: 10 MG/DL
LACTATE SERPL-SCNC: 1 MMOL/L (ref 0.7–2)
LEUKOCYTE ESTERASE UR QL STRIP: NEGATIVE
LIPASE SERPL-CCNC: 23 U/L (ref 13–60)
LYMPHOCYTES # BLD AUTO: 2.2 10E3/UL (ref 0.8–5.3)
LYMPHOCYTES NFR BLD AUTO: 18 %
MCH RBC QN AUTO: 28.1 PG (ref 26.5–33)
MCHC RBC AUTO-ENTMCNC: 33.1 G/DL (ref 31.5–36.5)
MCV RBC AUTO: 85 FL (ref 78–100)
MONOCYTES # BLD AUTO: 0.4 10E3/UL (ref 0–1.3)
MONOCYTES NFR BLD AUTO: 3 %
MUCOUS THREADS #/AREA URNS LPF: PRESENT /LPF
NEUTROPHILS # BLD AUTO: 9.6 10E3/UL (ref 1.6–8.3)
NEUTROPHILS NFR BLD AUTO: 79 %
NITRATE UR QL: NEGATIVE
NRBC # BLD AUTO: 0 10E3/UL
NRBC BLD AUTO-RTO: 0 /100
P AXIS - MUSE: 37 DEGREES
PH UR STRIP: 6.5 [PH] (ref 5–7)
PLATELET # BLD AUTO: 362 10E3/UL (ref 150–450)
POTASSIUM SERPL-SCNC: 4 MMOL/L (ref 3.4–5.3)
PR INTERVAL - MUSE: 180 MS
PROT SERPL-MCNC: 8.6 G/DL (ref 6.4–8.3)
QRS DURATION - MUSE: 90 MS
QT - MUSE: 350 MS
QTC - MUSE: 462 MS
R AXIS - MUSE: 49 DEGREES
RBC # BLD AUTO: 5.41 10E6/UL (ref 3.8–5.2)
RBC URINE: 13 /HPF
RSV RNA SPEC NAA+PROBE: NEGATIVE
RSV RNA SPEC NAA+PROBE: NEGATIVE
SARS-COV-2 RNA RESP QL NAA+PROBE: NEGATIVE
SARS-COV-2 RNA RESP QL NAA+PROBE: NEGATIVE
SODIUM SERPL-SCNC: 141 MMOL/L (ref 136–145)
SP GR UR STRIP: 1.02 (ref 1–1.03)
SQUAMOUS EPITHELIAL: 2 /HPF
SYSTOLIC BLOOD PRESSURE - MUSE: NORMAL MMHG
T AXIS - MUSE: 16 DEGREES
TROPONIN T SERPL HS-MCNC: <6 NG/L
UROBILINOGEN UR STRIP-MCNC: NORMAL MG/DL
VENTRICULAR RATE- MUSE: 105 BPM
WBC # BLD AUTO: 12.2 10E3/UL (ref 4–11)
WBC URINE: 2 /HPF

## 2023-01-26 PROCEDURE — 87040 BLOOD CULTURE FOR BACTERIA: CPT | Performed by: EMERGENCY MEDICINE

## 2023-01-26 PROCEDURE — 85025 COMPLETE CBC W/AUTO DIFF WBC: CPT | Performed by: EMERGENCY MEDICINE

## 2023-01-26 PROCEDURE — 36415 COLL VENOUS BLD VENIPUNCTURE: CPT | Performed by: EMERGENCY MEDICINE

## 2023-01-26 PROCEDURE — 250N000011 HC RX IP 250 OP 636: Performed by: EMERGENCY MEDICINE

## 2023-01-26 PROCEDURE — 70450 CT HEAD/BRAIN W/O DYE: CPT

## 2023-01-26 PROCEDURE — 87637 SARSCOV2&INF A&B&RSV AMP PRB: CPT | Performed by: EMERGENCY MEDICINE

## 2023-01-26 PROCEDURE — 83690 ASSAY OF LIPASE: CPT | Performed by: EMERGENCY MEDICINE

## 2023-01-26 PROCEDURE — 93005 ELECTROCARDIOGRAM TRACING: CPT

## 2023-01-26 PROCEDURE — 99285 EMERGENCY DEPT VISIT HI MDM: CPT | Mod: 25,CS

## 2023-01-26 PROCEDURE — 96361 HYDRATE IV INFUSION ADD-ON: CPT

## 2023-01-26 PROCEDURE — 83605 ASSAY OF LACTIC ACID: CPT | Performed by: EMERGENCY MEDICINE

## 2023-01-26 PROCEDURE — 250N000011 HC RX IP 250 OP 636: Performed by: HOSPITALIST

## 2023-01-26 PROCEDURE — 71046 X-RAY EXAM CHEST 2 VIEWS: CPT

## 2023-01-26 PROCEDURE — 250N000013 HC RX MED GY IP 250 OP 250 PS 637: Performed by: EMERGENCY MEDICINE

## 2023-01-26 PROCEDURE — 81001 URINALYSIS AUTO W/SCOPE: CPT | Performed by: EMERGENCY MEDICINE

## 2023-01-26 PROCEDURE — 84484 ASSAY OF TROPONIN QUANT: CPT | Performed by: EMERGENCY MEDICINE

## 2023-01-26 PROCEDURE — 74177 CT ABD & PELVIS W/CONTRAST: CPT

## 2023-01-26 PROCEDURE — 210N000002 HC R&B HEART CARE

## 2023-01-26 PROCEDURE — 250N000013 HC RX MED GY IP 250 OP 250 PS 637: Performed by: HOSPITALIST

## 2023-01-26 PROCEDURE — 80053 COMPREHEN METABOLIC PANEL: CPT | Performed by: EMERGENCY MEDICINE

## 2023-01-26 PROCEDURE — 250N000009 HC RX 250: Performed by: EMERGENCY MEDICINE

## 2023-01-26 PROCEDURE — 258N000003 HC RX IP 258 OP 636: Performed by: EMERGENCY MEDICINE

## 2023-01-26 PROCEDURE — C9803 HOPD COVID-19 SPEC COLLECT: HCPCS

## 2023-01-26 PROCEDURE — 96374 THER/PROPH/DIAG INJ IV PUSH: CPT

## 2023-01-26 PROCEDURE — 258N000003 HC RX IP 258 OP 636: Performed by: HOSPITALIST

## 2023-01-26 PROCEDURE — 99223 1ST HOSP IP/OBS HIGH 75: CPT | Mod: AI | Performed by: HOSPITALIST

## 2023-01-26 PROCEDURE — 87086 URINE CULTURE/COLONY COUNT: CPT | Performed by: HOSPITALIST

## 2023-01-26 PROCEDURE — 96375 TX/PRO/DX INJ NEW DRUG ADDON: CPT

## 2023-01-26 RX ORDER — PROCHLORPERAZINE MALEATE 5 MG
5-10 TABLET ORAL EVERY 6 HOURS PRN
Status: DISCONTINUED | OUTPATIENT
Start: 2023-01-26 | End: 2023-01-27 | Stop reason: HOSPADM

## 2023-01-26 RX ORDER — CLONIDINE HYDROCHLORIDE 0.1 MG/1
0.1 TABLET ORAL ONCE
Status: COMPLETED | OUTPATIENT
Start: 2023-01-26 | End: 2023-01-26

## 2023-01-26 RX ORDER — KETOROLAC TROMETHAMINE 15 MG/ML
15 INJECTION, SOLUTION INTRAMUSCULAR; INTRAVENOUS ONCE
Status: COMPLETED | OUTPATIENT
Start: 2023-01-26 | End: 2023-01-26

## 2023-01-26 RX ORDER — LABETALOL HYDROCHLORIDE 5 MG/ML
10 INJECTION, SOLUTION INTRAVENOUS EVERY 4 HOURS PRN
Status: DISCONTINUED | OUTPATIENT
Start: 2023-01-26 | End: 2023-01-27 | Stop reason: HOSPADM

## 2023-01-26 RX ORDER — CLONIDINE HYDROCHLORIDE 0.1 MG/1
0.1 TABLET ORAL 2 TIMES DAILY
Status: DISCONTINUED | OUTPATIENT
Start: 2023-01-26 | End: 2023-01-27

## 2023-01-26 RX ORDER — CLONAZEPAM 1 MG/1
1 TABLET ORAL 3 TIMES DAILY PRN
COMMUNITY

## 2023-01-26 RX ORDER — HYDROMORPHONE HCL IN WATER/PF 6 MG/30 ML
0.2 PATIENT CONTROLLED ANALGESIA SYRINGE INTRAVENOUS
Status: DISCONTINUED | OUTPATIENT
Start: 2023-01-26 | End: 2023-01-27 | Stop reason: HOSPADM

## 2023-01-26 RX ORDER — PRAZOSIN HYDROCHLORIDE 2 MG/1
2 CAPSULE ORAL AT BEDTIME
COMMUNITY

## 2023-01-26 RX ORDER — DOXEPIN HYDROCHLORIDE 10 MG/1
10 CAPSULE ORAL AT BEDTIME
COMMUNITY

## 2023-01-26 RX ORDER — ALPRAZOLAM 0.5 MG
0.5 TABLET ORAL 3 TIMES DAILY PRN
Status: DISCONTINUED | OUTPATIENT
Start: 2023-01-26 | End: 2023-01-27 | Stop reason: HOSPADM

## 2023-01-26 RX ORDER — PROCHLORPERAZINE 25 MG
25 SUPPOSITORY, RECTAL RECTAL EVERY 12 HOURS PRN
Status: DISCONTINUED | OUTPATIENT
Start: 2023-01-26 | End: 2023-01-27 | Stop reason: HOSPADM

## 2023-01-26 RX ORDER — SODIUM CHLORIDE 9 MG/ML
INJECTION, SOLUTION INTRAVENOUS CONTINUOUS
Status: DISCONTINUED | OUTPATIENT
Start: 2023-01-26 | End: 2023-01-27 | Stop reason: HOSPADM

## 2023-01-26 RX ORDER — IOPAMIDOL 755 MG/ML
131 INJECTION, SOLUTION INTRAVASCULAR ONCE
Status: COMPLETED | OUTPATIENT
Start: 2023-01-26 | End: 2023-01-26

## 2023-01-26 RX ORDER — ACETAMINOPHEN 325 MG/1
650 TABLET ORAL EVERY 6 HOURS PRN
Status: DISCONTINUED | OUTPATIENT
Start: 2023-01-26 | End: 2023-01-27 | Stop reason: HOSPADM

## 2023-01-26 RX ORDER — LIDOCAINE 40 MG/G
CREAM TOPICAL
Status: DISCONTINUED | OUTPATIENT
Start: 2023-01-26 | End: 2023-01-27 | Stop reason: HOSPADM

## 2023-01-26 RX ORDER — CLONIDINE HYDROCHLORIDE 0.2 MG/1
0.2 TABLET ORAL 3 TIMES DAILY
COMMUNITY

## 2023-01-26 RX ORDER — ACETAMINOPHEN 650 MG/1
650 SUPPOSITORY RECTAL EVERY 6 HOURS PRN
Status: DISCONTINUED | OUTPATIENT
Start: 2023-01-26 | End: 2023-01-27 | Stop reason: HOSPADM

## 2023-01-26 RX ORDER — OLANZAPINE 2.5 MG/1
2.5 TABLET, FILM COATED ORAL DAILY PRN
COMMUNITY

## 2023-01-26 RX ORDER — LABETALOL HYDROCHLORIDE 5 MG/ML
20 INJECTION, SOLUTION INTRAVENOUS ONCE
Status: COMPLETED | OUTPATIENT
Start: 2023-01-26 | End: 2023-01-26

## 2023-01-26 RX ORDER — ALPRAZOLAM 0.25 MG
0.25 TABLET ORAL ONCE
Status: COMPLETED | OUTPATIENT
Start: 2023-01-26 | End: 2023-01-26

## 2023-01-26 RX ORDER — HYDROMORPHONE HYDROCHLORIDE 1 MG/ML
0.5 INJECTION, SOLUTION INTRAMUSCULAR; INTRAVENOUS; SUBCUTANEOUS
Status: DISCONTINUED | OUTPATIENT
Start: 2023-01-26 | End: 2023-01-26

## 2023-01-26 RX ADMIN — PROCHLORPERAZINE EDISYLATE 10 MG: 5 INJECTION INTRAMUSCULAR; INTRAVENOUS at 14:30

## 2023-01-26 RX ADMIN — SODIUM CHLORIDE 77 ML: 900 INJECTION INTRAVENOUS at 09:42

## 2023-01-26 RX ADMIN — HYDROMORPHONE HYDROCHLORIDE 0.5 MG: 1 INJECTION, SOLUTION INTRAMUSCULAR; INTRAVENOUS; SUBCUTANEOUS at 10:00

## 2023-01-26 RX ADMIN — ALPRAZOLAM 0.5 MG: 0.5 TABLET ORAL at 22:32

## 2023-01-26 RX ADMIN — KETOROLAC TROMETHAMINE 15 MG: 15 INJECTION, SOLUTION INTRAMUSCULAR; INTRAVENOUS at 09:31

## 2023-01-26 RX ADMIN — SODIUM CHLORIDE 1000 ML: 9 INJECTION, SOLUTION INTRAVENOUS at 08:03

## 2023-01-26 RX ADMIN — SODIUM CHLORIDE: 9 INJECTION, SOLUTION INTRAVENOUS at 13:24

## 2023-01-26 RX ADMIN — CLONIDINE HYDROCHLORIDE 0.1 MG: 0.1 TABLET ORAL at 10:16

## 2023-01-26 RX ADMIN — LABETALOL HYDROCHLORIDE 20 MG: 5 INJECTION INTRAVENOUS at 11:20

## 2023-01-26 RX ADMIN — IOPAMIDOL 131 ML: 755 INJECTION, SOLUTION INTRAVENOUS at 09:42

## 2023-01-26 RX ADMIN — ACETAMINOPHEN 650 MG: 325 TABLET, FILM COATED ORAL at 22:34

## 2023-01-26 RX ADMIN — LABETALOL HYDROCHLORIDE 10 MG: 5 INJECTION INTRAVENOUS at 13:25

## 2023-01-26 RX ADMIN — CLONIDINE HYDROCHLORIDE 0.1 MG: 0.1 TABLET ORAL at 17:34

## 2023-01-26 ASSESSMENT — ACTIVITIES OF DAILY LIVING (ADL)
ADLS_ACUITY_SCORE: 35

## 2023-01-26 NOTE — ED TRIAGE NOTES
5 days of not feeling well, fever and vomiting. Patient arrived by EMS, EMS gave patient droperidol for her n/v. Patient very somnolent in triage, requiring repeated stimulation to answer questions.     Triage Assessment     Row Name 01/26/23 0729       Triage Assessment (Adult)    Airway WDL WDL       Respiratory WDL    Respiratory WDL WDL       Skin Circulation/Temperature WDL    Skin Circulation/Temperature WDL WDL       Cardiac WDL    Cardiac WDL WDL       Peripheral/Neurovascular WDL    Peripheral Neurovascular WDL WDL       Cognitive/Neuro/Behavioral WDL    Cognitive/Neuro/Behavioral WDL X;arousability    Level of Consciousness somnolent  may be due to droperidol    Arousal Level arouses to repeated stimulation    Speech slow       Eloisa Coma Scale    Best Eye Response 4-->(E4) spontaneous    Best Motor Response 6-->(M6) obeys commands    Best Verbal Response 5-->(V5) oriented    Eloisa Coma Scale Score 15

## 2023-01-26 NOTE — ED NOTES
[facility]  ED Nurse Handoff Report    ED Chief complaint: Vomiting and Fever      ED Diagnosis:   Final diagnoses:   Hypertensive urgency   Vomiting, unspecified vomiting type, unspecified whether nausea present   History of intracranial aneurysm       Code Status: Full Code    Allergies:   Allergies   Allergen Reactions     Ibuprofen GI Disturbance     Seroquel [Quetiapine] Other (See Comments) and Rash     Insomnia and rash       Patient Story: Pt presents with complaints of abd pain, nausea and vomiting. Pt reports she has been running fevers at home as well. Pt is lethargic here and arouses to voice and repeated stimuli. Pt did get droperidol by EMS which may be contributing. CT imaging of head and abdomen was performed which were negative. Ct does show steatohepatitis. Pt also hypertensive in ED- pt given blood pressure medications with some slow improvement in BP.  Focused Assessment:  Drowsy, HTN     Treatments and/or interventions provided: blood work, imaging, ekg, ua, viral swabs  Patient's response to treatments and/or interventions: tolerating well     To be done/followed up on inpatient unit:  inpt orders    Does this patient have any cognitive concerns?:  drowsy    Activity level - Baseline/Home:  Independent  Activity Level - Current:   Stand with assist x2    Patient's Preferred language: English   Needed?: No    Isolation: None  Infection: Not Applicable  Patient tested for COVID 19 prior to admission: YES  Bariatric?: No    Vital Signs:   Vitals:    01/26/23 1100 01/26/23 1117 01/26/23 1132 01/26/23 1147   BP: (!) 222/116 (!) 222/119 (!) 192/107 (!) 190/106   BP Location:       Pulse: 105 106 88 94   Resp:       Temp:       TempSrc:       SpO2: 94% 94% 92% 93%   Weight:       Height:           Cardiac Rhythm:     Was the PSS-3 completed:   Yes  What interventions are required if any?               Family Comments: none present  OBS brochure/video discussed/provided to  patient/family: N/A              Name of person given brochure if not patient: n/a              Relationship to patient: n/a    For the majority of the shift this patient's behavior was Green.   Behavioral interventions performed were none.    ED NURSE PHONE NUMBER: *[phone]

## 2023-01-26 NOTE — ED NOTES
Pt reports nausea has returned, pt denies abd pain but states feeling nauseous. Pt still keeping eyes closed when talking to nurse. Pt reports she has had troubles concentrating and gets headaches since chula. Pt able to state name/birthday/location but unsure what year it is.

## 2023-01-26 NOTE — ED NOTES
Pt reports nausea back and no PRN orders for nausea. Pt also hypertensive and no parameters for BP medications. RN paged Dr. Cano for medications and parameters

## 2023-01-26 NOTE — H&P
[facility]    History and Physical - Hospitalist Service       Date of Admission:  1/26/2023    Assessment & Plan      Maryanne Crockett is a 42 year old female admitted on 1/26/2023. Past medical history of cerebral aneurysm with prior stenting-coiling, nicotine dependence, bipolar disorder, anxiety disorder, history of seizures, chronic headaches, and essential hypertension.  Presents with recurrent nausea and vomiting, uncontrolled hypertension, mild leukocytosis, and dehydration.    Hypertensive urgency  Essential hypertension, uncontrolled on admission  Nausea and vomiting  Leukocytosis, 12.2k  Hematuria  Severe hepatic steatosis on admission CT  Elevated liver function tests (LFTs)  1 week history of recurrent nausea and vomiting, progressive, with low-grade fever.  Mild leukocytosis on admission with no clear radiographic finding to support infection.  CT head unremarkable showing no acute changes in the setting of uncontrolled hypertension, likely secondary with concerns of hypertensive urgency.  Possible viral syndrome contributing to symptoms though cannot rule out bacterial source.  Await culture results.  Consider broad-spectrum IV antibiotics if recurrent fever, worsening leukocytosis, or deteriorating clinical status.    Admit to cardiac unit    Telemetry    BP control, continue PTA medications including clonidine BID; IV labetalol prn for uncontrolled hypertension; consider IV antihypertensive infusion if worsening uncontrolled blood pressure despite repeated as needed dosing    Neuro checks    IV fluids 0.9 NS w/ nausea, vomiting, dehydration; monitor BP closely    Pain control; IV opiates for severe pain, monitoring for side effects; nonopiate such as Tylenol as needed for mild to moderate pain or fever; avoid NSAIDs with elevated blood pressure    Pharmacy consult for medication review and reconciliation; rounding provider to please review    AM labs w/ recheck CBC, basic  profile, liver profile, procalcitonin    History of cerebral aneurysm, s/p stent/coil    Negative head CT on admission, no report of headache; monitor    Neuro checks    Nicotine dependence    Smoking cessation discussed and encouraged, follow-up with primary clinic provider    Consider nicotine patch if requested    Bipolar I disorder  Anxiety disorder    Pharmacy medication reconciliation pending with review upon completion, attention AM rounding provider; current medications listed include sertraline, Saphris, alprazolam, and Adderall    History of seizures    No recent seizure-like activity reported, monitor closely, neurochecks    History of complex regional pain syndrome    Stable, follow-up with primary clinic provider, Tylenol as needed for pain       Diet:  Cardiac, low sodium  DVT Prophylaxis: Pneumatic Compression Devices  Luu Catheter: Not present  Lines: None     Cardiac Monitoring: None  Code Status:  Full    Admit to hospital inpatient; new problem this admission (nausea, vomiting, dehydration, mild leukocytosis, uncontrolled hypertension), moderate to high risk medical condition, potentially severe; multiple medical problems with comorbidities and potential for worsening; moderate to high complexity patient with additional workup and treatment planned (see hospital orders).  Clinical management discussed with ER provider.  On my medical records reviewed.  Admission test reviewed and future tests ordered.  Admission images including CT brain, CT abdomen pelvis, and chest x-ray reviewed.  EKG reviewed x2.    Change in hospitalist provider tomorrow with one of my [facility] hospitalist colleagues assuming care.    Clinically Significant Risk Factors Present on Admission           # Hypercalcemia: Highest Ca = 10.4 mg/dL in last 2 days, will monitor as appropriate      # Drug Induced Platelet Defect: home medication list includes an antiplatelet medication   # Hypertension: home medication list  "includes antihypertensive(s)      # Obesity: Estimated body mass index is 36.26 kg/m  as calculated from the following:    Height as of this encounter: 1.803 m (5' 11\").    Weight as of this encounter: 117.9 kg (260 lb).           Disposition Plan           Deepak Cano MD  Hospitalist Service  [facility]  Securely message with Emailage (more info)  Text page via [facility] Paging/Directory     ______________________________________________________________________    Chief Complaint   Nausea, vomiting, fever at home, leukocytosis, uncontrolled hypertension on admission    History is obtained from the patient    History of Present Illness   Maryanne Crockett is a 42 year old female who presents with 1 week history of progressive nausea and vomiting, fatigue, and low-grade fevers.  Symptoms began nearly 1 week ago with nausea and vomiting, repeatedly, over 10 times per day.  Ongoing fatigue, decreased oral intake and decreased appetite.  Describes temperature up to 100 or 101 Fahrenheit in recent past, use of Tylenol as needed.  With vomiting no hematemesis reported.  Vomitus described as \"water\".  Shortness of breath with exertion over a month or more with mild intermittent cough.  Denies current abdominal pain.  Decreased energy.  Occasional blurry vision.  History of chronic headaches for the past 2 years with prior aneurysm clipping-stenting.  Headaches yesterday and day prior though no headaches today.  No complaints of chest pain.  No acute changes in bowel or bladder function.    In the emergency room, uncontrolled hypertension with initial blood pressure 189/98, later as high as 222/116.  Oral clonidine taken prior to admission administered in addition to dose of IV labetalol by ER provider.  Blood work showed mild leukocytosis, WBC 12,200 with normal hemoglobin and platelets.  Electrolytes satisfactory with elevated alkaline phosphatase 121 and ALT 38.  Noncontrast head CT negative for " acute changes.  CT scan of the abdomen pelvis negative for acute changes.  Chest x-ray PA lateral view is negative.  EKG without clear acute ischemic changes.  Hospital admission for nausea and vomiting, probable dehydration, severe uncontrolled hypertension, and mild leukocytosis.    Past Medical History    Past Medical History:   Diagnosis Date     Bipolar I disorder (H)      Cerebral aneurysm, nonruptured      Family history of glioblastoma     screening MRIs every 2 years     Ovarian cyst      RSD (reflex sympathetic dystrophy)      Seizures (H)        Past Surgical History   Past Surgical History:   Procedure Laterality Date     CHOLECYSTECTOMY       CYSTOSCOPY N/A 09/01/2020    Procedure: CYSTOSCOPY;  Surgeon: Hayley Zayas MD;  Location: [facility]     DAVINCI HYSTERECTOMY TOTAL, BILATERAL SALPINGO-OOPHORECTOMY, COMBINED Left 09/01/2020    Procedure: HYSTERECTOMY, TOTAL, ROBOT-ASSISTED, LAPAROSCOPIC,  left oophorectomy;  Surgeon: Hayley Zayas MD;  Location: [facility] OR     DILATION AND CURETTAGE SUCTION  04/30/2015    retained POC     GASTRIC RESTRICTIVE PROCEDURE, OPEN, REMOVE/REPLACE SUBCUTANEOUS PORT       INJECT NERVE BLOCK OCCIPITAL Left 6/30/2022    Procedure: Left occipital nerve block under ultrasound guidance and left upper back trigger point injection;  Surgeon: Scott Morgan MD;  Location: [facility] OR     INJECT NERVE BLOCK OCCIPITAL Left 9/15/2022    Procedure: Left occipital nerve block under ultrasound guidance and left upper back trigger point injection;  Surgeon: Scott Morgan MD;  Location: [facility] OR     INJECT NERVE BLOCK OCCIPITAL Left 1/5/2023    Procedure: Left occipital nerve block under ultrasound guidance and left upper back trigger point injection;  Surgeon: Scott Morgan MD;  Location: [facility] OR     IR CAROTID CEREBRAL ANGIOGRAM BILATERAL  03/19/2021     IR CAROTID CEREBRAL ANGIOGRAM BILATERAL  06/23/2021     IR CAROTID CEREBRAL ANGIOGRAM BILATERAL  1/12/2022     LAPAROSCOPIC  OOPHORECTOMY Right 08/05/2016    Procedure: LAPAROSCOPIC OOPHORECTOMY;  Surgeon: Adrianne Vergara MD;  [facility]     LAPAROSCOPIC REMOVAL GASTRIC ADJUSTABLE BAND N/A 05/11/2021    Procedure: REMOVAL, GASTRIC BAND, ADJUSTABLE, LAPAROSCOPIC;  Surgeon: Remy Gold MD;  Location: [facility] OR     LAPAROSCOPIC SALPINGECTOMY Bilateral 08/05/2016    Procedure: LAPAROSCOPIC SALPINGECTOMY;  Surgeon: Adrianne Vergara MD;  [facility]     LAPAROSCOPIC TUBAL LIGATION Bilateral 05/22/2015    Procedure: LAPAROSCOPIC TUBAL LIGATION;  Surgeon: Hayley Zayas MD;  [facility]     LAPAROSCOPY OPERATIVE ADULT N/A 08/05/2016    Procedure: LAPAROSCOPY OPERATIVE ADULT;  Surgeon: Adrianne Vergara MD;  [facility]     PANNICULECTOMY  04/22/2017    [facility]     SOFT TISSUE SURGERY Right     cyst in hand       Prior to Admission Medications   Prior to Admission Medications   Prescriptions Last Dose Informant Patient Reported? Taking?   ALPRAZolam (XANAX) 1 MG tablet   Yes No   Sig: Take 2 mg by mouth 3 times daily as needed    Multiple Vitamins-Minerals (MULTIVITAMIN ADULT PO)   Yes No   Sig: Take 1 tablet by mouth every morning    SAPHRIS 10 MG SUBL sublingual tablet   Yes No   Sig: Place 10 mg under the tongue 2 times daily    amphetamine-dextroamphetamine (ADDERALL) 20 MG tablet   Yes No   Sig: Take 20 mg by mouth 2 times daily    aspirin (ASA) 325 MG tablet   No No   Sig: Take 1 tablet (325 mg) by mouth daily   camphor-menthol (DERMASARRA) 0.5-0.5 % external lotion   No No   Sig: Apply 1 mL topically every 6 hours as needed for skin care   cloNIDine (CATAPRES) 0.1 MG tablet   Yes No   Sig: Take 1 tablet by mouth 2 times daily    clopidogrel (PLAVIX) 75 MG tablet   No No   Sig: Take 1 tablet (75 mg) by mouth daily   medical cannabis (Patient's own supply)   Yes No   Sig: See Admin Instructions (The purpose of this order is to document that the patient reports taking medical cannabis.  This is not a  prescription, and is not used to certify that the patient has a qualifying medical condition.)   mometasone-formoterol (DULERA) 100-5 MCG/ACT inhaler   No No   Sig: Inhale 2 puffs into the lungs 2 times daily as needed (cough, short of breath)   nicotine (COMMIT) 2 MG lozenge   No No   Sig: Place 1 lozenge (2 mg) inside cheek every 2 hours as needed for smoking cessation   nicotine (NICODERM CQ) 7 MG/24HR 24 hr patch   No No   Sig: Place 1 patch onto the skin every 24 hours . Start after finishing 14mg patch.   ondansetron (ZOFRAN ODT) 8 MG ODT tab   No No   Sig: Take 1 tablet (8 mg) by mouth every 8 hours as needed for nausea   ondansetron (ZOFRAN-ODT) 4 MG ODT tab   No No   Sig: Take 1 tablet (4 mg) by mouth every 8 hours as needed for nausea   pregabalin (LYRICA) 150 MG capsule   No No   Sig: TAKE 1 CAPSULE (150 MG) BY MOUTH 3 TIMES DAILY   sertraline (ZOLOFT) 25 MG tablet   Yes No   Sig: Take 25 mg by mouth daily   vitamin B-12 (CYANOCOBALAMIN) 1000 MCG tablet  Self Yes No   Sig: Take 1,000 mcg by mouth every morning       Facility-Administered Medications Last Administration Doses Remaining   betamethasone acet & sod phos (CELESTONE) injection 6 mg 1/31/2022  3:00 PM    lidocaine (PF) (XYLOCAINE) 1 % injection 0.5 mL 1/31/2022  3:00 PM            Review of Systems    Review of systems otherwise negative and per HPI above.    Social History   I have reviewed this patient's social history and updated it with pertinent information if needed.  Social History     Tobacco Use     Smoking status: Some Days     Packs/day: 0.25     Types: Cigarettes     Smokeless tobacco: Current     Tobacco comments:     Trying   Vaping Use     Vaping Use: Never used   Substance Use Topics     Alcohol use: Not Currently     Comment: rare     Drug use: Yes     Types: Marijuana     Comment: pt says she is prescribed medical marijuana       Family History   I have reviewed this patient's family history and updated it with pertinent  information if needed.  Family History   Problem Relation Age of Onset     Coronary Artery Disease Mother 50        stent     Leukemia Father      Deep Vein Thrombosis (DVT) Father      Brain Cancer Brother 36        glioblastoma     Deep Vein Thrombosis (DVT) Brother      Cerebrovascular Disease Brother 47        stroke secondary to DVT     Deep Vein Thrombosis (DVT) Brother      Cerebral aneurysm Maternal Grandfather      Brain Cancer Maternal Aunt         glioblastoma     Brain Cancer Maternal Aunt         glioblastoma     Breast Cancer No family hx of      Anesthesia Reaction No family hx of      Ovarian Cancer No family hx of      Colon Cancer No family hx of        Allergies   Allergies   Allergen Reactions     Ibuprofen GI Disturbance     Seroquel [Quetiapine] Other (See Comments) and Rash     Insomnia and rash        Physical Exam   Vital Signs: Temp: 99  F (37.2  C) Temp src: Oral BP: (!) 190/106 Pulse: 94   Resp: 20 SpO2: 93 % O2 Device: None (Room air)    Weight: 260 lbs 0 oz    GENERAL awake and alert, lying in bed, answering questions and following commands, appearing fatigued  HEAD normocephalic, atraumatic  EYES pupils equal, round, reactive to light; no conjunctival injection or jaundice  ENT mucous membranes moist; no lesions or exudates  LYMPH no cervical, submandibular, supraclavicular, or axillary adenopathy  BACK normal  LUNGS moderate inspiratory effort; breath sounds symmetric; no wheezes, crackles, or rhonchi  HEART S1,S2 with regular rate and rhythm; no rubs or gallops  ABDOMEN soft, non-tender to palpation; no guarding, rebound, or rigidity; no palpable masses or organomegaly  MUSCULOSKELETAL range of motion of joints intact upper and lower extremities; no gross joint deformities; no calf pain or tenderness on palpation; extremities without edema  PULSES dorsalis pedis pulses present, symmetric  SKIN warm and dry  NEURO moves upper and lower extremities spontaneously and to command; no  focal weakness appreciated; sensation intact to light touch upper and lower extremities  PSYCHIATRIC awake and alert; answers questions and follows simple commands    Medical Decision Making             Data     I have personally reviewed the following data over the past 24 hrs:    12.2 (H)  \   15.2   / 362     141 102 12.2 /  185 (H)   4.0 26 0.76 \       ALT: 38 (H) AST: 27 AP: 121 (H) TBILI: 0.3   ALB: 5.1 TOT PROTEIN: 8.6 (H) LIPASE: 23       Trop: <6 BNP: N/A       Procal: N/A CRP: N/A Lactic Acid: 1.0         Imaging results reviewed over the past 24 hrs:   Recent Results (from the past 24 hour(s))   XR Chest 2 Views    Narrative    XR CHEST 2 VIEWS  1/26/2023 8:00 AM       INDICATION: fever/vomiting  COMPARISON: 11/22/2022       Impression    IMPRESSION: Negative chest.    TISH HAGER MD         SYSTEM ID:  [id number]   CT Abdomen Pelvis w Contrast    Narrative    CT ABDOMEN AND PELVIS WITH CONTRAST 1/26/2023 9:55 AM    CLINICAL HISTORY: Abdominal pain. Acute left abdominal pain, vomiting,  slight hematuria.    TECHNIQUE: CT scan of the abdomen and pelvis was performed following  injection of IV contrast. Multiplanar reformats were obtained. Dose  reduction techniques were used.  CONTRAST: 131 mL [id number]    COMPARISON: None.    FINDINGS:   LOWER CHEST: No infiltrates or effusions.    HEPATOBILIARY: Diffuse hepatic steatosis. No significant mass. No bile  duct dilatation. No calcified gallstones. Cholecystectomy.    PANCREAS: No significant mass, duct dilatation, or inflammatory  change.    SPLEEN: Normal size.    ADRENAL GLANDS: No significant nodules.    KIDNEYS/BLADDER: No obvious stone, there is some early contrast  excretion limiting evaluation of small stones. No hydronephrosis. No  mass.    BOWEL: No obstruction or inflammatory change.    PELVIC ORGANS: No pelvic masses.    ADDITIONAL FINDINGS: No ascites.    MUSCULOSKELETAL: No frankly destructive bony lesions. Tiny  fat-containing right internal  hernia.      Impression    IMPRESSION:   1.  Severe hepatic steatosis, question steatohepatitis.  2.  Otherwise no acute process demonstrated.    NOE MEZA MD         SYSTEM ID:  [id number]   CT Head w/o Contrast    Narrative    CT OF THE HEAD WITHOUT CONTRAST   1/26/2023 11:18 AM     COMPARISON: Head CT 11/22/2022.    HISTORY:  nausea, HTN, h/o intracranial aneurysm     TECHNIQUE:  Axial CT images of the head from the skull base to the  vertex were acquired without IV contrast. Dose reduction techniques  were used.     FINDINGS:   INTRACRANIAL CONTENTS: There is contrast within the intracranial  vasculature from the contrast-enhanced exam from earlier today. No  intracranial hemorrhage. Mild diffuse cerebral parenchymal volume  loss. No midline shift. The basilar cisterns are patent. No cerebellar  tonsillar ectopia. No CT evidence for an acute infarct.    VISUALIZED ORBITS/SINUSES/MASTOIDS: No significant orbital  abnormality.  Small chronic mucous retention cyst in the left sphenoid  sinus. Mild mucosal thickening of the ethmoid air cells. Mild chronic  mucosal thickening of the maxillary sinuses. No significant middle ear  or mastoid effusion.    OSSEOUS STRUCTURES/SOFT TISSUES: No significant abnormality.      Impression    IMPRESSION:  1.  No intracranial hemorrhage, mass lesions, hydrocephalus or CT  evidence for an acute infarct seen within the limitations of this  exam.    CHARO SLATER MD         SYSTEM ID:  [id number]

## 2023-01-26 NOTE — ED NOTES
RN at bedside, pt slightly more awake at this time. Pt's eyes open when RN entered the room and able to swallow pills

## 2023-01-26 NOTE — ED NOTES
Pt remains drowsy- just moans when RN in room and needs stimulation to awaken. Pt vitals stable, blood pressure improved from earlier

## 2023-01-26 NOTE — ED PROVIDER NOTES
History     Chief Complaint:  Vomiting and Fever     The history is provided by the patient and medical records.      Maryanne Crockett is a 42 year old female with a history of cerebral aneurysm, bipolar disorder, and epilepsy on Plavix who presents with vomiting and fever for the past 3 days. She says that yesterday the vomiting was the worst and seemed almost constant. She says that her temperature ranged between . She denies cough. The patient notes that her bowels have been normal, denying diarrhea or constipation. She denies rash, sore throat, dysuria. She received droperidol from EMS en route. The patient says that the reason she presented is because it is concerning for her that she hasn't been able to keep any food or fluids down. She also adds that's she has had a bump in her right axillary area for the last 6 months and would like to know what it is.     Review of External Notes: I reviewed prior labs, note serum WBC was 12k in 11/2022.     ROS:  Review of Systems   All other systems reviewed and are negative.    Allergies:  Ibuprofen  Seroquel [Quetiapine]     Medications:    Alprazolam  Adderall  Aspirin  Clonidine   Plavix  Dulera  Nicoderm  Zofran  Pregabalin  Saphris  Sertraline  Tramadol  Prazosin   Mirtazapine  Lorazepam  Lamotrigine  Gabapentin  Estradiol  Doxepin    Past Medical History:    Bipolar 1 disorder  Cerebral aneurysm  Ovarian cyst  RSD  Epilepsy  Depression    Past Surgical History:    Cholecystectomy   Cystoscopy  Hysterectomy  Dilation and curettage suction  Gastric restrictive procedure  Occipital nerve block  Carotid cerebral angiogram  Oophorectomy  Salpingectomy  Tubal ligation  Panniculectomy     Family History:    Brain cancer  CVD  CAD  DVT x 3  leukemia    Social History:   reports that she has been smoking cigarettes. She has been smoking an average of .25 packs per day. She uses smokeless tobacco. She reports that she does not currently use alcohol. She reports current  drug use. Drug: Marijuana.  Patient presents to the ED alone.     Physical Exam     Patient Vitals for the past 24 hrs:   BP Temp Temp src Pulse Resp SpO2 Height Weight   01/26/23 1635 (!) 142/78 -- -- -- -- 91 % -- --   01/26/23 1625 -- -- -- -- -- 91 % -- --   01/26/23 1615 (!) 128/90 -- -- 98 -- 92 % -- --   01/26/23 1608 -- -- -- -- -- 93 % -- --   01/26/23 1558 (!) 168/97 -- -- -- -- 95 % -- --   01/26/23 1548 133/88 -- -- 89 -- 94 % -- --   01/26/23 1538 -- -- -- -- -- 92 % -- --   01/26/23 1528 125/81 -- -- -- -- 94 % -- --   01/26/23 1518 124/78 -- -- 95 -- 93 % -- --   01/26/23 1508 -- -- -- -- -- 93 % -- --   01/26/23 1458 120/70 -- -- -- -- 92 % -- --   01/26/23 1454 -- -- -- -- -- 93 % -- --   01/26/23 1444 123/76 -- -- 104 -- 91 % -- --   01/26/23 1434 (!) 184/94 -- -- -- -- 92 % -- --   01/26/23 1424 -- -- -- -- -- 93 % -- --   01/26/23 1414 (!) 170/111 -- -- 88 -- 93 % -- --   01/26/23 1404 (!) 184/97 -- -- -- -- 93 % -- --   01/26/23 1354 -- -- -- -- -- 92 % -- --   01/26/23 1344 (!) 188/115 -- -- 90 -- 93 % -- --   01/26/23 1340 -- -- -- -- -- 93 % -- --   01/26/23 1325 (!) 183/111 -- -- -- -- 93 % -- --   01/26/23 1310 (!) 207/114 -- -- -- -- 95 % -- --   01/26/23 1255 (!) 180/119 -- -- -- -- 95 % -- --   01/26/23 1240 (!) 214/117 -- -- -- -- 93 % -- --   01/26/23 1225 (!) 190/109 -- -- -- -- 93 % -- --   01/26/23 1210 (!) 200/112 -- -- -- -- 93 % -- --   01/26/23 1155 (!) 201/104 -- -- -- -- 93 % -- --   01/26/23 1147 (!) 190/106 -- -- 94 -- 93 % -- --   01/26/23 1132 (!) 192/107 -- -- 88 -- 92 % -- --   01/26/23 1117 (!) 222/119 -- -- 106 -- 94 % -- --   01/26/23 1100 (!) 222/116 -- -- 105 -- 94 % -- --   01/26/23 1045 (!) 243/117 -- -- 103 -- 96 % -- --   01/26/23 1030 (!) 213/135 -- -- 94 -- 95 % -- --   01/26/23 1015 (!) 229/137 -- -- 101 -- 95 % -- --   01/26/23 1000 (!) 222/130 -- -- -- -- -- -- --   01/26/23 0930 (!) 197/101 -- -- 111 -- -- -- --   01/26/23 0915 (!) 209/105 -- -- 96 -- --  "-- --   01/26/23 0900 (!) 206/111 -- -- 102 -- 97 % -- --   01/26/23 0846 -- -- -- 92 -- 98 % -- --   01/26/23 0845 (!) 210/105 -- -- 99 -- -- -- --   01/26/23 0726 (!) 189/98 99  F (37.2  C) Oral 97 20 99 % 1.803 m (5' 11\") 117.9 kg (260 lb)      Physical Exam  General: Woman resting in the gurney in room 30  HENT: mucous membranes slightly dry, OP clear, face nontender, FROM mandible  CV: rate as above, no murmur audible, no lower extremity edema  Resp: normal effort, speaks in full phrases, no stridor, no cough observed  GI: Abdomen soft, slightly protuberant, no guarding, negative Gallo sign, slight left-sided tenderness, no tympany, no palpable fluid wave  MSK: no bony tenderness, no CVAT, full range of motion of right shoulder, no palpable abnormalities in right axilla  Skin: appropriately warm and dry, no right axilla rash, no abdominal rash  Neuro: Initially slightly drowsy but responds to voice, follows commands without difficulty, clear speech, oriented, face symmetric,  equal, can lift both legs off bed, no nuchal rigidity  Psych: cooperative, no apparent hallucinations    Emergency Department Course   ECG:  ECG taken at 1005, ECG read at 1015  Sinus tachycardia  Rate 105 bpm. RI interval 180 ms. QRS duration 910 ms. QT/QTc 350/462 ms. P-R-T axes 37 49 16.    Imaging:  CT Head w/o Contrast   Final Result   IMPRESSION:   1.  No intracranial hemorrhage, mass lesions, hydrocephalus or CT   evidence for an acute infarct seen within the limitations of this   exam.      CHARO SLATER MD            SYSTEM ID:  [id number]      CT Abdomen Pelvis w Contrast   Final Result   IMPRESSION:    1.  Severe hepatic steatosis, question steatohepatitis.   2.  Otherwise no acute process demonstrated.      NOE MEZA MD            SYSTEM ID:  [id number]      XR Chest 2 Views   Final Result   IMPRESSION: Negative chest.      TISH HAGER MD            SYSTEM ID:  [id number]         Report per " radiology    Laboratory:  Labs Ordered and Resulted from Time of ED Arrival to Time of ED Departure   COMPREHENSIVE METABOLIC PANEL - Abnormal       Result Value    Sodium 141      Potassium 4.0      Chloride 102      Carbon Dioxide (CO2) 26      Anion Gap 13      Urea Nitrogen 12.2      Creatinine 0.76      Calcium 10.4 (*)     Glucose 185 (*)     Alkaline Phosphatase 121 (*)     AST 27      ALT 38 (*)     Protein Total 8.6 (*)     Albumin 5.1      Bilirubin Total 0.3      GFR Estimate >90     ROUTINE UA WITH MICROSCOPIC - Abnormal    Color Urine Yellow      Appearance Urine Clear      Glucose Urine Negative      Bilirubin Urine Negative      Ketones Urine 10 (*)     Specific Gravity Urine 1.025      Blood Urine Small (*)     pH Urine 6.5      Protein Albumin Urine 70 (*)     Urobilinogen Urine Normal      Nitrite Urine Negative      Leukocyte Esterase Urine Negative      Mucus Urine Present (*)     RBC Urine 13 (*)     WBC Urine 2      Squamous Epithelials Urine 2 (*)     Hyaline Casts Urine 1     CBC WITH PLATELETS AND DIFFERENTIAL - Abnormal    WBC Count 12.2 (*)     RBC Count 5.41 (*)     Hemoglobin 15.2      Hematocrit 45.9      MCV 85      MCH 28.1      MCHC 33.1      RDW 14.5      Platelet Count 362      % Neutrophils 79      % Lymphocytes 18      % Monocytes 3      % Eosinophils 0      % Basophils 0      % Immature Granulocytes 0      NRBCs per 100 WBC 0      Absolute Neutrophils 9.6 (*)     Absolute Lymphocytes 2.2      Absolute Monocytes 0.4      Absolute Eosinophils 0.0      Absolute Basophils 0.1      Absolute Immature Granulocytes 0.0      Absolute NRBCs 0.0     INFLUENZA A/B & SARS-COV2 PCR MULTIPLEX - Normal    Influenza A PCR Negative      Influenza B PCR Negative      RSV PCR Negative      SARS CoV2 PCR Negative     INFLUENZA A/B & SARS-COV2 PCR MULTIPLEX - Normal    Influenza A PCR Negative      Influenza B PCR Negative      RSV PCR Negative      SARS CoV2 PCR Negative     LIPASE - Normal     Lipase 23     LACTIC ACID WHOLE BLOOD - Normal    Lactic Acid 1.0     TROPONIN T, HIGH SENSITIVITY - Normal    Troponin T, High Sensitivity <6     BLOOD CULTURE   BLOOD CULTURE   URINE CULTURE      Emergency Department Course & Assessments:       Interventions:  Medications   lidocaine 1 % 0.1-1 mL (has no administration in time range)   lidocaine (LMX4) cream (has no administration in time range)   sodium chloride (PF) 0.9% PF flush 3 mL (3 mLs Intracatheter Not Given 1/26/23 1310)   sodium chloride (PF) 0.9% PF flush 3 mL (has no administration in time range)   sodium chloride 0.9% infusion ( Intravenous Rate/Dose Verify 1/26/23 1536)   acetaminophen (TYLENOL) tablet 650 mg (has no administration in time range)     Or   acetaminophen (TYLENOL) Suppository 650 mg (has no administration in time range)   HYDROmorphone (DILAUDID) injection 0.2 mg (has no administration in time range)   labetalol (NORMODYNE/TRANDATE) injection 10 mg (10 mg Intravenous Given 1/26/23 1325)   cloNIDine (CATAPRES) tablet 0.1 mg (has no administration in time range)   prochlorperazine (COMPAZINE) injection 5-10 mg (10 mg Intravenous Given 1/26/23 1430)     Or   prochlorperazine (COMPAZINE) tablet 5-10 mg ( Oral See Alternative 1/26/23 1430)     Or   prochlorperazine (COMPAZINE) suppository 25 mg ( Rectal See Alternative 1/26/23 1430)   0.9% sodium chloride BOLUS (0 mLs Intravenous Stopped 1/26/23 0932)   ketorolac (TORADOL) injection 15 mg (15 mg Intravenous Given 1/26/23 0931)   iopamidol (ISOVUE-370) solution 131 mL (131 mLs Intravenous Given 1/26/23 0942)   Saline Flush (77 mLs Intravenous Given 1/26/23 0942)   cloNIDine (CATAPRES) tablet 0.1 mg (0.1 mg Oral Given 1/26/23 1016)   labetalol (NORMODYNE/TRANDATE) injection 20 mg (20 mg Intravenous Given 1/26/23 1120)      Independent Interpretation (X-rays, CTs, rhythm strip):  I personally reviewed her head CT, no intracranial hemorrhage seen    Consultations/Discussion of Management or  Tests:  1213 I spoke with Dr. Cano, hospitalist, regarding the admission of the patient.       Assessments:  0723 I examined the patient and obtained history as noted above.   0915 I rechecked the patient. She is now complaining of pain in her left abdomen. She also notes some hematuria.   1052 I rechecked the patient.   1142 I rechecked the patient and updated her on findings. Her blood pressure is [id number]    Disposition:  The patient was admitted to the hospital under the care of Dr. Cano.     Impression & Plan      Medical Decision Making:  Regarding her reported fever and vomiting, a broad variety of possible causes including urinary tract infection, pyelonephritis, pneumonia, COVID, influenza and many others were considered.  She was found to have mild hematuria, and this in combination with her later report that she now had some left-sided abdominal pain led to CT imaging which does not reveal any ureteral stone or other acute structural explanation radiographically.  Her blood pressure became markedly elevated, and while my suspicion for structural intracranial process was not high, in light of her prior intracranial aneurysm that had been intervened on, I felt that CT imaging was indicated to ensure that this was not the cause for her vomiting.  Fortunately, no hard findings of acute endorgan damage at this time, though given persistent symptoms with vomiting in the ED as well as severe hypertension, I think she requires hospitalization and this was arranged to the hospitalist service.  I note that her blood pressure came down over 50 points systolic, such as I did not feel that a drip of antihypertensive medication was indicated that this can be reconsidered pending her clinical course.  The rationale for hospitalization was reviewed with her, she agrees, she was seen in the emergency department by the accepting hospitalist.      Diagnosis:    ICD-10-CM    1. Hypertensive urgency  I16.0       2.  Vomiting, unspecified vomiting type, unspecified whether nausea present  R11.10       3. History of intracranial aneurysm  Z86.79            Scribe Disclosure:  I, Aldo Schuster, am serving as a scribe at 8:08 AM on 1/26/2023 to document services personally performed by Manolo Hopkisn MD based on my observations and the provider's statements to me.      1/26/2023   Manolo Hopkins MD Reitsema, Jeffrey Alan, MD  01/26/23 1700

## 2023-01-27 VITALS
OXYGEN SATURATION: 98 % | DIASTOLIC BLOOD PRESSURE: 110 MMHG | HEART RATE: 106 BPM | WEIGHT: 264.7 LBS | RESPIRATION RATE: 17 BRPM | BODY MASS INDEX: 37.06 KG/M2 | HEIGHT: 71 IN | TEMPERATURE: 97.7 F | SYSTOLIC BLOOD PRESSURE: 151 MMHG

## 2023-01-27 LAB
ALBUMIN SERPL BCG-MCNC: 4.1 G/DL (ref 3.5–5.2)
ALP SERPL-CCNC: 99 U/L (ref 35–104)
ALT SERPL W P-5'-P-CCNC: 29 U/L (ref 10–35)
ANION GAP SERPL CALCULATED.3IONS-SCNC: 14 MMOL/L (ref 7–15)
AST SERPL W P-5'-P-CCNC: 30 U/L (ref 10–35)
BILIRUB DIRECT SERPL-MCNC: <0.2 MG/DL (ref 0–0.3)
BILIRUB SERPL-MCNC: 0.4 MG/DL
BUN SERPL-MCNC: 14.3 MG/DL (ref 6–20)
CALCIUM SERPL-MCNC: 9.2 MG/DL (ref 8.6–10)
CHLORIDE SERPL-SCNC: 104 MMOL/L (ref 98–107)
CREAT SERPL-MCNC: 0.69 MG/DL (ref 0.51–0.95)
DEPRECATED HCO3 PLAS-SCNC: 24 MMOL/L (ref 22–29)
ERYTHROCYTE [DISTWIDTH] IN BLOOD BY AUTOMATED COUNT: 14.9 % (ref 10–15)
GFR SERPL CREATININE-BSD FRML MDRD: >90 ML/MIN/1.73M2
GLUCOSE SERPL-MCNC: 114 MG/DL (ref 70–99)
HCT VFR BLD AUTO: 40.4 % (ref 35–47)
HGB BLD-MCNC: 13.1 G/DL (ref 11.7–15.7)
MCH RBC QN AUTO: 27.7 PG (ref 26.5–33)
MCHC RBC AUTO-ENTMCNC: 32.4 G/DL (ref 31.5–36.5)
MCV RBC AUTO: 85 FL (ref 78–100)
PLATELET # BLD AUTO: 300 10E3/UL (ref 150–450)
POTASSIUM SERPL-SCNC: 3.8 MMOL/L (ref 3.4–5.3)
PROCALCITONIN SERPL IA-MCNC: 0.05 NG/ML
PROT SERPL-MCNC: 7.2 G/DL (ref 6.4–8.3)
RBC # BLD AUTO: 4.73 10E6/UL (ref 3.8–5.2)
SODIUM SERPL-SCNC: 142 MMOL/L (ref 136–145)
WBC # BLD AUTO: 11.4 10E3/UL (ref 4–11)

## 2023-01-27 PROCEDURE — 85027 COMPLETE CBC AUTOMATED: CPT | Performed by: HOSPITALIST

## 2023-01-27 PROCEDURE — G0378 HOSPITAL OBSERVATION PER HR: HCPCS

## 2023-01-27 PROCEDURE — 99239 HOSP IP/OBS DSCHRG MGMT >30: CPT | Performed by: HOSPITALIST

## 2023-01-27 PROCEDURE — 96361 HYDRATE IV INFUSION ADD-ON: CPT

## 2023-01-27 PROCEDURE — 82248 BILIRUBIN DIRECT: CPT | Performed by: HOSPITALIST

## 2023-01-27 PROCEDURE — 258N000003 HC RX IP 258 OP 636: Performed by: HOSPITALIST

## 2023-01-27 PROCEDURE — 84145 PROCALCITONIN (PCT): CPT | Performed by: HOSPITALIST

## 2023-01-27 PROCEDURE — 80053 COMPREHEN METABOLIC PANEL: CPT | Performed by: HOSPITALIST

## 2023-01-27 PROCEDURE — 250N000013 HC RX MED GY IP 250 OP 250 PS 637: Performed by: HOSPITALIST

## 2023-01-27 PROCEDURE — 36415 COLL VENOUS BLD VENIPUNCTURE: CPT | Performed by: HOSPITALIST

## 2023-01-27 RX ORDER — CLONAZEPAM 1 MG/1
1 TABLET ORAL 3 TIMES DAILY PRN
Status: DISCONTINUED | OUTPATIENT
Start: 2023-01-27 | End: 2023-01-27 | Stop reason: HOSPADM

## 2023-01-27 RX ORDER — NALOXONE HYDROCHLORIDE 0.4 MG/ML
0.2 INJECTION, SOLUTION INTRAMUSCULAR; INTRAVENOUS; SUBCUTANEOUS
Status: DISCONTINUED | OUTPATIENT
Start: 2023-01-27 | End: 2023-01-27 | Stop reason: HOSPADM

## 2023-01-27 RX ORDER — CLOPIDOGREL BISULFATE 75 MG/1
75 TABLET ORAL DAILY
Status: DISCONTINUED | OUTPATIENT
Start: 2023-01-27 | End: 2023-01-27 | Stop reason: HOSPADM

## 2023-01-27 RX ORDER — DOXEPIN HYDROCHLORIDE 10 MG/1
10 CAPSULE ORAL AT BEDTIME
Status: DISCONTINUED | OUTPATIENT
Start: 2023-01-27 | End: 2023-01-27 | Stop reason: HOSPADM

## 2023-01-27 RX ORDER — PRAZOSIN HYDROCHLORIDE 2 MG/1
2 CAPSULE ORAL AT BEDTIME
Status: DISCONTINUED | OUTPATIENT
Start: 2023-01-27 | End: 2023-01-27 | Stop reason: HOSPADM

## 2023-01-27 RX ORDER — NALOXONE HYDROCHLORIDE 0.4 MG/ML
0.4 INJECTION, SOLUTION INTRAMUSCULAR; INTRAVENOUS; SUBCUTANEOUS
Status: DISCONTINUED | OUTPATIENT
Start: 2023-01-27 | End: 2023-01-27 | Stop reason: HOSPADM

## 2023-01-27 RX ORDER — PREGABALIN 50 MG/1
150 CAPSULE ORAL 3 TIMES DAILY
Status: DISCONTINUED | OUTPATIENT
Start: 2023-01-27 | End: 2023-01-27 | Stop reason: HOSPADM

## 2023-01-27 RX ORDER — FLUTICASONE FUROATE AND VILANTEROL 100; 25 UG/1; UG/1
1 POWDER RESPIRATORY (INHALATION) DAILY
Status: DISCONTINUED | OUTPATIENT
Start: 2023-01-27 | End: 2023-01-27 | Stop reason: HOSPADM

## 2023-01-27 RX ORDER — ASPIRIN 325 MG
325 TABLET ORAL DAILY
Status: DISCONTINUED | OUTPATIENT
Start: 2023-01-27 | End: 2023-01-27 | Stop reason: HOSPADM

## 2023-01-27 RX ORDER — METOPROLOL TARTRATE 25 MG/1
25 TABLET, FILM COATED ORAL 2 TIMES DAILY
Qty: 60 TABLET | Refills: 3 | Status: SHIPPED | OUTPATIENT
Start: 2023-01-27

## 2023-01-27 RX ORDER — OLANZAPINE 2.5 MG/1
2.5 TABLET, FILM COATED ORAL DAILY PRN
Status: DISCONTINUED | OUTPATIENT
Start: 2023-01-27 | End: 2023-01-27 | Stop reason: HOSPADM

## 2023-01-27 RX ORDER — CLONIDINE HYDROCHLORIDE 0.1 MG/1
0.2 TABLET ORAL 3 TIMES DAILY
Status: DISCONTINUED | OUTPATIENT
Start: 2023-01-27 | End: 2023-01-27 | Stop reason: HOSPADM

## 2023-01-27 RX ADMIN — PREGABALIN 150 MG: 50 CAPSULE ORAL at 12:00

## 2023-01-27 RX ADMIN — ALPRAZOLAM 0.5 MG: 0.5 TABLET ORAL at 06:41

## 2023-01-27 RX ADMIN — CLOPIDOGREL BISULFATE 75 MG: 75 TABLET ORAL at 12:00

## 2023-01-27 RX ADMIN — ASPIRIN 325 MG ORAL TABLET 325 MG: 325 PILL ORAL at 11:59

## 2023-01-27 RX ADMIN — SERTRALINE HYDROCHLORIDE 50 MG: 50 TABLET ORAL at 12:01

## 2023-01-27 RX ADMIN — CLONIDINE HYDROCHLORIDE 0.2 MG: 0.1 TABLET ORAL at 11:59

## 2023-01-27 RX ADMIN — SODIUM CHLORIDE: 9 INJECTION, SOLUTION INTRAVENOUS at 01:28

## 2023-01-27 RX ADMIN — ALPRAZOLAM 0.5 MG: 0.5 TABLET ORAL at 12:05

## 2023-01-27 ASSESSMENT — ACTIVITIES OF DAILY LIVING (ADL)
ADLS_ACUITY_SCORE: 18
ADLS_ACUITY_SCORE: 35
ADLS_ACUITY_SCORE: 18

## 2023-01-27 NOTE — DISCHARGE SUMMARY
[facility]    Discharge Summary  Hospitalist    Date of Admission:  1/26/2023  Date of Discharge:  1/27/2023    Discharge Diagnoses      Hypertensive urgency  Vomiting, unspecified vomiting type, unspecified whether nausea present with severe nausea and vomiting uncontrolled hypertension  History of intracranial aneurysm    History of Present Illness   Maryanne Crockett is an 42 year old female who presented with     Hospital Course   Maryanne Crockett was admitted on 1/26/2023.  The following problems were addressed during her hospitalization:    Maryanne Crockett is a 42 year old female admitted on 1/26/2023. Past medical history of cerebral aneurysm with prior stenting-coiling, nicotine dependence, bipolar disorder, anxiety disorder, history of seizures, chronic headaches, and essential hypertension.  Presents with recurrent nausea and vomiting, uncontrolled hypertension, mild leukocytosis, and dehydration.     Hypertensive urgency  Essential hypertension, uncontrolled on admission  Nausea and vomiting  Leukocytosis, 12.2k  Hematuria  Severe hepatic steatosis on admission CT  Elevated liver function tests (LFTs)  1 week history of recurrent nausea and vomiting, progressive, with low-grade fever.  Mild leukocytosis on admission with no clear radiographic finding to support infection.  CT head unremarkable showing no acute changes in the setting of uncontrolled hypertension, likely secondary with concerns of hypertensive urgency.  Possible viral syndrome contributing to symptoms though cannot rule out bacterial source.  Await culture results.  Consider broad-spectrum IV antibiotics if recurrent fever, worsening leukocytosis, or deteriorating clinical status.    Admitted to cardiac unit    Telemetry    BP control, continue PTA medications including clonidine BID; IV labetalol prn for uncontrolled hypertension;     Patient's blood pressure improved the next day.  Her mentation improved and patient was  clear and oriented.  Back to baseline and tolerating oral intake.  Nausea vomiting resolved.    Her blood pressure stabilized in the 150s.  Was started on oral metoprolol 25 twice daily in addition to her home regimen for persistent tachycardia and hypertension.    Patient requested to be discharged home and was cleared for discharge with follow-up with PCP in a week with CBC and BMP.  IV fluids were discontinued once she had adequate oral intake.    LFTs normalized by the next day.    Neuro checks were normal.    IV fluids 0.9 NS w/ nausea, vomiting, dehydration; monitor BP closely    No obvious source of infection.  She does have mild leukocytosis but her procalcitonin was at 0.05.  She denied any urinary complaints.  Blood cultures and urine cultures were still pending at this time but she has been afebrile and felt that it was safe to discharge her with close follow-up for cultures without any antibiotics currently.     History of cerebral aneurysm, s/p stent/coil    Negative head CT on admission, no report of headache; monitor    Neuro checks clear     Nicotine dependence    Smoking cessation discussed and encouraged, follow-up with primary clinic provider    Consider nicotine patch if requested     Bipolar I disorder  Anxiety disorder    Restarted all her home medications including sertraline, alprazolam and Adderall     History of seizures    No recent seizure-like activity reported, monitor closely, neurochecks     History of complex regional pain syndrome    Stable, follow-up with primary clinic provider, Tylenol as needed for pain           Diet:  Cardiac, low sodium  DVT Prophylaxis: Pneumatic Compression Devices  Luu Catheter: Not present  Lines: None     Cardiac Monitoring: None  Code Status:  Full     Clinically Significant Risk Factors Present on Admission           # Hypercalcemia: Highest Ca = 10.4 mg/dL in last 2 days, will monitor as appropriate      # Drug Induced Platelet Defect: home  "medication list includes an antiplatelet medication   # Hypertension: home medication list includes antihypertensive(s)      # Obesity: Estimated body mass index is 36.92 kg/m  as calculated from the following:    Height as of this encounter: 1.803 m (5' 11\").    Weight as of this encounter: 120.1 kg (264 lb 11.2 oz).            Erika Briceno MD, MD    Pending Results   These results will be followed up by pcp  Unresulted Labs Ordered in the Past 30 Days of this Admission     Date and Time Order Name Status Description    1/26/2023  2:46 PM Urine Culture Preliminary     1/26/2023 10:56 AM Blood Culture Peripheral Blood Preliminary     1/26/2023 10:56 AM Blood Culture Peripheral Blood Preliminary         Code Status   Full Code       Primary Care Physician   Tristian Mera    Physical Exam   Temp: 97.7  F (36.5  C) Temp src: Oral BP: (!) 151/110 Pulse: 106   Resp: 17 SpO2: 98 % O2 Device: None (Room air)    Vitals:    01/26/23 0726 01/27/23 0548   Weight: 117.9 kg (260 lb) 120.1 kg (264 lb 11.2 oz)     Vital Signs with Ranges  Temp:  [97.3  F (36.3  C)-98.6  F (37  C)] 97.7  F (36.5  C)  Pulse:  [] 106  Resp:  [13-18] 17  BP: (120-214)/() 151/110  SpO2:  [90 %-98 %] 98 %  I/O last 3 completed shifts:  In: 1160 [P.O.:1160]  Out: 300 [Urine:300]    Physical Exam  Constitutional:       Appearance: Normal appearance. She is obese.   HENT:      Head: Normocephalic.   Eyes:      Pupils: Pupils are equal, round, and reactive to light.   Cardiovascular:      Rate and Rhythm: Normal rate and regular rhythm.      Pulses: Normal pulses.      Heart sounds: Normal heart sounds.   Pulmonary:      Effort: Pulmonary effort is normal. No respiratory distress.      Breath sounds: Normal breath sounds.   Abdominal:      General: Abdomen is flat. Bowel sounds are normal. There is no distension.      Tenderness: There is no abdominal tenderness. There is no guarding.   Musculoskeletal:         General: Normal range of " motion.      Cervical back: Normal range of motion.   Skin:     General: Skin is warm and dry.   Neurological:      General: No focal deficit present.   Psychiatric:         Mood and Affect: Mood normal.           Discharge Disposition   Discharged to home  Condition at discharge: Stable    Consultations This Hospital Stay   None    Time Spent on this Encounter   Erika SCHULTZ MD, personally saw the patient today and spent greater than 30 minutes discharging this patient.    Discharge Orders      Reason for your hospital stay    Nausea and vomiting with hypertension     Follow-up and recommended labs and tests     Follow up with primary care provider, Tristian Mera, within 7 days for hospital follow- up.  The following labs/tests are recommended: cbc, bmp in 1 week.     Activity    Your activity upon discharge: activity as tolerated     Diet    Follow this diet upon discharge: Orders Placed This Encounter      Combination Diet Low Saturated Fat Na <2400mg Diet, No Caffeine Diet     Discharge Medications   Current Discharge Medication List      START taking these medications    Details   metoprolol tartrate (LOPRESSOR) 25 MG tablet Take 1 tablet (25 mg) by mouth 2 times daily  Qty: 60 tablet, Refills: 3    Associated Diagnoses: Hypertensive urgency         CONTINUE these medications which have NOT CHANGED    Details   amphetamine-dextroamphetamine (ADDERALL) 20 MG tablet Take 30 mg by mouth daily      ALPRAZolam (XANAX) 1 MG tablet Take 1 mg by mouth 3 times daily as needed      aspirin (ASA) 325 MG tablet Take 1 tablet (325 mg) by mouth daily  Qty: 90 tablet, Refills: 3    Comments: Please start taking 7 days prior to angiogram procedure  Associated Diagnoses: Cerebral aneurysm, nonruptured      brexpiprazole (REXULTI) 1 MG tablet Take 1 mg by mouth daily      camphor-menthol (DERMASARRA) 0.5-0.5 % external lotion Apply 1 mL topically every 6 hours as needed for skin care  Qty: 222 mL, Refills: 0     Associated Diagnoses: Fibromyalgia affecting hand; Right hand pain      clonazePAM (KLONOPIN) 1 MG tablet Take 1 mg by mouth 3 times daily as needed      cloNIDine (CATAPRES) 0.2 MG tablet Take 0.2 mg by mouth 3 times daily      clopidogrel (PLAVIX) 75 MG tablet Take 1 tablet (75 mg) by mouth daily  Qty: 30 tablet, Refills: 6    Associated Diagnoses: Cerebral aneurysm, nonruptured      doxepin (SINEQUAN) 10 MG capsule Take 10 mg by mouth At Bedtime      medical cannabis (Patient's own supply) See Admin Instructions (The purpose of this order is to document that the patient reports taking medical cannabis.  This is not a prescription, and is not used to certify that the patient has a qualifying medical condition.)      mometasone-formoterol (DULERA) 100-5 MCG/ACT inhaler Inhale 2 puffs into the lungs 2 times daily as needed (cough, short of breath)  Qty: 13 g, Refills: 6    Comments: OK to switch with alternative formoterol containing LABA/ICS combo as covered by insurance.  Associated Diagnoses: Moderate asthma with acute exacerbation, unspecified whether persistent      Multiple Vitamins-Minerals (MULTIVITAMIN ADULT PO) Take 1 tablet by mouth every morning       OLANZapine (ZYPREXA) 2.5 MG tablet Take 2.5 mg by mouth daily as needed (agitation)      prazosin (MINIPRESS) 2 MG capsule Take 2 mg by mouth At Bedtime      pregabalin (LYRICA) 150 MG capsule TAKE 1 CAPSULE (150 MG) BY MOUTH 3 TIMES DAILY  Qty: 90 capsule, Refills: 3    Associated Diagnoses: Fibromyalgia affecting hand      SAPHRIS 10 MG SUBL sublingual tablet Place 10 mg under the tongue 2 times daily       sertraline (ZOLOFT) 50 MG tablet Take 50 mg by mouth daily           Allergies   Allergies   Allergen Reactions     Ibuprofen GI Disturbance     Seroquel [Quetiapine] Other (See Comments) and Rash     Insomnia and rash     Data   Recent Labs   Lab Test 01/27/23  0555 01/26/23  0803 11/22/22  2305 06/23/21  0758 06/21/21  1028 03/16/21  1314   WBC 11.4*  12.2* 12.5*   < > 7.6 10.2   HGB 13.1 15.2 14.1   < > 13.2 13.3   MCV 85 85 82   < > 88 88    362 311   < > 277 324   INR  --   --  1.04  --  1.01 1.07    < > = values in this interval not displayed.      Recent Labs   Lab Test 01/27/23  0555 01/26/23  0803 11/23/22  0312 11/22/22  2305    141  --  137   POTASSIUM 3.8 4.0  --  4.2   CHLORIDE 104 102  --  98   CO2 24 26  --  21*   BUN 14.3 12.2  --  12.9   CR 0.69 0.76 0.8 0.83   ANIONGAP 14 13  --  18*   SOFI 9.2 10.4*  --  9.7   * 185*  --  89         Results for orders placed or performed during the hospital encounter of 01/26/23   XR Chest 2 Views    Narrative    XR CHEST 2 VIEWS  1/26/2023 8:00 AM       INDICATION: fever/vomiting  COMPARISON: 11/22/2022       Impression    IMPRESSION: Negative chest.    TISH HAGER MD         SYSTEM ID:  [id number]   CT Abdomen Pelvis w Contrast    Narrative    CT ABDOMEN AND PELVIS WITH CONTRAST 1/26/2023 9:55 AM    CLINICAL HISTORY: Abdominal pain. Acute left abdominal pain, vomiting,  slight hematuria.    TECHNIQUE: CT scan of the abdomen and pelvis was performed following  injection of IV contrast. Multiplanar reformats were obtained. Dose  reduction techniques were used.  CONTRAST: 131 mL [id number]    COMPARISON: None.    FINDINGS:   LOWER CHEST: No infiltrates or effusions.    HEPATOBILIARY: Diffuse hepatic steatosis. No significant mass. No bile  duct dilatation. No calcified gallstones. Cholecystectomy.    PANCREAS: No significant mass, duct dilatation, or inflammatory  change.    SPLEEN: Normal size.    ADRENAL GLANDS: No significant nodules.    KIDNEYS/BLADDER: No obvious stone, there is some early contrast  excretion limiting evaluation of small stones. No hydronephrosis. No  mass.    BOWEL: No obstruction or inflammatory change.    PELVIC ORGANS: No pelvic masses.    ADDITIONAL FINDINGS: No ascites.    MUSCULOSKELETAL: No frankly destructive bony lesions. Tiny  fat-containing right internal  hernia.      Impression    IMPRESSION:   1.  Severe hepatic steatosis, question steatohepatitis.  2.  Otherwise no acute process demonstrated.    NOE MEZA MD         SYSTEM ID:  [id number]   CT Head w/o Contrast    Narrative    CT OF THE HEAD WITHOUT CONTRAST   1/26/2023 11:18 AM     COMPARISON: Head CT 11/22/2022.    HISTORY:  nausea, HTN, h/o intracranial aneurysm     TECHNIQUE:  Axial CT images of the head from the skull base to the  vertex were acquired without IV contrast. Dose reduction techniques  were used.     FINDINGS:   INTRACRANIAL CONTENTS: There is contrast within the intracranial  vasculature from the contrast-enhanced exam from earlier today. No  intracranial hemorrhage. Mild diffuse cerebral parenchymal volume  loss. No midline shift. The basilar cisterns are patent. No cerebellar  tonsillar ectopia. No CT evidence for an acute infarct.    VISUALIZED ORBITS/SINUSES/MASTOIDS: No significant orbital  abnormality.  Small chronic mucous retention cyst in the left sphenoid  sinus. Mild mucosal thickening of the ethmoid air cells. Mild chronic  mucosal thickening of the maxillary sinuses. No significant middle ear  or mastoid effusion.    OSSEOUS STRUCTURES/SOFT TISSUES: No significant abnormality.      Impression    IMPRESSION:  1.  No intracranial hemorrhage, mass lesions, hydrocephalus or CT  evidence for an acute infarct seen within the limitations of this  exam.    CHARO SLATER MD         SYSTEM ID:  [id number]

## 2023-01-27 NOTE — PROGRESS NOTES
RECEIVING UNIT ED HANDOFF REVIEW    ED Nurse Handoff Report was reviewed by: Jaleesa Levi RN on January 26, 2023 at 9:06 PM

## 2023-01-27 NOTE — PLAN OF CARE
"Neuro: A/Ox4, anxious- PRN Xanax given; neuros- WDL except some R hand weakness w/ handgrip, denies H/A    Most Recent Vitals: BP (!) 147/95 (BP Location: Left arm)   Pulse 87   Temp 98.6  F (37  C) (Oral)   Resp 15   Ht 1.803 m (5' 11\")   Wt 120.1 kg (264 lb 11.2 oz)   LMP 03/14/2018 (Approximate)   SpO2 94%   BMI 36.92 kg/m    Tele/Cardiac: ST/SR   Cardiac Sx: denies CP, LH/D, palpitations; does have RUEDA/SOB    Pain: c/o low back pain- tyl given   IV: RUE PIV- NS 0.9% 75 mL/hr infusing   Skin: L arm- scratches, L hand-rash   GI/: WDL, denies N/V    Mobility: SBA w/ GB   Diet: Orders Placed This Encounter      Combination Diet Low Saturated Fat Na <2400mg Diet, No Caffeine Diet  Plan: Continue monitoring B/P and managing HTN. Continue current plan of care.                          "

## 2023-01-27 NOTE — PROVIDER NOTIFICATION
MD Notification    Notified Person: MD    Notified Person Name: Dr. Sanches     Notification Date/Time: 1/26/2023 @ 2138    Notification Interaction: [employer]     Purpose of Notification: [address]   Pt c/o feeling anxious. H/O anxiety & bipolar disorder. Pt is requesting Alprazolam, but I don't see any PRN orders for anxiolytics. She does take Alprazolam @ home. Please advise. Thanks! -LATOYA Lazcano *[id number]    Orders Received: order placed for Alprazolam 0.25 mg once po    Comments:

## 2023-01-27 NOTE — UTILIZATION REVIEW
"  Admission Status; Secondary Review Determination         Under the authority of the Utilization Management Committee, the utilization review process indicated a secondary review on the above patient.  The review outcome is based on review of the medical records, discussions with staff, and applying clinical experience noted on the date of the review.        ()      Inpatient Status Appropriate - This patient's medical care is consistent with medical management for inpatient care and reasonable inpatient medical practice.      (xxx) Observation Status Appropriate - This patient does not meet hospital inpatient criteria and is placed in observation status. If this patient's primary payer is Medicare and was admitted as an inpatient, Condition Code 44 should be used and patient status changed to \"observation\".   () Admission Status NOT Appropriate - This patient's medical care is not consistent with medical management for Inpatient or Observation Status.          RATIONALE FOR DETERMINATION     Maryanne Crockett is a 42 year old female with a past medical history of cerebral aneurysm with prior stenting-coiling, nicotine dependence, bipolar disorder, anxiety disorder, history of seizures, chronic headaches, and essential hypertension who was admitted on 1/26/2023 with recurrent nausea and vomiting, uncontrolled hypertension, mild leukocytosis, and dehydration.  She received IV antihypertensive x2 in the ER but has not required additional doses.  She has received IVF.  She is medically stable for discharge today.  Observation status is appropriate.  I spoke with Dr. Briceno.      The severity of illness, intensity of service provided, expected LOS and risk for adverse outcome make the care complex, high risk and appropriate for hospital admission.        The information on this document is developed by the utilization review team in order for the business office to ensure compliance.  This only denotes the appropriateness " of proper admission status and does not reflect the quality of care rendered.         The definitions of Inpatient Status and Observation Status used in making the determination above are those provided in the CMS Coverage Manual, Chapter 1 and Chapter 6, section 70.4.      Sincerely,     Brenda Guallpa MD  Physician Advisor   Utilization Review/ Case Management  [facility].

## 2023-01-28 ENCOUNTER — PATIENT OUTREACH (OUTPATIENT)
Dept: CARE COORDINATION | Facility: CLINIC | Age: 43
End: 2023-01-28
Payer: COMMERCIAL

## 2023-01-28 LAB — BACTERIA UR CULT: NO GROWTH

## 2023-01-28 NOTE — PROGRESS NOTES
Discharge instructions reviewed, understood, and signed by patient. VSS, PIV removed, new medications reviewed and understood, patient has all belongings. Patient left unit with nursing staff.

## 2023-01-28 NOTE — PROGRESS NOTES
"[facility] offered Clinic Care Coordination to an established [facility] eligible patient and patient declined CCC at this time.    Clinic Care Coordination Contact  [facility]: Post-Discharge Note  SITUATION                                                      Admission:    Admission Date: 01/26/23   Reason for Admission: Vomiting, Fever  Discharge:   Discharge Date: 01/27/23  Discharge Diagnosis: Hypertensive urgency, Vomiting, unspecified vomiting type, unspecified whether nausea present with severe nausea and vomiting uncontrolled hypertension, History of intracranial aneurysm    BACKGROUND                                                      Per hospital discharge summary and inpatient provider notes:    Maryanne Crockett is a 42 year old female with a history of cerebral aneurysm, bipolar disorder, and epilepsy on Plavix who presents with vomiting and fever for the past 3 days. She says that yesterday the vomiting was the worst and seemed almost constant. She says that her temperature ranged between . She denies cough. The patient notes that her bowels have been normal, denying diarrhea or constipation. She denies rash, sore throat, dysuria. She received droperidol from EMS en route. The patient says that the reason she presented is because it is concerning for her that she hasn't been able to keep any food or fluids down. She also adds that's she has had a bump in her right axillary area for the last 6 months and would like to know what it is.     ASSESSMENT      Discharge Assessment  How are you doing now that you are home?: \"I'm fine I'm just resting.\"  How are your symptoms? (Red Flag symptoms escalate to triage hotline per guidelines): Improved  Do you feel your condition is stable enough to be safe at home until your provider visit?: Yes  Does the patient have their discharge instructions? : Yes  Does the patient have questions regarding their discharge instructions? : No  Were you started on any new " medications or were there changes to any of your previous medications? : Yes  Does the patient have all of their medications?: Yes  Do you have questions regarding any of your medications? : No  Do you have all of your needed medical supplies or equipment (DME)?  (i.e. oxygen tank, CPAP, cane, etc.): Yes  Discharge follow-up appointment scheduled within 14 calendar days? : No  Is patient agreeable to assistance with scheduling? : Yes    Post-op ([facility] CTA Only)  If the patient had a surgery or procedure, do they have any questions for a nurse?: No    PLAN                                                      Outpatient Plan:      Follow-up and recommended labs and tests  Follow up with primary care provider, Tristian Mera, within 7 days for hospital follow- up. The following labs/tests are recommended: cbc, bmp in 1 week    No future appointments.      For any urgent concerns, please contact our 24 hour nurse triage line: [phone] ([phone])         SANTOSH Diaz  [phone]  [facility] Care Resource [facility]

## 2023-01-31 LAB
BACTERIA BLD CULT: NO GROWTH
BACTERIA BLD CULT: NO GROWTH

## 2023-03-02 DIAGNOSIS — M79.7 FIBROMYALGIA AFFECTING HAND: ICD-10-CM

## 2023-03-02 RX ORDER — PREGABALIN 150 MG/1
CAPSULE ORAL
Qty: 90 CAPSULE | Refills: 4 | Status: SHIPPED | OUTPATIENT
Start: 2023-03-02 | End: 2023-08-08

## 2023-03-02 NOTE — TELEPHONE ENCOUNTER
Pregabalin refill requested.   Pt was last seen on 11/10/22, and it was recommended they follow up after their procedure. (Performed on 1/5/23)    Pt is scheduled for follow up with Dr. Eddie soliz 3/23/23.    LPN pended refill and routed to the provider to review and process.     Dasia Hi LPN

## 2023-03-02 NOTE — TELEPHONE ENCOUNTER
[facility] Call Center    Phone Message    May a detailed message be left on voicemail: yes     Reason for Call: Other: Patient is calling regarding the status of her medication since she has been out now for a few days. Please call back to discuss.      Action Taken: Other: Pain    Travel Screening: Not Applicable

## 2023-03-22 ASSESSMENT — ENCOUNTER SYMPTOMS
SLEEP DISTURBANCES DUE TO BREATHING: 0
DISTURBANCES IN COORDINATION: 1
BREAST PAIN: 1
SORE THROAT: 0
LIGHT-HEADEDNESS: 1
SWOLLEN GLANDS: 1
SPEECH CHANGE: 1
FATIGUE: 1
MUSCLE CRAMPS: 1
COUGH: 0
POOR WOUND HEALING: 0
BLOOD IN STOOL: 0
EXERCISE INTOLERANCE: 1
TREMORS: 1
HYPOTENSION: 0
NECK MASS: 1
HYPERTENSION: 1
JAUNDICE: 0
HEADACHES: 1
NECK PAIN: 1
SMELL DISTURBANCE: 0
DEPRESSION: 1
NAIL CHANGES: 0
WHEEZING: 1
BREAST MASS: 1
NIGHT SWEATS: 1
ABDOMINAL PAIN: 1
TASTE DISTURBANCE: 0
ARTHRALGIAS: 1
ORTHOPNEA: 1
POSTURAL DYSPNEA: 1
DIZZINESS: 1
RECTAL PAIN: 0
BACK PAIN: 1
SKIN CHANGES: 0
WEIGHT GAIN: 1
LOSS OF CONSCIOUSNESS: 0
WEAKNESS: 1
NAUSEA: 1
SINUS CONGESTION: 0
PALPITATIONS: 1
CONSTIPATION: 1
BLOATING: 1
ALTERED TEMPERATURE REGULATION: 1
MUSCLE WEAKNESS: 1
STIFFNESS: 1
JOINT SWELLING: 1
VOMITING: 1
HEMOPTYSIS: 0
MYALGIAS: 1
NUMBNESS: 1
SPUTUM PRODUCTION: 0
HEARTBURN: 0
PARALYSIS: 0
FEVER: 0
TINGLING: 1
HALLUCINATIONS: 0
SYNCOPE: 0
SEIZURES: 1
DECREASED CONCENTRATION: 1
BOWEL INCONTINENCE: 0
COUGH DISTURBING SLEEP: 0
DYSPNEA ON EXERTION: 1
POLYPHAGIA: 0
POLYDIPSIA: 1
SNORES LOUDLY: 1
TROUBLE SWALLOWING: 0
DECREASED APPETITE: 1
HOARSE VOICE: 0
NERVOUS/ANXIOUS: 1
BRUISES/BLEEDS EASILY: 1
MEMORY LOSS: 1
DIARRHEA: 1
INSOMNIA: 1
LEG PAIN: 1
INCREASED ENERGY: 1
PANIC: 1
SHORTNESS OF BREATH: 1
WEIGHT LOSS: 0
SINUS PAIN: 0

## 2023-03-23 ENCOUNTER — OFFICE VISIT (OUTPATIENT)
Dept: ANESTHESIOLOGY | Facility: CLINIC | Age: 43
End: 2023-03-23
Payer: COMMERCIAL

## 2023-03-23 VITALS
HEART RATE: 69 BPM | WEIGHT: 264 LBS | OXYGEN SATURATION: 98 % | BODY MASS INDEX: 40.01 KG/M2 | HEIGHT: 68 IN | SYSTOLIC BLOOD PRESSURE: 113 MMHG | DIASTOLIC BLOOD PRESSURE: 77 MMHG

## 2023-03-23 DIAGNOSIS — M54.2 CERVICALGIA: ICD-10-CM

## 2023-03-23 DIAGNOSIS — M47.816 SPONDYLOSIS OF LUMBAR REGION WITHOUT MYELOPATHY OR RADICULOPATHY: ICD-10-CM

## 2023-03-23 DIAGNOSIS — G89.29 CHRONIC SACROILIAC JOINT PAIN: ICD-10-CM

## 2023-03-23 DIAGNOSIS — E66.01 MORBID OBESITY (H): ICD-10-CM

## 2023-03-23 DIAGNOSIS — G57.12 MERALGIA PARAESTHETICA, LEFT: ICD-10-CM

## 2023-03-23 DIAGNOSIS — M53.3 CHRONIC SACROILIAC JOINT PAIN: ICD-10-CM

## 2023-03-23 DIAGNOSIS — M79.18 MYOFASCIAL PAIN: Primary | ICD-10-CM

## 2023-03-23 PROCEDURE — 99214 OFFICE O/P EST MOD 30 MIN: CPT | Mod: GC | Performed by: ANESTHESIOLOGY

## 2023-03-23 RX ORDER — LIDOCAINE 50 MG/G
PATCH TOPICAL
Qty: 30 PATCH | Refills: 0 | Status: SHIPPED | OUTPATIENT
Start: 2023-03-23 | End: 2023-09-07

## 2023-03-23 RX ORDER — METHOCARBAMOL 500 MG/1
500 TABLET, FILM COATED ORAL 4 TIMES DAILY PRN
Qty: 30 TABLET | Refills: 0 | Status: SHIPPED | OUTPATIENT
Start: 2023-03-23 | End: 2023-03-29

## 2023-03-23 ASSESSMENT — PAIN SCALES - GENERAL: PAINLEVEL: SEVERE PAIN (7)
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
## 2023-03-23 NOTE — NURSING NOTE
RN reviewed AVS with patient. Patient to contact clinic if any questions/concerns. Patient verbalized understanding.    Raquel Wright RNCC
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
## 2023-03-23 NOTE — PATIENT INSTRUCTIONS
Medications:    methocarbamol (ROBAXIN) 500 MG tablet - Take 1 tablet (500 mg) by mouth 4 times daily as needed for muscle spasms    lidocaine (LIDODERM) 5 % patch - Apply up to 3 patches to painful area at once for up to 12 hours within a 24 hour period. Remove after 12 hours    Please provide the clinic with a minium of 1 week notice, on all prescription refills.       Referrals:    Chioma Cervantes [phone]    Acupuncture Referral. Please call to schedule.  -Please call your insurance provider to find out about acupuncture coverage, being that not all policies cover acupuncture services.       Physical Therapy Referral placed-   If you have not heard from the scheduling office within 2 business days, please call [phone] for all locations       Recommended Follow up:      Follow up as needed      To speak with a nurse, schedule/reschedule/cancel a clinic appointment, or request a medication refill call: [phone].    You can also reach us by [employer]: [email or web address]

## 2023-03-23 NOTE — NURSING NOTE
Patient presents with:  Follow Up: Follow-up Neck, Back left leg Pain      Severe Pain (7)     Pain Medications     Salicylates Refills Start End     aspirin (ASA) 325 MG tablet    3 3/30/2021     Sig - Route: Take 1 tablet (325 mg) by mouth daily - Oral    Class: E-Prescribe    Notes to Pharmacy: Please start taking 7 days prior to angiogram procedure          What medications are you using for pain? Tylenol, Pregabalin    (New patients only) Have you been seen by another pain clinic/ provider? no    (Return Patients only) What refills are you needing today? no

## 2023-03-24 ENCOUNTER — TELEPHONE (OUTPATIENT)
Dept: ANESTHESIOLOGY | Facility: CLINIC | Age: 43
End: 2023-03-24
Payer: COMMERCIAL

## 2023-03-24 DIAGNOSIS — R11.0 NAUSEA: ICD-10-CM

## 2023-03-24 RX ORDER — ONDANSETRON 4 MG/1
4 TABLET, ORALLY DISINTEGRATING ORAL EVERY 8 HOURS PRN
Qty: 20 TABLET | Refills: 1 | OUTPATIENT
Start: 2023-03-24

## 2023-03-24 NOTE — TELEPHONE ENCOUNTER
PRIOR AUTHORIZATION DENIED    Medication: lidocaine (LIDODERM) 5 % patch - EPA DENIED    Denial Date: 3/23/2023    Denial Rational:      Appeal Information:

## 2023-03-24 NOTE — TELEPHONE ENCOUNTER
Ondansetron (Zofran) 4 mg    Currently being managed by pain provider.  Rx denied.    GEORGE FarleyN, RN, CCM

## 2023-03-24 NOTE — TELEPHONE ENCOUNTER
I called the patient and explained to her that her Prior Authorization was denied from the insurance company and she can try to purchase over the counter for the lidocaine patches. She said she will try to purhcase over the counter at her pharmacy

## 2023-03-29 ENCOUNTER — TELEPHONE (OUTPATIENT)
Dept: ANESTHESIOLOGY | Facility: CLINIC | Age: 43
End: 2023-03-29
Payer: COMMERCIAL

## 2023-03-29 RX ORDER — METHOCARBAMOL 500 MG/1
500 TABLET, FILM COATED ORAL 4 TIMES DAILY PRN
Qty: 120 TABLET | Refills: 1 | Status: SHIPPED | OUTPATIENT
Start: 2023-03-29 | End: 2023-06-12

## 2023-03-29 NOTE — TELEPHONE ENCOUNTER
RN spoke with patient to confirm dosage of methocarbamol and effectiveness. Patient reports she is taking 4 tablets daily. Patient was unable to confirm how many tablets she had left. Writer will send update to provider for ongoing refills. Patient appreciated the call.    Raquel Wright RNCC

## 2023-03-29 NOTE — TELEPHONE ENCOUNTER
RN spoke with pharmacy to confirm sold date. Pharmacy confirmed methocarbamol was sold to patient on 3/24/23 for 30 tablets.    Raquel Wright RNCC

## 2023-04-02 ENCOUNTER — HEALTH MAINTENANCE LETTER (OUTPATIENT)
Age: 43
End: 2023-04-02

## 2023-05-11 ENCOUNTER — HOSPITAL ENCOUNTER (EMERGENCY)
Facility: CLINIC | Age: 43
Discharge: HOME | End: 2023-05-11
Attending: EMERGENCY MEDICINE | Admitting: EMERGENCY MEDICINE
Payer: COMMERCIAL

## 2023-05-11 VITALS
TEMPERATURE: 97.5 F | RESPIRATION RATE: 20 BRPM | DIASTOLIC BLOOD PRESSURE: 87 MMHG | SYSTOLIC BLOOD PRESSURE: 137 MMHG | HEART RATE: 62 BPM | OXYGEN SATURATION: 98 %

## 2023-05-11 DIAGNOSIS — R07.89 ATYPICAL CHEST PAIN: ICD-10-CM

## 2023-05-11 LAB
ALBUMIN SERPL BCG-MCNC: 4.7 G/DL (ref 3.5–5.2)
ALBUMIN UR-MCNC: 20 MG/DL
ALP SERPL-CCNC: 118 U/L (ref 35–104)
ALT SERPL W P-5'-P-CCNC: 25 U/L (ref 10–35)
ANION GAP SERPL CALCULATED.3IONS-SCNC: 11 MMOL/L (ref 7–15)
APPEARANCE UR: CLEAR
AST SERPL W P-5'-P-CCNC: 16 U/L (ref 10–35)
ATRIAL RATE - MUSE: 59 BPM
BACTERIA #/AREA URNS HPF: ABNORMAL /HPF
BASOPHILS # BLD AUTO: 0.1 10E3/UL (ref 0–0.2)
BASOPHILS NFR BLD AUTO: 1 %
BILIRUB SERPL-MCNC: 0.3 MG/DL
BILIRUB UR QL STRIP: NEGATIVE
BUN SERPL-MCNC: 5.6 MG/DL (ref 6–20)
CALCIUM SERPL-MCNC: 9.5 MG/DL (ref 8.6–10)
CHLORIDE SERPL-SCNC: 107 MMOL/L (ref 98–107)
COLOR UR AUTO: ABNORMAL
CREAT SERPL-MCNC: 0.81 MG/DL (ref 0.51–0.95)
D DIMER PPP FEU-MCNC: 0.27 UG/ML FEU (ref 0–0.5)
DEPRECATED HCO3 PLAS-SCNC: 24 MMOL/L (ref 22–29)
DIASTOLIC BLOOD PRESSURE - MUSE: NORMAL MMHG
EOSINOPHIL # BLD AUTO: 0.3 10E3/UL (ref 0–0.7)
EOSINOPHIL NFR BLD AUTO: 4 %
ERYTHROCYTE [DISTWIDTH] IN BLOOD BY AUTOMATED COUNT: 13.3 % (ref 10–15)
GFR SERPL CREATININE-BSD FRML MDRD: >90 ML/MIN/1.73M2
GLUCOSE SERPL-MCNC: 104 MG/DL (ref 70–99)
GLUCOSE UR STRIP-MCNC: NEGATIVE MG/DL
GROUP A STREP BY PCR: NOT DETECTED
HCT VFR BLD AUTO: 46.3 % (ref 35–47)
HGB BLD-MCNC: 15.3 G/DL (ref 11.7–15.7)
HGB UR QL STRIP: NEGATIVE
IMM GRANULOCYTES # BLD: 0 10E3/UL
IMM GRANULOCYTES NFR BLD: 0 %
INTERPRETATION ECG - MUSE: NORMAL
KETONES UR STRIP-MCNC: NEGATIVE MG/DL
LEUKOCYTE ESTERASE UR QL STRIP: NEGATIVE
LYMPHOCYTES # BLD AUTO: 3.4 10E3/UL (ref 0.8–5.3)
LYMPHOCYTES NFR BLD AUTO: 37 %
MCH RBC QN AUTO: 27.4 PG (ref 26.5–33)
MCHC RBC AUTO-ENTMCNC: 33 G/DL (ref 31.5–36.5)
MCV RBC AUTO: 83 FL (ref 78–100)
MONOCYTES # BLD AUTO: 0.6 10E3/UL (ref 0–1.3)
MONOCYTES NFR BLD AUTO: 6 %
MUCOUS THREADS #/AREA URNS LPF: PRESENT /LPF
NEUTROPHILS # BLD AUTO: 4.8 10E3/UL (ref 1.6–8.3)
NEUTROPHILS NFR BLD AUTO: 52 %
NITRATE UR QL: NEGATIVE
NRBC # BLD AUTO: 0 10E3/UL
NRBC BLD AUTO-RTO: 0 /100
P AXIS - MUSE: 80 DEGREES
PH UR STRIP: 5.5 [PH] (ref 5–7)
PLATELET # BLD AUTO: 271 10E3/UL (ref 150–450)
POTASSIUM SERPL-SCNC: 3.9 MMOL/L (ref 3.4–5.3)
PR INTERVAL - MUSE: 182 MS
PROT SERPL-MCNC: 7.6 G/DL (ref 6.4–8.3)
QRS DURATION - MUSE: 92 MS
QT - MUSE: 400 MS
QTC - MUSE: 396 MS
R AXIS - MUSE: 83 DEGREES
RBC # BLD AUTO: 5.59 10E6/UL (ref 3.8–5.2)
RBC URINE: 1 /HPF
SODIUM SERPL-SCNC: 142 MMOL/L (ref 136–145)
SP GR UR STRIP: 1.01 (ref 1–1.03)
SQUAMOUS EPITHELIAL: 2 /HPF
SYSTOLIC BLOOD PRESSURE - MUSE: NORMAL MMHG
T AXIS - MUSE: 60 DEGREES
TROPONIN T SERPL HS-MCNC: 6 NG/L
UROBILINOGEN UR STRIP-MCNC: NORMAL MG/DL
VENTRICULAR RATE- MUSE: 59 BPM
WBC # BLD AUTO: 9.2 10E3/UL (ref 4–11)
WBC URINE: 2 /HPF

## 2023-05-11 PROCEDURE — 85025 COMPLETE CBC W/AUTO DIFF WBC: CPT | Performed by: EMERGENCY MEDICINE

## 2023-05-11 PROCEDURE — 80053 COMPREHEN METABOLIC PANEL: CPT | Performed by: EMERGENCY MEDICINE

## 2023-05-11 PROCEDURE — 84484 ASSAY OF TROPONIN QUANT: CPT | Performed by: EMERGENCY MEDICINE

## 2023-05-11 PROCEDURE — 93010 ELECTROCARDIOGRAM REPORT: CPT | Performed by: EMERGENCY MEDICINE

## 2023-05-11 PROCEDURE — 99284 EMERGENCY DEPT VISIT MOD MDM: CPT | Mod: 25 | Performed by: EMERGENCY MEDICINE

## 2023-05-11 PROCEDURE — 81001 URINALYSIS AUTO W/SCOPE: CPT | Performed by: EMERGENCY MEDICINE

## 2023-05-11 PROCEDURE — 85379 FIBRIN DEGRADATION QUANT: CPT | Performed by: EMERGENCY MEDICINE

## 2023-05-11 PROCEDURE — 250N000009 HC RX 250: Performed by: EMERGENCY MEDICINE

## 2023-05-11 PROCEDURE — 250N000013 HC RX MED GY IP 250 OP 250 PS 637: Performed by: EMERGENCY MEDICINE

## 2023-05-11 PROCEDURE — 93005 ELECTROCARDIOGRAM TRACING: CPT | Performed by: EMERGENCY MEDICINE

## 2023-05-11 PROCEDURE — 36415 COLL VENOUS BLD VENIPUNCTURE: CPT | Performed by: EMERGENCY MEDICINE

## 2023-05-11 PROCEDURE — 96360 HYDRATION IV INFUSION INIT: CPT | Performed by: EMERGENCY MEDICINE

## 2023-05-11 PROCEDURE — 258N000003 HC RX IP 258 OP 636: Performed by: EMERGENCY MEDICINE

## 2023-05-11 PROCEDURE — 87651 STREP A DNA AMP PROBE: CPT | Performed by: EMERGENCY MEDICINE

## 2023-05-11 RX ORDER — SODIUM CHLORIDE 9 MG/ML
INJECTION, SOLUTION INTRAVENOUS CONTINUOUS
Status: DISCONTINUED | OUTPATIENT
Start: 2023-05-11 | End: 2023-05-11 | Stop reason: HOSPADM

## 2023-05-11 RX ADMIN — SODIUM CHLORIDE 1000 ML: 9 INJECTION, SOLUTION INTRAVENOUS at 12:37

## 2023-05-11 RX ADMIN — LIDOCAINE HYDROCHLORIDE 30 ML: 20 SOLUTION ORAL; TOPICAL at 12:41

## 2023-05-11 ASSESSMENT — ACTIVITIES OF DAILY LIVING (ADL): ADLS_ACUITY_SCORE: 35

## 2023-05-11 NOTE — ED NOTES
Pt left AMA, unwitnessed by writer however pts in the lobby saw her leave. Pt took off her IV in the bathroom per ED tech. MD made aware

## 2023-05-11 NOTE — ED PROVIDER NOTES
ED Provider Note  [facility]      History   No chief complaint on file.    The history is provided by the patient and medical records.     Maryanne Crockett is a 42 year old female who cerebral aneurysm s/p stent placement, seizures, and HTN who presents to the ED for evaluation of chest pain and throat pain.  The patient noticed a lump in her right axilla approximately 3 days ago associated with burning pain.  She feels the pain traveled across her chest and into the left side of her neck.  Patient now has sternal chest pain which is more painful when palpated.  She notes her chest pain is worse while laying in a supine position.  She does not report a change in her chest pain with standing or ambulating.  Patient notes feeling pain in the left side of her. She also endorses feeling her left neck is slightly swollen.  She denies feeling short of breath.  Patient denies changes in her neck pain with consumption of food or fluids. She reports a decreased appetite over the past couple days. She feels as though she hasn't felt like herself for several days but denies a specific instance that precipitated this. Patient denies feeling short of breath.  Patient denies a history of GERD or use of Protonix.  Patient endorses compliance with her home hypertensive medications.      Past Medical History  Past Medical History:   Diagnosis Date     Bipolar I disorder (H)      Cerebral aneurysm, nonruptured      Family history of glioblastoma     screening MRIs every 2 years     Ovarian cyst      RSD (reflex sympathetic dystrophy)      Seizures (H)      Past Surgical History:   Procedure Laterality Date     CHOLECYSTECTOMY       CYSTOSCOPY N/A 09/01/2020    Procedure: CYSTOSCOPY;  Surgeon: Hayley Zayas MD;  [facility]     [facility] SAL[facility]O-OOPHORECTOMY, COMBINED Left 09/01/2020    Procedure: HYSTERECTOMY, TOTAL, ROBOT-ASSISTED, LAPAROSCOPIC,  left oophorectomy;   Surgeon: Hayley Zayas MD;  [facility]: [facility]     DILATION AND CURETTAGE SUCTION  04/30/2015    retained POC     GASTRIC RESTRICTIVE PROCEDURE, OPEN, REMOVE/REPLACE SUBCUTANEOUS PORT       INJECT NERVE BLOCK OCCIPITAL Left 6/30/2022    Procedure: Left occipital nerve block under ultrasound guidance and left upper back trigger point injection;  Surgeon: Scott Morgan MD;  Location: [facility] OR     INJECT NERVE BLOCK OCCIPITAL Left 9/15/2022    Procedure: Left occipital nerve block under ultrasound guidance and left upper back trigger point injection;  Surgeon: Scott Morgan MD;  Location: [facility] OR     INJECT NERVE BLOCK OCCIPITAL Left 1/5/2023    Procedure: Left occipital nerve block under ultrasound guidance and left upper back trigger point injection;  Surgeon: Scott Morgan MD;  Location: [facility] OR     IR CAROTID CEREBRAL ANGIOGRAM BILATERAL  03/19/2021     IR CAROTID CEREBRAL ANGIOGRAM BILATERAL  06/23/2021     IR CAROTID CEREBRAL ANGIOGRAM BILATERAL  1/12/2022     LAPAROSCOPIC OOPHORECTOMY Right 08/05/2016    Procedure: LAPAROSCOPIC OOPHORECTOMY;  Surgeon: Adrianne Vergara MD;  [facility]     LAPAROSCOPIC REMOVAL GASTRIC ADJUSTABLE BAND N/A 05/11/2021    Procedure: REMOVAL, GASTRIC BAND, ADJUSTABLE, LAPAROSCOPIC;  Surgeon: Remy Gold MD;  [facility]     LAPAROSCOPIC SALPINGECTOMY Bilateral 08/05/2016    Procedure: LAPAROSCOPIC SALPINGECTOMY;  Surgeon: Adrianne Vergara MD;  [facility]     LAPAROSCOPIC TUBAL LIGATION Bilateral 05/22/2015    Procedure: LAPAROSCOPIC TUBAL LIGATION;  Surgeon: Hayley Zayas MD;  [facility]     LAPAROSCOPY OPERATIVE ADULT N/A 08/05/2016    Procedure: LAPAROSCOPY OPERATIVE ADULT;  Surgeon: Adrianne Vergara MD;  [facility]     PANNICULECTOMY  04/22/2017    [facility]     SOFT TISSUE SURGERY Right     cyst in hand     ALPRAZolam (XANAX) 1 MG tablet  amphetamine-dextroamphetamine (ADDERALL) 20 MG tablet  aspirin (ASA) 325 MG  tablet  camphor-menthol (DERMASARRA) 0.5-0.5 % external lotion  clonazePAM (KLONOPIN) 1 MG tablet  cloNIDine (CATAPRES) 0.2 MG tablet  clopidogrel (PLAVIX) 75 MG tablet  doxepin (SINEQUAN) 10 MG capsule  lidocaine (LIDODERM) 5 % patch  medical cannabis (Patient's own supply)  methocarbamol (ROBAXIN) 500 MG tablet  metoprolol tartrate (LOPRESSOR) 25 MG tablet  mometasone-formoterol (DULERA) 100-5 MCG/ACT inhaler  Multiple Vitamins-Minerals (MULTIVITAMIN ADULT PO)  OLANZapine (ZYPREXA) 2.5 MG tablet  prazosin (MINIPRESS) 2 MG capsule  pregabalin (LYRICA) 150 MG capsule  SAPHRIS 10 MG SUBL sublingual tablet  sertraline (ZOLOFT) 50 MG tablet      Allergies   Allergen Reactions     Ibuprofen GI Disturbance     Seroquel [Quetiapine] Other (See Comments) and Rash     Insomnia and rash     Family History  Family History   Problem Relation Age of Onset     Coronary Artery Disease Mother 50        stent     Leukemia Father      Deep Vein Thrombosis (DVT) Father      Brain Cancer Brother 36        glioblastoma     Deep Vein Thrombosis (DVT) Brother      Cerebrovascular Disease Brother 47        stroke secondary to DVT     Deep Vein Thrombosis (DVT) Brother      Cerebral aneurysm Maternal Grandfather      Brain Cancer Maternal Aunt         glioblastoma     Brain Cancer Maternal Aunt         glioblastoma     Breast Cancer No family hx of      Anesthesia Reaction No family hx of      Ovarian Cancer No family hx of      Colon Cancer No family hx of      Social History   Social History     Tobacco Use     Smoking status: Some Days     Packs/day: 0.25     Types: Cigarettes     Smokeless tobacco: Current     Tobacco comments:     Trying   Vaping Use     Vaping status: Never Used   Substance Use Topics     Alcohol use: Not Currently     Comment: rare     Drug use: Yes     Types: Marijuana     Comment: pt says she is prescribed medical marijuana         A medically appropriate review of systems was performed with pertinent positives  and negatives noted in the HPI, and all other systems negative.    Physical Exam      Physical Exam  Constitutional:       General: She is not in acute distress.     Appearance: She is well-developed. She is not ill-appearing, toxic-appearing or diaphoretic.   HENT:      Head: Normocephalic and atraumatic.   Cardiovascular:      Rate and Rhythm: Normal rate and regular rhythm.      Heart sounds: Normal heart sounds.   Pulmonary:      Effort: Pulmonary effort is normal. No respiratory distress.      Breath sounds: Normal breath sounds. No decreased breath sounds, wheezing or rhonchi.   Chest:      Chest wall: Tenderness (tenderness right axilla to anterior chest wall; no skin changes; no absces ) present.   Abdominal:      General: There is no distension.      Palpations: Abdomen is soft.      Tenderness: There is no abdominal tenderness. There is no rebound.   Musculoskeletal:         General: No tenderness.      Cervical back: Normal range of motion.   Skin:     General: Skin is warm and dry.   Neurological:      Mental Status: She is alert and oriented to person, place, and time.   Psychiatric:         Behavior: Behavior normal.         Thought Content: Thought content normal.           ED Course, Procedures, & Data     12:17 PM  The patient was seen and examined by Dr. Pau Zuleta in Room VTA.     Procedures       ED Course Selections:        EKG Interpretation:      Interpreted by Pau Zuleta MD  Time reviewed: 1204  Symptoms at time of EKG: None   Rhythm: sinus bradycardia  Rate: Normal  Axis: Normal  Ectopy: none  Conduction: normal  ST Segments/ T Waves: No ST-T wave changes and No acute ischemic changes  Q Waves: none  Comparison to prior: No old EKG available    Clinical Impression: normal EKG               Results for orders placed or performed during the hospital encounter of 05/11/23   Comprehensive metabolic panel     Status: Abnormal   Result Value Ref Range    Sodium 142 136 - 145 mmol/L     Potassium 3.9 3.4 - 5.3 mmol/L    Chloride 107 98 - 107 mmol/L    Carbon Dioxide (CO2) 24 22 - 29 mmol/L    Anion Gap 11 7 - 15 mmol/L    Urea Nitrogen 5.6 (L) 6.0 - 20.0 mg/dL    Creatinine 0.81 0.51 - 0.95 mg/dL    Calcium 9.5 8.6 - 10.0 mg/dL    Glucose 104 (H) 70 - 99 mg/dL    Alkaline Phosphatase 118 (H) 35 - 104 U/L    AST 16 10 - 35 U/L    ALT 25 10 - 35 U/L    Protein Total 7.6 6.4 - 8.3 g/dL    Albumin 4.7 3.5 - 5.2 g/dL    Bilirubin Total 0.3 <=1.2 mg/dL    GFR Estimate >90 >60 mL/min/1.73m2   D dimer quantitative     Status: Normal   Result Value Ref Range    D-Dimer Quantitative 0.27 0.00 - 0.50 ug/mL FEU    Narrative    This D-dimer assay is intended for use in conjunction with a clinical pretest probability assessment model to exclude pulmonary embolism (PE) and deep venous thrombosis (DVT) in outpatients suspected of PE or DVT. The cut-off value is 0.50 ug/mL FEU.   Troponin T, High Sensitivity     Status: Normal   Result Value Ref Range    Troponin T, High Sensitivity 6 <=14 ng/L   UA with Microscopic reflex to Culture     Status: Abnormal    Specimen: Urine, Midstream   Result Value Ref Range    Color Urine Light Yellow Colorless, Straw, Light Yellow, Yellow    Appearance Urine Clear Clear    Glucose Urine Negative Negative mg/dL    Bilirubin Urine Negative Negative    Ketones Urine Negative Negative mg/dL    Specific Gravity Urine 1.013 1.003 - 1.035    Blood Urine Negative Negative    pH Urine 5.5 5.0 - 7.0    Protein Albumin Urine 20 (A) Negative mg/dL    Urobilinogen Urine Normal Normal, 2.0 mg/dL    Nitrite Urine Negative Negative    Leukocyte Esterase Urine Negative Negative    Bacteria Urine Few (A) None Seen /HPF    Mucus Urine Present (A) None Seen /LPF    RBC Urine 1 <=2 /HPF    WBC Urine 2 <=5 /HPF    Squamous Epithelials Urine 2 (H) <=1 /HPF    Narrative    Urine Culture not indicated   CBC with platelets and differential     Status: Abnormal   Result Value Ref Range    WBC Count 9.2  4.0 - 11.0 10e3/uL    RBC Count 5.59 (H) 3.80 - 5.20 10e6/uL    Hemoglobin 15.3 11.7 - 15.7 g/dL    Hematocrit 46.3 35.0 - 47.0 %    MCV 83 78 - 100 fL    MCH 27.4 26.5 - 33.0 pg    MCHC 33.0 31.5 - 36.5 g/dL    RDW 13.3 10.0 - 15.0 %    Platelet Count 271 150 - 450 10e3/uL    % Neutrophils 52 %    % Lymphocytes 37 %    % Monocytes 6 %    % Eosinophils 4 %    % Basophils 1 %    % Immature Granulocytes 0 %    NRBCs per 100 WBC 0 <1 /100    Absolute Neutrophils 4.8 1.6 - 8.3 10e3/uL    Absolute Lymphocytes 3.4 0.8 - 5.3 10e3/uL    Absolute Monocytes 0.6 0.0 - 1.3 10e3/uL    Absolute Eosinophils 0.3 0.0 - 0.7 10e3/uL    Absolute Basophils 0.1 0.0 - 0.2 10e3/uL    Absolute Immature Granulocytes 0.0 <=0.4 10e3/uL    Absolute NRBCs 0.0 10e3/uL   EKG 12-lead, tracing only     Status: None   Result Value Ref Range    Systolic Blood Pressure  mmHg    Diastolic Blood Pressure  mmHg    Ventricular Rate 59 BPM    Atrial Rate 59 BPM    NE Interval 182 ms    QRS Duration 92 ms     ms    QTc 396 ms    P Axis 80 degrees    R AXIS 83 degrees    T Axis 60 degrees    Interpretation ECG       Sinus bradycardia with sinus arrhythmia  Otherwise normal ECG  Unconfirmed report - interpretation of this ECG is computer generated - see medical record for final interpretation  Confirmed by - EMERGENCY ROOM, PHYSICIAN ([id number]),  CHUYIAT FUENTES ([id number]) on 5/11/2023 2:21:04 PM     CBC with platelets differential     Status: Abnormal    Narrative    The following orders were created for panel order CBC with platelets differential.  Procedure                               Abnormality         Status                     ---------                               -----------         ------                     CBC with platelets and d...[[id number]]  Abnormal            Final result                 Please view results for these tests on the individual orders.     Medications   0.9% sodium chloride BOLUS (1,000 mLs Intravenous $New Bag 5/11/23  0857)     Followed by   sodium chloride 0.9% infusion (has no administration in time range)   lidocaine (viscous) (XYLOCAINE) 2 % 15 mL, alum & mag hydroxide-simethicone (MAALOX) 15 mL GI Cocktail (30 mLs Oral $Given 5/11/23 1241)                   No results found for any visits on 05/11/23.  Medications - No data to display  Labs Ordered and Resulted from Time of ED Arrival to Time of ED Departure - No data to display  No orders to display          Critical care was not performed.     Medical Decision Making  The patient's presentation was of high complexity (an acute health issue posing potential threat to life or bodily function).    The patient's evaluation involved:  review of external note(s) from 3+ sources (Previous clinic visits and previous discharge summary was reviewed.  Previous hospitalization for hypertension was reviewed.)  ordering and/or review of 3+ test(s) in this encounter (Previous labs were compared to today's lab results.)  review of 2 test result(s) ordered prior to this encounter (see separate area of note for details)  independent interpretation of testing performed by another health professional (not done)    The patient's management necessitated moderate risk (limitations due to social determinants of health (see separate area of note for details)).      Assessment & Plan    Patient is a 42-year-old female with a history of hypertension on Zyprexa as well who presents to the ER due to chest pain.  Patient that she is been feeling unwell for the past few days.  Patient's chest pain is worse when she is laying down.  Patient's pain is reproducible here.  Patient's pain feels very atypical for ACS.  We will give the patient a GI cocktail as one of her main concerns is a sore throat.  We will also check her for rapid strep.  We will check some basic labs and a chest x-ray.  Plan of care discussed the patient.    Patient ended up eloping from the ER prior to completion of care.  She did not  receive her chest x-ray.  Patient ended up pulling out her IV and leaving the waiting room.  Patient's initial EKG was normal.  Patient's troponin came back normal.    I have reviewed the nursing notes. I have reviewed the findings, diagnosis, plan and need for follow up with the patient.    New Prescriptions    No medications on file       Final diagnoses:   Atypical chest pain     IRemy, am serving as a trained medical scribe to document services personally performed by Pau Zuleta MD, based on the provider's statements to me.      I, Pau Zuleta MD, was physically present and have reviewed and verified the accuracy of this note documented by Remy Stahl.     [facility] EMERGENCY DEPARTMENT  5/11/2023     Pau Zuleta MD  05/11/23 [id number]

## 2023-05-11 NOTE — ED TRIAGE NOTES
Patient BIBA from home  C/o feeling dizzy and CP  Diaphoretic upon EMS arrival    NSR  5 droperidol and 250cc IVF given en route - states relief after that     Patient describes CP pain in left shoulder radiating to her neck

## 2023-06-10 DIAGNOSIS — M47.816 SPONDYLOSIS OF LUMBAR REGION WITHOUT MYELOPATHY OR RADICULOPATHY: ICD-10-CM

## 2023-06-10 DIAGNOSIS — M53.3 CHRONIC SACROILIAC JOINT PAIN: ICD-10-CM

## 2023-06-10 DIAGNOSIS — G89.29 CHRONIC SACROILIAC JOINT PAIN: ICD-10-CM

## 2023-06-10 DIAGNOSIS — M79.18 MYOFASCIAL PAIN: ICD-10-CM

## 2023-06-10 DIAGNOSIS — M54.2 CERVICALGIA: ICD-10-CM

## 2023-06-12 RX ORDER — METHOCARBAMOL 500 MG/1
TABLET, FILM COATED ORAL
Qty: 120 TABLET | Refills: 0 | Status: SHIPPED | OUTPATIENT
Start: 2023-06-12 | End: 2023-07-11

## 2023-06-12 NOTE — TELEPHONE ENCOUNTER
Refill request    Medication: methocarbamol (ROBAXIN) 500 MG tablet    Sig: Take 1 tablet (500 mg) by mouth 4 times daily as needed for muscle spasms    Dispensed: 120  Refills: 1  Last prescribed on 3/29/23    Last clinic appointment: 3/23/23  Next clinic appointment: Not scheduled.       Preferred pharmacy:    [id number] - [address]    Medication refilled. No changes.     Dasia Hi LPN

## 2023-07-11 DIAGNOSIS — G89.29 CHRONIC SACROILIAC JOINT PAIN: ICD-10-CM

## 2023-07-11 DIAGNOSIS — M53.3 CHRONIC SACROILIAC JOINT PAIN: ICD-10-CM

## 2023-07-11 DIAGNOSIS — M47.816 SPONDYLOSIS OF LUMBAR REGION WITHOUT MYELOPATHY OR RADICULOPATHY: ICD-10-CM

## 2023-07-11 DIAGNOSIS — M79.18 MYOFASCIAL PAIN: ICD-10-CM

## 2023-07-11 DIAGNOSIS — M54.2 CERVICALGIA: ICD-10-CM

## 2023-07-11 RX ORDER — METHOCARBAMOL 500 MG/1
TABLET, FILM COATED ORAL
Qty: 120 TABLET | Refills: 0 | Status: SHIPPED | OUTPATIENT
Start: 2023-07-11 | End: 2023-08-08

## 2023-07-11 NOTE — TELEPHONE ENCOUNTER
Refill request    Medication: methocarbamol (ROBAXIN) 500 MG tablet    Sig: TAKE ONE TABLET BY MOUTH FOUR TIMES DAILY AS NEEDED FOR MUSCLE SPASM    Dispensed: 120  Refills: 0  Medication last prescribed on 6/12/23    Last clinic appointment: 3/23/23  Next clinic appointment: 8/10/23    Preferred pharmacy:    [id number] - [facility] [address] [place] [address]

## 2023-08-07 DIAGNOSIS — M79.18 MYOFASCIAL PAIN: ICD-10-CM

## 2023-08-07 DIAGNOSIS — M79.7 FIBROMYALGIA AFFECTING HAND: ICD-10-CM

## 2023-08-07 DIAGNOSIS — M54.2 CERVICALGIA: ICD-10-CM

## 2023-08-07 DIAGNOSIS — G89.29 CHRONIC SACROILIAC JOINT PAIN: ICD-10-CM

## 2023-08-07 DIAGNOSIS — M53.3 CHRONIC SACROILIAC JOINT PAIN: ICD-10-CM

## 2023-08-07 DIAGNOSIS — M47.816 SPONDYLOSIS OF LUMBAR REGION WITHOUT MYELOPATHY OR RADICULOPATHY: ICD-10-CM

## 2023-08-08 RX ORDER — METHOCARBAMOL 500 MG/1
TABLET, FILM COATED ORAL
Qty: 120 TABLET | Refills: 0 | Status: SHIPPED | OUTPATIENT
Start: 2023-08-08 | End: 2023-09-08

## 2023-08-08 RX ORDER — PREGABALIN 150 MG/1
CAPSULE ORAL
Qty: 90 CAPSULE | Refills: 0 | Status: SHIPPED | OUTPATIENT
Start: 2023-08-08 | End: 2023-09-08

## 2023-08-08 NOTE — TELEPHONE ENCOUNTER
Refill request    Medication: methocarbamol (ROBAXIN) 500 MG tablet     Sig: TAKE ONE TABLET BY MOUTH FOUR TIMES DAILY AS NEEDED FOR MUSCLE SPASM     Dispensed: 120  Refills: 0  SOLD to the pt on: 7/12/23- called pharmacy to verify date.     Medication: pregabalin (LYRICA) 150 MG capsule     Sig: TAKE ONE CAPSULE BY MOUTH THREE TIMES DAILY     Dispensed: 90  Refills: 4  SOLD to the pt on: 7/12/23- called pharmacy to verify date.     Last clinic appointment: 3/23/23  Next clinic appointment: 8/10/23      Preferred pharmacy:    [id number] - [address]      Refill request routed to the provider to review.     Dasia Hi LPN

## 2023-08-10 ENCOUNTER — OFFICE VISIT (OUTPATIENT)
Dept: ANESTHESIOLOGY | Facility: CLINIC | Age: 43
End: 2023-08-10
Payer: COMMERCIAL

## 2023-08-10 VITALS
DIASTOLIC BLOOD PRESSURE: 116 MMHG | SYSTOLIC BLOOD PRESSURE: 167 MMHG | HEIGHT: 68 IN | BODY MASS INDEX: 40.01 KG/M2 | HEART RATE: 102 BPM | OXYGEN SATURATION: 100 % | WEIGHT: 264 LBS

## 2023-08-10 DIAGNOSIS — M79.7 FIBROMYALGIA AFFECTING HAND: Primary | ICD-10-CM

## 2023-08-10 DIAGNOSIS — G90.511 COMPLEX REGIONAL PAIN SYNDROME TYPE 1 OF RIGHT UPPER EXTREMITY: ICD-10-CM

## 2023-08-10 DIAGNOSIS — M79.643 CHRONIC HAND PAIN, UNSPECIFIED LATERALITY: ICD-10-CM

## 2023-08-10 DIAGNOSIS — G89.29 CHRONIC HAND PAIN, UNSPECIFIED LATERALITY: ICD-10-CM

## 2023-08-10 DIAGNOSIS — G89.29 CHRONIC LEFT HIP PAIN: ICD-10-CM

## 2023-08-10 DIAGNOSIS — M79.7 FIBROMYALGIA AFFECTING MULTIPLE SITES: ICD-10-CM

## 2023-08-10 DIAGNOSIS — M25.552 CHRONIC LEFT HIP PAIN: ICD-10-CM

## 2023-08-10 PROCEDURE — 99214 OFFICE O/P EST MOD 30 MIN: CPT | Performed by: ANESTHESIOLOGY

## 2023-08-10 RX ORDER — AMITRIPTYLINE HYDROCHLORIDE 10 MG/1
10 TABLET ORAL AT BEDTIME
Qty: 30 TABLET | Refills: 1 | Status: SHIPPED | OUTPATIENT
Start: 2023-08-10 | End: 2023-12-29

## 2023-08-10 ASSESSMENT — PAIN SCALES - GENERAL: PAINLEVEL: SEVERE PAIN (7)
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
## 2023-08-10 NOTE — PATIENT INSTRUCTIONS
Medications:    amitriptyline (ELAVIL) 10 MG tablet - Take 1 tablet (10 mg) by mouth At Bedtime     Please provide the clinic with a minium of 1 week notice, on all prescription refills.       Referrals:    Scooter DME referral placed with PT    Physical Therapy Referral placed-   If you have not heard from the scheduling office within 2 business days, please call [phone] for all locations       Imaging:    IMAGING SERVICES HOURS:    All imaging modalities are available from 7 a.m. - 9 p.m. Monday through Friday  X-ray, CT, MRI, and General Ultrasound appointments are available from 7 a.m. -3:30 p.m. on Saturdays  X-ray, CT and MRI appointments are available from 8 a.m. - 4:30 p.m. on Sundays  Please call [phone] to schedule imaging exams       Recommended Follow up:      Follow up in 6 months.       To speak with a nurse, schedule/reschedule/cancel a clinic appointment, or request a medication refill call: [phone].    You can also reach us by [email or web address]: [email or web address]

## 2023-08-10 NOTE — NURSING NOTE
Patient presents with:  Follow Up: Follow-up lower back, Neck, Hands         Severe Pain (7)     Pain Medications       Salicylates Refills Start End     aspirin (ASA) 325 MG tablet    3 3/30/2021     Sig - Route: Take 1 tablet (325 mg) by mouth daily - Oral    Class: E-Prescribe    Notes to Pharmacy: Please start taking 7 days prior to angiogram procedure            What medications are you using for pain? Pregabalin, Medical Cannabis, Methocarbamol    (New patients only) Have you been seen by another pain clinic/ provider? no    (Return Patients only) What refills are you needing today? no
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
## 2023-09-06 DIAGNOSIS — M79.18 MYOFASCIAL PAIN: ICD-10-CM

## 2023-09-06 DIAGNOSIS — G89.29 CHRONIC SACROILIAC JOINT PAIN: ICD-10-CM

## 2023-09-06 DIAGNOSIS — M53.3 CHRONIC SACROILIAC JOINT PAIN: ICD-10-CM

## 2023-09-06 DIAGNOSIS — M54.2 CERVICALGIA: ICD-10-CM

## 2023-09-06 DIAGNOSIS — M47.816 SPONDYLOSIS OF LUMBAR REGION WITHOUT MYELOPATHY OR RADICULOPATHY: ICD-10-CM

## 2023-09-06 DIAGNOSIS — M79.7 FIBROMYALGIA AFFECTING HAND: ICD-10-CM

## 2023-09-07 DIAGNOSIS — J45.901 MODERATE ASTHMA WITH ACUTE EXACERBATION, UNSPECIFIED WHETHER PERSISTENT: ICD-10-CM

## 2023-09-07 NOTE — TELEPHONE ENCOUNTER
Refill request for 2 medications.    Medication: pregabalin (LYRICA) 150 MG capsule     Sig: TAKE ONE CAPSULE BY MOUTH THREE TIMES DAILY     Dispensed: 90  Refills: 0  Last prescribed on 8/8/823        Medication: methocarbamol (ROBAXIN) 500 MG tablet     Sig: TAKE ONE TABLET BY MOUTH FOUR TIMES DAILY AS NEEDED FOR MUSCLE SPASM     Dispensed: 120  Refills: 0  Last prescribed on 8/8/23        Last clinic appointment: 8/10/23  Next clinic appointment: Not Scheduled.     Preferred pharmacy:    [facility][id number] - [facility] [address] [phone]

## 2023-09-08 RX ORDER — METHOCARBAMOL 500 MG/1
TABLET, FILM COATED ORAL
Qty: 120 TABLET | Refills: 0 | Status: SHIPPED | OUTPATIENT
Start: 2023-09-08 | End: 2023-10-10

## 2023-09-08 RX ORDER — PREGABALIN 150 MG/1
CAPSULE ORAL
Qty: 90 CAPSULE | Refills: 0 | Status: SHIPPED | OUTPATIENT
Start: 2023-09-08 | End: 2023-10-10

## 2023-10-07 DIAGNOSIS — M47.816 SPONDYLOSIS OF LUMBAR REGION WITHOUT MYELOPATHY OR RADICULOPATHY: ICD-10-CM

## 2023-10-07 DIAGNOSIS — M79.7 FIBROMYALGIA AFFECTING HAND: ICD-10-CM

## 2023-10-07 DIAGNOSIS — M53.3 CHRONIC SACROILIAC JOINT PAIN: ICD-10-CM

## 2023-10-07 DIAGNOSIS — G89.29 CHRONIC SACROILIAC JOINT PAIN: ICD-10-CM

## 2023-10-07 DIAGNOSIS — M54.2 CERVICALGIA: ICD-10-CM

## 2023-10-07 DIAGNOSIS — M79.18 MYOFASCIAL PAIN: ICD-10-CM

## 2023-10-09 NOTE — TELEPHONE ENCOUNTER
Refill request    Medication: pregabalin (LYRICA) 150 MG capsule, methocarbamol (ROBAXIN) 500 MG tablet     Sig: TAKE ONE CAPSULE BY MOUTH THREE TIMES DAILY (lyrica), TAKE ONE TABLET BY MOUTH FOUR TIMES DAILY AS NEEDED FOR MUSCLE SPASM (robaxin)    Dispensed: 90 (lyrica), 120 (robaxin)  Refills: 0   SOLD to the pt on: 9/10/23     Last clinic appointment: 8/10/23  Next clinic appointment: not scheduled     Last Drug Screen Collected: not on file  Controlled Substance Agreement signed: not on file       Preferred pharmacy:    [facility] #[id number] - [facility] [address]       Refill request routed to the provider to review.

## 2023-10-10 RX ORDER — METHOCARBAMOL 500 MG/1
TABLET, FILM COATED ORAL
Qty: 120 TABLET | Refills: 0 | Status: SHIPPED | OUTPATIENT
Start: 2023-10-10 | End: 2023-11-14

## 2023-10-10 RX ORDER — PREGABALIN 150 MG/1
CAPSULE ORAL
Qty: 90 CAPSULE | Refills: 0 | Status: SHIPPED | OUTPATIENT
Start: 2023-10-10 | End: 2023-11-14

## 2023-11-13 DIAGNOSIS — M79.18 MYOFASCIAL PAIN: ICD-10-CM

## 2023-11-13 DIAGNOSIS — G89.29 CHRONIC SACROILIAC JOINT PAIN: ICD-10-CM

## 2023-11-13 DIAGNOSIS — M53.3 CHRONIC SACROILIAC JOINT PAIN: ICD-10-CM

## 2023-11-13 DIAGNOSIS — M47.816 SPONDYLOSIS OF LUMBAR REGION WITHOUT MYELOPATHY OR RADICULOPATHY: ICD-10-CM

## 2023-11-13 DIAGNOSIS — M79.7 FIBROMYALGIA AFFECTING HAND: ICD-10-CM

## 2023-11-13 DIAGNOSIS — M54.2 CERVICALGIA: ICD-10-CM

## 2023-11-13 NOTE — TELEPHONE ENCOUNTER
Refill request    Medication: pregabalin (LYRICA) 150 MG capsule,  methocarbamol (ROBAXIN) 500 MG tablet     Sig: TAKE ONE CAPSULE BY MOUTH THREE TIMES DAILY (pregabalin), TAKE ONE TABLET BY MOUTH FOUR TIMES DAILY AS NEEDED FOR MUSCLE SPASM (methocarbamol)    Dispensed: 90 (pregabalin), 120 (methocarbamol)  Refills: 0, 0  SOLD to the pt on: 10/11/23     Last clinic appointment: 8/10/23  Next clinic appointment: not scheduled     Last Drug Screen Collected: not on file  Controlled Substance Agreement signed: not on file       Preferred pharmacy:    [facility] #[id number] - [facility] [address] [place] [address]         Refill request routed to the provider to review.

## 2023-11-14 RX ORDER — PREGABALIN 150 MG/1
CAPSULE ORAL
Qty: 90 CAPSULE | Refills: 0 | Status: SHIPPED | OUTPATIENT
Start: 2023-11-14 | End: 2023-12-12

## 2023-11-14 RX ORDER — METHOCARBAMOL 500 MG/1
TABLET, FILM COATED ORAL
Qty: 120 TABLET | Refills: 0 | Status: SHIPPED | OUTPATIENT
Start: 2023-11-14 | End: 2023-12-12

## 2023-12-12 DIAGNOSIS — M79.7 FIBROMYALGIA AFFECTING HAND: ICD-10-CM

## 2023-12-12 DIAGNOSIS — M54.2 CERVICALGIA: ICD-10-CM

## 2023-12-12 DIAGNOSIS — M79.18 MYOFASCIAL PAIN: ICD-10-CM

## 2023-12-12 DIAGNOSIS — M47.816 SPONDYLOSIS OF LUMBAR REGION WITHOUT MYELOPATHY OR RADICULOPATHY: ICD-10-CM

## 2023-12-12 DIAGNOSIS — G89.29 CHRONIC SACROILIAC JOINT PAIN: ICD-10-CM

## 2023-12-12 DIAGNOSIS — M53.3 CHRONIC SACROILIAC JOINT PAIN: ICD-10-CM

## 2023-12-12 RX ORDER — METHOCARBAMOL 500 MG/1
TABLET, FILM COATED ORAL
Qty: 120 TABLET | Refills: 0 | Status: SHIPPED | OUTPATIENT
Start: 2023-12-12 | End: 2024-01-15

## 2023-12-12 RX ORDER — PREGABALIN 150 MG/1
CAPSULE ORAL
Qty: 90 CAPSULE | Refills: 0 | Status: SHIPPED | OUTPATIENT
Start: 2023-12-12 | End: 2024-01-15

## 2023-12-12 NOTE — TELEPHONE ENCOUNTER
Refill request    Medication: methocarbamol (ROBAXIN) 500 MG tablet, pregabalin (LYRICA) 150 MG capsule     Sig: TAKE ONE TABLET BY MOUTH FOUR TIMES DAILY AS NEEDED FOR MUSCLE SPASM (robaxin),  TAKE ONE CAPSULE BY MOUTH THREE TIMES DAILY (pregab)    Dispensed: 120 (robaxin), 90 (pregab)  Refills: 0  SOLD to the pt on: 11/14/23     Last clinic appointment: 8/10/23  Next clinic appointment: 1/4/24    Last Drug Screen Collected: not on file  Controlled Substance Agreement signed: not on file       Preferred pharmacy:    [facility] #[id number] - [facility] [address]         Refill request routed to the provider to review.

## 2023-12-28 DIAGNOSIS — M79.7 FIBROMYALGIA AFFECTING MULTIPLE SITES: ICD-10-CM

## 2023-12-28 NOTE — TELEPHONE ENCOUNTER
Refill request    Medication: amitriptyline (ELAVIL) 10 MG tablet     Sig: Take 1 tablet (10 mg) by mouth At Bedtime - Oral     Dispensed: 30  Refills: 1  SOLD to the pt on: 8/10/23     Last clinic appointment: 8/10/23  Next clinic appointment: 1/4/24    Last Drug Screen Collected: not on file   Controlled Substance Agreement signed: not on file      Preferred pharmacy:      [facility] #[id number] [address]         Refill request routed to the provider to review.

## 2023-12-29 RX ORDER — AMITRIPTYLINE HYDROCHLORIDE 10 MG/1
10 TABLET ORAL AT BEDTIME
Qty: 30 TABLET | Refills: 0 | Status: SHIPPED | OUTPATIENT
Start: 2023-12-29 | End: 2024-01-26

## 2024-01-12 DIAGNOSIS — M79.18 MYOFASCIAL PAIN: ICD-10-CM

## 2024-01-12 DIAGNOSIS — G89.29 CHRONIC SACROILIAC JOINT PAIN: ICD-10-CM

## 2024-01-12 DIAGNOSIS — M54.2 CERVICALGIA: ICD-10-CM

## 2024-01-12 DIAGNOSIS — M47.816 SPONDYLOSIS OF LUMBAR REGION WITHOUT MYELOPATHY OR RADICULOPATHY: ICD-10-CM

## 2024-01-12 DIAGNOSIS — M79.7 FIBROMYALGIA AFFECTING HAND: ICD-10-CM

## 2024-01-12 DIAGNOSIS — M53.3 CHRONIC SACROILIAC JOINT PAIN: ICD-10-CM

## 2024-01-12 NOTE — TELEPHONE ENCOUNTER
Refill request    Medication: methocarbamol (ROBAXIN) 500 MG, tablet pregabalin (LYRICA) 150 MG capsule     Sig: TAKE ONE TABLET BY MOUTH FOUR TIMES DAILY AS NEEDED FOR MUSCLE SPASM (robaxin), TAKE ONE CAPSULE BY MOUTH THREE TIMES DAILY (lyrica)    Dispensed: 120 (robaxin), 90 (lyrica)   Refills: 0  SOLD to the pt on: 12/12/23     Last clinic appointment: 8/10/23  Next clinic appointment: not scheduled     Last Drug Screen Collected: not on file  Controlled Substance Agreement signed: not on file       Preferred pharmacy:      [facility] #[id number] - [facility] [address] [address] [facility]         Refill request routed to the provider to review.

## 2024-01-15 RX ORDER — PREGABALIN 150 MG/1
CAPSULE ORAL
Qty: 90 CAPSULE | Refills: 0 | Status: SHIPPED | OUTPATIENT
Start: 2024-01-15 | End: 2024-02-08

## 2024-01-15 RX ORDER — METHOCARBAMOL 500 MG/1
TABLET, FILM COATED ORAL
Qty: 120 TABLET | Refills: 0 | Status: SHIPPED | OUTPATIENT
Start: 2024-01-15 | End: 2024-02-08

## 2024-01-22 NOTE — PROGRESS NOTES
Gynecologic Postoperative Check Note  9/1/2020    S: Pt is doing ok though very sleepy. Needed IV pain medication in PACU. Is tolerating clear liquids but hasn't eaten anything yet. Has not yet voided though tried to go in commode a bit ago. Discussed needing to try again within the hour. Pain tolerable at this time, somewhat nauseated    O:  Vitals:    09/01/20 1615 09/01/20 1637 09/01/20 1650 09/01/20 1705   BP: (!) 155/102 (!) 166/90 (!) 166/86 (!) 163/88   BP Location:  Right arm  Right arm   Cuff Size:  Adult Large  Adult Large   Pulse: 82 80 82    Resp:  14 16 18   Temp:  99.3  F (37.4  C)     TempSrc:  Oral     SpO2: 95% 94% 94% 94%   Weight:  96 kg (211 lb 11.2 oz)     Height:           Gen: In no distress, very sleepy but responds to questioning  Cardio: regular rate and rhythm  Resp: nonlabored breathing, 94% on RA, capnography in place  Abdomen: soft, appropriately tender, nondistended, 4 incisions covered with steristrips and bandaids, no shadowing  Extremities: Non-tender, trace edema bilaterally, SCDs in place though not turned on     A: 40 year old POD#0 s/p da fatuma assisted total laparoscopic hysterectomy, left oophorectomy, cystoscopy. Uncomplicated procedure with EBL 20mL. Doing ok in early postoperative period. Await spontaneous void.    Dz: Endometriosis, left ovarian cyst  FEN: Advance as tolerated  CV: s/p labetalol in PACU due to hypertension. Patient does not take antihypertensives, will continue to monitor. Recommend primary care follow up   Pain: s/p TAP and toradol; PRN oxycodone,Tylenol, flexeril  - s/p Gastric band procedure, avoid longterm NSAIDs, would be ok with 24hr toradol if pain difficult to control overnight  GI: PRN antiemetics and stool softeners  : f/p ventura, await spontaneous void. Discussed with nurse and patient need for attempt in next hour. Please page gyn team if unable to void. Bladder scan as well.     Dispo: To home when meeting postoperative goals    Siomara  MD Frida  Obstetrics and Gyncology, PGY-4  2020 , 5:17 PM   [phone] 6pm-6:30AM, ragini  [phone] 6:30AM-6PM     Quality 130: Documentation Of Current Medications In The Medical Record: Current Medications Documented Detail Level: Detailed Quality 394c: Hpv Vaccine For Adolescents: Patient has had at least two HPV vaccines (with at least 146 days between the two) OR three HPV vaccines on or between the patient’s 9th and 13th birthdays. Quality 431: Preventive Care And Screening: Unhealthy Alcohol Use - Screening: Patient not identified as an unhealthy alcohol user when screened for unhealthy alcohol use using a systematic screening method Quality 394a: Meningococcal Immunizations For Adolescents: Patient had one dose of meningococcal vaccine (serogroups A, C, W, Y) on or between the patient's 11th and 13th birthdays. Quality 110: Preventive Care And Screening: Influenza Immunization: Influenza Immunization not Administered because Patient Refused. Quality 47: Advance Care Plan: Advance Care Planning discussed and documented in the medical record; patient did not wish or was not able to name a surrogate decision maker or provide an advance care plan. Quality 226: Preventive Care And Screening: Tobacco Use: Screening And Cessation Intervention: Patient screened for tobacco use and is an ex/non-smoker Quality 394b: Td/Tdap Immunizations For Adolescents: Patient had one tetanus, diphtheria toxoids and acellular pertussis vaccine (Tdap) on or between the patient's 10th and 13th birthdays.

## 2024-01-24 DIAGNOSIS — M79.7 FIBROMYALGIA AFFECTING MULTIPLE SITES: ICD-10-CM

## 2024-01-26 RX ORDER — AMITRIPTYLINE HYDROCHLORIDE 10 MG/1
10 TABLET ORAL AT BEDTIME
Qty: 30 TABLET | Refills: 0 | Status: SHIPPED | OUTPATIENT
Start: 2024-01-26 | End: 2024-02-19

## 2024-01-26 NOTE — TELEPHONE ENCOUNTER
Refill request    Medication: amitriptyline (ELAVIL) 10 MG tablet     Sig: Take 1 tablet (10 mg) by mouth At Bedtime - Oral     Dispensed: 30  Refills: 0  SOLD to the pt on: 12/29/23     Last clinic appointment: 8/10/23  Next clinic appointment: not scheduled    Last Drug Screen Collected: not on file  Controlled Substance Agreement signed: not on file      Preferred pharmacy:    [facility][id number] [address]           Refill request routed to the provider to review.

## 2024-02-08 DIAGNOSIS — M79.18 MYOFASCIAL PAIN: ICD-10-CM

## 2024-02-08 DIAGNOSIS — M53.3 CHRONIC SACROILIAC JOINT PAIN: ICD-10-CM

## 2024-02-08 DIAGNOSIS — M54.2 CERVICALGIA: ICD-10-CM

## 2024-02-08 DIAGNOSIS — G89.29 CHRONIC SACROILIAC JOINT PAIN: ICD-10-CM

## 2024-02-08 DIAGNOSIS — M79.7 FIBROMYALGIA AFFECTING HAND: ICD-10-CM

## 2024-02-08 DIAGNOSIS — M47.816 SPONDYLOSIS OF LUMBAR REGION WITHOUT MYELOPATHY OR RADICULOPATHY: ICD-10-CM

## 2024-02-08 RX ORDER — METHOCARBAMOL 500 MG/1
TABLET, FILM COATED ORAL
Qty: 120 TABLET | Refills: 0 | Status: SHIPPED | OUTPATIENT
Start: 2024-02-08 | End: 2024-03-15

## 2024-02-08 RX ORDER — PREGABALIN 150 MG/1
CAPSULE ORAL
Qty: 90 CAPSULE | Refills: 0 | Status: SHIPPED | OUTPATIENT
Start: 2024-02-08 | End: 2024-03-15

## 2024-02-08 NOTE — TELEPHONE ENCOUNTER
Refill request    Medication: methocarbamol (ROBAXIN) 500 MG tablet, pregabalin (LYRICA) 150 MG capsule     Sig: TAKE ONE TABLET BY MOUTH FOUR TIMES DAILY AS NEEDED FOR MUSCLE SPASM- robaxin, TAKE ONE CAPSULE BY MOUTH THREE TIMES DAILY- pregabalin    Dispensed: 120- robaxin, 90- pregabalin  Refills: 0  SOLD to the pt on: 1/15/24     Last clinic appointment: 8/10/23  Next clinic appointment: not scheduled     Last Drug Screen Collected: not on file  Controlled Substance Agreement signed: not on file      Preferred pharmacy:    [facility] #[id number] - [facility] [address]         Refill request routed to the provider to review.

## 2024-02-17 DIAGNOSIS — M79.7 FIBROMYALGIA AFFECTING MULTIPLE SITES: ICD-10-CM

## 2024-02-19 RX ORDER — AMITRIPTYLINE HYDROCHLORIDE 10 MG/1
10 TABLET ORAL
Qty: 30 TABLET | Refills: 0 | Status: SHIPPED | OUTPATIENT
Start: 2024-02-19 | End: 2024-04-25

## 2024-02-19 NOTE — CONFIDENTIAL NOTE
Refill request    Medication: amitriptyline (ELAVIL) 10 MG tablet     Sig: Take 1 tablet (10 mg) by mouth At Bedtime     Dispensed: 30 tablets  Refills: 0    Last prescribed to patient: 1/26/24    Last clinic appointment: 8/10/23  Next clinic appointment: Not scheduled    Last Drug Screen Collected: Not on file  Controlled Substance Agreement signed: Not on file      Preferred pharmacy:    [facility] #[id number] [address]         Refill request routed to the provider to review.     Ximena Bradford RN

## 2024-02-20 NOTE — TELEPHONE ENCOUNTER
Chart reviewed - Patient of Dr. Scott Morgan. Request appears appropriate. Refilled for 30 day supply.     Celina Sharp, PILY, APRN, AGNP-C  [facility]

## 2024-03-13 DIAGNOSIS — M53.3 CHRONIC SACROILIAC JOINT PAIN: ICD-10-CM

## 2024-03-13 DIAGNOSIS — M54.2 CERVICALGIA: ICD-10-CM

## 2024-03-13 DIAGNOSIS — M79.7 FIBROMYALGIA AFFECTING HAND: ICD-10-CM

## 2024-03-13 DIAGNOSIS — G89.29 CHRONIC SACROILIAC JOINT PAIN: ICD-10-CM

## 2024-03-13 DIAGNOSIS — M47.816 SPONDYLOSIS OF LUMBAR REGION WITHOUT MYELOPATHY OR RADICULOPATHY: ICD-10-CM

## 2024-03-13 DIAGNOSIS — M79.18 MYOFASCIAL PAIN: ICD-10-CM

## 2024-03-15 RX ORDER — METHOCARBAMOL 500 MG/1
TABLET, FILM COATED ORAL
Qty: 120 TABLET | Refills: 0 | Status: SHIPPED | OUTPATIENT
Start: 2024-03-15 | End: 2024-04-23

## 2024-03-15 RX ORDER — PREGABALIN 150 MG/1
CAPSULE ORAL
Qty: 90 CAPSULE | Refills: 0 | Status: SHIPPED | OUTPATIENT
Start: 2024-03-15 | End: 2024-04-23

## 2024-03-15 NOTE — TELEPHONE ENCOUNTER
Refill request for 2 medications.         Medication: methocarbamol (ROBAXIN) 500 MG tablet     Sig: TAKE ONE TABLET BY MOUTH FOUR TIMES DAILY AS NEEDED FOR MUSCLE SPASM     Dispensed: 120  Refills: 0    Last prescribed to patient: 2/8/24     Medication: pregabalin (LYRICA) 150 MG capsule     Sig: TAKE ONE CAPSULE BY MOUTH THREE TIMES DAILY     Dispensed: 90  Refills: 0    Last prescribed to patient: 2/8/24     Last clinic appointment: 8/10/23  Next clinic appointment: Not scheduled.       Preferred pharmacy:    [facility] #[id number] [address] [phone]     Refill request routed to the provider to review.     Dasia Hi LPN

## 2024-03-24 ENCOUNTER — HEALTH MAINTENANCE LETTER (OUTPATIENT)
Age: 44
End: 2024-03-24

## 2024-04-18 DIAGNOSIS — M79.18 MYOFASCIAL PAIN: ICD-10-CM

## 2024-04-18 DIAGNOSIS — M79.7 FIBROMYALGIA AFFECTING MULTIPLE SITES: ICD-10-CM

## 2024-04-18 DIAGNOSIS — M47.816 SPONDYLOSIS OF LUMBAR REGION WITHOUT MYELOPATHY OR RADICULOPATHY: ICD-10-CM

## 2024-04-18 DIAGNOSIS — G89.29 CHRONIC SACROILIAC JOINT PAIN: ICD-10-CM

## 2024-04-18 DIAGNOSIS — M79.7 FIBROMYALGIA AFFECTING HAND: ICD-10-CM

## 2024-04-18 DIAGNOSIS — M54.2 CERVICALGIA: ICD-10-CM

## 2024-04-18 DIAGNOSIS — M53.3 CHRONIC SACROILIAC JOINT PAIN: ICD-10-CM

## 2024-04-19 NOTE — TELEPHONE ENCOUNTER
Refill request    Medication: amitriptyline (ELAVIL) 10 MG tablet     Sig: TAKE ONE TABLET BY MOUTH AT BEDTIME     Dispensed: 30  Refills: 0    Last prescribed to patient: 2/19/24 (Non controlled substance)    Last clinic appointment: 8/10/23  Next clinic appointment: Not scheduled.     Preferred pharmacy:    [facility] #[id number] [address] [phone]     Refill request routed to the provider to review.     Dasia Hi LPN

## 2024-04-19 NOTE — TELEPHONE ENCOUNTER
Refill request for 2 medications.     Medication: methocarbamol (ROBAXIN) 500 MG tablet     Sig: TAKE ONE TABLET BY MOUTH FOUR TIMES DAILY AS NEEDED FOR MUSCLE SPASM     Dispensed: 120  Refills: 0    Last prescribed to patient: 3/15/24     Medication: pregabalin (LYRICA) 150 MG capsule     Sig: TAKE ONE CAPSULE BY MOUTH THREE TIMES DAILY     Dispensed: 90  Refills: 0    Last prescribed to patient: 3/15/24    Last clinic appointment: 8/10/23  Next clinic appointment: Not scheduled.       Preferred pharmacy:    [facility] #[id number] [address] [phone]     Refill request routed to the provider to review.     Dasia Hi LPN

## 2024-04-23 RX ORDER — METHOCARBAMOL 500 MG/1
TABLET, FILM COATED ORAL
Qty: 120 TABLET | Refills: 1 | Status: SHIPPED | OUTPATIENT
Start: 2024-04-23 | End: 2024-06-20

## 2024-04-23 RX ORDER — PREGABALIN 150 MG/1
CAPSULE ORAL
Qty: 90 CAPSULE | Refills: 1 | Status: SHIPPED | OUTPATIENT
Start: 2024-04-23 | End: 2024-06-20

## 2024-04-25 RX ORDER — AMITRIPTYLINE HYDROCHLORIDE 10 MG/1
10 TABLET ORAL
Qty: 30 TABLET | Refills: 1 | Status: SHIPPED | OUTPATIENT
Start: 2024-04-25 | End: 2024-07-19

## 2024-04-26 NOTE — PROGRESS NOTES
[facility]  Epilepsy Clinic:  NEW PATIENT EVALUATION         Service Date: 10/30/2019    HISTORY:  Ms. Maryanne Crockett is a 39-year-old, right-handed woman presenting for follow-up for her history of spells.  The patient came alone to the visit today and appeared able to provide detailed medical history.  I reviewed medical records on the [facility] chart and through [facility] and also records of brain MRI and inpatient video EEG monitoring performed in the [facility] system in 2018.     Since the last visit she completed 7 days of monitoring in the epilepsy unit, between 6/26 and 7/2/2019. The EEG was normal during this time, though she did not have any complete spells consisting of amnesia, passing out, or abnormal movements. During what were consistent with her typical auras the EEG was unchanged and normal.     She has felt very fatigued and unmotivated for the past several months. The last time she felt normal was in the hospital during EMU monitoring. She does not attribute these sensations to depression. She is suspicious that her fatigue is secondary to her lamotrigine dose which was increased to 250 BID after her EMU hospitalization, following an up titration schedule. She has been dizzy and unstable on her feet and had a near-fall at the base of stairs but no injuries. She has had double vision for approximately 2-3 months. She denies sadness, depression, thoughts of self-harm, and feels that her current mood is distinct from what she has felt as depression in the past. In an attempt to improve this mood she has been working with her psychiatrist to wean off of many of her psychotropic medications over the past several months. She is now taking only escitalopram and asenapine. She has stopped lithium, Xanax, Wellbutrin and Adderall.     Last convulsion with loss of consciousness occurred immediately prior to the EMU hospitalization, 06/2019. She has had no periods of time-lapse, falls, or loss of  consciousness since then.   Sensation of tingling left fingertips occurred once- no tingling in the lips or feet- this last occurred roughly at the end of September.      Ictal semiology-history:   The patient reported that she has had only one type of recurring seizure in her lifetime, which is a convulsion with loss of consciousness.  She estimates that she has ha about 10 convulsions in her lifetime.  The first of these reportedly occurred in 2018, and the most recent during the last week of 2019, reportedly without more than 1 on a single day and with the distribution usually weeks or a month apart.  These all have arisen during waking, usually with no aura or prodrome.  Several times she has had a feeling of diffuse warmth and tingling numbness around the lips and toes for several minutes before she loses consciousness.  She usually finds that she is down on the floor or ground when she regains awareness, and she feels confused with no memory for the event and generally lethargy.  On several occasions, she found that she had bitten the tip of her tongue while unconscious or had been incontinent of urine.  Friends and passers-by have told her that when she is down, her whole body is shaking or trembling.  A friend told her that one of these lasted about 5 minutes in terms of shaking and unresponsiveness.     The patient does not recognize exacerbating or remitting factors with regard to seizure frequency.      Epilepsy-seizure predispositions:   The patient noted a family history of grand mal seizures in her brother, who had the seizures as a manifestation of a brain tumor in adulthood.      She has no history of gestational or  injury, febrile convulsions, developmental delay, stroke, meningitis, encephalitis, significant head injury, or other epileptic predispositions.  She denied a history of physical or sexual abuse by an adult during her childhood or adulthood.      Laboratory evaluations:  "  The [facility] system medical record indicates that she had a brain MRI performed on 05/12/2018, which was reported as normal except for small subcortical white matter signal changes.      The record also shows that she had 5 days of video-EEG monitoring at [facility], performed 06/05-09/2018, which was interpreted by Dr. Segun Dominguez as showing normal electroencephalographic activities at all times, including during an event with \"slipping off the couch with associated brief amnesia   not an epileptic seizure.\"  She was not reported to have had a generalized convulsion during the recording.     SUMMARY OF 7 DAYS OF VIDEO EEG MONITORING 07/2/2019 Dr. Heraclio Brantley:  A 9-10 Hz posterior dominant rhythm.  No focal slowing.  No epileptiform activity.  The patient experienced several minor spells consisting of lightheadedness, numbness of her legs, her hands and some visual changes.  She felt that some of these sensations occasionally preceded her target spells.  Many of these occurred during hyperventilation and may have been due to hyperventilation.  EEG during these spells was consistently normal with no seizure discharge.  Major spells of collapse, amnesia, impairment, and jerking were not recorded.  Thus, the identity of these major spells is unclear.  The interictal record was entirely normal without epileptiform activity.      Epilepsy therapeutics:   It is not clear to the patient whether she has ever been treated with anti-seizure medications for a diagnosis of epilepsy, but it is clear that she has been on medications that may be useful in neuropathic pain or mood stabilization in bipolar disorder.  She does not remember all of the medications she has received in past years, but the medical record shows that she is currently on gabapentin, which possibly was started for neuropathic pain following a right wrist procedure and lamotrigine, possibly started for mood stabilization.  She has " been on bupropion, but it seems clear that some of her convulsions occurred after she was taken off bupropion.  In the past, she has also been on lithium, and various other psychiatric medications for bipolar disorder.      Other history:   The patient noted that she had symptoms of recurring bill and depression dating back to her teens, although she was not diagnosed with bipolar disorder until her early 20s.  She most recently had an exacerbation of depression in 02/2019, when she was hospitalized with medication adjustment.  She denied having suicidal ideation after that time.  Currently, she stated that she feels that her mood is fairly stable and that she is compliant with the prescribed medications.  Her medications are prescribed by her primary care physician, Dr. Hyde and also by her psychiatrist, Dr. Bethanie Marte.      PAST MEDICAL-SURGICAL HISTORY:    1.  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   2.  Bipolar disorder with recurrent bill and depression.   3.  Chronic obesity; status post laparoscopic banding.   4.  Status post resection of ganglion cyst at the right wrist with subsequent chronic pain locally.   5.  Status post cholecystectomy.     FAMILY HISTORY:  Family history of seizures is as noted above.     PERSONAL AND SOCIAL HISTORY:  The patient grew up in [place].  She graduated from high school in regular classes and has attended 2 years of college courses.  She worked for a number of years as a  for a private company.  She is currently unemployed.  She lives in an apartment in [place], [place] with her 12-year-old daughter and has support in  and other activities from her own mother.  Currently, she does not use tobacco (recently having stopped), ethanol or illicit recreational drugs. She does smoke cannabis regularly.      REVIEW OF SYSTEMS:   She stated that her right wrist discomfort is stable and less severe than it has been in the past.    She feels that her weight is stable, having lost about 100 pounds after laparoscopic banding, but she is planning to have the band removed as was recommended to her sometime in the next month.   She denied history of attention and memory disturbance, difficulties with comprehension and expression, difficulty in solving problems, weakness, tremors or other abnormal involuntary movements, numbness or tingling, incoordination, falling or difficulty in walking, urinary or fecal incontinence, headache and other pain, except as described above.  The patient denied any history of severe depression or suicidal ideation, severe anxiety, panic attacks, hallucinations, delusions, psychosis, substance abuse, or psychiatric hospitalization.  She denied rashes and easy bruising.  The remainder of a 14-system symptom review was negative except as noted above.       MEDICATIONS:  Lamotrigine 250 mg BID, g Gabapentin 900 mg t.i.d., asenapine, and other medications as per the electronic medical record.     PHYSICAL EXAMINATION:    On physical examination the patient appeared well nourished and in no acute distress.  Pulse was 83 and regular.  Blood pressure initially was 153/94, but after approximately 20 minutes, was 138/82.  Respirations were 18 per minute.  Weight was 248 pounds.  Skull was normocephalic and atraumatic.  Neck was supple, without signs of meningeal irritation.      On neurological examination the patient appeared alert and was fully oriented to person, place, time, and reason for visit.  Speech showed normal articulation, fluency, repetitions, naming, syntax and comprehension.  She accurately repeated digits sequences, repeating 8 forward and 5 reversed.  At 10 minutes from presentation, 3/3 words were recalled.  No apraxias or atavistic signs were elicited.  Cranial nerves II through XII were normal.  Muscle  masses, tones and strengths were normal throughout.  There was no pronator drift.  Sequential fine finger movements were performed normally with each hand.  No spontaneous tremors, myoclonus, or other abnormal movements were observed.  Sensations of light touch, pin prick, vibration and proprioception were reportedly normal throughout.  The rapid alternating movements, and finger-nose-finger and heel-shin maneuvers were performed normally bilaterally.  Romberg maneuver was negative.  Regular, heel, toe, tandem and reverse tandem walking were normal.  Deep tendon reflexes were normal and symmetric throughout.  Toes were downgoing bilaterally.      IMPRESSION:    Since her last visit the patient has had an admission with video EEG monitoring which did not capture a target event, but did show a normal EEG interictally and during her targeted auras. MRI brain has been largely normal without a clear seizure focus, and she has active psychiatric comorbidities. In addition to her prior vEEG at [facility] which did capture a spell without EEG correlation, there is predominant evidence to suggest that these spells are non-epileptic though epilepsy cannot be entirely ruled out. We reviwed these findings with her in detail today.    She has been started on lamotrigine, first as a mood stabilizer, and continues at a dose of 250 mg BID, though is concerned that this drug or other centrally-acting drugs are making her fatigued, clumsy, and apathetic. For this reason she has been eliminating her other antidepressant medications including lithium, bupropion, Adderall and alprazolam.     We are concerned that her symptoms remain more consistent with depression, which we reviewed with the patient. She explained that she was not sad, depressed, or suicidal, and continued to receive care regularly from her psychiatrist as recently as last week and who she continues to see regularly.      Regarding her lamotrigine, per the patient's wishes  I agree that it is reasonable to reduce this dose to 200 mg BID.      We reviewed [place] regulations on loss of consciousness and driving with the patient.  She appeared to clearly understand that she is prohibited from operating a motor vehicle within 3 months following any episode with sudden unconsciousness or inability to sit up, and that she is required to report any future such seizure to the [facility] within 30 days after the event.  I also recommended that she and her family review all of her other activities, and avoid any activities that might lead to self-injury or injury of others, within 3 months following any seizure with impaired awareness or impaired motor control.  Such activities include but are not limited to holding babies or young children at heights from which they might be injured if dropped, bathing infants or young children in situations in which they might drown without continuous interactive care by an adult who is fully capable at all times during the bath, operating power cutting or other tools, handling firearms, exposure to heights from which she might fall, exposure to vessels with hot cooking oil or water, and tub-bathing or swimming alone.      PLAN:    1. Lamotrigine level.   2. Reduce dose to Lamotrigine 200 mg b.i.d.   3. RTC 3 months      The patient was seen and discussed with Dr. Schaefer who provided direct counseling.   Richard Nath PGY3 Neurology Resident    Report Prepared By: Richard Nath MD, Neurology Resident   I agree with the findings and plan of care as documented.  I personally examined the patient, and discussed our epilepsy diagnostic impressions and therapeutic recommendations with the patient.  He was agreeable to this plan.      I spent 40 minutes in this patient care, more than 50% of which consisted of counseling and coordinating care.        Walker Schaefer M.D.   Professor of Neurology      MEDICATIONS  (STANDING):  ammonium lactate 12% Lotion 1 Application(s) Topical two times a day  benztropine 1 milliGRAM(s) Oral two times a day  carbidopa/levodopa  25/100 1.5 Tablet(s) Oral <User Schedule>  cholecalciferol 400 Unit(s) Oral daily  divalproex Sprinkle 750 milliGRAM(s) Oral two times a day  fluPHENAZine 5 milliGRAM(s) Oral two times a day  glucagon  Injectable 1 milliGRAM(s) IntraMuscular once  hemorrhoidal Ointment 1 Application(s) Rectal daily  hydrocortisone 2.5% Ointment 1 Application(s) Topical two times a day  insulin lispro (ADMELOG) corrective regimen sliding scale   SubCutaneous three times a day before meals  levothyroxine 75 MICROGram(s) Oral daily  lisinopril 20 milliGRAM(s) Oral daily  metFORMIN 500 milliGRAM(s) Oral two times a day  metoprolol succinate ER 50 milliGRAM(s) Oral daily  multivitamin 1 Tablet(s) Oral at bedtime  polyethylene glycol 3350 17 Gram(s) Oral daily  QUEtiapine 150 milliGRAM(s) Oral two times a day  senna 2 Tablet(s) Oral at bedtime    MEDICATIONS  (PRN):  acetaminophen     Tablet .. 650 milliGRAM(s) Oral every 6 hours PRN Mild Pain (1 - 3), Moderate Pain (4 - 6)  albuterol    90 MICROgram(s) HFA Inhaler 2 Puff(s) Inhalation every 6 hours PRN Shortness of Breath and/or Wheezing  aluminum hydroxide/magnesium hydroxide/simethicone Suspension 30 milliLiter(s) Oral every 6 hours PRN Dyspepsia  bisacodyl Suppository 10 milliGRAM(s) Rectal daily PRN constipation  dextrose Oral Gel 15 Gram(s) Oral once PRN Blood Glucose LESS THAN 70 milliGRAM(s)/deciliter  diphenhydrAMINE Injectable 25 milliGRAM(s) IntraMuscular once PRN agitation  fluPHENAZine 2.5 milliGRAM(s) Oral every 6 hours PRN agitation  fluPHENAZine  Injectable 2 milliGRAM(s) IntraMuscular once PRN agitation  simethicone 80 milliGRAM(s) Chew every 8 hours PRN gas  sodium chloride 0.65% Nasal 2 Spray(s) Both Nostrils every 6 hours PRN congestion

## 2024-06-02 ENCOUNTER — HEALTH MAINTENANCE LETTER (OUTPATIENT)
Age: 44
End: 2024-06-02

## 2024-06-09 ENCOUNTER — APPOINTMENT (OUTPATIENT)
Dept: GENERAL RADIOLOGY | Facility: CLINIC | Age: 44
End: 2024-06-09
Attending: EMERGENCY MEDICINE
Payer: COMMERCIAL

## 2024-06-09 ENCOUNTER — HOSPITAL ENCOUNTER (EMERGENCY)
Facility: CLINIC | Age: 44
Discharge: HOME | End: 2024-06-09
Attending: EMERGENCY MEDICINE | Admitting: EMERGENCY MEDICINE
Payer: COMMERCIAL

## 2024-06-09 VITALS
OXYGEN SATURATION: 98 % | HEIGHT: 69 IN | SYSTOLIC BLOOD PRESSURE: 155 MMHG | BODY MASS INDEX: 38.99 KG/M2 | DIASTOLIC BLOOD PRESSURE: 121 MMHG | HEART RATE: 104 BPM | RESPIRATION RATE: 12 BRPM | TEMPERATURE: 98.6 F

## 2024-06-09 DIAGNOSIS — M54.42 ACUTE LEFT-SIDED LOW BACK PAIN WITH LEFT-SIDED SCIATICA: ICD-10-CM

## 2024-06-09 DIAGNOSIS — M79.671 RIGHT FOOT PAIN: ICD-10-CM
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
## 2024-06-09 LAB
ANION GAP SERPL CALCULATED.3IONS-SCNC: 13 MMOL/L (ref 7–15)
BASOPHILS # BLD AUTO: 0.1 10E3/UL (ref 0–0.2)
BASOPHILS NFR BLD AUTO: 1 %
BUN SERPL-MCNC: 20.5 MG/DL (ref 6–20)
CALCIUM SERPL-MCNC: 10 MG/DL (ref 8.6–10)
CHLORIDE SERPL-SCNC: 102 MMOL/L (ref 98–107)
CREAT SERPL-MCNC: 1.16 MG/DL (ref 0.51–0.95)
DEPRECATED HCO3 PLAS-SCNC: 27 MMOL/L (ref 22–29)
EGFRCR SERPLBLD CKD-EPI 2021: 60 ML/MIN/1.73M2
EOSINOPHIL # BLD AUTO: 0.7 10E3/UL (ref 0–0.7)
EOSINOPHIL NFR BLD AUTO: 7 %
ERYTHROCYTE [DISTWIDTH] IN BLOOD BY AUTOMATED COUNT: 14.3 % (ref 10–15)
ETHANOL SERPL-MCNC: <0.01 G/DL
GLUCOSE SERPL-MCNC: 99 MG/DL (ref 70–99)
HCT VFR BLD AUTO: 42.5 % (ref 35–47)
HGB BLD-MCNC: 13.5 G/DL (ref 11.7–15.7)
IMM GRANULOCYTES # BLD: 0 10E3/UL
IMM GRANULOCYTES NFR BLD: 0 %
LYMPHOCYTES # BLD AUTO: 3.6 10E3/UL (ref 0.8–5.3)
LYMPHOCYTES NFR BLD AUTO: 36 %
MCH RBC QN AUTO: 27.4 PG (ref 26.5–33)
MCHC RBC AUTO-ENTMCNC: 31.8 G/DL (ref 31.5–36.5)
MCV RBC AUTO: 86 FL (ref 78–100)
MONOCYTES # BLD AUTO: 0.6 10E3/UL (ref 0–1.3)
MONOCYTES NFR BLD AUTO: 6 %
NEUTROPHILS # BLD AUTO: 4.9 10E3/UL (ref 1.6–8.3)
NEUTROPHILS NFR BLD AUTO: 50 %
NRBC # BLD AUTO: 0 10E3/UL
NRBC BLD AUTO-RTO: 0 /100
PLATELET # BLD AUTO: 249 10E3/UL (ref 150–450)
POTASSIUM SERPL-SCNC: 4 MMOL/L (ref 3.4–5.3)
RBC # BLD AUTO: 4.92 10E6/UL (ref 3.8–5.2)
SODIUM SERPL-SCNC: 142 MMOL/L (ref 135–145)
WBC # BLD AUTO: 9.9 10E3/UL (ref 4–11)

## 2024-06-09 PROCEDURE — 99285 EMERGENCY DEPT VISIT HI MDM: CPT | Mod: 25

## 2024-06-09 PROCEDURE — 258N000003 HC RX IP 258 OP 636: Mod: JZ | Performed by: EMERGENCY MEDICINE

## 2024-06-09 PROCEDURE — 85025 COMPLETE CBC W/AUTO DIFF WBC: CPT | Performed by: EMERGENCY MEDICINE

## 2024-06-09 PROCEDURE — 36415 COLL VENOUS BLD VENIPUNCTURE: CPT | Performed by: EMERGENCY MEDICINE

## 2024-06-09 PROCEDURE — 73630 X-RAY EXAM OF FOOT: CPT | Mod: RT

## 2024-06-09 PROCEDURE — 96361 HYDRATE IV INFUSION ADD-ON: CPT

## 2024-06-09 PROCEDURE — 80048 BASIC METABOLIC PNL TOTAL CA: CPT | Performed by: EMERGENCY MEDICINE

## 2024-06-09 PROCEDURE — 82077 ASSAY SPEC XCP UR&BREATH IA: CPT | Performed by: EMERGENCY MEDICINE

## 2024-06-09 PROCEDURE — 96374 THER/PROPH/DIAG INJ IV PUSH: CPT

## 2024-06-09 PROCEDURE — 250N000011 HC RX IP 250 OP 636: Mod: JZ | Performed by: EMERGENCY MEDICINE

## 2024-06-09 PROCEDURE — 73502 X-RAY EXAM HIP UNI 2-3 VIEWS: CPT

## 2024-06-09 RX ORDER — ONDANSETRON 2 MG/ML
4 INJECTION INTRAMUSCULAR; INTRAVENOUS ONCE
Status: COMPLETED | OUTPATIENT
Start: 2024-06-09 | End: 2024-06-09

## 2024-06-09 RX ADMIN — ONDANSETRON 4 MG: 2 INJECTION INTRAMUSCULAR; INTRAVENOUS at 07:32

## 2024-06-09 RX ADMIN — SODIUM CHLORIDE 1000 ML: 9 INJECTION, SOLUTION INTRAVENOUS at 07:32

## 2024-06-09 ASSESSMENT — COLUMBIA-SUICIDE SEVERITY RATING SCALE - C-SSRS
2. HAVE YOU ACTUALLY HAD ANY THOUGHTS OF KILLING YOURSELF IN THE PAST MONTH?: NO
1. IN THE PAST MONTH, HAVE YOU WISHED YOU WERE DEAD OR WISHED YOU COULD GO TO SLEEP AND NOT WAKE UP?: NO
6. HAVE YOU EVER DONE ANYTHING, STARTED TO DO ANYTHING, OR PREPARED TO DO ANYTHING TO END YOUR LIFE?: NO

## 2024-06-09 ASSESSMENT — ACTIVITIES OF DAILY LIVING (ADL)
ADLS_ACUITY_SCORE: 38
ADLS_ACUITY_SCORE: 38

## 2024-06-09 NOTE — ED TRIAGE NOTES
Patient states fell in the garage yesterday.  Complaints of left hip pain & back pain.     Triage Assessment (Adult)       Row Name 06/09/24 0607          Triage Assessment    Airway WDL WDL        Respiratory WDL    Respiratory WDL WDL        Cardiac WDL    Cardiac WDL WDL        Peripheral/Neurovascular WDL    Peripheral Neurovascular WDL WDL        Cognitive/Neuro/Behavioral WDL    Cognitive/Neuro/Behavioral WDL WDL

## 2024-06-09 NOTE — ED NOTES
Patient refused exit vitals, refused to  have discharge paper work. Stated that she could get it from my chart. Offered to call Taxi but refused and wanted to use her lift carlos to call for a ride. Patient was wheel chaired to [facility].

## 2024-06-09 NOTE — ED PROVIDER NOTES
Emergency Department Note      History of Present Illness     Chief Complaint  Hip Pain    HPI  Maryanne Crockett is a 43 year old female with history of chronic pain and hypertension who presents to the ED for evaluation of hip pain. The patient reports that she fell in the garage yesterday morning and experienced a gradual onset of pain in the left elbow, left hip, left knee, right foot, and back, as well as nausea. Patient did not hit her head and was able to walk after the fall. She mentions that the knee pain hurts more with pressure. The patient also has pain in the right foot near the arch that is not new but hurts more since the fall. The patient has taken tylenol for the pain with no relief. Denies alcohol use. She also mentions she has a history of arthritis and chronic pain issues. Of note, patient uses medical marijuana and endorses using this earlier today.    Independent Historian  None    Review of External Notes  None  Past Medical History   Medical History and Problem List  Bipolar 1 disorder  Cerebral aneurysm  Ovarian cyst  Reflex sympathetic dystrophy  Seizures   ADD  Complex regional pain syndrome  Cervicalgia  Occipital neuralgia of left side  Obesity  ADHD  Pyelonephritis  E. Coli UTI  Hypokalemia  Depression   Calculus of gallbladder   Ulnar neuropathy   GERD  Abdominal abscess  Anxiety   PONV  Endometriosis  Asthma   Gestational diabetes   Chronic tonsillitis   Hypertension   Degenerative joint disease   Cholelithiasis   Chlamydia trachomatis infection  Hepatitis B  Bulimia nervosa    Medications  Xanax  Elavil  Adderall  Aspirin 325 mg  Klonopin  Catapres  Sinequan   Robaxin  Lopressor  Dulera  Zyprexa  Minipress  Lyrica  Saphris  Zoloft  Wellbutrin  Latuda  Percocet   Albuterol  Flexeril   Zofran   Lamictal   Strattera   Vistaril   Roxicodone   Plavix     Surgical History   Cholecystectomy  Cystoscopy  Hysterectomy, total, robot-assisted, laparoscopic, left oophorectomy  Dilation and  "curettage  Gastric restrictive procedure, open, remove/replace subcutaneous port  Left occipital nerve block under ultrasound guidance and left upper back trigger point injection x3  IR carotid cerebral angiogram bilateral x3  Laparoscopic oophorectomy, right   Removal, gastric band, adjustable, laparoscopic   Laparoscopic salpingectomy, bilateral   Laparoscopic tubal ligation, bilateral   Laparoscopy operative  Panniculectomy  Soft tissue surgery, right hand  Gastric bypass  Tonsillectomy and adenoidectomy   Ovarian cyst removal  Physical Exam   Patient Vitals for the past 24 hrs:   BP Temp Temp src Pulse Resp SpO2 Height   06/09/24 0607 (!) 155/121 98.6  F (37  C) Temporal 104 12 98 % 1.753 m (5' 9\")     Physical Exam  General: Alert and cooperative with exam. Patient in mild distress. Normal mentation.  Obese.  Appears under the influence.  Disorganized/tangential thought process  Head:  Scalp is NC/AT  Eyes:  No scleral icterus, PERRL  ENT:  The external nose and ears are normal. The oropharynx is normal and without erythema; mucus membranes are moist. Uvula midline, no evidence of deep space infection.  Neck:  Normal range of motion without rigidity.  CV:  Regular rate and rhythm    No pathologic murmur   Resp:  Breath sounds are clear bilaterally    Non-labored, no retractions or accessory muscle use  GI:  Abdomen is soft, no distension, no tenderness. No peritoneal signs  MS:  No lower extremity edema.  CMS intact to lower extremities.  Mild tenderness to right foot without overlying skin change.  Tenderness to left lumbar paraspinal area without overlying skin change or midline spinal tenderness  Skin:  Warm and dry, No rash or lesions noted.  Neuro: Oriented x 3. No gross motor deficits.    Diagnostics   Lab Results   Labs Ordered and Resulted from Time of ED Arrival to Time of ED Departure   BASIC METABOLIC PANEL - Abnormal       Result Value    Sodium 142      Potassium 4.0      Chloride 102      Carbon " Dioxide (CO2) 27      Anion Gap 13      Urea Nitrogen 20.5 (*)     Creatinine 1.16 (*)     GFR Estimate 60 (*)     Calcium 10.0      Glucose 99     ETHYL ALCOHOL LEVEL - Normal    Alcohol ethyl <0.01     CBC WITH PLATELETS AND DIFFERENTIAL    WBC Count 9.9      RBC Count 4.92      Hemoglobin 13.5      Hematocrit 42.5      MCV 86      MCH 27.4      MCHC 31.8      RDW 14.3      Platelet Count 249      % Neutrophils 50      % Lymphocytes 36      % Monocytes 6      % Eosinophils 7      % Basophils 1      % Immature Granulocytes 0      NRBCs per 100 WBC 0      Absolute Neutrophils 4.9      Absolute Lymphocytes 3.6      Absolute Monocytes 0.6      Absolute Eosinophils 0.7      Absolute Basophils 0.1      Absolute Immature Granulocytes 0.0      Absolute NRBCs 0.0       Imaging  Foot  XR, G/E 3 views, right   Final Result   IMPRESSION: Normal joint spaces and alignment. No acute displaced fracture identified. No Lisfranc subluxation.         XR Pelvis w Hip Left 1 View   Final Result   IMPRESSION: Left hip joint space is maintained. There is no evidence of an acute fracture or dislocation. Surgical clips in the pelvis and left inguinal region. Mild pubic symphyseal arthrosis.           Independent Interpretation  I independently reviewed the right foot XR: No fracture or dislocation  I independently reviewed the left pelvis with hip XR: No fracture or dislocation  ED Course    Medications Administered  Medications   sodium chloride 0.9% BOLUS 1,000 mL (0 mLs Intravenous Stopped 6/9/24 0846)   ondansetron (ZOFRAN) injection 4 mg (4 mg Intravenous $Given 6/9/24 0732)     Discussion of Management  None    Social Determinants of Health adding to complexity of care  None    ED Course  ED Course as of 06/09/24 2135   Sun Jun 09, 2024   0644 I obtained history and examined the patient as noted above.      Medical Decision Making / Diagnosis   CMS Diagnoses: None    MIPS     None    MDM  Maryanne Crockett is a 43 year old female who  presents for multiple complaints including left low back/hip pain and right foot pain after fall evaluation of back pain that started after a fall yesterday morning.  Patient's pain appears to be musculoskeletal.  No evidence of fracture on imaging as above.  Patient is ambulatory.  No red flag symptoms to suggest CT and/or MRI is indicated at this point.  There is no clinical evidence of cauda equina syndrome, discitis, spinal/epidural space hematoma or epidural abscess or other worrisome etiology. The neurological exam is normal and the patient's symptoms seem consistent with musculoskeletal soft tissue injury/exacerbation of chronic pain.  On initial evaluation patient did appear intoxicated with disorganized thinking and tangential thought process.  Later patient endorsed marijuana use prior to ED evaluation which is likely cause of symptoms.  Patient was allowed to sober in the emergency department and discharged when clinically sober.  She does have a history of bipolar and denies any concerns for acute mental health issue.  No indication for psychiatric hold at this time.  Supportive care for patient's pain was discussed.  Close follow-up with PCP if not improving.  Return precautions discussed.    Disposition  The patient was discharged.     ICD-10 Codes:    ICD-10-CM    1. Acute left-sided low back pain with left-sided sciatica  M54.42       2. Fall, initial encounter  W19.XXXA       3. Right foot pain  M79.671          Discharge Medications  Discharge Medication List as of 6/9/2024  8:49 AM        Scribe Disclosure:  Rcihard SCHULTZ, irving serving as a scribe at 7:26 AM on 6/9/2024 to document services personally performed by Darrian Pires DO based on my observations and the provider's statements to me.     Scribe Disclosure:  PADMA SCHULTZ, irving serving as a scribe  at 8:08 AM on 6/9/2024 to document services personally performed by Darrian Pires DO based on my observations and the  provider's statements to me.      Darrian Pires, DO  06/09/24 3280

## 2024-06-09 NOTE — DISCHARGE INSTRUCTIONS
Recommend icing area of pain 20 minutes, 4 times per day for neck several days  Tylenol as needed for additional pain relief  Over-the-counter lidocaine patches as needed for additional pain relief; use as directed

## 2024-06-20 ENCOUNTER — TELEPHONE (OUTPATIENT)
Dept: ANESTHESIOLOGY | Facility: CLINIC | Age: 44
End: 2024-06-20
Payer: COMMERCIAL

## 2024-06-20 DIAGNOSIS — M54.2 CERVICALGIA: ICD-10-CM

## 2024-06-20 DIAGNOSIS — M79.18 MYOFASCIAL PAIN: ICD-10-CM

## 2024-06-20 DIAGNOSIS — M53.3 CHRONIC SACROILIAC JOINT PAIN: ICD-10-CM

## 2024-06-20 DIAGNOSIS — M79.7 FIBROMYALGIA AFFECTING HAND: ICD-10-CM

## 2024-06-20 DIAGNOSIS — M47.816 SPONDYLOSIS OF LUMBAR REGION WITHOUT MYELOPATHY OR RADICULOPATHY: ICD-10-CM

## 2024-06-20 DIAGNOSIS — G89.29 CHRONIC SACROILIAC JOINT PAIN: ICD-10-CM

## 2024-06-20 RX ORDER — PREGABALIN 150 MG/1
CAPSULE ORAL
Qty: 90 CAPSULE | Refills: 0 | Status: SHIPPED | OUTPATIENT
Start: 2024-06-20 | End: 2024-07-19

## 2024-06-20 RX ORDER — METHOCARBAMOL 500 MG/1
TABLET, FILM COATED ORAL
Qty: 120 TABLET | Refills: 0 | Status: SHIPPED | OUTPATIENT
Start: 2024-06-20 | End: 2024-07-19

## 2024-06-20 NOTE — TELEPHONE ENCOUNTER
Refill request for 2 medications.        Medication: methocarbamol (ROBAXIN) 500 MG tablet     Sig: TAKE ONE TABLET BY MOUTH FOUR TIMES DAILY AS NEEDED FOR MUSCLE SPASM     Dispensed: 120  Refills: 1    Last prescribed to patient: 4/23/24 (Non controlled substance)    Medication: pregabalin (LYRICA) 150 MG capsule     Sig: TAKE ONE CAPSULE BY MOUTH THREE TIMES DAILY     Dispensed: 90  Refills: 1  SOLD to the pt on: 4/23/24, 5/23/24 (Controlled Substance)   LPN CALLED THE PHARMACY TO VERIFY DATES. PHARMACIST CONFIRMED THAT THERE ARE NO REFILLS ON PROFILE.       Last clinic appointment: 8/10/23  Next clinic appointment: Not scheduled. LPN called and left VM for the pt to return call to schedule follow up appointment with Dr. Morgan. IRAMN routed encounter to clinic coordinators to follow up with the pt and assist them to schedule appointment.       Preferred pharmacy:    [id number] [place] [phone]     Refill request routed to the provider to review.     Dasia Hi LPN

## 2024-06-20 NOTE — TELEPHONE ENCOUNTER
Chart reviewed - Patient of Dr. Morgan. Request appears appropriate.  checked, no concerns. Refilled for 30 day supply.     Celina Sharp DNP, APRN, AGNP-C  [facility]

## 2024-06-20 NOTE — TELEPHONE ENCOUNTER
LPN called and left VM for the pt, asking that they call the clinic back to schedule a follow up with Dr. Morgan.     LPN will routed encounter to Clinic coordinators and ask that they assist the pt to schedule a follow up with the provider.     Dasia Hi, JANN

## 2024-06-21 ENCOUNTER — TELEPHONE (OUTPATIENT)
Dept: ANESTHESIOLOGY | Facility: CLINIC | Age: 44
End: 2024-06-21
Payer: COMMERCIAL

## 2024-06-21 NOTE — TELEPHONE ENCOUNTER
Dagoberto Forman,    I was able to schedule this patient for the following appointment:    Date: 7/25  Time: 7 AM  Visit type: Return pain  Provider: Dr. Morgan  [place] [facility] [place]    Please let me know if you need me to do anything further.    Sincerely,  Maryanne Rogers  Clinic Coordinator

## 2024-06-21 NOTE — TELEPHONE ENCOUNTER
Patient confirmed scheduled appointment:     Date: 7/25  Time: 7 AM  Visit type: Return pain  Provider: Dr. Morgan  Location[place] [facility] [place]

## 2024-07-19 DIAGNOSIS — M54.2 CERVICALGIA: ICD-10-CM

## 2024-07-19 DIAGNOSIS — M47.816 SPONDYLOSIS OF LUMBAR REGION WITHOUT MYELOPATHY OR RADICULOPATHY: ICD-10-CM

## 2024-07-19 DIAGNOSIS — G89.29 CHRONIC SACROILIAC JOINT PAIN: ICD-10-CM

## 2024-07-19 DIAGNOSIS — M79.18 MYOFASCIAL PAIN: ICD-10-CM

## 2024-07-19 DIAGNOSIS — M79.7 FIBROMYALGIA AFFECTING HAND: ICD-10-CM

## 2024-07-19 DIAGNOSIS — M53.3 CHRONIC SACROILIAC JOINT PAIN: ICD-10-CM

## 2024-07-19 DIAGNOSIS — M79.7 FIBROMYALGIA AFFECTING MULTIPLE SITES: ICD-10-CM

## 2024-07-19 RX ORDER — AMITRIPTYLINE HYDROCHLORIDE 10 MG/1
10 TABLET ORAL
Qty: 30 TABLET | Refills: 0 | Status: SHIPPED | OUTPATIENT
Start: 2024-07-19 | End: 2024-08-20

## 2024-07-19 RX ORDER — METHOCARBAMOL 500 MG/1
TABLET, FILM COATED ORAL
Qty: 120 TABLET | Refills: 0 | Status: SHIPPED | OUTPATIENT
Start: 2024-07-19 | End: 2024-07-25

## 2024-07-19 RX ORDER — PREGABALIN 150 MG/1
CAPSULE ORAL
Qty: 90 CAPSULE | Refills: 0 | Status: SHIPPED | OUTPATIENT
Start: 2024-07-19 | End: 2024-08-20

## 2024-07-19 NOTE — TELEPHONE ENCOUNTER
Refill request    Medication: amitriptyline (ELAVIL) 10 MG tablet    Sig: TAKE ONE TABLET BY MOUTH AT BEDTIME     Dispensed: 30  Refills: 1    Last prescribed to patient:4/25/24      Last clinic appointment: 8/10/23  Next clinic appointment: 7/25/24      Preferred pharmacy:    [id number] - [address]    Refill request routed to the provider to review.

## 2024-07-19 NOTE — TELEPHONE ENCOUNTER
Refill request    Medication:   methocarbamol (ROBAXIN) 500 MG tablet     pregabalin (LYRICA) 150 MG capsule    Sig:   Methocarbamol-TAKE ONE TABLET BY MOUTH FOUR TIMES DAILY AS NEEDED FOR MUSCLE SPASM    Pregabalin-TAKE ONE CAPSULE BY MOUTH THREE TIMES DAILY     Dispensed:   Methocarbamol-120    Pregabalin-90    Refills:   Methocarbamol-0    Pregabalin-0  SOLD to the pt on:   Pregabalin-6/21/24   Last prescribed to patient:   Methocarbamol- 6/20/24    Last clinic appointment: 8/10/23  Next clinic appointment: 7/25/24    Last Drug Screen Collected: not needed  Controlled Substance Agreement signed: not needed    Preferred pharmacy:    [id number] - [address]    Refill request routed to the provider to review.

## 2024-07-25 ENCOUNTER — TELEPHONE (OUTPATIENT)
Dept: ORTHOPEDICS | Facility: CLINIC | Age: 44
End: 2024-07-25

## 2024-07-25 ENCOUNTER — OFFICE VISIT (OUTPATIENT)
Dept: ANESTHESIOLOGY | Facility: CLINIC | Age: 44
End: 2024-07-25
Payer: COMMERCIAL

## 2024-07-25 ENCOUNTER — OFFICE VISIT (OUTPATIENT)
Dept: ORTHOPEDICS | Facility: CLINIC | Age: 44
End: 2024-07-25
Payer: COMMERCIAL

## 2024-07-25 ENCOUNTER — ANCILLARY PROCEDURE (OUTPATIENT)
Dept: GENERAL RADIOLOGY | Facility: CLINIC | Age: 44
End: 2024-07-25
Attending: STUDENT IN AN ORGANIZED HEALTH CARE EDUCATION/TRAINING PROGRAM
Payer: COMMERCIAL

## 2024-07-25 VITALS
HEART RATE: 83 BPM | RESPIRATION RATE: 16 BRPM | SYSTOLIC BLOOD PRESSURE: 114 MMHG | OXYGEN SATURATION: 98 % | DIASTOLIC BLOOD PRESSURE: 81 MMHG

## 2024-07-25 DIAGNOSIS — M47.816 LUMBAR SPONDYLOSIS: Primary | ICD-10-CM

## 2024-07-25 DIAGNOSIS — M79.672 CHRONIC FOOT PAIN, LEFT: ICD-10-CM

## 2024-07-25 DIAGNOSIS — M79.18 MYOFASCIAL PAIN: ICD-10-CM

## 2024-07-25 DIAGNOSIS — E66.01 MORBID OBESITY (H): ICD-10-CM

## 2024-07-25 DIAGNOSIS — M79.672 LEFT FOOT PAIN: ICD-10-CM

## 2024-07-25 DIAGNOSIS — M79.672 LEFT FOOT PAIN: Primary | ICD-10-CM

## 2024-07-25 DIAGNOSIS — M77.8 EXTENSOR TENDONITIS OF FOOT: Primary | ICD-10-CM

## 2024-07-25 DIAGNOSIS — G89.29 CHRONIC FOOT PAIN, LEFT: ICD-10-CM

## 2024-07-25 LAB — RADIOLOGIST FLAGS: NORMAL

## 2024-07-25 PROCEDURE — 99214 OFFICE O/P EST MOD 30 MIN: CPT | Performed by: ANESTHESIOLOGY

## 2024-07-25 PROCEDURE — 99203 OFFICE O/P NEW LOW 30 MIN: CPT | Performed by: STUDENT IN AN ORGANIZED HEALTH CARE EDUCATION/TRAINING PROGRAM

## 2024-07-25 PROCEDURE — 73630 X-RAY EXAM OF FOOT: CPT | Mod: LT | Performed by: RADIOLOGY

## 2024-07-25 RX ORDER — CYCLOBENZAPRINE HCL 10 MG
10 TABLET ORAL 3 TIMES DAILY PRN
Qty: 90 TABLET | Refills: 0 | Status: SHIPPED | OUTPATIENT
Start: 2024-07-25 | End: 2024-08-20

## 2024-07-25 ASSESSMENT — PAIN SCALES - GENERAL: PAINLEVEL: MODERATE PAIN (4)

## 2024-07-25 NOTE — PROGRESS NOTES
[facility]  Sports Medicine Clinic  [facility]           SUBJECTIVE       Maryanne Crockett is a 43 year old female presenting to clinic today for concerns of left foot pain after wearing high-heeled shoes.  Patient has a history of RSD as well as CRPS.  She did have some swelling in the area.  She is noticing pain on the top of the foot, but no bony tenderness.  She is not having any radicular symptoms.  No numbness and tingling.  No redness or warmth..    PMH, Medications and Allergies were reviewed and updated as needed.    ROS:  As noted above otherwise negative.    Patient Active Problem List   Diagnosis    Nausea & vomiting    Convulsions, unspecified convulsion type (H)    Suicidal ideation    S/P hysterectomy    Attention deficit disorder    Complex regional pain syndrome type 1 of right upper extremity    Cerebral aneurysm, nonruptured    Family history of glioblastoma    Bipolar I disorder (H)    Smoking    Cervicalgia    Occipital neuralgia of left side    Hypertensive urgency    History of intracranial aneurysm    Vomiting, unspecified vomiting type, unspecified whether nausea present    Morbid obesity (H)       Current Outpatient Medications   Medication Sig Dispense Refill    ALPRAZolam (XANAX) 1 MG tablet Take 1 mg by mouth 3 times daily as needed      amitriptyline (ELAVIL) 10 MG tablet TAKE ONE TABLET BY MOUTH AT BEDTIME 30 tablet 0    amphetamine-dextroamphetamine (ADDERALL) 20 MG tablet Take 30 mg by mouth daily      aspirin (ASA) 325 MG tablet Take 1 tablet (325 mg) by mouth daily 90 tablet 3    camphor-menthol (DERMASARRA) 0.5-0.5 % external lotion Apply 1 mL topically every 6 hours as needed for skin care 222 mL 0    clonazePAM (KLONOPIN) 1 MG tablet Take 1 mg by mouth 3 times daily as needed      cloNIDine (CATAPRES) 0.2 MG tablet Take 0.2 mg by mouth 3 times daily      cyclobenzaprine (FLEXERIL) 10 MG tablet Take 1 tablet (10 mg) by mouth 3 times daily as needed for  muscle spasms 90 tablet 0    diclofenac (VOLTAREN) 1 % topical gel Apply 4 g topically 4 times daily as needed for moderate pain 350 g 0    doxepin (SINEQUAN) 10 MG capsule Take 10 mg by mouth At Bedtime      FLUoxetine (PROZAC) 20 MG capsule Take 20 mg by mouth 2 times daily      medical cannabis (Patient's own supply) See Admin Instructions (The purpose of this order is to document that the patient reports taking medical cannabis.  This is not a prescription, and is not used to certify that the patient has a qualifying medical condition.)      metoprolol tartrate (LOPRESSOR) 25 MG tablet Take 1 tablet (25 mg) by mouth 2 times daily 60 tablet 3    mometasone-formoterol (DULERA) 100-5 MCG/ACT inhaler Inhale 2 puffs into the lungs 2 times daily as needed (cough, short of breath) 13 g 6    Multiple Vitamins-Minerals (MULTIVITAMIN ADULT PO) Take 1 tablet by mouth every morning       OLANZapine (ZYPREXA) 2.5 MG tablet Take 2.5 mg by mouth daily as needed (agitation)      prazosin (MINIPRESS) 2 MG capsule Take 2 mg by mouth At Bedtime      pregabalin (LYRICA) 150 MG capsule TAKE ONE CAPSULE BY MOUTH THREE TIMES DAILY 90 capsule 0    SAPHRIS 10 MG SUBL sublingual tablet Place 10 mg under the tongue 2 times daily               OBJECTIVE:       Vitals: There were no vitals filed for this visit.  BMI: There is no height or weight on file to calculate BMI.    Gen:  Well nourished and in no acute distress  HEENT: Extraocular movement intact  Neck: Supple  Pulm:  Breathing Comfortably. No increased respiratory effort.  Psych: Euthymic. Appropriately answers questions    MSK: Left foot without evidence of erythema or warmth.  Tenderness to palpation along the dorsal extensor tendons of the second and third metatarsal region.  This is worsened with second and third toe extension.  Hyperalgesia noticed in the area as well.  Mild fullness, without fluctuance.  No drainage to the top of the foot.  Small abrasion on the medial aspect  of the great toe without erythema or ecchymosis.  No drainage either.      XRAY : Independent evaluation of the left foot is without acute osseous abnormalities.  No substantial degenerative changes seen.          ASSESSMENT and PLAN:     Maryanne was seen today for consult.    Diagnoses and all orders for this visit:    Extensor tendonitis of foot  -     diclofenac (VOLTAREN) 1 % topical gel; Apply 4 g topically 4 times daily as needed for moderate pain      Maryanne is a 43-year-old female with a past medical history as listed above presenting to clinic with left dorsal foot pain.  No specific injury.  Patient was wearing appropriate shoes per her attestation.  This does seem to have resulted in a bit of extensor tendinosis.  The patient has not tried any modalities as of yet.  At this point, I have discussed the etiology with the patient.  I have advised against improper shoe wear.  She is supposed to be on anticoagulation, so oral NSAIDs are to be avoided but she has been taking them intermittently.  Patient also hears to have an allergy to oral ibuprofen.  At this point she is requesting a topical anti-inflammatory.  Because of this and the low systemic bioavailability I have prescribed diclofenac to be used up to 4 times a day as needed.  We have also provided her an Ace wrap.  Have advised against her continuing to keep the foot, but she does find that helpful.  I do think that is exacerbating the symptoms as well.  Return precautions advised.  ER and urgent care precautions discussed.    Options for treatment and/or follow-up care were reviewed with the patient was actively involved in the decision making process. Patient verbalized understanding and was in agreement with the plan.    Jonah West DO  , Sports Medicine  [facility]

## 2024-07-25 NOTE — NURSING NOTE
"Patient presents with:  RECHECK: Follow up pain      Moderate Pain (4)     Pain Medications       Salicylates Refills Start End     aspirin (ASA) 325 MG tablet 3 3/30/2021 --    Sig - Route: Take 1 tablet (325 mg) by mouth daily - Oral    Class: E-Prescribe    Notes to Pharmacy: Please start taking 7 days prior to angiogram procedure            What medications are you using for pain? Topical cream, tylenol,         (Return Patients only) What refills are you needing today? \"Probably\"    Expectations: follow up    Fatou Nails LPN    "

## 2024-07-25 NOTE — TELEPHONE ENCOUNTER
I called the patient and assisted in scheduling a cast tech visit tomorrow 7/26/24 at 11:20 AM for a CAM boot fitting. She was also scheduled for a Follow-up with Dr. West on 8/8/24 at 10 AM. I confirmed the time and location of both visits. All questions were answered at this time. Naz Burton ATC on 7/25/2024 at 3:19 PM
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
## 2024-07-25 NOTE — PATIENT INSTRUCTIONS
Medications:    Stop Methocarbamol.     cyclobenzaprine (FLEXERIL) 10 MG tablet. Take 1 tablet (10 mg) by mouth 3 times daily as needed for muscle spasms      *Please provide the clinic with a minium of 1 week notice, on all prescription refills.       Referrals:    Weight Management Referral placed.    Orthopedic Referral placed.  [facility]  will call you to coordinate your care as prescribed by your provider. A representative will call you within 2 business days to help you schedule your appointment, or you may contact the  Representative at: [phone].      Imaging:    MRI of Lumbar Spine ordered.    IMAGING SERVICES HOURS:    All imaging modalities are available from 7 a.m. - 9 p.m. Monday through Friday  X-ray, CT, MRI, and General Ultrasound appointments are available from 7 a.m. -3:30 p.m. on Saturdays  X-ray, CT and MRI appointments are available from 8 a.m. - 4:30 p.m. on Sundays  Please call [phone] to schedule imaging exams         Recommended Follow up:      Follow up as needed.    To speak with a nurse, schedule/reschedule/cancel a clinic appointment, or request a medication refill call: [phone]    You can also reach us by DealPing: [email or web address]

## 2024-07-25 NOTE — LETTER
7/25/2024      RE: Maryanne Crockett  [address]  [place] [address]     Dear Colleague,    Thank you for referring your patient, Maryanne Crockett, to the [facility] [place]. Please see a copy of my visit note below.    [facility]  Sports Medicine Clinic  Cl[facility] and [facility] Center           SUBJECTIVE       Maryanne Crockett is a 43 year old female presenting to clinic today for concerns of left foot pain after wearing high-heeled shoes.  Patient has a history of RSD as well as CRPS.  She did have some swelling in the area.  She is noticing pain on the top of the foot, but no bony tenderness.  She is not having any radicular symptoms.  No numbness and tingling.  No redness or warmth..    PMH, Medications and Allergies were reviewed and updated as needed.    ROS:  As noted above otherwise negative.    Patient Active Problem List   Diagnosis     Nausea & vomiting     Convulsions, unspecified convulsion type (H)     Suicidal ideation     S/P hysterectomy     Attention deficit disorder     Complex regional pain syndrome type 1 of right upper extremity     Cerebral aneurysm, nonruptured     Family history of glioblastoma     Bipolar I disorder (H)     Smoking     Cervicalgia     Occipital neuralgia of left side     Hypertensive urgency     History of intracranial aneurysm     Vomiting, unspecified vomiting type, unspecified whether nausea present     Morbid obesity (H)       Current Outpatient Medications   Medication Sig Dispense Refill     ALPRAZolam (XANAX) 1 MG tablet Take 1 mg by mouth 3 times daily as needed       amitriptyline (ELAVIL) 10 MG tablet TAKE ONE TABLET BY MOUTH AT BEDTIME 30 tablet 0     amphetamine-dextroamphetamine (ADDERALL) 20 MG tablet Take 30 mg by mouth daily       aspirin (ASA) 325 MG tablet Take 1 tablet (325 mg) by mouth daily 90 tablet 3     camphor-menthol (DERMASARRA) 0.5-0.5 % external lotion Apply 1 mL topically every 6 hours as needed for  skin care 222 mL 0     clonazePAM (KLONOPIN) 1 MG tablet Take 1 mg by mouth 3 times daily as needed       cloNIDine (CATAPRES) 0.2 MG tablet Take 0.2 mg by mouth 3 times daily       cyclobenzaprine (FLEXERIL) 10 MG tablet Take 1 tablet (10 mg) by mouth 3 times daily as needed for muscle spasms 90 tablet 0     diclofenac (VOLTAREN) 1 % topical gel Apply 4 g topically 4 times daily as needed for moderate pain 350 g 0     doxepin (SINEQUAN) 10 MG capsule Take 10 mg by mouth At Bedtime       FLUoxetine (PROZAC) 20 MG capsule Take 20 mg by mouth 2 times daily       medical cannabis (Patient's own supply) See Admin Instructions (The purpose of this order is to document that the patient reports taking medical cannabis.  This is not a prescription, and is not used to certify that the patient has a qualifying medical condition.)       metoprolol tartrate (LOPRESSOR) 25 MG tablet Take 1 tablet (25 mg) by mouth 2 times daily 60 tablet 3     mometasone-formoterol (DULERA) 100-5 MCG/ACT inhaler Inhale 2 puffs into the lungs 2 times daily as needed (cough, short of breath) 13 g 6     Multiple Vitamins-Minerals (MULTIVITAMIN ADULT PO) Take 1 tablet by mouth every morning        OLANZapine (ZYPREXA) 2.5 MG tablet Take 2.5 mg by mouth daily as needed (agitation)       prazosin (MINIPRESS) 2 MG capsule Take 2 mg by mouth At Bedtime       pregabalin (LYRICA) 150 MG capsule TAKE ONE CAPSULE BY MOUTH THREE TIMES DAILY 90 capsule 0     SAPHRIS 10 MG SUBL sublingual tablet Place 10 mg under the tongue 2 times daily               OBJECTIVE:       Vitals: There were no vitals filed for this visit.  BMI: There is no height or weight on file to calculate BMI.    Gen:  Well nourished and in no acute distress  HEENT: Extraocular movement intact  Neck: Supple  Pulm:  Breathing Comfortably. No increased respiratory effort.  Psych: Euthymic. Appropriately answers questions    MSK: Left foot without evidence of erythema or warmth.  Tenderness to  palpation along the dorsal extensor tendons of the second and third metatarsal region.  This is worsened with second and third toe extension.  Hyperalgesia noticed in the area as well.  Mild fullness, without fluctuance.  No drainage to the top of the foot.  Small abrasion on the medial aspect of the great toe without erythema or ecchymosis.  No drainage either.      XRAY : Independent evaluation of the left foot is without acute osseous abnormalities.  No substantial degenerative changes seen.          ASSESSMENT and PLAN:     Maryanne was seen today for consult.    Diagnoses and all orders for this visit:    Extensor tendonitis of foot  -     diclofenac (VOLTAREN) 1 % topical gel; Apply 4 g topically 4 times daily as needed for moderate pain      Maryanne is a 43-year-old female with a past medical history as listed above presenting to clinic with left dorsal foot pain.  No specific injury.  Patient was wearing appropriate shoes per her attestation.  This does seem to have resulted in a bit of extensor tendinosis.  The patient has not tried any modalities as of yet.  At this point, I have discussed the etiology with the patient.  I have advised against improper shoe wear.  She is supposed to be on anticoagulation, so oral NSAIDs are to be avoided but she has been taking them intermittently.  Patient also hears to have an allergy to oral ibuprofen.  At this point she is requesting a topical anti-inflammatory.  Because of this and the low systemic bioavailability I have prescribed diclofenac to be used up to 4 times a day as needed.  We have also provided her an Ace wrap.  Have advised against her continuing to keep the foot, but she does find that helpful.  I do think that is exacerbating the symptoms as well.  Return precautions advised.  ER and urgent care precautions discussed.    Options for treatment and/or follow-up care were reviewed with the patient was actively involved in the decision making process. Patient  verbalized understanding and was in agreement with the plan.    Jonah West DO  , Sports Medicine  Department of [facility]      Again, thank you for allowing me to participate in the care of your patient.      Sincerely,    Jonah West DO
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
## 2024-07-25 NOTE — NURSING NOTE
RN reviewed AVS with patient. Patient to contact clinic if any questions/concerns. Patient verbalized understanding.    Ximena Bradford RN

## 2024-07-25 NOTE — PROGRESS NOTES
Addendum    2:00pm: Pt contacted with prelim read from radiology. Appears to have 4th metatarsal neck fx. She had no injury, and state such again. She was just walking in new heels. Advised patient of findings. Recommended that patient RTC today to be fitted for a walking boot with wbat. Pt declined as she would prefer to come tomorrow given the amount of time she spent at [facility] today. Advised patient to protect her foot at all time wearing stiff soled shoes during any ambulation. Advised her that one of our ATC's will contact the patient to get her scheduled for a boot fitting for tomorrow. I would also like to see her for a follow-up after the boot fitting in roughly 2 weeks to assess how she is doing and to obtain new radiographs.     Return precautions advised. ER/UC precautions dicussed.     Jonah West D.O., CAQSM  , Sports Medicine  Department of Family Medicine and [facility]  [facility]

## 2024-07-26 ENCOUNTER — ALLIED HEALTH/NURSE VISIT (OUTPATIENT)
Dept: ORTHOPEDICS | Facility: CLINIC | Age: 44
End: 2024-07-26
Payer: COMMERCIAL

## 2024-07-26 ENCOUNTER — TELEPHONE (OUTPATIENT)
Dept: ORTHOPEDICS | Facility: CLINIC | Age: 44
End: 2024-07-26

## 2024-07-26 DIAGNOSIS — M79.672 LEFT FOOT PAIN: Primary | ICD-10-CM

## 2024-07-26 PROCEDURE — 99207 PR NO CHARGE LOS: CPT

## 2024-07-26 NOTE — TELEPHONE ENCOUNTER
Other: Patient calling to see if there are any later available times today for her cast nurse appt, please call     Could we send this information to you in [employer] or would you prefer to receive a phone call?:   Patient would prefer a phone call   Okay to leave a detailed message?: Yes at Cell number on file:    Telephone Information:   Mobile [phone]

## 2024-07-31 ENCOUNTER — TELEPHONE (OUTPATIENT)
Dept: ORTHOPEDICS | Facility: CLINIC | Age: 44
End: 2024-07-31
Payer: COMMERCIAL

## 2024-07-31 NOTE — TELEPHONE ENCOUNTER
Other: Maryanne called she would like someone to give her a call concerning questions she has about the boot she is wearing and and also has questions concerning her fracture. Please call as soon as possible      Could we send this information to you in "[employer] or would you prefer to receive a phone call?:   Patient would prefer a phone call   Okay to leave a detailed message?: Yes at Cell number on file:    Telephone Information:   Mobile [phone]

## 2024-08-01 NOTE — TELEPHONE ENCOUNTER
I spoke with the patient and she had questions about wearing the boot and also where her fracture is at in her foot. I let her know that she should be in the boot until her follow up appointment with Dr. West next week. I let her know that she can wear the ace wrap under the boot if she would like but she doesn't have to. We discussed the area of where the fracture is at in her foot. She understood this and had no further questions at the time. I let her know that we can discuss longer term treatment of this at her visit next week but she should remain in the boot until then.     SHARIF Johnston

## 2024-08-02 ENCOUNTER — HOSPITAL ENCOUNTER (OUTPATIENT)
Dept: MRI IMAGING | Facility: CLINIC | Age: 44
Discharge: HOME | End: 2024-08-02
Attending: ANESTHESIOLOGY | Admitting: ANESTHESIOLOGY
Payer: COMMERCIAL

## 2024-08-02 DIAGNOSIS — M47.816 LUMBAR SPONDYLOSIS: ICD-10-CM

## 2024-08-02 PROCEDURE — 72148 MRI LUMBAR SPINE W/O DYE: CPT

## 2024-08-02 PROCEDURE — 72148 MRI LUMBAR SPINE W/O DYE: CPT | Mod: 26 | Performed by: RADIOLOGY

## 2024-08-06 DIAGNOSIS — M79.672 LEFT FOOT PAIN: Primary | ICD-10-CM

## 2024-08-07 NOTE — PROGRESS NOTES
[facility]  Sports Medicine Clinic  [facility]           SUBJECTIVE       Maryanne Crockett is a 43 year old female presenting to clinic today for follow-up of the fracture of the left foot.  Patient has been in the boot since coming back to clinic after our telephone call, listed below.  Overall doing well.  Pain has improved.  No more swelling.  No numbness or tingling..    7/25/24: Progress Notes  Jonah West DO (Physician)  Sports Medicine  Addendum     2:00pm: Pt contacted with prelim read from radiology. Appears to have 4th metatarsal neck fx. She had no injury, and state such again. She was just walking in new heels. Advised patient of findings. Recommended that patient RTC today to be fitted for a walking boot with wbat. Pt declined as she would prefer to come tomorrow given the amount of time she spent at [facility] today. Advised patient to protect her foot at all time wearing stiff soled shoes during any ambulation. Advised her that one of our ATC's will contact the patient to get her scheduled for a boot fitting for tomorrow. I would also like to see her for a follow-up after the boot fitting in roughly 2 weeks to assess how she is doing and to obtain new radiographs.      Return precautions advised. ER/UC precautions dicussed.             PMH, Medications and Allergies were reviewed and updated as needed.    ROS:  As noted above otherwise negative.    Patient Active Problem List   Diagnosis    Nausea & vomiting    Convulsions, unspecified convulsion type (H)    Suicidal ideation    S/P hysterectomy    Attention deficit disorder    Complex regional pain syndrome type 1 of right upper extremity    Cerebral aneurysm, nonruptured    Family history of glioblastoma    Bipolar I disorder (H)    Smoking    Cervicalgia    Occipital neuralgia of left side    Hypertensive urgency    History of intracranial aneurysm    Vomiting, unspecified vomiting type, unspecified whether nausea present     Morbid obesity (H)       Current Outpatient Medications   Medication Sig Dispense Refill    ALPRAZolam (XANAX) 1 MG tablet Take 1 mg by mouth 3 times daily as needed      amitriptyline (ELAVIL) 10 MG tablet TAKE ONE TABLET BY MOUTH AT BEDTIME 30 tablet 0    amphetamine-dextroamphetamine (ADDERALL) 20 MG tablet Take 30 mg by mouth daily      aspirin (ASA) 325 MG tablet Take 1 tablet (325 mg) by mouth daily 90 tablet 3    camphor-menthol (DERMASARRA) 0.5-0.5 % external lotion Apply 1 mL topically every 6 hours as needed for skin care 222 mL 0    clonazePAM (KLONOPIN) 1 MG tablet Take 1 mg by mouth 3 times daily as needed      cloNIDine (CATAPRES) 0.2 MG tablet Take 0.2 mg by mouth 3 times daily      cyclobenzaprine (FLEXERIL) 10 MG tablet Take 1 tablet (10 mg) by mouth 3 times daily as needed for muscle spasms 90 tablet 0    diclofenac (VOLTAREN) 1 % topical gel Apply 4 g topically 4 times daily as needed for moderate pain 350 g 0    doxepin (SINEQUAN) 10 MG capsule Take 10 mg by mouth At Bedtime      FLUoxetine (PROZAC) 20 MG capsule Take 20 mg by mouth 2 times daily      medical cannabis (Patient's own supply) See Admin Instructions (The purpose of this order is to document that the patient reports taking medical cannabis.  This is not a prescription, and is not used to certify that the patient has a qualifying medical condition.)      metoprolol tartrate (LOPRESSOR) 25 MG tablet Take 1 tablet (25 mg) by mouth 2 times daily 60 tablet 3    mometasone-formoterol (DULERA) 100-5 MCG/ACT inhaler Inhale 2 puffs into the lungs 2 times daily as needed (cough, short of breath) 13 g 6    Multiple Vitamins-Minerals (MULTIVITAMIN ADULT PO) Take 1 tablet by mouth every morning       OLANZapine (ZYPREXA) 2.5 MG tablet Take 2.5 mg by mouth daily as needed (agitation)      prazosin (MINIPRESS) 2 MG capsule Take 2 mg by mouth At Bedtime      pregabalin (LYRICA) 150 MG capsule TAKE ONE CAPSULE BY MOUTH THREE TIMES DAILY 90 capsule  0    SAPHRIS 10 MG SUBL sublingual tablet Place 10 mg under the tongue 2 times daily               OBJECTIVE:       Vitals: There were no vitals filed for this visit.  BMI: There is no height or weight on file to calculate BMI.    Gen:  Well nourished and in no acute distress  HEENT: Extraocular movement intact  Neck: Supple  Pulm:  Breathing Comfortably. No increased respiratory effort.  Psych: Euthymic. Appropriately answers questions    MSK: Left foot inspection without evidence of erythema or ecchymosis.  Mild tenderness in the area of the known fracture.  Remainder of examination is within normal limits.       Study Result    Narrative & Impression   3 views left foot radiographs 7/25/2024 9:10 AM     History: AP, lateral and oblique; Left foot pain     Additional History from EMR: Dorsal foot pain after wearing heels.     Comparison: 6/9/2024 right foot radiographs     Findings:     Standing AP, oblique, and lateral  views of the left foot were  obtained.      Nondisplaced fracture of the neck of the fourth metatarsal with  sclerosis at the site of fracture and irregular cortical margin most  prominent on the ulnar side.      Lisfranc articulation alignment is congruent.     Mild degenerative changes of first metatarsophalangeal joint.   Achilles tendon insertional and plantar calcaneal enthesopathy.      Soft tissue is unremarkable.                                                                      Impression:  Nondisplaced fracture of the neck of the fourth metatarsal bone of the  left foot.      [Recommend Follow Up: Nondisplaced fourth metatarsal bone neck  fracture]        XRAY : Independent evaluation shows increased sclerosis in the area of the known fracture.  Similar alignment.  No other acute osseous abnormalities.          ASSESSMENT and PLAN:     Maryanne was seen today for follow up.    Diagnoses and all orders for this visit:    Closed nondisplaced fracture of fourth metatarsal bone of left foot  with routine healing, subsequent encounter    Other orders  -     Sports Med Adult Follow-Up Clinic Order (Blank); Future      Maryanne is a 43-year-old female presenting to clinic today for 2-week follow-up of the nondisplaced fourth metatarsal neck fracture.  She is overall doing well and has remained in the boot.  Overall timeframe for healing of this for her would be roughly 4 to 6 weeks.  Therefore I would like to see the patient in another 3 weeks.  New x-rays at that time.  She should remain in the boot, weightbearing as tolerated.  Activity restrictions discussed.  Tylenol as needed for pain.  All questions answered.  Return precautions advised.    Options for treatment and/or follow-up care were reviewed with the patient was actively involved in the decision making process. Patient verbalized understanding and was in agreement with the plan.    Jonah West DO  , Sports Medicine  Department of Family Medicine and [facility]

## 2024-08-08 ENCOUNTER — OFFICE VISIT (OUTPATIENT)
Dept: ORTHOPEDICS | Facility: CLINIC | Age: 44
End: 2024-08-08
Payer: COMMERCIAL

## 2024-08-08 ENCOUNTER — ANCILLARY PROCEDURE (OUTPATIENT)
Dept: GENERAL RADIOLOGY | Facility: CLINIC | Age: 44
End: 2024-08-08
Attending: STUDENT IN AN ORGANIZED HEALTH CARE EDUCATION/TRAINING PROGRAM
Payer: COMMERCIAL

## 2024-08-08 DIAGNOSIS — S92.345D CLOSED NONDISPLACED FRACTURE OF FOURTH METATARSAL BONE OF LEFT FOOT WITH ROUTINE HEALING, SUBSEQUENT ENCOUNTER: Primary | ICD-10-CM

## 2024-08-08 DIAGNOSIS — M79.672 LEFT FOOT PAIN: ICD-10-CM

## 2024-08-08 PROCEDURE — 99213 OFFICE O/P EST LOW 20 MIN: CPT | Performed by: STUDENT IN AN ORGANIZED HEALTH CARE EDUCATION/TRAINING PROGRAM

## 2024-08-08 PROCEDURE — 73630 X-RAY EXAM OF FOOT: CPT | Mod: LT | Performed by: RADIOLOGY

## 2024-08-08 NOTE — LETTER
8/8/2024      RE: Maryanne Crockett  [address]  [facility] [address]     Dear Colleague,    Thank you for referring your patient, Maryanne Crockett, to the [facility] SPORTS MEDICINE CLINIC [place]. Please see a copy of my visit note below.    [facility]  Sports Medicine Clinic  Clinics and Surgery Center           SUBJECTIVE       Maryanne Crockett is a 43 year old female presenting to clinic today for follow-up of the fracture of the left foot.  Patient has been in the boot since coming back to clinic after our telephone call, listed below.  Overall doing well.  Pain has improved.  No more swelling.  No numbness or tingling..    7/25/24: Progress Notes  Jonah West DO (Physician)   Sports Medicine  Addendum     2:00pm: Pt contacted with prelim read from radiology. Appears to have 4th metatarsal neck fx. She had no injury, and state such again. She was just walking in new heels. Advised patient of findings. Recommended that patient RTC today to be fitted for a walking boot with wbat. Pt declined as she would prefer to come tomorrow given the amount of time she spent at [facility] today. Advised patient to protect her foot at all time wearing stiff soled shoes during any ambulation. Advised her that one of our ATC's will contact the patient to get her scheduled for a boot fitting for tomorrow. I would also like to see her for a follow-up after the boot fitting in roughly 2 weeks to assess how she is doing and to obtain new radiographs.      Return precautions advised. ER/UC precautions dicussed.             PMH, Medications and Allergies were reviewed and updated as needed.    ROS:  As noted above otherwise negative.    Patient Active Problem List   Diagnosis     Nausea & vomiting     Convulsions, unspecified convulsion type (H)     Suicidal ideation     S/P hysterectomy     Attention deficit disorder     Complex regional pain syndrome type 1 of right upper extremity     Cerebral aneurysm,  nonruptured     Family history of glioblastoma     Bipolar I disorder (H)     Smoking     Cervicalgia     Occipital neuralgia of left side     Hypertensive urgency     History of intracranial aneurysm     Vomiting, unspecified vomiting type, unspecified whether nausea present     Morbid obesity (H)       Current Outpatient Medications   Medication Sig Dispense Refill     ALPRAZolam (XANAX) 1 MG tablet Take 1 mg by mouth 3 times daily as needed       amitriptyline (ELAVIL) 10 MG tablet TAKE ONE TABLET BY MOUTH AT BEDTIME 30 tablet 0     amphetamine-dextroamphetamine (ADDERALL) 20 MG tablet Take 30 mg by mouth daily       aspirin (ASA) 325 MG tablet Take 1 tablet (325 mg) by mouth daily 90 tablet 3     camphor-menthol (DERMASARRA) 0.5-0.5 % external lotion Apply 1 mL topically every 6 hours as needed for skin care 222 mL 0     clonazePAM (KLONOPIN) 1 MG tablet Take 1 mg by mouth 3 times daily as needed       cloNIDine (CATAPRES) 0.2 MG tablet Take 0.2 mg by mouth 3 times daily       cyclobenzaprine (FLEXERIL) 10 MG tablet Take 1 tablet (10 mg) by mouth 3 times daily as needed for muscle spasms 90 tablet 0     diclofenac (VOLTAREN) 1 % topical gel Apply 4 g topically 4 times daily as needed for moderate pain 350 g 0     doxepin (SINEQUAN) 10 MG capsule Take 10 mg by mouth At Bedtime       FLUoxetine (PROZAC) 20 MG capsule Take 20 mg by mouth 2 times daily       medical cannabis (Patient's own supply) See Admin Instructions (The purpose of this order is to document that the patient reports taking medical cannabis.  This is not a prescription, and is not used to certify that the patient has a qualifying medical condition.)       metoprolol tartrate (LOPRESSOR) 25 MG tablet Take 1 tablet (25 mg) by mouth 2 times daily 60 tablet 3     mometasone-formoterol (DULERA) 100-5 MCG/ACT inhaler Inhale 2 puffs into the lungs 2 times daily as needed (cough, short of breath) 13 g 6     Multiple Vitamins-Minerals (MULTIVITAMIN ADULT  PO) Take 1 tablet by mouth every morning        OLANZapine (ZYPREXA) 2.5 MG tablet Take 2.5 mg by mouth daily as needed (agitation)       prazosin (MINIPRESS) 2 MG capsule Take 2 mg by mouth At Bedtime       pregabalin (LYRICA) 150 MG capsule TAKE ONE CAPSULE BY MOUTH THREE TIMES DAILY 90 capsule 0     SAPHRIS 10 MG SUBL sublingual tablet Place 10 mg under the tongue 2 times daily               OBJECTIVE:       Vitals: There were no vitals filed for this visit.  BMI: There is no height or weight on file to calculate BMI.    Gen:  Well nourished and in no acute distress  HEENT: Extraocular movement intact  Neck: Supple  Pulm:  Breathing Comfortably. No increased respiratory effort.  Psych: Euthymic. Appropriately answers questions    MSK: Left foot inspection without evidence of erythema or ecchymosis.  Mild tenderness in the area of the known fracture.  Remainder of examination is within normal limits.       Study Result    Narrative & Impression   3 views left foot radiographs 7/25/2024 9:10 AM     History: AP, lateral and oblique; Left foot pain     Additional History from EMR: Dorsal foot pain after wearing heels.     Comparison: 6/9/2024 right foot radiographs     Findings:     Standing AP, oblique, and lateral  views of the left foot were  obtained.      Nondisplaced fracture of the neck of the fourth metatarsal with  sclerosis at the site of fracture and irregular cortical margin most  prominent on the ulnar side.      Lisfranc articulation alignment is congruent.     Mild degenerative changes of first metatarsophalangeal joint.   Achilles tendon insertional and plantar calcaneal enthesopathy.      Soft tissue is unremarkable.                                                                      Impression:  Nondisplaced fracture of the neck of the fourth metatarsal bone of the  left foot.      [Recommend Follow Up: Nondisplaced fourth metatarsal bone neck  fracture]        XRAY : Independent evaluation shows  increased sclerosis in the area of the known fracture.  Similar alignment.  No other acute osseous abnormalities.          ASSESSMENT and PLAN:     Maryanne was seen today for follow up.    Diagnoses and all orders for this visit:    Closed nondisplaced fracture of fourth metatarsal bone of left foot with routine healing, subsequent encounter    Other orders  -     Sports Med Adult Follow-Up Clinic Order (Blank); Future      Maryanne is a 43-year-old female presenting to clinic today for 2-week follow-up of the nondisplaced fourth metatarsal neck fracture.  She is overall doing well and has remained in the boot.  Overall timeframe for healing of this for her would be roughly 4 to 6 weeks.  Therefore I would like to see the patient in another 3 weeks.  New x-rays at that time.  She should remain in the boot, weightbearing as tolerated.  Activity restrictions discussed.  Tylenol as needed for pain.  All questions answered.  Return precautions advised.    Options for treatment and/or follow-up care were reviewed with the patient was actively involved in the decision making process. Patient verbalized understanding and was in agreement with the plan.    Jonah West DO  , Sports Medicine  [facility]      Again, thank you for allowing me to participate in the care of your patient.      Sincerely,    Jonah West DO

## 2024-08-19 DIAGNOSIS — G89.29 CHRONIC SACROILIAC JOINT PAIN: ICD-10-CM

## 2024-08-19 DIAGNOSIS — M47.816 SPONDYLOSIS OF LUMBAR REGION WITHOUT MYELOPATHY OR RADICULOPATHY: ICD-10-CM

## 2024-08-19 DIAGNOSIS — M54.2 CERVICALGIA: ICD-10-CM

## 2024-08-19 DIAGNOSIS — M53.3 CHRONIC SACROILIAC JOINT PAIN: ICD-10-CM

## 2024-08-19 DIAGNOSIS — M79.18 MYOFASCIAL PAIN: ICD-10-CM

## 2024-08-19 DIAGNOSIS — M79.7 FIBROMYALGIA AFFECTING HAND: ICD-10-CM

## 2024-08-19 DIAGNOSIS — M79.7 FIBROMYALGIA AFFECTING MULTIPLE SITES: ICD-10-CM

## 2024-08-20 RX ORDER — CYCLOBENZAPRINE HCL 10 MG
10 TABLET ORAL 3 TIMES DAILY PRN
Qty: 90 TABLET | Refills: 0 | Status: SHIPPED | OUTPATIENT
Start: 2024-08-20

## 2024-08-20 RX ORDER — PREGABALIN 150 MG/1
CAPSULE ORAL
Qty: 90 CAPSULE | Refills: 0 | Status: SHIPPED | OUTPATIENT
Start: 2024-08-20

## 2024-08-20 RX ORDER — METHOCARBAMOL 500 MG/1
TABLET, FILM COATED ORAL
Qty: 120 TABLET | Refills: 0 | Status: SHIPPED | OUTPATIENT
Start: 2024-08-20

## 2024-08-20 RX ORDER — AMITRIPTYLINE HYDROCHLORIDE 10 MG/1
10 TABLET ORAL
Qty: 30 TABLET | Refills: 0 | Status: SHIPPED | OUTPATIENT
Start: 2024-08-20

## 2024-08-20 NOTE — TELEPHONE ENCOUNTER
Refill request    Message from the patient: 4 refill requests    Medication:   amitriptyline (ELAVIL) 10 MG tablet     methocarbamol (ROBAXIN) 500 MG tablet     cyclobenzaprine (FLEXERIL) 10 MG tablet     pregabalin (LYRICA) 150 MG capsule       Sig:  Amitriptyline- TAKE ONE TABLET BY MOUTH AT BEDTIME     methocarbamol -TAKE ONE TABLET BY MOUTH FOUR TIMES DAILY AS NEEDED FOR MUSCLE SPASM     Cyclobenzaprine-Take 1 tablet (10 mg) by mouth 3 times daily as needed for muscle spasms     Pregabalin-TAKE ONE CAPSULE BY MOUTH THREE TIMES DAILY        Dispensed/Refills:    Amitriptyline-30/0    Methocarbamol-120/0    Pregabalin-90/0    SOLD to the pt on:     Pregabalin-7/28  Last prescribed to patient:   Amitriptyline and methocarbomal 7/19  Cyclobenzaprine 7/25     Last clinic appointment: 7/25/24  Next clinic appointment: none listed    Preferred pharmacy:    [id number] - [address]    Refill request routed to the provider to review.

## 2024-08-23 DIAGNOSIS — M25.572 PAIN OF JOINT OF LEFT ANKLE AND FOOT: Primary | ICD-10-CM

## 2024-08-29 NOTE — PROGRESS NOTES
[facility]  Sports Medicine Clinic  Clinics and Surgery Center           SUBJECTIVE       Maryanne Crockett is a 44 year old female presenting to clinic today for a follow-up of the fourth metatarsal fracture, at the 5-week isaak of immobilization..  Patient is having mild pain in the foot still.  She is also noticing pain in the right foot, likely compensation given her footwear.  No new injuries.  No numbness and tingling.    8/8/24: Closed nondisplaced fracture of fourth metatarsal bone of left foot with routine healing, subsequent encounter     Other orders  -     Sports Med Adult Follow-Up Clinic Order (Blank); Future        Maryanne is a 43-year-old female presenting to clinic today for 2-week follow-up of the nondisplaced fourth metatarsal neck fracture.  She is overall doing well and has remained in the boot.  Overall timeframe for healing of this for her would be roughly 4 to 6 weeks.  Therefore I would like to see the patient in another 3 weeks.  New x-rays at that time.  She should remain in the boot, weightbearing as tolerated.  Activity restrictions discussed.  Tylenol as needed for pain.  All questions answered.  Return precautions advised.       PMH, Medications and Allergies were reviewed and updated as needed.    ROS:  As noted above otherwise negative.    Patient Active Problem List   Diagnosis    Nausea & vomiting    Convulsions, unspecified convulsion type (H)    Suicidal ideation    S/P hysterectomy    Attention deficit disorder    Complex regional pain syndrome type 1 of right upper extremity    Cerebral aneurysm, nonruptured    Family history of glioblastoma    Bipolar I disorder (H)    Smoking    Cervicalgia    Occipital neuralgia of left side    Hypertensive urgency    History of intracranial aneurysm    Vomiting, unspecified vomiting type, unspecified whether nausea present    Morbid obesity (H)       Current Outpatient Medications   Medication Sig Dispense Refill    ALPRAZolam (XANAX)  1 MG tablet Take 1 mg by mouth 3 times daily as needed      amitriptyline (ELAVIL) 10 MG tablet TAKE ONE TABLET BY MOUTH AT BEDTIME 30 tablet 0    amphetamine-dextroamphetamine (ADDERALL) 20 MG tablet Take 30 mg by mouth daily      aspirin (ASA) 325 MG tablet Take 1 tablet (325 mg) by mouth daily 90 tablet 3    camphor-menthol (DERMASARRA) 0.5-0.5 % external lotion Apply 1 mL topically every 6 hours as needed for skin care 222 mL 0    clonazePAM (KLONOPIN) 1 MG tablet Take 1 mg by mouth 3 times daily as needed      cloNIDine (CATAPRES) 0.2 MG tablet Take 0.2 mg by mouth 3 times daily      cyclobenzaprine (FLEXERIL) 10 MG tablet Take 1 tablet (10 mg) by mouth 3 times daily as needed for muscle spasms 90 tablet 0    diclofenac (VOLTAREN) 1 % topical gel Apply 4 g topically 4 times daily as needed for moderate pain 350 g 0    doxepin (SINEQUAN) 10 MG capsule Take 10 mg by mouth At Bedtime      FLUoxetine (PROZAC) 20 MG capsule Take 20 mg by mouth 2 times daily      medical cannabis (Patient's own supply) See Admin Instructions (The purpose of this order is to document that the patient reports taking medical cannabis.  This is not a prescription, and is not used to certify that the patient has a qualifying medical condition.)      methocarbamol (ROBAXIN) 500 MG tablet TAKE ONE TABLET BY MOUTH FOUR TIMES DAILY AS NEEDED FOR MUSCLE SPASM 120 tablet 0    metoprolol tartrate (LOPRESSOR) 25 MG tablet Take 1 tablet (25 mg) by mouth 2 times daily 60 tablet 3    mometasone-formoterol (DULERA) 100-5 MCG/ACT inhaler Inhale 2 puffs into the lungs 2 times daily as needed (cough, short of breath) 13 g 6    Multiple Vitamins-Minerals (MULTIVITAMIN ADULT PO) Take 1 tablet by mouth every morning       OLANZapine (ZYPREXA) 2.5 MG tablet Take 2.5 mg by mouth daily as needed (agitation)      prazosin (MINIPRESS) 2 MG capsule Take 2 mg by mouth At Bedtime      pregabalin (LYRICA) 150 MG capsule TAKE ONE CAPSULE BY MOUTH THREE TIMES DAILY  90 capsule 0    SAPHRIS 10 MG SUBL sublingual tablet Place 10 mg under the tongue 2 times daily               OBJECTIVE:       Vitals: There were no vitals filed for this visit.  BMI: There is no height or weight on file to calculate BMI.    Gen:  Well nourished and in no acute distress  HEENT: Extraocular movement intact  Neck: Supple  Pulm:  Breathing Comfortably. No increased respiratory effort.  Psych: Euthymic. Appropriately answers questions    MSK: Left foot without edema, ecchymosis, or erythema.  Remaining but improved tenderness to palpation at the neck of the fourth metatarsal.  Full range of motion of the foot and ankle.  No rotational deformities.  Strength testing is appropriate.  Mildly antalgic gait with the patient avoiding toe off of the left foot.  Negative tib-fib squeeze, calcaneal squeeze, talar tilt, anterior drawer, and external rotation testing.    Study Result    Narrative & Impression   3 views left foot radiographs 8/8/2024 12:13 PM     History: Left foot pain     Comparison: 7/25/2024     Findings:     Standing AP, oblique, and lateral  views of the left foot were  obtained.      Redemonstration of fracture of the neck of the fourth metatarsal,  which is healing.     Lisfranc articulation alignment is congruent on standing images.     Mild degenerative changes of first metatarsophalangeal joint.   Achilles tendon insertional and plantar calcaneal enthesopathy.      Soft tissue is unremarkable.                                                                      Impression:  1. Redemonstration of fracture of the neck of the fourth metatarsal  head neck junction, which is healing, unchanged alignment.   2. No substantial degenerative change.        XRAY : Independent evaluation of the x-rays shows ongoing healing of the head neck junction of the fourth metatarsal fracture.          ASSESSMENT and PLAN:     Maryanne was seen today for pain.    Diagnoses and all orders for this visit:    Pain  of joint of left ankle and foot    Closed nondisplaced fracture of fourth metatarsal bone of left foot with routine healing, subsequent encounter  -     Vitamin D Deficiency; Future    Other orders  -     Sports Med Adult Follow-Up Clinic Order (Blank)      Maryanne is a 44-year-old very pleasant female presented clinic today for follow-up of the left foot.  Patient has been immobilized for 5 weeks, just short of the typical healing time of this fracture pattern.  The patient is having some compensatory pain given the immobilization and altered biomechanics, into the right foot.  Patient is also having poor shoe choice on the right foot.  At this point, I would like to get a vitamin D level on the patient given some remaining pain in the area of the known fracture.  Have also counseled the patient on appropriate footwear.  I would like her to remain in the cam boot for another 2 to 3 weeks, and have her follow-up with new x-rays at that time.  We have provided the patient with Superfeet as inserts today which she can use on her right foot only at this time, while her left foot is continuing to heal.  All questions have been answered.  Return precautions advised.    Options for treatment and/or follow-up care were reviewed with the patient was actively involved in the decision making process. Patient verbalized understanding and was in agreement with the plan.    Jonah West DO  , Sports Medicine  Department of Family Medicine and [facility]

## 2024-08-30 ENCOUNTER — LAB (OUTPATIENT)
Dept: LAB | Facility: CLINIC | Age: 44
End: 2024-08-30
Payer: COMMERCIAL

## 2024-08-30 ENCOUNTER — ANCILLARY PROCEDURE (OUTPATIENT)
Dept: GENERAL RADIOLOGY | Facility: CLINIC | Age: 44
End: 2024-08-30
Attending: STUDENT IN AN ORGANIZED HEALTH CARE EDUCATION/TRAINING PROGRAM
Payer: COMMERCIAL

## 2024-08-30 ENCOUNTER — OFFICE VISIT (OUTPATIENT)
Dept: ORTHOPEDICS | Facility: CLINIC | Age: 44
End: 2024-08-30
Payer: COMMERCIAL

## 2024-08-30 DIAGNOSIS — S92.345D CLOSED NONDISPLACED FRACTURE OF FOURTH METATARSAL BONE OF LEFT FOOT WITH ROUTINE HEALING, SUBSEQUENT ENCOUNTER: ICD-10-CM

## 2024-08-30 DIAGNOSIS — M25.572 PAIN OF JOINT OF LEFT ANKLE AND FOOT: ICD-10-CM

## 2024-08-30 DIAGNOSIS — M25.572 PAIN OF JOINT OF LEFT ANKLE AND FOOT: Primary | ICD-10-CM

## 2024-08-30 LAB — VIT D+METAB SERPL-MCNC: 21 NG/ML (ref 20–50)

## 2024-08-30 PROCEDURE — 73630 X-RAY EXAM OF FOOT: CPT | Mod: LT | Performed by: RADIOLOGY

## 2024-08-30 PROCEDURE — 99213 OFFICE O/P EST LOW 20 MIN: CPT | Performed by: STUDENT IN AN ORGANIZED HEALTH CARE EDUCATION/TRAINING PROGRAM

## 2024-08-30 PROCEDURE — 82306 VITAMIN D 25 HYDROXY: CPT | Performed by: STUDENT IN AN ORGANIZED HEALTH CARE EDUCATION/TRAINING PROGRAM

## 2024-08-30 PROCEDURE — 99000 SPECIMEN HANDLING OFFICE-LAB: CPT | Performed by: PATHOLOGY

## 2024-08-30 PROCEDURE — 36415 COLL VENOUS BLD VENIPUNCTURE: CPT | Performed by: PATHOLOGY

## 2024-08-30 NOTE — LETTER
8/30/2024      RE: Maryanne Crockett  [address]  [facility] [address]     Dear Colleague,    Thank you for referring your patient, Maryanne Crockett, to the [facility] SPORTS MEDICINE CLINIC [place]. Please see a copy of my visit note below.    [facility]  Sports Medicine Clinic  Clinics and Surgery Center           SUBJECTIVE       Maryanne Crockett is a 44 year old female presenting to clinic today for a follow-up of the fourth metatarsal fracture, at the 5-week isaak of immobilization..  Patient is having mild pain in the foot still.  She is also noticing pain in the right foot, likely compensation given her footwear.  No new injuries.  No numbness and tingling.    8/8/24: Closed nondisplaced fracture of fourth metatarsal bone of left foot with routine healing, subsequent encounter     Other orders  -     Sports Med Adult Follow-Up Clinic Order (Blank); Future        Maryanne is a 43-year-old female presenting to clinic today for 2-week follow-up of the nondisplaced fourth metatarsal neck fracture.  She is overall doing well and has remained in the boot.  Overall timeframe for healing of this for her would be roughly 4 to 6 weeks.  Therefore I would like to see the patient in another 3 weeks.  New x-rays at that time.  She should remain in the boot, weightbearing as tolerated.  Activity restrictions discussed.  Tylenol as needed for pain.  All questions answered.  Return precautions advised.       PMH, Medications and Allergies were reviewed and updated as needed.    ROS:  As noted above otherwise negative.    Patient Active Problem List   Diagnosis     Nausea & vomiting     Convulsions, unspecified convulsion type (H)     Suicidal ideation     S/P hysterectomy     Attention deficit disorder     Complex regional pain syndrome type 1 of right upper extremity     Cerebral aneurysm, nonruptured     Family history of glioblastoma     Bipolar I disorder (H)     Smoking     Cervicalgia     Occipital  neuralgia of left side     Hypertensive urgency     History of intracranial aneurysm     Vomiting, unspecified vomiting type, unspecified whether nausea present     Morbid obesity (H)       Current Outpatient Medications   Medication Sig Dispense Refill     ALPRAZolam (XANAX) 1 MG tablet Take 1 mg by mouth 3 times daily as needed       amitriptyline (ELAVIL) 10 MG tablet TAKE ONE TABLET BY MOUTH AT BEDTIME 30 tablet 0     amphetamine-dextroamphetamine (ADDERALL) 20 MG tablet Take 30 mg by mouth daily       aspirin (ASA) 325 MG tablet Take 1 tablet (325 mg) by mouth daily 90 tablet 3     camphor-menthol (DERMASARRA) 0.5-0.5 % external lotion Apply 1 mL topically every 6 hours as needed for skin care 222 mL 0     clonazePAM (KLONOPIN) 1 MG tablet Take 1 mg by mouth 3 times daily as needed       cloNIDine (CATAPRES) 0.2 MG tablet Take 0.2 mg by mouth 3 times daily       cyclobenzaprine (FLEXERIL) 10 MG tablet Take 1 tablet (10 mg) by mouth 3 times daily as needed for muscle spasms 90 tablet 0     diclofenac (VOLTAREN) 1 % topical gel Apply 4 g topically 4 times daily as needed for moderate pain 350 g 0     doxepin (SINEQUAN) 10 MG capsule Take 10 mg by mouth At Bedtime       FLUoxetine (PROZAC) 20 MG capsule Take 20 mg by mouth 2 times daily       medical cannabis (Patient's own supply) See Admin Instructions (The purpose of this order is to document that the patient reports taking medical cannabis.  This is not a prescription, and is not used to certify that the patient has a qualifying medical condition.)       methocarbamol (ROBAXIN) 500 MG tablet TAKE ONE TABLET BY MOUTH FOUR TIMES DAILY AS NEEDED FOR MUSCLE SPASM 120 tablet 0     metoprolol tartrate (LOPRESSOR) 25 MG tablet Take 1 tablet (25 mg) by mouth 2 times daily 60 tablet 3     mometasone-formoterol (DULERA) 100-5 MCG/ACT inhaler Inhale 2 puffs into the lungs 2 times daily as needed (cough, short of breath) 13 g 6     Multiple Vitamins-Minerals (MULTIVITAMIN  ADULT PO) Take 1 tablet by mouth every morning        OLANZapine (ZYPREXA) 2.5 MG tablet Take 2.5 mg by mouth daily as needed (agitation)       prazosin (MINIPRESS) 2 MG capsule Take 2 mg by mouth At Bedtime       pregabalin (LYRICA) 150 MG capsule TAKE ONE CAPSULE BY MOUTH THREE TIMES DAILY 90 capsule 0     SAPHRIS 10 MG SUBL sublingual tablet Place 10 mg under the tongue 2 times daily               OBJECTIVE:       Vitals: There were no vitals filed for this visit.  BMI: There is no height or weight on file to calculate BMI.    Gen:  Well nourished and in no acute distress  HEENT: Extraocular movement intact  Neck: Supple  Pulm:  Breathing Comfortably. No increased respiratory effort.  Psych: Euthymic. Appropriately answers questions    MSK: Left foot without edema, ecchymosis, or erythema.  Remaining but improved tenderness to palpation at the neck of the fourth metatarsal.  Full range of motion of the foot and ankle.  No rotational deformities.  Strength testing is appropriate.  Mildly antalgic gait with the patient avoiding toe off of the left foot.  Negative tib-fib squeeze, calcaneal squeeze, talar tilt, anterior drawer, and external rotation testing.    Study Result    Narrative & Impression   3 views left foot radiographs 8/8/2024 12:13 PM     History: Left foot pain     Comparison: 7/25/2024     Findings:     Standing AP, oblique, and lateral  views of the left foot were  obtained.      Redemonstration of fracture of the neck of the fourth metatarsal,  which is healing.     Lisfranc articulation alignment is congruent on standing images.     Mild degenerative changes of first metatarsophalangeal joint.   Achilles tendon insertional and plantar calcaneal enthesopathy.      Soft tissue is unremarkable.                                                                      Impression:  1. Redemonstration of fracture of the neck of the fourth metatarsal  head neck junction, which is healing, unchanged alignment.    2. No substantial degenerative change.        XRAY : Independent evaluation of the x-rays shows ongoing healing of the head neck junction of the fourth metatarsal fracture.          ASSESSMENT and PLAN:     Maryanne was seen today for pain.    Diagnoses and all orders for this visit:    Pain of joint of left ankle and foot    Closed nondisplaced fracture of fourth metatarsal bone of left foot with routine healing, subsequent encounter  -     Vitamin D Deficiency; Future    Other orders  -     Sports Med Adult Follow-Up Clinic Order (Blank)      Maryanne is a 44-year-old very pleasant female presented clinic today for follow-up of the left foot.  Patient has been immobilized for 5 weeks, just short of the typical healing time of this fracture pattern.  The patient is having some compensatory pain given the immobilization and altered biomechanics, into the right foot.  Patient is also having poor shoe choice on the right foot.  At this point, I would like to get a vitamin D level on the patient given some remaining pain in the area of the known fracture.  Have also counseled the patient on appropriate footwear.  I would like her to remain in the cam boot for another 2 to 3 weeks, and have her follow-up with new x-rays at that time.  We have provided the patient with Superfeet as inserts today which she can use on her right foot only at this time, while her left foot is continuing to heal.  All questions have been answered.  Return precautions advised.    Options for treatment and/or follow-up care were reviewed with the patient was actively involved in the decision making process. Patient verbalized understanding and was in agreement with the plan.    Jonah West DO  , Sports Medicine  Department of Family Medicine and [facility]      Again, thank you for allowing me to participate in the care of your patient.      Sincerely,    Jonah West DO

## 2024-09-03 DIAGNOSIS — E55.9 VITAMIN D INSUFFICIENCY: Primary | ICD-10-CM

## 2024-09-17 DIAGNOSIS — M25.572 PAIN OF JOINT OF LEFT ANKLE AND FOOT: Primary | ICD-10-CM

## 2024-10-03 NOTE — ED PROVIDER NOTES
INTERPRETATION:  This 61-minute, computer-assisted video EEG recording is normal.  No potentially epileptiform activity was present during the recording.      CLASSIFICATION:  Normal (awake and drowsy).  Sleep - unsuccessful.  EKG channel.    DESCRIPTION:    BACKGROUND:  The awake recording revealed 9 Hz alpha activity over the posterior head region.  Given the extensive study, the patient still did not sleep.  The EEG showed normal V waves during drowsiness.  There was no significant change on the EEG background with photic stimulation.  Hyperventilation was omitted due to old age.      INTERICTAL DISCHARGES: None    CLINICAL EVENTS:  None    The EKG channel was unremarkable.    Sign Out Provider: Dr. Dow    Sign Out Plan: 42-year-old female here with headache, recent URI/cold type symptoms, history of cerebellar aneurysm.  Patient pending CT head, CTA of the head and, neurology recommendations, and reevaluation.    Reassessment: I reviewed CT scan of the head which is unremarkable with no  intracranial pathology, pneumatized fluid in the right sphenoid loculated, correlation for acute sinusitis.  I reviewed CTA which demonstrates no high-grade stenosis of branch occlusion, right ICA and M1 stent for aneurysm treatment with no residual aneurysm identified.  With negative CT head and negative CTA neurology recommends treatment with Toradol, Compazine, Benadryl, magnesium.    Reevaluation patient resting comfortably, no distress, feeling much better with improvement of symptoms however requesting be discharged home.  At this time plan for discharge home.  Follow-up with primary care provider, return precautions discussed.  Patient understands agrees with the plan.        Disposition: Discharged        Kesha Schmidt MD  11/23/22 [id number]

## 2024-10-04 DIAGNOSIS — M54.2 CERVICALGIA: ICD-10-CM

## 2024-10-04 DIAGNOSIS — M79.18 MYOFASCIAL PAIN: ICD-10-CM

## 2024-10-04 DIAGNOSIS — G89.29 CHRONIC SACROILIAC JOINT PAIN: ICD-10-CM

## 2024-10-04 DIAGNOSIS — M79.7 FIBROMYALGIA AFFECTING HAND: ICD-10-CM

## 2024-10-04 DIAGNOSIS — M79.7 FIBROMYALGIA AFFECTING MULTIPLE SITES: ICD-10-CM

## 2024-10-04 DIAGNOSIS — M47.816 SPONDYLOSIS OF LUMBAR REGION WITHOUT MYELOPATHY OR RADICULOPATHY: ICD-10-CM

## 2024-10-04 DIAGNOSIS — M53.3 CHRONIC SACROILIAC JOINT PAIN: ICD-10-CM

## 2024-10-04 NOTE — TELEPHONE ENCOUNTER
Refill request    Medication:   amitriptyline (ELAVIL) 10 MG tablet   methocarbamol (ROBAXIN) 500 MG tablet   cyclobenzaprine (FLEXERIL) 10 MG tablet   pregabalin (LYRICA) 150 MG capsule   Sig:   amitriptyline-TAKE ONE TABLET BY MOUTH AT BEDTIME     Methocarbamol-TAKE ONE TABLET BY MOUTH FOUR TIMES DAILY AS NEEDED FOR MUSCLE SPASM    Cyclobenzaprine-Take 1 tablet (10 mg) by mouth 3 times daily as needed for muscle spasms    Pregabalin-TAKE ONE CAPSULE BY MOUTH THREE TIMES DAILY      Dispensed/Refills:  amitriptyline-30/0  Methocarbamol-120/0  SOLD to the pt on: 8/26/24   Last prescribed to patient: 8/20/24   Last clinic appointment: 7/25/24  Next clinic appointment: none noted    Preferred pharmacy:    [facility] #[id number] - [address]    Refill request routed to the provider to review.

## 2024-10-08 RX ORDER — PREGABALIN 150 MG/1
CAPSULE ORAL
Qty: 90 CAPSULE | Refills: 0 | Status: SHIPPED | OUTPATIENT
Start: 2024-10-08 | End: 2024-11-06

## 2024-10-08 RX ORDER — AMITRIPTYLINE HYDROCHLORIDE 10 MG/1
10 TABLET ORAL
Qty: 30 TABLET | Refills: 0 | Status: SHIPPED | OUTPATIENT
Start: 2024-10-08 | End: 2024-11-06

## 2024-10-08 RX ORDER — METHOCARBAMOL 500 MG/1
TABLET, FILM COATED ORAL
Qty: 120 TABLET | Refills: 0 | Status: SHIPPED | OUTPATIENT
Start: 2024-10-08 | End: 2024-11-06

## 2024-10-08 RX ORDER — CYCLOBENZAPRINE HCL 10 MG
10 TABLET ORAL 3 TIMES DAILY PRN
Qty: 90 TABLET | Refills: 0 | Status: SHIPPED | OUTPATIENT
Start: 2024-10-08 | End: 2024-11-06

## 2024-11-05 DIAGNOSIS — M53.3 CHRONIC SACROILIAC JOINT PAIN: ICD-10-CM

## 2024-11-05 DIAGNOSIS — G89.29 CHRONIC SACROILIAC JOINT PAIN: ICD-10-CM

## 2024-11-05 DIAGNOSIS — M79.7 FIBROMYALGIA AFFECTING MULTIPLE SITES: ICD-10-CM

## 2024-11-05 DIAGNOSIS — M47.816 SPONDYLOSIS OF LUMBAR REGION WITHOUT MYELOPATHY OR RADICULOPATHY: ICD-10-CM

## 2024-11-05 DIAGNOSIS — E55.9 VITAMIN D INSUFFICIENCY: ICD-10-CM

## 2024-11-05 DIAGNOSIS — M79.18 MYOFASCIAL PAIN: ICD-10-CM

## 2024-11-05 DIAGNOSIS — M79.7 FIBROMYALGIA AFFECTING HAND: ICD-10-CM

## 2024-11-05 DIAGNOSIS — M54.2 CERVICALGIA: ICD-10-CM

## 2024-11-05 NOTE — TELEPHONE ENCOUNTER
Name from pharmacy: Vitamin D3 Oral Capsule 1.25 MG (39226 UT)          Will file in chart as: cholecalciferol (VITAMIN D3) 1250 mcg (99604 units) capsule    Sig: Take 1 capsule (50,000 Units) by mouth every 7 days.    Disp: 8 capsule    Refills: 0    Start: 11/5/2024    Class: E-Prescribe    Non-formulary For: Vitamin D insufficiency    Last ordered: 2 months ago (9/3/2024) by Jonah West DO    Last refill: 9/3/2024    Rx #: [id number]    Vitamin Supplements Protocol [id number]/05/2024 06:16 AM   Protocol Details The patient does not have an order for high-dose vitamin D            Lev Villeda RN, MSN

## 2024-11-05 NOTE — TELEPHONE ENCOUNTER
Refill request      Medication/Sig:  amitriptyline (ELAVIL) 10 MG tablet-TAKE ONE TABLET BY MOUTH AT BEDTIME     cyclobenzaprine (FLEXERIL) 10 MG tablet-Take 1 tablet (10 mg) by mouth 3 times daily as needed for muscle spasm     methocarbamol (ROBAXIN) 500 MG tablet- TAKE ONE TABLET BY MOUTH FOUR TIMES DAILY AS NEEDED FOR MUSCLE SPASM     pregabalin (LYRICA) 150 MG capsule-TAKE ONE CAPSULE BY MOUTH THREE TIMES DAILY     Dispensed/Refills:  amitriptyline-30/0  Cyclobenzaprine-90/0  Methocarbamol-120/0  Pregabalin-90/0     Last prescribed to patient:   Amitriptyline-10/8/24  Cyclobenzaprine-10/8/24   Methocarbamol-10/8/24  Pregabalin-10/8/24  Last clinic appointment: 7/25/24  Next clinic appointment: none listed    Preferred pharmacy:    [facility] #[id number] - [address]    Refill request routed to the provider to review.

## 2024-11-06 RX ORDER — CHOLECALCIFEROL (VITAMIN D3) 1250 MCG
1250 CAPSULE ORAL
Qty: 8 CAPSULE | Refills: 0 | OUTPATIENT
Start: 2024-11-06

## 2024-11-06 RX ORDER — PREGABALIN 150 MG/1
CAPSULE ORAL
Qty: 90 CAPSULE | Refills: 0 | Status: SHIPPED | OUTPATIENT
Start: 2024-11-06

## 2024-11-06 RX ORDER — AMITRIPTYLINE HYDROCHLORIDE 10 MG/1
10 TABLET ORAL AT BEDTIME
Qty: 30 TABLET | Refills: 0 | Status: SHIPPED | OUTPATIENT
Start: 2024-11-06

## 2024-11-06 RX ORDER — CYCLOBENZAPRINE HCL 10 MG
10 TABLET ORAL 3 TIMES DAILY PRN
Qty: 90 TABLET | Refills: 0 | Status: SHIPPED | OUTPATIENT
Start: 2024-11-06

## 2024-11-06 RX ORDER — METHOCARBAMOL 500 MG/1
TABLET, FILM COATED ORAL
Qty: 120 TABLET | Refills: 0 | Status: SHIPPED | OUTPATIENT
Start: 2024-11-06

## 2024-11-12 NOTE — TELEPHONE ENCOUNTER
CC:  Yaodeandre Valdez is here today for Office Visit (Closed fracture of nasal bone)    Medications: medications verified, no change  Added preferred pharmacy  Denies  known Latex allergy or symptoms of Latex sensitivity.  All allergies and medications reviewed.  Patient would like communication of their results via:    [facility]    Cell Phone:   Telephone Information:   Mobile [phone]     Okay to leave a message containing results? Yes       Rooming documentation of social history includes tobacco screening only.    Dagoberto Forman,    Yes, of course! I'll follow up, and let you know if/when I'm able to get her scheduled.     Thank you,  Maryanne

## 2024-12-02 NOTE — LETTER
3/22/2017       RE: Maryanne Crockett  [address]  [facility] [address]     Dear Colleague,    Thank you for referring your patient, Maryanne Crockett, to the [facility] at [facility]. Please see a copy of my visit note below.    [facility]    S: Ms. Maryanne Crockett is a 36 year old  with history of chronic pain, ovarian cysts, bilateral salpingostomy and right oophorectomy who comes in today for increasing abdominal pain. She has a long history of narcotic use for chronic pain after surgery in her hand. In 2016 she had a right oophorectomy for ovarian torsion which resulted in escalating pain medication needs. At one point she was taking 8-10 5-325 Percocet daily for pain. She signed a pain contract and was able to wean off all pain medications in December.     Recently she has been having increased bilateral lower abdominal pain that she describes as burning in quality. The pain is improved with lying still in the fetal position and heat packs. She also notes that the pain was better when she was on Percocet. She has been experiencing the pain 15-20 days a month and it is negatively affecting her life as she rarely leaves the house do to the high level of discomfort. She would like to figure out her source of pain and come up with a solution to it.     In addition she reports very heavy periods that date back to this past August when she had the oophorectomy. She formerly had periods of 4-5 days with mild amount of bleeding. Since that time she has been having periods of 6-7 days that have required changing her tampon as often as every hour. On multiple occasions she has bleed though her clothes. She also notes that she has been having bloody noses at increased frequency. She had not previous history of bleeding or clotting disorders. She has been taking Ibuprofen Q6hrs for her abdominal pain. She also smokes 4-5 cigarettes daily     O:  BP  "129/83 (BP Location: Left arm, Patient Position: Chair)  Pulse 79  Ht 1.778 m (5' 10\")  Wt 106.6 kg (235 lb)  LMP 2017 (Exact Date)  Breastfeeding? No  BMI 33.72 kg/m2  Gen: Well-appearing, NAD  HEENT: Normocephalic, atraumatic  CV:  RRR, no m/r/g auscultated  Pulm: CTAB, no w/r/r auscultated  Abd: Soft, obese, low transverse abdominal scar, minimally -tender, non-distended    Pelvic:  Normal appearing external female genitalia. Normal hair distribution. Vagina is without lesions. No discharge or blood, Cervix multiparous, no lesions, no cervical motion tenderness. Uterus is small, mobile, non-tender. No adnexal tenderness or masses    A/P:  Ms. Maryanne Crockett is a 36 year old  with history of chronic pain, ovarian cysts, bilateral salpingostomy and right oophorectomy who comes in today for increasing abdominal pain. Given her previous history of heavy narcotic usage resulting in confinement to single provide, single pharmacy pain contract, there suspicion for drug seeking behavior. Additionally she is having heavy periods since this past August     #Increased menstrual bleeding: Pt reports heavy bleeding during period since August. She additionally reports increased frequency of bloody noses and increased bruising. Differential diagnosis includes anatomic causes like uterine fibroids, endometriosis, adenomyosis, or endometrial hyperplasia vs coagulation issues.   - Hemoglobin, platelets  - TSH  - Ultrasound     #Abdominal pain: History of chronic pain with narcotic abuse. Concern for drug seeking behavior. Other diagnosis include endometriosis, pelvic adhesions, GI issues.  -  Ultrasound   -  Follow up after US to discuss further treatment options, will avoid usage of narcotics      ISRRAEL SCHULTZ, scribed this note in the presence of Dr. Adrianne Padilla MD based on history, physical exam and discussion with the patient.     Adrianne SCHULTZ, personally performed the services described in this " documentation, as scribed by Dominguez Sessions in my presence, and it is both accurate and complete.   Adrianne Padilla MD      Patient is a 78y old  Male who presents with a chief complaint of SARAY (02 Dec 2024 08:44)      INTERVAL HPI/OVERNIGHT EVENTS: No acute events overnight. Pt was seen and examined at the bedside this AM. Pt sleeping this AM arousable, however, falling back asleep during exam orientatedx1. Unable to obtain reliable ROS due to mentation    MEDICATIONS  (STANDING):  amLODIPine   Tablet 10 milliGRAM(s) Oral daily  apixaban 5 milliGRAM(s) Oral two times a day  aspirin  chewable 81 milliGRAM(s) Oral daily  busPIRone 5 milliGRAM(s) Oral two times a day  dextrose 5%. 1000 milliLiter(s) (100 mL/Hr) IV Continuous <Continuous>  dextrose 5%. 1000 milliLiter(s) (50 mL/Hr) IV Continuous <Continuous>  dextrose 50% Injectable 25 Gram(s) IV Push once  dextrose 50% Injectable 12.5 Gram(s) IV Push once  dextrose 50% Injectable 25 Gram(s) IV Push once  donepezil 10 milliGRAM(s) Oral at bedtime  escitalopram 20 milliGRAM(s) Oral daily  fluticasone propionate/ salmeterol 100-50 MICROgram(s) Diskus 1 Dose(s) Inhalation two times a day  glucagon  Injectable 1 milliGRAM(s) IntraMuscular once  insulin lispro (ADMELOG) corrective regimen sliding scale   SubCutaneous three times a day before meals  insulin lispro (ADMELOG) corrective regimen sliding scale   SubCutaneous at bedtime  lactated ringers. 1000 milliLiter(s) (70 mL/Hr) IV Continuous <Continuous>  memantine 5 milliGRAM(s) Oral two times a day  QUEtiapine 25 milliGRAM(s) Oral at bedtime  rosuvastatin 10 milliGRAM(s) Oral at bedtime  tiotropium 2.5 MICROgram(s) Inhaler 2 Puff(s) Inhalation daily    MEDICATIONS  (PRN):  acetaminophen     Tablet .. 650 milliGRAM(s) Oral every 6 hours PRN Temp greater or equal to 38C (100.4F), Mild Pain (1 - 3)  aluminum hydroxide/magnesium hydroxide/simethicone Suspension 30 milliLiter(s) Oral every 4 hours PRN Dyspepsia  dextrose Oral Gel 15 Gram(s) Oral once PRN Blood Glucose LESS THAN 70 milliGRAM(s)/deciliter  melatonin 3 milliGRAM(s) Oral at bedtime PRN Insomnia      Allergies    No Known Allergies    Intolerances        REVIEW OF SYSTEMS:  unable to obtain reliable ROS 2/2 mentation     Vital Signs Last 24 Hrs  T(C): 37.4 (02 Dec 2024 08:11), Max: 38 (02 Dec 2024 05:53)  T(F): 99.3 (02 Dec 2024 08:11), Max: 100.4 (02 Dec 2024 05:53)  HR: 62 (02 Dec 2024 08:11) (54 - 84)  BP: 147/73 (02 Dec 2024 08:11) (110/56 - 147/73)  BP(mean): --  RR: 16 (02 Dec 2024 08:11) (16 - 18)  SpO2: 98% (02 Dec 2024 08:11) (94% - 98%)    Parameters below as of 02 Dec 2024 00:42  Patient On (Oxygen Delivery Method): nasal cannula  O2 Flow (L/min): 2      PHYSICAL EXAM:  GENERAL: NAD, sleeping but arousable  HEENT:  anicteric, moist mucous membranes  CHEST/LUNG:  CTA b/l, no rales, wheezes, or rhonchi appreciated  HEART:  RRR, S1, S2  ABDOMEN:  BS+, soft, nondistended  : brewster in place  MUSCULOSKELETAL: no edema, cyanosis, or gross deformities noted   NEUROLOGIC: answers some questions, orientedx1, drifting back to sleep during exam      LABS:                        12.2   6.22  )-----------( 182      ( 01 Dec 2024 12:15 )             37.7     CBC Full  -  ( 01 Dec 2024 12:15 )  WBC Count : 6.22 K/uL  Hemoglobin : 12.2 g/dL  Hematocrit : 37.7 %  Platelet Count - Automated : 182 K/uL  Mean Cell Volume : 95.0 fl  Mean Cell Hemoglobin : 30.7 pg  Mean Cell Hemoglobin Concentration : 32.4 g/dL  Auto Neutrophil # : 4.35 K/uL  Auto Lymphocyte # : 1.13 K/uL  Auto Monocyte # : 0.46 K/uL  Auto Eosinophil # : 0.23 K/uL  Auto Basophil # : 0.03 K/uL  Auto Neutrophil % : 69.9 %  Auto Lymphocyte % : 18.2 %  Auto Monocyte % : 7.4 %  Auto Eosinophil % : 3.7 %  Auto Basophil % : 0.5 %    01 Dec 2024 12:15    143    |  111    |  55     ----------------------------<  127    4.6     |  26     |  3.80     Ca    9.1        01 Dec 2024 12:15    TPro  7.1    /  Alb  3.6    /  TBili  <0.1   /  DBili  x      /  AST  11     /  ALT  19     /  AlkPhos  86     01 Dec 2024 12:15    PT/INR - ( 01 Dec 2024 12:15 )   PT: 12.3 sec;   INR: 1.05 ratio         PTT - ( 01 Dec 2024 12:15 )  PTT:36.3 sec  Urinalysis Basic - ( 01 Dec 2024 18:01 )    Color: Yellow / Appearance: Clear / S.016 / pH: x  Gluc: x / Ketone: Negative mg/dL  / Bili: Negative / Urobili: 0.2 mg/dL   Blood: x / Protein: 30 mg/dL / Nitrite: Negative   Leuk Esterase: Negative / RBC: 0 /HPF / WBC 2 /HPF   Sq Epi: x / Non Sq Epi: x / Bacteria: Occasional /HPF      CAPILLARY BLOOD GLUCOSE      POCT Blood Glucose.: 132 mg/dL (02 Dec 2024 08:23)  POCT Blood Glucose.: 100 mg/dL (01 Dec 2024 22:11)  POCT Blood Glucose.: 157 mg/dL (01 Dec 2024 11:51)        Urinalysis with Rflx Culture (collected 24 @ 18:01)        RADIOLOGY & ADDITIONAL TESTS:    Personally reviewed.     Consultant(s) Notes Reviewed:  [x] YES  [ ] NO

## 2024-12-03 DIAGNOSIS — M79.7 FIBROMYALGIA AFFECTING MULTIPLE SITES: ICD-10-CM

## 2024-12-03 DIAGNOSIS — G89.29 CHRONIC SACROILIAC JOINT PAIN: ICD-10-CM

## 2024-12-03 DIAGNOSIS — M47.816 SPONDYLOSIS OF LUMBAR REGION WITHOUT MYELOPATHY OR RADICULOPATHY: ICD-10-CM

## 2024-12-03 DIAGNOSIS — M79.18 MYOFASCIAL PAIN: ICD-10-CM

## 2024-12-03 DIAGNOSIS — M79.7 FIBROMYALGIA AFFECTING HAND: ICD-10-CM

## 2024-12-03 DIAGNOSIS — M53.3 CHRONIC SACROILIAC JOINT PAIN: ICD-10-CM

## 2024-12-03 DIAGNOSIS — M54.2 CERVICALGIA: ICD-10-CM

## 2024-12-03 RX ORDER — AMITRIPTYLINE HYDROCHLORIDE 10 MG/1
10 TABLET ORAL AT BEDTIME
Qty: 30 TABLET | Refills: 0 | Status: SHIPPED | OUTPATIENT
Start: 2024-12-03

## 2024-12-03 RX ORDER — METHOCARBAMOL 500 MG/1
500 TABLET, FILM COATED ORAL 4 TIMES DAILY PRN
Qty: 120 TABLET | Refills: 0 | Status: SHIPPED | OUTPATIENT
Start: 2024-12-03

## 2024-12-03 RX ORDER — PREGABALIN 150 MG/1
CAPSULE ORAL
Qty: 90 CAPSULE | Refills: 0 | Status: SHIPPED | OUTPATIENT
Start: 2024-12-03

## 2024-12-03 RX ORDER — CYCLOBENZAPRINE HCL 10 MG
10 TABLET ORAL 3 TIMES DAILY PRN
Qty: 90 TABLET | Refills: 0 | Status: SHIPPED | OUTPATIENT
Start: 2024-12-03

## 2024-12-03 NOTE — TELEPHONE ENCOUNTER
Refill request    Medication/Sig:  amitriptyline (ELAVIL) 10 MG tablet-TAKE 1 TABLET BY MOUTH AT BEDTIME    cyclobenzaprine (FLEXERIL) 10 MG tablet-  Take 1 tablet (10 mg) by mouth 3 times daily as needed for muscle spasms    methocarbamol (ROBAXIN) 500 MG tablet- TAKE  TABLET BY MOUTH 4 TIMES DAILY AS NEEDED FOR MUSCLE SPASM     pregabalin (LYRICA) 150 MG capsule-TAKE ONE CAPSULE BY MOUTH THREE TIMES DAILY    Dispensed/Refills:   amitriptyline-30/0  Cyclobenzaprine-90/0  Methocarbamol-120/6    Last prescribed to patient:   amitriptyline, cyclobenzaprine, methocarbamol, pregabalin  -11/6/24     Last clinic appointment: 7/25/24  Next clinic appointment: none listed    Preferred pharmacy:    [facility] #[id number] - [address]    Refill request routed to the provider to review.

## 2024-12-09 DIAGNOSIS — M79.18 MYOFASCIAL PAIN: ICD-10-CM

## 2024-12-09 DIAGNOSIS — M54.2 CERVICALGIA: ICD-10-CM

## 2024-12-09 DIAGNOSIS — M47.816 SPONDYLOSIS OF LUMBAR REGION WITHOUT MYELOPATHY OR RADICULOPATHY: ICD-10-CM

## 2024-12-09 DIAGNOSIS — M53.3 CHRONIC SACROILIAC JOINT PAIN: ICD-10-CM

## 2024-12-09 DIAGNOSIS — G89.29 CHRONIC SACROILIAC JOINT PAIN: ICD-10-CM

## 2024-12-10 RX ORDER — METHOCARBAMOL 500 MG/1
500 TABLET, FILM COATED ORAL 4 TIMES DAILY PRN
Qty: 120 TABLET | Refills: 0 | Status: SHIPPED | OUTPATIENT
Start: 2024-12-10

## 2024-12-10 NOTE — TELEPHONE ENCOUNTER
Refill request    Message from the patient: four refill requests were sent for refill on 12/3/24-[facility] states that Methocarbomal was not received.    Medication: methocarbamol (ROBAXIN) 500 MG tablet     Sig: TAKE 1 TABLET BY MOUTH 4 TIMES DAILY AS NEEDED FOR MUSCLE SPASM     Dispensed: 120  Refills: 0    Last prescribed to patient: 11/6/24     Last clinic appointment: 7/25/24  Next clinic appointment: none listed    Preferred pharmacy:    [facility] #[id number] - [address]    Refill request routed to the provider to review.

## 2025-01-06 DIAGNOSIS — M47.816 SPONDYLOSIS OF LUMBAR REGION WITHOUT MYELOPATHY OR RADICULOPATHY: ICD-10-CM

## 2025-01-06 DIAGNOSIS — M54.2 CERVICALGIA: ICD-10-CM

## 2025-01-06 DIAGNOSIS — M53.3 CHRONIC SACROILIAC JOINT PAIN: ICD-10-CM

## 2025-01-06 DIAGNOSIS — G89.29 CHRONIC SACROILIAC JOINT PAIN: ICD-10-CM

## 2025-01-06 DIAGNOSIS — M79.7 FIBROMYALGIA AFFECTING MULTIPLE SITES: ICD-10-CM

## 2025-01-06 DIAGNOSIS — M79.7 FIBROMYALGIA AFFECTING HAND: ICD-10-CM

## 2025-01-06 DIAGNOSIS — M79.18 MYOFASCIAL PAIN: ICD-10-CM

## 2025-01-08 DIAGNOSIS — M79.18 MYOFASCIAL PAIN: ICD-10-CM

## 2025-01-08 RX ORDER — METHOCARBAMOL 500 MG/1
500 TABLET, FILM COATED ORAL 4 TIMES DAILY PRN
Qty: 120 TABLET | Refills: 0 | Status: SHIPPED | OUTPATIENT
Start: 2025-01-08

## 2025-01-08 RX ORDER — CYCLOBENZAPRINE HCL 10 MG
10 TABLET ORAL 3 TIMES DAILY PRN
Qty: 90 TABLET | Refills: 0 | Status: SHIPPED | OUTPATIENT
Start: 2025-01-08

## 2025-01-08 RX ORDER — PREGABALIN 150 MG/1
CAPSULE ORAL
Qty: 90 CAPSULE | Refills: 0 | Status: SHIPPED | OUTPATIENT
Start: 2025-01-08

## 2025-01-08 RX ORDER — AMITRIPTYLINE HYDROCHLORIDE 10 MG/1
10 TABLET ORAL
Qty: 30 TABLET | Refills: 0 | Status: SHIPPED | OUTPATIENT
Start: 2025-01-08

## 2025-01-08 NOTE — TELEPHONE ENCOUNTER
Refill request    Medication: cyclobenzaprine (FLEXERIL) 10 MG tablet     Sig: Take 1 tablet (10 mg) by mouth 3 times daily as needed for muscle spasms     Dispensed: 90  Refills: 0    Last prescribed to patient: 12/3/24     Last clinic appointment: 7/25/24  Next clinic appointment: none listed    Preferred pharmacy:    [facility] #[id number] [address]    Refill request routed to the provider to review.

## 2025-01-08 NOTE — TELEPHONE ENCOUNTER
Refill request    Medication/Sig:  methocarbamol (ROBAXIN) 500 MG tablet-TAKE 1 TABLET BY MOUTH 4 TIMES DAILY AS NEEDED FOR MUSCLE SPASM     amitriptyline (ELAVIL) 10 MG tablet-TAKE 1 TABLET BY MOUTH AT BEDTIME     pregabalin (LYRICA) 150 MG capsule-TAKE ONE CAPSULE BY MOUTH THREE TIMES DAILY     Dispensed/Reills:  methocarbamol-120/0  Amitriptyline-30/0  Pregabalin-90/0  Last prescribed to patient:     methocarbamol-12/10/24  Pregabalin and Amitriptyline-12/3/24     Last clinic appointment: 7/25/24  Next clinic appointment: none listed      Preferred pharmacy:    [facility] #[id number] - [address]    Refill request routed to the provider to review.

## 2025-02-15 ENCOUNTER — HOSPITAL ENCOUNTER (EMERGENCY)
Facility: CLINIC | Age: 45
Discharge: HOME | End: 2025-02-16
Attending: EMERGENCY MEDICINE | Admitting: EMERGENCY MEDICINE
Payer: COMMERCIAL

## 2025-02-15 ENCOUNTER — APPOINTMENT (OUTPATIENT)
Dept: CT IMAGING | Facility: CLINIC | Age: 45
End: 2025-02-15
Attending: EMERGENCY MEDICINE
Payer: COMMERCIAL

## 2025-02-15 DIAGNOSIS — R56.9 SEIZURE-LIKE ACTIVITY (H): Primary | ICD-10-CM

## 2025-02-15 DIAGNOSIS — R56.9 CONVULSIONS, UNSPECIFIED CONVULSION TYPE (H): Chronic | ICD-10-CM

## 2025-02-15 DIAGNOSIS — R45.1 AGITATION: ICD-10-CM

## 2025-02-15 LAB
ALBUMIN SERPL BCG-MCNC: 4.5 G/DL (ref 3.5–5.2)
ALP SERPL-CCNC: 103 U/L (ref 40–150)
ALT SERPL W P-5'-P-CCNC: 18 U/L (ref 0–50)
ANION GAP SERPL CALCULATED.3IONS-SCNC: 18 MMOL/L (ref 7–15)
AST SERPL W P-5'-P-CCNC: 20 U/L (ref 0–45)
BASOPHILS # BLD AUTO: 0.1 10E3/UL (ref 0–0.2)
BASOPHILS NFR BLD AUTO: 1 %
BILIRUB SERPL-MCNC: 0.2 MG/DL
BUN SERPL-MCNC: 14 MG/DL (ref 6–20)
CALCIUM SERPL-MCNC: 9.8 MG/DL (ref 8.8–10.4)
CHLORIDE SERPL-SCNC: 102 MMOL/L (ref 98–107)
CREAT SERPL-MCNC: 0.95 MG/DL (ref 0.51–0.95)
EGFRCR SERPLBLD CKD-EPI 2021: 75 ML/MIN/1.73M2
EOSINOPHIL # BLD AUTO: 0.5 10E3/UL (ref 0–0.7)
EOSINOPHIL NFR BLD AUTO: 4 %
ERYTHROCYTE [DISTWIDTH] IN BLOOD BY AUTOMATED COUNT: 13.3 % (ref 10–15)
GLUCOSE SERPL-MCNC: 122 MG/DL (ref 70–99)
HCO3 SERPL-SCNC: 17 MMOL/L (ref 22–29)
HCT VFR BLD AUTO: 42.3 % (ref 35–47)
HGB BLD-MCNC: 14.7 G/DL (ref 11.7–15.7)
HOLD SPECIMEN: NORMAL
HOLD SPECIMEN: NORMAL
IMM GRANULOCYTES # BLD: 0 10E3/UL
IMM GRANULOCYTES NFR BLD: 0 %
LACTATE SERPL-SCNC: 2.5 MMOL/L (ref 0.7–2)
LYMPHOCYTES # BLD AUTO: 3.7 10E3/UL (ref 0.8–5.3)
LYMPHOCYTES NFR BLD AUTO: 31 %
MCH RBC QN AUTO: 28.2 PG (ref 26.5–33)
MCHC RBC AUTO-ENTMCNC: 34.8 G/DL (ref 31.5–36.5)
MCV RBC AUTO: 81 FL (ref 78–100)
MONOCYTES # BLD AUTO: 0.7 10E3/UL (ref 0–1.3)
MONOCYTES NFR BLD AUTO: 6 %
NEUTROPHILS # BLD AUTO: 6.7 10E3/UL (ref 1.6–8.3)
NEUTROPHILS NFR BLD AUTO: 58 %
NRBC # BLD AUTO: 0 10E3/UL
NRBC BLD AUTO-RTO: 0 /100
PLATELET # BLD AUTO: 318 10E3/UL (ref 150–450)
POTASSIUM SERPL-SCNC: 3.9 MMOL/L (ref 3.4–5.3)
PROT SERPL-MCNC: 8 G/DL (ref 6.4–8.3)
RBC # BLD AUTO: 5.22 10E6/UL (ref 3.8–5.2)
SODIUM SERPL-SCNC: 137 MMOL/L (ref 135–145)
WBC # BLD AUTO: 11.7 10E3/UL (ref 4–11)

## 2025-02-15 PROCEDURE — 83605 ASSAY OF LACTIC ACID: CPT | Performed by: EMERGENCY MEDICINE

## 2025-02-15 PROCEDURE — 70496 CT ANGIOGRAPHY HEAD: CPT | Mod: 26 | Performed by: RADIOLOGY

## 2025-02-15 PROCEDURE — 99291 CRITICAL CARE FIRST HOUR: CPT | Mod: 25 | Performed by: EMERGENCY MEDICINE

## 2025-02-15 PROCEDURE — 99291 CRITICAL CARE FIRST HOUR: CPT | Performed by: EMERGENCY MEDICINE

## 2025-02-15 PROCEDURE — 70496 CT ANGIOGRAPHY HEAD: CPT

## 2025-02-15 PROCEDURE — 36415 COLL VENOUS BLD VENIPUNCTURE: CPT | Performed by: EMERGENCY MEDICINE

## 2025-02-15 PROCEDURE — 250N000011 HC RX IP 250 OP 636: Mod: JZ | Performed by: EMERGENCY MEDICINE

## 2025-02-15 PROCEDURE — 70498 CT ANGIOGRAPHY NECK: CPT | Mod: 26 | Performed by: RADIOLOGY

## 2025-02-15 PROCEDURE — 70450 CT HEAD/BRAIN W/O DYE: CPT

## 2025-02-15 PROCEDURE — 80053 COMPREHEN METABOLIC PANEL: CPT | Performed by: EMERGENCY MEDICINE

## 2025-02-15 PROCEDURE — 85025 COMPLETE CBC W/AUTO DIFF WBC: CPT | Performed by: EMERGENCY MEDICINE

## 2025-02-15 PROCEDURE — 96372 THER/PROPH/DIAG INJ SC/IM: CPT | Performed by: EMERGENCY MEDICINE

## 2025-02-15 PROCEDURE — 250N000011 HC RX IP 250 OP 636: Performed by: EMERGENCY MEDICINE

## 2025-02-15 RX ORDER — HALOPERIDOL 5 MG/1
5 TABLET ORAL EVERY 8 HOURS PRN
Status: DISCONTINUED | OUTPATIENT
Start: 2025-02-15 | End: 2025-02-16 | Stop reason: HOSPADM

## 2025-02-15 RX ORDER — OLANZAPINE 10 MG/2ML
10 INJECTION, POWDER, FOR SOLUTION INTRAMUSCULAR ONCE
Status: COMPLETED | OUTPATIENT
Start: 2025-02-15 | End: 2025-02-15

## 2025-02-15 RX ORDER — LORAZEPAM 2 MG/ML
2 INJECTION INTRAMUSCULAR EVERY 8 HOURS PRN
Status: DISCONTINUED | OUTPATIENT
Start: 2025-02-15 | End: 2025-02-16 | Stop reason: HOSPADM

## 2025-02-15 RX ORDER — IOPAMIDOL 755 MG/ML
67 INJECTION, SOLUTION INTRAVASCULAR ONCE
Status: COMPLETED | OUTPATIENT
Start: 2025-02-15 | End: 2025-02-15

## 2025-02-15 RX ORDER — LORAZEPAM 1 MG/1
2 TABLET ORAL EVERY 8 HOURS PRN
Status: DISCONTINUED | OUTPATIENT
Start: 2025-02-15 | End: 2025-02-16 | Stop reason: HOSPADM

## 2025-02-15 RX ORDER — DIPHENHYDRAMINE HCL 50 MG
50 CAPSULE ORAL EVERY 8 HOURS PRN
Status: DISCONTINUED | OUTPATIENT
Start: 2025-02-15 | End: 2025-02-16 | Stop reason: HOSPADM

## 2025-02-15 RX ORDER — HALOPERIDOL 5 MG/ML
5 INJECTION INTRAMUSCULAR EVERY 8 HOURS PRN
Status: DISCONTINUED | OUTPATIENT
Start: 2025-02-15 | End: 2025-02-16 | Stop reason: HOSPADM

## 2025-02-15 RX ORDER — DIPHENHYDRAMINE HYDROCHLORIDE 50 MG/ML
50 INJECTION INTRAMUSCULAR; INTRAVENOUS EVERY 8 HOURS PRN
Status: DISCONTINUED | OUTPATIENT
Start: 2025-02-15 | End: 2025-02-16 | Stop reason: HOSPADM

## 2025-02-15 RX ADMIN — IOPAMIDOL 67 ML: 755 INJECTION, SOLUTION INTRAVENOUS at 22:46

## 2025-02-15 RX ADMIN — OLANZAPINE 10 MG: 10 INJECTION, POWDER, FOR SOLUTION INTRAMUSCULAR at 21:07

## 2025-02-15 ASSESSMENT — ACTIVITIES OF DAILY LIVING (ADL)
ADLS_ACUITY_SCORE: 49

## 2025-02-16 VITALS
OXYGEN SATURATION: 97 % | HEART RATE: 80 BPM | DIASTOLIC BLOOD PRESSURE: 95 MMHG | SYSTOLIC BLOOD PRESSURE: 124 MMHG | TEMPERATURE: 98.2 F | RESPIRATION RATE: 19 BRPM

## 2025-02-16 PROCEDURE — 99207 PR NO CHARGE LOS: CPT | Performed by: STUDENT IN AN ORGANIZED HEALTH CARE EDUCATION/TRAINING PROGRAM

## 2025-02-16 ASSESSMENT — ACTIVITIES OF DAILY LIVING (ADL)
ADLS_ACUITY_SCORE: 49

## 2025-02-16 NOTE — ED NOTES
Emergency Department Patient Sign-out       Brief HPI:  This is a 44 year old female signed out to me by Dr. Park .  See initial ED Provider note for details of the presentation.            Significant Events prior to my assuming care: n/a      Exam:   Patient Vitals for the past 24 hrs:   BP Pulse Resp SpO2   02/16/25 0045 -- 80 20 --   02/15/25 2300 (!) 129/102 96 -- --   02/15/25 2145 (!) 157/110 94 -- 98 %   02/15/25 2110 -- 120 (!) 40 99 %       General: Patient is in no acute distress currently.  HEENT: Normocephalic atraumatic.    Neck: Supple  Cardiovascular: Heart rate normal  Pulmonary: Patient is in no respiratory distress  Extremities: No signs of any significant or life-threatening trauma.  Neurologic: No new focal neurologic deficits.     ED RESULTS:   Results for orders placed or performed during the hospital encounter of 02/15/25 (from the past 24 hours)   CBC with platelets differential     Status: Abnormal    Collection Time: 02/15/25  9:32 PM    Narrative    The following orders were created for panel order CBC with platelets differential.  Procedure                               Abnormality         Status                     ---------                               -----------         ------                     CBC with platelets and d...[[id number]]  Abnormal            Final result                 Please view results for these tests on the individual orders.   Comprehensive metabolic panel     Status: Abnormal    Collection Time: 02/15/25  9:32 PM   Result Value Ref Range    Sodium 137 135 - 145 mmol/L    Potassium 3.9 3.4 - 5.3 mmol/L    Carbon Dioxide (CO2) 17 (L) 22 - 29 mmol/L    Anion Gap 18 (H) 7 - 15 mmol/L    Urea Nitrogen 14.0 6.0 - 20.0 mg/dL    Creatinine 0.95 0.51 - 0.95 mg/dL    GFR Estimate 75 >60 mL/min/1.73m2    Calcium 9.8 8.8 - 10.4 mg/dL    Chloride 102 98 - 107 mmol/L    Glucose 122 (H) 70 - 99 mg/dL    Alkaline Phosphatase 103 40 - 150 U/L    AST 20 0 - 45 U/L    ALT 18  0 - 50 U/L    Protein Total 8.0 6.4 - 8.3 g/dL    Albumin 4.5 3.5 - 5.2 g/dL    Bilirubin Total 0.2 <=1.2 mg/dL   Lactic acid whole blood with 1x repeat in 2 hr when >2     Status: Abnormal    Collection Time: 02/15/25  9:32 PM   Result Value Ref Range    Lactic Acid, Initial 2.5 (H) 0.7 - 2.0 mmol/L   CBC with platelets and differential     Status: Abnormal    Collection Time: 02/15/25  9:32 PM   Result Value Ref Range    WBC Count 11.7 (H) 4.0 - 11.0 10e3/uL    RBC Count 5.22 (H) 3.80 - 5.20 10e6/uL    Hemoglobin 14.7 11.7 - 15.7 g/dL    Hematocrit 42.3 35.0 - 47.0 %    MCV 81 78 - 100 fL    MCH 28.2 26.5 - 33.0 pg    MCHC 34.8 31.5 - 36.5 g/dL    RDW 13.3 10.0 - 15.0 %    Platelet Count 318 150 - 450 10e3/uL    % Neutrophils 58 %    % Lymphocytes 31 %    % Monocytes 6 %    % Eosinophils 4 %    % Basophils 1 %    % Immature Granulocytes 0 %    NRBCs per 100 WBC 0 <1 /100    Absolute Neutrophils 6.7 1.6 - 8.3 10e3/uL    Absolute Lymphocytes 3.7 0.8 - 5.3 10e3/uL    Absolute Monocytes 0.7 0.0 - 1.3 10e3/uL    Absolute Eosinophils 0.5 0.0 - 0.7 10e3/uL    Absolute Basophils 0.1 0.0 - 0.2 10e3/uL    Absolute Immature Granulocytes 0.0 <=0.4 10e3/uL    Absolute NRBCs 0.0 10e3/uL   [place] Draw     Status: None    Collection Time: 02/15/25  9:33 PM    Narrative    The following orders were created for panel order [place] Draw.  Procedure                               Abnormality         Status                     ---------                               -----------         ------                     Extra Blue Top Tube[[id number]]                              Final result               Extra Red Top Tube[[id number]]                               Final result                 Please view results for these tests on the individual orders.   Extra Blue Top Tube     Status: None    Collection Time: 02/15/25  9:33 PM   Result Value Ref Range    Hold Specimen [facility]    Extra Red Top Tube     Status: None    Collection Time: 02/15/25   9:33 PM   Result Value Ref Range    Hold Specimen [facility]    CT Head w/o Contrast     Status: None    Collection Time: 02/15/25 11:34 PM    Narrative    EXAM: CT HEAD W/O CONTRAST, CTA HEAD NECK W CONTRAST  LOCATION: [facility]  DATE: 2/15/2025    INDICATION: Seizure, seizure disorder and known aneurysm.  COMPARISON: Head CT dated 01/26/2023  CONTRAST: iopamidol (ISOVUE 370) solution 67 mL  TECHNIQUE: Head and neck CT angiogram with IV contrast. Noncontrast head CT followed by axial helical CT images of the head and neck vessels obtained during the arterial phase of intravenous contrast administration. Axial 2D reconstructed images and   multiplanar 3D MIP reconstructed images of the head and neck vessels were performed by the technologist. Dose reduction techniques were used. All stenosis measurements made according to NASCET criteria unless otherwise specified.    FINDINGS:   NONCONTRAST HEAD CT:   INTRACRANIAL CONTENTS: Stable vascular stent in the distal right internal carotid artery and extending into the proximal right middle cerebral artery. No intracranial hemorrhage, extraaxial collection, or mass effect.  No CT evidence of acute infarct.   Normal parenchymal attenuation. Normal ventricles and sulci.     VISUALIZED ORBITS/SINUSES/MASTOIDS: No intraorbital abnormality. Mucosal thickening primarily involving the ethmoid air cells. No middle ear or mastoid effusion.    BONES/SOFT TISSUES: No acute abnormality.    HEAD CTA:  ANTERIOR CIRCULATION: Patent stent in the distal right internal carotid artery and extending into the proximal right middle cerebral artery. Very mild calcified atherosclerotic plaque distal left internal carotid artery. No stenosis/occlusion, aneurysm,   or high flow vascular malformation. 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    POSTERIOR CIRCULATION: No stenosis/occlusion, aneurysm, or high flow vascular  malformation. Dominant right and smaller left vertebral artery contribute to a normal basilar artery.     DURAL VENOUS SINUSES: Not well evaluated on a technical basis.    NECK CTA:  Beam hardening artifact from dense contrast in the right subclavian and brachiocephalic veins limits evaluation of the proximal great vessels.    RIGHT CAROTID: No measurable stenosis or dissection.    LEFT CAROTID: No measurable stenosis or dissection.    VERTEBRAL ARTERIES: No focal stenosis or dissection. Dominant right and smaller left vertebral arteries.    AORTIC ARCH: Classic aortic arch anatomy with no significant stenosis at the origin of the great vessels.    NONVASCULAR STRUCTURES: Unremarkable.      Impression    IMPRESSION:   HEAD CT:  No acute intracranial process.    HEAD CTA:  1.  Patent stent in the distal right internal carotid artery and extending into the proximal right middle cerebral artery.  2.  No occlusion, significant stenosis, aneurysm, or high flow vascular malformation identified.    NECK CTA:  1.  No hemodynamically significant stenosis in the neck vessels.  2.  No evidence for dissection.     CTA Head Neck with Contrast     Status: None    Collection Time: 02/15/25 11:35 PM    Narrative    EXAM: CT HEAD W/O CONTRAST, CTA HEAD NECK W CONTRAST  LOCATION: [facility]  DATE: 2/15/2025    INDICATION: Seizure, seizure disorder and known aneurysm.  COMPARISON: Head CT dated 01/26/2023  CONTRAST: iopamidol (ISOVUE 370) solution 67 mL  TECHNIQUE: Head and neck CT angiogram with IV contrast. Noncontrast head CT followed by axial helical CT images of the head and neck vessels obtained during the arterial phase of intravenous contrast administration. Axial 2D reconstructed images and   multiplanar 3D MIP reconstructed images of the head and neck vessels were performed by the technologist. Dose reduction techniques were used. All stenosis measurements made according to  NASCET criteria unless otherwise specified.    FINDINGS:   NONCONTRAST HEAD CT:   INTRACRANIAL CONTENTS: Stable vascular stent in the distal right internal carotid artery and extending into the proximal right middle cerebral artery. No intracranial hemorrhage, extraaxial collection, or mass effect.  No CT evidence of acute infarct.   Normal parenchymal attenuation. Normal ventricles and sulci.     VISUALIZED ORBITS/SINUSES/MASTOIDS: No intraorbital abnormality. Mucosal thickening primarily involving the ethmoid air cells. No middle ear or mastoid effusion.    BONES/SOFT TISSUES: No acute abnormality.    HEAD CTA:  ANTERIOR CIRCULATION: Patent stent in the distal right internal carotid artery and extending into the proximal right middle cerebral artery. Very mild calcified atherosclerotic plaque distal left internal carotid artery. No stenosis/occlusion, aneurysm,   or high flow vascular malformation. 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    POSTERIOR CIRCULATION: No stenosis/occlusion, aneurysm, or high flow vascular malformation. Dominant right and smaller left vertebral artery contribute to a normal basilar artery.     DURAL VENOUS SINUSES: Not well evaluated on a technical basis.    NECK CTA:  Beam hardening artifact from dense contrast in the right subclavian and brachiocephalic veins limits evaluation of the proximal great vessels.    RIGHT CAROTID: No measurable stenosis or dissection.    LEFT CAROTID: No measurable stenosis or dissection.    VERTEBRAL ARTERIES: No focal stenosis or dissection. Dominant right and smaller left vertebral arteries.    AORTIC ARCH: Classic aortic arch anatomy with no significant stenosis at the origin of the great vessels.    NONVASCULAR STRUCTURES: Unremarkable.      Impression    IMPRESSION:   HEAD CT:  No acute intracranial process.    HEAD CTA:  1.  Patent stent in the distal right internal carotid artery and extending into the  proximal right middle cerebral artery.  2.  No occlusion, significant stenosis, aneurysm, or high flow vascular malformation identified.    NECK CTA:  1.  No hemodynamically significant stenosis in the neck vessels.  2.  No evidence for dissection.         ED MEDICATIONS:   Medications   haloperidol lactate (HALDOL) injection 5 mg (5 mg Intramuscular Not Given 2/16/25 0214)     And   LORazepam (ATIVAN) injection 2 mg (2 mg Intramuscular Not Given 2/16/25 0215)     And   diphenhydrAMINE (BENADRYL) injection 50 mg (50 mg Intramuscular Not Given 2/16/25 0215)   haloperidol (HALDOL) tablet 5 mg (has no administration in time range)     And   LORazepam (ATIVAN) tablet 2 mg (has no administration in time range)     And   diphenhydrAMINE (BENADRYL) capsule 50 mg (has no administration in time range)   OLANZapine (zyPREXA) injection 10 mg (10 mg Intramuscular $Given 2/15/25 2107)   iopamidol (ISOVUE-370) solution 67 mL (67 mLs Intravenous $Given 2/15/25 2246)   sodium chloride (PF) 0.9% PF flush 90 mL (90 mLs Intravenous $Given 2/15/25 2250)         Impression:    ICD-10-CM    1. Seizure-like activity (H)  R56.9 Adult Neurology  Referral      2. Agitation  R45.1       3. Convulsions, unspecified convulsion type (H)  R56.9 Adult Neurology  Referral          Plan:    Pending studies include neurology recommendations  Neurology recommends discharge home with follow-up in the epilepsy clinic, no additional medications or recommendations  I went and spoke with patient who feels safe to discharge home, she is awake, alert, A/O x 4  Patient feels safe to discharge home with a plan, states she will follow-up with her primary care doctor as well as neurology clinic, and understands strict return precautions to come back to the emergency room if she develops any new or worsening symptoms.  .        MD Magda Enriquez Collin, MD  02/16/25 [id number]

## 2025-02-16 NOTE — ED TRIAGE NOTES
BIBA from [place]. Daughter was at [place], went back to her car, had seizure like activity, AMS per EMS. Confused, A&Ox1, oriented to name. Tachycardia. Verbally combative with EMS.     Pt. Arrived to triage distressed and agitated. Stating her grandson is missing. Distressed and aggressive. Dilated pupils.     Hx: previous seizures, states urinates with seizures.     Ellie Gallego RN on 2/15/2025 at 8:56 PM

## 2025-02-16 NOTE — ED NOTES
Bed: ED24  Expected date:   Expected time:   Means of arrival:   Comments:  [place] [address]  44f  AMS  Poss seizure

## 2025-02-16 NOTE — ED NOTES
Pt called and informed that she left her necklace and lighter in room. Pt stated she will come pick it up at some point. Pt informed it will be left with security at the front of the ED.

## 2025-02-16 NOTE — PROGRESS NOTES
"Spoke to daughter and mother. Daughter stated that there was a loud noise in the car, grandson screamed and pt put hands on her chin, and tilted her head to her chest. Delljazzy stated pt started gurgling followed by hands to her side accompanied by foaming at the mouth and eyes opening and closing. Episode stopped at 2 minutes and daughter called 911.     Pt aggressive and disoriented upon arrival. Zyprexa IM administered. Mother stated that pt tends to take more meds than prescribed. No non-prescribed drug use noted. Pt likes to say \"If one pill works, than two pills will.\"     Pt has been complaining of a \"crushing\" feeling in the back of her head \"for a while.\" Took cold/flu and felt fine after for her headache that made her lose her vision today.   "

## 2025-02-16 NOTE — CONSULTS
"[facility]  Neurology Consultation    Patient Name:  Maryanne Crockett  MRN:  [id number]    :  1980  Date of Service:  2025  Primary care provider:  Shoshana Hyde      Neurology consultation service was asked to see Maryanne Crockett by Dr. Park in the ED.    Chief Complaint:  Concern for seizure.    History of Present Illness:   Maryanne Crockett is a 44 year old female with history of bipolar disorder, hx of unruptured PCOM cerebral aneurysm s/p securement with coiling in , family history of high grade glioma, hx ovarian cyst, hx salpingo-oophorectomy, hx of \"seizures\" and hx of non-epileptic events who presents to the ED after having an event concerning for seizure.  The patient herself does not remember the event.  ED providers tell me, per family, pt was in the care (as a passenger) when all of a sudden she had behavioral arrest, stiffening, trouble breathing, and shaking, lasting a minute or two before spontaneously abating.  She was brought to the ED.  On arrival she was very confused and combative with staff, which subsequently required 10mg of IM olanzapine.  Initial lactate 2.5 on arrival, other metabolic work up unremarkable including normonatremia, normokalemia, normal renal function, normal hepatic function.  Mild leukocytosis to 11.7.  Neurology was consulted due to concern for a seizure.    On chart review, she has seen neurology within our team multiple times in the past.  This included a 7 day EMU admission in 2019 in hopes of caputring spells similar in semiology to the present event.  At that time, she was connected to continuous EEG monitoring for 7 days but never had a target event. She had auras which she had described as feelings that would usually precede an event, and EEG at that time was normal.  She has previously been on lamotrigine and valproic acid, however she is not on those medications anymore.  She is on pregabalin and " "alprazolam in the outpatient setting, but presumably not for seizures.    The following is an excerpt from her EMU discharge summary written by Dr. Brantley on 6/26/2029:  \"38 year old with significant bipolar disease. Describes attacks as follows. Usually no warning. Amnestic. When comes to confused, has spurt of energy and then signficant sedation and needs to sleep. Reports observers describe stiffening, collapse, jerking. Reports tongue bite (tip) and urinary incontinence occur regularly. Spells occur in clusters. Reports strong family hisotoy of brain tumors; MRI May 2018, after onset of spells was normal. She has been treated with GP, PGB and VPA for bipolar disease. Has been treated with lamotrigine since spells started but never more than 200 mg per day with maximum level available in chart being 2.4 and most being lower. No side effects with lamotrigine.     Lamotrigine was discontinued and video-EEG monitoring performed for 7 days. Nine Hz posterior dominant rhythm. No epileptiform activity. She experienced several minor spells consisting of lightheadedness, numbness and tingling and other nonspecific sensations. She flet that some of these sensations occasionally preceded target spells. EEG during these periods was normal. Major spells of collapse, amneisa, impairment and jerking were not recorded. She tolerated period without lamotrigine in hospital; no worsening of bipolar disease.     Identity of her spells unclear because no spells recorded. History as provided by patient consistent with tonic clonic seizures so lamotrigine was restarted. She was provided with regimen to gradually increase dose to 200 mg twice a day. She was encouraged to have observers record her spells. Because spells occur in clusters, she was encouraged to call clinic immediately after spell; hopefully EEG and perhaps ambulatoary EEG can be quickly arranged so that EEG during spell can be recorded. She was encouraged to continue " "with psychiatric treatments. Laws regarding driving, safety items, teratogenicity discussed (she reports she has had post partum tubal ligation). Provided with seizure diary. [facility] follow-up arranged.\"    I met with Maryanne this evening.  When I met her she was sleeping but was easy to wake up.  She was however very sleepy on my exam.  Intermittently with waking her up, she was able to follow commands and tell me that she is in the hospital.  She knows her age and her name.  She does speak very fast and slightly dysarthric, but will have moments of completely clear speech.  She is able to tell me that she does not remember the event, but the preceding feelings she felt prior to this event were similar in nature to those she has experienced in the past.       ROS  A comprehensive ROS was performed and pertinent findings were included in HPI.     PMH  Past Medical History:   Diagnosis Date    Bipolar I disorder (H)     Cerebral aneurysm, nonruptured     Family history of glioblastoma     screening MRIs every 2 years    Ovarian cyst     RSD (reflex sympathetic dystrophy)     Seizures (H)      Past Surgical History:   Procedure Laterality Date    CHOLECYSTECTOMY      CYSTOSCOPY N/A 09/01/2020    Procedure: CYSTOSCOPY;  Surgeon: Hayley Zayas MD;  [facility]    DAVINCI HYSTERECTOMY TOTAL, BILATERAL SALPINGO-OOPHORECTOMY, COMBINED Left 09/01/2020    Procedure: HYSTERECTOMY, TOTAL, ROBOT-ASSISTED, LAPAROSCOPIC,  left oophorectomy;  Surgeon: Hayley Zayas MD;  [facility]    DILATION AND CURETTAGE SUCTION  04/30/2015    retained POC    GASTRIC RESTRICTIVE PROCEDURE, OPEN, REMOVE/REPLACE SUBCUTANEOUS PORT      INJECT NERVE BLOCK OCCIPITAL Left 6/30/2022    Procedure: Left occipital nerve block under ultrasound guidance and left upper back trigger point injection;  Surgeon: Scott Morgan MD;  Location: [facility] OR    INJECT NERVE BLOCK OCCIPITAL Left 9/15/2022    Procedure: Left occipital nerve block " under ultrasound guidance and left upper back trigger point injection;  Surgeon: Scott Morgan MD;  Location: [facility] OR    INJECT NERVE BLOCK OCCIPITAL Left 1/5/2023    Procedure: Left occipital nerve block under ultrasound guidance and left upper back trigger point injection;  Surgeon: Scott Morgan MD;  Location: [facility] OR    IR CAROTID CEREBRAL ANGIOGRAM BILATERAL  03/19/2021    IR CAROTID CEREBRAL ANGIOGRAM BILATERAL  06/23/2021    IR CAROTID CEREBRAL ANGIOGRAM BILATERAL  1/12/2022    LAPAROSCOPIC OOPHORECTOMY Right 08/05/2016    Procedure: LAPAROSCOPIC OOPHORECTOMY;  Surgeon: Adrianne Vergara MD;  [facility]    LAPAROSCOPIC REMOVAL GASTRIC ADJUSTABLE BAND N/A 05/11/2021    Procedure: REMOVAL, GASTRIC BAND, ADJUSTABLE, LAPAROSCOPIC;  Surgeon: Remy Gold MD;  [facility]    LAPAROSCOPIC SALPINGECTOMY Bilateral 08/05/2016    Procedure: LAPAROSCOPIC SALPINGECTOMY;  Surgeon: Adrianne Vergara MD;  [facility]    LAPAROSCOPIC TUBAL LIGATION Bilateral 05/22/2015    Procedure: LAPAROSCOPIC TUBAL LIGATION;  Surgeon: Hayley Zayas MD;  [facility]    LAPAROSCOPY OPERATIVE ADULT N/A 08/05/2016    Procedure: LAPAROSCOPY OPERATIVE ADULT;  Surgeon: Adrianne Vergara MD;  [facility]    PANNICULECTOMY  04/22/2017    [facility]    SOFT TISSUE SURGERY Right     cyst in hand       Medications   I have personally reviewed the patient's medication list.     Allergies  I have personally reviewed the patient's allergy list.     Social History  Social History     Socioeconomic History    Marital status: Single   Tobacco Use    Smoking status: Some Days     Current packs/day: 0.25     Types: Cigarettes    Smokeless tobacco: Current    Tobacco comments:     Trying   Vaping Use    Vaping status: Never Used   Substance and Sexual Activity    Alcohol use: Not Currently     Comment: rare    Drug use: Yes     Types: Marijuana     Comment: pt says she is prescribed medical marijuana    Sexual activity:  Yes     Partners: Male     Birth control/protection: Female Surgical     Comment: TBL/hys   Other Topics Concern    Parent/sibling w/ CABG, MI or angioplasty before 65F 55M? Yes   Social History Narrative    Lives with 13yo daughter    Also has two homeless girls staying with them currently (6 months as of 09/2021)    Has a 22 yo son and 5 year old grandson (takes grandson to school every morning)         Family History    Family History   Problem Relation Age of Onset    Coronary Artery Disease Mother 50        stent    Leukemia Father     Deep Vein Thrombosis (DVT) Father     Brain Cancer Brother 36        glioblastoma    Deep Vein Thrombosis (DVT) Brother     Cerebrovascular Disease Brother 47        stroke secondary to DVT    Deep Vein Thrombosis (DVT) Brother     Cerebral aneurysm Maternal Grandfather     Brain Cancer Maternal Aunt         glioblastoma    Brain Cancer Maternal Aunt         glioblastoma    Breast Cancer No family hx of     Anesthesia Reaction No family hx of     Ovarian Cancer No family hx of     Colon Cancer No family hx of            Physical Examination   Vitals: BP (!) 129/102   Pulse 96   Resp (!) 40   LMP 03/14/2018 (Approximate)   SpO2 98%   General: Lying in bed, NAD  Head: NC/AT --> there is no tongue laceration  Eyes: no icterus, op pink and moist  Cardiac: RRR. Extremities warm, no edema.   Respiratory: non-labored on RA  GI: S/NT/ND  Skin: No rash or lesion on exposed skin  Psych: Mood pleasant.  Somewhat confused.  Somewhat tangential in speech.  Neuro:  Mental status: Sleeping on arrival, easily woken.  Does doze off in the middle of inteview multiple times.  Can name, repeat, follow commands.  Speech is pressured and tangential at times and there is dysarthria interspersed with clear non-dysarthric speech.   Cranial nerves: VFF (blink to threat), Pupils equal, conjugate gaze, EOMI, face symmetric, shoulder shrug strong, tongue/uvula midline, no dysarthria.   Motor: Normal bulk  "and tone. No abnormal movements. 5/5 strength bilaterally in deltoids, biceps, triceps, hip flexors, knee flexion, knee extension.  Reflexes: dropped patellar reflexes, intact brachioradialis reflexes.  Sensory: Intact to light touch in proximal and distal aspects of all 4 extremities   Coordination: no ataxic spontaneous movements   Gait: Deferred    Investigations   I have personally reviewed pertinent labs, tests, and radiological imaging. Discussion of notable findings is included under Impression.     Was patient transferred from outside hospital?   No    Impression  This is a 44-year-old woman with a history of bipolar disorder, history of unruptured P-comm aneurysm status post securement with coiling in 2021, family history of high-grade glioma, history of ovarian cyst, history of salpingo-oophorectomy, history of \"seizures\" and history of nonepileptic events who presents to the emergency department after having a witnessed episode of behavioral arrest followed by tensing followed by convulsing for 1-2 minutes concerning for possible seizure.  Patient's lactate was elevated mildly to 2.5 and she does have a mild leukocytosis to 11.7 on admission.  With extensive chart review, it appears that the patient has had events in the past with similar semiology.  She did have a 7-day EMU admission in 2019 for similar episodes that did not capture any epileptiform abnormalities on continuous EEG monitoring for 7-hole days.  She was last seen in the epilepsy clinic in 2021.  At that time, our team recommended another outpatient routine EEG with consideration of oxcarbazepine in the future depending on those EEG results.  It appears that patient never followed up with those recommendations and has not seen the epilepsy clinic since.    Is unclear if the events leading to this ED encounter represent those of an epileptic seizure versus a nonepileptic event.  Given that her previous events did also seem quite similar to " this in semiology, it is reasonable to defer antiepileptic medication for now with again possibly repeating a routine EEG in the outpatient setting.  Although, we would want Ms. Crockett to follow-up with the neurology epilepsy clinic ([facility]) for subsequent follow-up for this consideration.    In the meantime, the patient does seem mildly encephalopathic.  I am unsure if this is due to the second-generation antipsychotic she got in the emergency department versus washout of other intoxicants versus other.  It is reasonable to have her clear appropriately in the emergency department prior to discharge.    Recommendations  -No ASMs at this time  -Follow up in neurology epilepsy clinic (ordered for you in navigators).    Thank you for involving Neurology in the care of Maryanne Crockett.  Please do not hesitate to call with questions/concerns (consult pager [phone]).      Patient was seen and discussed with Dr. Wilson.    Juanjo Lindsey, DO  Resident Physician, PGY-4  Department of Neurology    Dragon disclaimer: This documentation was completed with the aid of dictation software.  Please note that there may be some inconsistencies due to software errors.  Errors are corrected in real time, however if there is a remaining error, please do not hesitate to reach out for clarification.

## 2025-02-16 NOTE — ED PROVIDER NOTES
[place] EMERGENCY DEPARTMENT ([facility])    2/15/25       History     Chief Complaint   Patient presents with    Altered Mental Status     HPI  Maryanne Crockett is a 44 year old female who with PMH of seizures, reflex sympathetic dystrophy, ovarian cyst, non ruptured cerebral aneurysm and bipolar 1 disorder who presents to the ED due to altered mental status.  Patient is altered and agitated, is unable to provide any history.  Per report patient had a seizure.    Patient's family reports that she was in the car with them when she suddenly threw her hands up towards her chest and then arms straightened out.  Was concern for nurse respirations.  This event lasted briefly and then patient was confused and agitated after.      Past Medical History  Past Medical History:   Diagnosis Date    Bipolar I disorder (H)     Cerebral aneurysm, nonruptured     Family history of glioblastoma     screening MRIs every 2 years    Ovarian cyst     RSD (reflex sympathetic dystrophy)     Seizures (H)      Past Surgical History:   Procedure Laterality Date    CHOLECYSTECTOMY      CYSTOSCOPY N/A 09/01/2020    Procedure: CYSTOSCOPY;  Surgeon: Hayley Zayas MD;  [facility]    DAVINCI HYSTERECTOMY TOTAL, BILATERAL SALPINGO-OOPHORECTOMY, COMBINED Left 09/01/2020    Procedure: HYSTERECTOMY, TOTAL, ROBOT-ASSISTED, LAPAROSCOPIC,  left oophorectomy;  Surgeon: Hayley Zayas MD;  [facility]    DILATION AND CURETTAGE SUCTION  04/30/2015    retained POC    GASTRIC RESTRICTIVE PROCEDURE, OPEN, REMOVE/REPLACE SUBCUTANEOUS PORT      INJECT NERVE BLOCK OCCIPITAL Left 6/30/2022    Procedure: Left occipital nerve block under ultrasound guidance and left upper back trigger point injection;  Surgeon: Scott Morgan MD;  Location: [facility] OR    INJECT NERVE BLOCK OCCIPITAL Left 9/15/2022    Procedure: Left occipital nerve block under ultrasound guidance and left upper back trigger point injection;  Surgeon: Scott Morgan  MD APRIL;  Location: [facility] OR    INJECT NERVE BLOCK OCCIPITAL Left 1/5/2023    Procedure: Left occipital nerve block under ultrasound guidance and left upper back trigger point injection;  Surgeon: Scott Morgan MD;  Location: [facility] OR    IR CAROTID CEREBRAL ANGIOGRAM BILATERAL  03/19/2021    IR CAROTID CEREBRAL ANGIOGRAM BILATERAL  06/23/2021    IR CAROTID CEREBRAL ANGIOGRAM BILATERAL  1/12/2022    LAPAROSCOPIC OOPHORECTOMY Right 08/05/2016    Procedure: LAPAROSCOPIC OOPHORECTOMY;  Surgeon: Adrianne Vergara MD;  [facility]    LAPAROSCOPIC REMOVAL GASTRIC ADJUSTABLE BAND N/A 05/11/2021    Procedure: REMOVAL, GASTRIC BAND, ADJUSTABLE, LAPAROSCOPIC;  Surgeon: Remy Gold MD;  [facility]    LAPAROSCOPIC SALPINGECTOMY Bilateral 08/05/2016    Procedure: LAPAROSCOPIC SALPINGECTOMY;  Surgeon: Adrianne Vergara MD;  [facility]    LAPAROSCOPIC TUBAL LIGATION Bilateral 05/22/2015    Procedure: LAPAROSCOPIC TUBAL LIGATION;  Surgeon: Hayley Zayas MD;  [facility]    LAPAROSCOPY OPERATIVE ADULT N/A 08/05/2016    Procedure: LAPAROSCOPY OPERATIVE ADULT;  Surgeon: Adrianne Vergara MD;  [facility]    PANNICULECTOMY  04/22/2017    [facility]    SOFT TISSUE SURGERY Right     cyst in hand     ALPRAZolam (XANAX) 1 MG tablet  amitriptyline (ELAVIL) 10 MG tablet  amphetamine-dextroamphetamine (ADDERALL) 20 MG tablet  aspirin (ASA) 325 MG tablet  camphor-menthol (DERMASARRA) 0.5-0.5 % external lotion  clonazePAM (KLONOPIN) 1 MG tablet  cloNIDine (CATAPRES) 0.2 MG tablet  cyclobenzaprine (FLEXERIL) 10 MG tablet  diclofenac (VOLTAREN) 1 % topical gel  doxepin (SINEQUAN) 10 MG capsule  FLUoxetine (PROZAC) 20 MG capsule  medical cannabis (Patient's own supply)  methocarbamol (ROBAXIN) 500 MG tablet  metoprolol tartrate (LOPRESSOR) 25 MG tablet  mometasone-formoterol (DULERA) 100-5 MCG/ACT inhaler  Multiple Vitamins-Minerals (MULTIVITAMIN ADULT PO)  OLANZapine (ZYPREXA) 2.5 MG tablet  prazosin (MINIPRESS)  2 MG capsule  pregabalin (LYRICA) 150 MG capsule  SAPHRIS 10 MG SUBL sublingual tablet  vitamin D3 (CHOLECALCIFEROL) 1.25 MG (87683 UT) capsule      Allergies   Allergen Reactions    Ibuprofen GI Disturbance    Seroquel [Quetiapine] Other (See Comments) and Rash     Insomnia and rash     Family History  Family History   Problem Relation Age of Onset    Coronary Artery Disease Mother 50        stent    Leukemia Father     Deep Vein Thrombosis (DVT) Father     Brain Cancer Brother 36        glioblastoma    Deep Vein Thrombosis (DVT) Brother     Cerebrovascular Disease Brother 47        stroke secondary to DVT    Deep Vein Thrombosis (DVT) Brother     Cerebral aneurysm Maternal Grandfather     Brain Cancer Maternal Aunt         glioblastoma    Brain Cancer Maternal Aunt         glioblastoma    Breast Cancer No family hx of     Anesthesia Reaction No family hx of     Ovarian Cancer No family hx of     Colon Cancer No family hx of      Social History   Social History     Tobacco Use    Smoking status: Some Days     Current packs/day: 0.25     Types: Cigarettes    Smokeless tobacco: Current    Tobacco comments:     Trying   Vaping Use    Vaping status: Never Used   Substance Use Topics    Alcohol use: Not Currently     Comment: rare    Drug use: Yes     Types: Marijuana     Comment: pt says she is prescribed medical marijuana      Past medical history, past surgical history, medications, allergies, family history, and social history were reviewed with the patient. No additional pertinent items.   A complete review of systems was performed with pertinent positives and negatives noted in the HPI, and all other systems negative.    Physical Exam      Physical Exam  Vitals and nursing note reviewed.   Constitutional:       General: She is in acute distress.      Appearance: She is well-developed. She is not diaphoretic.   HENT:      Head: Normocephalic and atraumatic.      Mouth/Throat:      Pharynx: No oropharyngeal exudate.    Eyes:      General: No scleral icterus.        Right eye: No discharge.         Left eye: No discharge.      Pupils: Pupils are equal, round, and reactive to light.   Cardiovascular:      Rate and Rhythm: Normal rate and regular rhythm.      Heart sounds: Normal heart sounds. No murmur heard.     No friction rub. No gallop.   Pulmonary:      Effort: Pulmonary effort is normal. No respiratory distress.      Breath sounds: Normal breath sounds. No wheezing.   Chest:      Chest wall: No tenderness.   Abdominal:      General: Bowel sounds are normal. There is no distension.      Palpations: Abdomen is soft.      Tenderness: There is no abdominal tenderness.   Musculoskeletal:         General: No tenderness or deformity. Normal range of motion.      Cervical back: Normal range of motion and neck supple.   Skin:     General: Skin is warm and dry.      Coloration: Skin is not pale.      Findings: No erythema or rash.   Neurological:      Mental Status: She is alert and oriented to person, place, and time.      Cranial Nerves: No cranial nerve deficit.   Psychiatric:         Behavior: Behavior is uncooperative, agitated and hyperactive.         Judgment: Judgment is impulsive.           ED Course, Procedures, & Data      Procedures                No results found for any visits on 02/15/25.  Medications - No data to display  Labs Ordered and Resulted from Time of ED Arrival to Time of ED Departure - No data to display  No orders to display          Critical Care time was 30 minutes for this patient excluding procedures.     Assessment & Plan    This a 44-year-old female who presents with potential seizure and agitation.  Per family patient had an episode that was possibly seizure-like.  Afterwards she was agitated, uncooperative and aggressive.  She wired olanzapine upon arrival.  She became more calm.  She states she does not remember episode.  He does have a history of possible seizure in the past.  Lab work shows  lactic acid of 2.5 which could indicate seizure.  WBC count 11.7.  Due to history of nonruptured aneurysm head CT and CTA were obtained which shows no acute abnormalities.  I discussed case with Neurology who saw the patient.  As patient is still sedated from olanzapine they recommend monitoring until fully cleared with reevaluation if needed.  Patient was signed out to incoming physician pending reevaluation.    I have reviewed the nursing notes. I have reviewed the findings, diagnosis, plan and need for follow up with the patient.    New Prescriptions    No medications on file       Final diagnoses:   None       Galdino Park DO  [facility] EMERGENCY DEPARTMENT  2/15/2025     Galdino Park DO  02/16/25 [id number]

## 2025-02-16 NOTE — DISCHARGE INSTRUCTIONS
Here in the emergency room your workup was reassuring  Please call your primary care doctor for follow-up visit in the next 1 to 2 days  We have made you a neurology referral and they should be calling you with a follow-up visit  If you develop any new or worsening symptoms please return immediately to the emergency department

## 2025-02-25 DIAGNOSIS — S92.345D CLOSED NONDISPLACED FRACTURE OF FOURTH METATARSAL BONE OF LEFT FOOT WITH ROUTINE HEALING, SUBSEQUENT ENCOUNTER: Primary | ICD-10-CM

## 2025-03-04 DIAGNOSIS — G89.29 CHRONIC SACROILIAC JOINT PAIN: ICD-10-CM

## 2025-03-04 DIAGNOSIS — M79.7 FIBROMYALGIA AFFECTING MULTIPLE SITES: ICD-10-CM

## 2025-03-04 DIAGNOSIS — M79.18 MYOFASCIAL PAIN: ICD-10-CM

## 2025-03-04 DIAGNOSIS — M54.2 CERVICALGIA: ICD-10-CM

## 2025-03-04 DIAGNOSIS — M47.816 SPONDYLOSIS OF LUMBAR REGION WITHOUT MYELOPATHY OR RADICULOPATHY: ICD-10-CM

## 2025-03-04 DIAGNOSIS — M53.3 CHRONIC SACROILIAC JOINT PAIN: ICD-10-CM

## 2025-03-05 RX ORDER — AMITRIPTYLINE HYDROCHLORIDE 10 MG/1
10 TABLET ORAL
Qty: 30 TABLET | Refills: 0 | Status: SHIPPED | OUTPATIENT
Start: 2025-03-05

## 2025-03-05 RX ORDER — CYCLOBENZAPRINE HCL 10 MG
10 TABLET ORAL 3 TIMES DAILY PRN
Qty: 90 TABLET | Refills: 0 | Status: SHIPPED | OUTPATIENT
Start: 2025-03-05

## 2025-03-05 RX ORDER — METHOCARBAMOL 500 MG/1
500 TABLET, FILM COATED ORAL 4 TIMES DAILY PRN
Qty: 120 TABLET | Refills: 0 | Status: SHIPPED | OUTPATIENT
Start: 2025-03-05

## 2025-03-05 NOTE — TELEPHONE ENCOUNTER
Refill request    Medication/Sig:   amitriptyline (ELAVIL) 10 MG tablet-TAKE ONE TABLET BY MOUTH AT BEDTIME     cyclobenzaprine (FLEXERIL) 10 MG tablet-  Take 1 tablet (10 mg) by mouth 3 times daily as needed for muscle spasms     methocarbamol (ROBAXIN) 500 MG tablet-TAKE 1 TABLET BY MOUTH 4 TIMES DAILY AS NEEDED FOR MUSCLE SPASM     Dispensed/Refills:   amitriptyline-30/0  Cyclobenzaprine-90/0  Methocarbamol-120/0  Last prescribed to patient: All-2/7/25    Last clinic appointment: 7/25/24  Next clinic appointment: none listed    Preferred pharmacy:    [facility][id number] - [address]    Refill request routed to the provider to review.

## 2025-03-09 DIAGNOSIS — M79.7 FIBROMYALGIA AFFECTING HAND: ICD-10-CM

## 2025-03-10 RX ORDER — PREGABALIN 150 MG/1
CAPSULE ORAL
Qty: 90 CAPSULE | Refills: 0 | Status: SHIPPED | OUTPATIENT
Start: 2025-03-10

## 2025-03-10 NOTE — TELEPHONE ENCOUNTER
Refill request    Medication: pregabalin (LYRICA) 150 MG capsule     Sig: TAKE ONE CAPSULE BY MOUTH THREE TIMES DAILY     Dispensed: 90  Refills: 0    Last prescribed to patient: 2/7/25    Last clinic appointment: 7/25/24  Next clinic appointment: none listed    Preferred pharmacy:    [facility][id number] - [address]    Refill request routed to the provider to review.

## 2025-04-01 PROBLEM — F17.200 NICOTINE DEPENDENCE, UNSPECIFIED, UNCOMPLICATED: Chronic | Status: ACTIVE | Noted: 2021-09-29

## 2025-05-12 DIAGNOSIS — M54.2 CERVICALGIA: ICD-10-CM

## 2025-05-12 DIAGNOSIS — G89.29 CHRONIC SACROILIAC JOINT PAIN: ICD-10-CM

## 2025-05-12 DIAGNOSIS — M53.3 CHRONIC SACROILIAC JOINT PAIN: ICD-10-CM

## 2025-05-12 DIAGNOSIS — M79.18 MYOFASCIAL PAIN: ICD-10-CM

## 2025-05-12 DIAGNOSIS — M47.816 SPONDYLOSIS OF LUMBAR REGION WITHOUT MYELOPATHY OR RADICULOPATHY: ICD-10-CM

## 2025-05-12 RX ORDER — METHOCARBAMOL 500 MG/1
500 TABLET, FILM COATED ORAL 4 TIMES DAILY PRN
Qty: 120 TABLET | Refills: 0 | Status: SHIPPED | OUTPATIENT
Start: 2025-05-12

## 2025-05-12 NOTE — TELEPHONE ENCOUNTER
Refill request    Medication/Sig:   methocarbamol (ROBAXIN) 500 MG tablet-TAKE 1 TABLET BY MOUTH 4 TIMES DAILY AS NEEDED FOR MUSCLE SPASM     cyclobenzaprine (FLEXERIL) 10 MG tablet-Take 1 tablet (10 mg) by mouth 3 times daily as needed for muscle spasms.     Dispensed/Refill:   methocarbamol-120/0  Cyclobenzaprine-90/0    Last prescribed to patient: 4/11/25     Last clinic appointment: 7/25/24  Next clinic appointment: none listed    Preferred pharmacy:    [id number] - [facility] [address] [facility]    Refill request routed to the provider to review.

## 2025-05-13 ENCOUNTER — TELEPHONE (OUTPATIENT)
Dept: ANESTHESIOLOGY | Facility: CLINIC | Age: 45
End: 2025-05-13
Payer: COMMERCIAL

## 2025-06-03 DIAGNOSIS — M79.7 FIBROMYALGIA AFFECTING MULTIPLE SITES: ICD-10-CM

## 2025-06-03 DIAGNOSIS — M79.18 MYOFASCIAL PAIN: ICD-10-CM

## 2025-06-03 NOTE — TELEPHONE ENCOUNTER
Refill request    Medication: cyclobenzaprine (FLEXERIL) 10 MG tablet     Sig: Take 1 tablet (10 mg) by mouth 3 times daily as needed for muscle spasms.     Dispensed: 90  Refills: 0    Last prescribed to patient: 4/11/25    Last clinic appointment: 7/25/24  Next clinic appointment: none listed    Preferred pharmacy:    [facility] #[id number] [address]    Refill request routed to the provider to review.

## 2025-06-04 RX ORDER — CYCLOBENZAPRINE HCL 10 MG
10 TABLET ORAL 3 TIMES DAILY PRN
Qty: 90 TABLET | Refills: 0 | Status: SHIPPED | OUTPATIENT
Start: 2025-06-04

## 2025-06-04 RX ORDER — AMITRIPTYLINE HYDROCHLORIDE 10 MG/1
10 TABLET ORAL AT BEDTIME
Qty: 30 TABLET | Refills: 0 | Status: SHIPPED | OUTPATIENT
Start: 2025-06-04

## 2025-06-04 NOTE — TELEPHONE ENCOUNTER
Refill request    Medication: amitriptyline (ELAVIL) 10 MG tablet     Sig: Take 1 tablet (10 mg) by mouth at bedtime.     Dispensed: 30  Refills: 0    Last prescribed to patient: 5/9/25     Last clinic appointment: 7/25/24  Next clinic appointment: none listed    Preferred pharmacy:    [facility] #[id number] - [address]    Refill request routed to the provider to review.

## 2025-06-15 ENCOUNTER — HEALTH MAINTENANCE LETTER (OUTPATIENT)
Age: 45
End: 2025-06-15

## 2025-06-20 DIAGNOSIS — G89.29 CHRONIC SACROILIAC JOINT PAIN: ICD-10-CM

## 2025-06-20 DIAGNOSIS — M54.2 CERVICALGIA: ICD-10-CM

## 2025-06-20 DIAGNOSIS — M47.816 SPONDYLOSIS OF LUMBAR REGION WITHOUT MYELOPATHY OR RADICULOPATHY: ICD-10-CM

## 2025-06-20 DIAGNOSIS — M79.18 MYOFASCIAL PAIN: ICD-10-CM

## 2025-06-20 DIAGNOSIS — M53.3 CHRONIC SACROILIAC JOINT PAIN: ICD-10-CM

## 2025-06-20 DIAGNOSIS — M79.7 FIBROMYALGIA AFFECTING HAND: ICD-10-CM

## 2025-06-23 NOTE — TELEPHONE ENCOUNTER
Refill request    Medication/Sig:   methocarbamol (ROBAXIN) 500 MG tablet-Take 1 tablet (500 mg) by mouth 4 times daily as needed for muscle spasms.     pregabalin (LYRICA) 150 MG capsule-Take 1 capsule (150 mg) by mouth 3 times daily.     Dispensed/Refills:   methocarbamol-120/0  Pregabalin-90/0    Last prescribed to patient:   Methocarbamol-5/12/25  Pregabalin-5/9/25    Last clinic appointment: 7/25/24  Next clinic appointment: none listed    Preferred pharmacy:    [facility] #[id number] - [facility] [address]    Refill request routed to the provider to review.

## 2025-06-24 RX ORDER — PREGABALIN 150 MG/1
150 CAPSULE ORAL 3 TIMES DAILY
Qty: 90 CAPSULE | Refills: 0 | Status: SHIPPED | OUTPATIENT
Start: 2025-06-24

## 2025-06-24 RX ORDER — METHOCARBAMOL 500 MG/1
500 TABLET, FILM COATED ORAL 4 TIMES DAILY PRN
Qty: 120 TABLET | Refills: 0 | Status: SHIPPED | OUTPATIENT
Start: 2025-06-24

## 2025-07-07 DIAGNOSIS — M79.18 MYOFASCIAL PAIN: ICD-10-CM

## 2025-07-08 NOTE — TELEPHONE ENCOUNTER
Refill request    Medication: cyclobenzaprine (FLEXERIL) 10 MG tablet     Sig: Take 1 tablet (10 mg) by mouth 3 times daily as needed for muscle spasms.     Dispensed: 90  Refills: 0    Last prescribed to patient: 6/4/25     Last clinic appointment: 7/25/24  Next clinic appointment: none listed    Preferred pharmacy:    [facility] #[id number] [address]    Refill request routed to the provider to review.

## 2025-07-09 RX ORDER — CYCLOBENZAPRINE HCL 10 MG
10 TABLET ORAL 3 TIMES DAILY PRN
Qty: 90 TABLET | Refills: 0 | Status: SHIPPED | OUTPATIENT
Start: 2025-07-09

## 2025-07-09 NOTE — TELEPHONE ENCOUNTER
Chart reviewed - Patient of Dr. Morgan. Request appears appropriate. Refilled for 30 day supply.     Celina Sharp, PILY, APRN, AGNP-C  [facility]

## 2025-07-14 ENCOUNTER — PRE VISIT (OUTPATIENT)
Dept: ORTHOPEDICS | Facility: CLINIC | Age: 45
End: 2025-07-14

## 2025-07-14 ENCOUNTER — OFFICE VISIT (OUTPATIENT)
Dept: ORTHOPEDICS | Facility: CLINIC | Age: 45
End: 2025-07-14
Payer: COMMERCIAL

## 2025-07-14 ENCOUNTER — ANCILLARY PROCEDURE (OUTPATIENT)
Dept: GENERAL RADIOLOGY | Facility: CLINIC | Age: 45
End: 2025-07-14
Attending: FAMILY MEDICINE
Payer: COMMERCIAL

## 2025-07-14 DIAGNOSIS — M76.821 POSTERIOR TIBIAL TENDINITIS OF RIGHT LOWER EXTREMITY: Primary | ICD-10-CM

## 2025-07-14 DIAGNOSIS — M79.671 RIGHT FOOT PAIN: ICD-10-CM

## 2025-07-14 DIAGNOSIS — M79.671 RIGHT FOOT PAIN: Primary | ICD-10-CM

## 2025-07-14 DIAGNOSIS — M62.171 NONTRAUMATIC RUPTURE OF PLANTAR FASCIA OF RIGHT FOOT: ICD-10-CM

## 2025-07-14 PROCEDURE — 73630 X-RAY EXAM OF FOOT: CPT | Mod: RT | Performed by: RADIOLOGY

## 2025-07-14 PROCEDURE — 99213 OFFICE O/P EST LOW 20 MIN: CPT | Mod: GC | Performed by: FAMILY MEDICINE

## 2025-07-14 SDOH — HEALTH STABILITY: PHYSICAL HEALTH: ON AVERAGE, HOW MANY DAYS PER WEEK DO YOU ENGAGE IN MODERATE TO STRENUOUS EXERCISE (LIKE A BRISK WALK)?: 0 DAYS

## 2025-07-14 NOTE — LETTER
7/14/2025      RE: Maryanne Crockett  [address]  [place] [address]     Dear Colleague,    Thank you for referring your patient, Maryanne Crockett, to the [facility] [place]. Please see a copy of my visit note below.    ASSESSMENT/PLAN:    (M76.152) Posterior tibial tendinitis of right lower extremity  (primary encounter diagnosis)  Comment: exam is consistent w/ posterior tibial tendonitis and ?partial tear of plantar fascia; placed in walking boot; will follow-up in 3 weeks; if no better, would check MRI; if improved, would wean from boot and have her start PT  Plan: diclofenac (VOLTAREN) 1 % topical gel          (M62.171) Nontraumatic rupture of plantar fascia of right foot  Comment: see above  Plan: diclofenac (VOLTAREN) 1 % topical gel          Attestation:  This patient has been seen and evaluated by me, Ap Mendez MD with the resident, Dr Arvizu and the care team. I agree with the findings and plan of care as documented in this note.    Ap Mendez MD  July 14, 2025  3:16 PM      Pt is a 44 year old female last seen  by Dr Jay West in 8/2024 for L foot fracture here today for:     HPI:   Right Foot/Ankle pain:   Location: Medial foot    Duration: Over 1 year. Hx of fracture in left foot, required CAM boot. Pain in right foot started at that time, suspected to be compensatory. Patient was encouraged to wear supportive shoes   Trauma/ Fall? She was wearing heels and walking on a karen surface    Able to walk? Yes, limping   Swelling? Yes    Bruising? No   Weakness? No    Instability? No  Snapping/Clicking? No  Imaging? XR on 7/14/25   Treatment?  Wrapping/taping her foot, OTC orthotics. Has not tried any medications or therapies.     Past Medical History:   Diagnosis Date     Bipolar I disorder (H)      Cerebral aneurysm, nonruptured      Family history of glioblastoma     screening MRIs every 2 years     Ovarian cyst      RSD (reflex sympathetic dystrophy)       Seizures (H)       Past Surgical History:   Procedure Laterality Date     CHOLECYSTECTOMY       CYSTOSCOPY N/A 09/01/2020    Procedure: CYSTOSCOPY;  Surgeon: Hayley Zayas MD;  [facility]     DAVINCI HYSTERECTOMY TOTAL, BILATERAL SALPINGO-OOPHORECTOMY, COMBINED Left 09/01/2020    Procedure: HYSTERECTOMY, TOTAL, ROBOT-ASSISTED, LAPAROSCOPIC,  left oophorectomy;  Surgeon: Hayley Zayas MD;  [facility]     DILATION AND CURETTAGE SUCTION  04/30/2015    retained POC     GASTRIC RESTRICTIVE PROCEDURE, OPEN, REMOVE/REPLACE SUBCUTANEOUS PORT       INJECT NERVE BLOCK OCCIPITAL Left 6/30/2022    Procedure: Left occipital nerve block under ultrasound guidance and left upper back trigger point injection;  Surgeon: Scott Morgan MD;  Location: [facility] OR     INJECT NERVE BLOCK OCCIPITAL Left 9/15/2022    Procedure: Left occipital nerve block under ultrasound guidance and left upper back trigger point injection;  Surgeon: Scott Morgan MD;  Location: [facility] OR     INJECT NERVE BLOCK OCCIPITAL Left 1/5/2023    Procedure: Left occipital nerve block under ultrasound guidance and left upper back trigger point injection;  Surgeon: Scott Morgan MD;  Location: [facility] OR     IR CAROTID CEREBRAL ANGIOGRAM BILATERAL  03/19/2021     IR CAROTID CEREBRAL ANGIOGRAM BILATERAL  06/23/2021     IR CAROTID CEREBRAL ANGIOGRAM BILATERAL  1/12/2022     LAPAROSCOPIC OOPHORECTOMY Right 08/05/2016    Procedure: LAPAROSCOPIC OOPHORECTOMY;  Surgeon: Adrianne Vergara MD;  [facility]     LAPAROSCOPIC REMOVAL GASTRIC ADJUSTABLE BAND N/A 05/11/2021    Procedure: REMOVAL, GASTRIC BAND, ADJUSTABLE, LAPAROSCOPIC;  Surgeon: Remy Gold MD;  [facility]     LAPAROSCOPIC SALPINGECTOMY Bilateral 08/05/2016    Procedure: LAPAROSCOPIC SALPINGECTOMY;  Surgeon: Adrianne Vergara MD;  [facility]     LAPAROSCOPIC TUBAL LIGATION Bilateral 05/22/2015    Procedure: LAPAROSCOPIC TUBAL LIGATION;  Surgeon: Hayley Zayas  MD Angelica;  [facility]     LAPAROSCOPY OPERATIVE ADULT N/A 08/05/2016    Procedure: LAPAROSCOPY OPERATIVE ADULT;  Surgeon: Adrianne Vergara MD;  [facility]     PANNICULECTOMY  04/22/2017    [facility]     SOFT TISSUE SURGERY Right     cyst in hand      Current Outpatient Medications   Medication Sig Dispense Refill     ALPRAZolam (XANAX) 1 MG tablet Take 1 mg by mouth 3 times daily as needed       amitriptyline (ELAVIL) 10 MG tablet Take 1 tablet (10 mg) by mouth at bedtime. 30 tablet 0     amphetamine-dextroamphetamine (ADDERALL) 20 MG tablet Take 30 mg by mouth daily       aspirin (ASA) 325 MG tablet Take 1 tablet (325 mg) by mouth daily 90 tablet 3     camphor-menthol (DERMASARRA) 0.5-0.5 % external lotion Apply 1 mL topically every 6 hours as needed for skin care 222 mL 0     clonazePAM (KLONOPIN) 1 MG tablet Take 1 mg by mouth 3 times daily as needed       cloNIDine (CATAPRES) 0.2 MG tablet Take 0.2 mg by mouth 3 times daily       cyclobenzaprine (FLEXERIL) 10 MG tablet Take 1 tablet (10 mg) by mouth 3 times daily as needed for muscle spasms. 90 tablet 0     diclofenac (VOLTAREN) 1 % topical gel Apply 4 g topically 4 times daily as needed for moderate pain 350 g 0     doxepin (SINEQUAN) 10 MG capsule Take 10 mg by mouth At Bedtime       FLUoxetine (PROZAC) 20 MG capsule Take 20 mg by mouth 2 times daily       medical cannabis (Patient's own supply) See Admin Instructions (The purpose of this order is to document that the patient reports taking medical cannabis.  This is not a prescription, and is not used to certify that the patient has a qualifying medical condition.)       methocarbamol (ROBAXIN) 500 MG tablet Take 1 tablet (500 mg) by mouth 4 times daily as needed for muscle spasms. 120 tablet 0     metoprolol tartrate (LOPRESSOR) 25 MG tablet Take 1 tablet (25 mg) by mouth 2 times daily 60 tablet 3     mometasone-formoterol (DULERA) 100-5 MCG/ACT inhaler Inhale 2 puffs into the lungs 2 times daily as  needed (cough, short of breath) 13 g 6     Multiple Vitamins-Minerals (MULTIVITAMIN ADULT PO) Take 1 tablet by mouth every morning        OLANZapine (ZYPREXA) 2.5 MG tablet Take 2.5 mg by mouth daily as needed (agitation)       prazosin (MINIPRESS) 2 MG capsule Take 2 mg by mouth At Bedtime       pregabalin (LYRICA) 150 MG capsule TAKE 1 CAPSULE (150 MG) BY MOUTH 3 TIMES DAILY. 90 capsule 0     SAPHRIS 10 MG SUBL sublingual tablet Place 10 mg under the tongue 2 times daily        vitamin D3 (CHOLECALCIFEROL) 1.25 MG (00637 UT) capsule Take 1 capsule (50,000 Units) by mouth every 7 days. 8 capsule 0      Allergies   Allergen Reactions     Ibuprofen GI Disturbance     Seroquel [Quetiapine] Other (See Comments) and Rash     Insomnia and rash      ROS:   Gen- no fevers/chills   Derm - no rash/ redness   Neuro - no numbness, no tingling   Remainder of ROS negative.     Exam:   LMP 03/14/2018 (Approximate)        R Foot/Ankle:   Inspection: Swelling - YES - medial soft tissue; Bruising - No   Sensation: intact in peroneal, tibial, sural, and saphenous distribution   ROM: limited in all planes    Strength: 5/5 in all motions; Pain w/ resisted inversion  Bony tenderness: Medial malleolus? - NO; Lateral malleolus? - no; Navicular? - no; 5th Metatarsal? - no; Other - +TTP at medial calcaneus at plantar fascia origin  Ligaments Tenderness: ATFL/CFL -no; Deltoid - no; Syndesmosis - no; LisFranc - no  Tendons: Posterior Tibialis - YES; Peroneals - no; Achilles - no   Maneuvers: Calcaneal Squeeze - POS; Hop - deferred      Xray of R foot on July 14, 2025 at [facility] location - films personally reviewed with patient at time of visit     My impression: Neg       Again, thank you for allowing me to participate in the care of your patient.      Sincerely,    Ap Mendez MD

## 2025-07-14 NOTE — TELEPHONE ENCOUNTER
DIAGNOSIS: RIGHT FOOT PAIN    APPOINTMENT DATE: 7/14/25   NOTES STATUS DETAILS   DISCHARGE REPORT from the ER Epic 6/9/24 - [place]   MEDICATION LIST Epic    IMAGES     XRAYS (IMAGES & REPORTS) PACS 6/9/24 - Foot  XR, G/E 3 views, right

## 2025-07-14 NOTE — PROGRESS NOTES
ASSESSMENT/PLAN:    (M76.821) Posterior tibial tendinitis of right lower extremity  (primary encounter diagnosis)  Comment: exam is consistent w/ posterior tibial tendonitis and ?partial tear of plantar fascia; placed in walking boot; will follow-up in 3 weeks; if no better, would check MRI; if improved, would wean from boot and have her start PT  Plan: diclofenac (VOLTAREN) 1 % topical gel          (M62.171) Nontraumatic rupture of plantar fascia of right foot  Comment: see above  Plan: diclofenac (VOLTAREN) 1 % topical gel          Attestation:  This patient has been seen and evaluated by me, Ap Mendez MD with the resident, Dr Arvizu and the care team. I agree with the findings and plan of care as documented in this note.    Ap eMndez MD  July 14, 2025  3:16 PM      Pt is a 44 year old female last seen  by Dr Jay West in 8/2024 for L foot fracture here today for:     HPI:   Right Foot/Ankle pain:   Location: Medial foot    Duration: Over 1 year. Hx of fracture in left foot, required CAM boot. Pain in right foot started at that time, suspected to be compensatory. Patient was encouraged to wear supportive shoes   Trauma/ Fall? She was wearing heels and walking on a karen surface    Able to walk? Yes, limping   Swelling? Yes    Bruising? No   Weakness? No    Instability? No  Snapping/Clicking? No  Imaging? XR on 7/14/25   Treatment?  Wrapping/taping her foot, OTC orthotics. Has not tried any medications or therapies.     Past Medical History:   Diagnosis Date    Bipolar I disorder (H)     Cerebral aneurysm, nonruptured     Family history of glioblastoma     screening MRIs every 2 years    Ovarian cyst     RSD (reflex sympathetic dystrophy)     Seizures (H)       Past Surgical History:   Procedure Laterality Date    CHOLECYSTECTOMY      CYSTOSCOPY N/A 09/01/2020    Procedure: CYSTOSCOPY;  Surgeon: Hayley Zayas MD;  [facility]    DAVINCI HYSTERECTOMY TOTAL, BILATERAL SALPINGO-OOPHORECTOMY, COMBINED  Left 09/01/2020    Procedure: HYSTERECTOMY, TOTAL, ROBOT-ASSISTED, LAPAROSCOPIC,  left oophorectomy;  Surgeon: Hayley Zayas MD;  [facility]    DILATION AND CURETTAGE SUCTION  04/30/2015    retained POC    GASTRIC RESTRICTIVE PROCEDURE, OPEN, REMOVE/REPLACE SUBCUTANEOUS PORT      INJECT NERVE BLOCK OCCIPITAL Left 6/30/2022    Procedure: Left occipital nerve block under ultrasound guidance and left upper back trigger point injection;  Surgeon: Scott Morgan MD;  Location: [facility] OR    INJECT NERVE BLOCK OCCIPITAL Left 9/15/2022    Procedure: Left occipital nerve block under ultrasound guidance and left upper back trigger point injection;  Surgeon: Scott Morgan MD;  Location: [facility] OR    INJECT NERVE BLOCK OCCIPITAL Left 1/5/2023    Procedure: Left occipital nerve block under ultrasound guidance and left upper back trigger point injection;  Surgeon: Scott Morgan MD;  Location: [facility] OR    IR CAROTID CEREBRAL ANGIOGRAM BILATERAL  03/19/2021    IR CAROTID CEREBRAL ANGIOGRAM BILATERAL  06/23/2021    IR CAROTID CEREBRAL ANGIOGRAM BILATERAL  1/12/2022    LAPAROSCOPIC OOPHORECTOMY Right 08/05/2016    Procedure: LAPAROSCOPIC OOPHORECTOMY;  Surgeon: Adrianne Vergara MD;  [facility]    LAPAROSCOPIC REMOVAL GASTRIC ADJUSTABLE BAND N/A 05/11/2021    Procedure: REMOVAL, GASTRIC BAND, ADJUSTABLE, LAPAROSCOPIC;  Surgeon: Remy Gold MD;  [facility]    LAPAROSCOPIC SALPINGECTOMY Bilateral 08/05/2016    Procedure: LAPAROSCOPIC SALPINGECTOMY;  Surgeon: Adrianne Vergara MD;  [facility]    LAPAROSCOPIC TUBAL LIGATION Bilateral 05/22/2015    Procedure: LAPAROSCOPIC TUBAL LIGATION;  Surgeon: Hayley Zayas MD;  [facility]    LAPAROSCOPY OPERATIVE ADULT N/A 08/05/2016    Procedure: LAPAROSCOPY OPERATIVE ADULT;  Surgeon: Adrianne Vergara MD;  [facility]    PANNICULECTOMY  04/22/2017    [facility]    SOFT TISSUE SURGERY Right     cyst in hand      Current Outpatient Medications    Medication Sig Dispense Refill    ALPRAZolam (XANAX) 1 MG tablet Take 1 mg by mouth 3 times daily as needed      amitriptyline (ELAVIL) 10 MG tablet Take 1 tablet (10 mg) by mouth at bedtime. 30 tablet 0    amphetamine-dextroamphetamine (ADDERALL) 20 MG tablet Take 30 mg by mouth daily      aspirin (ASA) 325 MG tablet Take 1 tablet (325 mg) by mouth daily 90 tablet 3    camphor-menthol (DERMASARRA) 0.5-0.5 % external lotion Apply 1 mL topically every 6 hours as needed for skin care 222 mL 0    clonazePAM (KLONOPIN) 1 MG tablet Take 1 mg by mouth 3 times daily as needed      cloNIDine (CATAPRES) 0.2 MG tablet Take 0.2 mg by mouth 3 times daily      cyclobenzaprine (FLEXERIL) 10 MG tablet Take 1 tablet (10 mg) by mouth 3 times daily as needed for muscle spasms. 90 tablet 0    diclofenac (VOLTAREN) 1 % topical gel Apply 4 g topically 4 times daily as needed for moderate pain 350 g 0    doxepin (SINEQUAN) 10 MG capsule Take 10 mg by mouth At Bedtime      FLUoxetine (PROZAC) 20 MG capsule Take 20 mg by mouth 2 times daily      medical cannabis (Patient's own supply) See Admin Instructions (The purpose of this order is to document that the patient reports taking medical cannabis.  This is not a prescription, and is not used to certify that the patient has a qualifying medical condition.)      methocarbamol (ROBAXIN) 500 MG tablet Take 1 tablet (500 mg) by mouth 4 times daily as needed for muscle spasms. 120 tablet 0    metoprolol tartrate (LOPRESSOR) 25 MG tablet Take 1 tablet (25 mg) by mouth 2 times daily 60 tablet 3    mometasone-formoterol (DULERA) 100-5 MCG/ACT inhaler Inhale 2 puffs into the lungs 2 times daily as needed (cough, short of breath) 13 g 6    Multiple Vitamins-Minerals (MULTIVITAMIN ADULT PO) Take 1 tablet by mouth every morning       OLANZapine (ZYPREXA) 2.5 MG tablet Take 2.5 mg by mouth daily as needed (agitation)      prazosin (MINIPRESS) 2 MG capsule Take 2 mg by mouth At Bedtime      pregabalin  (LYRICA) 150 MG capsule TAKE 1 CAPSULE (150 MG) BY MOUTH 3 TIMES DAILY. 90 capsule 0    SAPHRIS 10 MG SUBL sublingual tablet Place 10 mg under the tongue 2 times daily       vitamin D3 (CHOLECALCIFEROL) 1.25 MG (45308 UT) capsule Take 1 capsule (50,000 Units) by mouth every 7 days. 8 capsule 0      Allergies   Allergen Reactions    Ibuprofen GI Disturbance    Seroquel [Quetiapine] Other (See Comments) and Rash     Insomnia and rash      ROS:   Gen- no fevers/chills   Derm - no rash/ redness   Neuro - no numbness, no tingling   Remainder of ROS negative.     Exam:   LMP 03/14/2018 (Approximate)        R Foot/Ankle:   Inspection: Swelling - YES - medial soft tissue; Bruising - No   Sensation: intact in peroneal, tibial, sural, and saphenous distribution   ROM: limited in all planes    Strength: 5/5 in all motions; Pain w/ resisted inversion  Bony tenderness: Medial malleolus? - NO; Lateral malleolus? - no; Navicular? - no; 5th Metatarsal? - no; Other - +TTP at medial calcaneus at plantar fascia origin  Ligaments Tenderness: ATFL/CFL -no; Deltoid - no; Syndesmosis - no; LisFranc - no  Tendons: Posterior Tibialis - YES; Peroneals - no; Achilles - no   Maneuvers: Calcaneal Squeeze - POS; Hop - deferred      Xray of R foot on July 14, 2025 at [facility] location - films personally reviewed with patient at time of visit     My impression: Neg

## 2025-07-15 DIAGNOSIS — M79.7 FIBROMYALGIA AFFECTING MULTIPLE SITES: ICD-10-CM

## 2025-07-15 NOTE — TELEPHONE ENCOUNTER
Refill request    Medication: amitriptyline (ELAVIL) 10 MG tablet     Sig: Take 1 tablet (10 mg) by mouth at bedtime. - Oral     Dispensed: 30 tablet  Refills: 0    Last prescribed to patient: 6/4/2025      Last clinic appointment: 7/25/2024  Next clinic appointment: N/A    Preferred pharmacy:  [facility] #[id number] [address]     Refill request routed to the provider to review.

## 2025-07-16 DIAGNOSIS — M53.3 CHRONIC SACROILIAC JOINT PAIN: ICD-10-CM

## 2025-07-16 DIAGNOSIS — M47.816 SPONDYLOSIS OF LUMBAR REGION WITHOUT MYELOPATHY OR RADICULOPATHY: ICD-10-CM

## 2025-07-16 DIAGNOSIS — G89.29 CHRONIC SACROILIAC JOINT PAIN: ICD-10-CM

## 2025-07-16 DIAGNOSIS — M54.2 CERVICALGIA: ICD-10-CM

## 2025-07-16 DIAGNOSIS — M79.18 MYOFASCIAL PAIN: ICD-10-CM

## 2025-07-16 NOTE — TELEPHONE ENCOUNTER
Refill request    Medication: methocarbamol (ROBAXIN) 500 MG tablet     Sig: Take 1 tablet (500 mg) by mouth 4 times daily as needed for muscle spasms.     Dispensed: 120  Refills: 0    Last prescribed to patient: 6/24/25     Last clinic appointment: 7/25/24  Next clinic appointment: none listed    Preferred pharmacy:    [facility] #[id number] [address]    Refill request routed to the provider to review.

## 2025-07-17 RX ORDER — METHOCARBAMOL 500 MG/1
500 TABLET, FILM COATED ORAL 4 TIMES DAILY PRN
Qty: 120 TABLET | Refills: 0 | Status: SHIPPED | OUTPATIENT
Start: 2025-07-17

## 2025-07-17 RX ORDER — AMITRIPTYLINE HYDROCHLORIDE 10 MG/1
10 TABLET ORAL AT BEDTIME
Qty: 30 TABLET | Refills: 0 | Status: SHIPPED | OUTPATIENT
Start: 2025-07-17

## 2025-07-20 DIAGNOSIS — M79.7 FIBROMYALGIA AFFECTING HAND: ICD-10-CM

## 2025-07-21 ENCOUNTER — TELEPHONE (OUTPATIENT)
Dept: FAMILY MEDICINE | Facility: CLINIC | Age: 45
End: 2025-07-21
Payer: COMMERCIAL

## 2025-07-21 DIAGNOSIS — M62.171 NONTRAUMATIC RUPTURE OF PLANTAR FASCIA OF RIGHT FOOT: ICD-10-CM

## 2025-07-21 DIAGNOSIS — M76.821 POSTERIOR TIBIAL TENDINITIS OF RIGHT LOWER EXTREMITY: Primary | ICD-10-CM

## 2025-07-21 NOTE — CONFIDENTIAL NOTE
LVM letting her know Dr. Suresh ordered an MRI and will call with results. He is willing to fill Lyrica for her for 1 month before she can get in with pain management.

## 2025-07-21 NOTE — TELEPHONE ENCOUNTER
Team - I've ordered a stat MRI. Please provide her a number to get scheduled. I will call her with results if done before Wed at 5pm as I am off for 20 days starting 7/24. I can also refill her pregabalin to start 7/24 for 1 month til she makes an appointment to see her pain mgmt team. PDMP reviewed.    Ap Mendez MD  July 21, 2025  1:12 PM

## 2025-07-21 NOTE — TELEPHONE ENCOUNTER
Refill request    Medication: pregabalin (LYRICA) 150 MG capsule     Sig: TAKE 1 CAPSULE (150 MG) BY MOUTH 3 TIMES DAILY.     Dispensed: 90  Refills: 0  SOLD to the pt on: 6/27/25     Last clinic appointment: 7/25/24  Next clinic appointment: none listed.  Sent a [employer] message to patient to schedule a follow up.    Preferred pharmacy:    [facility] #[id number] [address]    Refill request routed to the provider to review.

## 2025-07-21 NOTE — TELEPHONE ENCOUNTER
Patient is asking for a call from Dr. Mendez's team regarding her options for her right foot. She is stating the cast is not working, it is causing her severe pain.    Please call patient at [phone], any time, okay to leave detailed msg.

## 2025-07-21 NOTE — CONFIDENTIAL NOTE
Talked with patient and she is having increased arch and medial foot pain in the walking boot. She wanted to try the boot for a week but reports increased pain with walking. She has been laying low at home due to the pain. She did try the topical medication to help with the pain. I did let her know I will pass on the message to Dr. Mendez to discuss MRI or medication. We talked about putting an insert into the boot to help with the arch pain.

## 2025-07-22 ENCOUNTER — ANCILLARY PROCEDURE (OUTPATIENT)
Dept: MRI IMAGING | Facility: CLINIC | Age: 45
End: 2025-07-22
Attending: FAMILY MEDICINE
Payer: COMMERCIAL

## 2025-07-22 DIAGNOSIS — M76.821 POSTERIOR TIBIAL TENDINITIS OF RIGHT LOWER EXTREMITY: ICD-10-CM

## 2025-07-22 DIAGNOSIS — M62.171 NONTRAUMATIC RUPTURE OF PLANTAR FASCIA OF RIGHT FOOT: ICD-10-CM

## 2025-07-22 PROCEDURE — 73721 MRI JNT OF LWR EXTRE W/O DYE: CPT | Mod: RT | Performed by: RADIOLOGY

## 2025-07-23 RX ORDER — PREGABALIN 150 MG/1
150 CAPSULE ORAL 3 TIMES DAILY
Qty: 90 CAPSULE | Refills: 0 | Status: SHIPPED | OUTPATIENT
Start: 2025-07-23

## 2025-08-04 ENCOUNTER — OFFICE VISIT (OUTPATIENT)
Dept: ORTHOPEDICS | Facility: CLINIC | Age: 45
End: 2025-08-04
Payer: COMMERCIAL

## 2025-08-04 ENCOUNTER — ANCILLARY PROCEDURE (OUTPATIENT)
Dept: GENERAL RADIOLOGY | Facility: CLINIC | Age: 45
End: 2025-08-04
Attending: FAMILY MEDICINE
Payer: COMMERCIAL

## 2025-08-04 DIAGNOSIS — M84.374D STRESS FRACTURE OF RIGHT FOOT WITH ROUTINE HEALING, SUBSEQUENT ENCOUNTER: Primary | ICD-10-CM

## 2025-08-04 DIAGNOSIS — M79.89 SWELLING OF LEFT LOWER EXTREMITY: ICD-10-CM

## 2025-08-04 PROCEDURE — 73630 X-RAY EXAM OF FOOT: CPT | Mod: LT | Performed by: RADIOLOGY

## 2025-08-04 PROCEDURE — 99214 OFFICE O/P EST MOD 30 MIN: CPT | Mod: GC | Performed by: FAMILY MEDICINE

## 2025-08-05 ENCOUNTER — TELEPHONE (OUTPATIENT)
Dept: ORTHOPEDICS | Facility: CLINIC | Age: 45
End: 2025-08-05

## 2025-08-05 ENCOUNTER — ANCILLARY PROCEDURE (OUTPATIENT)
Dept: ULTRASOUND IMAGING | Facility: CLINIC | Age: 45
End: 2025-08-05
Attending: FAMILY MEDICINE
Payer: COMMERCIAL

## 2025-08-05 DIAGNOSIS — M79.89 SWELLING OF LEFT LOWER EXTREMITY: ICD-10-CM

## 2025-08-05 DIAGNOSIS — M25.572 PAIN OF JOINT OF LEFT ANKLE AND FOOT: Primary | ICD-10-CM

## 2025-08-05 PROCEDURE — 93971 EXTREMITY STUDY: CPT | Mod: LT

## 2025-08-11 ENCOUNTER — TELEPHONE (OUTPATIENT)
Dept: ANESTHESIOLOGY | Facility: CLINIC | Age: 45
End: 2025-08-11

## 2025-08-11 DIAGNOSIS — M79.7 FIBROMYALGIA AFFECTING MULTIPLE SITES: ICD-10-CM

## 2025-08-11 DIAGNOSIS — M79.18 MYOFASCIAL PAIN: ICD-10-CM

## 2025-08-11 RX ORDER — AMITRIPTYLINE HYDROCHLORIDE 10 MG/1
10 TABLET ORAL AT BEDTIME
Qty: 30 TABLET | Refills: 0 | Status: SHIPPED | OUTPATIENT
Start: 2025-08-11

## 2025-08-11 RX ORDER — CYCLOBENZAPRINE HCL 10 MG
10 TABLET ORAL 3 TIMES DAILY PRN
Qty: 90 TABLET | Refills: 0 | Status: SHIPPED | OUTPATIENT
Start: 2025-08-11

## 2025-08-18 DIAGNOSIS — M53.3 CHRONIC SACROILIAC JOINT PAIN: ICD-10-CM

## 2025-08-18 DIAGNOSIS — M54.2 CERVICALGIA: ICD-10-CM

## 2025-08-18 DIAGNOSIS — M79.18 MYOFASCIAL PAIN: ICD-10-CM

## 2025-08-18 DIAGNOSIS — M47.816 SPONDYLOSIS OF LUMBAR REGION WITHOUT MYELOPATHY OR RADICULOPATHY: ICD-10-CM

## 2025-08-18 DIAGNOSIS — G89.29 CHRONIC SACROILIAC JOINT PAIN: ICD-10-CM

## 2025-08-19 RX ORDER — METHOCARBAMOL 500 MG/1
500 TABLET, FILM COATED ORAL 4 TIMES DAILY PRN
Qty: 120 TABLET | Refills: 0 | Status: SHIPPED | OUTPATIENT
Start: 2025-08-19

## (undated) DEVICE — LIGHT HANDLE X1 31140133

## (undated) DEVICE — PEN MARKING SKIN W/LABELS 31145918

## (undated) DEVICE — SYR BULB IRRIG 50ML LATEX FREE 0035280

## (undated) DEVICE — DAVINCI S MONOPOLAR SCISSORS PRECURVED HOT SHEARS 420179

## (undated) DEVICE — PREP CHLORAPREP 26ML TINTED ORANGE  260815

## (undated) DEVICE — SUCTION IRR STRYKERFLOW II W/TIP 250-070-520

## (undated) DEVICE — LINEN GOWN X4 5410

## (undated) DEVICE — COVER CAMERA IN-LIGHT DISP LT-C02

## (undated) DEVICE — SU ENDO STITCH SURGIDAC 2-0 ES-9 7" TRIPLE STITCH 170041

## (undated) DEVICE — DAVINCI HOT SHEARS TIP COVER  400180

## (undated) DEVICE — NDL SPINAL 25GA 3.5" QUINCKE 405180

## (undated) DEVICE — CATH INTERMITTENT CLEAN-CATH FEMALE 14FR 6" VINYL LF 420614

## (undated) DEVICE — BLADE SWITCH SCISSORS TIP 5MM 89-5100B

## (undated) DEVICE — TUBING SUCTION 10'X3/16" N510

## (undated) DEVICE — DAVINCI S FCP BIPOLAR FENESTRATED 420205

## (undated) DEVICE — TRAY PAIN INJECTION 97A 640

## (undated) DEVICE — NDL CANNULA SOLOPLEX STIM 21GX100MM 001187-77

## (undated) DEVICE — NDL 30GA 0.5" 305106

## (undated) DEVICE — PAD PERI INDIV WRAP 11" 2022A

## (undated) DEVICE — SYR 50ML LL W/O NDL 309653

## (undated) DEVICE — GLOVE PROTEXIS POWDER FREE SMT 7.5  2D72PT75X

## (undated) DEVICE — SOL WATER IRRIG 1000ML BOTTLE 2F7114

## (undated) DEVICE — ENDO TROCAR FIRST ENTRY KII FIOS ADV FIX 12X100MM CFF73

## (undated) DEVICE — SU MONOCRYL 4-0 PS-2 27" UND Y426H

## (undated) DEVICE — DRAPE WARMER 66X44" ORS-300

## (undated) DEVICE — SOL ADH LIQUID BENZOIN SWAB 0.6ML C1544

## (undated) DEVICE — TUBING SUCTION MEDI-VAC 1/4"X20' N620A

## (undated) DEVICE — TUBING SMOKE EVAC PNEUVIEW 9660-XE

## (undated) DEVICE — STPL POWERED ECHELON LONG 60MM PLEE60A

## (undated) DEVICE — LINEN TOWEL PACK X5 5464

## (undated) DEVICE — DEVICE SUTURE PASSER 14GA WECK EFX EFXSP2

## (undated) DEVICE — GLOVE PROTEXIS BLUE W/NEU-THERA 7.0  2D73EB70

## (undated) DEVICE — PREP CHLORAPREP W/ORANGE TINT 10.5ML 260715

## (undated) DEVICE — SOL NACL 0.9% INJ 1000ML BAG 2B1324X

## (undated) DEVICE — SU VICRYL 0 UR-6 27" J603H

## (undated) DEVICE — SU VICRYL 3-0 SH 27" J316H

## (undated) DEVICE — COVER PROBE ULTRASOUND 3D W/GEL 5X96" LF 20-P3D596

## (undated) DEVICE — LINEN ORTHO PACK 5446

## (undated) DEVICE — SU WND CLOSURE VLOC 180 ABS 2-0 12" GS-21 VLOCL0315

## (undated) DEVICE — BARRIER INTERCEED 3X4" 4350

## (undated) DEVICE — DRSG PRIMAPORE 02X3" 7133

## (undated) DEVICE — DRSG TELFA ISLAND 4X8" 7541

## (undated) DEVICE — DRAPE UNDER BUTTOCK 8483

## (undated) DEVICE — ENDO TROCAR SLEEVE KII Z-THREADED 05X100MM CTS02

## (undated) DEVICE — APPLICATOR VISTASEAL RIGID TIP 35CM VSTL35

## (undated) DEVICE — SU MONOCRYL 4-0 PS-2 18" UND Y496G

## (undated) DEVICE — ENDO POUCH UNIVERSAL RETRIEVAL SYSTEM INZII 5MM CD003

## (undated) DEVICE — SU VICRYL 0 TIE 54" J608H

## (undated) DEVICE — ESU PENCIL W/COATED BLADE E2450H

## (undated) DEVICE — ENDO TROCAR OPTICAL ACCESS KII Z-THRD 15X100MM C0R37

## (undated) DEVICE — CATH TRAY FOLEY SURESTEP 16FR WDRAIN BAG STLK LATEX A300316A

## (undated) DEVICE — NDL INSUFFLATION 13GA 150MM C2202

## (undated) DEVICE — TAPE MEDIPORE 4"X2YD 2864

## (undated) DEVICE — GLOVE PROTEXIS W/NEU-THERA 7.0  2D73TE70

## (undated) DEVICE — ADH SKIN CLOSURE PREMIERPRO EXOFIN 1.0ML 3470

## (undated) DEVICE — ADAPTER DRAPE ALLY AU-AD

## (undated) DEVICE — ESU PENCIL W/HOLSTER E2350H

## (undated) DEVICE — ESU GROUND PAD UNIVERSAL W/O CORD

## (undated) DEVICE — TUBING INSUFFLATION W/FILTER CPC TO LUER 620-030-301

## (undated) DEVICE — ANTIFOG SOLUTION W/FOAM PAD 31142527

## (undated) DEVICE — LINEN TOWEL PACK X6 WHITE 5487

## (undated) DEVICE — STPL SKIN 35W ROTATING HEAD PRW35

## (undated) DEVICE — DEVICE ENDO STITCH APPLIER 10MM 173016

## (undated) DEVICE — KIT ENDO FIRST STEP DISINFECTANT 200ML W/POUCH EP-4

## (undated) DEVICE — DRSG PRIMAPORE 03 1/8X6" 66000318

## (undated) DEVICE — ESU ENDO SCISSORS 5MM CVD 5DCS

## (undated) DEVICE — SOL NACL 0.9% IRRIG 1000ML BOTTLE 2F7124

## (undated) DEVICE — GLOVE PROTEXIS BLUE W/NEU-THERA 7.5  2D73EB75

## (undated) DEVICE — GLOVE PROTEXIS W/NEU-THERA 6.5  2D73TE65

## (undated) DEVICE — CATH TRAY FOLEY SURESTEP 16FR W/URNE MTR STLK LATEX A303316A

## (undated) DEVICE — PAD CHUX UNDERPAD 30X36" P3036C

## (undated) DEVICE — ENDO TROCAR FIRST ENTRY KII FIOS Z-THRD 12X100MM CTF73

## (undated) DEVICE — ESU GROUND PAD ADULT W/CORD E7507

## (undated) DEVICE — SYR 10ML LL W/O NDL 302995

## (undated) DEVICE — SUCTION MANIFOLD NEPTUNE 2 SYS 4 PORT 0702-020-000

## (undated) DEVICE — STABILIZER ARCH KOH-EFFICIENT 4.0CM KC-ARCH-40

## (undated) DEVICE — LINEN TOWEL PACK X30 5481

## (undated) DEVICE — SPONGE LAP 18X18" X8435

## (undated) DEVICE — DECANTER TRANSFER DEVICE 2008S

## (undated) DEVICE — Device

## (undated) DEVICE — DAVINCI OBTURATOR 8MM BLADELESS 420023

## (undated) DEVICE — ENDO TROCAR FIRST ENTRY KII FIOS ADV FIX 11X100MM CFF33

## (undated) DEVICE — STPL RELOAD REG TISSUE ECHELON 60 X 3.6MM BLUE GST60B

## (undated) DEVICE — ENDO TROCAR FIRST ENTRY KII FIOS ADV FIX 12X150MM CFF71

## (undated) DEVICE — DAVINCI SI DRAPE ACCESSORY KIT 3-ARM 420290

## (undated) DEVICE — DAVINCI S NDL DRIVER LARGE 420006

## (undated) DEVICE — UTERINE MANIPULATOR RUMI 6.7MMX6CM UMW676

## (undated) DEVICE — ESU CORD BIPOLAR GREEN 10-4000

## (undated) DEVICE — SUCTION MANIFOLD DORNOCH ULTRA CART UL-CL500

## (undated) DEVICE — TUBING IRRIG CYSTO/BLADDER SET 81" LF 2C4040

## (undated) DEVICE — NDL SPINAL 20GA 3.5" 405182

## (undated) DEVICE — DAVINCI S CANNULA SEAL 8MM 400077

## (undated) DEVICE — ENDO TROCAR FIRST ENTRY KII FIOS Z-THRD 05X100MM CTF03

## (undated) DEVICE — SYR 30ML LL W/O NDL 302832

## (undated) DEVICE — KIT POSITIONER PINK PAD XL ADVANCED 40588

## (undated) DEVICE — BLADE KNIFE SURG 11 371111

## (undated) DEVICE — WIPES FOLEY CARE SURESTEP PROVON DFC100

## (undated) RX ORDER — DIAZEPAM 5 MG
TABLET ORAL
Status: DISPENSED
Start: 2023-01-05

## (undated) RX ORDER — ESMOLOL HYDROCHLORIDE 10 MG/ML
INJECTION INTRAVENOUS
Status: DISPENSED
Start: 2021-05-11

## (undated) RX ORDER — FENTANYL CITRATE 50 UG/ML
INJECTION, SOLUTION INTRAMUSCULAR; INTRAVENOUS
Status: DISPENSED
Start: 2021-03-19

## (undated) RX ORDER — EPHEDRINE SULFATE 50 MG/ML
INJECTION, SOLUTION INTRAMUSCULAR; INTRAVENOUS; SUBCUTANEOUS
Status: DISPENSED
Start: 2021-06-23

## (undated) RX ORDER — GLYCOPYRROLATE 0.2 MG/ML
INJECTION, SOLUTION INTRAMUSCULAR; INTRAVENOUS
Status: DISPENSED
Start: 2021-06-23

## (undated) RX ORDER — ONDANSETRON 2 MG/ML
INJECTION INTRAMUSCULAR; INTRAVENOUS
Status: DISPENSED
Start: 2021-06-23

## (undated) RX ORDER — PROTAMINE SULFATE 10 MG/ML
INJECTION, SOLUTION INTRAVENOUS
Status: DISPENSED
Start: 2021-06-23

## (undated) RX ORDER — HYDROMORPHONE HYDROCHLORIDE 1 MG/ML
INJECTION, SOLUTION INTRAMUSCULAR; INTRAVENOUS; SUBCUTANEOUS
Status: DISPENSED
Start: 2021-05-11

## (undated) RX ORDER — BETAMETHASONE SODIUM PHOSPHATE AND BETAMETHASONE ACETATE 3; 3 MG/ML; MG/ML
INJECTION, SUSPENSION INTRA-ARTICULAR; INTRALESIONAL; INTRAMUSCULAR; SOFT TISSUE
Status: DISPENSED
Start: 2022-01-31

## (undated) RX ORDER — FENTANYL CITRATE 50 UG/ML
INJECTION, SOLUTION INTRAMUSCULAR; INTRAVENOUS
Status: DISPENSED
Start: 2022-01-12

## (undated) RX ORDER — HEPARIN SODIUM 1000 [USP'U]/ML
INJECTION, SOLUTION INTRAVENOUS; SUBCUTANEOUS
Status: DISPENSED
Start: 2022-01-12

## (undated) RX ORDER — FENTANYL CITRATE 50 UG/ML
INJECTION, SOLUTION INTRAMUSCULAR; INTRAVENOUS
Status: DISPENSED
Start: 2021-05-11

## (undated) RX ORDER — CEFAZOLIN SODIUM 1 G/3ML
INJECTION, POWDER, FOR SOLUTION INTRAMUSCULAR; INTRAVENOUS
Status: DISPENSED
Start: 2020-09-01

## (undated) RX ORDER — LIDOCAINE HYDROCHLORIDE 10 MG/ML
INJECTION, SOLUTION EPIDURAL; INFILTRATION; INTRACAUDAL; PERINEURAL
Status: DISPENSED
Start: 2022-01-31

## (undated) RX ORDER — LIDOCAINE HYDROCHLORIDE 20 MG/ML
INJECTION, SOLUTION EPIDURAL; INFILTRATION; INTRACAUDAL; PERINEURAL
Status: DISPENSED
Start: 2021-05-11

## (undated) RX ORDER — LABETALOL HYDROCHLORIDE 5 MG/ML
INJECTION, SOLUTION INTRAVENOUS
Status: DISPENSED
Start: 2021-05-11

## (undated) RX ORDER — DEXAMETHASONE SODIUM PHOSPHATE 4 MG/ML
INJECTION, SOLUTION INTRA-ARTICULAR; INTRALESIONAL; INTRAMUSCULAR; INTRAVENOUS; SOFT TISSUE
Status: DISPENSED
Start: 2021-05-11

## (undated) RX ORDER — ASPIRIN 325 MG
TABLET ORAL
Status: DISPENSED
Start: 2021-06-23

## (undated) RX ORDER — LIDOCAINE HYDROCHLORIDE 10 MG/ML
INJECTION, SOLUTION INFILTRATION; PERINEURAL
Status: DISPENSED
Start: 2022-01-12

## (undated) RX ORDER — FENTANYL CITRATE 50 UG/ML
INJECTION, SOLUTION INTRAMUSCULAR; INTRAVENOUS
Status: DISPENSED
Start: 2021-06-23

## (undated) RX ORDER — ESMOLOL HYDROCHLORIDE 10 MG/ML
INJECTION INTRAVENOUS
Status: DISPENSED
Start: 2021-06-23

## (undated) RX ORDER — HYDROMORPHONE HYDROCHLORIDE 1 MG/ML
INJECTION, SOLUTION INTRAMUSCULAR; INTRAVENOUS; SUBCUTANEOUS
Status: DISPENSED
Start: 2021-06-23

## (undated) RX ORDER — HYDROMORPHONE HYDROCHLORIDE 1 MG/ML
INJECTION, SOLUTION INTRAMUSCULAR; INTRAVENOUS; SUBCUTANEOUS
Status: DISPENSED
Start: 2020-09-01

## (undated) RX ORDER — LIDOCAINE HYDROCHLORIDE 10 MG/ML
INJECTION, SOLUTION EPIDURAL; INFILTRATION; INTRACAUDAL; PERINEURAL
Status: DISPENSED
Start: 2021-06-23

## (undated) RX ORDER — HEPARIN SODIUM 1000 [USP'U]/ML
INJECTION, SOLUTION INTRAVENOUS; SUBCUTANEOUS
Status: DISPENSED
Start: 2021-06-23

## (undated) RX ORDER — DEXAMETHASONE SODIUM PHOSPHATE 4 MG/ML
INJECTION, SOLUTION INTRA-ARTICULAR; INTRALESIONAL; INTRAMUSCULAR; INTRAVENOUS; SOFT TISSUE
Status: DISPENSED
Start: 2020-09-01

## (undated) RX ORDER — OXYCODONE AND ACETAMINOPHEN 5; 325 MG/1; MG/1
TABLET ORAL
Status: DISPENSED
Start: 2022-01-12

## (undated) RX ORDER — BUPIVACAINE HYDROCHLORIDE 2.5 MG/ML
INJECTION, SOLUTION EPIDURAL; INFILTRATION; INTRACAUDAL
Status: DISPENSED
Start: 2021-05-11

## (undated) RX ORDER — FENTANYL CITRATE 50 UG/ML
INJECTION, SOLUTION INTRAMUSCULAR; INTRAVENOUS
Status: DISPENSED
Start: 2020-09-01

## (undated) RX ORDER — VERAPAMIL HYDROCHLORIDE 2.5 MG/ML
INJECTION, SOLUTION INTRAVENOUS
Status: DISPENSED
Start: 2022-01-12

## (undated) RX ORDER — DIAZEPAM 5 MG
TABLET ORAL
Status: DISPENSED
Start: 2022-06-30

## (undated) RX ORDER — OXYCODONE AND ACETAMINOPHEN 5; 325 MG/1; MG/1
TABLET ORAL
Status: DISPENSED
Start: 2021-05-11

## (undated) RX ORDER — ONDANSETRON 2 MG/ML
INJECTION INTRAMUSCULAR; INTRAVENOUS
Status: DISPENSED
Start: 2021-05-11

## (undated) RX ORDER — LIDOCAINE HYDROCHLORIDE 20 MG/ML
INJECTION, SOLUTION EPIDURAL; INFILTRATION; INTRACAUDAL; PERINEURAL
Status: DISPENSED
Start: 2020-09-01

## (undated) RX ORDER — CEFAZOLIN SODIUM 2 G/100ML
INJECTION, SOLUTION INTRAVENOUS
Status: DISPENSED
Start: 2020-09-01

## (undated) RX ORDER — PHENAZOPYRIDINE HYDROCHLORIDE 200 MG/1
TABLET, FILM COATED ORAL
Status: DISPENSED
Start: 2020-09-01

## (undated) RX ORDER — FENTANYL CITRATE-0.9 % NACL/PF 10 MCG/ML
PLASTIC BAG, INJECTION (ML) INTRAVENOUS
Status: DISPENSED
Start: 2021-06-23

## (undated) RX ORDER — LIDOCAINE HYDROCHLORIDE 20 MG/ML
INJECTION, SOLUTION EPIDURAL; INFILTRATION; INTRACAUDAL; PERINEURAL
Status: DISPENSED
Start: 2021-06-23

## (undated) RX ORDER — LABETALOL 20 MG/4 ML (5 MG/ML) INTRAVENOUS SYRINGE
Status: DISPENSED
Start: 2020-09-01

## (undated) RX ORDER — CEFAZOLIN SODIUM 2 G/100ML
INJECTION, SOLUTION INTRAVENOUS
Status: DISPENSED
Start: 2021-05-11

## (undated) RX ORDER — DIAZEPAM 5 MG
TABLET ORAL
Status: DISPENSED
Start: 2022-09-15

## (undated) RX ORDER — NITROGLYCERIN 5 MG/ML
VIAL (ML) INTRAVENOUS
Status: DISPENSED
Start: 2022-01-12

## (undated) RX ORDER — ONDANSETRON 2 MG/ML
INJECTION INTRAMUSCULAR; INTRAVENOUS
Status: DISPENSED
Start: 2020-09-01

## (undated) RX ORDER — CLOPIDOGREL BISULFATE 75 MG/1
TABLET ORAL
Status: DISPENSED
Start: 2021-06-23

## (undated) RX ORDER — HEPARIN SODIUM 200 [USP'U]/100ML
INJECTION, SOLUTION INTRAVENOUS
Status: DISPENSED
Start: 2022-01-12

## (undated) RX ORDER — LIDOCAINE HYDROCHLORIDE 10 MG/ML
INJECTION, SOLUTION EPIDURAL; INFILTRATION; INTRACAUDAL; PERINEURAL
Status: DISPENSED
Start: 2021-03-19

## (undated) RX ORDER — PROPOFOL 10 MG/ML
INJECTION, EMULSION INTRAVENOUS
Status: DISPENSED
Start: 2021-05-11

## (undated) RX ORDER — PROPOFOL 10 MG/ML
INJECTION, EMULSION INTRAVENOUS
Status: DISPENSED
Start: 2021-06-23

## (undated) RX ORDER — DEXAMETHASONE SODIUM PHOSPHATE 4 MG/ML
INJECTION, SOLUTION INTRA-ARTICULAR; INTRALESIONAL; INTRAMUSCULAR; INTRAVENOUS; SOFT TISSUE
Status: DISPENSED
Start: 2021-06-23

## (undated) RX ORDER — SODIUM CHLORIDE 9 MG/ML
INJECTION, SOLUTION INTRAVENOUS
Status: DISPENSED
Start: 2021-06-23

## (undated) RX ORDER — SODIUM CHLORIDE 9 MG/ML
INJECTION, SOLUTION INTRAVENOUS
Status: DISPENSED
Start: 2021-03-19